# Patient Record
Sex: FEMALE | Race: WHITE | NOT HISPANIC OR LATINO
[De-identification: names, ages, dates, MRNs, and addresses within clinical notes are randomized per-mention and may not be internally consistent; named-entity substitution may affect disease eponyms.]

---

## 2017-01-06 ENCOUNTER — APPOINTMENT (OUTPATIENT)
Dept: ORTHOPEDIC SURGERY | Facility: HOSPITAL | Age: 83
End: 2017-01-06

## 2017-01-06 ENCOUNTER — OUTPATIENT (OUTPATIENT)
Dept: OUTPATIENT SERVICES | Facility: HOSPITAL | Age: 83
LOS: 1 days | Discharge: HOME | End: 2017-01-06
Payer: MEDICARE

## 2017-01-06 VITALS
DIASTOLIC BLOOD PRESSURE: 52 MMHG | SYSTOLIC BLOOD PRESSURE: 156 MMHG | RESPIRATION RATE: 14 BRPM | HEART RATE: 50 BPM | OXYGEN SATURATION: 94 %

## 2017-01-06 VITALS
SYSTOLIC BLOOD PRESSURE: 169 MMHG | HEART RATE: 51 BPM | OXYGEN SATURATION: 100 % | WEIGHT: 151.9 LBS | RESPIRATION RATE: 16 BRPM | TEMPERATURE: 99 F | DIASTOLIC BLOOD PRESSURE: 58 MMHG | HEIGHT: 62 IN

## 2017-01-06 DIAGNOSIS — Z96.7 PRESENCE OF OTHER BONE AND TENDON IMPLANTS: Chronic | ICD-10-CM

## 2017-01-06 DIAGNOSIS — T84.84XA PAIN DUE TO INTERNAL ORTHOPEDIC PROSTHETIC DEVICES, IMPLANTS AND GRAFTS, INITIAL ENCOUNTER: ICD-10-CM

## 2017-01-06 PROCEDURE — 20680 REMOVAL OF IMPLANT DEEP: CPT | Mod: RT

## 2017-01-06 PROCEDURE — 20610 DRAIN/INJ JOINT/BURSA W/O US: CPT | Mod: 59,RT

## 2017-01-06 RX ORDER — HYDROMORPHONE HYDROCHLORIDE 2 MG/ML
0.25 INJECTION INTRAMUSCULAR; INTRAVENOUS; SUBCUTANEOUS
Qty: 0 | Refills: 0 | Status: DISCONTINUED | OUTPATIENT
Start: 2017-01-06 | End: 2017-01-06

## 2017-01-06 RX ORDER — OXYCODONE HYDROCHLORIDE 5 MG/1
1 TABLET ORAL
Qty: 20 | Refills: 0 | OUTPATIENT
Start: 2017-01-06

## 2017-01-06 RX ORDER — OXYCODONE AND ACETAMINOPHEN 5; 325 MG/1; MG/1
1 TABLET ORAL
Qty: 20 | Refills: 0
Start: 2017-01-06

## 2017-01-06 RX ORDER — SODIUM CHLORIDE 9 MG/ML
1000 INJECTION, SOLUTION INTRAVENOUS
Qty: 0 | Refills: 0 | Status: DISCONTINUED | OUTPATIENT
Start: 2017-01-06 | End: 2017-01-21

## 2017-01-06 RX ORDER — ONDANSETRON 8 MG/1
4 TABLET, FILM COATED ORAL ONCE
Qty: 0 | Refills: 0 | Status: DISCONTINUED | OUTPATIENT
Start: 2017-01-06 | End: 2017-01-06

## 2017-01-06 RX ADMIN — SODIUM CHLORIDE 75 MILLILITER(S): 9 INJECTION, SOLUTION INTRAVENOUS at 09:00

## 2017-01-06 NOTE — ASU DISCHARGE PLAN (ADULT/PEDIATRIC). - NOTIFY
Persistent Nausea and Vomiting/Bleeding that does not stop/Inability to Tolerate Liquids or Foods/Fever greater than 101/Numbness, color, or temperature change to extremity

## 2017-01-06 NOTE — ASU DISCHARGE PLAN (ADULT/PEDIATRIC). - MEDICATION SUMMARY - MEDICATIONS TO TAKE
I will START or STAY ON the medications listed below when I get home from the hospital:    Ecotrin 325 mg oral delayed release tablet  -- Indication: For Prophylaxis    acetaminophen-oxyCODONE 325 mg-5 mg oral tablet  -- 1-2 tab(s) by mouth every 4 hours, As needed, Moderate Pain (4 - 6) MDD:12 tabs.  -- Indication: For Pain Control    losartan 50 mg oral tablet  -- 1 tab(s) by mouth once a day  -- Indication: For Hypertension    Januvia 25 mg oral tablet  -- 1 tab(s) by mouth once a day  -- Indication: For Diabetes    Claritin 10 mg oral tablet  -- 1 tab(s) by mouth once a day  -- Indication: For Allergies    simvastatin 20 mg oral tablet  -- 2 tab(s) by mouth once a day (at bedtime)  -- Indication: For Hyperlipidemia    metoprolol tartrate 100 mg oral tablet  -- 1 tab(s) by mouth 2 times a day  -- Indication: For Hypertension

## 2017-01-18 ENCOUNTER — APPOINTMENT (OUTPATIENT)
Dept: ORTHOPEDIC SURGERY | Facility: CLINIC | Age: 83
End: 2017-01-18

## 2017-01-18 VITALS — WEIGHT: 152 LBS | HEIGHT: 61 IN | BODY MASS INDEX: 28.7 KG/M2

## 2017-06-20 ENCOUNTER — APPOINTMENT (OUTPATIENT)
Dept: ORTHOPEDIC SURGERY | Facility: CLINIC | Age: 83
End: 2017-06-20

## 2017-08-01 ENCOUNTER — APPOINTMENT (OUTPATIENT)
Dept: ORTHOPEDIC SURGERY | Facility: CLINIC | Age: 83
End: 2017-08-01
Payer: MEDICARE

## 2017-08-01 VITALS — BODY MASS INDEX: 28.7 KG/M2 | WEIGHT: 152 LBS | HEIGHT: 61 IN

## 2017-08-01 DIAGNOSIS — S72.001D FRACTURE OF UNSPECIFIED PART OF NECK OF RIGHT FEMUR, SUBSEQUENT ENCOUNTER FOR CLOSED FRACTURE WITH ROUTINE HEALING: ICD-10-CM

## 2017-08-01 PROCEDURE — 73564 X-RAY EXAM KNEE 4 OR MORE: CPT | Mod: RT

## 2017-08-01 PROCEDURE — 99214 OFFICE O/P EST MOD 30 MIN: CPT

## 2017-08-06 ENCOUNTER — FORM ENCOUNTER (OUTPATIENT)
Age: 83
End: 2017-08-06

## 2017-08-07 ENCOUNTER — APPOINTMENT (OUTPATIENT)
Dept: ULTRASOUND IMAGING | Facility: CLINIC | Age: 83
End: 2017-08-07
Payer: MEDICARE

## 2017-08-07 ENCOUNTER — OUTPATIENT (OUTPATIENT)
Dept: OUTPATIENT SERVICES | Facility: HOSPITAL | Age: 83
LOS: 1 days | End: 2017-08-07
Payer: MEDICARE

## 2017-08-07 DIAGNOSIS — Z00.8 ENCOUNTER FOR OTHER GENERAL EXAMINATION: ICD-10-CM

## 2017-08-07 DIAGNOSIS — Z96.7 PRESENCE OF OTHER BONE AND TENDON IMPLANTS: Chronic | ICD-10-CM

## 2017-08-07 PROCEDURE — 20611 DRAIN/INJ JOINT/BURSA W/US: CPT

## 2017-08-07 PROCEDURE — 20611 DRAIN/INJ JOINT/BURSA W/US: CPT | Mod: RT

## 2018-06-19 ENCOUNTER — APPOINTMENT (OUTPATIENT)
Dept: ORTHOPEDIC SURGERY | Facility: CLINIC | Age: 84
End: 2018-06-19
Payer: MEDICARE

## 2018-06-19 VITALS
BODY MASS INDEX: 28.51 KG/M2 | HEIGHT: 61 IN | SYSTOLIC BLOOD PRESSURE: 157 MMHG | HEART RATE: 68 BPM | DIASTOLIC BLOOD PRESSURE: 65 MMHG | WEIGHT: 151 LBS

## 2018-06-19 PROCEDURE — 99214 OFFICE O/P EST MOD 30 MIN: CPT

## 2018-06-19 PROCEDURE — 73502 X-RAY EXAM HIP UNI 2-3 VIEWS: CPT | Mod: RT

## 2018-06-19 RX ORDER — CELECOXIB 100 MG/1
100 CAPSULE ORAL
Qty: 60 | Refills: 2 | Status: ACTIVE | COMMUNITY
Start: 2018-06-19 | End: 1900-01-01

## 2018-07-14 ENCOUNTER — TRANSCRIPTION ENCOUNTER (OUTPATIENT)
Age: 84
End: 2018-07-14

## 2018-10-02 ENCOUNTER — APPOINTMENT (OUTPATIENT)
Dept: ORTHOPEDIC SURGERY | Facility: CLINIC | Age: 84
End: 2018-10-02
Payer: MEDICARE

## 2018-10-02 VITALS — SYSTOLIC BLOOD PRESSURE: 146 MMHG | DIASTOLIC BLOOD PRESSURE: 63 MMHG | HEART RATE: 63 BPM

## 2018-10-02 DIAGNOSIS — M16.11 UNILATERAL PRIMARY OSTEOARTHRITIS, RIGHT HIP: ICD-10-CM

## 2018-10-02 DIAGNOSIS — M17.11 UNILATERAL PRIMARY OSTEOARTHRITIS, RIGHT KNEE: ICD-10-CM

## 2018-10-02 PROCEDURE — 99213 OFFICE O/P EST LOW 20 MIN: CPT

## 2019-03-23 NOTE — ASU PATIENT PROFILE, ADULT - NS SC CAGE ALCOHOL GUILTY ABOUT
Writer discussed clinical presentation and plan of care with attending physician, Dr. Bertha Fields. Writer received MD order to change admission order to observation.      Miguel Mercedes BSN, RN-BC  RN Case Manager
no

## 2020-01-22 ENCOUNTER — EMERGENCY (EMERGENCY)
Facility: HOSPITAL | Age: 86
LOS: 1 days | Discharge: HOME | End: 2020-01-22
Attending: EMERGENCY MEDICINE | Admitting: EMERGENCY MEDICINE
Payer: MEDICARE

## 2020-01-22 VITALS
RESPIRATION RATE: 16 BRPM | DIASTOLIC BLOOD PRESSURE: 74 MMHG | OXYGEN SATURATION: 97 % | SYSTOLIC BLOOD PRESSURE: 158 MMHG | TEMPERATURE: 98 F | HEART RATE: 62 BPM

## 2020-01-22 VITALS
WEIGHT: 145.06 LBS | TEMPERATURE: 98 F | SYSTOLIC BLOOD PRESSURE: 198 MMHG | OXYGEN SATURATION: 98 % | RESPIRATION RATE: 16 BRPM | HEART RATE: 58 BPM | DIASTOLIC BLOOD PRESSURE: 69 MMHG

## 2020-01-22 DIAGNOSIS — Z96.7 PRESENCE OF OTHER BONE AND TENDON IMPLANTS: Chronic | ICD-10-CM

## 2020-01-22 PROCEDURE — 73552 X-RAY EXAM OF FEMUR 2/>: CPT

## 2020-01-22 PROCEDURE — 99283 EMERGENCY DEPT VISIT LOW MDM: CPT

## 2020-01-22 PROCEDURE — 93971 EXTREMITY STUDY: CPT | Mod: 26,LT

## 2020-01-22 PROCEDURE — 73552 X-RAY EXAM OF FEMUR 2/>: CPT | Mod: 26,LT

## 2020-01-22 PROCEDURE — 93971 EXTREMITY STUDY: CPT

## 2020-01-22 PROCEDURE — 99284 EMERGENCY DEPT VISIT MOD MDM: CPT | Mod: 25

## 2020-01-22 NOTE — ED PROVIDER NOTE - NSFOLLOWUPINSTRUCTIONS_ED_ALL_ED_FT
Follow up with your PCP/ortho within 2-3 days. Return to ED immediately for new or worsening symptoms

## 2020-01-22 NOTE — ED ADULT NURSE NOTE - OBJECTIVE STATEMENT
Pt presents to the ED via ambulance s/p Pt presents to the ED via ambulance s/p left thigh pain that radiates down to the knee, denies injury, pt is worse with standing and walking. Skin warm and dry, + sensation, + mobility.

## 2020-01-22 NOTE — ED PROVIDER NOTE - PATIENT PORTAL LINK FT
You can access the FollowMyHealth Patient Portal offered by [facility] by registering at the following website: [email or web address]. By joining [employer]’s FollowMyHealth portal, you will also be able to view your health information using other applications (apps) compatible with our system.

## 2020-08-28 NOTE — BRIEF OPERATIVE NOTE - ASSISTANT(S)
Health Maintenance Due   Topic Date Due   • Pneumococcal Vaccine 0-64 (1 of 1 - PPSV23) 03/15/1983   • DTaP/Tdap/Td Vaccine (1 - Tdap) 03/15/1996   • Depression Screening  07/03/2020       Patient completed vaccines in office.         Recent PHQ 2/9 Score    PHQ 2:  Date Adult PHQ 2 Score Adult PHQ 2 Interpretation   8/28/2020 3 Further screening needed       PHQ 9:  Date Adult PHQ 9 Score Adult PHQ 9 Interpretation   8/28/2020 5 Mild Depression       Ezio MALHOTRA, Leodan MALHOTRA

## 2023-11-24 ENCOUNTER — INPATIENT (INPATIENT)
Facility: HOSPITAL | Age: 89
LOS: 4 days | Discharge: NURSING, REHAB OR LONG-TERM CARE | DRG: 481 | End: 2023-11-29
Attending: FAMILY MEDICINE | Admitting: INTERNAL MEDICINE
Payer: MEDICARE

## 2023-11-24 ENCOUNTER — TRANSCRIPTION ENCOUNTER (OUTPATIENT)
Age: 89
End: 2023-11-24

## 2023-11-24 VITALS
TEMPERATURE: 98 F | SYSTOLIC BLOOD PRESSURE: 166 MMHG | HEIGHT: 60 IN | WEIGHT: 139.99 LBS | OXYGEN SATURATION: 96 % | DIASTOLIC BLOOD PRESSURE: 76 MMHG | HEART RATE: 91 BPM | RESPIRATION RATE: 18 BRPM

## 2023-11-24 DIAGNOSIS — E78.5 HYPERLIPIDEMIA, UNSPECIFIED: ICD-10-CM

## 2023-11-24 DIAGNOSIS — I10 ESSENTIAL (PRIMARY) HYPERTENSION: ICD-10-CM

## 2023-11-24 DIAGNOSIS — Z96.7 PRESENCE OF OTHER BONE AND TENDON IMPLANTS: Chronic | ICD-10-CM

## 2023-11-24 DIAGNOSIS — Z29.9 ENCOUNTER FOR PROPHYLACTIC MEASURES, UNSPECIFIED: ICD-10-CM

## 2023-11-24 DIAGNOSIS — N18.9 CHRONIC KIDNEY DISEASE, UNSPECIFIED: ICD-10-CM

## 2023-11-24 DIAGNOSIS — S72.001A FRACTURE OF UNSPECIFIED PART OF NECK OF RIGHT FEMUR, INITIAL ENCOUNTER FOR CLOSED FRACTURE: ICD-10-CM

## 2023-11-24 DIAGNOSIS — N39.0 URINARY TRACT INFECTION, SITE NOT SPECIFIED: ICD-10-CM

## 2023-11-24 DIAGNOSIS — R79.89 OTHER SPECIFIED ABNORMAL FINDINGS OF BLOOD CHEMISTRY: ICD-10-CM

## 2023-11-24 DIAGNOSIS — N17.9 ACUTE KIDNEY FAILURE, UNSPECIFIED: ICD-10-CM

## 2023-11-24 DIAGNOSIS — E11.9 TYPE 2 DIABETES MELLITUS WITHOUT COMPLICATIONS: ICD-10-CM

## 2023-11-24 LAB
A1C WITH ESTIMATED AVERAGE GLUCOSE RESULT: 7.4 % — HIGH (ref 4–5.6)
A1C WITH ESTIMATED AVERAGE GLUCOSE RESULT: 7.4 % — HIGH (ref 4–5.6)
ALBUMIN SERPL ELPH-MCNC: 3.6 G/DL — SIGNIFICANT CHANGE UP (ref 3.3–5)
ALBUMIN SERPL ELPH-MCNC: 3.6 G/DL — SIGNIFICANT CHANGE UP (ref 3.3–5)
ALP SERPL-CCNC: 87 U/L — SIGNIFICANT CHANGE UP (ref 40–120)
ALP SERPL-CCNC: 87 U/L — SIGNIFICANT CHANGE UP (ref 40–120)
ALT FLD-CCNC: 26 U/L — SIGNIFICANT CHANGE UP (ref 12–78)
ALT FLD-CCNC: 26 U/L — SIGNIFICANT CHANGE UP (ref 12–78)
ANION GAP SERPL CALC-SCNC: 11 MMOL/L — SIGNIFICANT CHANGE UP (ref 5–17)
ANION GAP SERPL CALC-SCNC: 11 MMOL/L — SIGNIFICANT CHANGE UP (ref 5–17)
APPEARANCE UR: CLEAR — SIGNIFICANT CHANGE UP
APPEARANCE UR: CLEAR — SIGNIFICANT CHANGE UP
APTT BLD: 25.3 SEC — SIGNIFICANT CHANGE UP (ref 24.5–35.6)
APTT BLD: 25.3 SEC — SIGNIFICANT CHANGE UP (ref 24.5–35.6)
AST SERPL-CCNC: 22 U/L — SIGNIFICANT CHANGE UP (ref 15–37)
AST SERPL-CCNC: 22 U/L — SIGNIFICANT CHANGE UP (ref 15–37)
BACTERIA # UR AUTO: ABNORMAL /HPF
BACTERIA # UR AUTO: ABNORMAL /HPF
BASOPHILS # BLD AUTO: 0.06 K/UL — SIGNIFICANT CHANGE UP (ref 0–0.2)
BASOPHILS # BLD AUTO: 0.06 K/UL — SIGNIFICANT CHANGE UP (ref 0–0.2)
BASOPHILS NFR BLD AUTO: 0.4 % — SIGNIFICANT CHANGE UP (ref 0–2)
BASOPHILS NFR BLD AUTO: 0.4 % — SIGNIFICANT CHANGE UP (ref 0–2)
BILIRUB SERPL-MCNC: 0.5 MG/DL — SIGNIFICANT CHANGE UP (ref 0.2–1.2)
BILIRUB SERPL-MCNC: 0.5 MG/DL — SIGNIFICANT CHANGE UP (ref 0.2–1.2)
BILIRUB UR-MCNC: NEGATIVE — SIGNIFICANT CHANGE UP
BILIRUB UR-MCNC: NEGATIVE — SIGNIFICANT CHANGE UP
BLD GP AB SCN SERPL QL: SIGNIFICANT CHANGE UP
BLD GP AB SCN SERPL QL: SIGNIFICANT CHANGE UP
BUN SERPL-MCNC: 42 MG/DL — HIGH (ref 7–23)
BUN SERPL-MCNC: 42 MG/DL — HIGH (ref 7–23)
CALCIUM SERPL-MCNC: 9.5 MG/DL — SIGNIFICANT CHANGE UP (ref 8.5–10.1)
CALCIUM SERPL-MCNC: 9.5 MG/DL — SIGNIFICANT CHANGE UP (ref 8.5–10.1)
CHLORIDE SERPL-SCNC: 109 MMOL/L — HIGH (ref 96–108)
CHLORIDE SERPL-SCNC: 109 MMOL/L — HIGH (ref 96–108)
CO2 SERPL-SCNC: 23 MMOL/L — SIGNIFICANT CHANGE UP (ref 22–31)
CO2 SERPL-SCNC: 23 MMOL/L — SIGNIFICANT CHANGE UP (ref 22–31)
COLOR SPEC: YELLOW — SIGNIFICANT CHANGE UP
COLOR SPEC: YELLOW — SIGNIFICANT CHANGE UP
CREAT SERPL-MCNC: 2.1 MG/DL — HIGH (ref 0.5–1.3)
CREAT SERPL-MCNC: 2.1 MG/DL — HIGH (ref 0.5–1.3)
DIFF PNL FLD: NEGATIVE — SIGNIFICANT CHANGE UP
DIFF PNL FLD: NEGATIVE — SIGNIFICANT CHANGE UP
EGFR: 22 ML/MIN/1.73M2 — LOW
EGFR: 22 ML/MIN/1.73M2 — LOW
EOSINOPHIL # BLD AUTO: 0.07 K/UL — SIGNIFICANT CHANGE UP (ref 0–0.5)
EOSINOPHIL # BLD AUTO: 0.07 K/UL — SIGNIFICANT CHANGE UP (ref 0–0.5)
EOSINOPHIL NFR BLD AUTO: 0.5 % — SIGNIFICANT CHANGE UP (ref 0–6)
EOSINOPHIL NFR BLD AUTO: 0.5 % — SIGNIFICANT CHANGE UP (ref 0–6)
EPI CELLS # UR: PRESENT
EPI CELLS # UR: PRESENT
ESTIMATED AVERAGE GLUCOSE: 166 MG/DL — HIGH (ref 68–114)
ESTIMATED AVERAGE GLUCOSE: 166 MG/DL — HIGH (ref 68–114)
GLUCOSE SERPL-MCNC: 198 MG/DL — HIGH (ref 70–99)
GLUCOSE SERPL-MCNC: 198 MG/DL — HIGH (ref 70–99)
GLUCOSE UR QL: NEGATIVE MG/DL — SIGNIFICANT CHANGE UP
GLUCOSE UR QL: NEGATIVE MG/DL — SIGNIFICANT CHANGE UP
HCT VFR BLD CALC: 35.9 % — SIGNIFICANT CHANGE UP (ref 34.5–45)
HCT VFR BLD CALC: 35.9 % — SIGNIFICANT CHANGE UP (ref 34.5–45)
HGB BLD-MCNC: 12 G/DL — SIGNIFICANT CHANGE UP (ref 11.5–15.5)
HGB BLD-MCNC: 12 G/DL — SIGNIFICANT CHANGE UP (ref 11.5–15.5)
IMM GRANULOCYTES NFR BLD AUTO: 1 % — HIGH (ref 0–0.9)
IMM GRANULOCYTES NFR BLD AUTO: 1 % — HIGH (ref 0–0.9)
INR BLD: 1.01 RATIO — SIGNIFICANT CHANGE UP (ref 0.85–1.18)
INR BLD: 1.01 RATIO — SIGNIFICANT CHANGE UP (ref 0.85–1.18)
KETONES UR-MCNC: NEGATIVE MG/DL — SIGNIFICANT CHANGE UP
KETONES UR-MCNC: NEGATIVE MG/DL — SIGNIFICANT CHANGE UP
LEUKOCYTE ESTERASE UR-ACNC: ABNORMAL
LEUKOCYTE ESTERASE UR-ACNC: ABNORMAL
LYMPHOCYTES # BLD AUTO: 1.39 K/UL — SIGNIFICANT CHANGE UP (ref 1–3.3)
LYMPHOCYTES # BLD AUTO: 1.39 K/UL — SIGNIFICANT CHANGE UP (ref 1–3.3)
LYMPHOCYTES # BLD AUTO: 9.5 % — LOW (ref 13–44)
LYMPHOCYTES # BLD AUTO: 9.5 % — LOW (ref 13–44)
MCHC RBC-ENTMCNC: 28 PG — SIGNIFICANT CHANGE UP (ref 27–34)
MCHC RBC-ENTMCNC: 28 PG — SIGNIFICANT CHANGE UP (ref 27–34)
MCHC RBC-ENTMCNC: 33.4 GM/DL — SIGNIFICANT CHANGE UP (ref 32–36)
MCHC RBC-ENTMCNC: 33.4 GM/DL — SIGNIFICANT CHANGE UP (ref 32–36)
MCV RBC AUTO: 83.7 FL — SIGNIFICANT CHANGE UP (ref 80–100)
MCV RBC AUTO: 83.7 FL — SIGNIFICANT CHANGE UP (ref 80–100)
MONOCYTES # BLD AUTO: 1.16 K/UL — HIGH (ref 0–0.9)
MONOCYTES # BLD AUTO: 1.16 K/UL — HIGH (ref 0–0.9)
MONOCYTES NFR BLD AUTO: 7.9 % — SIGNIFICANT CHANGE UP (ref 2–14)
MONOCYTES NFR BLD AUTO: 7.9 % — SIGNIFICANT CHANGE UP (ref 2–14)
NEUTROPHILS # BLD AUTO: 11.81 K/UL — HIGH (ref 1.8–7.4)
NEUTROPHILS # BLD AUTO: 11.81 K/UL — HIGH (ref 1.8–7.4)
NEUTROPHILS NFR BLD AUTO: 80.7 % — HIGH (ref 43–77)
NEUTROPHILS NFR BLD AUTO: 80.7 % — HIGH (ref 43–77)
NITRITE UR-MCNC: NEGATIVE — SIGNIFICANT CHANGE UP
NITRITE UR-MCNC: NEGATIVE — SIGNIFICANT CHANGE UP
NRBC # BLD: 0 /100 WBCS — SIGNIFICANT CHANGE UP (ref 0–0)
NRBC # BLD: 0 /100 WBCS — SIGNIFICANT CHANGE UP (ref 0–0)
PH UR: 5.5 — SIGNIFICANT CHANGE UP (ref 5–8)
PH UR: 5.5 — SIGNIFICANT CHANGE UP (ref 5–8)
PLATELET # BLD AUTO: 179 K/UL — SIGNIFICANT CHANGE UP (ref 150–400)
PLATELET # BLD AUTO: 179 K/UL — SIGNIFICANT CHANGE UP (ref 150–400)
POTASSIUM SERPL-MCNC: 3.6 MMOL/L — SIGNIFICANT CHANGE UP (ref 3.5–5.3)
POTASSIUM SERPL-MCNC: 3.6 MMOL/L — SIGNIFICANT CHANGE UP (ref 3.5–5.3)
POTASSIUM SERPL-SCNC: 3.6 MMOL/L — SIGNIFICANT CHANGE UP (ref 3.5–5.3)
POTASSIUM SERPL-SCNC: 3.6 MMOL/L — SIGNIFICANT CHANGE UP (ref 3.5–5.3)
PROT SERPL-MCNC: 7.3 G/DL — SIGNIFICANT CHANGE UP (ref 6–8.3)
PROT SERPL-MCNC: 7.3 G/DL — SIGNIFICANT CHANGE UP (ref 6–8.3)
PROT UR-MCNC: SIGNIFICANT CHANGE UP MG/DL
PROT UR-MCNC: SIGNIFICANT CHANGE UP MG/DL
PROTHROM AB SERPL-ACNC: 11.8 SEC — SIGNIFICANT CHANGE UP (ref 9.5–13)
PROTHROM AB SERPL-ACNC: 11.8 SEC — SIGNIFICANT CHANGE UP (ref 9.5–13)
RBC # BLD: 4.29 M/UL — SIGNIFICANT CHANGE UP (ref 3.8–5.2)
RBC # BLD: 4.29 M/UL — SIGNIFICANT CHANGE UP (ref 3.8–5.2)
RBC # FLD: 14.2 % — SIGNIFICANT CHANGE UP (ref 10.3–14.5)
RBC # FLD: 14.2 % — SIGNIFICANT CHANGE UP (ref 10.3–14.5)
RBC CASTS # UR COMP ASSIST: SIGNIFICANT CHANGE UP /HPF
RBC CASTS # UR COMP ASSIST: SIGNIFICANT CHANGE UP /HPF
SODIUM SERPL-SCNC: 143 MMOL/L — SIGNIFICANT CHANGE UP (ref 135–145)
SODIUM SERPL-SCNC: 143 MMOL/L — SIGNIFICANT CHANGE UP (ref 135–145)
SP GR SPEC: 1.01 — SIGNIFICANT CHANGE UP (ref 1–1.03)
SP GR SPEC: 1.01 — SIGNIFICANT CHANGE UP (ref 1–1.03)
TROPONIN I, HIGH SENSITIVITY RESULT: 24.8 NG/L — SIGNIFICANT CHANGE UP
TROPONIN I, HIGH SENSITIVITY RESULT: 24.8 NG/L — SIGNIFICANT CHANGE UP
UROBILINOGEN FLD QL: 0.2 MG/DL — SIGNIFICANT CHANGE UP (ref 0.2–1)
UROBILINOGEN FLD QL: 0.2 MG/DL — SIGNIFICANT CHANGE UP (ref 0.2–1)
WBC # BLD: 14.63 K/UL — HIGH (ref 3.8–10.5)
WBC # BLD: 14.63 K/UL — HIGH (ref 3.8–10.5)
WBC # FLD AUTO: 14.63 K/UL — HIGH (ref 3.8–10.5)
WBC # FLD AUTO: 14.63 K/UL — HIGH (ref 3.8–10.5)
WBC UR QL: 18 /HPF — HIGH (ref 0–5)
WBC UR QL: 18 /HPF — HIGH (ref 0–5)

## 2023-11-24 PROCEDURE — 99222 1ST HOSP IP/OBS MODERATE 55: CPT | Mod: GC

## 2023-11-24 PROCEDURE — 73552 X-RAY EXAM OF FEMUR 2/>: CPT | Mod: 26,RT

## 2023-11-24 PROCEDURE — 71100 X-RAY EXAM RIBS UNI 2 VIEWS: CPT | Mod: 26

## 2023-11-24 PROCEDURE — 93306 TTE W/DOPPLER COMPLETE: CPT | Mod: 26

## 2023-11-24 PROCEDURE — 71045 X-RAY EXAM CHEST 1 VIEW: CPT | Mod: 26,59

## 2023-11-24 PROCEDURE — 93010 ELECTROCARDIOGRAM REPORT: CPT

## 2023-11-24 PROCEDURE — 99222 1ST HOSP IP/OBS MODERATE 55: CPT

## 2023-11-24 PROCEDURE — 73502 X-RAY EXAM HIP UNI 2-3 VIEWS: CPT | Mod: 26,RT

## 2023-11-24 PROCEDURE — 93010 ELECTROCARDIOGRAM REPORT: CPT | Mod: 77

## 2023-11-24 PROCEDURE — 99285 EMERGENCY DEPT VISIT HI MDM: CPT

## 2023-11-24 RX ORDER — HEPARIN SODIUM 5000 [USP'U]/ML
5000 INJECTION INTRAVENOUS; SUBCUTANEOUS ONCE
Refills: 0 | Status: COMPLETED | OUTPATIENT
Start: 2023-11-24 | End: 2023-11-24

## 2023-11-24 RX ORDER — OXYCODONE HYDROCHLORIDE 5 MG/1
5 TABLET ORAL EVERY 6 HOURS
Refills: 0 | Status: DISCONTINUED | OUTPATIENT
Start: 2023-11-24 | End: 2023-11-24

## 2023-11-24 RX ORDER — ACETAMINOPHEN 500 MG
1000 TABLET ORAL ONCE
Refills: 0 | Status: DISCONTINUED | OUTPATIENT
Start: 2023-11-24 | End: 2023-11-24

## 2023-11-24 RX ORDER — HYDROMORPHONE HYDROCHLORIDE 2 MG/ML
0.5 INJECTION INTRAMUSCULAR; INTRAVENOUS; SUBCUTANEOUS ONCE
Refills: 0 | Status: DISCONTINUED | OUTPATIENT
Start: 2023-11-24 | End: 2023-11-25

## 2023-11-24 RX ORDER — MORPHINE SULFATE 50 MG/1
2 CAPSULE, EXTENDED RELEASE ORAL ONCE
Refills: 0 | Status: DISCONTINUED | OUTPATIENT
Start: 2023-11-24 | End: 2023-11-24

## 2023-11-24 RX ORDER — ASPIRIN/CALCIUM CARB/MAGNESIUM 324 MG
81 TABLET ORAL DAILY
Refills: 0 | Status: DISCONTINUED | OUTPATIENT
Start: 2023-11-24 | End: 2023-11-25

## 2023-11-24 RX ORDER — SODIUM CHLORIDE 9 MG/ML
1000 INJECTION, SOLUTION INTRAVENOUS
Refills: 0 | Status: DISCONTINUED | OUTPATIENT
Start: 2023-11-24 | End: 2023-11-25

## 2023-11-24 RX ORDER — LANOLIN ALCOHOL/MO/W.PET/CERES
3 CREAM (GRAM) TOPICAL AT BEDTIME
Refills: 0 | Status: DISCONTINUED | OUTPATIENT
Start: 2023-11-24 | End: 2023-11-25

## 2023-11-24 RX ORDER — SENNA PLUS 8.6 MG/1
2 TABLET ORAL AT BEDTIME
Refills: 0 | Status: DISCONTINUED | OUTPATIENT
Start: 2023-11-24 | End: 2023-11-25

## 2023-11-24 RX ORDER — HEPARIN SODIUM 5000 [USP'U]/ML
5000 INJECTION INTRAVENOUS; SUBCUTANEOUS ONCE
Refills: 0 | Status: DISCONTINUED | OUTPATIENT
Start: 2023-11-24 | End: 2023-11-24

## 2023-11-24 RX ORDER — SODIUM CHLORIDE 9 MG/ML
1000 INJECTION INTRAMUSCULAR; INTRAVENOUS; SUBCUTANEOUS ONCE
Refills: 0 | Status: COMPLETED | OUTPATIENT
Start: 2023-11-24 | End: 2023-11-24

## 2023-11-24 RX ORDER — DEXTROSE 50 % IN WATER 50 %
25 SYRINGE (ML) INTRAVENOUS ONCE
Refills: 0 | Status: DISCONTINUED | OUTPATIENT
Start: 2023-11-24 | End: 2023-11-25

## 2023-11-24 RX ORDER — DEXTROSE 50 % IN WATER 50 %
12.5 SYRINGE (ML) INTRAVENOUS ONCE
Refills: 0 | Status: DISCONTINUED | OUTPATIENT
Start: 2023-11-24 | End: 2023-11-25

## 2023-11-24 RX ORDER — CEFAZOLIN SODIUM 1 G
2000 VIAL (EA) INJECTION EVERY 8 HOURS
Refills: 0 | Status: DISCONTINUED | OUTPATIENT
Start: 2023-11-24 | End: 2023-11-24

## 2023-11-24 RX ORDER — ACETAMINOPHEN 500 MG
1000 TABLET ORAL ONCE
Refills: 0 | Status: COMPLETED | OUTPATIENT
Start: 2023-11-24 | End: 2023-11-24

## 2023-11-24 RX ORDER — ONDANSETRON 8 MG/1
4 TABLET, FILM COATED ORAL ONCE
Refills: 0 | Status: COMPLETED | OUTPATIENT
Start: 2023-11-24 | End: 2023-11-24

## 2023-11-24 RX ORDER — ONDANSETRON 8 MG/1
4 TABLET, FILM COATED ORAL EVERY 8 HOURS
Refills: 0 | Status: DISCONTINUED | OUTPATIENT
Start: 2023-11-24 | End: 2023-11-25

## 2023-11-24 RX ORDER — METOPROLOL TARTRATE 50 MG
100 TABLET ORAL DAILY
Refills: 0 | Status: DISCONTINUED | OUTPATIENT
Start: 2023-11-24 | End: 2023-11-25

## 2023-11-24 RX ORDER — CEFTRIAXONE 500 MG/1
1000 INJECTION, POWDER, FOR SOLUTION INTRAMUSCULAR; INTRAVENOUS EVERY 24 HOURS
Refills: 0 | Status: DISCONTINUED | OUTPATIENT
Start: 2023-11-24 | End: 2023-11-25

## 2023-11-24 RX ORDER — OXYCODONE HYDROCHLORIDE 5 MG/1
2.5 TABLET ORAL EVERY 6 HOURS
Refills: 0 | Status: DISCONTINUED | OUTPATIENT
Start: 2023-11-24 | End: 2023-11-24

## 2023-11-24 RX ORDER — ACETAMINOPHEN 500 MG
650 TABLET ORAL EVERY 6 HOURS
Refills: 0 | Status: DISCONTINUED | OUTPATIENT
Start: 2023-11-24 | End: 2023-11-25

## 2023-11-24 RX ORDER — INSULIN LISPRO 100/ML
VIAL (ML) SUBCUTANEOUS EVERY 6 HOURS
Refills: 0 | Status: DISCONTINUED | OUTPATIENT
Start: 2023-11-24 | End: 2023-11-25

## 2023-11-24 RX ORDER — LOSARTAN POTASSIUM 100 MG/1
50 TABLET, FILM COATED ORAL DAILY
Refills: 0 | Status: DISCONTINUED | OUTPATIENT
Start: 2023-11-24 | End: 2023-11-24

## 2023-11-24 RX ORDER — GLUCAGON INJECTION, SOLUTION 0.5 MG/.1ML
1 INJECTION, SOLUTION SUBCUTANEOUS ONCE
Refills: 0 | Status: DISCONTINUED | OUTPATIENT
Start: 2023-11-24 | End: 2023-11-25

## 2023-11-24 RX ORDER — ATORVASTATIN CALCIUM 80 MG/1
40 TABLET, FILM COATED ORAL AT BEDTIME
Refills: 0 | Status: DISCONTINUED | OUTPATIENT
Start: 2023-11-24 | End: 2023-11-25

## 2023-11-24 RX ORDER — METOPROLOL TARTRATE 50 MG
100 TABLET ORAL ONCE
Refills: 0 | Status: DISCONTINUED | OUTPATIENT
Start: 2023-11-24 | End: 2023-11-29

## 2023-11-24 RX ORDER — DEXTROSE 50 % IN WATER 50 %
15 SYRINGE (ML) INTRAVENOUS ONCE
Refills: 0 | Status: DISCONTINUED | OUTPATIENT
Start: 2023-11-24 | End: 2023-11-25

## 2023-11-24 RX ORDER — ACETAMINOPHEN 500 MG
650 TABLET ORAL EVERY 6 HOURS
Refills: 0 | Status: DISCONTINUED | OUTPATIENT
Start: 2023-11-24 | End: 2023-11-24

## 2023-11-24 RX ORDER — CEFAZOLIN SODIUM 1 G
2000 VIAL (EA) INJECTION ONCE
Refills: 0 | Status: COMPLETED | OUTPATIENT
Start: 2023-11-24 | End: 2023-11-24

## 2023-11-24 RX ORDER — TRAMADOL HYDROCHLORIDE 50 MG/1
25 TABLET ORAL EVERY 8 HOURS
Refills: 0 | Status: DISCONTINUED | OUTPATIENT
Start: 2023-11-24 | End: 2023-11-25

## 2023-11-24 RX ORDER — SODIUM CHLORIDE 9 MG/ML
1000 INJECTION INTRAMUSCULAR; INTRAVENOUS; SUBCUTANEOUS
Refills: 0 | Status: DISCONTINUED | OUTPATIENT
Start: 2023-11-24 | End: 2023-11-25

## 2023-11-24 RX ORDER — HYDROMORPHONE HYDROCHLORIDE 2 MG/ML
0.5 INJECTION INTRAMUSCULAR; INTRAVENOUS; SUBCUTANEOUS ONCE
Refills: 0 | Status: DISCONTINUED | OUTPATIENT
Start: 2023-11-24 | End: 2023-11-24

## 2023-11-24 RX ORDER — ENOXAPARIN SODIUM 100 MG/ML
40 INJECTION SUBCUTANEOUS ONCE
Refills: 0 | Status: DISCONTINUED | OUTPATIENT
Start: 2023-11-24 | End: 2023-11-24

## 2023-11-24 RX ADMIN — MORPHINE SULFATE 2 MILLIGRAM(S): 50 CAPSULE, EXTENDED RELEASE ORAL at 09:53

## 2023-11-24 RX ADMIN — ATORVASTATIN CALCIUM 40 MILLIGRAM(S): 80 TABLET, FILM COATED ORAL at 21:18

## 2023-11-24 RX ADMIN — SENNA PLUS 2 TABLET(S): 8.6 TABLET ORAL at 21:19

## 2023-11-24 RX ADMIN — HEPARIN SODIUM 5000 UNIT(S): 5000 INJECTION INTRAVENOUS; SUBCUTANEOUS at 21:18

## 2023-11-24 RX ADMIN — SODIUM CHLORIDE 75 MILLILITER(S): 9 INJECTION, SOLUTION INTRAVENOUS at 18:24

## 2023-11-24 RX ADMIN — SODIUM CHLORIDE 1000 MILLILITER(S): 9 INJECTION INTRAMUSCULAR; INTRAVENOUS; SUBCUTANEOUS at 09:46

## 2023-11-24 RX ADMIN — ONDANSETRON 4 MILLIGRAM(S): 8 TABLET, FILM COATED ORAL at 08:53

## 2023-11-24 RX ADMIN — Medication 400 MILLIGRAM(S): at 10:36

## 2023-11-24 RX ADMIN — Medication 2: at 18:52

## 2023-11-24 RX ADMIN — TRAMADOL HYDROCHLORIDE 25 MILLIGRAM(S): 50 TABLET ORAL at 22:25

## 2023-11-24 RX ADMIN — HYDROMORPHONE HYDROCHLORIDE 0.5 MILLIGRAM(S): 2 INJECTION INTRAMUSCULAR; INTRAVENOUS; SUBCUTANEOUS at 14:25

## 2023-11-24 RX ADMIN — MORPHINE SULFATE 2 MILLIGRAM(S): 50 CAPSULE, EXTENDED RELEASE ORAL at 09:45

## 2023-11-24 RX ADMIN — CEFTRIAXONE 100 MILLIGRAM(S): 500 INJECTION, POWDER, FOR SOLUTION INTRAMUSCULAR; INTRAVENOUS at 21:19

## 2023-11-24 RX ADMIN — SODIUM CHLORIDE 75 MILLILITER(S): 9 INJECTION, SOLUTION INTRAVENOUS at 12:59

## 2023-11-24 RX ADMIN — MORPHINE SULFATE 2 MILLIGRAM(S): 50 CAPSULE, EXTENDED RELEASE ORAL at 10:15

## 2023-11-24 RX ADMIN — SODIUM CHLORIDE 1000 MILLILITER(S): 9 INJECTION INTRAMUSCULAR; INTRAVENOUS; SUBCUTANEOUS at 08:54

## 2023-11-24 RX ADMIN — Medication 1000 MILLIGRAM(S): at 11:00

## 2023-11-24 RX ADMIN — MORPHINE SULFATE 2 MILLIGRAM(S): 50 CAPSULE, EXTENDED RELEASE ORAL at 08:49

## 2023-11-24 NOTE — ED PROVIDER NOTE - CLINICAL SUMMARY MEDICAL DECISION MAKING FREE TEXT BOX
Mechanical fall today, no anticoagulants with right hip injury.  Will check labs, x-ray, UA for possible UTI, admission

## 2023-11-24 NOTE — CONSULT NOTE ADULT - SUBJECTIVE AND OBJECTIVE BOX
[facility] - Jacey Sandoval,  Tigist, Kedar Bajwa Goodger, Natan Waddell  Office Number: [phone]    Initial Consult Note    CHIEF COMPLAINT: Patient is a 89y old  Female who presents with a chief complaint of fall     HPI:  89 yo Female with PMHx of HTN, T2DM, TIA 9/2016, right hip fracture, S/P ORIF right hip 3/5/16 presents to ED w/ a c/o of R hip pain s/p fall. Plan for IMN 11/24/23.    PAST MEDICAL & SURGICAL HISTORY:  Hypertension      Diabetes      TIA (transient ischemic attack)      Hip fracture      S/P ORIF (open reduction internal fixation) fracture  right hip        SOCIAL HISTORY:  No tobacco, ethanol, or drug abuse.  FAMILY HISTORY:    No family history of acute MI or sudden cardiac death.  MEDICATIONS  (STANDING):  cefTRIAXone   IVPB 1000 milliGRAM(s) IV Intermittent every 24 hours  lactated ringers. 1000 milliLiter(s) (75 mL/Hr) IV Continuous <Continuous>    MEDICATIONS  (PRN):    Allergies    No Known Allergies    Intolerances      REVIEW OF SYSTEMS:  All other review of systems is negative unless indicated above  VITAL SIGNS:   Vital Signs Last 24 Hrs  T(C): 36.7 (24 Nov 2023 06:47), Max: 36.7 (24 Nov 2023 06:47)  T(F): 98.1 (24 Nov 2023 06:47), Max: 98.1 (24 Nov 2023 06:47)  HR: 91 (24 Nov 2023 06:47) (91 - 91)  BP: 166/76 (24 Nov 2023 06:47) (166/76 - 166/76)  BP(mean): --  RR: 18 (24 Nov 2023 06:47) (18 - 18)  SpO2: 96% (24 Nov 2023 06:47) (96% - 96%)    Parameters below as of 24 Nov 2023 06:47  Patient On (Oxygen Delivery Method): room air      I&O's Summary    On Exam:  Constitutional: NAD, alert and oriented x 3  Lungs:  Non-labored, breath sounds are clear bilaterally, No wheezing, rales or rhonchi  Cardiovascular: RRR.  S1 and S2 positive.  No murmurs, rubs, gallops or clicks  Gastrointestinal: Bowel Sounds present, soft, nontender.   Extremities: No peripheral edema.   Neurological: Alert, no focal deficits  Skin: No rashes or ulcers   Psych:  Mood & affect appropriate.    LABS: All Labs Reviewed:                        12.0   14.63 )-----------( 179      ( 24 Nov 2023 09:00 )             35.9     24 Nov 2023 09:00    143    |  109    |  42     ----------------------------<  198    3.6     |  23     |  2.10     Ca    9.5        24 Nov 2023 09:00    TPro  7.3    /  Alb  3.6    /  TBili  0.5    /  DBili  x      /  AST  22     /  ALT  26     /  AlkPhos  87     24 Nov 2023 09:00    PT/INR - ( 24 Nov 2023 09:00 )   PT: 11.8 sec;   INR: 1.01 ratio         PTT - ( 24 Nov 2023 09:00 )  PTT:25.3 sec      Blood Culture:         RADIOLOGY:    EKG:       [facility] - Jacey Sandoval,  Tigist, Kedar Bajwa, Hugo, Natan Waddell  Office Number: [phone]    Initial Consult Note    CHIEF COMPLAINT: Patient is a 89y old  Female who presents with a chief complaint of fall     HPI:  89 yo Female with PMHx of HTN, T2DM, TIA 9/2016, right hip fracture, S/P ORIF right hip 3/5/16 presents to ED w/ a c/o of R hip pain s/p fall. Plan for IMN 11/24/23.    Pt got up at 4am, to look at helicopter/sound. She tripped and fell landing on her right hip.  She denied any LOC, dizziness, sob, chest pain.    She does not have a cardiologist. Denied any recent TTE or NST. At baseline, she is ambulatory with walker.     PAST MEDICAL & SURGICAL HISTORY:  Hypertension      Diabetes      TIA (transient ischemic attack)      Hip fracture      S/P ORIF (open reduction internal fixation) fracture  right hip        SOCIAL HISTORY:  No tobacco, ethanol, or drug abuse.  FAMILY HISTORY:    No family history of acute MI or sudden cardiac death.  MEDICATIONS  (STANDING):  cefTRIAXone   IVPB 1000 milliGRAM(s) IV Intermittent every 24 hours  lactated ringers. 1000 milliLiter(s) (75 mL/Hr) IV Continuous <Continuous>    MEDICATIONS  (PRN):    Allergies    No Known Allergies    Intolerances      REVIEW OF SYSTEMS:  All other review of systems is negative unless indicated above  VITAL SIGNS:   Vital Signs Last 24 Hrs  T(C): 36.7 (24 Nov 2023 06:47), Max: 36.7 (24 Nov 2023 06:47)  T(F): 98.1 (24 Nov 2023 06:47), Max: 98.1 (24 Nov 2023 06:47)  HR: 91 (24 Nov 2023 06:47) (91 - 91)  BP: 166/76 (24 Nov 2023 06:47) (166/76 - 166/76)  BP(mean): --  RR: 18 (24 Nov 2023 06:47) (18 - 18)  SpO2: 96% (24 Nov 2023 06:47) (96% - 96%)    Parameters below as of 24 Nov 2023 06:47  Patient On (Oxygen Delivery Method): room air      I&O's Summary    On Exam:  Constitutional: NAD, alert and oriented x 3  Lungs:  Non-labored, breath sounds are clear bilaterally, No wheezing, rales or rhonchi  Cardiovascular: RRR.  S1 and S2 positive.  No murmurs, rubs, gallops or clicks  Gastrointestinal: Bowel Sounds present, soft, nontender.   Extremities: No peripheral edema.   Neurological: Alert, no focal deficits  Skin: No rashes or ulcers   Psych:  Mood & affect appropriate.    LABS: All Labs Reviewed:                        12.0   14.63 )-----------( 179      ( 24 Nov 2023 09:00 )             35.9     24 Nov 2023 09:00    143    |  109    |  42     ----------------------------<  198    3.6     |  23     |  2.10     Ca    9.5        24 Nov 2023 09:00    TPro  7.3    /  Alb  3.6    /  TBili  0.5    /  DBili  x      /  AST  22     /  ALT  26     /  AlkPhos  87     24 Nov 2023 09:00    PT/INR - ( 24 Nov 2023 09:00 )   PT: 11.8 sec;   INR: 1.01 ratio         PTT - ( 24 Nov 2023 09:00 )  PTT:25.3 sec      Blood Culture:         RADIOLOGY:    EKG:       [facility] - Jacey Sandoval,  Tigist, Kedar Bajwa, Hugo, Natan Waddell  Office Number: [phone]    Initial Consult Note    CHIEF COMPLAINT: Patient is a 89y old  Female who presents with a chief complaint of fall     HPI:  89 yo Female with PMHx of HTN, T2DM, TIA 9/2016, right hip fracture, S/P ORIF right hip 3/5/16 presents to ED w/ a c/o of R hip pain s/p fall. Plan for IMN 11/24/23.    Pt got up at 4am, to look at helicopter/sound. She tripped and fell landing on her right hip.  She denied any LOC, dizziness, sob, chest pain.    She does not have a cardiologist. Denied any recent TTE or NST. At baseline, she is ambulatory with walker.     PAST MEDICAL & SURGICAL HISTORY:  Hypertension      Diabetes      TIA (transient ischemic attack)      Hip fracture      S/P ORIF (open reduction internal fixation) fracture  right hip        SOCIAL HISTORY:  No tobacco, ethanol, or drug abuse.  FAMILY HISTORY:    No family history of acute MI or sudden cardiac death.  MEDICATIONS  (STANDING):  cefTRIAXone   IVPB 1000 milliGRAM(s) IV Intermittent every 24 hours  lactated ringers. 1000 milliLiter(s) (75 mL/Hr) IV Continuous <Continuous>    MEDICATIONS  (PRN):    Allergies    No Known Allergies    Intolerances      REVIEW OF SYSTEMS:  All other review of systems is negative unless indicated above  VITAL SIGNS:   Vital Signs Last 24 Hrs  T(C): 36.7 (24 Nov 2023 06:47), Max: 36.7 (24 Nov 2023 06:47)  T(F): 98.1 (24 Nov 2023 06:47), Max: 98.1 (24 Nov 2023 06:47)  HR: 91 (24 Nov 2023 06:47) (91 - 91)  BP: 166/76 (24 Nov 2023 06:47) (166/76 - 166/76)  BP(mean): --  RR: 18 (24 Nov 2023 06:47) (18 - 18)  SpO2: 96% (24 Nov 2023 06:47) (96% - 96%)    Parameters below as of 24 Nov 2023 06:47  Patient On (Oxygen Delivery Method): room air      I&O's Summary    On Exam:  Constitutional: NAD, alert and oriented x 3  Lungs:  Non-labored, breath sounds are clear bilaterally, No wheezing, rales or rhonchi  Cardiovascular: RRR.  S1 and S2 positive.  No murmurs, rubs, gallops or clicks  Gastrointestinal: Bowel Sounds present, soft, nontender.   Extremities: No peripheral edema. TTP hip + pain  Neurological: Alert, no focal deficits  Skin: No rashes or ulcers   Psych:  Mood & affect appropriate.    LABS: All Labs Reviewed:                        12.0   14.63 )-----------( 179      ( 24 Nov 2023 09:00 )             35.9     24 Nov 2023 09:00    143    |  109    |  42     ----------------------------<  198    3.6     |  23     |  2.10     Ca    9.5        24 Nov 2023 09:00    TPro  7.3    /  Alb  3.6    /  TBili  0.5    /  DBili  x      /  AST  22     /  ALT  26     /  AlkPhos  87     24 Nov 2023 09:00    PT/INR - ( 24 Nov 2023 09:00 )   PT: 11.8 sec;   INR: 1.01 ratio         PTT - ( 24 Nov 2023 09:00 )  PTT:25.3 sec      Blood Culture:         RADIOLOGY:    EKG:

## 2023-11-24 NOTE — H&P ADULT - NSHPSOCIALHISTORY_GEN_ALL_CORE
Tobacco: Former, quit 50+ years   EtOH:  Denies  Recreational drug use: Denies  Lives with: Alone  Ambulates: Walker  ADLs: Independent

## 2023-11-24 NOTE — H&P ADULT - HISTORY OF PRESENT ILLNESS
89 yo female w/ pmh of HTN, HLD, T2DM, TIA, previous right hip fx w/ surgical pinning s/p removal presents following a mechanical fall in her home. Pt states she was standing beside her chair looking out the window when she lost her balance and fell onto her right side. Denies head trauma, LOC, dizziness, lightheadedness. States that she crawled to a phone to call an ambulance. This all occurred at 4am this morning. Initially on arrival to the ED she thought she ate breakfast, but now realizes that she never ate today. Pt states that she had a previous fx to the right hip which had a pin that "fell out" and she had surgically removed. Pt states that even following pain medication in the ED, the pain is an 8-9/10 and radiates to her groin.  Of note, the patient states that her urine this morning was malodorous similar to a UTI she has had in the past. At that time, she too did not have sxs of a urinary tract infection. Denies fever, chills, sob, cp, palpitations, abd pain, n/v/d/c, dysuria, urinary frequency, urinary urgency, hematuria.    ED course  Vitals: T 98.1, HR 91, /76, RR 18, SpO2 96% on RA  Significant labs: WBC 14.63, BUN/Cr 42/2.1, UA: Positive for Large Leukocyte Esterase, WBC 18, Many Bacteria, Positive Squamous Epithelial Cells  Imaging: XR Femur and Hip: Proximal femoral neck fx on personal read  CXR: No acute cardiopulmonary pathology on wet read  Xray Ribs: No acute fx to right sided ribs  EKG: NSR   In ED patient given: 1L NS, 1L LR, Ofirmev, 2mg Morphine x2, Zofran   89 yo female w/ pmh of HTN, HLD, T2DM, TIA, CKD previous right hip fx w/ surgical pinning s/p removal presents following a mechanical fall in her home. Pt states she was standing beside her chair looking out the window when she lost her balance and fell onto her right side. Denies head trauma, LOC, dizziness, lightheadedness. States that she crawled to a phone to call an ambulance. This all occurred at 4am this morning. Initially on arrival to the ED she thought she ate breakfast, but now realizes that she never ate today. Pt states that she had a previous fx to the right hip which had a pin that "fell out" and she had surgically removed. Pt states that even following pain medication in the ED, the pain is an 8-9/10 and radiates to her groin.  Of note, the patient states that her urine this morning was malodorous similar to a UTI she has had in the past. At that time, she too did not have sxs of a urinary tract infection. Denies fever, chills, sob, cp, palpitations, abd pain, n/v/d/c, dysuria, urinary frequency, urinary urgency, hematuria.    ED course  Vitals: T 98.1, HR 91, /76, RR 18, SpO2 96% on RA  Significant labs: WBC 14.63, BUN/Cr 42/2.1, UA: Positive for Large Leukocyte Esterase, WBC 18, Many Bacteria, Positive Squamous Epithelial Cells  Imaging: XR Femur and Hip: Proximal femoral neck fx on personal read  CXR: No acute cardiopulmonary pathology on wet read  Xray Ribs: No acute fx to right sided ribs  EKG: NSR   In ED patient given: 1L NS, 1L LR, Ofirmev, 2mg Morphine x2, Zofran

## 2023-11-24 NOTE — ED PROVIDER NOTE - PHYSICAL EXAMINATION
Right hip: Positive tenderness to right proximal hip with external rotation and slight shortening.  Normal distal strength and sensation equal bilaterally.  2+ pulses.  Normal cap refill.  Normal left hip.  Normal pelvis.

## 2023-11-24 NOTE — PATIENT PROFILE ADULT - FALL HARM RISK - RISK INTERVENTIONS

## 2023-11-24 NOTE — H&P ADULT - PROBLEM SELECTOR PLAN 4
Hold home januvia  -q6h FSS and Low ISS while patient NPO  -Hypoglycemia protocol Hold home januvia  -q6h FSS and Low ISS while patient NPO  -Hypoglycemia protocol  -f/u A1c

## 2023-11-24 NOTE — H&P ADULT - NSHPREVIEWOFSYSTEMS_GEN_ALL_CORE
CONSTITUTIONAL: No weakness, fevers or chills  HEENT:  No headache, no visual changes, no sore throat  RESPIRATORY: No cough, wheezing, hemoptysis; No shortness of breath  CARDIOVASCULAR: No chest pain or palpitations  GASTROINTESTINAL: No abdominal pain, no nausea, vomiting, or hematemesis; No diarrhea or constipation. No melena or hematochezia.  GENITOURINARY: +malodorous urine, No dysuria, frequency or hematuria  NEUROLOGICAL: No focal weakness or dizziness   MSK: +hip pain No myalgias  SKIN: No itching, burning, rashes, or lesions

## 2023-11-24 NOTE — ED ADULT NURSE NOTE - NSFALLHARMRISKINTERV_ED_ALL_ED

## 2023-11-24 NOTE — CONSULT NOTE ADULT - ASSESSMENT
NOTE IN PROGRESS 89 yo Female with PMHx of HTN, T2DM, TIA 9/2016, right hip fracture, S/P ORIF right hip 3/5/16 presents to ED w/ a c/o of R hip pain s/p fall. Plan for IMN 11/24/23.    HTN, preop  - Denies chest pain, palpitation, SOB, syncope, dizziness, lightheadedness, orthopnea, PND, KAUR and edema  - EKG demonstrates NSR. No acute ischemic changes noted.     - Patient euvolemic on examination with no overt signs of heart failure or cardiac ischemia.   - No severe valvular abnormalities noted on examination  - Would check routine ECHO to r/o valvular abnormalities and to assess for pulmonary hypertension and heart function.     - Pt has no active ischemia, decompensated heart failure, unstable arrythmia, or severe stenotic valvular disease. Patient is optimized from cardiovascular standpoint to proceed with planned procedure with routine hemodynamic monitoring.     - Monitor and replete lytes, keep K>4, Mg>2.  - Will continue to follow.    Zaid Youngblood NP  Nurse Practitioner- Cardiology   Call TEAMS 89 yo Female with PMHx of HTN, T2DM, TIA 9/2016, right hip fracture, S/P ORIF right hip 3/5/16 presents to ED w/ a c/o of R hip pain s/p fall. Plan for IMN 11/24/23.    HTN, preop  - Denies chest pain, palpitation, SOB, syncope, dizziness, lightheadedness, orthopnea, PND, KAUR and edema  - EKG demonstrates NSR. No acute ischemic changes noted.   - Patient euvolemic on examination with no overt signs of heart failure or cardiac ischemia.   - No severe valvular abnormalities noted on examination  - Would check routine ECHO to r/o valvular abnormalities and to assess for pulmonary hypertension and heart function.   - BP elevated likely in the setting of pain. Continue home anti HTN losartan and metoprolol.     - Pt has no active ischemia, decompensated heart failure, unstable arrythmia, or severe stenotic valvular disease. Patient is optimized from cardiovascular standpoint to proceed with planned procedure with routine hemodynamic monitoring.     - Monitor and replete lytes, keep K>4, Mg>2.  - Will continue to follow.    Zaid Youngblood NP  Nurse Practitioner- Cardiology   Call TEAMS 89 yo Female with PMHx of HTN, T2DM, TIA 9/2016, right hip fracture, S/P ORIF right hip 3/5/16 presents to ED w/ a c/o of R hip pain s/p fall. Plan for IMN 11/24/23.    HTN, preop  - Denies chest pain, palpitation, SOB, syncope, dizziness, lightheadedness, orthopnea, PND, KAUR and edema  - EKG demonstrates NSR. No acute ischemic changes noted.   - Patient euvolemic on examination with no overt signs of heart failure or cardiac ischemia.   - No severe valvular abnormalities noted on examination  - BP elevated likely in the setting of pain. Continue home anti HTN losartan and metoprolol.     - Pt has no active ischemia, decompensated heart failure, unstable arrythmia, or severe stenotic valvular disease. Patient is optimized from cardiovascular standpoint to proceed with planned procedure with routine hemodynamic monitoring.     - Monitor and replete lytes, keep K>4, Mg>2.  - Will continue to follow.    Zaid Youngblood NP  Nurse Practitioner- Cardiology   Call TEAMS 89 yo Female with PMHx of HTN, T2DM, TIA 9/2016, right hip fracture, S/P ORIF right hip 3/5/16 presents to ED w/ a c/o of R hip pain s/p fall. Plan for IMN 11/24/23.    HTN, preop  - Denies chest pain, palpitation, SOB, syncope, dizziness, lightheadedness, orthopnea, PND, KAUR and edema  - EKG demonstrates NSR. No acute ischemic changes noted.   - Patient euvolemic on examination with no overt signs of heart failure or cardiac ischemia.   - No severe valvular abnormalities noted on examination  - BP elevated likely in the setting of pain. Continue home anti HTN losartan and metoprolol.     - Pt has no active ischemia, decompensated heart failure, unstable arrythmia, or severe stenotic valvular disease. Patient is optimized from cardiovascular standpoint to proceed with planned procedure with routine hemodynamic monitoring.     - Monitor and replete lytes, keep K>4, Mg>2.  - Will continue to follow.    Zaid Youngblood NP  Nurse Practitioner- Cardiology   Call TEAMS      ADDENDUM: Pt went into Rapid aFib 140bpm, case cancelled. She was given Esmolol 60mg and Metoprolol 5mg with decrease of heart rate to 100, and eventually down to SR 80-90. .She reports nausea. Denied any chest pain, palpitations, sob. She denied any hx of afib.      PAF  - admit to tele  - Afib on telemetry 110-120s bpm.   - give metoprolol 5mg ivp now  - resume metoprolol po, can uptitrate as bp permits  - pain control  - CHADVASC score  4, would start on heparin gtt for full AC     89 yo Female with PMHx of HTN, T2DM, TIA 9/2016, right hip fracture, S/P ORIF right hip 3/5/16 presents to ED w/ a c/o of R hip pain s/p fall. Plan for IMN 11/24/23.    HTN, preop  - Denies chest pain, palpitation, SOB, syncope, dizziness, lightheadedness, orthopnea, PND, KAUR and edema  - EKG demonstrates NSR. No acute ischemic changes noted.   - Patient euvolemic on examination with no overt signs of heart failure or cardiac ischemia.   - No severe valvular abnormalities noted on examination  - BP elevated likely in the setting of pain. Continue home anti HTN losartan and metoprolol.     - Pt has no active ischemia, decompensated heart failure, unstable arrythmia, or severe stenotic valvular disease. Patient is optimized from cardiovascular standpoint to proceed with planned procedure with routine hemodynamic monitoring.     - Monitor and replete lytes, keep K>4, Mg>2.  - Will continue to follow.    Zaid Youngblood NP  Nurse Practitioner- Cardiology   Call TEAMS      ADDENDUM: Pt went into Rapid aFib 140bpm, case cancelled. She was given Esmolol 60mg and Metoprolol 5mg with decrease of heart rate to 100, and eventually down to SR 80-90. .She reports nausea. Denied any chest pain, palpitations, sob. She denied any hx of afib.      PAF  - admit to tele  - Afib on telemetry 110-120s bpm.   - give metoprolol 5mg ivp now, if necessary can start cardizem gtt  - resume metoprolol po ( can uptitrate as bp permits)  - pain control  - CHADVASC score  4, would start on heparin gtt for full AC

## 2023-11-24 NOTE — H&P ADULT - PROBLEM SELECTOR PLAN 5
Continue home metoprolol  -Hold losartan and HCTZ in setting of elevated Cr Continue home metoprolol, losartan, and HCTZ

## 2023-11-24 NOTE — H&P ADULT - ASSESSMENT
89 yo female w/ pmh of HTN, HLD, T2DM, TIA, previous right hip fx w/ surgical pinning s/p removal admitted for ORIF of mechanical fall w/ right hip fx. 89 yo female w/ pmh of HTN, HLD, T2DM, TIA,CKD previous right hip fx w/ surgical pinning s/p removal admitted for ORIF of mechanical fall w/ right hip fx.

## 2023-11-24 NOTE — CONSULT NOTE ADULT - SUBJECTIVE AND OBJECTIVE BOX
Patient is a 89yFemale ambulator without assistive devices who presents to ED w/ a c/o of R hip pain sp MF. Denies HT/LOC. States inability to walk immediately following the injury. Denies any numbness or tingling. Denies having any other pain elsewhere. History of R CRPP with Dr. Velasquez in 2016, hardware removed in 2017.    T(C): 36.7 (11-24-23 @ 06:47), Max: 36.7 (11-24-23 @ 06:47)  HR: 91 (11-24-23 @ 06:47) (91 - 91)  BP: 166/76 (11-24-23 @ 06:47) (166/76 - 166/76)  RR: 18 (11-24-23 @ 06:47) (18 - 18)  SpO2: 96% (11-24-23 @ 06:47) (96% - 96%)        Fracture of unspecified part of neck of right femur, initial encounter for closed fracture    Handoff    MEWS Score    Hypertension    Diabetes    TIA (transient ischemic attack)    Hip fracture    Hip fracture, right    No significant past surgical history    S/P ORIF (open reduction internal fixation) fracture    FALL    90+    SysAdmin_VisitLink        Gen: NAD, Resting comfortably    R LE:  Skin intact, no erythema or ecchymosis  Externally and shortened leg  TTP by hip, nowhere else along RLE  Motor: + EHL/FHL/TA/GSC  Sensory: +SILT: SPN/DPN/ALICE/SAPH/TIB  Compartments soft and compressible  No calf tenderness  + DP    Secondary Assessment:  NC/AT, NTTP of clavicles, NTTP of C-,T-,L-Spine,  UEs: NTTP of Shoulders, Elbows, Wrists, Hands; NT with AROM/PROM of Shoulders, Elbows, Wrists, Hands; AIN/PIN/Med/Uln/Msc/Rad/Ax intact  LLE: Able to SLR, NT with Log Roll, NT with Heel Strike, NTTP of Hip, Knee, Ankle, foot; NT with AROM/PROM of Hip, Knee, Ankle, foot; Q/H/Gsc/TA/EHL/FHL intact    Imaging:  XR L/R Hip/Femur: R IT Fx    A/P: 89yFemale with a 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 Plan for OR today with Dr De Luna     Plan for IMN Today  NPO  IVF while NPO  Hold chemical DVT ppx for surgery  Please document necessary medical optimizations for surgery   Pain Control As Needed   Ice hip as tolerated  NWB, Strict Bed Rest  SCDs while in bed   Medical recommendations appreciated     Discussed with attending who is in agreement with above plan     Patient is a 89yFemale ambulator without assistive devices who presents to ED w/ a c/o of R hip pain sp MF. Denies HT/LOC. States inability to walk immediately following the injury. Denies any numbness or tingling. Denies having any other pain elsewhere. History of R CRPP with Dr. Velasquez in 2016, hardware removed in 2017.    T(C): 36.7 (11-24-23 @ 06:47), Max: 36.7 (11-24-23 @ 06:47)  HR: 91 (11-24-23 @ 06:47) (91 - 91)  BP: 166/76 (11-24-23 @ 06:47) (166/76 - 166/76)  RR: 18 (11-24-23 @ 06:47) (18 - 18)  SpO2: 96% (11-24-23 @ 06:47) (96% - 96%)        Fracture of unspecified part of neck of right femur, initial encounter for closed fracture    Handoff    MEWS Score    Hypertension    Diabetes    TIA (transient ischemic attack)    Hip fracture    Hip fracture, right    No significant past surgical history    S/P ORIF (open reduction internal fixation) fracture    FALL    90+    SysAdmin_VisitLink        Gen: NAD, Resting comfortably    R LE:  Skin intact, no erythema or ecchymosis  Externally and shortened leg  TTP by hip, nowhere else along RLE  Motor: + EHL/FHL/TA/GSC  Sensory: +SILT: SPN/DPN/ALICE/SAPH/TIB  Compartments soft and compressible  No calf tenderness  + DP    Secondary Assessment:  NC/AT, NTTP of clavicles, NTTP of C-,T-,L-Spine,  UEs: NTTP of Shoulders, Elbows, Wrists, Hands; NT with AROM/PROM of Shoulders, Elbows, Wrists, Hands; AIN/PIN/Med/Uln/Msc/Rad/Ax intact  LLE: Able to SLR, NT with Log Roll, NT with Heel Strike, NTTP of Hip, Knee, Ankle, foot; NT with AROM/PROM of Hip, Knee, Ankle, foot; Q/H/Gsc/TA/EHL/FHL intact    Imaging:  XR R Hip/Femur: R IT Fx    A/P: 89yFemale with a 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 Plan for OR today with Dr De Luna     Plan for IMN Today  NPO  IVF while NPO  Hold chemical DVT ppx for surgery  Please document necessary medical optimizations for surgery   Pain Control As Needed   Ice hip as tolerated  NWB, Strict Bed Rest  SCDs while in bed   Medical recommendations appreciated     Discussed with attending who is in agreement with above plan

## 2023-11-24 NOTE — CHART NOTE - NSCHARTNOTEFT_GEN_A_CORE
Pre-Op Events noted. Patient with tachycardia in pre-op and nausea with chest discomfort. Concern for afib but was slowed down with BB and rhythm was sinus. Currently in SR at this time ~88bpm on telemetry. Patient states that she was extremely anxious and nauseous in pre-op. She denies having chest pain but daughter at bedside states that she was present and patient did endorse some chest discomfort at that time. Patient still denies - states that she had a lot of bladder discomfort. Was found to be in urinary retention and now much more comfortable after galeana catheter was placed. Patient with CKD and BLCr of ~1.9 per outpatient MD. Follows with [facility] for cardiology but has no nephrologist. Will request nephrology (Rachel) to see as well. Given current status, will d/c CTA order and as per discussion with cardiology (reviewed EKG and rhythm strips in chart with him) - no need for AC at this time. ECHO to be formally read in AM by Dr. Escoto and patient will be re-evaluated for OR tomorrow as case was cancelled tonight. D/w patient, daughter and cardiology consultant. Continue to monitor for arrhythmia on remote telemetry. Pre-Op Events noted. Patient with tachycardia in pre-op and nausea with chest discomfort. Concern for afib but was slowed down with BB and rhythm was sinus. Currently in SR at this time ~88bpm on telemetry. Patient states that she was extremely anxious and nauseous in pre-op. She denies having chest pain but daughter at bedside states that she was present and patient did endorse some chest discomfort at that time. Patient still denies - states that she had a lot of bladder discomfort. Was found to be in urinary retention and now much more comfortable after galeana catheter was placed. Patient with CKD and BLCr of ~1.9 per outpatient MD. Follows with [facility] for cardiology but has no nephrologist. Will request nephrology (Rachel) to see as well. Hold ARB and thiazide for now for possible mild ELVIN - re-eval after repeat BMP (possible mild ELVIN from AUR). Given current status, will d/c CTA order as ordered by orthopedics. Per discussion with cardiology (reviewed EKG and rhythm strips in chart with him) - no need for AC at this time and can hold off on CTA as this was in the case of new onset afib and chest pain - and pt is currently SR with no chest discomfort at all at this time. ECHO to be formally read in AM by Dr. Escoto and patient will be re-evaluated for OR tomorrow as case was cancelled tonight. D/w patient, daughter and cardiology consultant. Continue to monitor for arrhythmia on remote telemetry.

## 2023-11-24 NOTE — ED ADULT NURSE NOTE - NS ED NURSE REPORT GIVEN TO FT
Report given to OR EDMUNDO Zee. Patient A&Ox4, respirations even/unlabored, no apparent distress or change in mental status noted. Plan of care discussed with patient and daughter at bed side, all questions answered. VSS, recorded in EMR, pt in stable condition.

## 2023-11-24 NOTE — H&P ADULT - ATTENDING COMMENTS
89 yo female w/ pmh of HTN, HLD, T2DM, TIA,CKD previous right hip fx w/ surgical pinning s/p removal admitted for ORIF of mechanical fall w/ right hip fx    Clearance as above.  Pt's baseline Cr is 1.9 as confirmed with Dr. Escobar, Cr approximately as baseline.  No acute chest pain or son.  Bladder scan shows 704 cc of urine, galeana place din.  Treat UTI with ceftriaxone.  Pt now more comfortable.      Addy Mueller, Attending Physician

## 2023-11-24 NOTE — H&P ADULT - PROBLEM SELECTOR PLAN 2
H/o of UTI requiring IV Abx w/ only known symptom of malodorous urine  -Pt had similar odor to her urine this morning w/o dysuria, hematuria, urinary frequency  -WBC 14.63, UA Positive for Large Leuk Esterase and 18 WBC and many bacteria  -Start ceftriaxone  -f/u urine cx  -Monitor for fever, trend WBC

## 2023-11-24 NOTE — ED PROVIDER NOTE - PROGRESS NOTE DETAILS
Discussed with orthopedic resident, the patient may possibly be transferred to [place].  Will await disposition decision. Discussed with orthopedic resident, patient can be admitted at [facility], for  OR today. Discussed with Dr. Mueller, will see patient to admit.

## 2023-11-24 NOTE — ED PROVIDER NOTE - RESPIRATORY, MLM
Breath sounds clear and equal bilaterally. no chest wall tend. Breath sounds clear and equal bilaterally. no chest wall tend except mild R lat rib tenderness. no crepitus.

## 2023-11-24 NOTE — H&P ADULT - PROBLEM SELECTOR PLAN 3
H/o of "small kidneys" and altered kidney function numbers on previous labs as per patient  -Cr. 2.1 on admission w/o known baseline  -No known h/o CKD  -Hold nephrotoxic medication  -Trend daily BMP H/o of "small kidneys" and altered kidney function numbers on previous labs as per patient  -Cr. 2.1 on admission w/ baseline of 1.9  -Trend daily BMP

## 2023-11-24 NOTE — ED ADULT NURSE REASSESSMENT NOTE - NS ED NURSE REASSESS COMMENT FT1
Bladder scan results 704mL in bladder. MD Mueller made aware, MD ordering indwelling galeana catheter insertion. Will insert galeana as per MD order.
MD Mueller at bed side evaluating pt. MD requesting bladder scan for pt. Pt on female external catheter, urine noted in suction canister. MD made aware, bladder scan to be performed and results to be reported to MD. Pr remains stable.
Assumed care of patient at 12:20 from RN HCET Carcamo. Charting as noted. Patient A&Ox4, resp even/unlabored, on room air, MAEx4, skin warm, dry, intact, presents to ED c/o mechanical fall. At time of assessment, patient denies pain/discomfort, denies CP/SOB, denies any further complaints or physical symptoms. Patient updated on the plan of care, pt admitted, NPO for surgery, awaiting in pt bed. Stretcher locked in lowest position. No signs of acute distress noted, safety maintained, daughter at bed side.
Report given to OR EDMUNDO Zee. Patient A&Ox4, respirations even/unlabored, no apparent distress or change in mental status noted. Plan of care discussed with patient and daughter at bed side, all questions answered. VSS, recorded in EMR, pt in stable condition.

## 2023-11-24 NOTE — ED ADULT NURSE NOTE - OBJECTIVE STATEMENT
pt from home a&ox4 to ED s/p slip and fall at home falling onto right side with c/o right hip pain. denies dizziness prior to fall. denies hitting head/LOC.  unable to ambulate after the fall.  No numbness/tingling/focal weakness.  No fever or chills.  No nausea or vomiting.  No weakness or dizziness.    No other acute injury or complaints

## 2023-11-24 NOTE — CONSULT NOTE ADULT - ATTENDING COMMENTS
HPI  89yFemale w/ c/o right hip pain s/p mechanical fall. Unable to bear weight in the RLE since the fall. Denies headstrike or LOC. Denies numbness/tingling in the RLE. Denies any other trauma/injuries at this time. At baseline, community ambulator w/o assistive devices.    PHYSICAL EXAM  Gen: Lying in bed, NAD  Resp: No increased WOB  RLE:  Skin intact, shortened and externally rotated, +edema and +ecchymosis over R hip  +TTP over R hip, no TTP along remainder of extremity; compartments soft  Limited ROM at hip 2/2 pain  +Log roll test  +Pain with axial loading  Motor: TA/EHL/GS/FHL intact  Sensory: DP/SP/Tib/Yury/Saph SILT  +DP pulse, WWP    Secondary survey:  No TTP along spine or other extremities, pelvis grossly stable, SILT and compartments soft throughout    IMAGING  XRs: RIGHT Intertrochanteric Hip Fracture    The patient is a 89  -year-old female past medical history as above sustained above injury following mechanical fall. Admitted to the hospital for medical optimization. Physical exam reveals closed injury and intact neurovascular exam. Radiographs demonstrate right intertrochanteric hip fracture; severe osteoporosis. Based on injury pattern and desire to promote mobilization and decrease pain, surgical intervention was offered. The risks, benefits, alternatives of various treatment options were discussed with the patient and family specifically their daughter Mandy . We explicitly discussed nonoperative management however they would like to proceed with surgery. I explained to them the 30% risk of one-year mortality     Non-operative treatment: Benefit - No surgical/anesthetic risk   Risks - Persistent pain, malunion/nonunion, delayed mobilization/ prolonged immobility and attendant medical risks.     Left Hip Cephalomedullary Nail Benefit - higher rate of fracture union, better chance for more anatomic alignment of fracture vs nonop; no risk of dislocation   Risks - malunion, nonunion, periprosthetic fracture, general risks of surgery (infection, wound healing problems, persistent pain, neurovascular injury, need for further surgery), avascular necrosis, general perioperative medical risks (cardiac, pulmonary, renal, GI, as well as VTE)     The nature and purpose of the cephalomedullary nail alternative method(s) of treatment, the material risks involved, and the possibility of complications were fully explained to the patient. The patient was told the most common risks and complications associated with a intertrochanteric hip fracture include, but are not limited to blood clots in the leg, fatal pulmonary embolism, dislocation of the prosthesis, intraoperative and postoperative fractures of the femur or acetabulum, infection, failure of the prosthesis or grafting materials, complications from anesthesia, reactions to blood transfusions, postoperative leg length inequality, nerve damage or injury, vascular injury, delayed wound healing, infections, other injury or even death. Also, the patient was told that after undergoing procedure there may still be pain or disability. The patient was informed that the success of this operation in part depends upon the mechanical devices which are going to be implanted and that these devices can fail or malfunction, and may need to be repaired or replaced and there are no guarantees as to the longevity of this device or its part and that it or its parts could fail prematurely. Finally, the patient was asked to follow completely and fully with all advice and recommended treatments, and that recovery and ultimate outcome are affected by their compliance with recommended treatment.     ASSESSMENT & PLAN In brief, this is a 89F w/ right IT fracture preop for OR   -NWB RLE, bedrest   -OR today   -f/u preop: CBC, BMP, coags, T&S x2, CXR, EKG   -NPOsince midnight, IVF   -hold chemical DVT ppx for OR; SCDs OK   -Medical clearance/optimization for OR   -pain control -ice/cold compress   -obtain Vit D level. Supplementation with daily Ca/Vit D   -Outpt osteoporosis workup

## 2023-11-24 NOTE — H&P ADULT - NSHPPHYSICALEXAM_GEN_ALL_CORE
T(C): 36.7 (11-24-23 @ 06:47), Max: 36.7 (11-24-23 @ 06:47)  HR: 91 (11-24-23 @ 06:47) (91 - 91)  BP: 166/76 (11-24-23 @ 06:47) (166/76 - 166/76)  RR: 18 (11-24-23 @ 06:47) (18 - 18)  SpO2: 96% (11-24-23 @ 06:47) (96% - 96%)    GENERAL: patient appears well, no acute distress, appropriately interactive  EYES: sclera clear, no exudates  ENMT: oropharynx clear without erythema, no exudates, moist mucous membranes  NECK: supple, soft  LUNGS: good air entry bilaterally, clear to auscultation, symmetric breath sounds, no wheezing or rhonchi appreciated  CV: soft S1/S2, regular rate and rhythm, no murmurs noted, no lower extremity edema  GASTROINTESTINAL: abdomen is soft, nontender, nondistended, normoactive bowel sounds  INTEGUMENT: good skin turgor, warm skin, appears well perfused  MUSCULOSKELETAL: ttp to lateral hip and proximal femur, no tenderness to knee nor ankle, external rotation of RLE w/ slight shortening of the leg, B/L LE NVI,no clubbing or cyanosis  NEUROLOGIC: awake, alert, oriented x3, good muscle tone in all 4 extremities  HEME/LYMPH: no obvious ecchymosis or petechiae

## 2023-11-24 NOTE — CONSULT NOTE ADULT - NS ATTEND AMEND GEN_ALL_CORE FT
89 yo Female with PMHx of HTN, T2DM, TIA 9/2016, right hip fracture, S/P ORIF right hip 3/5/16 presents to ED w/ a c/o of R hip pain s/p fall. Plan for IMN 11/24/23.    Current in severe hip pain   No signs of significant ischemia or volume overload.   ekg is nonischemic  Currently no active cardiac conditions. No signs of ischemia, ADHF, clinical exam not consistent with severe stenotic valvular disease, no unstable arrhythmias noted. Therefore able to proceed with this emergent intermediate risk orthopedic  surgery without any further cardiac workup. Routine hemodynamic monitoring is suggested during the procedure.   htn likely reflective of pain. pain control. can use hydral PRN   Monitor and replete electrolytes. Keep K>4.0 and Mg>2.0.  Further cardiac workup will depend on clinical course.

## 2023-11-24 NOTE — ED PROVIDER NOTE - NSICDXPASTMEDICALHX_GEN_ALL_CORE_FT
PAST MEDICAL HISTORY:  Diabetes     Hip fracture     Hypertension     TIA (transient ischemic attack)

## 2023-11-24 NOTE — ED PROVIDER NOTE - OBJECTIVE STATEMENT
89-year-old female with a history of hypertension, type 2 diabetes, high cholesterol, TIA, previous right hip pin which was removed presents with status post mechanical fall this morning.  Patient lost her balance while looking at the window and fell onto her right hip.  Patient was not dizzy prior or after.  No head injury or LOC.  No neck or back pain.  Patient planes of right hip pain, unable to ambulate after the fall.  No numbness/tingling/focal weakness.  Patient has had some foul-smelling urine this morning per the daughter.  Patient is not having any urinary symptoms.  No fever or chills.  No nausea or vomiting.  No weakness or dizziness.  No aggravating or alleviating factors otherwise noted.  No other acute injury or complaints. 89-year-old female with a history of hypertension, type 2 diabetes, high cholesterol, TIA, previous right hip pin which was removed presents with status post mechanical fall this morning.  Patient lost her balance while looking at the window and fell onto her right hip.  Patient was not dizzy prior or after.  No head injury or LOC.  No neck or back pain.  Patient planes of right hip pain, unable to ambulate after the fall.  No numbness/tingling/focal weakness.  Patient has had some foul-smelling urine this morning per the daughter.  Patient is not having any urinary symptoms.  No fever or chills.  No nausea or vomiting.  No weakness or dizziness.  No aggravating or alleviating factors otherwise noted.  No other acute injury or complaints.  Patient is also complaining of some right rib pain.  Minimal tenderness.

## 2023-11-24 NOTE — H&P ADULT - NSICDXPASTMEDICALHX_GEN_ALL_CORE_FT
PAST MEDICAL HISTORY:  Diabetes     Hip fracture     HLD (hyperlipidemia)     Hypertension     TIA (transient ischemic attack)

## 2023-11-24 NOTE — H&P ADULT - PROBLEM SELECTOR PLAN 1
h/o right hip fx w/ surgical pinning now removed. Mechanical fall this am with new Right hip fx  -XR Hip and Femur: Acute IT hip fx on personal read  -Given 1L NS, IVF w/ LR, 2mg Morphine x2, Ofirmev, Zofran in ED  -NPO prior to surgery  -Strict Bed Rest  -Type and Screen  -Plan for Emergent ORIF w/ Dr De Luna today 11/24  -EKG: NSR  -Mets 4  -RCRI - Class III risk, indicating 10.1% 30-day risk of death, MI, or cardiac arrest h/o right hip fx w/ surgical pinning now removed. Mechanical fall this am with new Right hip fx  -XR Hip and Femur: Acute IT hip fx on personal read  -Given 1L NS, IVF w/ LR, 2mg Morphine x2, Ofirmev, Zofran in ED  -NPO prior to surgery  -Strict Bed Rest  -Type and Screen  -Plan for Emergent ORIF w/ Dr De Luna today 11/24  -EKG: NSR  -Mets 4  -RCRI - Class III risk, indicating 10.1% 30-day risk of death, MI, or cardiac arrest  -Cardiology consulted (Dr. Thacker), recs appreciated  -Orthopedics consulted, recs appreciated h/o right hip fx w/ surgical pinning now removed. Mechanical fall this am with new Right hip fx  -XR Hip and Femur: Acute IT hip fx on personal read  -Given 1L NS, IVF w/ LR, 2mg Morphine x2, Ofirmev, Zofran in ED  -NPO prior to surgery  -Strict Bed Rest  -Type and Screen  -Plan for Emergent ORIF w/ Dr De Luna today 11/24  -EKG: NSR; pt without acute chest pain or sob, no signs suggestive of ACS  -Pt's Cr is approximately baseline as confirmed with Dr. Galvez, pt's outpt PCP  -Mets 4  -RCRI - Class III risk, indicating 10.1% 30-day risk of death, MI, or cardiac arrest  -Pt medically optimized for procedure  -Cardiology consulted (Dr. Thacker), recs appreciated; cardiology clearance in chart  -Orthopedics consulted, recs appreciated

## 2023-11-24 NOTE — PROGRESS NOTE ADULT - SUBJECTIVE AND OBJECTIVE BOX
Called to see  89y/o female who was going for IM nail fro hip fx. Patient appeared to be in rapid atrial fibrillation with rate of 141. Patient felt nausea and some chest pain. Pt given Esmolol 60mg and Metoprolol 5mg with decrease of heart rate to 100. 2l/m of oxygen by NC applied. Patient no longer feels nauseous. EKG showed ST and accelerated junctional rhythm. Cardiology notified and will see patient. Case is cancelled for tonight.

## 2023-11-24 NOTE — ED ADULT NURSE NOTE - NSFALLRISKFACTORS_ED_ALL_ED
81 mg ASA every other day/Age: 85 years old or older/Coagulation: Bleeding disorder either through use of anticoagulants or underlying clinical condition(s)

## 2023-11-25 ENCOUNTER — TRANSCRIPTION ENCOUNTER (OUTPATIENT)
Age: 89
End: 2023-11-25

## 2023-11-25 DIAGNOSIS — R00.0 TACHYCARDIA, UNSPECIFIED: ICD-10-CM

## 2023-11-25 LAB
A1C WITH ESTIMATED AVERAGE GLUCOSE RESULT: 7.3 % — HIGH (ref 4–5.6)
A1C WITH ESTIMATED AVERAGE GLUCOSE RESULT: 7.3 % — HIGH (ref 4–5.6)
ALBUMIN SERPL ELPH-MCNC: 2.5 G/DL — LOW (ref 3.3–5)
ALBUMIN SERPL ELPH-MCNC: 2.5 G/DL — LOW (ref 3.3–5)
ALBUMIN SERPL ELPH-MCNC: 2.9 G/DL — LOW (ref 3.3–5)
ALBUMIN SERPL ELPH-MCNC: 2.9 G/DL — LOW (ref 3.3–5)
ALP SERPL-CCNC: 62 U/L — SIGNIFICANT CHANGE UP (ref 40–120)
ALP SERPL-CCNC: 62 U/L — SIGNIFICANT CHANGE UP (ref 40–120)
ALP SERPL-CCNC: 69 U/L — SIGNIFICANT CHANGE UP (ref 40–120)
ALP SERPL-CCNC: 69 U/L — SIGNIFICANT CHANGE UP (ref 40–120)
ALT FLD-CCNC: 21 U/L — SIGNIFICANT CHANGE UP (ref 12–78)
ANION GAP SERPL CALC-SCNC: 6 MMOL/L — SIGNIFICANT CHANGE UP (ref 5–17)
ANION GAP SERPL CALC-SCNC: 6 MMOL/L — SIGNIFICANT CHANGE UP (ref 5–17)
ANION GAP SERPL CALC-SCNC: 9 MMOL/L — SIGNIFICANT CHANGE UP (ref 5–17)
APPEARANCE UR: ABNORMAL
APPEARANCE UR: ABNORMAL
APTT BLD: 25.5 SEC — SIGNIFICANT CHANGE UP (ref 24.5–35.6)
APTT BLD: 25.5 SEC — SIGNIFICANT CHANGE UP (ref 24.5–35.6)
AST SERPL-CCNC: 23 U/L — SIGNIFICANT CHANGE UP (ref 15–37)
AST SERPL-CCNC: 23 U/L — SIGNIFICANT CHANGE UP (ref 15–37)
AST SERPL-CCNC: 28 U/L — SIGNIFICANT CHANGE UP (ref 15–37)
AST SERPL-CCNC: 28 U/L — SIGNIFICANT CHANGE UP (ref 15–37)
BILIRUB SERPL-MCNC: 0.5 MG/DL — SIGNIFICANT CHANGE UP (ref 0.2–1.2)
BILIRUB SERPL-MCNC: 0.5 MG/DL — SIGNIFICANT CHANGE UP (ref 0.2–1.2)
BILIRUB SERPL-MCNC: 0.8 MG/DL — SIGNIFICANT CHANGE UP (ref 0.2–1.2)
BILIRUB SERPL-MCNC: 0.8 MG/DL — SIGNIFICANT CHANGE UP (ref 0.2–1.2)
BILIRUB UR-MCNC: NEGATIVE — SIGNIFICANT CHANGE UP
BILIRUB UR-MCNC: NEGATIVE — SIGNIFICANT CHANGE UP
BUN SERPL-MCNC: 32 MG/DL — HIGH (ref 7–23)
BUN SERPL-MCNC: 34 MG/DL — HIGH (ref 7–23)
BUN SERPL-MCNC: 34 MG/DL — HIGH (ref 7–23)
CALCIUM SERPL-MCNC: 8.2 MG/DL — LOW (ref 8.5–10.1)
CALCIUM SERPL-MCNC: 8.2 MG/DL — LOW (ref 8.5–10.1)
CALCIUM SERPL-MCNC: 8.3 MG/DL — LOW (ref 8.5–10.1)
CALCIUM SERPL-MCNC: 8.3 MG/DL — LOW (ref 8.5–10.1)
CALCIUM SERPL-MCNC: 8.5 MG/DL — SIGNIFICANT CHANGE UP (ref 8.5–10.1)
CALCIUM SERPL-MCNC: 8.5 MG/DL — SIGNIFICANT CHANGE UP (ref 8.5–10.1)
CALCIUM SERPL-MCNC: 8.8 MG/DL — SIGNIFICANT CHANGE UP (ref 8.5–10.1)
CALCIUM SERPL-MCNC: 8.8 MG/DL — SIGNIFICANT CHANGE UP (ref 8.5–10.1)
CHLORIDE SERPL-SCNC: 108 MMOL/L — SIGNIFICANT CHANGE UP (ref 96–108)
CHLORIDE SERPL-SCNC: 111 MMOL/L — HIGH (ref 96–108)
CHLORIDE SERPL-SCNC: 111 MMOL/L — HIGH (ref 96–108)
CO2 SERPL-SCNC: 23 MMOL/L — SIGNIFICANT CHANGE UP (ref 22–31)
CO2 SERPL-SCNC: 23 MMOL/L — SIGNIFICANT CHANGE UP (ref 22–31)
CO2 SERPL-SCNC: 24 MMOL/L — SIGNIFICANT CHANGE UP (ref 22–31)
CO2 SERPL-SCNC: 26 MMOL/L — SIGNIFICANT CHANGE UP (ref 22–31)
CO2 SERPL-SCNC: 26 MMOL/L — SIGNIFICANT CHANGE UP (ref 22–31)
COLOR SPEC: SIGNIFICANT CHANGE UP
COLOR SPEC: SIGNIFICANT CHANGE UP
CREAT ?TM UR-MCNC: 62 MG/DL — SIGNIFICANT CHANGE UP
CREAT ?TM UR-MCNC: 62 MG/DL — SIGNIFICANT CHANGE UP
CREAT SERPL-MCNC: 1.8 MG/DL — HIGH (ref 0.5–1.3)
CREAT SERPL-MCNC: 1.9 MG/DL — HIGH (ref 0.5–1.3)
CREAT SERPL-MCNC: 1.9 MG/DL — HIGH (ref 0.5–1.3)
D DIMER BLD IA.RAPID-MCNC: 8030 NG/ML DDU — HIGH
D DIMER BLD IA.RAPID-MCNC: 8030 NG/ML DDU — HIGH
DIFF PNL FLD: ABNORMAL
DIFF PNL FLD: ABNORMAL
EGFR: 24 ML/MIN/1.73M2 — LOW
EGFR: 24 ML/MIN/1.73M2 — LOW
EGFR: 26 ML/MIN/1.73M2 — LOW
ESTIMATED AVERAGE GLUCOSE: 163 MG/DL — HIGH (ref 68–114)
ESTIMATED AVERAGE GLUCOSE: 163 MG/DL — HIGH (ref 68–114)
FLUAV AG NPH QL: SIGNIFICANT CHANGE UP
FLUAV AG NPH QL: SIGNIFICANT CHANGE UP
FLUBV AG NPH QL: SIGNIFICANT CHANGE UP
FLUBV AG NPH QL: SIGNIFICANT CHANGE UP
GLUCOSE SERPL-MCNC: 164 MG/DL — HIGH (ref 70–99)
GLUCOSE SERPL-MCNC: 164 MG/DL — HIGH (ref 70–99)
GLUCOSE SERPL-MCNC: 166 MG/DL — HIGH (ref 70–99)
GLUCOSE SERPL-MCNC: 166 MG/DL — HIGH (ref 70–99)
GLUCOSE SERPL-MCNC: 198 MG/DL — HIGH (ref 70–99)
GLUCOSE SERPL-MCNC: 198 MG/DL — HIGH (ref 70–99)
GLUCOSE SERPL-MCNC: 245 MG/DL — HIGH (ref 70–99)
GLUCOSE SERPL-MCNC: 245 MG/DL — HIGH (ref 70–99)
GLUCOSE UR QL: NEGATIVE MG/DL — SIGNIFICANT CHANGE UP
GLUCOSE UR QL: NEGATIVE MG/DL — SIGNIFICANT CHANGE UP
HCT VFR BLD CALC: 30.1 % — LOW (ref 34.5–45)
HCT VFR BLD CALC: 30.1 % — LOW (ref 34.5–45)
HCT VFR BLD CALC: 33.2 % — LOW (ref 34.5–45)
HCT VFR BLD CALC: 33.2 % — LOW (ref 34.5–45)
HCT VFR BLD CALC: 34.1 % — LOW (ref 34.5–45)
HCT VFR BLD CALC: 34.1 % — LOW (ref 34.5–45)
HGB BLD-MCNC: 10.9 G/DL — LOW (ref 11.5–15.5)
HGB BLD-MCNC: 10.9 G/DL — LOW (ref 11.5–15.5)
HGB BLD-MCNC: 11.2 G/DL — LOW (ref 11.5–15.5)
HGB BLD-MCNC: 11.2 G/DL — LOW (ref 11.5–15.5)
HGB BLD-MCNC: 9.8 G/DL — LOW (ref 11.5–15.5)
HGB BLD-MCNC: 9.8 G/DL — LOW (ref 11.5–15.5)
INR BLD: 1.19 RATIO — HIGH (ref 0.85–1.18)
INR BLD: 1.19 RATIO — HIGH (ref 0.85–1.18)
KETONES UR-MCNC: NEGATIVE MG/DL — SIGNIFICANT CHANGE UP
KETONES UR-MCNC: NEGATIVE MG/DL — SIGNIFICANT CHANGE UP
LEUKOCYTE ESTERASE UR-ACNC: ABNORMAL
LEUKOCYTE ESTERASE UR-ACNC: ABNORMAL
MAGNESIUM SERPL-MCNC: 1.6 MG/DL — SIGNIFICANT CHANGE UP (ref 1.6–2.6)
MAGNESIUM SERPL-MCNC: 1.6 MG/DL — SIGNIFICANT CHANGE UP (ref 1.6–2.6)
MCHC RBC-ENTMCNC: 27.6 PG — SIGNIFICANT CHANGE UP (ref 27–34)
MCHC RBC-ENTMCNC: 27.6 PG — SIGNIFICANT CHANGE UP (ref 27–34)
MCHC RBC-ENTMCNC: 27.8 PG — SIGNIFICANT CHANGE UP (ref 27–34)
MCHC RBC-ENTMCNC: 32.6 GM/DL — SIGNIFICANT CHANGE UP (ref 32–36)
MCHC RBC-ENTMCNC: 32.6 GM/DL — SIGNIFICANT CHANGE UP (ref 32–36)
MCHC RBC-ENTMCNC: 32.8 GM/DL — SIGNIFICANT CHANGE UP (ref 32–36)
MCV RBC AUTO: 84.1 FL — SIGNIFICANT CHANGE UP (ref 80–100)
MCV RBC AUTO: 84.1 FL — SIGNIFICANT CHANGE UP (ref 80–100)
MCV RBC AUTO: 84.6 FL — SIGNIFICANT CHANGE UP (ref 80–100)
MCV RBC AUTO: 84.6 FL — SIGNIFICANT CHANGE UP (ref 80–100)
MCV RBC AUTO: 85.5 FL — SIGNIFICANT CHANGE UP (ref 80–100)
MCV RBC AUTO: 85.5 FL — SIGNIFICANT CHANGE UP (ref 80–100)
NITRITE UR-MCNC: NEGATIVE — SIGNIFICANT CHANGE UP
NITRITE UR-MCNC: NEGATIVE — SIGNIFICANT CHANGE UP
NRBC # BLD: 0 /100 WBCS — SIGNIFICANT CHANGE UP (ref 0–0)
OSMOLALITY UR: 499 MOSM/KG — SIGNIFICANT CHANGE UP (ref 50–1200)
OSMOLALITY UR: 499 MOSM/KG — SIGNIFICANT CHANGE UP (ref 50–1200)
PH UR: 5.5 — SIGNIFICANT CHANGE UP (ref 5–8)
PH UR: 5.5 — SIGNIFICANT CHANGE UP (ref 5–8)
PHOSPHATE SERPL-MCNC: 3.3 MG/DL — SIGNIFICANT CHANGE UP (ref 2.5–4.5)
PHOSPHATE SERPL-MCNC: 3.3 MG/DL — SIGNIFICANT CHANGE UP (ref 2.5–4.5)
PLATELET # BLD AUTO: 145 K/UL — LOW (ref 150–400)
PLATELET # BLD AUTO: 145 K/UL — LOW (ref 150–400)
PLATELET # BLD AUTO: 152 K/UL — SIGNIFICANT CHANGE UP (ref 150–400)
PLATELET # BLD AUTO: 152 K/UL — SIGNIFICANT CHANGE UP (ref 150–400)
PLATELET # BLD AUTO: 177 K/UL — SIGNIFICANT CHANGE UP (ref 150–400)
PLATELET # BLD AUTO: 177 K/UL — SIGNIFICANT CHANGE UP (ref 150–400)
POTASSIUM SERPL-MCNC: 3.7 MMOL/L — SIGNIFICANT CHANGE UP (ref 3.5–5.3)
POTASSIUM SERPL-MCNC: 3.7 MMOL/L — SIGNIFICANT CHANGE UP (ref 3.5–5.3)
POTASSIUM SERPL-MCNC: 4 MMOL/L — SIGNIFICANT CHANGE UP (ref 3.5–5.3)
POTASSIUM SERPL-MCNC: 4 MMOL/L — SIGNIFICANT CHANGE UP (ref 3.5–5.3)
POTASSIUM SERPL-MCNC: 4.2 MMOL/L — SIGNIFICANT CHANGE UP (ref 3.5–5.3)
POTASSIUM SERPL-SCNC: 3.7 MMOL/L — SIGNIFICANT CHANGE UP (ref 3.5–5.3)
POTASSIUM SERPL-SCNC: 3.7 MMOL/L — SIGNIFICANT CHANGE UP (ref 3.5–5.3)
POTASSIUM SERPL-SCNC: 4 MMOL/L — SIGNIFICANT CHANGE UP (ref 3.5–5.3)
POTASSIUM SERPL-SCNC: 4 MMOL/L — SIGNIFICANT CHANGE UP (ref 3.5–5.3)
POTASSIUM SERPL-SCNC: 4.2 MMOL/L — SIGNIFICANT CHANGE UP (ref 3.5–5.3)
POTASSIUM UR-SCNC: 43 MMOL/L — SIGNIFICANT CHANGE UP
POTASSIUM UR-SCNC: 43 MMOL/L — SIGNIFICANT CHANGE UP
PROT ?TM UR-MCNC: 528 MG/DL — HIGH (ref 0–12)
PROT ?TM UR-MCNC: 528 MG/DL — HIGH (ref 0–12)
PROT SERPL-MCNC: 5.6 G/DL — LOW (ref 6–8.3)
PROT SERPL-MCNC: 5.6 G/DL — LOW (ref 6–8.3)
PROT SERPL-MCNC: 6.2 G/DL — SIGNIFICANT CHANGE UP (ref 6–8.3)
PROT SERPL-MCNC: 6.2 G/DL — SIGNIFICANT CHANGE UP (ref 6–8.3)
PROT UR-MCNC: 300 MG/DL
PROT UR-MCNC: 300 MG/DL
PROT/CREAT UR-RTO: 8.5 RATIO — HIGH (ref 0–0.2)
PROT/CREAT UR-RTO: 8.5 RATIO — HIGH (ref 0–0.2)
PROTHROM AB SERPL-ACNC: 13.9 SEC — HIGH (ref 9.5–13)
PROTHROM AB SERPL-ACNC: 13.9 SEC — HIGH (ref 9.5–13)
RBC # BLD: 3.52 M/UL — LOW (ref 3.8–5.2)
RBC # BLD: 3.52 M/UL — LOW (ref 3.8–5.2)
RBC # BLD: 3.95 M/UL — SIGNIFICANT CHANGE UP (ref 3.8–5.2)
RBC # BLD: 3.95 M/UL — SIGNIFICANT CHANGE UP (ref 3.8–5.2)
RBC # BLD: 4.03 M/UL — SIGNIFICANT CHANGE UP (ref 3.8–5.2)
RBC # BLD: 4.03 M/UL — SIGNIFICANT CHANGE UP (ref 3.8–5.2)
RBC # FLD: 14.6 % — HIGH (ref 10.3–14.5)
RBC # FLD: 14.6 % — HIGH (ref 10.3–14.5)
RBC # FLD: 14.7 % — HIGH (ref 10.3–14.5)
RBC # FLD: 14.7 % — HIGH (ref 10.3–14.5)
RBC # FLD: 14.8 % — HIGH (ref 10.3–14.5)
RBC # FLD: 14.8 % — HIGH (ref 10.3–14.5)
RSV RNA NPH QL NAA+NON-PROBE: SIGNIFICANT CHANGE UP
RSV RNA NPH QL NAA+NON-PROBE: SIGNIFICANT CHANGE UP
SARS-COV-2 RNA SPEC QL NAA+PROBE: SIGNIFICANT CHANGE UP
SARS-COV-2 RNA SPEC QL NAA+PROBE: SIGNIFICANT CHANGE UP
SODIUM SERPL-SCNC: 140 MMOL/L — SIGNIFICANT CHANGE UP (ref 135–145)
SODIUM SERPL-SCNC: 140 MMOL/L — SIGNIFICANT CHANGE UP (ref 135–145)
SODIUM SERPL-SCNC: 141 MMOL/L — SIGNIFICANT CHANGE UP (ref 135–145)
SODIUM SERPL-SCNC: 143 MMOL/L — SIGNIFICANT CHANGE UP (ref 135–145)
SODIUM SERPL-SCNC: 143 MMOL/L — SIGNIFICANT CHANGE UP (ref 135–145)
SODIUM UR-SCNC: 97 MMOL/L — SIGNIFICANT CHANGE UP
SODIUM UR-SCNC: 97 MMOL/L — SIGNIFICANT CHANGE UP
SP GR SPEC: 1.02 — SIGNIFICANT CHANGE UP (ref 1–1.03)
SP GR SPEC: 1.02 — SIGNIFICANT CHANGE UP (ref 1–1.03)
TROPONIN I, HIGH SENSITIVITY RESULT: 24.1 NG/L — SIGNIFICANT CHANGE UP
TROPONIN I, HIGH SENSITIVITY RESULT: 24.1 NG/L — SIGNIFICANT CHANGE UP
TROPONIN I, HIGH SENSITIVITY RESULT: 26 NG/L — SIGNIFICANT CHANGE UP
TROPONIN I, HIGH SENSITIVITY RESULT: 26 NG/L — SIGNIFICANT CHANGE UP
UROBILINOGEN FLD QL: 0.2 MG/DL — SIGNIFICANT CHANGE UP (ref 0.2–1)
UROBILINOGEN FLD QL: 0.2 MG/DL — SIGNIFICANT CHANGE UP (ref 0.2–1)
UUN UR-MCNC: 545 MG/DL — SIGNIFICANT CHANGE UP
UUN UR-MCNC: 545 MG/DL — SIGNIFICANT CHANGE UP
WBC # BLD: 18.22 K/UL — HIGH (ref 3.8–10.5)
WBC # BLD: 18.22 K/UL — HIGH (ref 3.8–10.5)
WBC # BLD: 20.14 K/UL — HIGH (ref 3.8–10.5)
WBC # BLD: 20.14 K/UL — HIGH (ref 3.8–10.5)
WBC # BLD: 21.08 K/UL — HIGH (ref 3.8–10.5)
WBC # BLD: 21.08 K/UL — HIGH (ref 3.8–10.5)
WBC # FLD AUTO: 18.22 K/UL — HIGH (ref 3.8–10.5)
WBC # FLD AUTO: 18.22 K/UL — HIGH (ref 3.8–10.5)
WBC # FLD AUTO: 20.14 K/UL — HIGH (ref 3.8–10.5)
WBC # FLD AUTO: 20.14 K/UL — HIGH (ref 3.8–10.5)
WBC # FLD AUTO: 21.08 K/UL — HIGH (ref 3.8–10.5)
WBC # FLD AUTO: 21.08 K/UL — HIGH (ref 3.8–10.5)

## 2023-11-25 PROCEDURE — 93010 ELECTROCARDIOGRAM REPORT: CPT

## 2023-11-25 PROCEDURE — 99232 SBSQ HOSP IP/OBS MODERATE 35: CPT

## 2023-11-25 PROCEDURE — 93970 EXTREMITY STUDY: CPT | Mod: 26

## 2023-11-25 PROCEDURE — 99221 1ST HOSP IP/OBS SF/LOW 40: CPT | Mod: 57

## 2023-11-25 PROCEDURE — 27245 TREAT THIGH FRACTURE: CPT | Mod: RT

## 2023-11-25 PROCEDURE — 99233 SBSQ HOSP IP/OBS HIGH 50: CPT | Mod: GC

## 2023-11-25 DEVICE — SCREW LOKG STRL 5X34MM: Type: IMPLANTABLE DEVICE | Site: RIGHT | Status: FUNCTIONAL

## 2023-11-25 DEVICE — NAIL CANN TI 130DEG 11X170MM: Type: IMPLANTABLE DEVICE | Site: RIGHT | Status: FUNCTIONAL

## 2023-11-25 DEVICE — IMPLANTABLE DEVICE: Type: IMPLANTABLE DEVICE | Site: RIGHT | Status: FUNCTIONAL

## 2023-11-25 RX ORDER — SODIUM CHLORIDE 9 MG/ML
1000 INJECTION, SOLUTION INTRAVENOUS
Refills: 0 | Status: DISCONTINUED | OUTPATIENT
Start: 2023-11-25 | End: 2023-11-29

## 2023-11-25 RX ORDER — DEXTROSE 50 % IN WATER 50 %
15 SYRINGE (ML) INTRAVENOUS ONCE
Refills: 0 | Status: DISCONTINUED | OUTPATIENT
Start: 2023-11-25 | End: 2023-11-29

## 2023-11-25 RX ORDER — ASPIRIN/CALCIUM CARB/MAGNESIUM 324 MG
81 TABLET ORAL DAILY
Refills: 0 | Status: DISCONTINUED | OUTPATIENT
Start: 2023-11-25 | End: 2023-11-29

## 2023-11-25 RX ORDER — ACETAMINOPHEN 500 MG
1000 TABLET ORAL ONCE
Refills: 0 | Status: COMPLETED | OUTPATIENT
Start: 2023-11-25 | End: 2023-11-25

## 2023-11-25 RX ORDER — ATORVASTATIN CALCIUM 80 MG/1
40 TABLET, FILM COATED ORAL AT BEDTIME
Refills: 0 | Status: DISCONTINUED | OUTPATIENT
Start: 2023-11-25 | End: 2023-11-29

## 2023-11-25 RX ORDER — METOPROLOL TARTRATE 50 MG
100 TABLET ORAL DAILY
Refills: 0 | Status: DISCONTINUED | OUTPATIENT
Start: 2023-11-25 | End: 2023-11-29

## 2023-11-25 RX ORDER — INSULIN LISPRO 100/ML
VIAL (ML) SUBCUTANEOUS
Refills: 0 | Status: DISCONTINUED | OUTPATIENT
Start: 2023-11-25 | End: 2023-11-29

## 2023-11-25 RX ORDER — SODIUM CHLORIDE 9 MG/ML
1000 INJECTION, SOLUTION INTRAVENOUS
Refills: 0 | Status: DISCONTINUED | OUTPATIENT
Start: 2023-11-25 | End: 2023-11-25

## 2023-11-25 RX ORDER — DEXTROSE 50 % IN WATER 50 %
25 SYRINGE (ML) INTRAVENOUS ONCE
Refills: 0 | Status: DISCONTINUED | OUTPATIENT
Start: 2023-11-25 | End: 2023-11-29

## 2023-11-25 RX ORDER — ONDANSETRON 8 MG/1
4 TABLET, FILM COATED ORAL ONCE
Refills: 0 | Status: DISCONTINUED | OUTPATIENT
Start: 2023-11-25 | End: 2023-11-25

## 2023-11-25 RX ORDER — APIXABAN 2.5 MG/1
2.5 TABLET, FILM COATED ORAL EVERY 12 HOURS
Refills: 0 | Status: DISCONTINUED | OUTPATIENT
Start: 2023-11-25 | End: 2023-11-25

## 2023-11-25 RX ORDER — LANOLIN ALCOHOL/MO/W.PET/CERES
3 CREAM (GRAM) TOPICAL AT BEDTIME
Refills: 0 | Status: DISCONTINUED | OUTPATIENT
Start: 2023-11-25 | End: 2023-11-29

## 2023-11-25 RX ORDER — GLUCAGON INJECTION, SOLUTION 0.5 MG/.1ML
1 INJECTION, SOLUTION SUBCUTANEOUS ONCE
Refills: 0 | Status: DISCONTINUED | OUTPATIENT
Start: 2023-11-25 | End: 2023-11-29

## 2023-11-25 RX ORDER — INSULIN LISPRO 100/ML
VIAL (ML) SUBCUTANEOUS AT BEDTIME
Refills: 0 | Status: DISCONTINUED | OUTPATIENT
Start: 2023-11-25 | End: 2023-11-29

## 2023-11-25 RX ORDER — ACETAMINOPHEN 500 MG
1000 TABLET ORAL ONCE
Refills: 0 | Status: COMPLETED | OUTPATIENT
Start: 2023-11-26 | End: 2023-11-26

## 2023-11-25 RX ORDER — HYDROMORPHONE HYDROCHLORIDE 2 MG/ML
0.5 INJECTION INTRAMUSCULAR; INTRAVENOUS; SUBCUTANEOUS
Refills: 0 | Status: DISCONTINUED | OUTPATIENT
Start: 2023-11-25 | End: 2023-11-25

## 2023-11-25 RX ORDER — SENNA PLUS 8.6 MG/1
2 TABLET ORAL AT BEDTIME
Refills: 0 | Status: DISCONTINUED | OUTPATIENT
Start: 2023-11-25 | End: 2023-11-29

## 2023-11-25 RX ORDER — OXYCODONE HYDROCHLORIDE 5 MG/1
2.5 TABLET ORAL EVERY 4 HOURS
Refills: 0 | Status: DISCONTINUED | OUTPATIENT
Start: 2023-11-25 | End: 2023-11-29

## 2023-11-25 RX ORDER — POLYETHYLENE GLYCOL 3350 17 G/17G
17 POWDER, FOR SOLUTION ORAL DAILY
Refills: 0 | Status: DISCONTINUED | OUTPATIENT
Start: 2023-11-25 | End: 2023-11-29

## 2023-11-25 RX ORDER — APIXABAN 2.5 MG/1
2.5 TABLET, FILM COATED ORAL EVERY 12 HOURS
Refills: 0 | Status: DISCONTINUED | OUTPATIENT
Start: 2023-11-26 | End: 2023-11-29

## 2023-11-25 RX ORDER — DEXTROSE 50 % IN WATER 50 %
12.5 SYRINGE (ML) INTRAVENOUS ONCE
Refills: 0 | Status: DISCONTINUED | OUTPATIENT
Start: 2023-11-25 | End: 2023-11-29

## 2023-11-25 RX ORDER — OXYCODONE HYDROCHLORIDE 5 MG/1
5 TABLET ORAL EVERY 4 HOURS
Refills: 0 | Status: DISCONTINUED | OUTPATIENT
Start: 2023-11-25 | End: 2023-11-29

## 2023-11-25 RX ORDER — SIMETHICONE 80 MG/1
80 TABLET, CHEWABLE ORAL EVERY 8 HOURS
Refills: 0 | Status: DISCONTINUED | OUTPATIENT
Start: 2023-11-25 | End: 2023-11-29

## 2023-11-25 RX ORDER — ONDANSETRON 8 MG/1
4 TABLET, FILM COATED ORAL EVERY 6 HOURS
Refills: 0 | Status: DISCONTINUED | OUTPATIENT
Start: 2023-11-25 | End: 2023-11-29

## 2023-11-25 RX ORDER — ACETAMINOPHEN 500 MG
1000 TABLET ORAL ONCE
Refills: 0 | Status: DISCONTINUED | OUTPATIENT
Start: 2023-11-25 | End: 2023-11-25

## 2023-11-25 RX ORDER — HEPARIN SODIUM 5000 [USP'U]/ML
5000 INJECTION INTRAVENOUS; SUBCUTANEOUS EVERY 12 HOURS
Refills: 0 | Status: DISCONTINUED | OUTPATIENT
Start: 2023-11-25 | End: 2023-11-25

## 2023-11-25 RX ORDER — CEFTRIAXONE 500 MG/1
1000 INJECTION, POWDER, FOR SOLUTION INTRAMUSCULAR; INTRAVENOUS EVERY 24 HOURS
Refills: 0 | Status: COMPLETED | OUTPATIENT
Start: 2023-11-25 | End: 2023-11-27

## 2023-11-25 RX ORDER — CEFAZOLIN SODIUM 1 G
2000 VIAL (EA) INJECTION ONCE
Refills: 0 | Status: DISCONTINUED | OUTPATIENT
Start: 2023-11-25 | End: 2023-11-25

## 2023-11-25 RX ADMIN — Medication 2: at 16:52

## 2023-11-25 RX ADMIN — ONDANSETRON 4 MILLIGRAM(S): 8 TABLET, FILM COATED ORAL at 11:52

## 2023-11-25 RX ADMIN — SODIUM CHLORIDE 75 MILLILITER(S): 9 INJECTION, SOLUTION INTRAVENOUS at 10:18

## 2023-11-25 RX ADMIN — TRAMADOL HYDROCHLORIDE 25 MILLIGRAM(S): 50 TABLET ORAL at 06:30

## 2023-11-25 RX ADMIN — Medication 30 MILLILITER(S): at 14:44

## 2023-11-25 RX ADMIN — Medication 400 MILLIGRAM(S): at 17:49

## 2023-11-25 RX ADMIN — Medication 2: at 00:58

## 2023-11-25 RX ADMIN — Medication 1: at 06:56

## 2023-11-25 RX ADMIN — Medication 81 MILLIGRAM(S): at 17:43

## 2023-11-25 RX ADMIN — Medication 1000 MILLIGRAM(S): at 10:36

## 2023-11-25 RX ADMIN — OXYCODONE HYDROCHLORIDE 5 MILLIGRAM(S): 5 TABLET ORAL at 18:00

## 2023-11-25 RX ADMIN — Medication 100 MILLIGRAM(S): at 06:30

## 2023-11-25 RX ADMIN — ATORVASTATIN CALCIUM 40 MILLIGRAM(S): 80 TABLET, FILM COATED ORAL at 21:30

## 2023-11-25 RX ADMIN — CEFTRIAXONE 100 MILLIGRAM(S): 500 INJECTION, POWDER, FOR SOLUTION INTRAMUSCULAR; INTRAVENOUS at 21:30

## 2023-11-25 RX ADMIN — Medication 3 MILLIGRAM(S): at 22:39

## 2023-11-25 RX ADMIN — OXYCODONE HYDROCHLORIDE 5 MILLIGRAM(S): 5 TABLET ORAL at 17:16

## 2023-11-25 RX ADMIN — Medication 400 MILLIGRAM(S): at 10:18

## 2023-11-25 NOTE — DISCHARGE NOTE PROVIDER - PROVIDER TOKENS
PROVIDER:[TOKEN:[[id number]]] PROVIDER:[TOKEN:[[id number]]],PROVIDER:[TOKEN:[[id number]]],PROVIDER:[TOKEN:[[id number]]],PROVIDER:[TOKEN:[[id number]]] PROVIDER:[TOKEN:[[id number]],FOLLOWUP:[2 weeks]],PROVIDER:[TOKEN:[[id number]],FOLLOWUP:[2 weeks]],PROVIDER:[TOKEN:[[id number]],FOLLOWUP:[Routine]],PROVIDER:[TOKEN:[[id number]],FOLLOWUP:[Routine]]

## 2023-11-25 NOTE — DISCHARGE NOTE PROVIDER - NSDCCPTREATMENT_GEN_ALL_CORE_FT
PRINCIPAL PROCEDURE  Procedure: Insertion of locking intramedullary edith into right hip  Findings and Treatment:

## 2023-11-25 NOTE — CONSULT NOTE ADULT - ASSESSMENT
89 yo female w/ pmh of HTN, HLD, T2DM, TIA, CKD previous right hip fx w/ surgical pinning s/p removal presents following a mechanical fall in her home. Pt states she was standing beside her chair looking out the window when she lost her balance and fell onto her right side.    plan for IMN today with ORTHO  cardio eval noted  known CKD - Stable  [facility] rx regimen  BP control  Pain rx regimen  D Dimer noted - likely an inflammation marker in light of Right Hip Fx  CXR NADP  monitor VS and HD and Sat  I chioma  DM care  serial FS

## 2023-11-25 NOTE — DISCHARGE NOTE PROVIDER - CARE PROVIDERS DIRECT ADDRESSES
deconditioning ,DirectAddress_Unknown ,DirectAddress_Unknown,DirectAddress_Unknown,[email or web address],DirectAddress_Unknown

## 2023-11-25 NOTE — CONSULT NOTE ADULT - SUBJECTIVE AND OBJECTIVE BOX
Date/Time Patient Seen:  		  Referring MD:   Data Reviewed	       Patient is a 89y old  Female who presents with a chief complaint of Right hip Fx (25 Nov 2023 07:12)      Subjective/HPI   89 yo female w/ pmh of HTN, HLD, T2DM, TIA, CKD previous right hip fx w/ surgical pinning s/p removal presents following a mechanical fall in her home. Pt states she was standing beside her chair looking out the window when she lost her balance and fell onto her right side. Denies head trauma, LOC, dizziness, lightheadedness. States that she crawled to a phone to call an ambulance. This all occurred at 4am this morning. Initially on arrival to the ED she thought she ate breakfast, but now realizes that she never ate today. Pt states that she had a previous fx to the right hip which had a pin that "fell out" and she had surgically removed. Pt states that even following pain medication in the ED, the pain is an 8-9/10 and radiates to her groin.  Of note, the patient states that her urine this morning was malodorous similar to a UTI she has had in the past. At that time, she too did not have sxs of a urinary tract infection. Denies fever, chills, sob, cp, palpitations, abd pain, n/v/d/c, dysuria, urinary frequency, urinary urgency, hematuria.  PAST MEDICAL & SURGICAL HISTORY:  Hypertension    Diabetes    TIA (transient ischemic attack)    Hip fracture    HLD (hyperlipidemia)    No significant past surgical history    S/P ORIF (open reduction internal fixation) fracture  right hip    PAST SURGICAL HISTORY:  S/P ORIF (open reduction internal fixation) fracture right hip.     FAMILY HISTORY:  Father  Still living? Unknown  FH: asthma, Age at diagnosis: Age Unknown.     Social History:  · Substance use	No  · Social History (marital status, living situation, occupation, and sexual history)	Tobacco: Former, quit 50+ years   EtOH:  Denies  Recreational drug use: Denies  Lives with: Alone  Ambulates: Walker  ADLs: Independent     Tobacco Screening:  · Core Measure Site	Yes  · Has the patient used tobacco in the past 30 days?	No    Risk Assessment:    Present on Admission:  Deep Venous Thrombosis	no  Pulmonary Embolus	no     HIV Screening:  · In accordance with [place] State law, we offer every patient who comes to our ED an HIV test. Would you like to be tested today?	Opt out        Medication list         MEDICATIONS  (STANDING):  aspirin enteric coated 81 milliGRAM(s) Oral daily  atorvastatin 40 milliGRAM(s) Oral at bedtime  ceFAZolin   IVPB 2000 milliGRAM(s) IV Intermittent once  cefTRIAXone   IVPB 1000 milliGRAM(s) IV Intermittent every 24 hours  dextrose 5%. 1000 milliLiter(s) (50 mL/Hr) IV Continuous <Continuous>  dextrose 5%. 1000 milliLiter(s) (100 mL/Hr) IV Continuous <Continuous>  dextrose 50% Injectable 12.5 Gram(s) IV Push once  dextrose 50% Injectable 25 Gram(s) IV Push once  dextrose 50% Injectable 25 Gram(s) IV Push once  glucagon  Injectable 1 milliGRAM(s) IntraMuscular once  HYDROmorphone  Injectable 0.5 milliGRAM(s) IV Push once  insulin lispro (ADMELOG) corrective regimen sliding scale   SubCutaneous every 6 hours  lactated ringers. 1000 milliLiter(s) (75 mL/Hr) IV Continuous <Continuous>  lactated ringers. 1000 milliLiter(s) (75 mL/Hr) IV Continuous <Continuous>  metoprolol succinate  milliGRAM(s) Oral daily  metoprolol succinate  milliGRAM(s) Oral once  senna 2 Tablet(s) Oral at bedtime  sodium chloride 0.9%. 1000 milliLiter(s) (125 mL/Hr) IV Continuous <Continuous>    MEDICATIONS  (PRN):  acetaminophen     Tablet .. 650 milliGRAM(s) Oral every 6 hours PRN Temp greater or equal to 38C (100.4F), Mild Pain (1 - 3)  aluminum hydroxide/magnesium hydroxide/simethicone Suspension 30 milliLiter(s) Oral every 4 hours PRN Dyspepsia  dextrose Oral Gel 15 Gram(s) Oral once PRN Blood Glucose LESS THAN 70 milliGRAM(s)/deciliter  melatonin 3 milliGRAM(s) Oral at bedtime PRN Insomnia  ondansetron Injectable 4 milliGRAM(s) IV Push every 8 hours PRN Nausea and/or Vomiting  traMADol 25 milliGRAM(s) Oral every 8 hours PRN Moderate Pain (4 - 6)         Vitals log        ICU Vital Signs Last 24 Hrs  T(C): 38.2 (25 Nov 2023 08:12), Max: 38.2 (25 Nov 2023 08:12)  T(F): 100.8 (25 Nov 2023 08:12), Max: 100.8 (25 Nov 2023 08:12)  HR: 78 (25 Nov 2023 08:12) (78 - 141)  BP: 146/576 (25 Nov 2023 08:12) (142/68 - 166/89)  BP(mean): --  ABP: --  ABP(mean): --  RR: 18 (25 Nov 2023 08:12) (13 - 25)  SpO2: 97% (25 Nov 2023 08:12) (95% - 100%)    O2 Parameters below as of 25 Nov 2023 05:40  Patient On (Oxygen Delivery Method): room air                 Input and Output:  I&O's Detail    24 Nov 2023 07:01  -  25 Nov 2023 07:00  --------------------------------------------------------  IN:  Total IN: 0 mL    OUT:    Indwelling Catheter - Urethral (mL): 200 mL  Total OUT: 200 mL    Total NET: -200 mL          Lab Data                        11.2   21.08 )-----------( 177      ( 25 Nov 2023 05:07 )             34.1     11-25    141  |  108  |  32<H>  ----------------------------<  198<H>  3.7   |  24  |  1.90<H>    Ca    8.8      25 Nov 2023 05:07  Phos  3.3     11-25  Mg     1.6     11-25    TPro  6.2  /  Alb  2.9<L>  /  TBili  0.8  /  DBili  x   /  AST  23  /  ALT  21  /  AlkPhos  69  11-25            Review of Systems	  pain  weakness  on RA    Objective     Physical Examination  heart s1s2  lung dc bS  head nc  verbal  alert  cn grossly int        Pertinent Lab findings & Imaging      Justin:  NO   Adequate UO     I&O's Detail    24 Nov 2023 07:01  -  25 Nov 2023 07:00  --------------------------------------------------------  IN:  Total IN: 0 mL    OUT:    Indwelling Catheter - Urethral (mL): 200 mL  Total OUT: 200 mL    Total NET: -200 mL               Discussed with:     Cultures:	        Radiology      ACC: [id number] EXAM:  US DPLX LWR EXT VEINS COMPL BI   ORDERED BY:   AKASH BRICENO     PROCEDURE DATE:  11/25/2023          INTERPRETATION:  CLINICAL INFORMATION: Leg pain    COMPARISON: None available.    TECHNIQUE: Duplex sonography of the BILATERAL LOWER extremity veins with   color and spectral Doppler, with and without compression.    FINDINGS:    RIGHT:  Normal compressibility of the RIGHT common femoral, femoral and popliteal   veins.  Doppler examination shows normal spontaneous and phasic flow.  No RIGHT calf vein thrombosis is detected.    LEFT:  Normal compressibility of the LEFT common femoral, femoral and popliteal   veins.  Doppler examination shows normal spontaneous and phasic flow.  No LEFT calf vein thrombosis is detected.    IMPRESSION:  No evidence of deep venous thrombosis in either lower extremity.            --- End of Report ---            SRINI FRAIRE MD; Attending Radiologist  This document has been electronically signed. Nov 25 2023  8:01AM       CONCLUSIONS:      1. Technically difficult image quality.   2. The patient is tachycardic throughout this examination.   3. Left ventricular systolic function is normal with an ejection fraction of 64 % by Swanson's method of disks.   4. The right ventricle is not well visualized.   5. Structurally normal mitral valve with normal leaflet excursion.   6. There is mild calcification of the mitral valve annulus.   7. The left atrium is normal.   8. Trace mitral regurgitation.   9. Aortic valve was not well visualized.  10. Moderate calcification of the aortic valve leaflets.  11. The inferior vena cava is normal in size measuring 1.30 cm in diameter, (normal <2.1cm) with normal inspiratory collapse (normal >50%) consistent with normal right atrial pressure (~3, range 0-5mmHg).

## 2023-11-25 NOTE — CONSULT NOTE ADULT - SUBJECTIVE AND OBJECTIVE BOX
Patient is a 89y old  Female who presents with a chief complaint of Right hip Fx (25 Nov 2023 13:23)       HPI:  89 yo female w/ pmh of HTN, HLD, T2DM, TIA, CKD previous right hip fx w/ surgical pinning s/p removal presents following a mechanical fall in her home. Pt states she was standing beside her chair looking out the window when she lost her balance and fell onto her right side. Denies head trauma, LOC, dizziness, lightheadedness. States that she crawled to a phone to call an ambulance. This all occurred at 4am this morning. Initially on arrival to the ED she thought she ate breakfast, but now realizes that she never ate today. Pt states that she had a previous fx to the right hip which had a pin that "fell out" and she had surgically removed. Pt states that even following pain medication in the ED, the pain is an 8-9/10 and radiates to her groin.  Of note, the patient states that her urine this morning was malodorous similar to a UTI she has had in the past. At that time, she too did not have sxs of a urinary tract infection. Denies fever, chills, sob, cp, palpitations, abd pain, n/v/d/c, dysuria, urinary frequency, urinary urgency, hematuria.  Has not seen urologist in many years, but reportedly her renal function has been stable.  Had galeana placed for retention.       PAST MEDICAL & SURGICAL HISTORY:  Hypertension      Diabetes      TIA (transient ischemic attack)      Hip fracture      HLD (hyperlipidemia)      S/P ORIF (open reduction internal fixation) fracture  right hip           FAMILY HISTORY:  FH: asthma (Father)    [place]    Social History:Non smoker    MEDICATIONS  (STANDING):  acetaminophen   IVPB .. 1000 milliGRAM(s) IV Intermittent once  acetaminophen   IVPB .. 1000 milliGRAM(s) IV Intermittent once  aspirin enteric coated 81 milliGRAM(s) Oral daily  atorvastatin 40 milliGRAM(s) Oral at bedtime  cefTRIAXone   IVPB 1000 milliGRAM(s) IV Intermittent every 24 hours  dextrose 5%. 1000 milliLiter(s) (50 mL/Hr) IV Continuous <Continuous>  dextrose 5%. 1000 milliLiter(s) (100 mL/Hr) IV Continuous <Continuous>  dextrose 50% Injectable 12.5 Gram(s) IV Push once  dextrose 50% Injectable 25 Gram(s) IV Push once  dextrose 50% Injectable 25 Gram(s) IV Push once  glucagon  Injectable 1 milliGRAM(s) IntraMuscular once  insulin lispro (ADMELOG) corrective regimen sliding scale   SubCutaneous three times a day before meals  insulin lispro (ADMELOG) corrective regimen sliding scale   SubCutaneous at bedtime  metoprolol succinate  milliGRAM(s) Oral once  metoprolol succinate  milliGRAM(s) Oral daily  polyethylene glycol 3350 17 Gram(s) Oral daily  senna 2 Tablet(s) Oral at bedtime    MEDICATIONS  (PRN):  aluminum hydroxide/magnesium hydroxide/simethicone Suspension 30 milliLiter(s) Oral every 6 hours PRN Dyspepsia  dextrose Oral Gel 15 Gram(s) Oral once PRN Blood Glucose LESS THAN 70 milliGRAM(s)/deciliter  melatonin 3 milliGRAM(s) Oral at bedtime PRN Insomnia  ondansetron Injectable 4 milliGRAM(s) IV Push every 6 hours PRN Nausea and/or Vomiting  oxyCODONE    IR 5 milliGRAM(s) Oral every 4 hours PRN Severe Pain (7 - 10)  oxyCODONE    IR 2.5 milliGRAM(s) Oral every 4 hours PRN Moderate Pain (4 - 6)  simethicone 80 milliGRAM(s) Chew every 8 hours PRN Gas   Meds reviewed    Allergies    No Known Allergies    Intolerances         REVIEW OF SYSTEMS:    Review of Systems:   Constitutional: Denies fatigue  HEENT: Denies headaches and dizziness  Respiratory: denies SOB, cough, or wheezing  Cardiovascular: denies CP, palpitations  Gastrointestinal: Denies nausea, denies vomiting, diarrhea, constipation, abdominal pain, or bloody stools  Genitourinary: denies painful urination, increased frequency, urgency, or bloody urine  Skin: denies rashes or itching  Musculoskeletal: denies muscle aches, joint swelling  Neurologic: Denies generalized weakness, denies loss of sensation, numbness, or tingling      Vital Signs Last 24 Hrs  T(C): 36.8 (25 Nov 2023 11:33), Max: 38.2 (25 Nov 2023 08:12)  T(F): 98.3 (25 Nov 2023 11:33), Max: 100.8 (25 Nov 2023 08:12)  HR: 62 (25 Nov 2023 11:33) (60 - 115)  BP: 116/55 (25 Nov 2023 11:33) (102/50 - 165/87)  BP(mean): --  RR: 18 (25 Nov 2023 11:33) (12 - 25)  SpO2: 98% (25 Nov 2023 11:33) (95% - 99%)    Parameters below as of 25 Nov 2023 11:33  Patient On (Oxygen Delivery Method): room air      Daily     Daily     PHYSICAL EXAM:    GENERAL: NAD  HEAD:  Atraumatic, Normocephalic  EYES: EOMI, conjunctiva and sclera clear  ENMT: No Drainage from nares, No drainage from ears  NERVOUS SYSTEM:  Awake and Alert  CHEST/LUNG: Clear to percussion bilaterally  EXTREMITIES:  No Edema  SKIN: No rashes No obvious ecchymosis      LABS:                        9.8    18.22 )-----------( 145      ( 25 Nov 2023 10:40 )             30.1     11-25    140  |  108  |  32<H>  ----------------------------<  166<H>  4.2   |  26  |  1.80<H>    Ca    8.2<L>      25 Nov 2023 12:55  Phos  3.3     11-25  Mg     1.6     11-25    TPro  5.6<L>  /  Alb  2.5<L>  /  TBili  0.5  /  DBili  x   /  AST  28  /  ALT  21  /  AlkPhos  62  11-25    PT/INR - ( 25 Nov 2023 07:01 )   PT: 15.6 sec;   INR: 1.34 ratio         PTT - ( 25 Nov 2023 07:01 )  PTT:27.0 sec  Urinalysis Basic - ( 25 Nov 2023 12:55 )    Color: x / Appearance: x / SG: x / pH: x  Gluc: 166 mg/dL / Ketone: x  / Bili: x / Urobili: x   Blood: x / Protein: x / Nitrite: x   Leuk Esterase: x / RBC: x / WBC x   Sq Epi: x / Non Sq Epi: x / Bacteria: x      Magnesium: 1.6 mg/dL (11-25 @ 05:07)  Phosphorus: 3.3 mg/dL (11-25 @ 05:07)          RADIOLOGY & ADDITIONAL TESTS:

## 2023-11-25 NOTE — PROGRESS NOTE ADULT - ASSESSMENT
89 yo female w/ pmh of HTN, HLD, T2DM, TIA,CKD previous right hip fx w/ surgical pinning s/p removal admitted for ORIF of mechanical fall w/ right hip fx, s/p R hip IMN on 11/25.

## 2023-11-25 NOTE — DISCHARGE NOTE PROVIDER - HOSPITAL COURSE
FROM ADMISSION H+P:   HPI:  89 yo female w/ pmh of HTN, HLD, T2DM, TIA, CKD previous right hip fx w/ surgical pinning s/p removal presents following a mechanical fall in her home. Pt states she was standing beside her chair looking out the window when she lost her balance and fell onto her right side. Denies head trauma, LOC, dizziness, lightheadedness. States that she crawled to a phone to call an ambulance. This all occurred at 4am this morning. Initially on arrival to the ED she thought she ate breakfast, but now realizes that she never ate today. Pt states that she had a previous fx to the right hip which had a pin that "fell out" and she had surgically removed. Pt states that even following pain medication in the ED, the pain is an 8-9/10 and radiates to her groin.  Of note, the patient states that her urine this morning was malodorous similar to a UTI she has had in the past. At that time, she too did not have sxs of a urinary tract infection. Denies fever, chills, sob, cp, palpitations, abd pain, n/v/d/c, dysuria, urinary frequency, urinary urgency, hematuria.    ED course  Vitals: T 98.1, HR 91, /76, RR 18, SpO2 96% on RA  Significant labs: WBC 14.63, BUN/Cr 42/2.1, UA: Positive for Large Leukocyte Esterase, WBC 18, Many Bacteria, Positive Squamous Epithelial Cells  Imaging: XR Femur and Hip: Proximal femoral neck fx on personal read  CXR: No acute cardiopulmonary pathology on wet read  Xray Ribs: No acute fx to right sided ribs  EKG: NSR   In ED patient given: 1L NS, 1L LR, Ofirmev, 2mg Morphine x2, Zofran   (24 Nov 2023 12:47)      ---  HOSPITAL COURSE:   Patient was admitted for ORIF of mechanical fall w/ right hip fracture. Orthopedics was consulted. Pt had ORIF w/ IMN of the R hip perfomed on 11/25. Pt was also found to have  acute UTI, continued on IV rocephin. Nephrology was consulted due to urinary retention in the ED w/ 700+cc of urine, galeana was placed.  Urine culture grew ____. Patient was also found to have episodes of tachycardia with HR to 140s prior to procedure on day of admission, cardiology and pulmonology were consulted. Metoprolol was continued. Home losartan and HCTZ were held in the setting of pt's existing CKD and stable BP around 100-140s/60s. Patient on POD #1 was continued on rocephin and started on Eliquis 2.5mg BID.   ---  CONSULTANTS:   Cardio Dr. Escoto  Orthopedics Dr. De Luna  Pulmonology Dr. Mohamud   Nephrology Dr. Ramos   ---  TIME SPENT:  I, the attending physician, was physically present for the key portions of the evaluation and management (E/M) service provided. The total amount of time spent reviewing the hospital notes, laboratory values, imaging findings, assessing/counseling the patient, discussing with consultant physicians, social work, nursing staff was -- minutes    ---  Primary care provider was made aware of plan for discharge:      [  ] NO     [  ] YES   FROM ADMISSION H+P:   HPI:  89 yo female w/ pmh of HTN, HLD, T2DM, TIA, CKD previous right hip fx w/ surgical pinning s/p removal presents following a mechanical fall in her home. Pt states she was standing beside her chair looking out the window when she lost her balance and fell onto her right side. Denies head trauma, LOC, dizziness, lightheadedness. States that she crawled to a phone to call an ambulance. This all occurred at 4am this morning. Initially on arrival to the ED she thought she ate breakfast, but now realizes that she never ate today. Pt states that she had a previous fx to the right hip which had a pin that "fell out" and she had surgically removed. Pt states that even following pain medication in the ED, the pain is an 8-9/10 and radiates to her groin.  Of note, the patient states that her urine this morning was malodorous similar to a UTI she has had in the past. At that time, she too did not have sxs of a urinary tract infection. Denies fever, chills, sob, cp, palpitations, abd pain, n/v/d/c, dysuria, urinary frequency, urinary urgency, hematuria.    ED course  Vitals: T 98.1, HR 91, /76, RR 18, SpO2 96% on RA  Significant labs: WBC 14.63, BUN/Cr 42/2.1, UA: Positive for Large Leukocyte Esterase, WBC 18, Many Bacteria, Positive Squamous Epithelial Cells  Imaging: XR Femur and Hip: Proximal femoral neck fx on personal read  CXR: No acute cardiopulmonary pathology on wet read  Xray Ribs: No acute fx to right sided ribs  EKG: NSR   In ED patient given: 1L NS, 1L LR, Ofirmev, 2mg Morphine x2, Zofran   (24 Nov 2023 12:47)      ---  HOSPITAL COURSE:   Patient was admitted for ORIF of mechanical fall w/ right hip fracture. Orthopedics was consulted. Pt had ORIF w/ IMN of the R hip perfomed on 11/25. Pt was also found to have  acute UTI, continued on IV rocephin. Nephrology was consulted due to urinary retention in the ED w/ 700+cc of urine, galeana was placed.  Patient was also found to have episodes of tachycardia with HR to 140s prior to procedure on day of admission, cardiology and pulmonology were consulted. Metoprolol was continued. Home losartan and HCTZ were held in the setting of pt's existing CKD and stable BP around 100-140s/60s. Patient on POD #1 was continued on rocephin and started on Eliquis 2.5mg BID. Urine culture grew Klebsiella pneumoniae. Pt evaluated by PT, determined for LEONEL placement.     Determined to continue augmentin for remainder of 5 day abx course for UTI.  Patient seen and examined on day of discharge. Patient medically optimized for LEONEL placement.   ---  CONSULTANTS:   Cardio Dr. Escoto  Orthopedics Dr. De Luna  Pulmonology Dr. Mohamud   Nephrology Dr. Ramos   ---  TIME SPENT:  I, the attending physician, was physically present for the key portions of the evaluation and management (E/M) service provided. The total amount of time spent reviewing the hospital notes, laboratory values, imaging findings, assessing/counseling the patient, discussing with consultant physicians, social work, nursing staff was -- minutes    ---  Primary care provider was made aware of plan for discharge:      [  ] NO     [  ] YES   FROM ADMISSION H+P:   HPI:  89 yo female w/ pmh of HTN, HLD, T2DM, TIA, CKD previous right hip fx w/ surgical pinning s/p removal presents following a mechanical fall in her home. Pt states she was standing beside her chair looking out the window when she lost her balance and fell onto her right side. Denies head trauma, LOC, dizziness, lightheadedness. States that she crawled to a phone to call an ambulance. This all occurred at 4am this morning. Initially on arrival to the ED she thought she ate breakfast, but now realizes that she never ate today. Pt states that she had a previous fx to the right hip which had a pin that "fell out" and she had surgically removed. Pt states that even following pain medication in the ED, the pain is an 8-9/10 and radiates to her groin.  Of note, the patient states that her urine this morning was malodorous similar to a UTI she has had in the past. At that time, she too did not have sxs of a urinary tract infection. Denies fever, chills, sob, cp, palpitations, abd pain, n/v/d/c, dysuria, urinary frequency, urinary urgency, hematuria.    ED course  Vitals: T 98.1, HR 91, /76, RR 18, SpO2 96% on RA  Significant labs: WBC 14.63, BUN/Cr 42/2.1, UA: Positive for Large Leukocyte Esterase, WBC 18, Many Bacteria, Positive Squamous Epithelial Cells  Imaging: XR Femur and Hip: Proximal femoral neck fx on personal read  CXR: No acute cardiopulmonary pathology on wet read  Xray Ribs: No acute fx to right sided ribs  EKG: NSR   In ED patient given: 1L NS, 1L LR, Ofirmev, 2mg Morphine x2, Zofran   (24 Nov 2023 12:47)      ---  HOSPITAL COURSE:   Patient was admitted for ORIF of mechanical fall w/ right hip fracture. Orthopedics was consulted. Pt had ORIF w/ IMN of the R hip perfomed on 11/25. Pt was also found to have  acute UTI, continued on IV rocephin. Nephrology was consulted due to urinary retention in the ED w/ 700+cc of urine, galeana was placed.  Patient was also found to have episodes of tachycardia with HR to 140s prior to procedure on day of admission, cardiology and pulmonology were consulted. Metoprolol was continued. Home losartan and HCTZ were held in the setting of pt's existing CKD and stable BP around 100-140s/60s. Patient on POD #1 was continued on rocephin and started on Eliquis 2.5mg BID. Urine culture grew Klebsiella pneumoniae. Pt evaluated by PT, determined for LEONEL placement. ID recommended continuing Cefuroxime for remainder of 5 day abx course for UTI.    Patient seen and examined on day of discharge.     LOS: 4d    VITALS:   T(C): 37 (11-28-23 @ 05:24), Max: 37 (11-28-23 @ 05:24)  HR: 87 (11-28-23 @ 05:24) (70 - 87)  BP: 181/74 (11-28-23 @ 05:24) (163/64 - 181/74)  RR: 18 (11-28-23 @ 05:24) (18 - 18)  SpO2: 93% (11-28-23 @ 05:24) (93% - 93%)    GENERAL: NAD, lying in bed comfortably  HEAD:  Atraumatic, Normocephalic  EYES: EOMI, PERRLA, conjunctiva and sclera clear  ENT: Moist mucous membranes  NECK: Supple, No JVD  CHEST/LUNG: Clear to auscultation bilaterally; No rales, rhonchi, wheezing, or rubs. Unlabored respirations  HEART: Regular rate and rhythm; No murmurs, rubs, or gallops  ABDOMEN: BSx4; Soft, nontender, nondistended  EXTREMITIES:  2+ Peripheral Pulses, brisk capillary refill. No clubbing, cyanosis, or edema  NERVOUS SYSTEM:  A&Ox3, no focal deficits   SKIN: No rashes or lesions    Patient medically optimized for LEONEL placement.   ---  CONSULTANTS:   Cardio Dr. Escoto  Orthopedics Dr. De Luna  Pulmonology Dr. Mohamud   Nephrology Dr. Ramos   ---  TIME SPENT:  I, the attending physician, was physically present for the key portions of the evaluation and management (E/M) service provided. The total amount of time spent reviewing the hospital notes, laboratory values, imaging findings, assessing/counseling the patient, discussing with consultant physicians, social work, nursing staff was -- minutes    ---  Primary care provider was made aware of plan for discharge:      [  ] NO     [  ] YES   FROM ADMISSION H+P:   HPI:  89 yo female w/ pmh of HTN, HLD, T2DM, TIA, CKD previous right hip fx w/ surgical pinning s/p removal presents following a mechanical fall in her home. Pt states she was standing beside her chair looking out the window when she lost her balance and fell onto her right side. Denies head trauma, LOC, dizziness, lightheadedness. States that she crawled to a phone to call an ambulance. This all occurred at 4am this morning. Initially on arrival to the ED she thought she ate breakfast, but now realizes that she never ate today. Pt states that she had a previous fx to the right hip which had a pin that "fell out" and she had surgically removed. Pt states that even following pain medication in the ED, the pain is an 8-9/10 and radiates to her groin.  Of note, the patient states that her urine this morning was malodorous similar to a UTI she has had in the past. At that time, she too did not have sxs of a urinary tract infection. Denies fever, chills, sob, cp, palpitations, abd pain, n/v/d/c, dysuria, urinary frequency, urinary urgency, hematuria.    ED course  Vitals: T 98.1, HR 91, /76, RR 18, SpO2 96% on RA  Significant labs: WBC 14.63, BUN/Cr 42/2.1, UA: Positive for Large Leukocyte Esterase, WBC 18, Many Bacteria, Positive Squamous Epithelial Cells  Imaging: XR Femur and Hip: Proximal femoral neck fx on personal read  CXR: No acute cardiopulmonary pathology on wet read  Xray Ribs: No acute fx to right sided ribs  EKG: NSR   In ED patient given: 1L NS, 1L LR, Ofirmev, 2mg Morphine x2, Zofran   (24 Nov 2023 12:47)      ---  HOSPITAL COURSE:   Patient was admitted for ORIF of mechanical fall w/ right hip fracture. Orthopedics was consulted. Pt had ORIF w/ IMN of the R hip perfomed on 11/25. Pt was also found to have  acute UTI, continued on IV rocephin. Nephrology was consulted due to urinary retention in the ED w/ 700+cc of urine, galeana was placed.  Patient was also found to have episodes of tachycardia with HR to 140s prior to procedure on day of admission, cardiology and pulmonology were consulted. Metoprolol was continued. Home losartan and HCTZ were held in the setting of pt's existing CKD and stable BP around 100-140s/60s. Patient on POD #1 was continued on rocephin and started on Eliquis 2.5mg BID. Urine culture grew Klebsiella pneumoniae. Pt evaluated by PT, determined for LEONEL placement. ID recommended continuing Cefuroxime for remainder of 5 day abx course for UTI.    Patient seen and examined on day of discharge.     LOS: 4d    VITALS:   T(C): 37 (11-28-23 @ 05:24), Max: 37 (11-28-23 @ 05:24)  HR: 87 (11-28-23 @ 05:24) (70 - 87)  BP: 181/74 (11-28-23 @ 05:24) (163/64 - 181/74)  RR: 18 (11-28-23 @ 05:24) (18 - 18)  SpO2: 93% (11-28-23 @ 05:24) (93% - 93%)    GENERAL: NAD, sitting in chair, +galeana draining properly.   HEENT:  anicteric, moist mucous membranes  CHEST/LUNG:  CTA b/l, no rales, wheezes, or rhonchi  HEART:  RRR, S1, S2  ABDOMEN:  BS+, soft. + TTP in RLQ and R suprapubic regions.   EXTREMITIES: no edema, cyanosis. + TTP of RLE.   NERVOUS SYSTEM: answers questions and follows commands appropriately    Patient medically optimized for discharge to [facility].     ---  CONSULTANTS:   Cardio Dr. Escoto  Orthopedics Dr. De Luna  Pulmonology Dr. Mohamud   Nephrology Dr. Ramos   ---  TIME SPENT:  I, the attending physician, was physically present for the key portions of the evaluation and management (E/M) service provided. The total amount of time spent reviewing the hospital notes, laboratory values, imaging findings, assessing/counseling the patient, discussing with consultant physicians, social work, nursing staff was -- minutes    ---  Primary care provider was made aware of plan for discharge:      [  ] NO     [  ] YES   FROM ADMISSION H+P:   HPI:  89 yo female w/ pmh of HTN, HLD, T2DM, TIA, CKD previous right hip fx w/ surgical pinning s/p removal presents following a mechanical fall in her home. Pt states she was standing beside her chair looking out the window when she lost her balance and fell onto her right side. Denies head trauma, LOC, dizziness, lightheadedness. States that she crawled to a phone to call an ambulance. This all occurred at 4am this morning. Initially on arrival to the ED she thought she ate breakfast, but now realizes that she never ate today. Pt states that she had a previous fx to the right hip which had a pin that "fell out" and she had surgically removed. Pt states that even following pain medication in the ED, the pain is an 8-9/10 and radiates to her groin.  Of note, the patient states that her urine this morning was malodorous similar to a UTI she has had in the past. At that time, she too did not have sxs of a urinary tract infection. Denies fever, chills, sob, cp, palpitations, abd pain, n/v/d/c, dysuria, urinary frequency, urinary urgency, hematuria.    ED course  Vitals: T 98.1, HR 91, /76, RR 18, SpO2 96% on RA  Significant labs: WBC 14.63, BUN/Cr 42/2.1, UA: Positive for Large Leukocyte Esterase, WBC 18, Many Bacteria, Positive Squamous Epithelial Cells  Imaging: XR Femur and Hip: Proximal femoral neck fx on personal read  CXR: No acute cardiopulmonary pathology on wet read  Xray Ribs: No acute fx to right sided ribs  EKG: NSR   In ED patient given: 1L NS, 1L LR, Ofirmev, 2mg Morphine x2, Zofran   (24 Nov 2023 12:47)      ---  HOSPITAL COURSE:   Patient was admitted for ORIF of mechanical fall w/ right hip fracture. Orthopedics was consulted. Pt had ORIF w/ IMN of the R hip perfomed on 11/25. Pt was also found to have  acute UTI, continued on IV rocephin. Nephrology was consulted due to urinary retention in the ED w/ 700+cc of urine, galeana was placed.  Patient was also found to have episodes of tachycardia with HR to 140s prior to procedure on day of admission, cardiology and pulmonology were consulted. Metoprolol was continued. Home losartan and HCTZ were held in the setting of pt's existing CKD and stable BP around 100-140s/60s. Patient on POD #1 was continued on rocephin and started on Eliquis 2.5mg BID. Urine culture grew Klebsiella pneumoniae,  continuing Cefuroxime for remainder of 5 day abx course for UTI. Pt evaluated by PT, determined for LEONEL placement.  patient received one unit blood transfusion on 11/28      Patient seen and examined on day of discharge.     LOS: 4d    VITALS:   T(C): 37 (11-28-23 @ 05:24), Max: 37 (11-28-23 @ 05:24)  HR: 87 (11-28-23 @ 05:24) (70 - 87)  BP: 181/74 (11-28-23 @ 05:24) (163/64 - 181/74)  RR: 18 (11-28-23 @ 05:24) (18 - 18)  SpO2: 93% (11-28-23 @ 05:24) (93% - 93%)    GENERAL: NAD, sitting in chair, +galeana draining properly.   HEENT:  anicteric, moist mucous membranes  CHEST/LUNG:  CTA b/l, no rales, wheezes, or rhonchi  HEART:  RRR, S1, S2  ABDOMEN:  BS+, soft. + TTP in RLQ and R suprapubic regions.   EXTREMITIES: no edema, cyanosis. + TTP of RLE.   NERVOUS SYSTEM: answers questions and follows commands appropriately    Patient medically optimized for discharge to [facility].     ---  CONSULTANTS:   Cardio Dr. Escoto  Orthopedics Dr. De Luna  Pulmonology Dr. Mohamud   Nephrology Dr. Ramos   ---  TIME SPENT:  I, the attending physician, was physically present for the key portions of the evaluation and management (E/M) service provided. The total amount of time spent reviewing the hospital notes, laboratory values, imaging findings, assessing/counseling the patient, discussing with consultant physicians, social work, nursing staff was -- minutes    ---  Primary care provider was made aware of plan for discharge:      [  ] NO     [  ] YES   FROM ADMISSION H+P:   HPI:  89 yo female w/ pmh of HTN, HLD, T2DM, TIA, CKD previous right hip fx w/ surgical pinning s/p removal presents following a mechanical fall in her home. Pt states she was standing beside her chair looking out the window when she lost her balance and fell onto her right side. Denies head trauma, LOC, dizziness, lightheadedness. States that she crawled to a phone to call an ambulance. This all occurred at 4am this morning. Initially on arrival to the ED she thought she ate breakfast, but now realizes that she never ate today. Pt states that she had a previous fx to the right hip which had a pin that "fell out" and she had surgically removed. Pt states that even following pain medication in the ED, the pain is an 8-9/10 and radiates to her groin.  Of note, the patient states that her urine this morning was malodorous similar to a UTI she has had in the past. At that time, she too did not have sxs of a urinary tract infection. Denies fever, chills, sob, cp, palpitations, abd pain, n/v/d/c, dysuria, urinary frequency, urinary urgency, hematuria.    ED course  Vitals: T 98.1, HR 91, /76, RR 18, SpO2 96% on RA  Significant labs: WBC 14.63, BUN/Cr 42/2.1, UA: Positive for Large Leukocyte Esterase, WBC 18, Many Bacteria, Positive Squamous Epithelial Cells  Imaging: XR Femur and Hip: Proximal femoral neck fx on personal read  CXR: No acute cardiopulmonary pathology on wet read  Xray Ribs: No acute fx to right sided ribs  EKG: NSR   In ED patient given: 1L NS, 1L LR, Ofirmev, 2mg Morphine x2, Zofran   (24 Nov 2023 12:47)      ---  HOSPITAL COURSE:   Patient was admitted for ORIF of mechanical fall w/ right hip fracture. Orthopedics was consulted. Pt had ORIF w/ IMN of the R hip perfomed on 11/25. Pt was also found to have  acute UTI, continued on IV rocephin. Nephrology was consulted due to urinary retention in the ED w/ 700+cc of urine, galeana was placed.  Patient was also found to have episodes of tachycardia with HR to 140s prior to procedure on day of admission, cardiology and pulmonology were consulted. Metoprolol was continued. Home losartan and HCTZ were held in the setting of pt's existing CKD and stable BP around 100-140s/60s. Patient on POD #1 was continued on rocephin and started on Eliquis 2.5mg BID. Urine culture grew Klebsiella pneumoniae,  continuing Cefuroxime for remainder of 5 day abx course for UTI. Pt evaluated by PT, determined for LEONEL placement.  patient received one unit blood transfusion on 11/28    Patient seen and examined on day of discharge.     LOS: 5d    VITALS:   T(C): 36.9 (11-29-23 @ 05:50), Max: 37.1 (11-28-23 @ 19:42)  HR: 72 (11-29-23 @ 05:50) (65 - 72)  BP: 145/58 (11-29-23 @ 05:50) (101/44 - 155/68)  RR: 18 (11-29-23 @ 05:50) (18 - 18)  SpO2: 91% (11-29-23 @ 05:50) (91% - 94%)    GENERAL: NAD, sitting in chair, +galeana draining properly.   HEENT:  anicteric, moist mucous membranes  CHEST/LUNG:  CTA b/l, no rales, wheezes, or rhonchi  HEART:  RRR, S1, S2  ABDOMEN:  BS+, soft. + TTP in RLQ and R suprapubic regions.   EXTREMITIES: no edema, cyanosis. + TTP of RLE.   NERVOUS SYSTEM: answers questions and follows commands appropriately     Patient medically optimized for discharge to [facility].     ---  CONSULTANTS:   Cardio Dr. Escoto  Orthopedics Dr. De Luna  Pulmonology Dr. Mohamud   Nephrology Dr. Ramos   ---  TIME SPENT:  I, the attending physician, was physically present for the key portions of the evaluation and management (E/M) service provided. The total amount of time spent reviewing the hospital notes, laboratory values, imaging findings, assessing/counseling the patient, discussing with consultant physicians, social work, nursing staff was -- minutes    ---  Primary care provider was made aware of plan for discharge:      [  ] NO     [  ] YES

## 2023-11-25 NOTE — BRIEF OPERATIVE NOTE - NSICDXBRIEFPROCEDURE_GEN_ALL_CORE_FT
PROCEDURES:  Insertion of locking intramedullary edith into right hip 25-Nov-2023 10:17:23  Tanya Teague

## 2023-11-25 NOTE — DISCHARGE NOTE PROVIDER - NSDCFUADDINST_GEN_ALL_CORE_FT
Discharge Instructions for Right Hip IMN:    1. PAIN CONTROL: See Med Rec.  2. ACTIVITY: Weight Bearing as Tolerated with assistance and rolling walker  3. PT: daily  4. DVT/PE PROPHYLAXIS: Continue DVT/PE Prophylaxis. See Med Rec for Duration and dose.  5. BANDAGE: Keep dressing dry and intact. May change if dressing saturated or falling off.   6. STAPLES: RN Remove Staples POD14 (12/9/23).  7. SHOWER: Okay to shower. Do not soak, submerge or let shower stream beat on dressing/wound.  8. FOLLOW UP: Follow-up with Orthopedic Surgeon Dr. De Luna in 14 Days. Call Office For Appointment.

## 2023-11-25 NOTE — PROGRESS NOTE ADULT - SUBJECTIVE AND OBJECTIVE BOX
Postop Check    Patient tolerated the procedure well. Patient seen and examined at bedside.  No acute complaints at this time. Pain well controlled. Patient had one episode of emesis post operatively. Denies chest pain, shortness of breath, or nausea.     PE:  Vital Signs Last 24 Hrs  T(C): 36.8 (11-25-23 @ 11:33), Max: 38.2 (11-25-23 @ 08:12)  T(F): 98.3 (11-25-23 @ 11:33), Max: 100.8 (11-25-23 @ 08:12)  HR: 62 (11-25-23 @ 11:33) (60 - 141)  BP: 116/55 (11-25-23 @ 11:33) (102/50 - 166/89)  BP(mean): --  RR: 18 (11-25-23 @ 11:33) (12 - 25)  SpO2: 98% (11-25-23 @ 11:33) (95% - 100%)    General: NAD, resting comfortably in bed  RLE:   Dressing in place C/D/I  SCD in place bilaterally  No calf tenderness   Motor: + EHL/FHL/TA/GSC  +SILT: SPN/DPN/Yury/Saph/Tib  DP/PT 2+    A/P:  89y f s/p R IMN POD0    -PT/OT  -WBAT  -Pain Control  -DVT ppx starting POD1  -One dose of Ancef give, continue Rocephin postoperatively  -FU AM Labs  -Rest, ice, compress and elevate the extremity as we needed  -Incentive Spirometry  -Medical management appreciated  -Dispo pending PT eval  -Will discuss plan with Dr. De Luna and will advise changes to plan as needed

## 2023-11-25 NOTE — DISCHARGE NOTE PROVIDER - NSDCMRMEDTOKEN_GEN_ALL_CORE_FT
aspirin 81 mg oral delayed release tablet: 1 tab(s) orally every other day  atorvastatin 40 mg oral tablet: 1 tab(s) orally once a day (at bedtime)  hydroCHLOROthiazide 12.5 mg oral tablet: 1 tab(s) orally once a day  Januvia 25 mg oral tablet: 1 tab(s) orally once a day  losartan 50 mg oral tablet: 1 tab(s) orally once a day  Metoprolol Succinate  mg oral tablet, extended release: 1 tab(s) orally once a day   apixaban 2.5 mg oral tablet: 1 tab(s) orally every 12 hours  aspirin 81 mg oral delayed release tablet: 1 tab(s) orally every other day  atorvastatin 40 mg oral tablet: 1 tab(s) orally once a day (at bedtime)  cefuroxime 250 mg oral tablet: 1 tab(s) orally every 12 hours take 1 tablet twice daily for 2 days  hydroCHLOROthiazide 12.5 mg oral tablet: 1 tab(s) orally once a day  Januvia 25 mg oral tablet: 1 tab(s) orally once a day  losartan 50 mg oral tablet: 1 tab(s) orally once a day  melatonin 3 mg oral tablet: 1 tab(s) orally once a day (at bedtime) As needed Insomnia  Metoprolol Succinate  mg oral tablet, extended release: 1 tab(s) orally once a day  polyethylene glycol 3350 oral powder for reconstitution: 17 gram(s) orally once a day  senna leaf extract oral tablet: 2 tab(s) orally once a day (at bedtime)  simethicone 80 mg oral tablet, chewable: 1 tab(s) orally every 8 hours As needed Gas  tamsulosin 0.4 mg oral capsule: 1 cap(s) orally once a day (at bedtime)   apixaban 2.5 mg oral tablet: 1 tab(s) orally every 12 hours  aspirin 81 mg oral delayed release tablet: 1 tab(s) orally every other day  atorvastatin 40 mg oral tablet: 1 tab(s) orally once a day (at bedtime)  cefuroxime 250 mg oral tablet: 1 tab(s) orally every 12 hours take 1 tablet twice daily until 11/30  hydroCHLOROthiazide 12.5 mg oral tablet: 1 tab(s) orally once a day  Januvia 25 mg oral tablet: 1 tab(s) orally once a day  losartan 50 mg oral tablet: 1 tab(s) orally once a day  melatonin 3 mg oral tablet: 1 tab(s) orally once a day (at bedtime) As needed Insomnia  Metoprolol Succinate  mg oral tablet, extended release: 1 tab(s) orally once a day  polyethylene glycol 3350 oral powder for reconstitution: 17 gram(s) orally once a day  senna leaf extract oral tablet: 2 tab(s) orally once a day (at bedtime)  simethicone 80 mg oral tablet, chewable: 1 tab(s) orally every 8 hours As needed Gas  tamsulosin 0.4 mg oral capsule: 1 cap(s) orally once a day (at bedtime)

## 2023-11-25 NOTE — CONSULT NOTE ADULT - ASSESSMENT
ELVIN on CKD 4  Urinary Retention  HTN    -Baseline Creatinine reportedly 1.9  -ELVIN likely 2/2 urinary retention  -Improving back to baseline with galeana  -On metoprolol for BP, can add amlodipine if remains elevated, but currently improved  -S/P OR    11/25/23-d/w daughters

## 2023-11-25 NOTE — PROGRESS NOTE ADULT - SUBJECTIVE AND OBJECTIVE BOX
[facility] -- Tigist Mari Pannella, Patel, Savella Goodger, Cohen  Office # [phone]      Follow Up:  tachycardia     Subjective/Observations:   Seen bedside no complaints, OR cancelled 2/2 tachycardia and nausea yesterday     REVIEW OF SYSTEMS: All other review of systems is negative unless indicated above    PAST MEDICAL & SURGICAL HISTORY:  Hypertension      Diabetes      TIA (transient ischemic attack)      Hip fracture      HLD (hyperlipidemia)      S/P ORIF (open reduction internal fixation) fracture  right hip          MEDICATIONS  (STANDING):  aspirin enteric coated 81 milliGRAM(s) Oral daily  atorvastatin 40 milliGRAM(s) Oral at bedtime  cefTRIAXone   IVPB 1000 milliGRAM(s) IV Intermittent every 24 hours  dextrose 5%. 1000 milliLiter(s) (50 mL/Hr) IV Continuous <Continuous>  dextrose 5%. 1000 milliLiter(s) (100 mL/Hr) IV Continuous <Continuous>  dextrose 50% Injectable 12.5 Gram(s) IV Push once  dextrose 50% Injectable 25 Gram(s) IV Push once  dextrose 50% Injectable 25 Gram(s) IV Push once  glucagon  Injectable 1 milliGRAM(s) IntraMuscular once  HYDROmorphone  Injectable 0.5 milliGRAM(s) IV Push once  insulin lispro (ADMELOG) corrective regimen sliding scale   SubCutaneous every 6 hours  lactated ringers. 1000 milliLiter(s) (75 mL/Hr) IV Continuous <Continuous>  lactated ringers. 1000 milliLiter(s) (75 mL/Hr) IV Continuous <Continuous>  metoprolol succinate  milliGRAM(s) Oral daily  metoprolol succinate  milliGRAM(s) Oral once  senna 2 Tablet(s) Oral at bedtime  sodium chloride 0.9%. 1000 milliLiter(s) (125 mL/Hr) IV Continuous <Continuous>    MEDICATIONS  (PRN):  acetaminophen     Tablet .. 650 milliGRAM(s) Oral every 6 hours PRN Temp greater or equal to 38C (100.4F), Mild Pain (1 - 3)  aluminum hydroxide/magnesium hydroxide/simethicone Suspension 30 milliLiter(s) Oral every 4 hours PRN Dyspepsia  dextrose Oral Gel 15 Gram(s) Oral once PRN Blood Glucose LESS THAN 70 milliGRAM(s)/deciliter  melatonin 3 milliGRAM(s) Oral at bedtime PRN Insomnia  ondansetron Injectable 4 milliGRAM(s) IV Push every 8 hours PRN Nausea and/or Vomiting  traMADol 25 milliGRAM(s) Oral every 8 hours PRN Moderate Pain (4 - 6)      Allergies    No Known Allergies    Intolerances        Vital Signs Last 24 Hrs  T(C): 37.1 (2023 05:40), Max: 37.1 (2023 05:40)  T(F): 98.8 (2023 05:40), Max: 98.8 (2023 05:40)  HR: 100 (2023 05:40) (83 - 141)  BP: 142/68 (2023 05:40) (142/68 - 166/89)  BP(mean): --  RR: 18 (2023 05:40) (13 - 25)  SpO2: 95% (2023 05:40) (95% - 100%)    Parameters below as of 2023 05:40  Patient On (Oxygen Delivery Method): room air        I&O's Summary    2023 07:01  -  2023 06:39  --------------------------------------------------------  IN: 0 mL / OUT: 200 mL / NET: -200 mL      Weight (kg): 51.664 (11-24 @ 15:59)    PHYSICAL EXAM:  TELE:   Constitutional: NAD, awake and alert, well-developed  HEENT: Moist Mucous Membranes, Anicteric  Pulmonary: Non-labored, breath sounds are clear bilaterally, No wheezing, crackles or rhonchi  Cardiovascular: Regular, S1 and S2 nl, No murmurs, rubs, gallops or clicks  Gastrointestinal: Bowel Sounds present, soft, nontender.   Lymph: No lymphadenopathy. No peripheral edema.  Skin: No visible rashes or ulcers.  Psych:  Mood & affect appropriate    LABS: All Labs Reviewed:                        11.2   21.08 )-----------( 177      ( 2023 05:07 )             34.1                         10.9   20.14 )-----------( 152      ( 2023 01:00 )             33.2                         12.0   14.63 )-----------( 179      ( 2023 09:00 )             35.9     2023 05:07    141    |  108    |  32     ----------------------------<  198    3.7     |  24     |  1.90   2023 01:00    141    |  108    |  34     ----------------------------<  245    4.2     |  24     |  1.80   2023 09:00    143    |  109    |  42     ----------------------------<  198    3.6     |  23     |  2.10     Ca    8.8        2023 05:07  Ca    8.5        2023 01:00  Ca    9.5        2023 09:00  Phos  3.3       2023 05:07  Mg     1.6       2023 05:07    TPro  6.2    /  Alb  2.9    /  TBili  0.8    /  DBili  x      /  AST  23     /  ALT  21     /  AlkPhos  69     2023 05:07  TPro  7.3    /  Alb  3.6    /  TBili  0.5    /  DBili  x      /  AST  22     /  ALT  26     /  AlkPhos  87     2023 09:00    PT/INR - ( 2023 01:00 )   PT: 13.9 sec;   INR: 1.19 ratio         PTT - ( 2023 01:00 )  PTT:25.5 sec         EC Lead ECG:   Ventricular Rate 78 BPM    Atrial Rate 78 BPM    P-R Interval 204 ms    QRS Duration 78 ms    Q-T Interval 420 ms    QTC Calculation(Bazett) 478 ms    P Axis 73 degrees    R Axis 30 degrees    T Axis 41 degrees    Diagnosis Line Normal sinus rhythm  Nonspecific ST and T wave abnormality  Abnormal ECG  When compared with ECG of 11-SEP-2016 12:36,  No significant change was found  Confirmed by CARIDAD GARCIA ([id number]) on 2023 9:20:36 PM (23 @ 09:12)      TRANSTHORACIC ECHOCARDIOGRAM REPORT  ________________________________________________________________________________                                      _______       Pt. Name:       NELDA VERDIN Study Date:    2023  MRN:            [id number]       YOB: 1930  Accession #:    [id number]      Age:           89 years  Account#:       [id number]     Gender:        F  Heart Rate:                    Height:        62.99 in (160.00 cm)  Rhythm:                        Weight:   114.64 lb (52.00 kg)  Blood Pressure: 158/98 mmHg    BSA/BMI:       1.53 m² / 20.31 kg/m²  ________________________________________________________________________________________  Referring Physician:    [id number] Addy Mueller  Interpreting Physician: Chuy Escoto  Primary Sonographer:    Juanito Bahena    CPT:               ECHO TTE WO CON COMP W DOPP - 11164.m  Indication(s):     Abnormal electrocardiogram ECG/EKG - R94.31  Procedure:         Transthoracic echocardiogram with 2-D, M-modeand complete                     spectral and color flow Doppler.  Ordering Location: [facility]  Study Information: Image quality for this study is technically difficult.    _______________________________________________________________________________________     CONCLUSIONS:      1. Technically difficult image quality.   2. The patient is tachycardic throughout this examination.   3. Left ventricular systolic function is normal with an ejection fraction of 64 % by Swanson's method of disks.   4. The right ventricle is not well visualized.   5. Structurally normal mitral valve with normal leaflet excursion.   6. There is mild calcification of the mitral valve annulus.   7. The left atrium is normal.   8. Trace mitral regurgitation.   9. Aortic valve was not well visualized.  10. Moderate calcification of the aortic valve leaflets.  11. The inferior vena cava is normal in size measuring 1.30 cm in diameter, (normal <2.1cm) with normal inspiratory collapse (normal >50%) consistent with normal right atrial pressure (~3, range 0-5mmHg).    ________________________________________________________________________________________  FINDINGS:     Left Ventricle:  Left ventricular systolic function is normal with a calculated ejection fraction of 64 % by the Swanson's biplane method of disks. There is normal left ventricular diastolic function.     Right Ventricle:  The right ventricle is not well visualized. Tricuspid annular plane systolic excursion (TAPSE) is 1.7 cm (normal >=1.7 cm).     Left Atrium:  The left atrium is normal with an indexed volume of 21.82 ml/m².     Right Atrium:  The right atrium was not well visualized.     Aortic Valve:  The aortic valve was not well visualized. The aortic valve anatomy cannot be determined. There is moderate calcification of the aortic valve leaflets.     Mitral Valve:  Structurally normal mitral valve with normal leaflet excursion. There is mild calcification of the mitral valve annulus. There is trace mitral regurgitation.     Tricuspid Valve:  The tricuspid valve was not well visualized.     Pulmonic Valve:  The pulmonic valve was not well visualized.     Aorta:  The aortic root at the sinuses of Valsalva is normal in size, measuring 3.00 cm (indexed 1.97 cm/m²).     Systemic Veins:  The inferior vena cava is normal in size measuring 1.30 cm in diameter, (normal <2.1cm) with normal inspiratory collapse (normal >50%) consistent with normal right atrial pressure (~3, range 0-5mmHg).  ____________________________________________________________________  QUANTITATIVE DATA:  Left Ventricle Measurements: (Indexed to BSA)     IVSd (2D):   1.2 cm  LVPWd (2D):  1.0 cm  LVIDd (2D):  3.5 cm  LVIDs (2D):  2.5 cm  LV Mass:     121 g  79.2 g/m²  LV Vol d, MOD A2C: 33.7 ml 22.08 ml/m²  LV Vol d, MOD A4C: 18.8 ml 12.32 ml/m²  LV Vol d, MOD BP:  25.5 ml 16.69 ml/m²  LV Vol s, MOD A2C: 11.5 ml 7.53 ml/m²  LV Vol s, MOD A4C: 7.7 ml  5.06 ml/m²  LV Vol s, MOD BP:  9.2 ml  6.02 ml/m²  LVOT SV MOD BP:    16.3 ml  LV EF% MOD BP:     64 %     MV E Vmax:    1.33 m/s  MV A Vmax:    0.50 m/s  MV E/A:       2.67  e' lateral:   21.20 cm/s  e' medial:    12.80 cm/s  E/e' lateral: 6.27  E/e' medial:  10.39  E/e' Average: 7.82  MV DT:        143 msec    Aorta Measurements: (normal range) (Indexed to BSA)     Sinuses of Valsalva: 3.00 cm (2.7 - 3.3 cm)       Left Atrium Measurements: (Indexed to BSA)  LA Diam 2D: 3.40 cm    Right Ventricle Measurements:     TAPSE: 1.7 cm       LVOT / RVOT/ Qp/Qs Data: (Indexed to BSA)  LVOT Diameter: 1.80 cm    Mitral Valve Measurements:     MV E Vmax: 1.3 m/s  MV A Vmax: 0.5 m/s  MV E/A:    2.7       Tricuspid Valve Measurements:     RA Pressure: 3 mmHg    ________________________________________________________________________________________  Electronicallysigned on 2023 at 6:17:31 AM by Chuy Escoto         *** Final ***      Radiology:         [facility] -- Tigist Mari Pannella, Patel, Savella Goodger, Cohen  Office # [phone]      Follow Up:  tachycardia     Subjective/Observations:   OR cancelled 2/2 tachycardia and nausea yesterday     REVIEW OF SYSTEMS: All other review of systems is negative unless indicated above    PAST MEDICAL & SURGICAL HISTORY:  Hypertension      Diabetes      TIA (transient ischemic attack)      Hip fracture      HLD (hyperlipidemia)      S/P ORIF (open reduction internal fixation) fracture  right hip          MEDICATIONS  (STANDING):  aspirin enteric coated 81 milliGRAM(s) Oral daily  atorvastatin 40 milliGRAM(s) Oral at bedtime  cefTRIAXone   IVPB 1000 milliGRAM(s) IV Intermittent every 24 hours  dextrose 5%. 1000 milliLiter(s) (50 mL/Hr) IV Continuous <Continuous>  dextrose 5%. 1000 milliLiter(s) (100 mL/Hr) IV Continuous <Continuous>  dextrose 50% Injectable 12.5 Gram(s) IV Push once  dextrose 50% Injectable 25 Gram(s) IV Push once  dextrose 50% Injectable 25 Gram(s) IV Push once  glucagon  Injectable 1 milliGRAM(s) IntraMuscular once  HYDROmorphone  Injectable 0.5 milliGRAM(s) IV Push once  insulin lispro (ADMELOG) corrective regimen sliding scale   SubCutaneous every 6 hours  lactated ringers. 1000 milliLiter(s) (75 mL/Hr) IV Continuous <Continuous>  lactated ringers. 1000 milliLiter(s) (75 mL/Hr) IV Continuous <Continuous>  metoprolol succinate  milliGRAM(s) Oral daily  metoprolol succinate  milliGRAM(s) Oral once  senna 2 Tablet(s) Oral at bedtime  sodium chloride 0.9%. 1000 milliLiter(s) (125 mL/Hr) IV Continuous <Continuous>    MEDICATIONS  (PRN):  acetaminophen     Tablet .. 650 milliGRAM(s) Oral every 6 hours PRN Temp greater or equal to 38C (100.4F), Mild Pain (1 - 3)  aluminum hydroxide/magnesium hydroxide/simethicone Suspension 30 milliLiter(s) Oral every 4 hours PRN Dyspepsia  dextrose Oral Gel 15 Gram(s) Oral once PRN Blood Glucose LESS THAN 70 milliGRAM(s)/deciliter  melatonin 3 milliGRAM(s) Oral at bedtime PRN Insomnia  ondansetron Injectable 4 milliGRAM(s) IV Push every 8 hours PRN Nausea and/or Vomiting  traMADol 25 milliGRAM(s) Oral every 8 hours PRN Moderate Pain (4 - 6)      Allergies    No Known Allergies    Intolerances        Vital Signs Last 24 Hrs  T(C): 37.1 (2023 05:40), Max: 37.1 (2023 05:40)  T(F): 98.8 (2023 05:40), Max: 98.8 (2023 05:40)  HR: 100 (2023 05:40) (83 - 141)  BP: 142/68 (2023 05:40) (142/68 - 166/89)  BP(mean): --  RR: 18 (2023 05:40) (13 - 25)  SpO2: 95% (2023 05:40) (95% - 100%)    Parameters below as of 2023 05:40  Patient On (Oxygen Delivery Method): room air        I&O's Summary    2023 07:01  -  2023 06:39  --------------------------------------------------------  IN: 0 mL / OUT: 200 mL / NET: -200 mL      Weight (kg): 51.664 (11-24 @ 15:59)    PHYSICAL EXAM:  TELE:   Constitutional: NAD, awake and alert, well-developed  HEENT: Moist Mucous Membranes, Anicteric  Pulmonary: Non-labored, breath sounds are clear bilaterally, No wheezing, crackles or rhonchi  Cardiovascular: Regular, S1 and S2 nl, No murmurs, rubs, gallops or clicks  Gastrointestinal: Bowel Sounds present, soft, nontender.   Lymph: No lymphadenopathy. No peripheral edema.  Skin: No visible rashes or ulcers.  Psych:  Mood & affect appropriate    LABS: All Labs Reviewed:                        11.2   21.08 )-----------( 177      ( 2023 05:07 )             34.1                         10.9   20.14 )-----------( 152      ( 2023 01:00 )             33.2                         12.0   14.63 )-----------( 179      ( 2023 09:00 )             35.9     2023 05:07    141    |  108    |  32     ----------------------------<  198    3.7     |  24     |  1.90   2023 01:00    141    |  108    |  34     ----------------------------<  245    4.2     |  24     |  1.80   2023 09:00    143    |  109    |  42     ----------------------------<  198    3.6     |  23     |  2.10     Ca    8.8        2023 05:07  Ca    8.5        2023 01:00  Ca    9.5        2023 09:00  Phos  3.3       2023 05:07  Mg     1.6       2023 05:07    TPro  6.2    /  Alb  2.9    /  TBili  0.8    /  DBili  x      /  AST  23     /  ALT  21     /  AlkPhos  69     2023 05:07  TPro  7.3    /  Alb  3.6    /  TBili  0.5    /  DBili  x      /  AST  22     /  ALT  26     /  AlkPhos  87     2023 09:00    PT/INR - ( 2023 01:00 )   PT: 13.9 sec;   INR: 1.19 ratio         PTT - ( 2023 01:00 )  PTT:25.5 sec         EC Lead ECG:   Ventricular Rate 78 BPM    Atrial Rate 78 BPM    P-R Interval 204 ms    QRS Duration 78 ms    Q-T Interval 420 ms    QTC Calculation(Bazett) 478 ms    P Axis 73 degrees    R Axis 30 degrees    T Axis 41 degrees    Diagnosis Line Normal sinus rhythm  Nonspecific ST and T wave abnormality  Abnormal ECG  When compared with ECG of 11-SEP-2016 12:36,  No significant change was found  Confirmed by CARIDAD GARCIA ([id number]) on 2023 9:20:36 PM (23 @ 09:12)      TRANSTHORACIC ECHOCARDIOGRAM REPORT  ________________________________________________________________________________                                      _______       Pt. Name:       NELDA VERDIN Study Date:    2023  MRN:            [id number]       YOB: 1930  Accession #:    [id number]      Age:           89 years  Account#:       [id number]     Gender:        F  Heart Rate:                    Height:        62.99 in (160.00 cm)  Rhythm:                        Weight:   114.64 lb (52.00 kg)  Blood Pressure: 158/98 mmHg    BSA/BMI:       1.53 m² / 20.31 kg/m²  ________________________________________________________________________________________  Referring Physician:    [id number] Addy Mueller  Interpreting Physician: Chuy Escoto  Primary Sonographer:    Juanito Bahena    CPT:               ECHO TTE WO CON COMP W DOPP - 53317.m  Indication(s):     Abnormal electrocardiogram ECG/EKG - R94.31  Procedure:         Transthoracic echocardiogram with 2-D, M-modeand complete                     spectral and color flow Doppler.  Ordering Location: [facility]  Study Information: Image quality for this study is technically difficult.    _______________________________________________________________________________________     CONCLUSIONS:      1. Technically difficult image quality.   2. The patient is tachycardic throughout this examination.   3. Left ventricular systolic function is normal with an ejection fraction of 64 % by Swanson's method of disks.   4. The right ventricle is not well visualized.   5. Structurally normal mitral valve with normal leaflet excursion.   6. There is mild calcification of the mitral valve annulus.   7. The left atrium is normal.   8. Trace mitral regurgitation.   9. Aortic valve was not well visualized.  10. Moderate calcification of the aortic valve leaflets.  11. The inferior vena cava is normal in size measuring 1.30 cm in diameter, (normal <2.1cm) with normal inspiratory collapse (normal >50%) consistent with normal right atrial pressure (~3, range 0-5mmHg).    ________________________________________________________________________________________  FINDINGS:     Left Ventricle:  Left ventricular systolic function is normal with a calculated ejection fraction of 64 % by the Swanson's biplane method of disks. There is normal left ventricular diastolic function.     Right Ventricle:  The right ventricle is not well visualized. Tricuspid annular plane systolic excursion (TAPSE) is 1.7 cm (normal >=1.7 cm).     Left Atrium:  The left atrium is normal with an indexed volume of 21.82 ml/m².     Right Atrium:  The right atrium was not well visualized.     Aortic Valve:  The aortic valve was not well visualized. The aortic valve anatomy cannot be determined. There is moderate calcification of the aortic valve leaflets.     Mitral Valve:  Structurally normal mitral valve with normal leaflet excursion. There is mild calcification of the mitral valve annulus. There is trace mitral regurgitation.     Tricuspid Valve:  The tricuspid valve was not well visualized.     Pulmonic Valve:  The pulmonic valve was not well visualized.     Aorta:  The aortic root at the sinuses of Valsalva is normal in size, measuring 3.00 cm (indexed 1.97 cm/m²).     Systemic Veins:  The inferior vena cava is normal in size measuring 1.30 cm in diameter, (normal <2.1cm) with normal inspiratory collapse (normal >50%) consistent with normal right atrial pressure (~3, range 0-5mmHg).  ____________________________________________________________________  QUANTITATIVE DATA:  Left Ventricle Measurements: (Indexed to BSA)     IVSd (2D):   1.2 cm  LVPWd (2D):  1.0 cm  LVIDd (2D):  3.5 cm  LVIDs (2D):  2.5 cm  LV Mass:     121 g  79.2 g/m²  LV Vol d, MOD A2C: 33.7 ml 22.08 ml/m²  LV Vol d, MOD A4C: 18.8 ml 12.32 ml/m²  LV Vol d, MOD BP:  25.5 ml 16.69 ml/m²  LV Vol s, MOD A2C: 11.5 ml 7.53 ml/m²  LV Vol s, MOD A4C: 7.7 ml  5.06 ml/m²  LV Vol s, MOD BP:  9.2 ml  6.02 ml/m²  LVOT SV MOD BP:    16.3 ml  LV EF% MOD BP:     64 %     MV E Vmax:    1.33 m/s  MV A Vmax:    0.50 m/s  MV E/A:       2.67  e' lateral:   21.20 cm/s  e' medial:    12.80 cm/s  E/e' lateral: 6.27  E/e' medial:  10.39  E/e' Average: 7.82  MV DT:        143 msec    Aorta Measurements: (normal range) (Indexed to BSA)     Sinuses of Valsalva: 3.00 cm (2.7 - 3.3 cm)       Left Atrium Measurements: (Indexed to BSA)  LA Diam 2D: 3.40 cm    Right Ventricle Measurements:     TAPSE: 1.7 cm       LVOT / RVOT/ Qp/Qs Data: (Indexed to BSA)  LVOT Diameter: 1.80 cm    Mitral Valve Measurements:     MV E Vmax: 1.3 m/s  MV A Vmax: 0.5 m/s  MV E/A:    2.7       Tricuspid Valve Measurements:     RA Pressure: 3 mmHg    ________________________________________________________________________________________  Electronicallysigned on 2023 at 6:17:31 AM by Chuy Escoto         *** Final ***      Radiology:

## 2023-11-25 NOTE — PROGRESS NOTE ADULT - SUBJECTIVE AND OBJECTIVE BOX
Patient seen and examined at bedside. Patient reports pain well controlled on medications. No acute events overnight. Pt denies fevers, chills, new onset numbness, weakness or tingling in the extremities.    Vital Signs (24 Hrs):  T(C): 37.1 (11-25-23 @ 05:40), Max: 37.1 (11-25-23 @ 05:40)  HR: 100 (11-25-23 @ 05:40) (83 - 141)  BP: 142/68 (11-25-23 @ 05:40) (142/68 - 166/89)  RR: 18 (11-25-23 @ 05:40) (13 - 25)  SpO2: 95% (11-25-23 @ 05:40) (95% - 100%)  Wt(kg): --    LABS:                          11.2   21.08 )-----------( 177      ( 25 Nov 2023 05:07 )             34.1     11-25    141  |  108  |  32<H>  ----------------------------<  198<H>  3.7   |  24  |  1.90<H>    Ca    8.8      25 Nov 2023 05:07  Phos  3.3     11-25  Mg     1.6     11-25    TPro  6.2  /  Alb  2.9<L>  /  TBili  0.8  /  DBili  x   /  AST  23  /  ALT  21  /  AlkPhos  69  11-25    LIVER FUNCTIONS - ( 25 Nov 2023 05:07 )  Alb: 2.9 g/dL / Pro: 6.2 g/dL / ALK PHOS: 69 U/L / ALT: 21 U/L / AST: 23 U/L / GGT: x           PT/INR - ( 25 Nov 2023 01:00 )   PT: 13.9 sec;   INR: 1.19 ratio         PTT - ( 25 Nov 2023 01:00 )  PTT:25.5 sec    Physical Exam:  Gen: NAD, Resting comfortably    R LE:  Skin intact, no erythema or ecchymosis  Externally and shortened leg  TTP by hip, nowhere else along RLE  Motor: + EHL/FHL/TA/GSC  Sensory: +SILT: SPN/DPN/ALICE/SAPH/TIB  Compartments soft and compressible  No calf tenderness  + DP      A/P: 89yFemale with a 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 Plan for OR today with Dr De Luna     Plan for IMN Today  NPO  IVF while NPO  Hold chemical DVT ppx for surgery  Please document necessary medical optimizations for surgery   Pain Control As Needed   Ice hip as tolerated  NWB, Strict Bed Rest  SCDs while in bed   Medical recommendations appreciated     Will discuss plan with Dr. De Luna and will advise changes to plan as needed

## 2023-11-25 NOTE — DISCHARGE NOTE PROVIDER - CARE PROVIDER_API CALL
Ananda De Luna  Orthopaedic Surgery  [address] [facility], [facility]  [place] [address]  Phone: [phone]  Fax: [phone]  Follow Up Time:    Ananda De Luna  Orthopaedic Surgery  [address] [facility], [address]  [place] [address]  Phone: [phone]  Fax: [phone]  Follow Up Time:     Lewis Ramos  Nephrology  [address]  [place] [address]  Phone: [phone]  Fax: [phone]  Follow Up Time:     Chuy Escoto  Cardiovascular Disease  [address] [place] [address]  Phone: [phone]  Fax: [phone]  Follow Up Time:     Silverio Mohamud)  Pulmonary Disease  [address] [place] [address]  Phone: [phone]  Fax: [phone]  Follow Up Time:    Ananda De Luna  Orthopaedic Surgery  [address] [facility], [address]  [place] [address]  Phone: [phone]  Fax: [phone]  Follow Up Time: 2 weeks    Lewis Ramos  Nephrology  [address] [facility], [address]  [place] [address]  Phone: [phone]  Fax: [phone]  Follow Up Time: 2 weeks    Chuy Escoto  Cardiovascular Disease  [address] [place] [address]  Phone: [phone]  Fax: [phone]  Follow Up Time: Routine    Silverio Mohamud)  Pulmonary Disease  [address] [place] [address]  Phone: [phone]  Fax: [phone]  Follow Up Time: Routine

## 2023-11-25 NOTE — PROGRESS NOTE ADULT - SUBJECTIVE AND OBJECTIVE BOX
Patient is a 89y old  Female who presents with a chief complaint of Right hip Fx (25 Nov 2023 12:27)      INTERVAL HPI/OVERNIGHT EVENTS: No acute events overnight. Patient seen and examined at bedside. Denies fever/chills, chest pain, shortness of breath, headache, dizziness, nausea/vomiting, abdominal pain. Pt planned for OR with Ortho this AM.     MEDICATIONS  (STANDING):  acetaminophen   IVPB .. 1000 milliGRAM(s) IV Intermittent once  acetaminophen   IVPB .. 1000 milliGRAM(s) IV Intermittent once  atorvastatin 40 milliGRAM(s) Oral at bedtime  cefTRIAXone   IVPB 1000 milliGRAM(s) IV Intermittent every 24 hours  lactated ringers. 1000 milliLiter(s) (75 mL/Hr) IV Continuous <Continuous>  metoprolol succinate  milliGRAM(s) Oral once  metoprolol succinate  milliGRAM(s) Oral daily  polyethylene glycol 3350 17 Gram(s) Oral daily  senna 2 Tablet(s) Oral at bedtime    MEDICATIONS  (PRN):  HYDROmorphone  Injectable 0.5 milliGRAM(s) IV Push every 10 minutes PRN Moderate Pain (4 - 6)  melatonin 3 milliGRAM(s) Oral at bedtime PRN Insomnia  ondansetron Injectable 4 milliGRAM(s) IV Push once PRN Nausea and/or Vomiting  ondansetron Injectable 4 milliGRAM(s) IV Push every 6 hours PRN Nausea and/or Vomiting  oxyCODONE    IR 2.5 milliGRAM(s) Oral every 4 hours PRN Moderate Pain (4 - 6)  oxyCODONE    IR 5 milliGRAM(s) Oral every 4 hours PRN Severe Pain (7 - 10)      Allergies  No Known Allergies      REVIEW OF SYSTEMS:  CONSTITUTIONAL: No fever or chills  HEENT:  No headache, no sore throat  RESPIRATORY: No cough, wheezing, or shortness of breath  CARDIOVASCULAR: No chest pain, palpitations  GASTROINTESTINAL: No abd pain, nausea, vomiting, or diarrhea  GENITOURINARY: No dysuria, frequency, or hematuria  NEUROLOGICAL: no focal weakness or dizziness  MUSCULOSKELETAL: no myalgias     Vital Signs Last 24 Hrs  T(C): 36.8 (25 Nov 2023 11:33), Max: 38.2 (25 Nov 2023 08:12)  T(F): 98.3 (25 Nov 2023 11:33), Max: 100.8 (25 Nov 2023 08:12)  HR: 62 (25 Nov 2023 11:33) (60 - 141)  BP: 116/55 (25 Nov 2023 11:33) (102/50 - 166/89)  BP(mean): --  RR: 18 (25 Nov 2023 11:33) (12 - 25)  SpO2: 98% (25 Nov 2023 11:33) (95% - 100%)    Parameters below as of 25 Nov 2023 11:33  Patient On (Oxygen Delivery Method): room air        PHYSICAL EXAM:  GENERAL: NAD  HEENT:  anicteric, moist mucous membranes  CHEST/LUNG:  CTA b/l, no rales, wheezes, or rhonchi  HEART:  RRR, S1, S2  ABDOMEN: soft, nontender, nondistended  EXTREMITIES: tenderness to palpation of R hip, sensation intact and equal b/l, no cyanosis or edema   NERVOUS SYSTEM: answers questions and follows commands appropriately      LABS:                        9.8    18.22 )-----------( 145      ( 25 Nov 2023 10:40 )             30.1     CBC Full  -  ( 25 Nov 2023 10:40 )  WBC Count : 18.22 K/uL  Hemoglobin : 9.8 g/dL  Hematocrit : 30.1 %  Platelet Count - Automated : 145 K/uL  Mean Cell Volume : 85.5 fl  Mean Cell Hemoglobin : 27.8 pg  Mean Cell Hemoglobin Concentration : 32.6 gm/dL  Auto Neutrophil # : x  Auto Lymphocyte # : x  Auto Monocyte # : x  Auto Eosinophil # : x  Auto Basophil # : x  Auto Neutrophil % : x  Auto Lymphocyte % : x  Auto Monocyte % : x  Auto Eosinophil % : x  Auto Basophil % : x    25 Nov 2023 10:40    143    |  111    |  32     ----------------------------<  164    4.0     |  23     |  1.80     Ca    8.3        25 Nov 2023 10:40  Phos  3.3       25 Nov 2023 05:07  Mg     1.6       25 Nov 2023 05:07    TPro  6.2    /  Alb  2.9    /  TBili  0.8    /  DBili  x      /  AST  23     /  ALT  21     /  AlkPhos  69     25 Nov 2023 05:07    PT/INR - ( 25 Nov 2023 07:01 )   PT: 15.6 sec;   INR: 1.34 ratio         PTT - ( 25 Nov 2023 07:01 )  PTT:27.0 sec  Urinalysis Basic - ( 25 Nov 2023 10:40 )    Color: x / Appearance: x / SG: x / pH: x  Gluc: 164 mg/dL / Ketone: x  / Bili: x / Urobili: x   Blood: x / Protein: x / Nitrite: x   Leuk Esterase: x / RBC: x / WBC x   Sq Epi: x / Non Sq Epi: x / Bacteria: x      CAPILLARY BLOOD GLUCOSE      POCT Blood Glucose.: 160 mg/dL (25 Nov 2023 11:31)  POCT Blood Glucose.: 155 mg/dL (25 Nov 2023 10:06)  POCT Blood Glucose.: 196 mg/dL (25 Nov 2023 06:25)  POCT Blood Glucose.: 239 mg/dL (25 Nov 2023 00:53)  POCT Blood Glucose.: 202 mg/dL (24 Nov 2023 21:50)  POCT Blood Glucose.: 247 mg/dL (24 Nov 2023 18:46)  POCT Blood Glucose.: 205 mg/dL (24 Nov 2023 16:01)          RADIOLOGY & ADDITIONAL TESTS:    Personally reviewed.     Consultant(s) Notes Reviewed:  [x] YES  [ ] NO     Patient is a 89y old  Female who presents with a chief complaint of Right hip Fx (25 Nov 2023 12:27)      INTERVAL HPI/OVERNIGHT EVENTS: Pt was tachycardic prior to Ortho procedure yesterday resulting in cancellation of procedure. HR improved s/p Esmolol x1 and Metoprolol x1. Patient seen and examined at bedside. Denies fever/chills, chest pain, shortness of breath, headache, dizziness, nausea/vomiting, abdominal pain. Pt planned for OR with Ortho this AM.     MEDICATIONS  (STANDING):  acetaminophen   IVPB .. 1000 milliGRAM(s) IV Intermittent once  acetaminophen   IVPB .. 1000 milliGRAM(s) IV Intermittent once  atorvastatin 40 milliGRAM(s) Oral at bedtime  cefTRIAXone   IVPB 1000 milliGRAM(s) IV Intermittent every 24 hours  lactated ringers. 1000 milliLiter(s) (75 mL/Hr) IV Continuous <Continuous>  metoprolol succinate  milliGRAM(s) Oral once  metoprolol succinate  milliGRAM(s) Oral daily  polyethylene glycol 3350 17 Gram(s) Oral daily  senna 2 Tablet(s) Oral at bedtime    MEDICATIONS  (PRN):  HYDROmorphone  Injectable 0.5 milliGRAM(s) IV Push every 10 minutes PRN Moderate Pain (4 - 6)  melatonin 3 milliGRAM(s) Oral at bedtime PRN Insomnia  ondansetron Injectable 4 milliGRAM(s) IV Push once PRN Nausea and/or Vomiting  ondansetron Injectable 4 milliGRAM(s) IV Push every 6 hours PRN Nausea and/or Vomiting  oxyCODONE    IR 2.5 milliGRAM(s) Oral every 4 hours PRN Moderate Pain (4 - 6)  oxyCODONE    IR 5 milliGRAM(s) Oral every 4 hours PRN Severe Pain (7 - 10)      Allergies  No Known Allergies      REVIEW OF SYSTEMS:  CONSTITUTIONAL: No fever or chills  HEENT:  No headache, no sore throat  RESPIRATORY: No cough, wheezing, or shortness of breath  CARDIOVASCULAR: No chest pain, palpitations  GASTROINTESTINAL: No abd pain, nausea, vomiting, or diarrhea  GENITOURINARY: No dysuria, frequency, or hematuria  NEUROLOGICAL: no focal weakness or dizziness  MUSCULOSKELETAL: no myalgias     Vital Signs Last 24 Hrs  T(C): 36.8 (25 Nov 2023 11:33), Max: 38.2 (25 Nov 2023 08:12)  T(F): 98.3 (25 Nov 2023 11:33), Max: 100.8 (25 Nov 2023 08:12)  HR: 62 (25 Nov 2023 11:33) (60 - 141)  BP: 116/55 (25 Nov 2023 11:33) (102/50 - 166/89)  BP(mean): --  RR: 18 (25 Nov 2023 11:33) (12 - 25)  SpO2: 98% (25 Nov 2023 11:33) (95% - 100%)    Parameters below as of 25 Nov 2023 11:33  Patient On (Oxygen Delivery Method): room air        PHYSICAL EXAM:  GENERAL: NAD  HEENT:  anicteric, moist mucous membranes  CHEST/LUNG:  CTA b/l, no rales, wheezes, or rhonchi  HEART:  RRR, S1, S2  ABDOMEN: soft, nontender, nondistended  EXTREMITIES: tenderness to palpation of R hip, sensation intact and equal b/l, no cyanosis or edema   NERVOUS SYSTEM: answers questions and follows commands appropriately      LABS:                        9.8    18.22 )-----------( 145      ( 25 Nov 2023 10:40 )             30.1     CBC Full  -  ( 25 Nov 2023 10:40 )  WBC Count : 18.22 K/uL  Hemoglobin : 9.8 g/dL  Hematocrit : 30.1 %  Platelet Count - Automated : 145 K/uL  Mean Cell Volume : 85.5 fl  Mean Cell Hemoglobin : 27.8 pg  Mean Cell Hemoglobin Concentration : 32.6 gm/dL  Auto Neutrophil # : x  Auto Lymphocyte # : x  Auto Monocyte # : x  Auto Eosinophil # : x  Auto Basophil # : x  Auto Neutrophil % : x  Auto Lymphocyte % : x  Auto Monocyte % : x  Auto Eosinophil % : x  Auto Basophil % : x    25 Nov 2023 10:40    143    |  111    |  32     ----------------------------<  164    4.0     |  23     |  1.80     Ca    8.3        25 Nov 2023 10:40  Phos  3.3       25 Nov 2023 05:07  Mg     1.6       25 Nov 2023 05:07    TPro  6.2    /  Alb  2.9    /  TBili  0.8    /  DBili  x      /  AST  23     /  ALT  21     /  AlkPhos  69     25 Nov 2023 05:07    PT/INR - ( 25 Nov 2023 07:01 )   PT: 15.6 sec;   INR: 1.34 ratio         PTT - ( 25 Nov 2023 07:01 )  PTT:27.0 sec  Urinalysis Basic - ( 25 Nov 2023 10:40 )    Color: x / Appearance: x / SG: x / pH: x  Gluc: 164 mg/dL / Ketone: x  / Bili: x / Urobili: x   Blood: x / Protein: x / Nitrite: x   Leuk Esterase: x / RBC: x / WBC x   Sq Epi: x / Non Sq Epi: x / Bacteria: x      CAPILLARY BLOOD GLUCOSE      POCT Blood Glucose.: 160 mg/dL (25 Nov 2023 11:31)  POCT Blood Glucose.: 155 mg/dL (25 Nov 2023 10:06)  POCT Blood Glucose.: 196 mg/dL (25 Nov 2023 06:25)  POCT Blood Glucose.: 239 mg/dL (25 Nov 2023 00:53)  POCT Blood Glucose.: 202 mg/dL (24 Nov 2023 21:50)  POCT Blood Glucose.: 247 mg/dL (24 Nov 2023 18:46)  POCT Blood Glucose.: 205 mg/dL (24 Nov 2023 16:01)          RADIOLOGY & ADDITIONAL TESTS:    Personally reviewed.     Consultant(s) Notes Reviewed:  [x] YES  [ ] NO

## 2023-11-25 NOTE — PROGRESS NOTE ADULT - ASSESSMENT
89 yo Female with PMHx of HTN, T2DM, TIA 9/2016, right hip fracture, S/P ORIF right hip 3/5/16 presents to ED w/ a c/o of R hip pain s/p fall. Plan for IMN     s/p fall preop for IMN   - EKG demonstrates NSR. No acute ischemic changes noted.   - Patient euvolemic on examination with no overt signs of heart failure or cardiac ischemia.   - No severe valvular abnormalities noted on examination  - /68. Continue home anti HTN losartan and metoprolol.   - or cancelled 11/24 for nausea and tachycardia in pre op   - Echo to be read today by Dr Escoto   -further risk stratification pending results of above     - Monitor and replete lytes, keep K>4, Mg>2.- AM labs pending   - Will continue to follow.   89 yo Female with PMHx of HTN, T2DM, TIA 9/2016, right hip fracture, S/P ORIF right hip 3/5/16 presents to ED w/ a c/o of R hip pain s/p fall. Plan for IMN     IN Progress    s/p fall preop for IMN   - EKG demonstrates NSR. No acute ischemic changes noted.   - Patient euvolemic on examination with no overt signs of heart failure or cardiac ischemia.   - No severe valvular abnormalities noted on examination  - /68. Continue home anti HTN losartan and metoprolol.   - or cancelled 11/24 for nausea and tachycardia in pre op   - Echo to be read today by Dr Escoto       - Monitor and replete lytes, keep K>4, Mg>2.- AM labs pending   - Will continue to follow.   89 yo Female with PMHx of HTN, T2DM, TIA 9/2016, right hip fracture, S/P ORIF right hip 3/5/16 presents to ED w/ a c/o of R hip pain s/p fall. Plan for IMN        s/p fall preop for IMN   - EKG demonstrates NSR. No acute ischemic changes noted.   - Patient euvolemic on examination with no overt signs of heart failure or cardiac ischemia.   - No severe valvular abnormalities noted on examination  - /68. Continue home anti HTN losartan and metoprolol.   - or cancelled 11/24 for nausea and tachycardia in pre op   - Echo to be read today by Dr Escoto       - Monitor and replete lytes, keep K>4, Mg>2.- AM labs pending   - Will continue to follow.

## 2023-11-25 NOTE — DISCHARGE NOTE PROVIDER - NSDCCPCAREPLAN_GEN_ALL_CORE_FT
PRINCIPAL DISCHARGE DIAGNOSIS  Diagnosis: Intertrochanteric fracture of right hip  Assessment and Plan of Treatment:      PRINCIPAL DISCHARGE DIAGNOSIS  Diagnosis: Intertrochanteric fracture of right hip  Assessment and Plan of Treatment: You were admitted for a R hip fracture. You were seen by orthopedics. You had a procedure done called open reduction, internal fixation of the right hip with intramedullary nailing  You were started on Eliquis 2.5mg  twice a day after your postoperative days  You were also seen by physical therapy, you were advised to weight bear as tolerated.  Please follow up with orthopedics within 1 week of discharge. Please continue to follow up with your PCP within 1 week of discharge.     PRINCIPAL DISCHARGE DIAGNOSIS  Diagnosis: Intertrochanteric fracture of right hip  Assessment and Plan of Treatment: You were admitted for a R hip fracture. You were seen by orthopedics. You had a procedure done called open reduction, internal fixation of the right hip with intramedullary nailing  You were started on Eliquis 2.5mg  twice a day after your postoperative days  You were also seen by physical therapy, you were advised to weight bear as tolerated.  Please follow up with orthopedics within 1 week of discharge. Please continue to follow up with your PCP within 1 week of discharge.      SECONDARY DISCHARGE DIAGNOSES  Diagnosis: Acute UTI  Assessment and Plan of Treatment: You were diagnosed with a UTI during the hospital stay. You were treated with IV antibiotics which were switched to oral antibiotics upon dishcharge. Start taking Cefuroxime 250 mg twice daily for two days. Follow up with your PCP within 1 week of discharge.        PRINCIPAL DISCHARGE DIAGNOSIS  Diagnosis: Intertrochanteric fracture of right hip  Assessment and Plan of Treatment: You were admitted for a R hip fracture. You were seen by orthopedics. You had a procedure done called open reduction, internal fixation of the right hip with intramedullary nailing  You were started on Eliquis 2.5mg  twice a day for another 33days.   You were also seen by physical therapy, you were advised to weight bear as tolerated.  Please follow up with orthopedics within 1 week of discharge. Please continue to follow up with your PCP within 1 week of discharge.      SECONDARY DISCHARGE DIAGNOSES  Diagnosis: Acute UTI  Assessment and Plan of Treatment: You were diagnosed with a UTI during the hospital stay. You were treated with IV antibiotics which were switched to oral antibiotics upon dishcharge. Start taking Cefuroxime 250 mg twice daily for two days. Follow up with your PCP within 1 week of discharge.        PRINCIPAL DISCHARGE DIAGNOSIS  Diagnosis: Intertrochanteric fracture of right hip  Assessment and Plan of Treatment: You were admitted for a R hip fracture. You were seen by orthopedics. You had a procedure done called open reduction, internal fixation of the right hip with intramedullary nailing  You were started on Eliquis 2.5mg  twice a day for another 33days.   You were also seen by physical therapy, you were advised to weight bear as tolerated.  Please follow up with orthopedics within 1 week of discharge. Please continue to follow up with your PCP within 1 week of discharge.      SECONDARY DISCHARGE DIAGNOSES  Diagnosis: Acute UTI  Assessment and Plan of Treatment: You were diagnosed with a UTI during the hospital stay. You were treated with IV antibiotics which were switched to oral antibiotics upon dishcharge. Start taking Cefuroxime 250 mg twice daily for two days. Follow up with your PCP within 1 week of discharge.       Diagnosis: Anemia due to acute blood loss  Assessment and Plan of Treatment: Hb 7.9  and s/p one unit blood transfusion on  11/28, monitor H/H suggested     PRINCIPAL DISCHARGE DIAGNOSIS  Diagnosis: Intertrochanteric fracture of right hip  Assessment and Plan of Treatment: You were admitted for a R hip fracture. You were seen by orthopedics. You had a procedure done called open reduction, internal fixation of the right hip with intramedullary nailing  You were started on Eliquis 2.5mg  twice a day for another 33days.   You were also seen by physical therapy, you were advised to weight bear as tolerated.  Please follow up with orthopedics within 1 week of discharge. Please continue to follow up with your PCP within 1 week of discharge.      SECONDARY DISCHARGE DIAGNOSES  Diagnosis: Acute UTI  Assessment and Plan of Treatment: You were diagnosed with a UTI during the hospital stay. You were treated with IV antibiotics which were switched to oral antibiotics upon dishcharge. Start taking Cefuroxime 250 mg twice daily for two days. Follow up with your PCP within 1 week of discharge.       Diagnosis: Anemia due to acute blood loss  Assessment and Plan of Treatment: You have a known history of Anemia. You were transfused 1 unit of blood on 11/28 for hemoglobin 7.9 to keep above goal hemoglobin 8. Your hemoglobin rised appropriately and remained stable.

## 2023-11-26 LAB
ALBUMIN SERPL ELPH-MCNC: 2.2 G/DL — LOW (ref 3.3–5)
ALBUMIN SERPL ELPH-MCNC: 2.2 G/DL — LOW (ref 3.3–5)
ALP SERPL-CCNC: 65 U/L — SIGNIFICANT CHANGE UP (ref 40–120)
ALP SERPL-CCNC: 65 U/L — SIGNIFICANT CHANGE UP (ref 40–120)
ALT FLD-CCNC: 15 U/L — SIGNIFICANT CHANGE UP (ref 12–78)
ALT FLD-CCNC: 15 U/L — SIGNIFICANT CHANGE UP (ref 12–78)
ANION GAP SERPL CALC-SCNC: 7 MMOL/L — SIGNIFICANT CHANGE UP (ref 5–17)
ANION GAP SERPL CALC-SCNC: 7 MMOL/L — SIGNIFICANT CHANGE UP (ref 5–17)
AST SERPL-CCNC: 34 U/L — SIGNIFICANT CHANGE UP (ref 15–37)
AST SERPL-CCNC: 34 U/L — SIGNIFICANT CHANGE UP (ref 15–37)
BASOPHILS # BLD AUTO: 0.05 K/UL — SIGNIFICANT CHANGE UP (ref 0–0.2)
BASOPHILS # BLD AUTO: 0.05 K/UL — SIGNIFICANT CHANGE UP (ref 0–0.2)
BASOPHILS NFR BLD AUTO: 0.4 % — SIGNIFICANT CHANGE UP (ref 0–2)
BASOPHILS NFR BLD AUTO: 0.4 % — SIGNIFICANT CHANGE UP (ref 0–2)
BILIRUB SERPL-MCNC: 0.5 MG/DL — SIGNIFICANT CHANGE UP (ref 0.2–1.2)
BILIRUB SERPL-MCNC: 0.5 MG/DL — SIGNIFICANT CHANGE UP (ref 0.2–1.2)
BUN SERPL-MCNC: 37 MG/DL — HIGH (ref 7–23)
BUN SERPL-MCNC: 37 MG/DL — HIGH (ref 7–23)
CALCIUM SERPL-MCNC: 8.2 MG/DL — LOW (ref 8.5–10.1)
CALCIUM SERPL-MCNC: 8.2 MG/DL — LOW (ref 8.5–10.1)
CHLORIDE SERPL-SCNC: 105 MMOL/L — SIGNIFICANT CHANGE UP (ref 96–108)
CHLORIDE SERPL-SCNC: 105 MMOL/L — SIGNIFICANT CHANGE UP (ref 96–108)
CO2 SERPL-SCNC: 27 MMOL/L — SIGNIFICANT CHANGE UP (ref 22–31)
CO2 SERPL-SCNC: 27 MMOL/L — SIGNIFICANT CHANGE UP (ref 22–31)
CREAT SERPL-MCNC: 1.9 MG/DL — HIGH (ref 0.5–1.3)
CREAT SERPL-MCNC: 1.9 MG/DL — HIGH (ref 0.5–1.3)
EGFR: 24 ML/MIN/1.73M2 — LOW
EGFR: 24 ML/MIN/1.73M2 — LOW
EOSINOPHIL # BLD AUTO: 0.18 K/UL — SIGNIFICANT CHANGE UP (ref 0–0.5)
EOSINOPHIL # BLD AUTO: 0.18 K/UL — SIGNIFICANT CHANGE UP (ref 0–0.5)
EOSINOPHIL NFR BLD AUTO: 1.3 % — SIGNIFICANT CHANGE UP (ref 0–6)
EOSINOPHIL NFR BLD AUTO: 1.3 % — SIGNIFICANT CHANGE UP (ref 0–6)
GLUCOSE SERPL-MCNC: 201 MG/DL — HIGH (ref 70–99)
GLUCOSE SERPL-MCNC: 201 MG/DL — HIGH (ref 70–99)
HCT VFR BLD CALC: 26.2 % — LOW (ref 34.5–45)
HCT VFR BLD CALC: 26.2 % — LOW (ref 34.5–45)
HGB BLD-MCNC: 8.5 G/DL — LOW (ref 11.5–15.5)
HGB BLD-MCNC: 8.5 G/DL — LOW (ref 11.5–15.5)
IMM GRANULOCYTES NFR BLD AUTO: 0.6 % — SIGNIFICANT CHANGE UP (ref 0–0.9)
IMM GRANULOCYTES NFR BLD AUTO: 0.6 % — SIGNIFICANT CHANGE UP (ref 0–0.9)
LYMPHOCYTES # BLD AUTO: 1.12 K/UL — SIGNIFICANT CHANGE UP (ref 1–3.3)
LYMPHOCYTES # BLD AUTO: 1.12 K/UL — SIGNIFICANT CHANGE UP (ref 1–3.3)
LYMPHOCYTES # BLD AUTO: 7.9 % — LOW (ref 13–44)
LYMPHOCYTES # BLD AUTO: 7.9 % — LOW (ref 13–44)
MAGNESIUM SERPL-MCNC: 1.7 MG/DL — SIGNIFICANT CHANGE UP (ref 1.6–2.6)
MAGNESIUM SERPL-MCNC: 1.7 MG/DL — SIGNIFICANT CHANGE UP (ref 1.6–2.6)
MCHC RBC-ENTMCNC: 28 PG — SIGNIFICANT CHANGE UP (ref 27–34)
MCHC RBC-ENTMCNC: 28 PG — SIGNIFICANT CHANGE UP (ref 27–34)
MCHC RBC-ENTMCNC: 32.4 GM/DL — SIGNIFICANT CHANGE UP (ref 32–36)
MCHC RBC-ENTMCNC: 32.4 GM/DL — SIGNIFICANT CHANGE UP (ref 32–36)
MCV RBC AUTO: 86.2 FL — SIGNIFICANT CHANGE UP (ref 80–100)
MCV RBC AUTO: 86.2 FL — SIGNIFICANT CHANGE UP (ref 80–100)
MONOCYTES # BLD AUTO: 1.12 K/UL — HIGH (ref 0–0.9)
MONOCYTES # BLD AUTO: 1.12 K/UL — HIGH (ref 0–0.9)
MONOCYTES NFR BLD AUTO: 7.9 % — SIGNIFICANT CHANGE UP (ref 2–14)
MONOCYTES NFR BLD AUTO: 7.9 % — SIGNIFICANT CHANGE UP (ref 2–14)
NEUTROPHILS # BLD AUTO: 11.55 K/UL — HIGH (ref 1.8–7.4)
NEUTROPHILS # BLD AUTO: 11.55 K/UL — HIGH (ref 1.8–7.4)
NEUTROPHILS NFR BLD AUTO: 81.9 % — HIGH (ref 43–77)
NEUTROPHILS NFR BLD AUTO: 81.9 % — HIGH (ref 43–77)
NRBC # BLD: 0 /100 WBCS — SIGNIFICANT CHANGE UP (ref 0–0)
NRBC # BLD: 0 /100 WBCS — SIGNIFICANT CHANGE UP (ref 0–0)
PHOSPHATE SERPL-MCNC: 2.8 MG/DL — SIGNIFICANT CHANGE UP (ref 2.5–4.5)
PHOSPHATE SERPL-MCNC: 2.8 MG/DL — SIGNIFICANT CHANGE UP (ref 2.5–4.5)
PLATELET # BLD AUTO: 136 K/UL — LOW (ref 150–400)
PLATELET # BLD AUTO: 136 K/UL — LOW (ref 150–400)
POTASSIUM SERPL-MCNC: 4 MMOL/L — SIGNIFICANT CHANGE UP (ref 3.5–5.3)
POTASSIUM SERPL-MCNC: 4 MMOL/L — SIGNIFICANT CHANGE UP (ref 3.5–5.3)
POTASSIUM SERPL-SCNC: 4 MMOL/L — SIGNIFICANT CHANGE UP (ref 3.5–5.3)
POTASSIUM SERPL-SCNC: 4 MMOL/L — SIGNIFICANT CHANGE UP (ref 3.5–5.3)
PROT SERPL-MCNC: 5.5 G/DL — LOW (ref 6–8.3)
PROT SERPL-MCNC: 5.5 G/DL — LOW (ref 6–8.3)
RBC # BLD: 3.04 M/UL — LOW (ref 3.8–5.2)
RBC # BLD: 3.04 M/UL — LOW (ref 3.8–5.2)
RBC # FLD: 14.9 % — HIGH (ref 10.3–14.5)
RBC # FLD: 14.9 % — HIGH (ref 10.3–14.5)
SODIUM SERPL-SCNC: 139 MMOL/L — SIGNIFICANT CHANGE UP (ref 135–145)
SODIUM SERPL-SCNC: 139 MMOL/L — SIGNIFICANT CHANGE UP (ref 135–145)
WBC # BLD: 14.1 K/UL — HIGH (ref 3.8–10.5)
WBC # BLD: 14.1 K/UL — HIGH (ref 3.8–10.5)
WBC # FLD AUTO: 14.1 K/UL — HIGH (ref 3.8–10.5)
WBC # FLD AUTO: 14.1 K/UL — HIGH (ref 3.8–10.5)

## 2023-11-26 PROCEDURE — 99233 SBSQ HOSP IP/OBS HIGH 50: CPT | Mod: GC

## 2023-11-26 PROCEDURE — 99232 SBSQ HOSP IP/OBS MODERATE 35: CPT

## 2023-11-26 RX ORDER — LIDOCAINE 4 G/100G
1 CREAM TOPICAL EVERY 24 HOURS
Refills: 0 | Status: DISCONTINUED | OUTPATIENT
Start: 2023-11-26 | End: 2023-11-29

## 2023-11-26 RX ORDER — SODIUM CHLORIDE 9 MG/ML
500 INJECTION INTRAMUSCULAR; INTRAVENOUS; SUBCUTANEOUS ONCE
Refills: 0 | Status: COMPLETED | OUTPATIENT
Start: 2023-11-26 | End: 2023-11-26

## 2023-11-26 RX ORDER — SODIUM CHLORIDE 9 MG/ML
1000 INJECTION, SOLUTION INTRAVENOUS
Refills: 0 | Status: DISCONTINUED | OUTPATIENT
Start: 2023-11-26 | End: 2023-11-28

## 2023-11-26 RX ORDER — SODIUM CHLORIDE 9 MG/ML
500 INJECTION INTRAMUSCULAR; INTRAVENOUS; SUBCUTANEOUS ONCE
Refills: 0 | Status: DISCONTINUED | OUTPATIENT
Start: 2023-11-26 | End: 2023-11-26

## 2023-11-26 RX ADMIN — LIDOCAINE 1 PATCH: 4 CREAM TOPICAL at 19:00

## 2023-11-26 RX ADMIN — Medication 81 MILLIGRAM(S): at 12:19

## 2023-11-26 RX ADMIN — SODIUM CHLORIDE 75 MILLILITER(S): 9 INJECTION, SOLUTION INTRAVENOUS at 12:42

## 2023-11-26 RX ADMIN — APIXABAN 2.5 MILLIGRAM(S): 2.5 TABLET, FILM COATED ORAL at 05:40

## 2023-11-26 RX ADMIN — OXYCODONE HYDROCHLORIDE 5 MILLIGRAM(S): 5 TABLET ORAL at 22:58

## 2023-11-26 RX ADMIN — POLYETHYLENE GLYCOL 3350 17 GRAM(S): 17 POWDER, FOR SOLUTION ORAL at 12:19

## 2023-11-26 RX ADMIN — CEFTRIAXONE 100 MILLIGRAM(S): 500 INJECTION, POWDER, FOR SOLUTION INTRAMUSCULAR; INTRAVENOUS at 21:58

## 2023-11-26 RX ADMIN — OXYCODONE HYDROCHLORIDE 5 MILLIGRAM(S): 5 TABLET ORAL at 21:58

## 2023-11-26 RX ADMIN — APIXABAN 2.5 MILLIGRAM(S): 2.5 TABLET, FILM COATED ORAL at 18:05

## 2023-11-26 RX ADMIN — LIDOCAINE 1 PATCH: 4 CREAM TOPICAL at 18:04

## 2023-11-26 RX ADMIN — OXYCODONE HYDROCHLORIDE 5 MILLIGRAM(S): 5 TABLET ORAL at 08:09

## 2023-11-26 RX ADMIN — SENNA PLUS 2 TABLET(S): 8.6 TABLET ORAL at 21:58

## 2023-11-26 RX ADMIN — Medication 1: at 12:18

## 2023-11-26 RX ADMIN — ATORVASTATIN CALCIUM 40 MILLIGRAM(S): 80 TABLET, FILM COATED ORAL at 21:58

## 2023-11-26 RX ADMIN — Medication 100 MILLIGRAM(S): at 05:40

## 2023-11-26 RX ADMIN — OXYCODONE HYDROCHLORIDE 5 MILLIGRAM(S): 5 TABLET ORAL at 09:09

## 2023-11-26 RX ADMIN — Medication 1000 MILLIGRAM(S): at 12:30

## 2023-11-26 RX ADMIN — SODIUM CHLORIDE 500 MILLILITER(S): 9 INJECTION INTRAMUSCULAR; INTRAVENOUS; SUBCUTANEOUS at 12:42

## 2023-11-26 RX ADMIN — Medication 400 MILLIGRAM(S): at 12:22

## 2023-11-26 NOTE — PROGRESS NOTE ADULT - ASSESSMENT
89 yo female w/ pmh of HTN, HLD, T2DM, TIA, CKD previous right hip fx w/ surgical pinning s/p removal presents following a mechanical fall in her home. Pt states she was standing beside her chair looking out the window when she lost her balance and fell onto her right side.    POD 1 - right IMN  ortho follow up  renal eval noted  on RA    cardio eval noted  known CKD - Stable  [facility] rx regimen  BP control  Pain rx regimen  D Dimer noted - likely an inflammation marker in light of Right Hip Fx  CXR NADP  monitor VS and HD and Sat  I chioma  DM care  serial FS

## 2023-11-26 NOTE — OCCUPATIONAL THERAPY INITIAL EVALUATION ADULT - ADL RETRAINING, OT EVAL
Patient will dress lower body with minimal assistance, AE as needed, in 2-5 OT sessions to prepare for ADLs and IADLs.

## 2023-11-26 NOTE — PROGRESS NOTE ADULT - ASSESSMENT
89 yo Female with PMHx of HTN, T2DM, TIA 9/2016, right hip fracture, S/P ORIF right hip 3/5/16 presents to ED w/ a c/o of R hip pain s/p fall. Plan for IMN        s/p fall now s/p IMN   -echo normal ef and no sig valve disease  -there is no evidence of acute ischemia.  -there is no evidence of significant arrhythmia.  -there is no evidence for meaningful  volume overload.  -no cardiovascular complications postop  -DVT prophylaxis  -monitor electrolytes, keep k>4, Mg>2  -cardiovascular status is otherwise without acute change.    - Monitor and replete lytes, keep K>4, Mg>2.   - Will continue to follow.

## 2023-11-26 NOTE — OCCUPATIONAL THERAPY INITIAL EVALUATION ADULT - ADDITIONAL COMMENTS
Pt [place] a [place] with +RACQUEL and +steps to bedroom and bathroom. As per daughter, once pt is on the main level of the home she can stay there for some time. Pt has a tub with no seat, +grab bars. Pt owns commode however does not use it. PTA, pt was independent using RW with ADLs, IADLs and functional mobility.

## 2023-11-26 NOTE — OCCUPATIONAL THERAPY INITIAL EVALUATION ADULT - BED MOBILITY TRAINING, PT EVAL
Patient will perform bed mobility skills (supine to sit and sit to supine) with minimal assistance to participate in EOB activities in 2-5 OT sessions.

## 2023-11-26 NOTE — PROGRESS NOTE ADULT - SUBJECTIVE AND OBJECTIVE BOX
Patient is a 89y old  Female who presents with a chief complaint of Right hip Fx (25 Nov 2023 13:23)       HPI:  89 yo female w/ pmh of HTN, HLD, T2DM, TIA, CKD previous right hip fx w/ surgical pinning s/p removal presents following a mechanical fall in her home. Pt states she was standing beside her chair looking out the window when she lost her balance and fell onto her right side. Denies head trauma, LOC, dizziness, lightheadedness. States that she crawled to a phone to call an ambulance. This all occurred at 4am this morning. Initially on arrival to the ED she thought she ate breakfast, but now realizes that she never ate today. Pt states that she had a previous fx to the right hip which had a pin that "fell out" and she had surgically removed. Pt states that even following pain medication in the ED, the pain is an 8-9/10 and radiates to her groin.  Of note, the patient states that her urine this morning was malodorous similar to a UTI she has had in the past. At that time, she too did not have sxs of a urinary tract infection. Denies fever, chills, sob, cp, palpitations, abd pain, n/v/d/c, dysuria, urinary frequency, urinary urgency, hematuria.  Has not seen urologist in many years, but reportedly her renal function has been stable.  Had galeana placed for retention.     No N/V/SOB    PAST MEDICAL & SURGICAL HISTORY:  Hypertension      Diabetes      TIA (transient ischemic attack)      Hip fracture      HLD (hyperlipidemia)      S/P ORIF (open reduction internal fixation) fracture  right hip           FAMILY HISTORY:  FH: asthma (Father)    [place]    Social History:Non smoker    MEDICATIONS  (STANDING):  acetaminophen   IVPB .. 1000 milliGRAM(s) IV Intermittent once  acetaminophen   IVPB .. 1000 milliGRAM(s) IV Intermittent once  aspirin enteric coated 81 milliGRAM(s) Oral daily  atorvastatin 40 milliGRAM(s) Oral at bedtime  cefTRIAXone   IVPB 1000 milliGRAM(s) IV Intermittent every 24 hours  dextrose 5%. 1000 milliLiter(s) (50 mL/Hr) IV Continuous <Continuous>  dextrose 5%. 1000 milliLiter(s) (100 mL/Hr) IV Continuous <Continuous>  dextrose 50% Injectable 12.5 Gram(s) IV Push once  dextrose 50% Injectable 25 Gram(s) IV Push once  dextrose 50% Injectable 25 Gram(s) IV Push once  glucagon  Injectable 1 milliGRAM(s) IntraMuscular once  insulin lispro (ADMELOG) corrective regimen sliding scale   SubCutaneous three times a day before meals  insulin lispro (ADMELOG) corrective regimen sliding scale   SubCutaneous at bedtime  metoprolol succinate  milliGRAM(s) Oral once  metoprolol succinate  milliGRAM(s) Oral daily  polyethylene glycol 3350 17 Gram(s) Oral daily  senna 2 Tablet(s) Oral at bedtime    MEDICATIONS  (PRN):  aluminum hydroxide/magnesium hydroxide/simethicone Suspension 30 milliLiter(s) Oral every 6 hours PRN Dyspepsia  dextrose Oral Gel 15 Gram(s) Oral once PRN Blood Glucose LESS THAN 70 milliGRAM(s)/deciliter  melatonin 3 milliGRAM(s) Oral at bedtime PRN Insomnia  ondansetron Injectable 4 milliGRAM(s) IV Push every 6 hours PRN Nausea and/or Vomiting  oxyCODONE    IR 5 milliGRAM(s) Oral every 4 hours PRN Severe Pain (7 - 10)  oxyCODONE    IR 2.5 milliGRAM(s) Oral every 4 hours PRN Moderate Pain (4 - 6)  simethicone 80 milliGRAM(s) Chew every 8 hours PRN Gas   Meds reviewed    Allergies    No Known Allergies    Intolerances         REVIEW OF SYSTEMS:    Review of Systems:   as above    ICU Vital Signs Last 24 Hrs  T(C): 36.7 (26 Nov 2023 11:18), Max: 37.2 (25 Nov 2023 19:40)  T(F): 98.1 (26 Nov 2023 11:18), Max: 98.9 (25 Nov 2023 19:40)  HR: 61 (26 Nov 2023 11:18) (61 - 73)  BP: 117/62 (26 Nov 2023 11:18) (99/62 - 122/62)  BP(mean): --  ABP: --  ABP(mean): --  RR: 18 (26 Nov 2023 11:18) (17 - 18)  SpO2: 91% (26 Nov 2023 11:18) (91% - 94%)    O2 Parameters below as of 26 Nov 2023 11:18  Patient On (Oxygen Delivery Method): room air          Daily     Daily     PHYSICAL EXAM:    GENERAL: NAD  HEAD:  Atraumatic, Normocephalic  EYES: EOMI, conjunctiva and sclera clear  ENMT: No Drainage from nares, No drainage from ears  NERVOUS SYSTEM:  Awake and Alert  CHEST/LUNG: Clear to percussion bilaterally  EXTREMITIES:  No Edema  SKIN: No rashes No obvious ecchymosis      LABS:                                    8.5    14.10 )-----------( 136      ( 26 Nov 2023 10:33 )             26.2     11-26    139  |  105  |  37<H>  ----------------------------<  201<H>  4.0   |  27  |  1.90<H>    Ca    8.2<L>      26 Nov 2023 10:33  Phos  2.8     11-26  Mg     1.7     11-26    TPro  5.5<L>  /  Alb  2.2<L>  /  TBili  0.5  /  DBili  x   /  AST  34  /  ALT  15  /  AlkPhos  65  11-26    PT/INR - ( 25 Nov 2023 07:01 )   PT: 15.6 sec;   INR: 1.34 ratio         PTT - ( 25 Nov 2023 07:01 )  PTT:27.0 sec  Urinalysis Basic - ( 26 Nov 2023 10:33 )    Color: x / Appearance: x / SG: x / pH: x  Gluc: 201 mg/dL / Ketone: x  / Bili: x / Urobili: x   Blood: x / Protein: x / Nitrite: x   Leuk Esterase: x / RBC: x / WBC x   Sq Epi: x / Non Sq Epi: x / Bacteria: x

## 2023-11-26 NOTE — PHYSICAL THERAPY INITIAL EVALUATION ADULT - GENERAL OBSERVATIONS, REHAB EVAL
Patient found supine in bed, +galeana, +SCDs, +bandage R hip, NAD, daughter at bedside, A&Ox4 and in agreement to work with OT for initial evaluation.

## 2023-11-26 NOTE — OCCUPATIONAL THERAPY INITIAL EVALUATION ADULT - TRANSFER TRAINING, PT EVAL
Patient will transfer to toilet with DME as needed with minimal assistance in 2-5 OT sessions to increase independence with toileting tasks.

## 2023-11-26 NOTE — OCCUPATIONAL THERAPY INITIAL EVALUATION ADULT - PERSONAL SAFETY AND JUDGMENT, REHAB EVAL
COPD/PN Week 2 Survey    Flowsheet Row Responses   [facility] patient discharged from? [place]   Does the patient have one of the following disease processes/diagnoses(primary or secondary)? COPD/Pneumonia   Was the primary reason for admission: Pneumonia   Week 2 attempt successful? Yes   Call start time 1310   Call end time 1314   Discharge diagnosis PNA   Meds reviewed with patient/caregiver? Yes   Is the patient taking all medications as directed (includes completed medication regime)? Yes   Has the patient kept scheduled appointments due by today? Yes  [had apt today for xray and bloodwork]   Has all DME been delivered? No   Pulse Ox monitoring Intermittent   O2 Sat comments 95% RA,  2L O2 at HS    What is the patient's perception of their health status since discharge? Improving   Is the patient/caregiver able to teach back signs and symptoms of worsening condition: Fever/chills, Shortness of breath, Chest pain   Week 2 call completed? Yes          ZENIA FRANZ - Registered Nurse  
98.7
verbal cues for safety and sequencing/intact

## 2023-11-26 NOTE — PROGRESS NOTE ADULT - SUBJECTIVE AND OBJECTIVE BOX
Date/Time Patient Seen:  		  Referring MD:   Data Reviewed	       Patient is a 89y old  Female who presents with a chief complaint of Right hip Fx (25 Nov 2023 17:38)      Subjective/HPI     PAST MEDICAL & SURGICAL HISTORY:  Hypertension    Diabetes    TIA (transient ischemic attack)    Hip fracture    HLD (hyperlipidemia)    No significant past surgical history    S/P ORIF (open reduction internal fixation) fracture  right hip          Medication list         MEDICATIONS  (STANDING):  acetaminophen   IVPB .. 1000 milliGRAM(s) IV Intermittent once  acetaminophen   IVPB .. 1000 milliGRAM(s) IV Intermittent once  apixaban 2.5 milliGRAM(s) Oral every 12 hours  aspirin enteric coated 81 milliGRAM(s) Oral daily  atorvastatin 40 milliGRAM(s) Oral at bedtime  cefTRIAXone   IVPB 1000 milliGRAM(s) IV Intermittent every 24 hours  dextrose 5%. 1000 milliLiter(s) (50 mL/Hr) IV Continuous <Continuous>  dextrose 5%. 1000 milliLiter(s) (100 mL/Hr) IV Continuous <Continuous>  dextrose 50% Injectable 25 Gram(s) IV Push once  dextrose 50% Injectable 25 Gram(s) IV Push once  dextrose 50% Injectable 12.5 Gram(s) IV Push once  glucagon  Injectable 1 milliGRAM(s) IntraMuscular once  insulin lispro (ADMELOG) corrective regimen sliding scale   SubCutaneous three times a day before meals  insulin lispro (ADMELOG) corrective regimen sliding scale   SubCutaneous at bedtime  metoprolol succinate  milliGRAM(s) Oral once  metoprolol succinate  milliGRAM(s) Oral daily  polyethylene glycol 3350 17 Gram(s) Oral daily  senna 2 Tablet(s) Oral at bedtime    MEDICATIONS  (PRN):  aluminum hydroxide/magnesium hydroxide/simethicone Suspension 30 milliLiter(s) Oral every 6 hours PRN Dyspepsia  dextrose Oral Gel 15 Gram(s) Oral once PRN Blood Glucose LESS THAN 70 milliGRAM(s)/deciliter  melatonin 3 milliGRAM(s) Oral at bedtime PRN Insomnia  ondansetron Injectable 4 milliGRAM(s) IV Push every 6 hours PRN Nausea and/or Vomiting  oxyCODONE    IR 2.5 milliGRAM(s) Oral every 4 hours PRN Moderate Pain (4 - 6)  oxyCODONE    IR 5 milliGRAM(s) Oral every 4 hours PRN Severe Pain (7 - 10)  simethicone 80 milliGRAM(s) Chew every 8 hours PRN Gas         Vitals log        ICU Vital Signs Last 24 Hrs  T(C): 37.1 (26 Nov 2023 05:02), Max: 38.2 (25 Nov 2023 08:12)  T(F): 98.8 (26 Nov 2023 05:02), Max: 100.8 (25 Nov 2023 08:12)  HR: 73 (26 Nov 2023 05:02) (60 - 100)  BP: 122/62 (26 Nov 2023 05:02) (102/50 - 146/576)  BP(mean): --  ABP: --  ABP(mean): --  RR: 17 (26 Nov 2023 05:02) (12 - 18)  SpO2: 92% (26 Nov 2023 05:02) (91% - 99%)    O2 Parameters below as of 26 Nov 2023 05:02  Patient On (Oxygen Delivery Method): room air                 Input and Output:  I&O's Detail    24 Nov 2023 07:01  -  25 Nov 2023 07:00  --------------------------------------------------------  IN:  Total IN: 0 mL    OUT:    Indwelling Catheter - Urethral (mL): 200 mL  Total OUT: 200 mL    Total NET: -200 mL      25 Nov 2023 07:01  -  26 Nov 2023 05:08  --------------------------------------------------------  IN:  Total IN: 0 mL    OUT:    Indwelling Catheter - Urethral (mL): 300 mL  Total OUT: 300 mL    Total NET: -300 mL          Lab Data                        9.8    18.22 )-----------( 145      ( 25 Nov 2023 10:40 )             30.1     11-25    140  |  108  |  32<H>  ----------------------------<  166<H>  4.2   |  26  |  1.80<H>    Ca    8.2<L>      25 Nov 2023 12:55  Phos  3.3     11-25  Mg     1.6     11-25    TPro  5.6<L>  /  Alb  2.5<L>  /  TBili  0.5  /  DBili  x   /  AST  28  /  ALT  21  /  AlkPhos  62  11-25            Review of Systems	      Objective     Physical Examination  heart s1s2  lung dc BS  head nc        Pertinent Lab findings & Imaging      Justin:  NO   Adequate UO     I&O's Detail    24 Nov 2023 07:01  -  25 Nov 2023 07:00  --------------------------------------------------------  IN:  Total IN: 0 mL    OUT:    Indwelling Catheter - Urethral (mL): 200 mL  Total OUT: 200 mL    Total NET: -200 mL      25 Nov 2023 07:01  -  26 Nov 2023 05:08  --------------------------------------------------------  IN:  Total IN: 0 mL    OUT:    Indwelling Catheter - Urethral (mL): 300 mL  Total OUT: 300 mL    Total NET: -300 mL               Discussed with:     Cultures:	        Radiology

## 2023-11-26 NOTE — PROGRESS NOTE ADULT - ASSESSMENT
ELVIN on CKD 4  Urinary Retention  HTN    -Baseline Creatinine reportedly 1.9  -ELVIN likely 2/2 urinary retention  -Improving back to baseline with galeana  -On metoprolol for BP, can add amlodipine if remains elevated, but currently improved  -S/P OR  -Creatinine stable       11/26/23-d/w daughter  11/25/23-d/w daughters

## 2023-11-26 NOTE — PHYSICAL THERAPY INITIAL EVALUATION ADULT - PERTINENT HX OF CURRENT PROBLEM, REHAB EVAL
89 yo female w/ pmh of HTN, HLD, T2DM, TIA, CKD previous right hip fx w/ surgical pinning s/p removal presents following a mechanical fall in her home. Pt states she was standing beside her chair looking out the window when she lost her balance and fell onto her right side. Pt with +displaced intertrochanteric fx of R hip. Pt underwent surgery on 11/25 for R hip IMN.

## 2023-11-26 NOTE — PROGRESS NOTE ADULT - SUBJECTIVE AND OBJECTIVE BOX
Patient seen and examined at bedside. Patient reports pain well controlled on medications. No acute events overnight. Pt denies fevers, chills, new onset numbness, weakness or tingling in the extremities.    Vital Signs (24 Hrs):  T(C): 37.1 (11-26-23 @ 05:02), Max: 38.2 (11-25-23 @ 08:12)  HR: 73 (11-26-23 @ 05:02) (60 - 78)  BP: 122/62 (11-26-23 @ 05:02) (102/50 - 146/576)  RR: 17 (11-26-23 @ 05:02) (12 - 18)  SpO2: 92% (11-26-23 @ 05:02) (91% - 99%)  Wt(kg): --    LABS:                          9.8    18.22 )-----------( 145      ( 25 Nov 2023 10:40 )             30.1     11-25    140  |  108  |  32<H>  ----------------------------<  166<H>  4.2   |  26  |  1.80<H>    Ca    8.2<L>      25 Nov 2023 12:55  Phos  3.3     11-25  Mg     1.6     11-25    TPro  5.6<L>  /  Alb  2.5<L>  /  TBili  0.5  /  DBili  x   /  AST  28  /  ALT  21  /  AlkPhos  62  11-25    LIVER FUNCTIONS - ( 25 Nov 2023 12:55 )  Alb: 2.5 g/dL / Pro: 5.6 g/dL / ALK PHOS: 62 U/L / ALT: 21 U/L / AST: 28 U/L / GGT: x           PT/INR - ( 25 Nov 2023 07:01 )   PT: 15.6 sec;   INR: 1.34 ratio         PTT - ( 25 Nov 2023 07:01 )  PTT:27.0 sec    Physical Exam:  General: NAD, resting comfortably in bed  RLE:   Dressing in place C/D/I  SCD in place bilaterally  No calf tenderness   Motor: + EHL/FHL/TA/GSC  +SILT: SPN/DPN/Yury/Saph/Tib  DP/PT 2+    A/P:  89y F s/p R IMN POD1    -PT/OT  -WBAT  -Pain Control  -DVT ppx: Eliquis 2.5 mg BID  -Continue Rocephin per medicine  -FU AM Labs  -Rest, ice, compress and elevate the extremity as we needed  -Incentive Spirometry  -Medical management appreciated  -Dispo pending PT eval  -Will discuss plan with Dr. De Luna and will advise changes to plan as needed

## 2023-11-26 NOTE — PROGRESS NOTE ADULT - SUBJECTIVE AND OBJECTIVE BOX
[facility]    Tigist Mari, Aydee, Kedar, Nadira, Natan      [phone]    CHIEF COMPLAINT: Patient is a 89y old  Female who presents with a chief complaint of Right hip Fx (26 Nov 2023 07:34)      Follow Up:  svt    Interim history: The patient reports no new symptoms.  Denies chest discomfort and shortness of breath.  No abdominal pain.  No new neurologic symptoms.      MEDICATIONS  (STANDING):  acetaminophen   IVPB .. 1000 milliGRAM(s) IV Intermittent once  apixaban 2.5 milliGRAM(s) Oral every 12 hours  aspirin enteric coated 81 milliGRAM(s) Oral daily  atorvastatin 40 milliGRAM(s) Oral at bedtime  cefTRIAXone   IVPB 1000 milliGRAM(s) IV Intermittent every 24 hours  dextrose 5%. 1000 milliLiter(s) (100 mL/Hr) IV Continuous <Continuous>  dextrose 5%. 1000 milliLiter(s) (50 mL/Hr) IV Continuous <Continuous>  dextrose 50% Injectable 12.5 Gram(s) IV Push once  dextrose 50% Injectable 25 Gram(s) IV Push once  dextrose 50% Injectable 25 Gram(s) IV Push once  glucagon  Injectable 1 milliGRAM(s) IntraMuscular once  insulin lispro (ADMELOG) corrective regimen sliding scale   SubCutaneous three times a day before meals  insulin lispro (ADMELOG) corrective regimen sliding scale   SubCutaneous at bedtime  metoprolol succinate  milliGRAM(s) Oral once  metoprolol succinate  milliGRAM(s) Oral daily  polyethylene glycol 3350 17 Gram(s) Oral daily  senna 2 Tablet(s) Oral at bedtime    MEDICATIONS  (PRN):  aluminum hydroxide/magnesium hydroxide/simethicone Suspension 30 milliLiter(s) Oral every 6 hours PRN Dyspepsia  dextrose Oral Gel 15 Gram(s) Oral once PRN Blood Glucose LESS THAN 70 milliGRAM(s)/deciliter  melatonin 3 milliGRAM(s) Oral at bedtime PRN Insomnia  ondansetron Injectable 4 milliGRAM(s) IV Push every 6 hours PRN Nausea and/or Vomiting  oxyCODONE    IR 2.5 milliGRAM(s) Oral every 4 hours PRN Moderate Pain (4 - 6)  oxyCODONE    IR 5 milliGRAM(s) Oral every 4 hours PRN Severe Pain (7 - 10)  simethicone 80 milliGRAM(s) Chew every 8 hours PRN Gas      REVIEW OF SYSTEMS:  eye, ent, GI, , allergic, dermatologic, musculoskeletal and neurologic are negative except as described above    Vital Signs Last 24 Hrs  T(C): 36.7 (26 Nov 2023 11:18), Max: 37.2 (25 Nov 2023 19:40)  T(F): 98.1 (26 Nov 2023 11:18), Max: 98.9 (25 Nov 2023 19:40)  HR: 61 (26 Nov 2023 11:18) (61 - 73)  BP: 117/62 (26 Nov 2023 11:18) (99/62 - 122/62)  BP(mean): --  RR: 18 (26 Nov 2023 11:18) (17 - 18)  SpO2: 91% (26 Nov 2023 11:18) (91% - 94%)    Parameters below as of 26 Nov 2023 11:18  Patient On (Oxygen Delivery Method): room air        I&O's Summary    25 Nov 2023 07:01  -  26 Nov 2023 07:00  --------------------------------------------------------  IN: 0 mL / OUT: 650 mL / NET: -650 mL        Telemetry past 24h: sr    PHYSICAL EXAM:    Constitutional: well-nourished, well-developed, NAD   HEENT:  MMM, sclerae anicteric, conjunctivae clear, no oral cyanosis.  Pulmonary: Non-labored, breath sounds are clear bilaterally, No wheezing, rales or rhonchi  Cardiovascular: Regular, S1 and S2.  No murmur.  No rubs, gallops or clicks  Gastrointestinal: Bowel Sounds present, soft, nontender.   Lymph: No peripheral edema.   Neurological: Alert, no focal deficits  Skin: No rashes.  Psych:  Mood & affect appropriate    LABS: All Labs Reviewed:                        8.5    14.10 )-----------( 136      ( 26 Nov 2023 10:33 )             26.2                         9.8    18.22 )-----------( 145      ( 25 Nov 2023 10:40 )             30.1                         11.2   21.08 )-----------( 177      ( 25 Nov 2023 05:07 )             34.1     26 Nov 2023 10:33    139    |  105    |  37     ----------------------------<  201    4.0     |  27     |  1.90   25 Nov 2023 12:55    140    |  108    |  32     ----------------------------<  166    4.2     |  26     |  1.80   25 Nov 2023 10:40    143    |  111    |  32     ----------------------------<  164    4.0     |  23     |  1.80     Ca    8.2        26 Nov 2023 10:33  Ca    8.2        25 Nov 2023 12:55  Ca    8.3        25 Nov 2023 10:40  Phos  2.8       26 Nov 2023 10:33  Phos  3.3       25 Nov 2023 05:07  Mg     1.7       26 Nov 2023 10:33  Mg     1.6       25 Nov 2023 05:07    TPro  5.5    /  Alb  2.2    /  TBili  0.5    /  DBili  x      /  AST  34     /  ALT  15     /  AlkPhos  65     26 Nov 2023 10:33  TPro  5.6    /  Alb  2.5    /  TBili  0.5    /  DBili  x      /  AST  28     /  ALT  21     /  AlkPhos  62     25 Nov 2023 12:55  TPro  6.2    /  Alb  2.9    /  TBili  0.8    /  DBili  x      /  AST  23     /  ALT  21     /  AlkPhos  69     25 Nov 2023 05:07    PT/INR - ( 25 Nov 2023 07:01 )   PT: 15.6 sec;   INR: 1.34 ratio         PTT - ( 25 Nov 2023 07:01 )  PTT:27.0 sec      Blood Culture: Organism --  Gram Stain Blood -- Gram Stain --  Specimen Source Clean Catch Clean Catch (Midstream)  Culture-Blood --           RADIOLOGY:    EKG:    Echo:

## 2023-11-26 NOTE — PROGRESS NOTE ADULT - SUBJECTIVE AND OBJECTIVE BOX
Patient is a 89y old  Female who presents with a chief complaint of Right hip Fx (26 Nov 2023 11:38)    INTERVAL HPI/OVERNIGHT EVENTS: No acute overnight events. Pt seen and examined at the bedside this AM. Pt states that she is feeling 'fine', with complaints of postoperative R sided waist pain after the R IMN yesterday. RN states galeana is draining much more properly today w/ yellow urine, stating yesterday urine was more 'pink' in coloration. Pt denies any other complaints at this time. Denies fever, n/v/d/, CP, SOB, LE edema.     MEDICATIONS  (STANDING):  apixaban 2.5 milliGRAM(s) Oral every 12 hours  aspirin enteric coated 81 milliGRAM(s) Oral daily  atorvastatin 40 milliGRAM(s) Oral at bedtime  cefTRIAXone   IVPB 1000 milliGRAM(s) IV Intermittent every 24 hours  dextrose 5%. 1000 milliLiter(s) (50 mL/Hr) IV Continuous <Continuous>  dextrose 5%. 1000 milliLiter(s) (100 mL/Hr) IV Continuous <Continuous>  dextrose 50% Injectable 12.5 Gram(s) IV Push once  dextrose 50% Injectable 25 Gram(s) IV Push once  dextrose 50% Injectable 25 Gram(s) IV Push once  glucagon  Injectable 1 milliGRAM(s) IntraMuscular once  insulin lispro (ADMELOG) corrective regimen sliding scale   SubCutaneous three times a day before meals  insulin lispro (ADMELOG) corrective regimen sliding scale   SubCutaneous at bedtime  lactated ringers. 1000 milliLiter(s) (75 mL/Hr) IV Continuous <Continuous>  metoprolol succinate  milliGRAM(s) Oral daily  metoprolol succinate  milliGRAM(s) Oral once  polyethylene glycol 3350 17 Gram(s) Oral daily  senna 2 Tablet(s) Oral at bedtime    MEDICATIONS  (PRN):  aluminum hydroxide/magnesium hydroxide/simethicone Suspension 30 milliLiter(s) Oral every 6 hours PRN Dyspepsia  dextrose Oral Gel 15 Gram(s) Oral once PRN Blood Glucose LESS THAN 70 milliGRAM(s)/deciliter  melatonin 3 milliGRAM(s) Oral at bedtime PRN Insomnia  ondansetron Injectable 4 milliGRAM(s) IV Push every 6 hours PRN Nausea and/or Vomiting  oxyCODONE    IR 5 milliGRAM(s) Oral every 4 hours PRN Severe Pain (7 - 10)  oxyCODONE    IR 2.5 milliGRAM(s) Oral every 4 hours PRN Moderate Pain (4 - 6)  simethicone 80 milliGRAM(s) Chew every 8 hours PRN Gas      Allergies    No Known Allergies    Intolerances        REVIEW OF SYSTEMS:  CONSTITUTIONAL: No fever or chills  HEENT:  No headache, no sore throat  RESPIRATORY: No cough, wheezing, or shortness of breath  CARDIOVASCULAR: No chest pain, palpitations  GASTROINTESTINAL: R abd pain 'more towards the R waist'. No  nausea, vomiting, or diarrhea  GENITOURINARY: No dysuria, frequency, or hematuria  NEUROLOGICAL: no focal weakness or dizziness  MUSCULOSKELETAL: no myalgias     Vital Signs Last 24 Hrs  T(C): 36.7 (26 Nov 2023 11:18), Max: 37.2 (25 Nov 2023 19:40)  T(F): 98.1 (26 Nov 2023 11:18), Max: 98.9 (25 Nov 2023 19:40)  HR: 61 (26 Nov 2023 11:18) (61 - 73)  BP: 117/62 (26 Nov 2023 11:18) (99/62 - 122/62)  BP(mean): --  RR: 18 (26 Nov 2023 11:18) (17 - 18)  SpO2: 91% (26 Nov 2023 11:18) (91% - 94%)    Parameters below as of 26 Nov 2023 11:18  Patient On (Oxygen Delivery Method): room air        PHYSICAL EXAM:  GENERAL: NAD, with ++ galeana draining properly.  HEENT:  anicteric, moist mucous membranes  CHEST/LUNG:  CTA b/l, no rales, wheezes, or rhonchi  HEART:  RRR, S1, S2  ABDOMEN:  BS+, ++TTP in RLQ and R waistline.   EXTREMITIES: SCDs present. no edema, cyanosis, or calf tenderness  NERVOUS SYSTEM: answers questions and follows commands appropriately    LABS:                        8.5    14.10 )-----------( 136      ( 26 Nov 2023 10:33 )             26.2     CBC Full  -  ( 26 Nov 2023 10:33 )  WBC Count : 14.10 K/uL  Hemoglobin : 8.5 g/dL  Hematocrit : 26.2 %  Platelet Count - Automated : 136 K/uL  Mean Cell Volume : 86.2 fl  Mean Cell Hemoglobin : 28.0 pg  Mean Cell Hemoglobin Concentration : 32.4 gm/dL  Auto Neutrophil # : 11.55 K/uL  Auto Lymphocyte # : 1.12 K/uL  Auto Monocyte # : 1.12 K/uL  Auto Eosinophil # : 0.18 K/uL  Auto Basophil # : 0.05 K/uL  Auto Neutrophil % : 81.9 %  Auto Lymphocyte % : 7.9 %  Auto Monocyte % : 7.9 %  Auto Eosinophil % : 1.3 %  Auto Basophil % : 0.4 %    26 Nov 2023 10:33    139    |  105    |  37     ----------------------------<  201    4.0     |  27     |  1.90     Ca    8.2        26 Nov 2023 10:33  Phos  2.8       26 Nov 2023 10:33  Mg     1.7       26 Nov 2023 10:33    TPro  5.5    /  Alb  2.2    /  TBili  0.5    /  DBili  x      /  AST  34     /  ALT  15     /  AlkPhos  65     26 Nov 2023 10:33    PT/INR - ( 25 Nov 2023 07:01 )   PT: 15.6 sec;   INR: 1.34 ratio         PTT - ( 25 Nov 2023 07:01 )  PTT:27.0 sec  Urinalysis Basic - ( 26 Nov 2023 10:33 )    Color: x / Appearance: x / SG: x / pH: x  Gluc: 201 mg/dL / Ketone: x  / Bili: x / Urobili: x   Blood: x / Protein: x / Nitrite: x   Leuk Esterase: x / RBC: x / WBC x   Sq Epi: x / Non Sq Epi: x / Bacteria: x      CAPILLARY BLOOD GLUCOSE      POCT Blood Glucose.: 157 mg/dL (26 Nov 2023 11:50)  POCT Blood Glucose.: 144 mg/dL (26 Nov 2023 08:17)  POCT Blood Glucose.: 149 mg/dL (25 Nov 2023 21:29)  POCT Blood Glucose.: 206 mg/dL (25 Nov 2023 16:41)        Culture - Urine (collected 11-24-23 @ 10:35)  Source: Clean Catch Clean Catch (Midstream)  Preliminary Report (11-25-23 @ 15:08):    >100,000 CFU/ml Gram Negative Rods        RADIOLOGY & ADDITIONAL TESTS:  Personally reviewed.     Consultant(s) Notes Reviewed:  [x] YES  [ ] NO

## 2023-11-26 NOTE — OCCUPATIONAL THERAPY INITIAL EVALUATION ADULT - NSACTIVITYREC_GEN_A_OT
BUE AROM exercises including shd/elbow/wrist/digit flexion/extension and pronation/supination exercises when in bed and OOB to increase functional BUE activity.

## 2023-11-27 DIAGNOSIS — I95.1 ORTHOSTATIC HYPOTENSION: ICD-10-CM

## 2023-11-27 DIAGNOSIS — I10 ESSENTIAL (PRIMARY) HYPERTENSION: ICD-10-CM

## 2023-11-27 LAB
-  AMOXICILLIN/CLAVULANIC ACID: SIGNIFICANT CHANGE UP
-  AMOXICILLIN/CLAVULANIC ACID: SIGNIFICANT CHANGE UP
-  AMPICILLIN/SULBACTAM: SIGNIFICANT CHANGE UP
-  AMPICILLIN/SULBACTAM: SIGNIFICANT CHANGE UP
-  AMPICILLIN: SIGNIFICANT CHANGE UP
-  AMPICILLIN: SIGNIFICANT CHANGE UP
-  AZTREONAM: SIGNIFICANT CHANGE UP
-  AZTREONAM: SIGNIFICANT CHANGE UP
-  CEFAZOLIN: SIGNIFICANT CHANGE UP
-  CEFAZOLIN: SIGNIFICANT CHANGE UP
-  CEFEPIME: SIGNIFICANT CHANGE UP
-  CEFEPIME: SIGNIFICANT CHANGE UP
-  CEFOXITIN: SIGNIFICANT CHANGE UP
-  CEFOXITIN: SIGNIFICANT CHANGE UP
-  CEFTRIAXONE: SIGNIFICANT CHANGE UP
-  CEFTRIAXONE: SIGNIFICANT CHANGE UP
-  CEFUROXIME: SIGNIFICANT CHANGE UP
-  CEFUROXIME: SIGNIFICANT CHANGE UP
-  CIPROFLOXACIN: SIGNIFICANT CHANGE UP
-  CIPROFLOXACIN: SIGNIFICANT CHANGE UP
-  ERTAPENEM: SIGNIFICANT CHANGE UP
-  ERTAPENEM: SIGNIFICANT CHANGE UP
-  GENTAMICIN: SIGNIFICANT CHANGE UP
-  GENTAMICIN: SIGNIFICANT CHANGE UP
-  IMIPENEM: SIGNIFICANT CHANGE UP
-  IMIPENEM: SIGNIFICANT CHANGE UP
-  LEVOFLOXACIN: SIGNIFICANT CHANGE UP
-  LEVOFLOXACIN: SIGNIFICANT CHANGE UP
-  MEROPENEM: SIGNIFICANT CHANGE UP
-  MEROPENEM: SIGNIFICANT CHANGE UP
-  NITROFURANTOIN: SIGNIFICANT CHANGE UP
-  NITROFURANTOIN: SIGNIFICANT CHANGE UP
-  PIPERACILLIN/TAZOBACTAM: SIGNIFICANT CHANGE UP
-  PIPERACILLIN/TAZOBACTAM: SIGNIFICANT CHANGE UP
-  TOBRAMYCIN: SIGNIFICANT CHANGE UP
-  TOBRAMYCIN: SIGNIFICANT CHANGE UP
-  TRIMETHOPRIM/SULFAMETHOXAZOLE: SIGNIFICANT CHANGE UP
-  TRIMETHOPRIM/SULFAMETHOXAZOLE: SIGNIFICANT CHANGE UP
ALBUMIN SERPL ELPH-MCNC: 2.2 G/DL — LOW (ref 3.3–5)
ALBUMIN SERPL ELPH-MCNC: 2.2 G/DL — LOW (ref 3.3–5)
ALP SERPL-CCNC: 80 U/L — SIGNIFICANT CHANGE UP (ref 40–120)
ALP SERPL-CCNC: 80 U/L — SIGNIFICANT CHANGE UP (ref 40–120)
ALT FLD-CCNC: 21 U/L — SIGNIFICANT CHANGE UP (ref 12–78)
ALT FLD-CCNC: 21 U/L — SIGNIFICANT CHANGE UP (ref 12–78)
ANION GAP SERPL CALC-SCNC: 7 MMOL/L — SIGNIFICANT CHANGE UP (ref 5–17)
ANION GAP SERPL CALC-SCNC: 7 MMOL/L — SIGNIFICANT CHANGE UP (ref 5–17)
AST SERPL-CCNC: 48 U/L — HIGH (ref 15–37)
AST SERPL-CCNC: 48 U/L — HIGH (ref 15–37)
BASOPHILS # BLD AUTO: 0.04 K/UL — SIGNIFICANT CHANGE UP (ref 0–0.2)
BASOPHILS # BLD AUTO: 0.04 K/UL — SIGNIFICANT CHANGE UP (ref 0–0.2)
BASOPHILS NFR BLD AUTO: 0.3 % — SIGNIFICANT CHANGE UP (ref 0–2)
BASOPHILS NFR BLD AUTO: 0.3 % — SIGNIFICANT CHANGE UP (ref 0–2)
BILIRUB SERPL-MCNC: 0.5 MG/DL — SIGNIFICANT CHANGE UP (ref 0.2–1.2)
BILIRUB SERPL-MCNC: 0.5 MG/DL — SIGNIFICANT CHANGE UP (ref 0.2–1.2)
BUN SERPL-MCNC: 37 MG/DL — HIGH (ref 7–23)
BUN SERPL-MCNC: 37 MG/DL — HIGH (ref 7–23)
CALCIUM SERPL-MCNC: 8.2 MG/DL — LOW (ref 8.5–10.1)
CALCIUM SERPL-MCNC: 8.2 MG/DL — LOW (ref 8.5–10.1)
CHLORIDE SERPL-SCNC: 104 MMOL/L — SIGNIFICANT CHANGE UP (ref 96–108)
CHLORIDE SERPL-SCNC: 104 MMOL/L — SIGNIFICANT CHANGE UP (ref 96–108)
CO2 SERPL-SCNC: 26 MMOL/L — SIGNIFICANT CHANGE UP (ref 22–31)
CO2 SERPL-SCNC: 26 MMOL/L — SIGNIFICANT CHANGE UP (ref 22–31)
CREAT SERPL-MCNC: 2 MG/DL — HIGH (ref 0.5–1.3)
CREAT SERPL-MCNC: 2 MG/DL — HIGH (ref 0.5–1.3)
CULTURE RESULTS: ABNORMAL
CULTURE RESULTS: ABNORMAL
EGFR: 23 ML/MIN/1.73M2 — LOW
EGFR: 23 ML/MIN/1.73M2 — LOW
EOSINOPHIL # BLD AUTO: 0.28 K/UL — SIGNIFICANT CHANGE UP (ref 0–0.5)
EOSINOPHIL # BLD AUTO: 0.28 K/UL — SIGNIFICANT CHANGE UP (ref 0–0.5)
EOSINOPHIL NFR BLD AUTO: 2.1 % — SIGNIFICANT CHANGE UP (ref 0–6)
EOSINOPHIL NFR BLD AUTO: 2.1 % — SIGNIFICANT CHANGE UP (ref 0–6)
GLUCOSE SERPL-MCNC: 176 MG/DL — HIGH (ref 70–99)
GLUCOSE SERPL-MCNC: 176 MG/DL — HIGH (ref 70–99)
HCT VFR BLD CALC: 26.8 % — LOW (ref 34.5–45)
HCT VFR BLD CALC: 26.8 % — LOW (ref 34.5–45)
HGB BLD-MCNC: 8.6 G/DL — LOW (ref 11.5–15.5)
HGB BLD-MCNC: 8.6 G/DL — LOW (ref 11.5–15.5)
IMM GRANULOCYTES NFR BLD AUTO: 0.9 % — SIGNIFICANT CHANGE UP (ref 0–0.9)
IMM GRANULOCYTES NFR BLD AUTO: 0.9 % — SIGNIFICANT CHANGE UP (ref 0–0.9)
LYMPHOCYTES # BLD AUTO: 1.28 K/UL — SIGNIFICANT CHANGE UP (ref 1–3.3)
LYMPHOCYTES # BLD AUTO: 1.28 K/UL — SIGNIFICANT CHANGE UP (ref 1–3.3)
LYMPHOCYTES # BLD AUTO: 9.7 % — LOW (ref 13–44)
LYMPHOCYTES # BLD AUTO: 9.7 % — LOW (ref 13–44)
MAGNESIUM SERPL-MCNC: 1.9 MG/DL — SIGNIFICANT CHANGE UP (ref 1.6–2.6)
MAGNESIUM SERPL-MCNC: 1.9 MG/DL — SIGNIFICANT CHANGE UP (ref 1.6–2.6)
MCHC RBC-ENTMCNC: 27.7 PG — SIGNIFICANT CHANGE UP (ref 27–34)
MCHC RBC-ENTMCNC: 27.7 PG — SIGNIFICANT CHANGE UP (ref 27–34)
MCHC RBC-ENTMCNC: 32.1 GM/DL — SIGNIFICANT CHANGE UP (ref 32–36)
MCHC RBC-ENTMCNC: 32.1 GM/DL — SIGNIFICANT CHANGE UP (ref 32–36)
MCV RBC AUTO: 86.2 FL — SIGNIFICANT CHANGE UP (ref 80–100)
MCV RBC AUTO: 86.2 FL — SIGNIFICANT CHANGE UP (ref 80–100)
METHOD TYPE: SIGNIFICANT CHANGE UP
METHOD TYPE: SIGNIFICANT CHANGE UP
MONOCYTES # BLD AUTO: 0.74 K/UL — SIGNIFICANT CHANGE UP (ref 0–0.9)
MONOCYTES # BLD AUTO: 0.74 K/UL — SIGNIFICANT CHANGE UP (ref 0–0.9)
MONOCYTES NFR BLD AUTO: 5.6 % — SIGNIFICANT CHANGE UP (ref 2–14)
MONOCYTES NFR BLD AUTO: 5.6 % — SIGNIFICANT CHANGE UP (ref 2–14)
NEUTROPHILS # BLD AUTO: 10.75 K/UL — HIGH (ref 1.8–7.4)
NEUTROPHILS # BLD AUTO: 10.75 K/UL — HIGH (ref 1.8–7.4)
NEUTROPHILS NFR BLD AUTO: 81.4 % — HIGH (ref 43–77)
NEUTROPHILS NFR BLD AUTO: 81.4 % — HIGH (ref 43–77)
NRBC # BLD: 0 /100 WBCS — SIGNIFICANT CHANGE UP (ref 0–0)
NRBC # BLD: 0 /100 WBCS — SIGNIFICANT CHANGE UP (ref 0–0)
ORGANISM # SPEC MICROSCOPIC CNT: ABNORMAL
ORGANISM # SPEC MICROSCOPIC CNT: ABNORMAL
ORGANISM # SPEC MICROSCOPIC CNT: SIGNIFICANT CHANGE UP
ORGANISM # SPEC MICROSCOPIC CNT: SIGNIFICANT CHANGE UP
PHOSPHATE SERPL-MCNC: 2.8 MG/DL — SIGNIFICANT CHANGE UP (ref 2.5–4.5)
PHOSPHATE SERPL-MCNC: 2.8 MG/DL — SIGNIFICANT CHANGE UP (ref 2.5–4.5)
PLATELET # BLD AUTO: 162 K/UL — SIGNIFICANT CHANGE UP (ref 150–400)
PLATELET # BLD AUTO: 162 K/UL — SIGNIFICANT CHANGE UP (ref 150–400)
POTASSIUM SERPL-MCNC: 4.2 MMOL/L — SIGNIFICANT CHANGE UP (ref 3.5–5.3)
POTASSIUM SERPL-MCNC: 4.2 MMOL/L — SIGNIFICANT CHANGE UP (ref 3.5–5.3)
POTASSIUM SERPL-SCNC: 4.2 MMOL/L — SIGNIFICANT CHANGE UP (ref 3.5–5.3)
POTASSIUM SERPL-SCNC: 4.2 MMOL/L — SIGNIFICANT CHANGE UP (ref 3.5–5.3)
PROT SERPL-MCNC: 5.9 G/DL — LOW (ref 6–8.3)
PROT SERPL-MCNC: 5.9 G/DL — LOW (ref 6–8.3)
RBC # BLD: 3.11 M/UL — LOW (ref 3.8–5.2)
RBC # BLD: 3.11 M/UL — LOW (ref 3.8–5.2)
RBC # FLD: 14.6 % — HIGH (ref 10.3–14.5)
RBC # FLD: 14.6 % — HIGH (ref 10.3–14.5)
SODIUM SERPL-SCNC: 137 MMOL/L — SIGNIFICANT CHANGE UP (ref 135–145)
SODIUM SERPL-SCNC: 137 MMOL/L — SIGNIFICANT CHANGE UP (ref 135–145)
SPECIMEN SOURCE: SIGNIFICANT CHANGE UP
SPECIMEN SOURCE: SIGNIFICANT CHANGE UP
WBC # BLD: 13.21 K/UL — HIGH (ref 3.8–10.5)
WBC # BLD: 13.21 K/UL — HIGH (ref 3.8–10.5)
WBC # FLD AUTO: 13.21 K/UL — HIGH (ref 3.8–10.5)
WBC # FLD AUTO: 13.21 K/UL — HIGH (ref 3.8–10.5)

## 2023-11-27 PROCEDURE — 99232 SBSQ HOSP IP/OBS MODERATE 35: CPT

## 2023-11-27 PROCEDURE — 99233 SBSQ HOSP IP/OBS HIGH 50: CPT | Mod: GC

## 2023-11-27 RX ADMIN — OXYCODONE HYDROCHLORIDE 5 MILLIGRAM(S): 5 TABLET ORAL at 06:15

## 2023-11-27 RX ADMIN — APIXABAN 2.5 MILLIGRAM(S): 2.5 TABLET, FILM COATED ORAL at 17:27

## 2023-11-27 RX ADMIN — Medication 1: at 12:24

## 2023-11-27 RX ADMIN — Medication 100 MILLIGRAM(S): at 05:15

## 2023-11-27 RX ADMIN — LIDOCAINE 1 PATCH: 4 CREAM TOPICAL at 19:15

## 2023-11-27 RX ADMIN — Medication 81 MILLIGRAM(S): at 11:29

## 2023-11-27 RX ADMIN — LIDOCAINE 1 PATCH: 4 CREAM TOPICAL at 17:27

## 2023-11-27 RX ADMIN — SIMETHICONE 80 MILLIGRAM(S): 80 TABLET, CHEWABLE ORAL at 21:14

## 2023-11-27 RX ADMIN — POLYETHYLENE GLYCOL 3350 17 GRAM(S): 17 POWDER, FOR SOLUTION ORAL at 11:29

## 2023-11-27 RX ADMIN — OXYCODONE HYDROCHLORIDE 5 MILLIGRAM(S): 5 TABLET ORAL at 05:15

## 2023-11-27 RX ADMIN — ATORVASTATIN CALCIUM 40 MILLIGRAM(S): 80 TABLET, FILM COATED ORAL at 21:14

## 2023-11-27 RX ADMIN — CEFTRIAXONE 100 MILLIGRAM(S): 500 INJECTION, POWDER, FOR SOLUTION INTRAMUSCULAR; INTRAVENOUS at 21:15

## 2023-11-27 RX ADMIN — OXYCODONE HYDROCHLORIDE 5 MILLIGRAM(S): 5 TABLET ORAL at 23:33

## 2023-11-27 RX ADMIN — Medication 1: at 08:03

## 2023-11-27 RX ADMIN — SIMETHICONE 80 MILLIGRAM(S): 80 TABLET, CHEWABLE ORAL at 10:02

## 2023-11-27 RX ADMIN — SODIUM CHLORIDE 75 MILLILITER(S): 9 INJECTION, SOLUTION INTRAVENOUS at 21:15

## 2023-11-27 RX ADMIN — LIDOCAINE 1 PATCH: 4 CREAM TOPICAL at 07:22

## 2023-11-27 RX ADMIN — APIXABAN 2.5 MILLIGRAM(S): 2.5 TABLET, FILM COATED ORAL at 05:15

## 2023-11-27 RX ADMIN — SENNA PLUS 2 TABLET(S): 8.6 TABLET ORAL at 21:14

## 2023-11-27 RX ADMIN — Medication 1: at 17:26

## 2023-11-27 NOTE — CARE COORDINATION ASSESSMENT. - NSCAREPROVIDERS_GEN_ALL_CORE_FT
CARE PROVIDERS:  Accepting Physician: Addy Mueller  Access Services: Vanna Zaragoza  Administration: Adiel Lira  Administration: Jeremiah Rodriguez  Administration: Jose Fuller  Admitting: Addy Mueller  Attending: Addy Mueller  Case Management: Lorezna Sy  Consultant: Zaid Youngblood  Consultant: Lety Thacker  Consultant: Silverio Mohamud  Consultant: Chuy Escoto  Consultant: Mireya Gardner  Consultant: Elie Bajwa  Consultant: Karen Fitch  Consultant: Jhonny Wooten  Consultant: Lewis Ramos  Consultant: Isabel Oconnell  Consultant: Ananda De Luna  Consultant: Howard An  Consultant: Fritz Capps  Covering Team: Sajan Khalil  Covering Team: Bassem Hernandez  ED Attending: Robert Lovett  ED Nurse: Desi Saleh  Nurse: Luciano Gomez  Nurse: Saadia Aquino  Nurse: Janna Cobos  Nurse: Natalie Reynoso  Occupational Therapy: Janice Warren  Ordered: Physician, Ordering  Ordered: Doctor, Unknown  Ordered: ADM, User  Ordered: ServiceAccount, SCMMLM  Ordered: ServiceAccount, SCMMLM  Ordered: ServiceAccount, SCMMLM  Ordered: ServiceAccount, SCMMLM  Override: José Miguel Horowitz  PCA/Nursing Assistant: So Moore  PCA/Nursing Assistant: Jennifer Souza  Primary Team: Jose Crockett  Primary Team: Robert Leon  Primary Team: Tanya Teague  Primary Team: Kathleen Nuñez  Primary Team: Ben Mayfield  Registered Dietitian: Annabelle Slaughter  Research: Alexandrea Hines  : Marly Shah  Team: [facility], Team  UR// Supp. Assoc.: Saadia Wilson  Wound Care Specialist Nurse: Farideh Joshi

## 2023-11-27 NOTE — PROGRESS NOTE ADULT - SUBJECTIVE AND OBJECTIVE BOX
Patient is a 89y old  Female who presents with a chief complaint of Right hip Fx (25 Nov 2023 13:23)       HPI:  89 yo female w/ pmh of HTN, HLD, T2DM, TIA, CKD previous right hip fx w/ surgical pinning s/p removal presents following a mechanical fall in her home. Pt states she was standing beside her chair looking out the window when she lost her balance and fell onto her right side. Denies head trauma, LOC, dizziness, lightheadedness. States that she crawled to a phone to call an ambulance. This all occurred at 4am this morning. Initially on arrival to the ED she thought she ate breakfast, but now realizes that she never ate today. Pt states that she had a previous fx to the right hip which had a pin that "fell out" and she had surgically removed. Pt states that even following pain medication in the ED, the pain is an 8-9/10 and radiates to her groin.  Of note, the patient states that her urine this morning was malodorous similar to a UTI she has had in the past. At that time, she too did not have sxs of a urinary tract infection. Denies fever, chills, sob, cp, palpitations, abd pain, n/v/d/c, dysuria, urinary frequency, urinary urgency, hematuria.  Has not seen urologist in many years, but reportedly her renal function has been stable.  Had galeana placed for retention.     No N/V/SOB    PAST MEDICAL & SURGICAL HISTORY:  Hypertension      Diabetes      TIA (transient ischemic attack)      Hip fracture      HLD (hyperlipidemia)      S/P ORIF (open reduction internal fixation) fracture  right hip           FAMILY HISTORY:  FH: asthma (Father)    [place]    Social History:Non smoker    MEDICATIONS  (STANDING):  acetaminophen   IVPB .. 1000 milliGRAM(s) IV Intermittent once  acetaminophen   IVPB .. 1000 milliGRAM(s) IV Intermittent once  aspirin enteric coated 81 milliGRAM(s) Oral daily  atorvastatin 40 milliGRAM(s) Oral at bedtime  cefTRIAXone   IVPB 1000 milliGRAM(s) IV Intermittent every 24 hours  dextrose 5%. 1000 milliLiter(s) (50 mL/Hr) IV Continuous <Continuous>  dextrose 5%. 1000 milliLiter(s) (100 mL/Hr) IV Continuous <Continuous>  dextrose 50% Injectable 12.5 Gram(s) IV Push once  dextrose 50% Injectable 25 Gram(s) IV Push once  dextrose 50% Injectable 25 Gram(s) IV Push once  glucagon  Injectable 1 milliGRAM(s) IntraMuscular once  insulin lispro (ADMELOG) corrective regimen sliding scale   SubCutaneous three times a day before meals  insulin lispro (ADMELOG) corrective regimen sliding scale   SubCutaneous at bedtime  metoprolol succinate  milliGRAM(s) Oral once  metoprolol succinate  milliGRAM(s) Oral daily  polyethylene glycol 3350 17 Gram(s) Oral daily  senna 2 Tablet(s) Oral at bedtime    MEDICATIONS  (PRN):  aluminum hydroxide/magnesium hydroxide/simethicone Suspension 30 milliLiter(s) Oral every 6 hours PRN Dyspepsia  dextrose Oral Gel 15 Gram(s) Oral once PRN Blood Glucose LESS THAN 70 milliGRAM(s)/deciliter  melatonin 3 milliGRAM(s) Oral at bedtime PRN Insomnia  ondansetron Injectable 4 milliGRAM(s) IV Push every 6 hours PRN Nausea and/or Vomiting  oxyCODONE    IR 5 milliGRAM(s) Oral every 4 hours PRN Severe Pain (7 - 10)  oxyCODONE    IR 2.5 milliGRAM(s) Oral every 4 hours PRN Moderate Pain (4 - 6)  simethicone 80 milliGRAM(s) Chew every 8 hours PRN Gas   Meds reviewed    Allergies    No Known Allergies    Intolerances         REVIEW OF SYSTEMS:    Review of Systems:   as above    Vital Signs Last 24 Hrs  T(C): 37.1 (27 Nov 2023 11:25), Max: 37.1 (27 Nov 2023 04:52)  T(F): 98.7 (27 Nov 2023 11:25), Max: 98.7 (27 Nov 2023 04:52)  HR: 67 (27 Nov 2023 11:25) (60 - 70)  BP: 131/72 (27 Nov 2023 11:25) (118/49 - 148/60)  BP(mean): --  RR: 17 (27 Nov 2023 11:25) (17 - 18)  SpO2: 94% (27 Nov 2023 11:25) (92% - 94%)    Parameters below as of 27 Nov 2023 11:25  Patient On (Oxygen Delivery Method): room air        PHYSICAL EXAM:    GENERAL: NAD  HEAD:  Atraumatic, Normocephalic  EYES: EOMI, conjunctiva and sclera clear  ENMT: No Drainage from nares, No drainage from ears  NERVOUS SYSTEM:  Awake and Alert  CHEST/LUNG: Clear to percussion bilaterally  EXTREMITIES:  No Edema  SKIN: No rashes No obvious ecchymosis      LABS:                        8.6    13.21 )-----------( 162      ( 27 Nov 2023 08:27 )             26.8     11-27    137  |  104  |  37<H>  ----------------------------<  176<H>  4.2   |  26  |  2.00<H>    Ca    8.2<L>      27 Nov 2023 08:27  Phos  2.8     11-27  Mg     1.9     11-27    TPro  5.9<L>  /  Alb  2.2<L>  /  TBili  0.5  /  DBili  x   /  AST  48<H>  /  ALT  21  /  AlkPhos  80  11-27      Urinalysis Basic - ( 27 Nov 2023 08:27 )    Color: x / Appearance: x / SG: x / pH: x  Gluc: 176 mg/dL / Ketone: x  / Bili: x / Urobili: x   Blood: x / Protein: x / Nitrite: x   Leuk Esterase: x / RBC: x / WBC x   Sq Epi: x / Non Sq Epi: x / Bacteria: x

## 2023-11-27 NOTE — PROGRESS NOTE ADULT - ASSESSMENT
ELVIN on CKD 4  Urinary Retention  HTN  Hip fracture  UTI    -Baseline Creatinine reportedly 1.9  -ELVIN likely 2/2 urinary retention, now at baseline   -Improving back to baseline with galeana  -On metoprolol for BP, can add amlodipine if remains elevated, but currently improved  -S/P OR  -Creatinine stable       11/26/23-d/w daughter  11/25/23-d/w daughters

## 2023-11-27 NOTE — PROGRESS NOTE ADULT - ATTENDING COMMENTS
Patient seen and examined at bedside. No chest pain or shortness of breath. Dressing c/d/i and NVI. Pain is controlled. Pending further work with PT/OT and monitoring postoperative labs.
Patient was seen and examined by myself. Case was discussed with house staff in details. I have reviewed and agree with the plan as outlined above with edits where appropriate.    HPI:  89 yo female w/ pmh of HTN, HLD, T2DM, TIA, CKD previous right hip fx w/ surgical pinning s/p removal presents following a mechanical fall in her home. Pt states she was standing beside her chair looking out the window when she lost her balance and fell onto her right side. Denies head trauma, LOC, dizziness, lightheadedness. States that she crawled to a phone to call an ambulance. This all occurred at 4am this morning. Initially on arrival to the ED she thought she ate breakfast, but now realizes that she never ate today. Pt states that she had a previous fx to the right hip which had a pin that "fell out" and she had surgically removed. Pt states that even following pain medication in the ED, the pain is an 8-9/10 and radiates to her groin.  Of note, the patient states that her urine this morning was malodorous similar to a UTI she has had in the past. At that time, she too did not have sxs of a urinary tract infection. Denies fever, chills, sob, cp, palpitations, abd pain, n/v/d/c, dysuria, urinary frequency, urinary urgency, hematuria.    ED course  Vitals: T 98.1, HR 91, /76, RR 18, SpO2 96% on RA  Significant labs: WBC 14.63, BUN/Cr 42/2.1, UA: Positive for Large Leukocyte Esterase, WBC 18, Many Bacteria, Positive Squamous Epithelial Cells  Imaging: XR Femur and Hip: Proximal femoral neck fx on personal read  CXR: No acute cardiopulmonary pathology on wet read  Xray Ribs: No acute fx to right sided ribs  EKG: NSR   In ED patient given: 1L NS, 1L LR, Ofirmev, 2mg Morphine x2, Zofran   (24 Nov 2023 12:47)      ROS: as in the HPI; all other ROS negative    SH and family history as above    Vital Signs Last 24 Hrs  T(C): 36.8 (25 Nov 2023 11:33), Max: 38.2 (25 Nov 2023 08:12)  T(F): 98.3 (25 Nov 2023 11:33), Max: 100.8 (25 Nov 2023 08:12)  HR: 62 (25 Nov 2023 11:33) (60 - 141)  BP: 116/55 (25 Nov 2023 11:33) (102/50 - 166/89)  BP(mean): --  RR: 18 (25 Nov 2023 11:33) (12 - 25)  SpO2: 98% (25 Nov 2023 11:33) (95% - 100%)    Parameters below as of 25 Nov 2023 11:33  Patient On (Oxygen Delivery Method): room air        GEN: NAD  HEENT- normocephalic; mouth moist  CVS- S1S2+  LUNGS- clear to auscultation; no wheezing  ABD: Soft , nontender, nondistended, Bowel sounds are present  EXTREMITY: right hip Sx site aqacel dressing on, area clean.   NEURO: AAOx3; non focal neurologic exam; grossly non focal neuro exam  PSYCH: normal affect and behavior  BACK: no swelling or mass;   VASCULAR: distal peripheral pulses present  SKIN: warm and dry.       Labs and imaging reviewed      Patient presenting with Patient is a 89y old  Female who presents with a chief complaint of Right hip Fx (25 Nov 2023 13:23)   admitted for   1. Displaced intertrochanteric fracture of the right hip: s/p IMN, patient doing well, c/o some cough and indigestion, pain is controlled.          PT eval, fall precaution, incentive spirometer, DVT prophylaxis  2.5mg eliquis bid. pain management.   2. urinary retention: s/p galeana, voiding trial in am   3. UTI: started on rocephin: f/u Culture + E coli  4. HTN: hold lisinopril/HCTZ for ELVIN, c/w metoprolol  5. HLD: lipitor   6.H/O TIA: on asa  7. DM: carb consist diet, RISS   8. ELVIN ON CKD: c/w IVF, c/w galeana for out put monitoring, renal consult noted, improved today   9. Tachycardia: on metoprolol, Tele monitor post op   10 orthostatic hypotension: likely due to fluid volume loss,  crystalloid IVF 500ml bolus  continue with LR 75ml/Hr.         Plan of care discussed with patient  AND DAUGHTER AT BEDSIDE ;  all questions and concerns were addressed.
Patient was seen and examined by myself. Case was discussed with house staff in details. I have reviewed and agree with the plan as outlined above with edits where appropriate.    HPI:  89 yo female w/ pmh of HTN, HLD, T2DM, TIA, CKD previous right hip fx w/ surgical pinning s/p removal presents following a mechanical fall in her home. Pt states she was standing beside her chair looking out the window when she lost her balance and fell onto her right side. Denies head trauma, LOC, dizziness, lightheadedness. States that she crawled to a phone to call an ambulance. This all occurred at 4am this morning. Initially on arrival to the ED she thought she ate breakfast, but now realizes that she never ate today. Pt states that she had a previous fx to the right hip which had a pin that "fell out" and she had surgically removed. Pt states that even following pain medication in the ED, the pain is an 8-9/10 and radiates to her groin.  Of note, the patient states that her urine this morning was malodorous similar to a UTI she has had in the past. At that time, she too did not have sxs of a urinary tract infection. Denies fever, chills, sob, cp, palpitations, abd pain, n/v/d/c, dysuria, urinary frequency, urinary urgency, hematuria.    ED course  Vitals: T 98.1, HR 91, /76, RR 18, SpO2 96% on RA  Significant labs: WBC 14.63, BUN/Cr 42/2.1, UA: Positive for Large Leukocyte Esterase, WBC 18, Many Bacteria, Positive Squamous Epithelial Cells  Imaging: XR Femur and Hip: Proximal femoral neck fx on personal read  CXR: No acute cardiopulmonary pathology on wet read  Xray Ribs: No acute fx to right sided ribs  EKG: NSR   In ED patient given: 1L NS, 1L LR, Ofirmev, 2mg Morphine x2, Zofran   (24 Nov 2023 12:47)      ROS: as in the HPI; all other ROS negative    SH and family history as above    Vital Signs Last 24 Hrs  T(C): 36.8 (25 Nov 2023 11:33), Max: 38.2 (25 Nov 2023 08:12)  T(F): 98.3 (25 Nov 2023 11:33), Max: 100.8 (25 Nov 2023 08:12)  HR: 62 (25 Nov 2023 11:33) (60 - 141)  BP: 116/55 (25 Nov 2023 11:33) (102/50 - 166/89)  BP(mean): --  RR: 18 (25 Nov 2023 11:33) (12 - 25)  SpO2: 98% (25 Nov 2023 11:33) (95% - 100%)    Parameters below as of 25 Nov 2023 11:33  Patient On (Oxygen Delivery Method): room air        GEN: NAD  HEENT- normocephalic; mouth moist  CVS- S1S2+  LUNGS- clear to auscultation; no wheezing  ABD: Soft , nontender, nondistended, Bowel sounds are present  EXTREMITY: right hip Sx site aqacel dressing on, area clean.   NEURO: AAOx3; non focal neurologic exam; grossly non focal neuro exam  PSYCH: normal affect and behavior  BACK: no swelling or mass;   VASCULAR: distal peripheral pulses present  SKIN: warm and dry.       Labs and imaging reviewed      Patient presenting with Patient is a 89y old  Female who presents with a chief complaint of Right hip Fx (25 Nov 2023 13:23)   admitted for   1. Displaced intertrochanteric fracture of the right hip: s/p IMN, patient doing well, c/o some cough and indigestion, pain is controlled.          PT eval, fall precaution, incentive spirometer, DVT prophylaxis suggest 2.5mg eliquis bid start from tomorrow. pain management.   2. urinary retention: s/p galeana, voiding trial  in am if renal function improve   3. UTI: started on rocephin: f/u Culture   4. HTN: hold lisinopril/HCTZ for ELVIN, c/w metoprolol  5. HLD: lipitor   6.H/O TIA: on asa  7. DM: carb consist diet, RISS   8. ELVIN ON CKD: c/w IVF, c/w galeana for out put monitoring, renal consult   9. Tachycardia: on metoprolol, Tele monitor post op         Plan of care discussed with patient  AND DAUGHTER AT BEDSIDE ;  all questions and concerns were addressed.
Patient was seen and examined by myself. Case was discussed with house staff in details. I have reviewed and agree with the plan as outlined above with edits where appropriate.    HPI:  89 yo female w/ pmh of HTN, HLD, T2DM, TIA, CKD previous right hip fx w/ surgical pinning s/p removal presents following a mechanical fall in her home. Pt states she was standing beside her chair looking out the window when she lost her balance and fell onto her right side. Denies head trauma, LOC, dizziness, lightheadedness. States that she crawled to a phone to call an ambulance. This all occurred at 4am this morning. Initially on arrival to the ED she thought she ate breakfast, but now realizes that she never ate today. Pt states that she had a previous fx to the right hip which had a pin that "fell out" and she had surgically removed. Pt states that even following pain medication in the ED, the pain is an 8-9/10 and radiates to her groin.  Of note, the patient states that her urine this morning was malodorous similar to a UTI she has had in the past. At that time, she too did not have sxs of a urinary tract infection. Denies fever, chills, sob, cp, palpitations, abd pain, n/v/d/c, dysuria, urinary frequency, urinary urgency, hematuria.    ED course  Vitals: T 98.1, HR 91, /76, RR 18, SpO2 96% on RA  Significant labs: WBC 14.63, BUN/Cr 42/2.1, UA: Positive for Large Leukocyte Esterase, WBC 18, Many Bacteria, Positive Squamous Epithelial Cells  Imaging: XR Femur and Hip: Proximal femoral neck fx on personal read  CXR: No acute cardiopulmonary pathology on wet read  Xray Ribs: No acute fx to right sided ribs  EKG: NSR   In ED patient given: 1L NS, 1L LR, Ofirmev, 2mg Morphine x2, Zofran   (24 Nov 2023 12:47)      ROS: as in the HPI; all other ROS negative    SH and family history as above    Vital Signs Last 24 Hrs  T(C): 36.8 (25 Nov 2023 11:33), Max: 38.2 (25 Nov 2023 08:12)  T(F): 98.3 (25 Nov 2023 11:33), Max: 100.8 (25 Nov 2023 08:12)  HR: 62 (25 Nov 2023 11:33) (60 - 141)  BP: 116/55 (25 Nov 2023 11:33) (102/50 - 166/89)  BP(mean): --  RR: 18 (25 Nov 2023 11:33) (12 - 25)  SpO2: 98% (25 Nov 2023 11:33) (95% - 100%)    Parameters below as of 25 Nov 2023 11:33  Patient On (Oxygen Delivery Method): room air        GEN: NAD  HEENT- normocephalic; mouth moist  CVS- S1S2+  LUNGS- clear to auscultation; no wheezing  ABD: Soft , nontender, nondistended, Bowel sounds are present  EXTREMITY: right hip Sx site aqacel dressing on, area clean.   NEURO: AAOx3; non focal neurologic exam; grossly non focal neuro exam  PSYCH: normal affect and behavior  BACK: no swelling or mass;   VASCULAR: distal peripheral pulses present  SKIN: warm and dry.       Labs and imaging reviewed      Patient presenting with Patient is a 89y old  Female who presents with a chief complaint of Right hip Fx (25 Nov 2023 13:23)   admitted for   1. Displaced intertrochanteric fracture of the right hip: s/p IMN, patient doing well, c/o some cough and indigestion, pain is controlled.          PT eval, fall precaution, incentive spirometer, DVT prophylaxis  2.5mg eliquis bid. pain management.   2. urinary retention: d/c galeana, voiding trial    3. UTI: started on rocephin: f/u Culture + E coli pansensitive , will Transit to oral upon discharge   4. HTN: hold lisinopril/HCTZ for ELVIN, c/w metoprolol  5. HLD: lipitor   6.H/O TIA: on asa  7. DM: carb consist diet, RISS   8. ELVIN ON CKD: c/w IVF, worsening Renal function likely pre renal   9. Tachycardia: on metoprolol, Tele monitor post op           Plan of care discussed with patient  AND DAUGHTER AT BEDSIDE ;  all questions and concerns were addressed.

## 2023-11-27 NOTE — CARE COORDINATION ASSESSMENT. - REASON FOR CONSULT
SW consult addressed, pt for [facility] with plan to return home./coordination of care/discharge planning

## 2023-11-27 NOTE — PROGRESS NOTE ADULT - ASSESSMENT
89 yo Female with PMHx of HTN, T2DM, TIA 9/2016, right hip fracture, S/P ORIF right hip 3/5/16 presents to ED w/ a c/o of R hip pain s/p fall. Plan for IMN      s/p fall now s/p IMN   -on eliquis per ortho   -pt, pain control     -echo normal ef and no sig valve disease  -there is no evidence of acute ischemia.  -there is no evidence for meaningful  volume overload.  -no cardiovascular complications postop  -had tachycardia prior to or procedure on 11/24     -bp stable continue home bb     -ELVIN on CKD followed by renal can add norvasc if needed for bp control      -DVT prophylaxis  -monitor electrolytes, keep k>4, Mg>2    - Monitor and replete lytes, keep K>4, Mg>2.   - Will continue to follow.

## 2023-11-27 NOTE — PROGRESS NOTE ADULT - ASSESSMENT
89 yo female w/ pmh of HTN, HLD, T2DM, TIA,CKD previous right hip fx w/ surgical pinning s/p removal admitted for ORIF of mechanical fall w/ right hip fx, s/p R hip IMN on 11/25.

## 2023-11-27 NOTE — CHART NOTE - NSCHARTNOTEFT_GEN_A_CORE
Called by RN for pt c/o B/L calf tenderness. Patient seen and examined at bedside.  Pt c/o abdominal pain, mild calf tenderness.    Exam:  Calves B/L mildly swollen; +TTP most proximal region R calf  Paula's sign negative to posterior calf pain B/L    A/P:  - C/o B/L Calf pain, post-op R hip IMN  - LE Doppler negative for DVT B/L 2 days ago  - Paula's neg. B/L  - Pt on ASA, Eliquis  - Low suspicion for DVT

## 2023-11-27 NOTE — PROGRESS NOTE ADULT - SUBJECTIVE AND OBJECTIVE BOX
[facility] -- Tigist Mari Pannella, Patel, Savella Goodger, Cohen  Office # [phone]      Follow Up:    SVT   Subjective/Observations:     No events overnight resting comfortably in bed.  No complaints of chest pain, dyspnea, or palpitations reported. No signs of orthopnea or PND.    REVIEW OF SYSTEMS: All other review of systems is negative unless indicated above    PAST MEDICAL & SURGICAL HISTORY:  Hypertension      Diabetes      TIA (transient ischemic attack)      Hip fracture      HLD (hyperlipidemia)      S/P ORIF (open reduction internal fixation) fracture  right hip          MEDICATIONS  (STANDING):  apixaban 2.5 milliGRAM(s) Oral every 12 hours  aspirin enteric coated 81 milliGRAM(s) Oral daily  atorvastatin 40 milliGRAM(s) Oral at bedtime  cefTRIAXone   IVPB 1000 milliGRAM(s) IV Intermittent every 24 hours  dextrose 5%. 1000 milliLiter(s) (50 mL/Hr) IV Continuous <Continuous>  dextrose 5%. 1000 milliLiter(s) (100 mL/Hr) IV Continuous <Continuous>  dextrose 50% Injectable 25 Gram(s) IV Push once  dextrose 50% Injectable 25 Gram(s) IV Push once  dextrose 50% Injectable 12.5 Gram(s) IV Push once  glucagon  Injectable 1 milliGRAM(s) IntraMuscular once  insulin lispro (ADMELOG) corrective regimen sliding scale   SubCutaneous three times a day before meals  insulin lispro (ADMELOG) corrective regimen sliding scale   SubCutaneous at bedtime  lactated ringers. 1000 milliLiter(s) (75 mL/Hr) IV Continuous <Continuous>  lidocaine   4% Patch 1 Patch Transdermal every 24 hours  metoprolol succinate  milliGRAM(s) Oral once  metoprolol succinate  milliGRAM(s) Oral daily  polyethylene glycol 3350 17 Gram(s) Oral daily  senna 2 Tablet(s) Oral at bedtime    MEDICATIONS  (PRN):  aluminum hydroxide/magnesium hydroxide/simethicone Suspension 30 milliLiter(s) Oral every 6 hours PRN Dyspepsia  benzonatate 100 milliGRAM(s) Oral three times a day PRN Cough  dextrose Oral Gel 15 Gram(s) Oral once PRN Blood Glucose LESS THAN 70 milliGRAM(s)/deciliter  melatonin 3 milliGRAM(s) Oral at bedtime PRN Insomnia  ondansetron Injectable 4 milliGRAM(s) IV Push every 6 hours PRN Nausea and/or Vomiting  oxyCODONE    IR 2.5 milliGRAM(s) Oral every 4 hours PRN Moderate Pain (4 - 6)  oxyCODONE    IR 5 milliGRAM(s) Oral every 4 hours PRN Severe Pain (7 - 10)  simethicone 80 milliGRAM(s) Chew every 8 hours PRN Gas      Allergies    No Known Allergies    Intolerances        Vital Signs Last 24 Hrs  T(C): 37.1 (2023 04:52), Max: 37.1 (2023 04:52)  T(F): 98.7 (2023 04:52), Max: 98.7 (2023 04:52)  HR: 70 (2023 07:45) (60 - 70)  BP: 148/60 (2023 07:45) (117/62 - 148/60)  BP(mean): --  RR: 17 (2023 04:52) (17 - 18)  SpO2: 94% (:45) (91% - 94%)    Parameters below as of 2023 07:45  Patient On (Oxygen Delivery Method): room air        I&O's Summary    2023 07:01  -  2023 07:00  --------------------------------------------------------  IN: 0 mL / OUT: 1450 mL / NET: -1450 mL          PHYSICAL EXAM:  TELE: not on tele   Constitutional: NAD, awake and alert, well-developed  HEENT: Moist Mucous Membranes, Anicteric  Pulmonary: Non-labored, breath sounds are clear bilaterally, No wheezing, crackles or rhonchi  Cardiovascular: Regular, S1 and S2 nl, No murmurs, rubs, gallops or clicks  Gastrointestinal: Bowel Sounds present, soft, nontender.   Lymph: No lymphadenopathy. No peripheral edema.  Skin: No visible rashes or ulcers.  Psych:  Mood & affect appropriate    LABS: All Labs Reviewed:                        8.6    13.21 )-----------( 162      ( 2023 08:27 )             26.8                         8.5    14.10 )-----------( 136      ( 2023 10:33 )             26.2                         9.8    18.22 )-----------( 145      ( 2023 10:40 )             30.1     2023 08:27    137    |  104    |  37     ----------------------------<  176    4.2     |  26     |  2.00   2023 10:33    139    |  105    |  37     ----------------------------<  201    4.0     |  27     |  1.90   2023 12:55    140    |  108    |  32     ----------------------------<  166    4.2     |  26     |  1.80     Ca    8.2        2023 08:27  Ca    8.2        2023 10:33  Ca    8.2        2023 12:55  Phos  2.8       2023 08:27  Phos  2.8       2023 10:33  Phos  3.3       2023 05:07  Mg     1.9       2023 08:27  Mg     1.7       2023 10:33  Mg     1.6       2023 05:07    TPro  5.9    /  Alb  2.2    /  TBili  0.5    /  DBili  x      /  AST  48     /  ALT  21     /  AlkPhos  80     2023 08:27  TPro  5.5    /  Alb  2.2    /  TBili  0.5    /  DBili  x      /  AST  34     /  ALT  15     /  AlkPhos  65     2023 10:33  TPro  5.6    /  Alb  2.5    /  TBili  0.5    /  DBili  x      /  AST  28     /  ALT  21     /  AlkPhos  62     2023 12:55             EC Lead ECG:   Ventricular Rate 60 BPM    Atrial Rate 60 BPM    P-R Interval 190 ms    QRS Duration 86 ms    Q-T Interval 454 ms    QTC Calculation(Bazett) 454 ms    P Axis -40 degrees    R Axis 4 degrees    T Axis 38 degrees    Diagnosis Line Unusual P axis, possible ectopic atrial rhythm  Nonspecific T wave abnormality  Abnormal ECG  When compared with ECG of 2023 16:25, (Unconfirmed)  Ectopic atrial rhythm has replaced Junctional rhythm  Vent. rate has decreased BY  43 BPM  Nonspecific T wave abnormality, worse in Anterior leads  Nonspecific T wave abnormality no longer evident in Lateral leads  Confirmed by Chuy Escoto MD ([id number]) on 2023 5:20:22 PM (23 @ 12:06)      TRANSTHORACIC ECHOCARDIOGRAM REPORT  ________________________________________________________________________________                                      _______       Pt. Name:       NELDA VERDIN Study Date:    2023  MRN:            [id number]       YOB: 1930  Accession #:    [id number]      Age:           89 years  Account#:       [id number]     Gender:        F  Heart Rate:                    Height:        62.99 in (160.00 cm)  Rhythm:                        Weight:   114.64 lb (52.00 kg)  Blood Pressure: 158/98 mmHg    BSA/BMI:       1.53 m² / 20.31 kg/m²  ________________________________________________________________________________________  Referring Physician:    [id number] Addy Mueller  Interpreting Physician: Chuy Escoto  Primary Sonographer:    Juanito Bahena    CPT:               ECHO TTE WO CON COMP W DOPP - 13745.m  Indication(s):     Abnormal electrocardiogram ECG/EKG - R94.31  Procedure:         Transthoracic echocardiogram with 2-D, M-modeand complete                     spectral and color flow Doppler.  Ordering Location: [facility]  Study Information: Image quality for this study is technically difficult.    _______________________________________________________________________________________     CONCLUSIONS:      1. Technically difficult image quality.   2. The patient is tachycardic throughout this examination.   3. Left ventricular systolic function is normal with an ejection fraction of 64 % by Swanson's method of disks.   4. The right ventricle is not well visualized.   5. Structurally normal mitral valve with normal leaflet excursion.   6. There is mild calcification of the mitral valve annulus.   7. The left atrium is normal.   8. Trace mitral regurgitation.   9. Aortic valve was not well visualized.  10. Moderate calcification of the aortic valve leaflets.  11. The inferior vena cava is normal in size measuring 1.30 cm in diameter, (normal <2.1cm) with normal inspiratory collapse (normal >50%) consistent with normal right atrial pressure (~3, range 0-5mmHg).    ________________________________________________________________________________________  FINDINGS:     Left Ventricle:  Left ventricular systolic function is normal with a calculated ejection fraction of 64 % by the Swanson's biplane method of disks. There is normal left ventricular diastolic function.     Right Ventricle:  The right ventricle is not well visualized. Tricuspid annular plane systolic excursion (TAPSE) is 1.7 cm (normal >=1.7 cm).     Left Atrium:  The left atrium is normal with an indexed volume of 21.82 ml/m².     Right Atrium:  The right atrium was not well visualized.     Aortic Valve:  The aortic valve was not well visualized. The aortic valve anatomy cannot be determined. There is moderate calcification of the aortic valve leaflets.     Mitral Valve:  Structurally normal mitral valve with normal leaflet excursion. There is mild calcification of the mitral valve annulus. There is trace mitral regurgitation.     Tricuspid Valve:  The tricuspid valve was not well visualized.     Pulmonic Valve:  The pulmonic valve was not well visualized.     Aorta:  The aortic root at the sinuses of Valsalva is normal in size, measuring 3.00 cm (indexed 1.97 cm/m²).     Systemic Veins:  The inferior vena cava is normal in size measuring 1.30 cm in diameter, (normal <2.1cm) with normal inspiratory collapse (normal >50%) consistent with normal right atrial pressure (~3, range 0-5mmHg).  ____________________________________________________________________  QUANTITATIVE DATA:  Left Ventricle Measurements: (Indexed to BSA)     IVSd (2D):   1.2 cm  LVPWd (2D):  1.0 cm  LVIDd (2D):  3.5 cm  LVIDs (2D):  2.5 cm  LV Mass:     121 g  79.2 g/m²  LV Vol d, MOD A2C: 33.7 ml 22.08 ml/m²  LV Vol d, MOD A4C: 18.8 ml 12.32 ml/m²  LV Vol d, MOD BP:  25.5 ml 16.69 ml/m²  LV Vol s, MOD A2C: 11.5 ml 7.53 ml/m²  LV Vol s, MOD A4C: 7.7 ml  5.06 ml/m²  LV Vol s, MOD BP:  9.2 ml  6.02 ml/m²  LVOT SV MOD BP:    16.3 ml  LV EF% MOD BP:     64 %     MV E Vmax:    1.33 m/s  MV A Vmax:    0.50 m/s  MV E/A:       2.67  e' lateral:   21.20 cm/s  e' medial:    12.80 cm/s  E/e' lateral: 6.27  E/e' medial:  10.39  E/e' Average: 7.82  MV DT:        143 msec    Aorta Measurements: (normal range) (Indexed to BSA)     Sinuses of Valsalva: 3.00 cm (2.7 - 3.3 cm)       Left Atrium Measurements: (Indexed to BSA)  LA Diam 2D: 3.40 cm    Right Ventricle Measurements:     TAPSE: 1.7 cm       LVOT / RVOT/ Qp/Qs Data: (Indexed to BSA)  LVOT Diameter: 1.80 cm    Mitral Valve Measurements:     MV E Vmax: 1.3 m/s  MV A Vmax: 0.5 m/s  MV E/A:    2.7       Tricuspid Valve Measurements:     RA Pressure: 3 mmHg    ________________________________________________________________________________________  Electronicallysigned on 2023 at 6:17:31 AM by Chuy Escoto         *** Final ***      Radiology:

## 2023-11-27 NOTE — PROGRESS NOTE ADULT - ASSESSMENT
89 yo female w/ pmh of HTN, HLD, T2DM, TIA, CKD previous right hip fx w/ surgical pinning s/p removal presents following a mechanical fall in her home. Pt states she was standing beside her chair looking out the window when she lost her balance and fell onto her right side.    POD 2 - right IMN  on ABX  on IVF  ortho follow up  renal eval noted  on RA    cardio eval noted  known CKD - Stable  [facility] rx regimen  BP control  Pain rx regimen  D Dimer noted - likely an inflammation marker in light of Right Hip Fx  CXR NADP  monitor VS and HD and Sat  I chioma  DM care  serial FS

## 2023-11-27 NOTE — PROGRESS NOTE ADULT - SUBJECTIVE AND OBJECTIVE BOX
Patient seen and examined at bedside. Patient reports pain well controlled on medications. No acute events overnight. Pt denies fevers, chills, new onset numbness, weakness or tingling in the extremities.    LABS:                        8.5    14.10 )-----------( 136      ( 26 Nov 2023 10:33 )             26.2     11-26    139  |  105  |  37<H>  ----------------------------<  201<H>  4.0   |  27  |  1.90<H>    Ca    8.2<L>      26 Nov 2023 10:33  Phos  2.8     11-26  Mg     1.7     11-26    TPro  5.5<L>  /  Alb  2.2<L>  /  TBili  0.5  /  DBili  x   /  AST  34  /  ALT  15  /  AlkPhos  65  11-26    PT/INR - ( 25 Nov 2023 07:01 )   PT: 15.6 sec;   INR: 1.34 ratio         PTT - ( 25 Nov 2023 07:01 )  PTT:27.0 sec  Urinalysis Basic - ( 26 Nov 2023 10:33 )    Color: x / Appearance: x / SG: x / pH: x  Gluc: 201 mg/dL / Ketone: x  / Bili: x / Urobili: x   Blood: x / Protein: x / Nitrite: x   Leuk Esterase: x / RBC: x / WBC x   Sq Epi: x / Non Sq Epi: x / Bacteria: x        VITAL SIGNS:  T(C): 37.1 (11-27-23 @ 04:52), Max: 37.1 (11-27-23 @ 04:52)  HR: 70 (11-27-23 @ 04:52) (60 - 71)  BP: 131/60 (11-27-23 @ 04:52) (99/62 - 138/62)  RR: 17 (11-27-23 @ 04:52) (17 - 18)  SpO2: 92% (11-27-23 @ 04:52) (91% - 94%)    Physical Exam:  General: NAD, resting comfortably in bed  RLE:   Dressing in place C/D/I  SCD in place bilaterally  No calf tenderness   Motor: + EHL/FHL/TA/GSC  +SILT: SPN/DPN/Yury/Saph/Tib  DP/PT 2+    A/P:  89y F s/p R IMN POD2    -PT/OT: Rec LEONEL  -WBAT  -Pain Control  -DVT ppx: Eliquis 2.5 mg BID  -Continue Rocephin per medicine  -FU AM Labs  -Rest, ice, compress and elevate the extremity as we needed  -Incentive Spirometry  -Medical management appreciated  -Dispo pending PT eval  -Will discuss plan with Dr. De Luna and will advise changes to plan as needed

## 2023-11-27 NOTE — CARE COORDINATION ASSESSMENT. - NSPASTMEDSURGHISTORY_GEN_ALL_CORE_FT
PAST MEDICAL & SURGICAL HISTORY:  Diabetes      Hypertension      Hip fracture      TIA (transient ischemic attack)      S/P ORIF (open reduction internal fixation) fracture  right hip      HLD (hyperlipidemia)

## 2023-11-27 NOTE — PROGRESS NOTE ADULT - SUBJECTIVE AND OBJECTIVE BOX
Date/Time Patient Seen:  		  Referring MD:   Data Reviewed	       Patient is a 89y old  Female who presents with a chief complaint of Right hip Fx (26 Nov 2023 13:33)      Subjective/HPI     PAST MEDICAL & SURGICAL HISTORY:  Hypertension    Diabetes    TIA (transient ischemic attack)    Hip fracture    HLD (hyperlipidemia)    No significant past surgical history    S/P ORIF (open reduction internal fixation) fracture  right hip          Medication list         MEDICATIONS  (STANDING):  apixaban 2.5 milliGRAM(s) Oral every 12 hours  aspirin enteric coated 81 milliGRAM(s) Oral daily  atorvastatin 40 milliGRAM(s) Oral at bedtime  cefTRIAXone   IVPB 1000 milliGRAM(s) IV Intermittent every 24 hours  dextrose 5%. 1000 milliLiter(s) (50 mL/Hr) IV Continuous <Continuous>  dextrose 5%. 1000 milliLiter(s) (100 mL/Hr) IV Continuous <Continuous>  dextrose 50% Injectable 12.5 Gram(s) IV Push once  dextrose 50% Injectable 25 Gram(s) IV Push once  dextrose 50% Injectable 25 Gram(s) IV Push once  glucagon  Injectable 1 milliGRAM(s) IntraMuscular once  insulin lispro (ADMELOG) corrective regimen sliding scale   SubCutaneous three times a day before meals  insulin lispro (ADMELOG) corrective regimen sliding scale   SubCutaneous at bedtime  lactated ringers. 1000 milliLiter(s) (75 mL/Hr) IV Continuous <Continuous>  lidocaine   4% Patch 1 Patch Transdermal every 24 hours  metoprolol succinate  milliGRAM(s) Oral daily  metoprolol succinate  milliGRAM(s) Oral once  polyethylene glycol 3350 17 Gram(s) Oral daily  senna 2 Tablet(s) Oral at bedtime    MEDICATIONS  (PRN):  aluminum hydroxide/magnesium hydroxide/simethicone Suspension 30 milliLiter(s) Oral every 6 hours PRN Dyspepsia  dextrose Oral Gel 15 Gram(s) Oral once PRN Blood Glucose LESS THAN 70 milliGRAM(s)/deciliter  melatonin 3 milliGRAM(s) Oral at bedtime PRN Insomnia  ondansetron Injectable 4 milliGRAM(s) IV Push every 6 hours PRN Nausea and/or Vomiting  oxyCODONE    IR 2.5 milliGRAM(s) Oral every 4 hours PRN Moderate Pain (4 - 6)  oxyCODONE    IR 5 milliGRAM(s) Oral every 4 hours PRN Severe Pain (7 - 10)  simethicone 80 milliGRAM(s) Chew every 8 hours PRN Gas         Vitals log        ICU Vital Signs Last 24 Hrs  T(C): 37.1 (27 Nov 2023 04:52), Max: 37.1 (27 Nov 2023 04:52)  T(F): 98.7 (27 Nov 2023 04:52), Max: 98.7 (27 Nov 2023 04:52)  HR: 70 (27 Nov 2023 04:52) (60 - 71)  BP: 131/60 (27 Nov 2023 04:52) (99/62 - 138/62)  BP(mean): --  ABP: --  ABP(mean): --  RR: 17 (27 Nov 2023 04:52) (17 - 18)  SpO2: 92% (27 Nov 2023 04:52) (91% - 94%)    O2 Parameters below as of 27 Nov 2023 04:52  Patient On (Oxygen Delivery Method): room air                 Input and Output:  I&O's Detail    25 Nov 2023 07:01  -  26 Nov 2023 07:00  --------------------------------------------------------  IN:  Total IN: 0 mL    OUT:    Indwelling Catheter - Urethral (mL): 650 mL  Total OUT: 650 mL    Total NET: -650 mL      26 Nov 2023 07:01  -  27 Nov 2023 05:02  --------------------------------------------------------  IN:  Total IN: 0 mL    OUT:    Indwelling Catheter - Urethral (mL): 750 mL  Total OUT: 750 mL    Total NET: -750 mL          Lab Data                        8.5    14.10 )-----------( 136      ( 26 Nov 2023 10:33 )             26.2     11-26    139  |  105  |  37<H>  ----------------------------<  201<H>  4.0   |  27  |  1.90<H>    Ca    8.2<L>      26 Nov 2023 10:33  Phos  2.8     11-26  Mg     1.7     11-26    TPro  5.5<L>  /  Alb  2.2<L>  /  TBili  0.5  /  DBili  x   /  AST  34  /  ALT  15  /  AlkPhos  65  11-26            Review of Systems	      Objective     Physical Examination    heart s1s2  lung dc BS  head nc      Pertinent Lab findings & Imaging      Justin:  NO   Adequate UO     I&O's Detail    25 Nov 2023 07:01  -  26 Nov 2023 07:00  --------------------------------------------------------  IN:  Total IN: 0 mL    OUT:    Indwelling Catheter - Urethral (mL): 650 mL  Total OUT: 650 mL    Total NET: -650 mL      26 Nov 2023 07:01  -  27 Nov 2023 05:02  --------------------------------------------------------  IN:  Total IN: 0 mL    OUT:    Indwelling Catheter - Urethral (mL): 750 mL  Total OUT: 750 mL    Total NET: -750 mL               Discussed with:     Cultures:	        Radiology

## 2023-11-27 NOTE — PROGRESS NOTE ADULT - SUBJECTIVE AND OBJECTIVE BOX
Patient is a 89y old  Female who presents with a chief complaint of Right hip Fx (27 Nov 2023 11:05)    INTERVAL HPI/OVERNIGHT EVENTS: No acute overnight events. Pt seen and examined at the in chair this AM. As per daughter, pt has had cough without notable sputum, attributing likely to recent procedure. Daughter also had concerns of medication regimen but acknowledges medications can be given in LEONEL upon placement. States pt's galeana has been draining more properly.  Pt denies fever, n/v/d, CP, SOB, abd pain, LE edema.     MEDICATIONS  (STANDING):  apixaban 2.5 milliGRAM(s) Oral every 12 hours  aspirin enteric coated 81 milliGRAM(s) Oral daily  atorvastatin 40 milliGRAM(s) Oral at bedtime  cefTRIAXone   IVPB 1000 milliGRAM(s) IV Intermittent every 24 hours  dextrose 5%. 1000 milliLiter(s) (100 mL/Hr) IV Continuous <Continuous>  dextrose 5%. 1000 milliLiter(s) (50 mL/Hr) IV Continuous <Continuous>  dextrose 50% Injectable 12.5 Gram(s) IV Push once  dextrose 50% Injectable 25 Gram(s) IV Push once  dextrose 50% Injectable 25 Gram(s) IV Push once  glucagon  Injectable 1 milliGRAM(s) IntraMuscular once  insulin lispro (ADMELOG) corrective regimen sliding scale   SubCutaneous three times a day before meals  insulin lispro (ADMELOG) corrective regimen sliding scale   SubCutaneous at bedtime  lactated ringers. 1000 milliLiter(s) (75 mL/Hr) IV Continuous <Continuous>  lidocaine   4% Patch 1 Patch Transdermal every 24 hours  metoprolol succinate  milliGRAM(s) Oral once  metoprolol succinate  milliGRAM(s) Oral daily  polyethylene glycol 3350 17 Gram(s) Oral daily  senna 2 Tablet(s) Oral at bedtime    MEDICATIONS  (PRN):  aluminum hydroxide/magnesium hydroxide/simethicone Suspension 30 milliLiter(s) Oral every 6 hours PRN Dyspepsia  benzonatate 100 milliGRAM(s) Oral three times a day PRN Cough  dextrose Oral Gel 15 Gram(s) Oral once PRN Blood Glucose LESS THAN 70 milliGRAM(s)/deciliter  melatonin 3 milliGRAM(s) Oral at bedtime PRN Insomnia  ondansetron Injectable 4 milliGRAM(s) IV Push every 6 hours PRN Nausea and/or Vomiting  oxyCODONE    IR 5 milliGRAM(s) Oral every 4 hours PRN Severe Pain (7 - 10)  oxyCODONE    IR 2.5 milliGRAM(s) Oral every 4 hours PRN Moderate Pain (4 - 6)  simethicone 80 milliGRAM(s) Chew every 8 hours PRN Gas      Allergies    No Known Allergies    Intolerances        REVIEW OF SYSTEMS:  CONSTITUTIONAL: No fever or chills  HEENT:  No headache, no sore throat  RESPIRATORY: No cough, wheezing, or shortness of breath  CARDIOVASCULAR: No chest pain, palpitations  GASTROINTESTINAL: +R sided groin soreness at site of ORIF incision. No nausea, vomiting, or diarrhea  GENITOURINARY: +galeana. No dysuria, frequency, or hematuria  NEUROLOGICAL: +R leg soreness. no focal weakness or dizziness  MUSCULOSKELETAL: no myalgias     Vital Signs Last 24 Hrs  T(C): 37.1 (27 Nov 2023 11:25), Max: 37.1 (27 Nov 2023 04:52)  T(F): 98.7 (27 Nov 2023 11:25), Max: 98.7 (27 Nov 2023 04:52)  HR: 67 (27 Nov 2023 11:25) (60 - 70)  BP: 131/72 (27 Nov 2023 11:25) (118/49 - 148/60)  BP(mean): --  RR: 17 (27 Nov 2023 11:25) (17 - 18)  SpO2: 94% (27 Nov 2023 11:25) (92% - 94%)    Parameters below as of 27 Nov 2023 11:25  Patient On (Oxygen Delivery Method): room air        PHYSICAL EXAM:  GENERAL: NAD, sitting in chair, +galeana draining properly.   HEENT:  anicteric, moist mucous membranes  CHEST/LUNG:  CTA b/l, no rales, wheezes, or rhonchi  HEART:  RRR, S1, S2  ABDOMEN:  BS+, soft. + TTP in RLQ and R suprapubic regions.   EXTREMITIES: no edema, cyanosis. + TTP of RLE.   NERVOUS SYSTEM: answers questions and follows commands appropriately    LABS:                        8.6    13.21 )-----------( 162      ( 27 Nov 2023 08:27 )             26.8     CBC Full  -  ( 27 Nov 2023 08:27 )  WBC Count : 13.21 K/uL  Hemoglobin : 8.6 g/dL  Hematocrit : 26.8 %  Platelet Count - Automated : 162 K/uL  Mean Cell Volume : 86.2 fl  Mean Cell Hemoglobin : 27.7 pg  Mean Cell Hemoglobin Concentration : 32.1 gm/dL  Auto Neutrophil # : 10.75 K/uL  Auto Lymphocyte # : 1.28 K/uL  Auto Monocyte # : 0.74 K/uL  Auto Eosinophil # : 0.28 K/uL  Auto Basophil # : 0.04 K/uL  Auto Neutrophil % : 81.4 %  Auto Lymphocyte % : 9.7 %  Auto Monocyte % : 5.6 %  Auto Eosinophil % : 2.1 %  Auto Basophil % : 0.3 %    27 Nov 2023 08:27    137    |  104    |  37     ----------------------------<  176    4.2     |  26     |  2.00     Ca    8.2        27 Nov 2023 08:27  Phos  2.8       27 Nov 2023 08:27  Mg     1.9       27 Nov 2023 08:27    TPro  5.9    /  Alb  2.2    /  TBili  0.5    /  DBili  x      /  AST  48     /  ALT  21     /  AlkPhos  80     27 Nov 2023 08:27      Urinalysis Basic - ( 27 Nov 2023 08:27 )    Color: x / Appearance: x / SG: x / pH: x  Gluc: 176 mg/dL / Ketone: x  / Bili: x / Urobili: x   Blood: x / Protein: x / Nitrite: x   Leuk Esterase: x / RBC: x / WBC x   Sq Epi: x / Non Sq Epi: x / Bacteria: x      CAPILLARY BLOOD GLUCOSE      POCT Blood Glucose.: 156 mg/dL (27 Nov 2023 07:47)  POCT Blood Glucose.: 155 mg/dL (26 Nov 2023 21:25)  POCT Blood Glucose.: 136 mg/dL (26 Nov 2023 16:51)  POCT Blood Glucose.: 157 mg/dL (26 Nov 2023 11:50)        Culture - Urine (collected 11-24-23 @ 10:35)  Source: Clean Catch Clean Catch (Midstream)  Final Report (11-27-23 @ 10:09):    >100,000 CFU/ml Klebsiella pneumoniae  Organism: Klebsiella pneumoniae (11-27-23 @ 10:09)  Organism: Klebsiella pneumoniae (11-27-23 @ 10:09)      Method Type: ANNA MARIE      -  Amoxicillin/Clavulanic Acid: S <=8/4      -  Ampicillin: R >16 These ampicillin results predict results for amoxicillin      -  Ampicillin/Sulbactam: S <=4/2      -  Aztreonam: S <=4      -  Cefazolin: S <=2 For uncomplicated UTI with K. pneumoniae, E. coli, or P. mirablis: ANNA MARIE <=16 is sensitive and ANNA MARIE >=32 is resistant. This also predicts results for oral agents cefaclor, cefdinir, cefpodoxime, cefprozil, cefuroxime axetil, cephalexin and locarbef for uncomplicated UTI. Note that some isolates may be susceptible to these agents while testing resistant to cefazolin.      -  Cefepime: S <=2      -  Cefoxitin: S <=8      -  Ceftriaxone: S <=1      -  Cefuroxime: S <=4      -  Ciprofloxacin: S <=0.25      -  Ertapenem: S <=0.5      -  Gentamicin: S <=2      -  Imipenem: S <=1      -  Levofloxacin: S <=0.5      -  Meropenem: S <=1      -  Nitrofurantoin: I 64 Should not be used to treat pyelonephritis      -  Piperacillin/Tazobactam: S <=8      -  Tobramycin: S <=2      -  Trimethoprim/Sulfamethoxazole: S <=0.5/9.5        RADIOLOGY & ADDITIONAL TESTS:  Personally reviewed.     Consultant(s) Notes Reviewed:  [x] YES  [ ] NO

## 2023-11-28 ENCOUNTER — TRANSCRIPTION ENCOUNTER (OUTPATIENT)
Age: 89
End: 2023-11-28

## 2023-11-28 LAB
ALBUMIN SERPL ELPH-MCNC: 2.1 G/DL — LOW (ref 3.3–5)
ALBUMIN SERPL ELPH-MCNC: 2.1 G/DL — LOW (ref 3.3–5)
ALP SERPL-CCNC: 89 U/L — SIGNIFICANT CHANGE UP (ref 40–120)
ALP SERPL-CCNC: 89 U/L — SIGNIFICANT CHANGE UP (ref 40–120)
ALT FLD-CCNC: 30 U/L — SIGNIFICANT CHANGE UP (ref 12–78)
ALT FLD-CCNC: 30 U/L — SIGNIFICANT CHANGE UP (ref 12–78)
ANION GAP SERPL CALC-SCNC: 7 MMOL/L — SIGNIFICANT CHANGE UP (ref 5–17)
ANION GAP SERPL CALC-SCNC: 7 MMOL/L — SIGNIFICANT CHANGE UP (ref 5–17)
AST SERPL-CCNC: 48 U/L — HIGH (ref 15–37)
AST SERPL-CCNC: 48 U/L — HIGH (ref 15–37)
BILIRUB SERPL-MCNC: 0.5 MG/DL — SIGNIFICANT CHANGE UP (ref 0.2–1.2)
BILIRUB SERPL-MCNC: 0.5 MG/DL — SIGNIFICANT CHANGE UP (ref 0.2–1.2)
BUN SERPL-MCNC: 38 MG/DL — HIGH (ref 7–23)
BUN SERPL-MCNC: 38 MG/DL — HIGH (ref 7–23)
CALCIUM SERPL-MCNC: 8.3 MG/DL — LOW (ref 8.5–10.1)
CALCIUM SERPL-MCNC: 8.3 MG/DL — LOW (ref 8.5–10.1)
CHLORIDE SERPL-SCNC: 105 MMOL/L — SIGNIFICANT CHANGE UP (ref 96–108)
CHLORIDE SERPL-SCNC: 105 MMOL/L — SIGNIFICANT CHANGE UP (ref 96–108)
CO2 SERPL-SCNC: 28 MMOL/L — SIGNIFICANT CHANGE UP (ref 22–31)
CO2 SERPL-SCNC: 28 MMOL/L — SIGNIFICANT CHANGE UP (ref 22–31)
CREAT SERPL-MCNC: 1.7 MG/DL — HIGH (ref 0.5–1.3)
CREAT SERPL-MCNC: 1.7 MG/DL — HIGH (ref 0.5–1.3)
EGFR: 28 ML/MIN/1.73M2 — LOW
EGFR: 28 ML/MIN/1.73M2 — LOW
GLUCOSE SERPL-MCNC: 182 MG/DL — HIGH (ref 70–99)
GLUCOSE SERPL-MCNC: 182 MG/DL — HIGH (ref 70–99)
HCT VFR BLD CALC: 23.3 % — LOW (ref 34.5–45)
HCT VFR BLD CALC: 23.3 % — LOW (ref 34.5–45)
HCT VFR BLD CALC: 26.6 % — LOW (ref 34.5–45)
HCT VFR BLD CALC: 26.6 % — LOW (ref 34.5–45)
HGB BLD-MCNC: 7.9 G/DL — LOW (ref 11.5–15.5)
HGB BLD-MCNC: 7.9 G/DL — LOW (ref 11.5–15.5)
HGB BLD-MCNC: 8.8 G/DL — LOW (ref 11.5–15.5)
HGB BLD-MCNC: 8.8 G/DL — LOW (ref 11.5–15.5)
MCHC RBC-ENTMCNC: 27.5 PG — SIGNIFICANT CHANGE UP (ref 27–34)
MCHC RBC-ENTMCNC: 27.5 PG — SIGNIFICANT CHANGE UP (ref 27–34)
MCHC RBC-ENTMCNC: 28.3 PG — SIGNIFICANT CHANGE UP (ref 27–34)
MCHC RBC-ENTMCNC: 28.3 PG — SIGNIFICANT CHANGE UP (ref 27–34)
MCHC RBC-ENTMCNC: 33.1 GM/DL — SIGNIFICANT CHANGE UP (ref 32–36)
MCHC RBC-ENTMCNC: 33.1 GM/DL — SIGNIFICANT CHANGE UP (ref 32–36)
MCHC RBC-ENTMCNC: 33.9 GM/DL — SIGNIFICANT CHANGE UP (ref 32–36)
MCHC RBC-ENTMCNC: 33.9 GM/DL — SIGNIFICANT CHANGE UP (ref 32–36)
MCV RBC AUTO: 83.1 FL — SIGNIFICANT CHANGE UP (ref 80–100)
MCV RBC AUTO: 83.1 FL — SIGNIFICANT CHANGE UP (ref 80–100)
MCV RBC AUTO: 83.5 FL — SIGNIFICANT CHANGE UP (ref 80–100)
MCV RBC AUTO: 83.5 FL — SIGNIFICANT CHANGE UP (ref 80–100)
NRBC # BLD: 0 /100 WBCS — SIGNIFICANT CHANGE UP (ref 0–0)
PHOSPHATE SERPL-MCNC: 3 MG/DL — SIGNIFICANT CHANGE UP (ref 2.5–4.5)
PHOSPHATE SERPL-MCNC: 3 MG/DL — SIGNIFICANT CHANGE UP (ref 2.5–4.5)
PLATELET # BLD AUTO: 148 K/UL — LOW (ref 150–400)
PLATELET # BLD AUTO: 148 K/UL — LOW (ref 150–400)
PLATELET # BLD AUTO: 152 K/UL — SIGNIFICANT CHANGE UP (ref 150–400)
PLATELET # BLD AUTO: 152 K/UL — SIGNIFICANT CHANGE UP (ref 150–400)
POTASSIUM SERPL-MCNC: 4 MMOL/L — SIGNIFICANT CHANGE UP (ref 3.5–5.3)
POTASSIUM SERPL-MCNC: 4 MMOL/L — SIGNIFICANT CHANGE UP (ref 3.5–5.3)
POTASSIUM SERPL-SCNC: 4 MMOL/L — SIGNIFICANT CHANGE UP (ref 3.5–5.3)
POTASSIUM SERPL-SCNC: 4 MMOL/L — SIGNIFICANT CHANGE UP (ref 3.5–5.3)
PROT SERPL-MCNC: 5.8 G/DL — LOW (ref 6–8.3)
PROT SERPL-MCNC: 5.8 G/DL — LOW (ref 6–8.3)
RBC # BLD: 2.79 M/UL — LOW (ref 3.8–5.2)
RBC # BLD: 2.79 M/UL — LOW (ref 3.8–5.2)
RBC # BLD: 3.2 M/UL — LOW (ref 3.8–5.2)
RBC # BLD: 3.2 M/UL — LOW (ref 3.8–5.2)
RBC # FLD: 14.4 % — SIGNIFICANT CHANGE UP (ref 10.3–14.5)
RBC # FLD: 14.4 % — SIGNIFICANT CHANGE UP (ref 10.3–14.5)
RBC # FLD: 14.6 % — HIGH (ref 10.3–14.5)
RBC # FLD: 14.6 % — HIGH (ref 10.3–14.5)
SODIUM SERPL-SCNC: 140 MMOL/L — SIGNIFICANT CHANGE UP (ref 135–145)
SODIUM SERPL-SCNC: 140 MMOL/L — SIGNIFICANT CHANGE UP (ref 135–145)
WBC # BLD: 10.13 K/UL — SIGNIFICANT CHANGE UP (ref 3.8–10.5)
WBC # BLD: 10.13 K/UL — SIGNIFICANT CHANGE UP (ref 3.8–10.5)
WBC # BLD: 8.99 K/UL — SIGNIFICANT CHANGE UP (ref 3.8–10.5)
WBC # BLD: 8.99 K/UL — SIGNIFICANT CHANGE UP (ref 3.8–10.5)
WBC # FLD AUTO: 10.13 K/UL — SIGNIFICANT CHANGE UP (ref 3.8–10.5)
WBC # FLD AUTO: 10.13 K/UL — SIGNIFICANT CHANGE UP (ref 3.8–10.5)
WBC # FLD AUTO: 8.99 K/UL — SIGNIFICANT CHANGE UP (ref 3.8–10.5)
WBC # FLD AUTO: 8.99 K/UL — SIGNIFICANT CHANGE UP (ref 3.8–10.5)

## 2023-11-28 PROCEDURE — 99232 SBSQ HOSP IP/OBS MODERATE 35: CPT

## 2023-11-28 PROCEDURE — 99239 HOSP IP/OBS DSCHRG MGMT >30: CPT

## 2023-11-28 RX ORDER — CEFUROXIME AXETIL 250 MG
1 TABLET ORAL
Qty: 0 | Refills: 0 | DISCHARGE
Start: 2023-11-28

## 2023-11-28 RX ORDER — APIXABAN 2.5 MG/1
1 TABLET, FILM COATED ORAL
Qty: 0 | Refills: 0 | DISCHARGE
Start: 2023-11-28

## 2023-11-28 RX ORDER — TAMSULOSIN HYDROCHLORIDE 0.4 MG/1
1 CAPSULE ORAL
Qty: 0 | Refills: 0 | DISCHARGE
Start: 2023-11-28

## 2023-11-28 RX ORDER — POLYETHYLENE GLYCOL 3350 17 G/17G
17 POWDER, FOR SOLUTION ORAL
Qty: 0 | Refills: 0 | DISCHARGE
Start: 2023-11-28

## 2023-11-28 RX ORDER — INFLUENZA VIRUS VACCINE 15; 15; 15; 15 UG/.5ML; UG/.5ML; UG/.5ML; UG/.5ML
0.7 SUSPENSION INTRAMUSCULAR ONCE
Refills: 0 | Status: DISCONTINUED | OUTPATIENT
Start: 2023-11-28 | End: 2023-11-29

## 2023-11-28 RX ORDER — TAMSULOSIN HYDROCHLORIDE 0.4 MG/1
0.4 CAPSULE ORAL AT BEDTIME
Refills: 0 | Status: DISCONTINUED | OUTPATIENT
Start: 2023-11-28 | End: 2023-11-29

## 2023-11-28 RX ORDER — LANOLIN ALCOHOL/MO/W.PET/CERES
1 CREAM (GRAM) TOPICAL
Qty: 0 | Refills: 0 | DISCHARGE
Start: 2023-11-28

## 2023-11-28 RX ORDER — SENNA PLUS 8.6 MG/1
2 TABLET ORAL
Qty: 0 | Refills: 0 | DISCHARGE
Start: 2023-11-28

## 2023-11-28 RX ORDER — SIMETHICONE 80 MG/1
1 TABLET, CHEWABLE ORAL
Qty: 0 | Refills: 0 | DISCHARGE
Start: 2023-11-28

## 2023-11-28 RX ORDER — CEFUROXIME AXETIL 250 MG
250 TABLET ORAL EVERY 12 HOURS
Refills: 0 | Status: DISCONTINUED | OUTPATIENT
Start: 2023-11-28 | End: 2023-11-29

## 2023-11-28 RX ADMIN — OXYCODONE HYDROCHLORIDE 5 MILLIGRAM(S): 5 TABLET ORAL at 00:30

## 2023-11-28 RX ADMIN — LIDOCAINE 1 PATCH: 4 CREAM TOPICAL at 06:00

## 2023-11-28 RX ADMIN — Medication 81 MILLIGRAM(S): at 12:47

## 2023-11-28 RX ADMIN — APIXABAN 2.5 MILLIGRAM(S): 2.5 TABLET, FILM COATED ORAL at 06:58

## 2023-11-28 RX ADMIN — Medication 1: at 12:39

## 2023-11-28 RX ADMIN — ATORVASTATIN CALCIUM 40 MILLIGRAM(S): 80 TABLET, FILM COATED ORAL at 22:34

## 2023-11-28 RX ADMIN — Medication 100 MILLIGRAM(S): at 06:58

## 2023-11-28 RX ADMIN — LIDOCAINE 1 PATCH: 4 CREAM TOPICAL at 17:32

## 2023-11-28 RX ADMIN — TAMSULOSIN HYDROCHLORIDE 0.4 MILLIGRAM(S): 0.4 CAPSULE ORAL at 22:34

## 2023-11-28 RX ADMIN — Medication 250 MILLIGRAM(S): at 17:33

## 2023-11-28 RX ADMIN — Medication 1: at 08:36

## 2023-11-28 RX ADMIN — APIXABAN 2.5 MILLIGRAM(S): 2.5 TABLET, FILM COATED ORAL at 17:33

## 2023-11-28 NOTE — CHART NOTE - NSCHARTNOTEFT_GEN_A_CORE
Called by RN for bladder scan > 500  Straight cath ordered; this will be 2nd consecutive SC  If next bladder scan shows continued retention, will initiate Anderson

## 2023-11-28 NOTE — CHART NOTE - NSCHARTNOTEFT_GEN_A_CORE
Patient was scheduled to be discharged today. Pickup rescheduled pick LEONEL, pt s/p 1 u prbc so unable to meet pickup time. Hemoglobin from improved to 8.8. Patient asymptomatic  after transfusion.

## 2023-11-28 NOTE — PROGRESS NOTE ADULT - ASSESSMENT
89 yo female w/ pmh of HTN, HLD, T2DM, TIA, CKD previous right hip fx w/ surgical pinning s/p removal presents following a mechanical fall in her home. Pt states she was standing beside her chair looking out the window when she lost her balance and fell onto her right side.    POD 3 - right IMN  on IVF, caution with vol overload  ortho follow up  renal eval noted    cardio eval noted  known CKD - Stable  [facility] rx regimen  BP control  Pain rx regimen  D Dimer noted - likely an inflammation marker in light of Right Hip Fx  CXR NADP  monitor VS and HD and Sat  I chioma  DM care  serial FS

## 2023-11-28 NOTE — PROGRESS NOTE ADULT - ASSESSMENT
ELVIN on CKD 4  Urinary Retention  HTN    -Baseline Creatinine reportedly 1.9  -ELVIN likely 2/2 urinary retention  -Improving back to baseline with galeana  -On metoprolol for BP, can add amlodipine if remains elevated, but currently improved  -S/P OR  -Creatinine stable   -Cont galeana, follow up outpatient urology    d/w primary  11/28/23-d./w daughter  11/26/23-d/w daughter  11/25/23-d/w daughters

## 2023-11-28 NOTE — DISCHARGE NOTE NURSING/CASE MANAGEMENT/SOCIAL WORK - PATIENT PORTAL LINK FT
You can access the FollowMyHealth Patient Portal offered by [facility] by registering at the following website: [email or web address]. By joining [facility]’s FollowMyHealth portal, you will also be able to view your health information using other applications (apps) compatible with our system.

## 2023-11-28 NOTE — PATIENT CHOICE NOTE. - NSPTCHOICESTATE_GEN_ALL_CORE

## 2023-11-28 NOTE — DISCHARGE NOTE NURSING/CASE MANAGEMENT/SOCIAL WORK - NSDCPEFALRISK_GEN_ALL_CORE
For information on Fall & Injury Prevention, visit: [email or web address] OR  [email or web address] OR  https://www.cdc.gov/steadi/patient.html

## 2023-11-28 NOTE — PROGRESS NOTE ADULT - SUBJECTIVE AND OBJECTIVE BOX
Date/Time Patient Seen:  		  Referring MD:   Data Reviewed	       Patient is a 89y old  Female who presents with a chief complaint of Right hip Fx (27 Nov 2023 14:55)      Subjective/HPI     PAST MEDICAL & SURGICAL HISTORY:  Hypertension    Diabetes    TIA (transient ischemic attack)    Hip fracture    HLD (hyperlipidemia)    No significant past surgical history    S/P ORIF (open reduction internal fixation) fracture  right hip          Medication list         MEDICATIONS  (STANDING):  apixaban 2.5 milliGRAM(s) Oral every 12 hours  aspirin enteric coated 81 milliGRAM(s) Oral daily  atorvastatin 40 milliGRAM(s) Oral at bedtime  dextrose 5%. 1000 milliLiter(s) (100 mL/Hr) IV Continuous <Continuous>  dextrose 5%. 1000 milliLiter(s) (50 mL/Hr) IV Continuous <Continuous>  dextrose 50% Injectable 25 Gram(s) IV Push once  dextrose 50% Injectable 25 Gram(s) IV Push once  dextrose 50% Injectable 12.5 Gram(s) IV Push once  glucagon  Injectable 1 milliGRAM(s) IntraMuscular once  insulin lispro (ADMELOG) corrective regimen sliding scale   SubCutaneous three times a day before meals  insulin lispro (ADMELOG) corrective regimen sliding scale   SubCutaneous at bedtime  lactated ringers. 1000 milliLiter(s) (75 mL/Hr) IV Continuous <Continuous>  lidocaine   4% Patch 1 Patch Transdermal every 24 hours  metoprolol succinate  milliGRAM(s) Oral daily  metoprolol succinate  milliGRAM(s) Oral once  polyethylene glycol 3350 17 Gram(s) Oral daily  senna 2 Tablet(s) Oral at bedtime    MEDICATIONS  (PRN):  aluminum hydroxide/magnesium hydroxide/simethicone Suspension 30 milliLiter(s) Oral every 6 hours PRN Dyspepsia  benzonatate 100 milliGRAM(s) Oral three times a day PRN Cough  dextrose Oral Gel 15 Gram(s) Oral once PRN Blood Glucose LESS THAN 70 milliGRAM(s)/deciliter  melatonin 3 milliGRAM(s) Oral at bedtime PRN Insomnia  ondansetron Injectable 4 milliGRAM(s) IV Push every 6 hours PRN Nausea and/or Vomiting  oxyCODONE    IR 2.5 milliGRAM(s) Oral every 4 hours PRN Moderate Pain (4 - 6)  oxyCODONE    IR 5 milliGRAM(s) Oral every 4 hours PRN Severe Pain (7 - 10)  simethicone 80 milliGRAM(s) Chew every 8 hours PRN Gas         Vitals log        ICU Vital Signs Last 24 Hrs  T(C): 36.8 (27 Nov 2023 21:41), Max: 37.1 (27 Nov 2023 11:25)  T(F): 98.3 (27 Nov 2023 21:41), Max: 98.7 (27 Nov 2023 11:25)  HR: 70 (27 Nov 2023 21:41) (67 - 70)  BP: 163/64 (27 Nov 2023 21:41) (131/72 - 163/64)  BP(mean): --  ABP: --  ABP(mean): --  RR: 18 (27 Nov 2023 21:41) (17 - 18)  SpO2: 93% (27 Nov 2023 21:41) (93% - 94%)    O2 Parameters below as of 27 Nov 2023 21:41  Patient On (Oxygen Delivery Method): room air                 Input and Output:  I&O's Detail    26 Nov 2023 07:01  -  27 Nov 2023 07:00  --------------------------------------------------------  IN:  Total IN: 0 mL    OUT:    Indwelling Catheter - Urethral (mL): 1450 mL  Total OUT: 1450 mL    Total NET: -1450 mL      27 Nov 2023 07:01  -  28 Nov 2023 05:03  --------------------------------------------------------  IN:  Total IN: 0 mL    OUT:    Intermittent Catheterization - Urethral (mL): 1150 mL    Voided (mL): 50 mL  Total OUT: 1200 mL    Total NET: -1200 mL          Lab Data                        8.6    13.21 )-----------( 162      ( 27 Nov 2023 08:27 )             26.8     11-27    137  |  104  |  37<H>  ----------------------------<  176<H>  4.2   |  26  |  2.00<H>    Ca    8.2<L>      27 Nov 2023 08:27  Phos  2.8     11-27  Mg     1.9     11-27    TPro  5.9<L>  /  Alb  2.2<L>  /  TBili  0.5  /  DBili  x   /  AST  48<H>  /  ALT  21  /  AlkPhos  80  11-27            Review of Systems	      Objective     Physical Examination  heart s1s2  lung dc BS  head nc        Pertinent Lab findings & Imaging      Justin:  NO   Adequate UO     I&O's Detail    26 Nov 2023 07:01  -  27 Nov 2023 07:00  --------------------------------------------------------  IN:  Total IN: 0 mL    OUT:    Indwelling Catheter - Urethral (mL): 1450 mL  Total OUT: 1450 mL    Total NET: -1450 mL      27 Nov 2023 07:01  -  28 Nov 2023 05:03  --------------------------------------------------------  IN:  Total IN: 0 mL    OUT:    Intermittent Catheterization - Urethral (mL): 1150 mL    Voided (mL): 50 mL  Total OUT: 1200 mL    Total NET: -1200 mL               Discussed with:     Cultures:	        Radiology

## 2023-11-28 NOTE — PROGRESS NOTE ADULT - ASSESSMENT
89 yo Female with PMHx of HTN, T2DM, TIA 9/2016, right hip fracture, S/P ORIF right hip 3/5/16 presents to ED w/ a c/o of R hip pain s/p fall. s/p IMN      s/p fall now s/p IMN   -on eliquis per ortho   - Hb down trending, today 7.9      -echo normal ef and no sig valve disease  -there is no evidence of acute ischemia.  -there is no evidence for meaningful  volume overload.  -had tachycardia prior to or procedure on 11/24   -no cardiovascular complications postop      -BP liable  -  continue home bb   - add norvasc for Blood Pressure control         -DVT prophylaxis  -monitor electrolytes, keep k>4, Mg>2      King Child, MS ANP, AGAP  Nurse Practitioner- Cardiology   Spectra #[id number] /[phone]

## 2023-11-28 NOTE — PROGRESS NOTE ADULT - SUBJECTIVE AND OBJECTIVE BOX
[facility] -- Tigist Mari Pannella, Patel, Savella, Goodger: Office # [phone]    Follow Up:  SVT       Subjective/Observations: Patient seen and examined. Patient alert awake, Denies chest pain palpitation dizziness, denies difficulty breathing , no orthopnea or PND noted.  Patient compliant of lower abdomen and rectal pain.  Also burning on urination. Tolerating room air      REVIEW OF SYSTEMS: All other review of systems are negative unless indicated above    PAST MEDICAL & SURGICAL HISTORY:  Hypertension      Diabetes      TIA (transient ischemic attack)      Hip fracture      HLD (hyperlipidemia)      S/P ORIF (open reduction internal fixation) fracture  right hip          MEDICATIONS  (STANDING):  apixaban 2.5 milliGRAM(s) Oral every 12 hours  aspirin enteric coated 81 milliGRAM(s) Oral daily  atorvastatin 40 milliGRAM(s) Oral at bedtime  cefuroxime   Tablet 250 milliGRAM(s) Oral every 12 hours  dextrose 5%. 1000 milliLiter(s) (50 mL/Hr) IV Continuous <Continuous>  dextrose 5%. 1000 milliLiter(s) (100 mL/Hr) IV Continuous <Continuous>  dextrose 50% Injectable 12.5 Gram(s) IV Push once  dextrose 50% Injectable 25 Gram(s) IV Push once  dextrose 50% Injectable 25 Gram(s) IV Push once  glucagon  Injectable 1 milliGRAM(s) IntraMuscular once  insulin lispro (ADMELOG) corrective regimen sliding scale   SubCutaneous three times a day before meals  insulin lispro (ADMELOG) corrective regimen sliding scale   SubCutaneous at bedtime  lidocaine   4% Patch 1 Patch Transdermal every 24 hours  metoprolol succinate  milliGRAM(s) Oral daily  metoprolol succinate  milliGRAM(s) Oral once  polyethylene glycol 3350 17 Gram(s) Oral daily  senna 2 Tablet(s) Oral at bedtime  tamsulosin 0.4 milliGRAM(s) Oral at bedtime    MEDICATIONS  (PRN):  aluminum hydroxide/magnesium hydroxide/simethicone Suspension 30 milliLiter(s) Oral every 6 hours PRN Dyspepsia  benzonatate 100 milliGRAM(s) Oral three times a day PRN Cough  dextrose Oral Gel 15 Gram(s) Oral once PRN Blood Glucose LESS THAN 70 milliGRAM(s)/deciliter  melatonin 3 milliGRAM(s) Oral at bedtime PRN Insomnia  ondansetron Injectable 4 milliGRAM(s) IV Push every 6 hours PRN Nausea and/or Vomiting  oxyCODONE    IR 2.5 milliGRAM(s) Oral every 4 hours PRN Moderate Pain (4 - 6)  oxyCODONE    IR 5 milliGRAM(s) Oral every 4 hours PRN Severe Pain (7 - 10)  simethicone 80 milliGRAM(s) Chew every 8 hours PRN Gas    Allergies    No Known Allergies    Intolerances      Vital Signs Last 24 Hrs  T(C): 36.7 (28 Nov 2023 12:42), Max: 37 (28 Nov 2023 05:24)  T(F): 98 (28 Nov 2023 12:42), Max: 98.6 (28 Nov 2023 05:24)  HR: 65 (28 Nov 2023 12:42) (65 - 87)  BP: 101/44 (28 Nov 2023 12:42) (101/44 - 181/74)  BP(mean): --  RR: 18 (28 Nov 2023 12:42) (18 - 18)  SpO2: 92% (28 Nov 2023 12:42) (92% - 93%)    Parameters below as of 28 Nov 2023 12:42  Patient On (Oxygen Delivery Method): room air      I&O's Summary    27 Nov 2023 07:01  -  28 Nov 2023 07:00  --------------------------------------------------------  IN: 810 mL / OUT: 1400 mL / NET: -590 mL          TELE: not on tele   PHYSICAL EXAM:  Constitutional: NAD, awake and alert,   HEENT: Moist Mucous Membranes, Anicteric  Pulmonary: Non-labored, breath sounds are clear bilaterally, No wheezing, rales or rhonchi  Cardiovascular: Regular, S1 and S2, No murmurs, No rubs, gallops or clicks  Gastrointestinal:  soft, nontender, nondistended   Lymph: No peripheral edema. No lymphadenopathy.   Skin: No visible rashes or ulcers, rt hip dsg intact  Psych:  Mood & affect appropriate      LABS: All Labs Reviewed:                        7.9    10.13 )-----------( 148      ( 28 Nov 2023 11:15 )             23.3                         8.6    13.21 )-----------( 162      ( 27 Nov 2023 08:27 )             26.8                         8.5    14.10 )-----------( 136      ( 26 Nov 2023 10:33 )             26.2     28 Nov 2023 11:15    140    |  105    |  38     ----------------------------<  182    4.0     |  28     |  1.70   27 Nov 2023 08:27    137    |  104    |  37     ----------------------------<  176    4.2     |  26     |  2.00   26 Nov 2023 10:33    139    |  105    |  37     ----------------------------<  201    4.0     |  27     |  1.90     Ca    8.3        28 Nov 2023 11:15  Ca    8.2        27 Nov 2023 08:27  Ca    8.2        26 Nov 2023 10:33  Phos  3.0       28 Nov 2023 11:15  Phos  2.8       27 Nov 2023 08:27  Phos  2.8       26 Nov 2023 10:33  Mg     1.9       27 Nov 2023 08:27  Mg     1.7       26 Nov 2023 10:33    TPro  5.8    /  Alb  2.1    /  TBili  0.5    /  DBili  x      /  AST  48     /  ALT  30     /  AlkPhos  89     28 Nov 2023 11:15  TPro  5.9    /  Alb  2.2    /  TBili  0.5    /  DBili  x      /  AST  48     /  ALT  21     /  AlkPhos  80     27 Nov 2023 08:27  TPro  5.5    /  Alb  2.2    /  TBili  0.5    /  DBili  x      /  AST  34     /  ALT  15     /  AlkPhos  65     26 Nov 2023 10:33   LIVER FUNCTIONS - ( 28 Nov 2023 11:15 )  Alb: 2.1 g/dL / Pro: 5.8 g/dL / ALK PHOS: 89 U/L / ALT: 30 U/L / AST: 48 U/L / GGT: x           Troponin I, High Sensitivity Result: 26.0 ng/L (11-25-23 @ 05:07)  Troponin I, High Sensitivity Result: 24.1 ng/L (11-25-23 @ 00:35)  Troponin I, High Sensitivity Result: 24.8 ng/L (11-24-23 @ 20:50)  D-Dimer Assay, Quantitative: 8030 ng/mL DDU (11-24-23 @ 22:45)    12 Lead ECG:   Ventricular Rate 60 BPM    Atrial Rate 60 BPM    P-R Interval 190 ms    QRS Duration 86 ms    Q-T Interval 454 ms    QTC Calculation(Bazett) 454 ms    P Axis -40 degrees    R Axis 4 degrees    T Axis 38 degrees    Diagnosis Line Unusual P axis, possible ectopic atrial rhythm  Nonspecific T wave abnormality  Abnormal ECG  When compared with ECG of 24-NOV-2023 16:25, (Unconfirmed)  Ectopic atrial rhythm has replaced Junctional rhythm  Vent. rate has decreased BY  43 BPM  Nonspecific T wave abnormality, worse in Anterior leads  Nonspecific T wave abnormality no longer evident in Lateral leads  Confirmed by Chuy Escoto MD ([id number]) on 11/25/2023 5:20:22 PM (11-25-23 @ 12:06)      TRANSTHORACIC ECHOCARDIOGRAM REPORT  ________________________________________________________________________________                                      _______       Pt. Name:       NELDA VERDIN Study Date:    11/24/2023  MRN:            [id number]       YOB: 1930  Accession #:    [id number]      Age:           89 years  Account#:       [id number]     Gender:        F  Heart Rate:                    Height:        62.99 in (160.00 cm)  Rhythm:                        Weight:   114.64 lb (52.00 kg)  Blood Pressure: 158/98 mmHg    BSA/BMI:       1.53 m² / 20.31 kg/m²  ________________________________________________________________________________________  Referring Physician:    [id number] Addy Mueller  Interpreting Physician: Chuy Escoto  Primary Sonographer:    Juanito Bahena    CPT:               ECHO TTE WO CON COMP W DOPP - 84745.m  Indication(s):     Abnormal electrocardiogram ECG/EKG - R94.31  Procedure:         Transthoracic echocardiogram with 2-D, M-modeand complete                     spectral and color flow Doppler.  Ordering Location: [facility]  Study Information: Image quality for this study is technically difficult.    _______________________________________________________________________________________     CONCLUSIONS:      1. Technically difficult image quality.   2. The patient is tachycardic throughout this examination.   3. Left ventricular systolic function is normal with an ejection fraction of 64 % by Swanson's method of disks.   4. The right ventricle is not well visualized.   5. Structurally normal mitral valve with normal leaflet excursion.   6. There is mild calcification of the mitral valve annulus.   7. The left atrium is normal.   8. Trace mitral regurgitation.   9. Aortic valve was not well visualized.  10. Moderate calcification of the aortic valve leaflets.  11. The inferior vena cava is normal in size measuring 1.30 cm in diameter, (normal <2.1cm) with normal inspiratory collapse (normal >50%) consistent with normal right atrial pressure (~3, range 0-5mmHg).    ________________________________________________________________________________________  FINDINGS:     Left Ventricle:  Left ventricular systolic function is normal with a calculated ejection fraction of 64 % by the Swanson's biplane method of disks. There is normal left ventricular diastolic function.     Right Ventricle:  The right ventricle is not well visualized. Tricuspid annular plane systolic excursion (TAPSE) is 1.7 cm (normal >=1.7 cm).     Left Atrium:  The left atrium is normal with an indexed volume of 21.82 ml/m².     Right Atrium:  The right atrium was not well visualized.     Aortic Valve:  The aortic valve was not well visualized. The aortic valve anatomy cannot be determined. There is moderate calcification of the aortic valve leaflets.     Mitral Valve:  Structurally normal mitral valve with normal leaflet excursion. There is mild calcification of the mitral valve annulus. There is trace mitral regurgitation.     Tricuspid Valve:  The tricuspid valve was not well visualized.     Pulmonic Valve:  The pulmonic valve was not well visualized.     Aorta:  The aortic root at the sinuses of Valsalva is normal in size, measuring 3.00 cm (indexed 1.97 cm/m²).     Systemic Veins:  The inferior vena cava is normal in size measuring 1.30 cm in diameter, (normal <2.1cm) with normal inspiratory collapse (normal >50%) consistent with normal right atrial pressure (~3, range 0-5mmHg).  ____________________________________________________________________  QUANTITATIVE DATA:  Left Ventricle Measurements: (Indexed to BSA)     IVSd (2D):   1.2 cm  LVPWd (2D):  1.0 cm  LVIDd (2D):  3.5 cm  LVIDs (2D):  2.5 cm  LV Mass:     121 g  79.2 g/m²  LV Vol d, MOD A2C: 33.7 ml 22.08 ml/m²  LV Vol d, MOD A4C: 18.8 ml 12.32 ml/m²  LV Vol d, MOD BP:  25.5 ml 16.69 ml/m²  LV Vol s, MOD A2C: 11.5 ml 7.53 ml/m²  LV Vol s, MOD A4C: 7.7 ml  5.06 ml/m²  LV Vol s, MOD BP:  9.2 ml  6.02 ml/m²  LVOT SV MOD BP:    16.3 ml  LV EF% MOD BP:     64 %     MV E Vmax:    1.33 m/s  MV A Vmax:    0.50 m/s  MV E/A:       2.67  e' lateral:   21.20 cm/s  e' medial:    12.80 cm/s  E/e' lateral: 6.27  E/e' medial:  10.39  E/e' Average: 7.82  MV DT:        143 msec    Aorta Measurements: (normal range) (Indexed to BSA)     Sinuses of Valsalva: 3.00 cm (2.7 - 3.3 cm)       Left Atrium Measurements: (Indexed to BSA)  LA Diam 2D: 3.40 cm    Right Ventricle Measurements:     TAPSE: 1.7 cm       LVOT / RVOT/ Qp/Qs Data: (Indexed to BSA)  LVOT Diameter: 1.80 cm    Mitral Valve Measurements:     MV E Vmax: 1.3 m/s  MV A Vmax: 0.5 m/s  MV E/A:    2.7       Tricuspid Valve Measurements:     RA Pressure: 3 mmHg    ________________________________________________________________________________________  Electronicallysigned on 11/25/2023 at 6:17:31 AM by Chuy Escoto         *** Final ***

## 2023-11-28 NOTE — PROGRESS NOTE ADULT - SUBJECTIVE AND OBJECTIVE BOX
Patient is a 89y old  Female who presents with a chief complaint of Right hip Fx (25 Nov 2023 13:23)       HPI:  89 yo female w/ pmh of HTN, HLD, T2DM, TIA, CKD previous right hip fx w/ surgical pinning s/p removal presents following a mechanical fall in her home. Pt states she was standing beside her chair looking out the window when she lost her balance and fell onto her right side. Denies head trauma, LOC, dizziness, lightheadedness. States that she crawled to a phone to call an ambulance. This all occurred at 4am this morning. Initially on arrival to the ED she thought she ate breakfast, but now realizes that she never ate today. Pt states that she had a previous fx to the right hip which had a pin that "fell out" and she had surgically removed. Pt states that even following pain medication in the ED, the pain is an 8-9/10 and radiates to her groin.  Of note, the patient states that her urine this morning was malodorous similar to a UTI she has had in the past. At that time, she too did not have sxs of a urinary tract infection. Denies fever, chills, sob, cp, palpitations, abd pain, n/v/d/c, dysuria, urinary frequency, urinary urgency, hematuria.  Has not seen urologist in many years, but reportedly her renal function has been stable.  Had galeana placed for retention.     No N/V/SOB    PAST MEDICAL & SURGICAL HISTORY:  Hypertension      Diabetes      TIA (transient ischemic attack)      Hip fracture      HLD (hyperlipidemia)      S/P ORIF (open reduction internal fixation) fracture  right hip           FAMILY HISTORY:  FH: asthma (Father)    [place]    Social History:Non smoker    MEDICATIONS  (STANDING):  acetaminophen   IVPB .. 1000 milliGRAM(s) IV Intermittent once  acetaminophen   IVPB .. 1000 milliGRAM(s) IV Intermittent once  aspirin enteric coated 81 milliGRAM(s) Oral daily  atorvastatin 40 milliGRAM(s) Oral at bedtime  cefTRIAXone   IVPB 1000 milliGRAM(s) IV Intermittent every 24 hours  dextrose 5%. 1000 milliLiter(s) (50 mL/Hr) IV Continuous <Continuous>  dextrose 5%. 1000 milliLiter(s) (100 mL/Hr) IV Continuous <Continuous>  dextrose 50% Injectable 12.5 Gram(s) IV Push once  dextrose 50% Injectable 25 Gram(s) IV Push once  dextrose 50% Injectable 25 Gram(s) IV Push once  glucagon  Injectable 1 milliGRAM(s) IntraMuscular once  insulin lispro (ADMELOG) corrective regimen sliding scale   SubCutaneous three times a day before meals  insulin lispro (ADMELOG) corrective regimen sliding scale   SubCutaneous at bedtime  metoprolol succinate  milliGRAM(s) Oral once  metoprolol succinate  milliGRAM(s) Oral daily  polyethylene glycol 3350 17 Gram(s) Oral daily  senna 2 Tablet(s) Oral at bedtime    MEDICATIONS  (PRN):  aluminum hydroxide/magnesium hydroxide/simethicone Suspension 30 milliLiter(s) Oral every 6 hours PRN Dyspepsia  dextrose Oral Gel 15 Gram(s) Oral once PRN Blood Glucose LESS THAN 70 milliGRAM(s)/deciliter  melatonin 3 milliGRAM(s) Oral at bedtime PRN Insomnia  ondansetron Injectable 4 milliGRAM(s) IV Push every 6 hours PRN Nausea and/or Vomiting  oxyCODONE    IR 5 milliGRAM(s) Oral every 4 hours PRN Severe Pain (7 - 10)  oxyCODONE    IR 2.5 milliGRAM(s) Oral every 4 hours PRN Moderate Pain (4 - 6)  simethicone 80 milliGRAM(s) Chew every 8 hours PRN Gas   Meds reviewed    Allergies    No Known Allergies    Intolerances         REVIEW OF SYSTEMS:    Review of Systems:   as above    ICU Vital Signs Last 24 Hrs  T(C): 36.7 (28 Nov 2023 12:42), Max: 37 (28 Nov 2023 05:24)  T(F): 98 (28 Nov 2023 12:42), Max: 98.6 (28 Nov 2023 05:24)  HR: 65 (28 Nov 2023 12:42) (65 - 87)  BP: 101/44 (28 Nov 2023 12:42) (101/44 - 181/74)  BP(mean): --  ABP: --  ABP(mean): --  RR: 18 (28 Nov 2023 12:42) (18 - 18)  SpO2: 92% (28 Nov 2023 12:42) (92% - 93%)    O2 Parameters below as of 28 Nov 2023 12:42  Patient On (Oxygen Delivery Method): room air        Daily     Daily     PHYSICAL EXAM:    GENERAL: NAD  HEAD:  Atraumatic, Normocephalic  EYES: EOMI, conjunctiva and sclera clear  ENMT: No Drainage from nares, No drainage from ears  NERVOUS SYSTEM:  Awake and Alert  CHEST/LUNG: Clear to percussion bilaterally  EXTREMITIES:  No Edema  SKIN: No rashes No obvious ecchymosis      LABS:                                               7.9    10.13 )-----------( 148      ( 28 Nov 2023 11:15 )             23.3     11-28    140  |  105  |  38<H>  ----------------------------<  182<H>  4.0   |  28  |  1.70<H>    Ca    8.3<L>      28 Nov 2023 11:15  Phos  3.0     11-28  Mg     1.9     11-27    TPro  5.8<L>  /  Alb  2.1<L>  /  TBili  0.5  /  DBili  x   /  AST  48<H>  /  ALT  30  /  AlkPhos  89  11-28      Urinalysis Basic - ( 28 Nov 2023 11:15 )    Color: x / Appearance: x / SG: x / pH: x  Gluc: 182 mg/dL / Ketone: x  / Bili: x / Urobili: x   Blood: x / Protein: x / Nitrite: x   Leuk Esterase: x / RBC: x / WBC x   Sq Epi: x / Non Sq Epi: x / Bacteria: x

## 2023-11-28 NOTE — PROGRESS NOTE ADULT - SUBJECTIVE AND OBJECTIVE BOX
Patient seen and examined at bedside. Patient reports pain well controlled on medications. No acute events overnight. Pt denies fevers, chills, new onset numbness, weakness or tingling in the extremities.    LABS:                        8.6    13.21 )-----------( 162      ( 27 Nov 2023 08:27 )             26.8     11-27    137  |  104  |  37<H>  ----------------------------<  176<H>  4.2   |  26  |  2.00<H>    Ca    8.2<L>      27 Nov 2023 08:27  Phos  2.8     11-27  Mg     1.9     11-27    TPro  5.9<L>  /  Alb  2.2<L>  /  TBili  0.5  /  DBili  x   /  AST  48<H>  /  ALT  21  /  AlkPhos  80  11-27      Urinalysis Basic - ( 27 Nov 2023 08:27 )    Color: x / Appearance: x / SG: x / pH: x  Gluc: 176 mg/dL / Ketone: x  / Bili: x / Urobili: x   Blood: x / Protein: x / Nitrite: x   Leuk Esterase: x / RBC: x / WBC x   Sq Epi: x / Non Sq Epi: x / Bacteria: x        VITAL SIGNS:  T(C): 37 (11-28-23 @ 05:24), Max: 37.1 (11-27-23 @ 11:25)  HR: 87 (11-28-23 @ 05:24) (67 - 87)  BP: 181/74 (11-28-23 @ 05:24) (131/72 - 181/74)  RR: 18 (11-28-23 @ 05:24) (17 - 18)  SpO2: 93% (11-28-23 @ 05:24) (93% - 94%)    Physical Exam:  General: NAD, resting comfortably in bed  RLE:   Dressing in place C/D/I  SCD in place bilaterally  No calf tenderness   Motor: + EHL/FHL/TA/GSC  +SILT: SPN/DPN/Yury/Saph/Tib  DP/PT 2+    A/P:  89y F s/p R IMN POD3    -PT/OT: Rec LEONEL  -WBAT  -Pain Control  -DVT ppx: Eliquis 2.5 mg BID  -Continue Rocephin per medicine  -FU AM Labs  -Rest, ice, compress and elevate the extremity as we needed  -Incentive Spirometry  -Medical management appreciated  -Dispo pending PT eval  -Will discuss plan with Dr. De Luna and will advise changes to plan as needed

## 2023-11-29 VITALS
TEMPERATURE: 98 F | WEIGHT: 164.69 LBS | HEART RATE: 72 BPM | OXYGEN SATURATION: 91 % | DIASTOLIC BLOOD PRESSURE: 588 MMHG | RESPIRATION RATE: 18 BRPM | SYSTOLIC BLOOD PRESSURE: 145 MMHG

## 2023-11-29 LAB
ALBUMIN SERPL ELPH-MCNC: 2.3 G/DL — LOW (ref 3.3–5)
ALBUMIN SERPL ELPH-MCNC: 2.3 G/DL — LOW (ref 3.3–5)
ALP SERPL-CCNC: 91 U/L — SIGNIFICANT CHANGE UP (ref 40–120)
ALP SERPL-CCNC: 91 U/L — SIGNIFICANT CHANGE UP (ref 40–120)
ALT FLD-CCNC: 36 U/L — SIGNIFICANT CHANGE UP (ref 12–78)
ALT FLD-CCNC: 36 U/L — SIGNIFICANT CHANGE UP (ref 12–78)
ANION GAP SERPL CALC-SCNC: 8 MMOL/L — SIGNIFICANT CHANGE UP (ref 5–17)
ANION GAP SERPL CALC-SCNC: 8 MMOL/L — SIGNIFICANT CHANGE UP (ref 5–17)
AST SERPL-CCNC: 50 U/L — HIGH (ref 15–37)
AST SERPL-CCNC: 50 U/L — HIGH (ref 15–37)
BASOPHILS # BLD AUTO: 0.03 K/UL — SIGNIFICANT CHANGE UP (ref 0–0.2)
BASOPHILS # BLD AUTO: 0.03 K/UL — SIGNIFICANT CHANGE UP (ref 0–0.2)
BASOPHILS NFR BLD AUTO: 0.4 % — SIGNIFICANT CHANGE UP (ref 0–2)
BASOPHILS NFR BLD AUTO: 0.4 % — SIGNIFICANT CHANGE UP (ref 0–2)
BILIRUB SERPL-MCNC: 0.7 MG/DL — SIGNIFICANT CHANGE UP (ref 0.2–1.2)
BILIRUB SERPL-MCNC: 0.7 MG/DL — SIGNIFICANT CHANGE UP (ref 0.2–1.2)
BUN SERPL-MCNC: 39 MG/DL — HIGH (ref 7–23)
BUN SERPL-MCNC: 39 MG/DL — HIGH (ref 7–23)
CALCIUM SERPL-MCNC: 8.9 MG/DL — SIGNIFICANT CHANGE UP (ref 8.5–10.1)
CALCIUM SERPL-MCNC: 8.9 MG/DL — SIGNIFICANT CHANGE UP (ref 8.5–10.1)
CHLORIDE SERPL-SCNC: 105 MMOL/L — SIGNIFICANT CHANGE UP (ref 96–108)
CHLORIDE SERPL-SCNC: 105 MMOL/L — SIGNIFICANT CHANGE UP (ref 96–108)
CO2 SERPL-SCNC: 28 MMOL/L — SIGNIFICANT CHANGE UP (ref 22–31)
CO2 SERPL-SCNC: 28 MMOL/L — SIGNIFICANT CHANGE UP (ref 22–31)
CREAT SERPL-MCNC: 1.6 MG/DL — HIGH (ref 0.5–1.3)
CREAT SERPL-MCNC: 1.6 MG/DL — HIGH (ref 0.5–1.3)
EGFR: 30 ML/MIN/1.73M2 — LOW
EGFR: 30 ML/MIN/1.73M2 — LOW
EOSINOPHIL # BLD AUTO: 0.28 K/UL — SIGNIFICANT CHANGE UP (ref 0–0.5)
EOSINOPHIL # BLD AUTO: 0.28 K/UL — SIGNIFICANT CHANGE UP (ref 0–0.5)
EOSINOPHIL NFR BLD AUTO: 3.4 % — SIGNIFICANT CHANGE UP (ref 0–6)
EOSINOPHIL NFR BLD AUTO: 3.4 % — SIGNIFICANT CHANGE UP (ref 0–6)
GLUCOSE SERPL-MCNC: 198 MG/DL — HIGH (ref 70–99)
GLUCOSE SERPL-MCNC: 198 MG/DL — HIGH (ref 70–99)
HCT VFR BLD CALC: 30.1 % — LOW (ref 34.5–45)
HCT VFR BLD CALC: 30.1 % — LOW (ref 34.5–45)
HGB BLD-MCNC: 9.7 G/DL — LOW (ref 11.5–15.5)
HGB BLD-MCNC: 9.7 G/DL — LOW (ref 11.5–15.5)
IMM GRANULOCYTES NFR BLD AUTO: 1.7 % — HIGH (ref 0–0.9)
IMM GRANULOCYTES NFR BLD AUTO: 1.7 % — HIGH (ref 0–0.9)
LYMPHOCYTES # BLD AUTO: 1.16 K/UL — SIGNIFICANT CHANGE UP (ref 1–3.3)
LYMPHOCYTES # BLD AUTO: 1.16 K/UL — SIGNIFICANT CHANGE UP (ref 1–3.3)
LYMPHOCYTES # BLD AUTO: 14.1 % — SIGNIFICANT CHANGE UP (ref 13–44)
LYMPHOCYTES # BLD AUTO: 14.1 % — SIGNIFICANT CHANGE UP (ref 13–44)
MCHC RBC-ENTMCNC: 27.2 PG — SIGNIFICANT CHANGE UP (ref 27–34)
MCHC RBC-ENTMCNC: 27.2 PG — SIGNIFICANT CHANGE UP (ref 27–34)
MCHC RBC-ENTMCNC: 32.2 GM/DL — SIGNIFICANT CHANGE UP (ref 32–36)
MCHC RBC-ENTMCNC: 32.2 GM/DL — SIGNIFICANT CHANGE UP (ref 32–36)
MCV RBC AUTO: 84.6 FL — SIGNIFICANT CHANGE UP (ref 80–100)
MCV RBC AUTO: 84.6 FL — SIGNIFICANT CHANGE UP (ref 80–100)
MONOCYTES # BLD AUTO: 1.02 K/UL — HIGH (ref 0–0.9)
MONOCYTES # BLD AUTO: 1.02 K/UL — HIGH (ref 0–0.9)
MONOCYTES NFR BLD AUTO: 12.4 % — SIGNIFICANT CHANGE UP (ref 2–14)
MONOCYTES NFR BLD AUTO: 12.4 % — SIGNIFICANT CHANGE UP (ref 2–14)
NEUTROPHILS # BLD AUTO: 5.61 K/UL — SIGNIFICANT CHANGE UP (ref 1.8–7.4)
NEUTROPHILS # BLD AUTO: 5.61 K/UL — SIGNIFICANT CHANGE UP (ref 1.8–7.4)
NEUTROPHILS NFR BLD AUTO: 68 % — SIGNIFICANT CHANGE UP (ref 43–77)
NEUTROPHILS NFR BLD AUTO: 68 % — SIGNIFICANT CHANGE UP (ref 43–77)
NRBC # BLD: 0 /100 WBCS — SIGNIFICANT CHANGE UP (ref 0–0)
NRBC # BLD: 0 /100 WBCS — SIGNIFICANT CHANGE UP (ref 0–0)
PLATELET # BLD AUTO: 175 K/UL — SIGNIFICANT CHANGE UP (ref 150–400)
PLATELET # BLD AUTO: 175 K/UL — SIGNIFICANT CHANGE UP (ref 150–400)
POTASSIUM SERPL-MCNC: 3.9 MMOL/L — SIGNIFICANT CHANGE UP (ref 3.5–5.3)
POTASSIUM SERPL-MCNC: 3.9 MMOL/L — SIGNIFICANT CHANGE UP (ref 3.5–5.3)
POTASSIUM SERPL-SCNC: 3.9 MMOL/L — SIGNIFICANT CHANGE UP (ref 3.5–5.3)
POTASSIUM SERPL-SCNC: 3.9 MMOL/L — SIGNIFICANT CHANGE UP (ref 3.5–5.3)
PROT SERPL-MCNC: 6.1 G/DL — SIGNIFICANT CHANGE UP (ref 6–8.3)
PROT SERPL-MCNC: 6.1 G/DL — SIGNIFICANT CHANGE UP (ref 6–8.3)
RBC # BLD: 3.56 M/UL — LOW (ref 3.8–5.2)
RBC # BLD: 3.56 M/UL — LOW (ref 3.8–5.2)
RBC # FLD: 14.6 % — HIGH (ref 10.3–14.5)
RBC # FLD: 14.6 % — HIGH (ref 10.3–14.5)
SODIUM SERPL-SCNC: 141 MMOL/L — SIGNIFICANT CHANGE UP (ref 135–145)
SODIUM SERPL-SCNC: 141 MMOL/L — SIGNIFICANT CHANGE UP (ref 135–145)
WBC # BLD: 8.24 K/UL — SIGNIFICANT CHANGE UP (ref 3.8–10.5)
WBC # BLD: 8.24 K/UL — SIGNIFICANT CHANGE UP (ref 3.8–10.5)
WBC # FLD AUTO: 8.24 K/UL — SIGNIFICANT CHANGE UP (ref 3.8–10.5)
WBC # FLD AUTO: 8.24 K/UL — SIGNIFICANT CHANGE UP (ref 3.8–10.5)

## 2023-11-29 PROCEDURE — 93970 EXTREMITY STUDY: CPT | Mod: 26

## 2023-11-29 PROCEDURE — 99232 SBSQ HOSP IP/OBS MODERATE 35: CPT

## 2023-11-29 RX ORDER — ACETAMINOPHEN 500 MG
1000 TABLET ORAL ONCE
Refills: 0 | Status: COMPLETED | OUTPATIENT
Start: 2023-11-29 | End: 2023-11-29

## 2023-11-29 RX ORDER — ACETAMINOPHEN 500 MG
650 TABLET ORAL EVERY 6 HOURS
Refills: 0 | Status: DISCONTINUED | OUTPATIENT
Start: 2023-11-29 | End: 2023-11-29

## 2023-11-29 RX ORDER — OXYBUTYNIN CHLORIDE 5 MG
5 TABLET ORAL
Refills: 0 | Status: DISCONTINUED | OUTPATIENT
Start: 2023-11-29 | End: 2023-11-29

## 2023-11-29 RX ADMIN — Medication 100 MILLIGRAM(S): at 05:54

## 2023-11-29 RX ADMIN — APIXABAN 2.5 MILLIGRAM(S): 2.5 TABLET, FILM COATED ORAL at 05:54

## 2023-11-29 RX ADMIN — Medication 650 MILLIGRAM(S): at 12:15

## 2023-11-29 RX ADMIN — Medication 1: at 08:15

## 2023-11-29 RX ADMIN — Medication 1000 MILLIGRAM(S): at 05:15

## 2023-11-29 RX ADMIN — POLYETHYLENE GLYCOL 3350 17 GRAM(S): 17 POWDER, FOR SOLUTION ORAL at 12:15

## 2023-11-29 RX ADMIN — Medication 2: at 12:14

## 2023-11-29 RX ADMIN — Medication 5 MILLIGRAM(S): at 12:15

## 2023-11-29 RX ADMIN — Medication 400 MILLIGRAM(S): at 05:00

## 2023-11-29 NOTE — PROGRESS NOTE ADULT - PROBLEM SELECTOR PLAN 1
H/o R hip fx w/ surgical pinning now removed. Mechanical fall prior to admission with new R hip fx  - s/p R hip IMN today with Ortho  - Analgesia PRN  - SCDs for now, will start pharmacologic DVT ppx on POD#1 per Ortho   - Continue IV Rocephin post-op per Ortho   - Ortho following, recs appreciated
H/o R hip fx w/ surgical pinning now removed. Mechanical fall prior to admission with new R hip fx  - s/p R hip IMN today with Ortho  - Analgesia PRN  - s/p SCDs  - started Eliquis 2.5 BID POD #1  - Continue IV Rocephin post-op   - Ortho following, recs appreciated  - PT/OT evaluation -- LEONEL
H/o R hip fx w/ surgical pinning now removed. Mechanical fall prior to admission with new R hip fx  - s/p R hip IMN today with Ortho- POD#4  - Analgesia PRN  - s/p SCDs  - continue Eliquis 2.5 BID   - Lidocaine patch PRN for R sided groin/waist pain.    - Ortho following, recs appreciated  - PT/OT evaluation -- LEOENL
H/o R hip fx w/ surgical pinning now removed. Mechanical fall prior to admission with new R hip fx  - s/p R hip IMN today with Ortho  - Analgesia PRN  - s/p SCDs  - continue Eliquis 2.5 BID POD #2  - Lidocaine patch PRN for R sided groin/waist pain.   - Continue IV Rocephin post-op, to switch to Augmentin PO on dc as cultures sensitive pending LEONEL placement.   - Ortho following, recs appreciated  - PT/OT evaluation -- LEONEL

## 2023-11-29 NOTE — PROGRESS NOTE ADULT - ASSESSMENT
ELVIN on CKD 4  Urinary Retention  HTN    -Baseline Creatinine reportedly 1.9  -ELVIN likely 2/2 urinary retention  -Improved with gaelana  -On metoprolol for BP, can add amlodipine if remains elevated, but currently improved  -S/P OR  -Creatinine at baseline range; monitor trend for now  -Cont lissett, follow up outpatient urology    DC planning      11/28/23-d./w daughter  11/26/23-d/w daughter  11/25/23-d/w daughters

## 2023-11-29 NOTE — PROGRESS NOTE ADULT - PROBLEM SELECTOR PLAN 3
Systolic drop of >20 on 11/26 s/p IVF 500ml bolus  - continue LR 75cc/hr  - BPs stable, trend.
H/o of "small kidneys" and altered kidney function numbers on previous labs as per patient  - Baseline ~1.9 per chart review   - stable at 1.9.  - HELD home HCTZ and Losartan given ELVIN on admission, BP have been stable.
H/o of "small kidneys" and altered kidney function numbers on previous labs as per patient  - Baseline ~1.9 per chart review   - Hold home HCTZ and Losartan given ELVIN on admission, restart as able
Systolic drop of >20 on 11/26 s/p IVF 500ml bolus  - continue LR 75cc/hr  - BPs stable, trend.

## 2023-11-29 NOTE — PROGRESS NOTE ADULT - SUBJECTIVE AND OBJECTIVE BOX
Date/Time Patient Seen:  		  Referring MD:   Data Reviewed	       Patient is a 89y old  Female who presents with a chief complaint of Right hip Fx (28 Nov 2023 15:10)      Subjective/HPI     PAST MEDICAL & SURGICAL HISTORY:  Hypertension    Diabetes    TIA (transient ischemic attack)    Hip fracture    HLD (hyperlipidemia)    No significant past surgical history    S/P ORIF (open reduction internal fixation) fracture  right hip          Medication list         MEDICATIONS  (STANDING):  apixaban 2.5 milliGRAM(s) Oral every 12 hours  aspirin enteric coated 81 milliGRAM(s) Oral daily  atorvastatin 40 milliGRAM(s) Oral at bedtime  cefuroxime   Tablet 250 milliGRAM(s) Oral every 12 hours  dextrose 5%. 1000 milliLiter(s) (100 mL/Hr) IV Continuous <Continuous>  dextrose 5%. 1000 milliLiter(s) (50 mL/Hr) IV Continuous <Continuous>  dextrose 50% Injectable 12.5 Gram(s) IV Push once  dextrose 50% Injectable 25 Gram(s) IV Push once  dextrose 50% Injectable 25 Gram(s) IV Push once  glucagon  Injectable 1 milliGRAM(s) IntraMuscular once  influenza  Vaccine (HIGH DOSE) 0.7 milliLiter(s) IntraMuscular once  insulin lispro (ADMELOG) corrective regimen sliding scale   SubCutaneous three times a day before meals  insulin lispro (ADMELOG) corrective regimen sliding scale   SubCutaneous at bedtime  lidocaine   4% Patch 1 Patch Transdermal every 24 hours  metoprolol succinate  milliGRAM(s) Oral once  metoprolol succinate  milliGRAM(s) Oral daily  polyethylene glycol 3350 17 Gram(s) Oral daily  senna 2 Tablet(s) Oral at bedtime  tamsulosin 0.4 milliGRAM(s) Oral at bedtime    MEDICATIONS  (PRN):  aluminum hydroxide/magnesium hydroxide/simethicone Suspension 30 milliLiter(s) Oral every 6 hours PRN Dyspepsia  benzonatate 100 milliGRAM(s) Oral three times a day PRN Cough  dextrose Oral Gel 15 Gram(s) Oral once PRN Blood Glucose LESS THAN 70 milliGRAM(s)/deciliter  melatonin 3 milliGRAM(s) Oral at bedtime PRN Insomnia  ondansetron Injectable 4 milliGRAM(s) IV Push every 6 hours PRN Nausea and/or Vomiting  oxyCODONE    IR 2.5 milliGRAM(s) Oral every 4 hours PRN Moderate Pain (4 - 6)  oxyCODONE    IR 5 milliGRAM(s) Oral every 4 hours PRN Severe Pain (7 - 10)  simethicone 80 milliGRAM(s) Chew every 8 hours PRN Gas         Vitals log        ICU Vital Signs Last 24 Hrs  T(C): 37.1 (28 Nov 2023 19:42), Max: 37.1 (28 Nov 2023 19:42)  T(F): 98.8 (28 Nov 2023 19:42), Max: 98.8 (28 Nov 2023 19:42)  HR: 67 (28 Nov 2023 19:42) (65 - 87)  BP: 155/68 (28 Nov 2023 19:42) (101/44 - 181/74)  BP(mean): --  ABP: --  ABP(mean): --  RR: 18 (28 Nov 2023 19:42) (18 - 18)  SpO2: 94% (28 Nov 2023 19:42) (92% - 94%)    O2 Parameters below as of 28 Nov 2023 19:42  Patient On (Oxygen Delivery Method): room air                 Input and Output:  I&O's Detail    27 Nov 2023 07:01  -  28 Nov 2023 07:00  --------------------------------------------------------  IN:    Lactated Ringers: 750 mL    Oral Fluid: 60 mL  Total IN: 810 mL    OUT:    Intermittent Catheterization - Urethral (mL): 1150 mL    Voided (mL): 250 mL  Total OUT: 1400 mL    Total NET: -590 mL      28 Nov 2023 07:01  -  29 Nov 2023 05:02  --------------------------------------------------------  IN:  Total IN: 0 mL    OUT:    Indwelling Catheter - Urethral (mL): 800 mL  Total OUT: 800 mL    Total NET: -800 mL          Lab Data                        8.8    8.99  )-----------( 152      ( 28 Nov 2023 21:47 )             26.6     11-28    140  |  105  |  38<H>  ----------------------------<  182<H>  4.0   |  28  |  1.70<H>    Ca    8.3<L>      28 Nov 2023 11:15  Phos  3.0     11-28  Mg     1.9     11-27    TPro  5.8<L>  /  Alb  2.1<L>  /  TBili  0.5  /  DBili  x   /  AST  48<H>  /  ALT  30  /  AlkPhos  89  11-28            Review of Systems	      Objective     Physical Examination    heart s1s2  lung dc BS  head nc      Pertinent Lab findings & Imaging      Justin:  NO   Adequate UO     I&O's Detail    27 Nov 2023 07:01  -  28 Nov 2023 07:00  --------------------------------------------------------  IN:    Lactated Ringers: 750 mL    Oral Fluid: 60 mL  Total IN: 810 mL    OUT:    Intermittent Catheterization - Urethral (mL): 1150 mL    Voided (mL): 250 mL  Total OUT: 1400 mL    Total NET: -590 mL      28 Nov 2023 07:01  -  29 Nov 2023 05:02  --------------------------------------------------------  IN:  Total IN: 0 mL    OUT:    Indwelling Catheter - Urethral (mL): 800 mL  Total OUT: 800 mL    Total NET: -800 mL               Discussed with:     Cultures:	        Radiology

## 2023-11-29 NOTE — PROGRESS NOTE ADULT - SUBJECTIVE AND OBJECTIVE BOX
[facility] -- Tigist Mari Pannella, Patel, Savella Goodger, Cohen  Office # [phone]      Follow Up:    Svt  Subjective/Observations:     No events overnight resting comfortably in bed.  No complaints of chest pain, dyspnea, or palpitations reported. No signs of orthopnea or PND.  remains on room air   REVIEW OF SYSTEMS: All other review of systems is negative unless indicated above    PAST MEDICAL & SURGICAL HISTORY:  Hypertension      Diabetes      TIA (transient ischemic attack)      Hip fracture      HLD (hyperlipidemia)      S/P ORIF (open reduction internal fixation) fracture  right hip          MEDICATIONS  (STANDING):  apixaban 2.5 milliGRAM(s) Oral every 12 hours  aspirin enteric coated 81 milliGRAM(s) Oral daily  atorvastatin 40 milliGRAM(s) Oral at bedtime  cefuroxime   Tablet 250 milliGRAM(s) Oral every 12 hours  dextrose 5%. 1000 milliLiter(s) (100 mL/Hr) IV Continuous <Continuous>  dextrose 5%. 1000 milliLiter(s) (50 mL/Hr) IV Continuous <Continuous>  dextrose 50% Injectable 12.5 Gram(s) IV Push once  dextrose 50% Injectable 25 Gram(s) IV Push once  dextrose 50% Injectable 25 Gram(s) IV Push once  glucagon  Injectable 1 milliGRAM(s) IntraMuscular once  influenza  Vaccine (HIGH DOSE) 0.7 milliLiter(s) IntraMuscular once  insulin lispro (ADMELOG) corrective regimen sliding scale   SubCutaneous three times a day before meals  insulin lispro (ADMELOG) corrective regimen sliding scale   SubCutaneous at bedtime  lidocaine   4% Patch 1 Patch Transdermal every 24 hours  metoprolol succinate  milliGRAM(s) Oral once  metoprolol succinate  milliGRAM(s) Oral daily  polyethylene glycol 3350 17 Gram(s) Oral daily  senna 2 Tablet(s) Oral at bedtime  tamsulosin 0.4 milliGRAM(s) Oral at bedtime    MEDICATIONS  (PRN):  aluminum hydroxide/magnesium hydroxide/simethicone Suspension 30 milliLiter(s) Oral every 6 hours PRN Dyspepsia  benzonatate 100 milliGRAM(s) Oral three times a day PRN Cough  dextrose Oral Gel 15 Gram(s) Oral once PRN Blood Glucose LESS THAN 70 milliGRAM(s)/deciliter  melatonin 3 milliGRAM(s) Oral at bedtime PRN Insomnia  ondansetron Injectable 4 milliGRAM(s) IV Push every 6 hours PRN Nausea and/or Vomiting  oxyCODONE    IR 2.5 milliGRAM(s) Oral every 4 hours PRN Moderate Pain (4 - 6)  oxyCODONE    IR 5 milliGRAM(s) Oral every 4 hours PRN Severe Pain (7 - 10)  simethicone 80 milliGRAM(s) Chew every 8 hours PRN Gas      Allergies    No Known Allergies    Intolerances        Vital Signs Last 24 Hrs  T(C): 36.9 (2023 05:50), Max: 37.1 (2023 19:42)  T(F): 98.5 (2023 05:50), Max: 98.8 (2023 19:42)  HR: 72 (2023 05:50) (65 - 72)  BP: 145/58 (2023 05:50) (101/44 - 155/68)  BP(mean): --  RR: 18 (2023 05:50) (18 - 18)  SpO2: 91% (2023 05:50) (91% - 94%)    Parameters below as of 2023 05:50  Patient On (Oxygen Delivery Method): room air        I&O's Summary    2023 07:01  -  2023 07:00  --------------------------------------------------------  IN: 0 mL / OUT: 800 mL / NET: -800 mL          PHYSICAL EXAM:  TELE: not on tele   Constitutional: NAD, awake and alert, well-developed  HEENT: Moist Mucous Membranes, Anicteric  Pulmonary: Non-labored, breath sounds are clear bilaterally, No wheezing, crackles or rhonchi  Cardiovascular: Regular, S1 and S2 nl, No murmurs, rubs, gallops or clicks  Gastrointestinal: Bowel Sounds present, soft, nontender.   Lymph: No lymphadenopathy. No peripheral edema.  Skin: No visible rashes or ulcers. right hip dressing   Psych:  Mood & affect appropriate    LABS: All Labs Reviewed:                        8.8    8.99  )-----------( 152      ( 2023 21:47 )             26.6                         7.9    10.13 )-----------( 148      ( 2023 11:15 )             23.3                         8.6    13.21 )-----------( 162      ( 2023 08:27 )             26.8     2023 11:15    140    |  105    |  38     ----------------------------<  182    4.0     |  28     |  1.70   2023 08:27    137    |  104    |  37     ----------------------------<  176    4.2     |  26     |  2.00   2023 10:33    139    |  105    |  37     ----------------------------<  201    4.0     |  27     |  1.90     Ca    8.3        2023 11:15  Ca    8.2        2023 08:27  Ca    8.2        2023 10:33  Phos  3.0       2023 11:15  Phos  2.8       2023 08:27  Phos  2.8       2023 10:33  Mg     1.9       2023 08:27  Mg     1.7       2023 10:33    TPro  5.8    /  Alb  2.1    /  TBili  0.5    /  DBili  x      /  AST  48     /  ALT  30     /  AlkPhos  89     2023 11:15  TPro  5.9    /  Alb  2.2    /  TBili  0.5    /  DBili  x      /  AST  48     /  ALT  21     /  AlkPhos  80     2023 08:27  TPro  5.5    /  Alb  2.2    /  TBili  0.5    /  DBili  x      /  AST  34     /  ALT  15     /  AlkPhos  65     2023 10:33             EC Lead ECG:   Ventricular Rate 60 BPM    Atrial Rate 60 BPM    P-R Interval 190 ms    QRS Duration 86 ms    Q-T Interval 454 ms    QTC Calculation(Bazett) 454 ms    P Axis -40 degrees    R Axis 4 degrees    T Axis 38 degrees    Diagnosis Line Unusual P axis, possible ectopic atrial rhythm  Nonspecific T wave abnormality  Abnormal ECG  When compared with ECG of 24-NOV-2023 16:25, (Unconfirmed)  Ectopic atrial rhythm has replaced Junctional rhythm  Vent. rate has decreased BY  43 BPM  Nonspecific T wave abnormality, worse in Anterior leads  Nonspecific T wave abnormality no longer evident in Lateral leads  Confirmed by Chuy Escoto MD ([id number]) on 2023 5:20:22 PM (23 @ 12:06)      TRANSTHORACIC ECHOCARDIOGRAM REPORT  ________________________________________________________________________________                                      _______       Pt. Name:       NELDA VERDIN Study Date:    2023  MRN:            [id number]       YOB: 1930  Accession #:    [id number]      Age:           89 years  Account#:       [id number]     Gender:        F  Heart Rate:                    Height:        62.99 in (160.00 cm)  Rhythm:                        Weight:   114.64 lb (52.00 kg)  Blood Pressure: 158/98 mmHg    BSA/BMI:       1.53 m² / 20.31 kg/m²  ________________________________________________________________________________________  Referring Physician:    [id number] Addy Mueller  Interpreting Physician: Chuy Escoto  Primary Sonographer:    Juanito Bahena    CPT:               ECHO TTE WO CON COMP W DOPP - 08709.m  Indication(s):     Abnormal electrocardiogram ECG/EKG - R94.31  Procedure:         Transthoracic echocardiogram with 2-D, M-modeand complete                     spectral and color flow Doppler.  Ordering Location: [facility]  Study Information: Image quality for this study is technically difficult.    _______________________________________________________________________________________     CONCLUSIONS:      1. Technically difficult image quality.   2. The patient is tachycardic throughout this examination.   3. Left ventricular systolic function is normal with an ejection fraction of 64 % by Swanson's method of disks.   4. The right ventricle is not well visualized.   5. Structurally normal mitral valve with normal leaflet excursion.   6. There is mild calcification of the mitral valve annulus.   7. The left atrium is normal.   8. Trace mitral regurgitation.   9. Aortic valve was not well visualized.  10. Moderate calcification of the aortic valve leaflets.  11. The inferior vena cava is normal in size measuring 1.30 cm in diameter, (normal <2.1cm) with normal inspiratory collapse (normal >50%) consistent with normal right atrial pressure (~3, range 0-5mmHg).    ________________________________________________________________________________________  FINDINGS:     Left Ventricle:  Left ventricular systolic function is normal with a calculated ejection fraction of 64 % by the Swanson's biplane method of disks. There is normal left ventricular diastolic function.     Right Ventricle:  The right ventricle is not well visualized. Tricuspid annular plane systolic excursion (TAPSE) is 1.7 cm (normal >=1.7 cm).     Left Atrium:  The left atrium is normal with an indexed volume of 21.82 ml/m².     Right Atrium:  The right atrium was not well visualized.     Aortic Valve:  The aortic valve was not well visualized. The aortic valve anatomy cannot be determined. There is moderate calcification of the aortic valve leaflets.     Mitral Valve:  Structurally normal mitral valve with normal leaflet excursion. There is mild calcification of the mitral valve annulus. There is trace mitral regurgitation.     Tricuspid Valve:  The tricuspid valve was not well visualized.     Pulmonic Valve:  The pulmonic valve was not well visualized.     Aorta:  The aortic root at the sinuses of Valsalva is normal in size, measuring 3.00 cm (indexed 1.97 cm/m²).     Systemic Veins:  The inferior vena cava is normal in size measuring 1.30 cm in diameter, (normal <2.1cm) with normal inspiratory collapse (normal >50%) consistent with normal right atrial pressure (~3, range 0-5mmHg).  ____________________________________________________________________  QUANTITATIVE DATA:  Left Ventricle Measurements: (Indexed to BSA)     IVSd (2D):   1.2 cm  LVPWd (2D):  1.0 cm  LVIDd (2D):  3.5 cm  LVIDs (2D):  2.5 cm  LV Mass:     121 g  79.2 g/m²  LV Vol d, MOD A2C: 33.7 ml 22.08 ml/m²  LV Vol d, MOD A4C: 18.8 ml 12.32 ml/m²  LV Vol d, MOD BP:  25.5 ml 16.69 ml/m²  LV Vol s, MOD A2C: 11.5 ml 7.53 ml/m²  LV Vol s, MOD A4C: 7.7 ml  5.06 ml/m²  LV Vol s, MOD BP:  9.2 ml  6.02 ml/m²  LVOT SV MOD BP:    16.3 ml  LV EF% MOD BP:     64 %     MV E Vmax:    1.33 m/s  MV A Vmax:    0.50 m/s  MV E/A:       2.67  e' lateral:   21.20 cm/s  e' medial:    12.80 cm/s  E/e' lateral: 6.27  E/e' medial:  10.39  E/e' Average: 7.82  MV DT:        143 msec    Aorta Measurements: (normal range) (Indexed to BSA)     Sinuses of Valsalva: 3.00 cm (2.7 - 3.3 cm)       Left Atrium Measurements: (Indexed to BSA)  LA Diam 2D: 3.40 cm    Right Ventricle Measurements:     TAPSE: 1.7 cm       LVOT / RVOT/ Qp/Qs Data: (Indexed to BSA)  LVOT Diameter: 1.80 cm    Mitral Valve Measurements:     MV E Vmax: 1.3 m/s  MV A Vmax: 0.5 m/s  MV E/A:    2.7       Tricuspid Valve Measurements:     RA Pressure: 3 mmHg    ________________________________________________________________________________________  Electronicallysigned on 2023 at 6:17:31 AM by Chuy Escoto         *** Final ***      Radiology:

## 2023-11-29 NOTE — PROGRESS NOTE ADULT - TIME BILLING
direct patient care including but not limited to reviewing chart, medications ,laboratory data, imaging reports, discussion of plan of care with consultants on the case, coordination of care with multidisciplinary team involved in the case and discussion of plan with patient.  Patient and family agreeable to plan of care and verbalized understanding the anticipated hospital course and treatment plan.

## 2023-11-29 NOTE — PROGRESS NOTE ADULT - PROVIDER SPECIALTY LIST ADULT
Anesthesia
Cardiology
Nephrology
Orthopedics
Orthopedics
Pulmonology
Pulmonology
Nephrology
Orthopedics
Orthopedics
Cardiology
Nephrology
Orthopedics
Pulmonology
Cardiology
Pulmonology
Hospitalist
Orthopedics
Hospitalist

## 2023-11-29 NOTE — PROGRESS NOTE ADULT - SUBJECTIVE AND OBJECTIVE BOX
Patient is a 89y old  Female who presents with a chief complaint of Right hip Fx (25 Nov 2023 13:23)       HPI:  89 yo female w/ pmh of HTN, HLD, T2DM, TIA, CKD previous right hip fx w/ surgical pinning s/p removal presents following a mechanical fall in her home. Pt states she was standing beside her chair looking out the window when she lost her balance and fell onto her right side. Denies head trauma, LOC, dizziness, lightheadedness. States that she crawled to a phone to call an ambulance. This all occurred at 4am this morning. Initially on arrival to the ED she thought she ate breakfast, but now realizes that she never ate today. Pt states that she had a previous fx to the right hip which had a pin that "fell out" and she had surgically removed. Pt states that even following pain medication in the ED, the pain is an 8-9/10 and radiates to her groin.  Of note, the patient states that her urine this morning was malodorous similar to a UTI she has had in the past. At that time, she too did not have sxs of a urinary tract infection. Denies fever, chills, sob, cp, palpitations, abd pain, n/v/d/c, dysuria, urinary frequency, urinary urgency, hematuria.  Has not seen urologist in many years, but reportedly her renal function has been stable.  Had galeana placed for retention.     No N/V/SOB    PAST MEDICAL & SURGICAL HISTORY:  Hypertension      Diabetes      TIA (transient ischemic attack)      Hip fracture      HLD (hyperlipidemia)      S/P ORIF (open reduction internal fixation) fracture  right hip           FAMILY HISTORY:  FH: asthma (Father)    [place]    Social History:Non smoker    MEDICATIONS  (STANDING):  acetaminophen   IVPB .. 1000 milliGRAM(s) IV Intermittent once  acetaminophen   IVPB .. 1000 milliGRAM(s) IV Intermittent once  aspirin enteric coated 81 milliGRAM(s) Oral daily  atorvastatin 40 milliGRAM(s) Oral at bedtime  cefTRIAXone   IVPB 1000 milliGRAM(s) IV Intermittent every 24 hours  dextrose 5%. 1000 milliLiter(s) (50 mL/Hr) IV Continuous <Continuous>  dextrose 5%. 1000 milliLiter(s) (100 mL/Hr) IV Continuous <Continuous>  dextrose 50% Injectable 12.5 Gram(s) IV Push once  dextrose 50% Injectable 25 Gram(s) IV Push once  dextrose 50% Injectable 25 Gram(s) IV Push once  glucagon  Injectable 1 milliGRAM(s) IntraMuscular once  insulin lispro (ADMELOG) corrective regimen sliding scale   SubCutaneous three times a day before meals  insulin lispro (ADMELOG) corrective regimen sliding scale   SubCutaneous at bedtime  metoprolol succinate  milliGRAM(s) Oral once  metoprolol succinate  milliGRAM(s) Oral daily  polyethylene glycol 3350 17 Gram(s) Oral daily  senna 2 Tablet(s) Oral at bedtime    MEDICATIONS  (PRN):  aluminum hydroxide/magnesium hydroxide/simethicone Suspension 30 milliLiter(s) Oral every 6 hours PRN Dyspepsia  dextrose Oral Gel 15 Gram(s) Oral once PRN Blood Glucose LESS THAN 70 milliGRAM(s)/deciliter  melatonin 3 milliGRAM(s) Oral at bedtime PRN Insomnia  ondansetron Injectable 4 milliGRAM(s) IV Push every 6 hours PRN Nausea and/or Vomiting  oxyCODONE    IR 5 milliGRAM(s) Oral every 4 hours PRN Severe Pain (7 - 10)  oxyCODONE    IR 2.5 milliGRAM(s) Oral every 4 hours PRN Moderate Pain (4 - 6)  simethicone 80 milliGRAM(s) Chew every 8 hours PRN Gas   Meds reviewed    Allergies    No Known Allergies    Intolerances         REVIEW OF SYSTEMS:    Review of Systems:   as above    Vital Signs Last 24 Hrs  T(C): 36.9 (29 Nov 2023 05:50), Max: 37.1 (28 Nov 2023 19:42)  T(F): 98.5 (29 Nov 2023 05:50), Max: 98.8 (28 Nov 2023 19:42)  HR: 72 (29 Nov 2023 05:50) (65 - 72)  BP: 145/58 (29 Nov 2023 05:50) (101/44 - 155/68)  BP(mean): --  RR: 18 (29 Nov 2023 05:50) (18 - 18)  SpO2: 91% (29 Nov 2023 05:50) (91% - 94%)    Parameters below as of 29 Nov 2023 05:50  Patient On (Oxygen Delivery Method): room air      Daily     Daily     PHYSICAL EXAM:    GENERAL: NAD  HEAD:  Atraumatic, Normocephalic  EYES: EOMI, conjunctiva and sclera clear  ENMT: No Drainage from nares, No drainage from ears  NERVOUS SYSTEM:  Awake and Alert  CHEST/LUNG: Clear to percussion bilaterally  EXTREMITIES:  No Edema  SKIN: No rashes No obvious ecchymosis      LABS:                                                      8.8    8.99  )-----------( 152      ( 28 Nov 2023 21:47 )             26.6     11-28    140  |  105  |  38<H>  ----------------------------<  182<H>  4.0   |  28  |  1.70<H>    Ca    8.3<L>      28 Nov 2023 11:15  Phos  3.0     11-28    TPro  5.8<L>  /  Alb  2.1<L>  /  TBili  0.5  /  DBili  x   /  AST  48<H>  /  ALT  30  /  AlkPhos  89  11-28      Urinalysis Basic - ( 28 Nov 2023 11:15 )    Color: x / Appearance: x / SG: x / pH: x  Gluc: 182 mg/dL / Ketone: x  / Bili: x / Urobili: x   Blood: x / Protein: x / Nitrite: x   Leuk Esterase: x / RBC: x / WBC x   Sq Epi: x / Non Sq Epi: x / Bacteria: x

## 2023-11-29 NOTE — PROGRESS NOTE ADULT - PROBLEM SELECTOR PLAN 7
- Continue home Metoprolol with hold parameters  - Hold home Losartan and HCTZ given ELVIN   - Monitor routine hemodynamics
Continue home Atorvastatin and ASA
Continue home Atorvastatin and ASA
- Continue home Metoprolol with hold parameters  - Hold home Losartan and HCTZ given ELVIN   - Monitor routine hemodynamics

## 2023-11-29 NOTE — PROGRESS NOTE ADULT - PROBLEM SELECTOR PROBLEM 3
Orthostatic hypotension
CKD (chronic kidney disease)
Orthostatic hypotension
CKD (chronic kidney disease)

## 2023-11-29 NOTE — PROGRESS NOTE ADULT - NS ATTEND AMEND GEN_ALL_CORE FT
89 yo Female with PMHx of HTN, T2DM, TIA 9/2016, right hip fracture, S/P ORIF right hip 3/5/16 presents to ED w/ a c/o of R hip pain s/p fall. s/p IMN      s/p fall now s/p IMN    -echo normal ef and no sig valve disease  -there is no evidence of acute ischemia.  -there is no evidence for meaningful  volume overload.  -had tachycardia prior to or procedure on 11/24   -no cardiovascular complications postop  -labile bp add ccb
No signs of significant ischemia or volume overload. cont current care. Further cardiac workup will depend on clinical course.
89 yo Female with PMHx of HTN, T2DM, TIA 9/2016, right hip fracture, S/P ORIF right hip 3/5/16 presents to ED w/ a c/o of R hip pain s/p fall. Plan for IMN      s/p fall now s/p IMN   -on eliquis per ortho     -echo normal ef and no sig valve disease  -there is no evidence of acute ischemia.  -there is no evidence for meaningful  volume overload.  -no cardiovascular complications postop  -had tachycardia prior to or procedure on 11/24     -bp stable continue home bb
-echo normal ef and no sig valve disease  -there is no evidence of acute ischemia.  -there is no evidence of significant arrhythmia.  -there is no evidence for meaningful  volume overload.  -no cardiovascular complications postop  -DVT prophylaxis  -monitor electrolytes, keep k>4, Mg>2  -cardiovascular status is otherwise without acute change.

## 2023-11-29 NOTE — PROGRESS NOTE ADULT - ASSESSMENT
89 yo Female with PMHx of HTN, T2DM, TIA 9/2016, right hip fracture, S/P ORIF right hip 3/5/16 presents to ED w/ a c/o of R hip pain s/p fall. s/p IMN      s/p fall now s/p IMN   -on eliquis per ortho   - fu am cbc     -echo normal ef and no sig valve disease  -there is no evidence of acute ischemia.  -there is no evidence for meaningful  volume overload.  -had tachycardia prior to or procedure on 11/24   -no cardiovascular complications postop    -bp 145/58  -  continue home bb       -DVT prophylaxis  -monitor electrolytes, keep k>4, Mg>2

## 2023-11-29 NOTE — PROGRESS NOTE ADULT - PROBLEM SELECTOR PLAN 6
- Continue home Metoprolol with hold parameters  - Hold home Losartan and HCTZ given ELVIN   - Monitor routine hemodynamics
Hold home Januvia  - Low dose ISS   - Fingersticks qac&qhs  - Hypoglycemia protocol
Hold home Januvia  - Low dose ISS   - Fingersticks qac&qhs  - Hypoglycemia protocol
- Continue home Metoprolol with hold parameters  - Hold home Losartan and HCTZ given ELVIN   - Monitor routine hemodynamics

## 2023-11-29 NOTE — PROGRESS NOTE ADULT - PROBLEM SELECTOR PLAN 4
H/o of "small kidneys" and altered kidney function numbers on previous labs as per patient  - Baseline ~1.9 per chart review   - currently at 2   - on LR IVF   - HELD home HCTZ and Losartan given ELVIN on admission, BP have been stable.
- Episode of tachycardia w/ HR 140s prior to Ortho procedure on 11/24  - HR improved  - Continue home Metoprolol   - EKG NSR  - TTE: EF 64%  - Cardio (Dr. Escoto) consulted, following
- Episode of tachycardia w/ HR 140s prior to Ortho procedure on 11/24  - HR improved  - Continue home Metoprolol   - EKG NSR  - TTE: EF 64%  - Cardio (Dr. Escoto) consulted, f/u recs
H/o of "small kidneys" and altered kidney function numbers on previous labs as per patient  - Baseline ~1.9 per chart review   - currently at 2   - on LR IVF   - HELD home HCTZ and Losartan given ELVIN on admission, BP have been stable.

## 2023-11-29 NOTE — PROGRESS NOTE ADULT - PROBLEM SELECTOR PLAN 5
- Episode of tachycardia w/ HR 140s prior to Ortho procedure on 11/24  - HR improved  - Continue home Metoprolol   - EKG NSR  - TTE: EF 64%  - Cardio (Dr. Esctoo) consulted, following
Hold home Januvia  - Low dose ISS   - Fingersticks qac&qhs  - Hypoglycemia protocol
Hold home Januvia  - Low dose ISS   - Fingersticks qac&qhs  - Hypoglycemia protocol
- Episode of tachycardia w/ HR 140s prior to Ortho procedure on 11/24  - HR improved  - Continue home Metoprolol   - EKG NSR  - TTE: EF 64%  - Cardio (Dr. Escoto) consulted, following

## 2023-11-29 NOTE — PROGRESS NOTE ADULT - PROBLEM SELECTOR PLAN 9
VTE Prophylaxis: Eliquis 2.5mg BID       #DISPO LEONEL as per PT/OT    #Tessalon Perles  for cough.
VTE Prophylaxis: Eliquis 2.5mg BID       #DISPO LEONEL as per PT/OT    #Tessalon Perles  for cough.

## 2023-11-29 NOTE — PROGRESS NOTE ADULT - ASSESSMENT
89 yo female w/ pmh of HTN, HLD, T2DM, TIA, CKD previous right hip fx w/ surgical pinning s/p removal presents following a mechanical fall in her home. Pt states she was standing beside her chair looking out the window when she lost her balance and fell onto her right side.    POD 4 - right IMN  ortho follow up  renal eval noted    cardio eval noted  known CKD - Stable  [facility] rx regimen  BP control  Pain rx regimen  D Dimer noted - likely an inflammation marker in light of Right Hip Fx  CXR NADP  monitor VS and HD and Sat  I chioma  DM care  serial FS

## 2023-11-29 NOTE — PROGRESS NOTE ADULT - REASON FOR ADMISSION
Right hip Fx

## 2023-11-29 NOTE — PROGRESS NOTE ADULT - PROBLEM SELECTOR PLAN 2
H/o of UTI requiring IV Abx w/ only known symptom of malodorous urine  - Pt had similar odor to her urine prior to admission w/o dysuria, hematuria, urinary frequency  - UA positive  - Bladder scan in ED revealed 704cc, galeana placed for urinary retention   - Galeana draining properly. Failed TOV, patient complaining of cramping, will trial oxybutynin  - Continue IV Rocephin, transition to PO for total 5 day course on d/c   - UCx noted.  - Monitor for fever, trend WBC
H/o of UTI requiring IV Abx w/ only known symptom of malodorous urine  - Pt had similar odor to her urine prior to admission w/o dysuria, hematuria, urinary frequency  - UA positive  - Bladder scan in ED revealed 704cc, galeana placed for urinary retention   - Galeana draining properly.   - Remove galeana TOV @ 6PM today, to notify if over 400cc retention.   - Continue IV Rocephin, transition to Augmentin for total 5 day course on d/c   - UCx noted.  - Monitor for fever, trend WBC
H/o of UTI requiring IV Abx w/ only known symptom of malodorous urine  - Pt had similar odor to her urine prior to admission w/o dysuria, hematuria, urinary frequency  - UA positive  - Bladder scan in ED revealed 704cc, galeana placed for urinary retention   - Galeana draining properly.   - Continue IV Rocephin   - UCx- gram - rods, follow final report.  - Monitor for fever, trend WBC
H/o of UTI requiring IV Abx w/ only known symptom of malodorous urine  - Pt had similar odor to her urine prior to admission w/o dysuria, hematuria, urinary frequency  - UA positive  - Bladder scan in ED revealed 704cc, galeana placed for urinary retention   - Continue IV Rocephin   - F/u urine cx   - Monitor for fever, trend WBC

## 2023-11-29 NOTE — CAREGIVER ENGAGEMENT NOTE - CAREGIVER OUTREACH NOTES - FREE TEXT
TAHIR spoke with kiah Galvan regarding plan for d/c today. ATHIR explained that [facility] has no bed avail. today. She is accepting bed at [facility] for today. TAHIR set up beto at 12pm via [facility]. SW to follow and remain available for any needs.
TAHIR spoke with kiah Galvan at pt bedside and she is agreeable to d/c today. beto via [facility] scheduled at 12pm. TAHIR to follow and remain available for any needs.
SW met with pt at dtr [facility] at bedside. IMM provided. Pending response in the AM from [facility] about available bed, if not plan to d/c to [facility] tomorrow. SW to follow and remain available for any needs.

## 2023-11-29 NOTE — PROGRESS NOTE ADULT - PROBLEM SELECTOR PLAN 8
VTE Prophylaxis: SCDs for now, start pharmacologic DVT ppx on POD#1
Continue home Atorvastatin and ASA
Continue home Atorvastatin and ASA
VTE Prophylaxis: Eliquis 2.5mg BID       #DISPO LEONEL as per PT/OT

## 2023-11-29 NOTE — PROGRESS NOTE ADULT - SUBJECTIVE AND OBJECTIVE BOX
Patient seen and examined at bedside. Patient reports pain well controlled on medications. Patient complained of right calf pain overnight. BLLE Doppler US were ordered, pending results. Pt denies fevers, chills, new onset numbness, weakness or tingling in the extremities.      Vital Signs (24 Hrs):  T(C): 36.9 (11-29-23 @ 05:50), Max: 37.1 (11-28-23 @ 19:42)  HR: 72 (11-29-23 @ 05:50) (65 - 72)  BP: 145/58 (11-29-23 @ 05:50) (101/44 - 155/68)  RR: 18 (11-29-23 @ 05:50) (18 - 18)  SpO2: 91% (11-29-23 @ 05:50) (91% - 94%)  Wt(kg): --    LABS:                          8.8    8.99  )-----------( 152      ( 28 Nov 2023 21:47 )             26.6     11-28    140  |  105  |  38<H>  ----------------------------<  182<H>  4.0   |  28  |  1.70<H>    Ca    8.3<L>      28 Nov 2023 11:15  Phos  3.0     11-28    TPro  5.8<L>  /  Alb  2.1<L>  /  TBili  0.5  /  DBili  x   /  AST  48<H>  /  ALT  30  /  AlkPhos  89  11-28    LIVER FUNCTIONS - ( 28 Nov 2023 11:15 )  Alb: 2.1 g/dL / Pro: 5.8 g/dL / ALK PHOS: 89 U/L / ALT: 30 U/L / AST: 48 U/L / GGT: x           Physical Exam:  General: NAD, resting comfortably in bed  RLE:   Dressing in place C/D/I  SCD in place bilaterally  No calf tenderness BL   Motor: + EHL/FHL/TA/GSC  +SILT: SPN/DPN/Yury/Saph/Tib  DP/PT 2+    A/P:  89y F s/p R IMN POD4    -PT/OT: Rec LEONEL  -WBAT  -Pain Control  -DVT ppx: Eliquis 2.5 mg BID  -Continue Cefuroxime per Medicine  -FU AM Labs  -Rest, ice, compress and elevate the extremity as we needed  -Incentive Spirometry  -Medical management appreciated  -Will discuss plan with Dr. De Luna and will advise changes to plan as needed

## 2023-11-29 NOTE — PROGRESS NOTE ADULT - SUBJECTIVE AND OBJECTIVE BOX
INTERVAL HPI/OVERNIGHT EVENTS:  Patient seen and examined at bedside. States she is feeling better this AM. Was transfused 1 unit PRBC yesterday with appropriate rise in Hgb. Patient states she was having some calf pain this AM, sent for doppler to r/o DVT. Patient also complains fo pressure due to galeana catheter. Denies any chest pain, SOB, abd pain.    ROS:  All other review of systems is negative unless indicated above.    MEDICATIONS  (STANDING):  apixaban 2.5 milliGRAM(s) Oral every 12 hours  aspirin enteric coated 81 milliGRAM(s) Oral daily  atorvastatin 40 milliGRAM(s) Oral at bedtime  cefuroxime   Tablet 250 milliGRAM(s) Oral every 12 hours  dextrose 5%. 1000 milliLiter(s) (100 mL/Hr) IV Continuous <Continuous>  dextrose 5%. 1000 milliLiter(s) (50 mL/Hr) IV Continuous <Continuous>  dextrose 50% Injectable 25 Gram(s) IV Push once  dextrose 50% Injectable 25 Gram(s) IV Push once  dextrose 50% Injectable 12.5 Gram(s) IV Push once  glucagon  Injectable 1 milliGRAM(s) IntraMuscular once  influenza  Vaccine (HIGH DOSE) 0.7 milliLiter(s) IntraMuscular once  insulin lispro (ADMELOG) corrective regimen sliding scale   SubCutaneous three times a day before meals  insulin lispro (ADMELOG) corrective regimen sliding scale   SubCutaneous at bedtime  lidocaine   4% Patch 1 Patch Transdermal every 24 hours  metoprolol succinate  milliGRAM(s) Oral once  metoprolol succinate  milliGRAM(s) Oral daily  oxybutynin 5 milliGRAM(s) Oral two times a day  polyethylene glycol 3350 17 Gram(s) Oral daily  senna 2 Tablet(s) Oral at bedtime  tamsulosin 0.4 milliGRAM(s) Oral at bedtime    MEDICATIONS  (PRN):  acetaminophen     Tablet .. 650 milliGRAM(s) Oral every 6 hours PRN Mild Pain (1 - 3)  aluminum hydroxide/magnesium hydroxide/simethicone Suspension 30 milliLiter(s) Oral every 6 hours PRN Dyspepsia  benzonatate 100 milliGRAM(s) Oral three times a day PRN Cough  dextrose Oral Gel 15 Gram(s) Oral once PRN Blood Glucose LESS THAN 70 milliGRAM(s)/deciliter  melatonin 3 milliGRAM(s) Oral at bedtime PRN Insomnia  ondansetron Injectable 4 milliGRAM(s) IV Push every 6 hours PRN Nausea and/or Vomiting  oxyCODONE    IR 2.5 milliGRAM(s) Oral every 4 hours PRN Moderate Pain (4 - 6)  oxyCODONE    IR 5 milliGRAM(s) Oral every 4 hours PRN Severe Pain (7 - 10)  simethicone 80 milliGRAM(s) Chew every 8 hours PRN Gas      Allergies    No Known Allergies    Intolerances        Vital Signs Last 24 Hrs  T(C): 36.9 (29 Nov 2023 05:50), Max: 37.1 (28 Nov 2023 19:42)  T(F): 98.5 (29 Nov 2023 05:50), Max: 98.8 (28 Nov 2023 19:42)  HR: 72 (29 Nov 2023 05:50) (67 - 72)  BP: 145/58 (29 Nov 2023 05:50) (145/58 - 155/68)  BP(mean): --  RR: 18 (29 Nov 2023 05:50) (18 - 18)  SpO2: 91% (29 Nov 2023 05:50) (91% - 94%)    Parameters below as of 29 Nov 2023 05:50  Patient On (Oxygen Delivery Method): room air        11-28 @ 07:01 - 11-29 @ 07:00  --------------------------------------------------------  IN: 0 mL / OUT: 800 mL / NET: -800 mL    11-29 @ 07:01  -  11-29 @ 14:30  --------------------------------------------------------  IN: 0 mL / OUT: 500 mL / NET: -500 mL      Physical Exam:  General: sitting comfortably in chair, NAD  Neurology: A&Ox3, nonfocal, HALLMAN x 4  Respiratory: CTA B/L  CV: RRR, S1S2, no murmurs, rubs or gallops  Abdominal: Soft, NT, ND +BS  : +galeana with clear urine draining  Extremities: R hip dressing C/D/I, + peripheral pulses      LABS:                        9.7    8.24  )-----------( 175      ( 29 Nov 2023 11:10 )             30.1     11-29    141  |  105  |  39<H>  ----------------------------<  198<H>  3.9   |  28  |  1.60<H>    Ca    8.9      29 Nov 2023 11:10  Phos  3.0     11-28    TPro  6.1  /  Alb  2.3<L>  /  TBili  0.7  /  DBili  x   /  AST  50<H>  /  ALT  36  /  AlkPhos  91  11-29      Urinalysis Basic - ( 29 Nov 2023 11:10 )    Color: x / Appearance: x / SG: x / pH: x  Gluc: 198 mg/dL / Ketone: x  / Bili: x / Urobili: x   Blood: x / Protein: x / Nitrite: x   Leuk Esterase: x / RBC: x / WBC x   Sq Epi: x / Non Sq Epi: x / Bacteria: x        RADIOLOGY & ADDITIONAL TESTS:

## 2023-12-06 PROBLEM — E78.5 HYPERLIPIDEMIA, UNSPECIFIED: Chronic | Status: ACTIVE | Noted: 2023-11-24

## 2023-12-08 ENCOUNTER — TRANSCRIPTION ENCOUNTER (OUTPATIENT)
Age: 89
End: 2023-12-08

## 2023-12-11 ENCOUNTER — APPOINTMENT (OUTPATIENT)
Dept: UROLOGY | Facility: CLINIC | Age: 89
End: 2023-12-11
Payer: MEDICARE

## 2023-12-11 ENCOUNTER — NON-APPOINTMENT (OUTPATIENT)
Age: 89
End: 2023-12-11

## 2023-12-11 VITALS
OXYGEN SATURATION: 90 % | WEIGHT: 150 LBS | BODY MASS INDEX: 28.32 KG/M2 | TEMPERATURE: 97.7 F | HEART RATE: 64 BPM | SYSTOLIC BLOOD PRESSURE: 140 MMHG | HEIGHT: 61 IN | RESPIRATION RATE: 16 BRPM | DIASTOLIC BLOOD PRESSURE: 68 MMHG

## 2023-12-11 PROCEDURE — 51702 INSERT TEMP BLADDER CATH: CPT

## 2023-12-11 PROCEDURE — 99203 OFFICE O/P NEW LOW 30 MIN: CPT | Mod: 25

## 2023-12-12 ENCOUNTER — APPOINTMENT (OUTPATIENT)
Dept: ORTHOPEDIC SURGERY | Facility: CLINIC | Age: 89
End: 2023-12-12
Payer: MEDICARE

## 2023-12-12 VITALS
SYSTOLIC BLOOD PRESSURE: 166 MMHG | DIASTOLIC BLOOD PRESSURE: 69 MMHG | HEIGHT: 61 IN | BODY MASS INDEX: 28.13 KG/M2 | HEART RATE: 65 BPM | WEIGHT: 149 LBS

## 2023-12-12 PROCEDURE — 73502 X-RAY EXAM HIP UNI 2-3 VIEWS: CPT | Mod: 26,RT

## 2023-12-12 PROCEDURE — 99024 POSTOP FOLLOW-UP VISIT: CPT

## 2023-12-13 PROCEDURE — 97116 GAIT TRAINING THERAPY: CPT

## 2023-12-13 PROCEDURE — 73552 X-RAY EXAM OF FEMUR 2/>: CPT

## 2023-12-13 PROCEDURE — 73502 X-RAY EXAM HIP UNI 2-3 VIEWS: CPT

## 2023-12-13 PROCEDURE — 84540 ASSAY OF URINE/UREA-N: CPT

## 2023-12-13 PROCEDURE — 83036 HEMOGLOBIN GLYCOSYLATED A1C: CPT

## 2023-12-13 PROCEDURE — 81001 URINALYSIS AUTO W/SCOPE: CPT

## 2023-12-13 PROCEDURE — 71045 X-RAY EXAM CHEST 1 VIEW: CPT

## 2023-12-13 PROCEDURE — 87077 CULTURE AEROBIC IDENTIFY: CPT

## 2023-12-13 PROCEDURE — 85027 COMPLETE CBC AUTOMATED: CPT

## 2023-12-13 PROCEDURE — 86901 BLOOD TYPING SEROLOGIC RH(D): CPT

## 2023-12-13 PROCEDURE — 86850 RBC ANTIBODY SCREEN: CPT

## 2023-12-13 PROCEDURE — 87637 SARSCOV2&INF A&B&RSV AMP PRB: CPT

## 2023-12-13 PROCEDURE — 97535 SELF CARE MNGMENT TRAINING: CPT

## 2023-12-13 PROCEDURE — 86923 COMPATIBILITY TEST ELECTRIC: CPT

## 2023-12-13 PROCEDURE — 82962 GLUCOSE BLOOD TEST: CPT

## 2023-12-13 PROCEDURE — 96375 TX/PRO/DX INJ NEW DRUG ADDON: CPT

## 2023-12-13 PROCEDURE — 84300 ASSAY OF URINE SODIUM: CPT

## 2023-12-13 PROCEDURE — 93005 ELECTROCARDIOGRAM TRACING: CPT

## 2023-12-13 PROCEDURE — 97166 OT EVAL MOD COMPLEX 45 MIN: CPT

## 2023-12-13 PROCEDURE — P9016: CPT

## 2023-12-13 PROCEDURE — 83735 ASSAY OF MAGNESIUM: CPT

## 2023-12-13 PROCEDURE — 71100 X-RAY EXAM RIBS UNI 2 VIEWS: CPT

## 2023-12-13 PROCEDURE — 80048 BASIC METABOLIC PNL TOTAL CA: CPT

## 2023-12-13 PROCEDURE — 87086 URINE CULTURE/COLONY COUNT: CPT

## 2023-12-13 PROCEDURE — 36430 TRANSFUSION BLD/BLD COMPNT: CPT

## 2023-12-13 PROCEDURE — 93970 EXTREMITY STUDY: CPT

## 2023-12-13 PROCEDURE — 85379 FIBRIN DEGRADATION QUANT: CPT

## 2023-12-13 PROCEDURE — 76000 FLUOROSCOPY <1 HR PHYS/QHP: CPT

## 2023-12-13 PROCEDURE — 84133 ASSAY OF URINE POTASSIUM: CPT

## 2023-12-13 PROCEDURE — 97162 PT EVAL MOD COMPLEX 30 MIN: CPT

## 2023-12-13 PROCEDURE — 36415 COLL VENOUS BLD VENIPUNCTURE: CPT

## 2023-12-13 PROCEDURE — 82570 ASSAY OF URINE CREATININE: CPT

## 2023-12-13 PROCEDURE — C1713: CPT

## 2023-12-13 PROCEDURE — 84100 ASSAY OF PHOSPHORUS: CPT

## 2023-12-13 PROCEDURE — 86900 BLOOD TYPING SEROLOGIC ABO: CPT

## 2023-12-13 PROCEDURE — 87186 SC STD MICRODIL/AGAR DIL: CPT

## 2023-12-13 PROCEDURE — 85025 COMPLETE CBC W/AUTO DIFF WBC: CPT

## 2023-12-13 PROCEDURE — 80053 COMPREHEN METABOLIC PANEL: CPT

## 2023-12-13 PROCEDURE — 85730 THROMBOPLASTIN TIME PARTIAL: CPT

## 2023-12-13 PROCEDURE — 93306 TTE W/DOPPLER COMPLETE: CPT

## 2023-12-13 PROCEDURE — 96374 THER/PROPH/DIAG INJ IV PUSH: CPT

## 2023-12-13 PROCEDURE — 84484 ASSAY OF TROPONIN QUANT: CPT

## 2023-12-13 PROCEDURE — 99285 EMERGENCY DEPT VISIT HI MDM: CPT | Mod: 25

## 2023-12-13 PROCEDURE — 84156 ASSAY OF PROTEIN URINE: CPT

## 2023-12-13 PROCEDURE — 85610 PROTHROMBIN TIME: CPT

## 2023-12-13 PROCEDURE — 97530 THERAPEUTIC ACTIVITIES: CPT

## 2023-12-13 PROCEDURE — 83935 ASSAY OF URINE OSMOLALITY: CPT

## 2024-01-03 ENCOUNTER — APPOINTMENT (OUTPATIENT)
Dept: UROLOGY | Facility: CLINIC | Age: 89
End: 2024-01-03
Payer: MEDICARE

## 2024-01-03 DIAGNOSIS — N39.3 STRESS INCONTINENCE (FEMALE) (MALE): ICD-10-CM

## 2024-01-03 DIAGNOSIS — R33.9 RETENTION OF URINE, UNSPECIFIED: ICD-10-CM

## 2024-01-03 PROCEDURE — 51741 ELECTRO-UROFLOWMETRY FIRST: CPT | Mod: 26

## 2024-01-03 PROCEDURE — 51728 CYSTOMETROGRAM W/VP: CPT | Mod: TC

## 2024-01-03 PROCEDURE — 51784 ANAL/URINARY MUSCLE STUDY: CPT | Mod: 26

## 2024-01-03 PROCEDURE — 51728 CYSTOMETROGRAM W/VP: CPT | Mod: 26

## 2024-01-03 PROCEDURE — 51797 INTRAABDOMINAL PRESSURE TEST: CPT | Mod: 26

## 2024-01-03 PROCEDURE — 51741 ELECTRO-UROFLOWMETRY FIRST: CPT

## 2024-01-03 PROCEDURE — 51797 INTRAABDOMINAL PRESSURE TEST: CPT

## 2024-01-17 NOTE — DISCHARGE NOTE NURSING/CASE MANAGEMENT/SOCIAL WORK - NSDCCRSPACEROBJ_GEN_ALL_CORE_FT
61 yo M who represents with worsening left and right chest pain, LUQ and RUQ abdominal pain, and back pain. He continues to have no leukocytosis, afebrile (documented 102.7 overnight, but on recheck was normal) and HIDA was negative for acute cholecystitis. Additionally, his complaints are not consistent with cholecystitis or biliary colic. His bilirubin is mildly elevated. EKG on arrival yesterday was notable for septal infarct of indeterminate age.  Now status post laparoscopic cholecystectomy on January 15th, 2024 with signs of significant inflammation and necrosis of the gallbladder.    - OK to advance diet as tolerated  - Multimodal pain control  - Drain care  - Strict I's & O's.  - Continue antibiotics for 4 days postoperatively  - Needs DVT PPx  - Trend labs  - EKG previously with ischemic changes, now showing septal infarct age undetermined, workup per primary   - recommend keeping him as an inpatient for at least 1 more day for monitoring of his lab function and to increase his mobility.  - multiple bowel movements are okay at this point.  If he continues to have uncontrollable loose stools, tomorrow would consider further investigation into the source of this.  - Rest of care per primary   .

## 2024-01-26 ENCOUNTER — APPOINTMENT (OUTPATIENT)
Dept: ORTHOPEDIC SURGERY | Facility: CLINIC | Age: 89
End: 2024-01-26
Payer: MEDICARE

## 2024-01-26 VITALS — SYSTOLIC BLOOD PRESSURE: 156 MMHG | HEART RATE: 72 BPM | DIASTOLIC BLOOD PRESSURE: 78 MMHG

## 2024-01-26 DIAGNOSIS — M25.569 PAIN IN UNSPECIFIED KNEE: ICD-10-CM

## 2024-01-26 PROCEDURE — 73502 X-RAY EXAM HIP UNI 2-3 VIEWS: CPT | Mod: RT

## 2024-01-26 PROCEDURE — 20610 DRAIN/INJ JOINT/BURSA W/O US: CPT | Mod: 79,RT

## 2024-01-26 PROCEDURE — 99213 OFFICE O/P EST LOW 20 MIN: CPT | Mod: 24,25

## 2024-01-26 PROCEDURE — 73564 X-RAY EXAM KNEE 4 OR MORE: CPT | Mod: RT

## 2024-02-05 NOTE — PROCEDURE
[de-identified] : Injection: Right knee joint. Indication: Osteoarthritis.   A discussion was had with the patient regarding this procedure and all questions were answered. All risks, benefits and alternatives were discussed. These included but were not limited to bleeding, infection, allergic reaction and reaccumulation of fluid. A timeout was done to ensure correct side and patient agreed to the procedure.  A [place] vera was created on the skin utilizing a plastic needle cap to vera the anticipated point of entry. Alcohol was used to clean the skin, and chloraprep was used to sterilize and prep the area in the lateral joint line aspect of the knee. Ethyl chloride spray was then used as a topical anesthetic. A 22-gauge needle was used to inject 4cc 1% lidocaine without epinephrine  and 1cc of 40mg/ml methylprednisolone into the knee. A sterile bandage was then applied. The patient tolerated the procedure well.

## 2024-02-05 NOTE — HISTORY OF PRESENT ILLNESS
[de-identified] : NELDA VERDIN  is an 89 year-old female presents today status post right hip cephalomedullary nail for displaced intertrochanteric hip fracture on 1/25/2023 for followup. The patient is living athome. The patient has maintained weight bearing as tolerated. The patient is ambulating with a walker for an assistive device and working with physical therapy. The patient has weaned off all narcotic pain medicine. The patient is progressing well.  She is suffering from some right knee pain and does have a known history of right knee arthritis   No fever, chills, or signs of infection. Today, patient does not state any other associated signs or complaints outside of those described. The patient presents for postoperative followup.

## 2024-02-05 NOTE — DISCUSSION/SUMMARY
[de-identified] : Now s/p right hip cephalomedullary nail on 1/25/2023 for follow-up. Overall doing well.  I encouraged her to continue working with physical therapy and Occupational Therapy to improve her strength.  She also suffering from right knee arthritis which seems to be more bothersome to her than her hip at this time.  She was given a steroid injection for symptom relief   At this point we have suggested continued exercises. Appropriate instructions regarding further care, restrictions, and further recovery has been given.    We remain available for any further questions and concerns and instructed patient that we would like to see them if any concerns for infection including fevers, redness, or increased swelling.  I would like to see her back in 3 months for repeat evaluation or sooner should symptoms worsen

## 2024-02-05 NOTE — PHYSICAL EXAM
[de-identified] : General Appearance / Station: Well developed, well nourished, in no acute distress Orientation: Oriented to person, place, and time Gait & Station: Ambulates with wheelchair and walker for assistive device Neurologic: Normal leg sensation Cardiovascular: Warm extremity Lymphatics: No lymphedema  OPERATIVE RIGHT HIP Skin: incisions clean, dry and intact. Well healing. No erythema, warmth or tenderness. Range of motion: Painless internal and external rotation of the hip. Strength: Within Normal Limits. Negative Trendelenburg sign                                                                      Neurovascular Exam: Able to wiggle toes and dorsiflex/ plantarflex ankle. Foot warm, well perfused  SYMPTOMATIC RIGHT KNEE: Alignment: Valgus Skin: normal Effusion: none . Quadriceps: normal . Range of motion: symmetrical but painful . PF crepitus: 1+. PF apprehension: none . Patella / Patella Tendon: nontender . Lachman's: negative  Valgus @ 30: negative. Varus @ 30: negative. Posterior drawer: negative. Palpation: TENDER AT MEDIAL AND LATERAL JOINT LINE.  [de-identified] : Imaging: AP Pelvis and lateral views of the right hip show satisfactory placement of a right hip cephalomedullary nail. There are no changes in alignment of hardware and no signs of radiographic failure compared to previous radiographs.  Imaging: Standing 4 views of the right knee show right knee severe arthritis worse in the lateral compartment with loss of joint space, subchondral sclerosis, marginal osteophyte formations, and subchondral cyst formation. There are no signs of fracture. There is no  knee effusion. The soft tissues appear unremarkable

## 2024-03-12 ENCOUNTER — APPOINTMENT (OUTPATIENT)
Dept: ORTHOPEDIC SURGERY | Facility: CLINIC | Age: 89
End: 2024-03-12
Payer: MEDICARE

## 2024-03-12 VITALS
BODY MASS INDEX: 26.43 KG/M2 | DIASTOLIC BLOOD PRESSURE: 73 MMHG | HEIGHT: 61 IN | SYSTOLIC BLOOD PRESSURE: 176 MMHG | WEIGHT: 140 LBS | HEART RATE: 50 BPM

## 2024-03-12 PROCEDURE — 99214 OFFICE O/P EST MOD 30 MIN: CPT

## 2024-03-12 PROCEDURE — 73502 X-RAY EXAM HIP UNI 2-3 VIEWS: CPT | Mod: RT

## 2024-03-12 RX ORDER — HYLAN G-F 20 16MG/2ML
16 SYRINGE (ML) INTRAARTICULAR
Qty: 1 | Refills: 0 | Status: ACTIVE | OUTPATIENT
Start: 2024-03-12

## 2024-03-12 RX ORDER — LIDOCAINE 5% 700 MG/1
5 PATCH TOPICAL
Qty: 30 | Refills: 1 | Status: ACTIVE | COMMUNITY
Start: 2024-03-12 | End: 1900-01-01

## 2024-03-12 NOTE — PHYSICAL EXAM
[de-identified] : General Appearance / Station: Well developed, well nourished, in no acute distress Orientation: Oriented to person, place, and time Gait & Station: Ambulates with wheelchair and walker for assistive device Neurologic: Normal leg sensation Cardiovascular: Warm extremity Lymphatics: No lymphedema  OPERATIVE RIGHT HIP Skin: incisions clean, dry and intact. Well healing. No erythema, warmth or tenderness.  Tenderness to palpation over lateral incision Range of motion: Painless internal and external rotation of the hip. Strength: Within Normal Limits. Negative Trendelenburg sign                                                                      Neurovascular Exam: Able to wiggle toes and dorsiflex/ plantarflex ankle. Foot warm, well perfused  SYMPTOMATIC RIGHT KNEE: Alignment: Valgus Skin: normal Effusion: none . Quadriceps: normal . Range of motion: symmetrical but painful . PF crepitus: 1+. PF apprehension: none . Patella / Patella Tendon: nontender . Lachman's: negative  Valgus @ 30: negative. Varus @ 30: negative. Posterior drawer: negative. Palpation: TENDER AT MEDIAL AND LATERAL JOINT LINE.  [de-identified] : Imaging: AP Pelvis and lateral views of the right hip show satisfactory placement of a right hip cephalomedullary nail. There are no changes in alignment of hardware and no signs of radiographic failure compared to previous radiographs.

## 2024-03-12 NOTE — DISCUSSION/SUMMARY
[de-identified] : I discussed with her that I would like her to continue walking with a walker and to place a lidocaine patch over the lateral aspect of the hip where it is most bothersome to her.  I like to see her back in about 1 month for repeat evaluation or sooner should her symptoms worsen.  I explained that if her symptoms have not improved I would like to obtain a CT scan of the right hip. Regarding her right knee arthritis, she does not have much relief with the cortisone injection she would like to try a series of 3 gel injections as it is provided with her more sustained release.  I have ordered the Synvisc injections and we will work to obtain authorization.  She will follow-up for those injections once they are authorized.

## 2024-04-03 ENCOUNTER — APPOINTMENT (OUTPATIENT)
Dept: UROLOGY | Facility: CLINIC | Age: 89
End: 2024-04-03

## 2024-04-12 ENCOUNTER — APPOINTMENT (OUTPATIENT)
Dept: ORTHOPEDIC SURGERY | Facility: CLINIC | Age: 89
End: 2024-04-12

## 2024-05-29 ENCOUNTER — INPATIENT (INPATIENT)
Facility: HOSPITAL | Age: 89
LOS: 5 days | Discharge: NURSING, REHAB OR LONG-TERM CARE | DRG: 690 | End: 2024-06-04
Attending: INTERNAL MEDICINE | Admitting: INTERNAL MEDICINE
Payer: MEDICARE

## 2024-05-29 VITALS
DIASTOLIC BLOOD PRESSURE: 72 MMHG | WEIGHT: 139.99 LBS | SYSTOLIC BLOOD PRESSURE: 163 MMHG | TEMPERATURE: 98 F | HEART RATE: 87 BPM | RESPIRATION RATE: 17 BRPM | OXYGEN SATURATION: 95 %
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
## 2024-05-29 DIAGNOSIS — Z86.73 PERSONAL HISTORY OF TRANSIENT ISCHEMIC ATTACK (TIA), AND CEREBRAL INFARCTION WITHOUT RESIDUAL DEFICITS: ICD-10-CM

## 2024-05-29 DIAGNOSIS — E83.52 HYPERCALCEMIA: ICD-10-CM

## 2024-05-29 DIAGNOSIS — Z29.9 ENCOUNTER FOR PROPHYLACTIC MEASURES, UNSPECIFIED: ICD-10-CM

## 2024-05-29 DIAGNOSIS — E11.9 TYPE 2 DIABETES MELLITUS WITHOUT COMPLICATIONS: ICD-10-CM

## 2024-05-29 DIAGNOSIS — I10 ESSENTIAL (PRIMARY) HYPERTENSION: ICD-10-CM

## 2024-05-29 DIAGNOSIS — N39.0 URINARY TRACT INFECTION, SITE NOT SPECIFIED: ICD-10-CM

## 2024-05-29 DIAGNOSIS — R93.89 ABNORMAL FINDINGS ON DIAGNOSTIC IMAGING OF OTHER SPECIFIED BODY STRUCTURES: ICD-10-CM

## 2024-05-29 DIAGNOSIS — N18.9 CHRONIC KIDNEY DISEASE, UNSPECIFIED: ICD-10-CM

## 2024-05-29 DIAGNOSIS — Z96.7 PRESENCE OF OTHER BONE AND TENDON IMPLANTS: Chronic | ICD-10-CM

## 2024-05-29 DIAGNOSIS — E78.5 HYPERLIPIDEMIA, UNSPECIFIED: ICD-10-CM

## 2024-05-29 LAB
ALBUMIN SERPL ELPH-MCNC: 3.8 G/DL — SIGNIFICANT CHANGE UP (ref 3.3–5)
ALP SERPL-CCNC: 106 U/L — SIGNIFICANT CHANGE UP (ref 40–120)
ALT FLD-CCNC: 24 U/L — SIGNIFICANT CHANGE UP (ref 12–78)
ANION GAP SERPL CALC-SCNC: 8 MMOL/L — SIGNIFICANT CHANGE UP (ref 5–17)
APPEARANCE UR: CLEAR — SIGNIFICANT CHANGE UP
APTT BLD: 25.8 SEC — SIGNIFICANT CHANGE UP (ref 24.5–35.6)
AST SERPL-CCNC: 22 U/L — SIGNIFICANT CHANGE UP (ref 15–37)
BACTERIA # UR AUTO: ABNORMAL /HPF
BASOPHILS # BLD AUTO: 0.04 K/UL — SIGNIFICANT CHANGE UP (ref 0–0.2)
BASOPHILS NFR BLD AUTO: 0.6 % — SIGNIFICANT CHANGE UP (ref 0–2)
BILIRUB SERPL-MCNC: 0.8 MG/DL — SIGNIFICANT CHANGE UP (ref 0.2–1.2)
BILIRUB UR-MCNC: NEGATIVE — SIGNIFICANT CHANGE UP
BUN SERPL-MCNC: 35 MG/DL — HIGH (ref 7–23)
CALCIUM SERPL-MCNC: 10.7 MG/DL — HIGH (ref 8.5–10.1)
CHLORIDE SERPL-SCNC: 106 MMOL/L — SIGNIFICANT CHANGE UP (ref 96–108)
CO2 SERPL-SCNC: 27 MMOL/L — SIGNIFICANT CHANGE UP (ref 22–31)
COLOR SPEC: YELLOW — SIGNIFICANT CHANGE UP
CREAT SERPL-MCNC: 1.8 MG/DL — HIGH (ref 0.5–1.3)
DIFF PNL FLD: NEGATIVE — SIGNIFICANT CHANGE UP
EGFR: 26 ML/MIN/1.73M2 — LOW
EOSINOPHIL # BLD AUTO: 0.42 K/UL — SIGNIFICANT CHANGE UP (ref 0–0.5)
EOSINOPHIL NFR BLD AUTO: 5.9 % — SIGNIFICANT CHANGE UP (ref 0–6)
EPI CELLS # UR: PRESENT
FLUAV AG NPH QL: SIGNIFICANT CHANGE UP
FLUBV AG NPH QL: SIGNIFICANT CHANGE UP
GLUCOSE SERPL-MCNC: 133 MG/DL — HIGH (ref 70–99)
GLUCOSE UR QL: NEGATIVE MG/DL — SIGNIFICANT CHANGE UP
HCT VFR BLD CALC: 38.7 % — SIGNIFICANT CHANGE UP (ref 34.5–45)
HGB BLD-MCNC: 12.7 G/DL — SIGNIFICANT CHANGE UP (ref 11.5–15.5)
IMM GRANULOCYTES NFR BLD AUTO: 0.6 % — SIGNIFICANT CHANGE UP (ref 0–0.9)
INR BLD: 1.03 RATIO — SIGNIFICANT CHANGE UP (ref 0.85–1.18)
KETONES UR-MCNC: NEGATIVE MG/DL — SIGNIFICANT CHANGE UP
LACTATE SERPL-SCNC: 1.3 MMOL/L — SIGNIFICANT CHANGE UP (ref 0.7–2)
LACTATE SERPL-SCNC: 2.6 MMOL/L — HIGH (ref 0.7–2)
LEUKOCYTE ESTERASE UR-ACNC: ABNORMAL
LYMPHOCYTES # BLD AUTO: 2.01 K/UL — SIGNIFICANT CHANGE UP (ref 1–3.3)
LYMPHOCYTES # BLD AUTO: 28.3 % — SIGNIFICANT CHANGE UP (ref 13–44)
MCHC RBC-ENTMCNC: 27.7 PG — SIGNIFICANT CHANGE UP (ref 27–34)
MCHC RBC-ENTMCNC: 32.8 GM/DL — SIGNIFICANT CHANGE UP (ref 32–36)
MCV RBC AUTO: 84.5 FL — SIGNIFICANT CHANGE UP (ref 80–100)
MONOCYTES # BLD AUTO: 0.74 K/UL — SIGNIFICANT CHANGE UP (ref 0–0.9)
MONOCYTES NFR BLD AUTO: 10.4 % — SIGNIFICANT CHANGE UP (ref 2–14)
NEUTROPHILS # BLD AUTO: 3.86 K/UL — SIGNIFICANT CHANGE UP (ref 1.8–7.4)
NEUTROPHILS NFR BLD AUTO: 54.2 % — SIGNIFICANT CHANGE UP (ref 43–77)
NITRITE UR-MCNC: POSITIVE
NRBC # BLD: 0 /100 WBCS — SIGNIFICANT CHANGE UP (ref 0–0)
PH UR: 5.5 — SIGNIFICANT CHANGE UP (ref 5–8)
PLATELET # BLD AUTO: 181 K/UL — SIGNIFICANT CHANGE UP (ref 150–400)
POTASSIUM SERPL-MCNC: 3.6 MMOL/L — SIGNIFICANT CHANGE UP (ref 3.5–5.3)
POTASSIUM SERPL-SCNC: 3.6 MMOL/L — SIGNIFICANT CHANGE UP (ref 3.5–5.3)
PROT SERPL-MCNC: 8 G/DL — SIGNIFICANT CHANGE UP (ref 6–8.3)
PROT UR-MCNC: NEGATIVE MG/DL — SIGNIFICANT CHANGE UP
PROTHROM AB SERPL-ACNC: 12 SEC — SIGNIFICANT CHANGE UP (ref 9.5–13)
RBC # BLD: 4.58 M/UL — SIGNIFICANT CHANGE UP (ref 3.8–5.2)
RBC # FLD: 13.8 % — SIGNIFICANT CHANGE UP (ref 10.3–14.5)
RBC CASTS # UR COMP ASSIST: 0 /HPF — SIGNIFICANT CHANGE UP (ref 0–4)
RSV RNA NPH QL NAA+NON-PROBE: SIGNIFICANT CHANGE UP
SARS-COV-2 RNA SPEC QL NAA+PROBE: SIGNIFICANT CHANGE UP
SODIUM SERPL-SCNC: 141 MMOL/L — SIGNIFICANT CHANGE UP (ref 135–145)
SP GR SPEC: 1.01 — SIGNIFICANT CHANGE UP (ref 1–1.03)
UROBILINOGEN FLD QL: 0.2 MG/DL — SIGNIFICANT CHANGE UP (ref 0.2–1)
WBC # BLD: 7.11 K/UL — SIGNIFICANT CHANGE UP (ref 3.8–10.5)
WBC # FLD AUTO: 7.11 K/UL — SIGNIFICANT CHANGE UP (ref 3.8–10.5)
WBC UR QL: 50 /HPF — HIGH (ref 0–5)

## 2024-05-29 PROCEDURE — 70450 CT HEAD/BRAIN W/O DYE: CPT | Mod: 26,MC

## 2024-05-29 PROCEDURE — 99285 EMERGENCY DEPT VISIT HI MDM: CPT

## 2024-05-29 PROCEDURE — 73630 X-RAY EXAM OF FOOT: CPT | Mod: 26,LT

## 2024-05-29 PROCEDURE — 73610 X-RAY EXAM OF ANKLE: CPT | Mod: 26,LT

## 2024-05-29 PROCEDURE — 76705 ECHO EXAM OF ABDOMEN: CPT | Mod: 26

## 2024-05-29 PROCEDURE — 93010 ELECTROCARDIOGRAM REPORT: CPT

## 2024-05-29 PROCEDURE — 73030 X-RAY EXAM OF SHOULDER: CPT | Mod: 26,RT

## 2024-05-29 PROCEDURE — 71250 CT THORAX DX C-: CPT | Mod: 26,MC

## 2024-05-29 PROCEDURE — 72125 CT NECK SPINE W/O DYE: CPT | Mod: 26,MC

## 2024-05-29 PROCEDURE — 99223 1ST HOSP IP/OBS HIGH 75: CPT | Mod: GC

## 2024-05-29 RX ORDER — INSULIN LISPRO 100/ML
VIAL (ML) SUBCUTANEOUS AT BEDTIME
Refills: 0 | Status: DISCONTINUED | OUTPATIENT
Start: 2024-05-29 | End: 2024-06-04

## 2024-05-29 RX ORDER — METOPROLOL TARTRATE 50 MG
100 TABLET ORAL DAILY
Refills: 0 | Status: DISCONTINUED | OUTPATIENT
Start: 2024-05-29 | End: 2024-05-30

## 2024-05-29 RX ORDER — DEXTROSE 50 % IN WATER 50 %
15 SYRINGE (ML) INTRAVENOUS ONCE
Refills: 0 | Status: DISCONTINUED | OUTPATIENT
Start: 2024-05-29 | End: 2024-06-04

## 2024-05-29 RX ORDER — METOPROLOL TARTRATE 50 MG
1 TABLET ORAL
Refills: 0 | DISCHARGE

## 2024-05-29 RX ORDER — SODIUM CHLORIDE 9 MG/ML
1000 INJECTION INTRAMUSCULAR; INTRAVENOUS; SUBCUTANEOUS ONCE
Refills: 0 | Status: COMPLETED | OUTPATIENT
Start: 2024-05-29 | End: 2024-05-29

## 2024-05-29 RX ORDER — DEXTROSE 50 % IN WATER 50 %
12.5 SYRINGE (ML) INTRAVENOUS ONCE
Refills: 0 | Status: DISCONTINUED | OUTPATIENT
Start: 2024-05-29 | End: 2024-06-04

## 2024-05-29 RX ORDER — ACETAMINOPHEN 500 MG
650 TABLET ORAL ONCE
Refills: 0 | Status: COMPLETED | OUTPATIENT
Start: 2024-05-29 | End: 2024-05-29

## 2024-05-29 RX ORDER — LIDOCAINE 4 G/100G
2 CREAM TOPICAL EVERY 24 HOURS
Refills: 0 | Status: DISCONTINUED | OUTPATIENT
Start: 2024-05-29 | End: 2024-06-04

## 2024-05-29 RX ORDER — CEFTRIAXONE 500 MG/1
1000 INJECTION, POWDER, FOR SOLUTION INTRAMUSCULAR; INTRAVENOUS EVERY 24 HOURS
Refills: 0 | Status: DISCONTINUED | OUTPATIENT
Start: 2024-05-30 | End: 2024-05-29

## 2024-05-29 RX ORDER — INSULIN LISPRO 100/ML
VIAL (ML) SUBCUTANEOUS
Refills: 0 | Status: DISCONTINUED | OUTPATIENT
Start: 2024-05-29 | End: 2024-06-04

## 2024-05-29 RX ORDER — HEPARIN SODIUM 5000 [USP'U]/ML
5000 INJECTION INTRAVENOUS; SUBCUTANEOUS EVERY 8 HOURS
Refills: 0 | Status: DISCONTINUED | OUTPATIENT
Start: 2024-05-29 | End: 2024-06-04

## 2024-05-29 RX ORDER — DEXTROSE 50 % IN WATER 50 %
25 SYRINGE (ML) INTRAVENOUS ONCE
Refills: 0 | Status: DISCONTINUED | OUTPATIENT
Start: 2024-05-29 | End: 2024-06-04

## 2024-05-29 RX ORDER — PIPERACILLIN AND TAZOBACTAM 4; .5 G/20ML; G/20ML
3.38 INJECTION, POWDER, LYOPHILIZED, FOR SOLUTION INTRAVENOUS ONCE
Refills: 0 | Status: DISCONTINUED | OUTPATIENT
Start: 2024-05-29 | End: 2024-05-29

## 2024-05-29 RX ORDER — PIPERACILLIN AND TAZOBACTAM 4; .5 G/20ML; G/20ML
3.38 INJECTION, POWDER, LYOPHILIZED, FOR SOLUTION INTRAVENOUS ONCE
Refills: 0 | Status: COMPLETED | OUTPATIENT
Start: 2024-05-29 | End: 2024-05-29

## 2024-05-29 RX ORDER — SODIUM CHLORIDE 9 MG/ML
1000 INJECTION, SOLUTION INTRAVENOUS
Refills: 0 | Status: DISCONTINUED | OUTPATIENT
Start: 2024-05-29 | End: 2024-06-04

## 2024-05-29 RX ORDER — GLUCAGON INJECTION, SOLUTION 0.5 MG/.1ML
1 INJECTION, SOLUTION SUBCUTANEOUS ONCE
Refills: 0 | Status: DISCONTINUED | OUTPATIENT
Start: 2024-05-29 | End: 2024-06-04

## 2024-05-29 RX ORDER — SODIUM CHLORIDE 9 MG/ML
1000 INJECTION INTRAMUSCULAR; INTRAVENOUS; SUBCUTANEOUS
Refills: 0 | Status: DISCONTINUED | OUTPATIENT
Start: 2024-05-29 | End: 2024-05-31

## 2024-05-29 RX ORDER — ATORVASTATIN CALCIUM 80 MG/1
40 TABLET, FILM COATED ORAL AT BEDTIME
Refills: 0 | Status: DISCONTINUED | OUTPATIENT
Start: 2024-05-29 | End: 2024-06-04

## 2024-05-29 RX ORDER — LIDOCAINE 4 G/100G
1 CREAM TOPICAL ONCE
Refills: 0 | Status: COMPLETED | OUTPATIENT
Start: 2024-05-29 | End: 2024-05-29

## 2024-05-29 RX ORDER — LOSARTAN POTASSIUM 100 MG/1
50 TABLET, FILM COATED ORAL DAILY
Refills: 0 | Status: DISCONTINUED | OUTPATIENT
Start: 2024-05-29 | End: 2024-06-03

## 2024-05-29 RX ORDER — SITAGLIPTIN 50 MG/1
1 TABLET, FILM COATED ORAL
Refills: 0 | DISCHARGE

## 2024-05-29 RX ORDER — DIPHENHYDRAMINE HCL 50 MG
25 CAPSULE ORAL ONCE
Refills: 0 | Status: COMPLETED | OUTPATIENT
Start: 2024-05-29 | End: 2024-05-29

## 2024-05-29 RX ORDER — ASPIRIN/CALCIUM CARB/MAGNESIUM 324 MG
81 TABLET ORAL
Refills: 0 | Status: DISCONTINUED | OUTPATIENT
Start: 2024-05-29 | End: 2024-06-04

## 2024-05-29 RX ORDER — ASPIRIN/CALCIUM CARB/MAGNESIUM 324 MG
1 TABLET ORAL
Refills: 0 | DISCHARGE

## 2024-05-29 RX ORDER — CEFTRIAXONE 500 MG/1
1000 INJECTION, POWDER, FOR SOLUTION INTRAMUSCULAR; INTRAVENOUS ONCE
Refills: 0 | Status: COMPLETED | OUTPATIENT
Start: 2024-05-29 | End: 2024-05-29

## 2024-05-29 RX ORDER — ACETAMINOPHEN 500 MG
650 TABLET ORAL EVERY 6 HOURS
Refills: 0 | Status: DISCONTINUED | OUTPATIENT
Start: 2024-05-29 | End: 2024-06-04

## 2024-05-29 RX ORDER — DEXTROSE 10 % IN WATER 10 %
125 INTRAVENOUS SOLUTION INTRAVENOUS ONCE
Refills: 0 | Status: DISCONTINUED | OUTPATIENT
Start: 2024-05-29 | End: 2024-06-04

## 2024-05-29 RX ADMIN — PIPERACILLIN AND TAZOBACTAM 200 GRAM(S): 4; .5 INJECTION, POWDER, LYOPHILIZED, FOR SOLUTION INTRAVENOUS at 16:50

## 2024-05-29 RX ADMIN — Medication 650 MILLIGRAM(S): at 22:08

## 2024-05-29 RX ADMIN — Medication 25 MILLIGRAM(S): at 22:08

## 2024-05-29 RX ADMIN — SODIUM CHLORIDE 1000 MILLILITER(S): 9 INJECTION INTRAMUSCULAR; INTRAVENOUS; SUBCUTANEOUS at 12:55

## 2024-05-29 RX ADMIN — SODIUM CHLORIDE 60 MILLILITER(S): 9 INJECTION INTRAMUSCULAR; INTRAVENOUS; SUBCUTANEOUS at 16:00

## 2024-05-29 RX ADMIN — CEFTRIAXONE 1000 MILLIGRAM(S): 500 INJECTION, POWDER, FOR SOLUTION INTRAMUSCULAR; INTRAVENOUS at 11:30

## 2024-05-29 RX ADMIN — LIDOCAINE 1 PATCH: 4 CREAM TOPICAL at 14:24

## 2024-05-29 RX ADMIN — SODIUM CHLORIDE 1000 MILLILITER(S): 9 INJECTION INTRAMUSCULAR; INTRAVENOUS; SUBCUTANEOUS at 11:53

## 2024-05-29 RX ADMIN — CEFTRIAXONE 100 MILLIGRAM(S): 500 INJECTION, POWDER, FOR SOLUTION INTRAMUSCULAR; INTRAVENOUS at 10:35

## 2024-05-29 RX ADMIN — Medication 0: at 17:42

## 2024-05-29 NOTE — ED ADULT NURSE NOTE - NSFALLRISKINTERV_ED_ALL_ED

## 2024-05-29 NOTE — H&P ADULT - PROBLEM SELECTOR PLAN 6
chronic; 163/72 on admission  continue home losartant, hctz, losartan with parameters chronic; 163/72 on admission  continue home losartan, hctz with parameters chronic; 163/72 on admission  continue home losartan, hctz, metoprolol with parameters

## 2024-05-29 NOTE — H&P ADULT - NSHPPHYSICALEXAM_GEN_ALL_CORE
VITALS:   T(C): 36.6 (05-29-24 @ 09:56), Max: 36.6 (05-29-24 @ 09:56)  HR: 87 (05-29-24 @ 09:56) (87 - 87)  BP: 163/72 (05-29-24 @ 09:56) (163/72 - 163/72)  RR: 17 (05-29-24 @ 09:56) (17 - 17)  SpO2: 95% (05-29-24 @ 09:56) (95% - 95%)    GENERAL: NAD, lying in bed comfortably  HEAD:  Atraumatic, normocephalic  EYES: EOMI, PERRLA, conjunctiva and sclera clear  ENT: Moist mucous membranes  NECK: Supple, no JVD  HEART: Regular rate and rhythm, no murmurs, rubs, or gallops  LUNGS: Unlabored respirations.  Clear to auscultation bilaterally, no crackles, wheezing, or rhonchi  ABDOMEN: Soft, nontender, nondistended, +BS  EXTREMITIES: 2+ peripheral pulses bilaterally. No clubbing, cyanosis, or edema  NERVOUS SYSTEM:  A&Ox3, no focal deficits   SKIN: No rashes or lesions VITALS:   T(C): 36.6 (05-29-24 @ 09:56), Max: 36.6 (05-29-24 @ 09:56)  HR: 87 (05-29-24 @ 09:56) (87 - 87)  BP: 163/72 (05-29-24 @ 09:56) (163/72 - 163/72)  RR: 17 (05-29-24 @ 09:56) (17 - 17)  SpO2: 95% (05-29-24 @ 09:56) (95% - 95%)    GENERAL: NAD, lying in bed comfortably  HEAD:  Atraumatic, normocephalic  EYES: EOMI, PERRLA, conjunctiva and sclera clear  ENT: Moist mucous membranes  NECK: Supple, no JVD  HEART: Regular rate and rhythm, no murmurs, rubs, or gallops  LUNGS: Unlabored respirations.  Clear to auscultation bilaterally, no crackles, wheezing, or rhonchi  ABDOMEN: Soft, nontender, nondistended, +BS  EXTREMITIES: 2+ peripheral pulses bilaterally. + LLE edema  NERVOUS SYSTEM:  A&Ox2 (person, place), no focal deficits   SKIN: + L dorsal foot ecchymosis, + R shoulder ecchymosis VITALS:   T(C): 36.6 (05-29-24 @ 09:56), Max: 36.6 (05-29-24 @ 09:56)  HR: 87 (05-29-24 @ 09:56) (87 - 87)  BP: 163/72 (05-29-24 @ 09:56) (163/72 - 163/72)  RR: 17 (05-29-24 @ 09:56) (17 - 17)  SpO2: 95% (05-29-24 @ 09:56) (95% - 95%)    GENERAL: NAD, lying in bed comfortably  HEAD:  Atraumatic, normocephalic  EYES: EOMI, PERRLA, conjunctiva and sclera clear  ENT: Moist mucous membranes  NECK: Supple, no JVD  HEART: Regular rate and rhythm, no murmurs, rubs, or gallops  LUNGS: Unlabored respirations.  Clear to auscultation bilaterally, no crackles, wheezing, or rhonchi  ABDOMEN: Soft, + RUQ TTP; nondistended, +BS  EXTREMITIES: 2+ peripheral pulses bilaterally. + LLE edema, + TTP L dorsal foot, + TTP R shoulder  NERVOUS SYSTEM:  A&Ox2 (person, place), no focal deficits   SKIN: + L dorsal foot ecchymosis, + R shoulder ecchymosis

## 2024-05-29 NOTE — ED PROVIDER NOTE - OBJECTIVE STATEMENT
89-year-old female history of CKD with creatinine of 1.8, diabetes, hypertension, takes low-dose aspirin every other day, also takes water pill every other here with concern for UTI as well as evaluation after fall.  On Thursday patient dropped her walker on her left foot causing pain and bruising.  On Friday she fell, family noted bruising on her right shoulder.  Patient also has been complaining of rib pain on the right since then.  It is foul-smelling urine.  Was on antibiotic a week ago however has been off recently.  Family notes increased confusion and states patient felt hot yesterday however no fever.  PMD Dr. Jean Schmid.

## 2024-05-29 NOTE — CONSULT NOTE ADULT - ASSESSMENT
[facility]  Chart Reviewed-Full Consult to follow for any immediate concerns please fell free to contact us directly at  [phone] and have us paged or text my cell # [phone]  Chuy Macias MD PhD   89 yo female w HTN, HLD, T2DM, TIA, CKD III previous right hip fx w/ surgical pinning presented to the ED for multiple falls and foul smelling urine. Diagnosed w/ UTI by her PCP x2 wks ago, had been taking cefuroxime with no improvement in urinary symptoms. Pt noticed foul smelling urine, several falls over the past week.    RECOMMENDATIONS  Pyelonephritis  noted urinary frequency, suprapubic tenderness, pyuria, frequent UTIs, no RUQ pain, no imaging, biochemical or symptoms to suggest alternate diagnosis other than gallbladder which is benign on exam so agree with  Zosyn started 5/29 continue for now  recs to follow     Recurrent UTIs will want to address recurrences after Rx for acute issues    Thank you for consulting us and involving us in the management of this most interesting and challenging case.  We will follow along in the care of this patient. Please call us at [phone] or text me directly on my cell# at [phone] with any concerns.

## 2024-05-29 NOTE — ED ADULT NURSE NOTE - CHIEF COMPLAINT QUOTE
Left lower extremity swelling and pain after dropping walker on foot Thursday. S/p fall over walker on Friday and c/o right rib pain. Pt dx with UTI x weeks ago, but urine still has foul odor and pt is weaker than normal. Pt denies SOB. Pt [place] and left hearing aides at home.

## 2024-05-29 NOTE — CONSULT NOTE ADULT - SUBJECTIVE AND OBJECTIVE BOX
[facility] DIV[facility] of INFECTIOUS DISEASE  Chuy Macias MD PhD, Rekha Gonzalez MD, Claudette Holguin MD, Jan Thacker MD, Camilo Arroyo MD  and providing coverage with Jonathan Grijalva MD  Providing Infectious Disease Consultations at [facility], [facility], [place], [facility], [facility]'s    Office# [phone] to schedule follow up appointments  Answering Service for urgent calls or New Consults [phone]  Cell# to text for urgent issues Chuy Gilberto [phone]     HPI:  89 yo female w HTN, HLD, T2DM, TIA, CKD III previous right hip fx w/ surgical pinning presented to the ED for multiple falls and foul smelling urine. Ddiagnosed w/ UTI by her PCP x2 wks ago, had been taking cefuroxime for the past 2 weeks with no improvement in urinary symptoms. Pt noticed foul smelling urine, several falls over the past week. Last Thursday, she dropped her walker on her L leg and subsequently noticed bruising and swelling. On Friday, she states that her walker tipped over, subsequently she fell on her R-side. She c/o R-sided abdominal pain which she attributes to her fall. Denies LOC, head trauma. Denies fevers, chills, dysuria, hematuria, N/V/D/C.     In the ED   VS: T(F): 97.8 HR: 87 BP: 163/72 RR: 17 SpO2: 95%  Labs show: H.7  WBC:7.11  Plt:181  Creatinine:1.8 Ca. 10.7 Lactate 2.6  UA: postive nitrites, Large leuks, mod zoltan,   S/p: 1g rocephin, 1L NS  Imaging  CT HEAD: There is no acute intracranial hemorrhage or depressed calvarial fracture. Chronic findings as above.  CT CERVICAL SPINE: No fracture or acute traumatic malalignment. Advanced multilevel degenerative changes of the cervical spine.  CT Chest: Circumferential gallbladder wall thickening, Few bilateral pulmonary nodules.         (29 May 2024 13:58)      PAST MEDICAL & SURGICAL HISTORY:  Hypertension      Diabetes      TIA (transient ischemic attack)      Hip fracture      HLD (hyperlipidemia)      S/P ORIF (open reduction internal fixation) fracture  right hip          Antimicrobials  piperacillin/tazobactam IVPB. 3.375 Gram(s) IV Intermittent once  piperacillin/tazobactam IVPB.- 3.375 Gram(s) IV Intermittent once      Immunological      Other  acetaminophen     Tablet .. 650 milliGRAM(s) Oral every 6 hours PRN  aspirin enteric coated 81 milliGRAM(s) Oral <User Schedule>  atorvastatin 40 milliGRAM(s) Oral at bedtime  dextrose 10% Bolus 125 milliLiter(s) IV Bolus once  dextrose 5%. 1000 milliLiter(s) IV Continuous <Continuous>  dextrose 5%. 1000 milliLiter(s) IV Continuous <Continuous>  dextrose 50% Injectable 25 Gram(s) IV Push once  dextrose 50% Injectable 12.5 Gram(s) IV Push once  dextrose Oral Gel 15 Gram(s) Oral once PRN  glucagon  Injectable 1 milliGRAM(s) IntraMuscular once  heparin   Injectable 5000 Unit(s) SubCutaneous every 8 hours  hydrochlorothiazide 12.5 milliGRAM(s) Oral daily  insulin lispro (ADMELOG) corrective regimen sliding scale   SubCutaneous at bedtime  insulin lispro (ADMELOG) corrective regimen sliding scale   SubCutaneous three times a day before meals  lidocaine   4% Patch 2 Patch Transdermal every 24 hours  losartan 50 milliGRAM(s) Oral daily  metoprolol succinate  milliGRAM(s) Oral daily  sodium chloride 0.9%. 1000 milliLiter(s) IV Continuous <Continuous>      Allergies    No Known Allergies    Intolerances        SOCIAL HISTORY:  Social History:  denies drug use  Tobacco: Former, quit  30+ years   EtOH:  Denies  Recreational drug use: Denies  Lives with: Alone  Ambulates: Walker  ADLs: Independent (29 May 2024 13:58)      FAMILY HISTORY:  FH: asthma (Father)        ROS:    EYES:  Negative  blurry vision or double vision  GASTROINTESTINAL:  Negative for nausea, vomiting, diarrhea  -otherwise negative except for subjective    Vital Signs Last 24 Hrs  T(C): 36.6 (29 May 2024 09:56), Max: 36.6 (29 May 2024 09:56)  T(F): 97.8 (29 May 2024 09:56), Max: 97.8 (29 May 2024 09:56)  HR: 87 (29 May 2024 09:56) (87 - 87)  BP: 163/72 (29 May 2024 09:56) (163/72 - 163/72)  BP(mean): --  RR: 17 (29 May 2024 09:56) (17 - 17)  SpO2: 95% (29 May 2024 09:56) (95% - 95%)    Parameters below as of 29 May 2024 09:56  Patient On (Oxygen Delivery Method): room air        PE:  In no distress  HEENT:  NC, PERRL, sclerae anicteric, conjunctivae clear, EOMI.  Sinuses nontender, no nasal exudate.  No buccal or pharyngeal lesions, erythema or exudate  Neck:  Supple, no adenopathy  Lungs:  No accessory muscle use, bilaterally clear to auscultation  Cor:  distant  Abd:  Symmetric, normoactive BS.  Soft, supra pubic area is tender, no masses, guarding or rebound.  Liver and spleen not enlarged  Extrem:  No cyanosis or edema  Skin:  No rashes.  Neuro: grossly intact  Musc: moving all limbs freely, no focal deficits        LABS:                        12.7   7.11  )-----------( 181      ( 29 May 2024 10:15 )             38.7       WBC Count: 7.11 K/uL (24 @ 10:15)          141  |  106  |  35<H>  ----------------------------<  133<H>  3.6   |  27  |  1.80<H>    Ca    10.7<H>      29 May 2024 10:15    TPro  8.0  /  Alb  3.8  /  TBili  0.8  /  DBili  x   /  AST  22  /  ALT  24  /  AlkPhos  106        Creatinine: 1.80 mg/dL (24 @ 10:15)      Urinalysis Basic - ( 29 May 2024 10:15 )    Color: Yellow / Appearance: Clear / S.009 / pH: x  Gluc: 133 mg/dL / Ketone: Negative mg/dL  / Bili: Negative / Urobili: 0.2 mg/dL   Blood: x / Protein: Negative mg/dL / Nitrite: Positive   Leuk Esterase: Large / RBC: 0 /HPF / WBC 50 /HPF   Sq Epi: x / Non Sq Epi: x / Bacteria: Moderate /HPF      MICROBIOLOGY:      RADIOLOGY & ADDITIONAL STUDIES:    --< from: CT Chest No Cont (24 @ 11:34) >    ACC: [id number] EXAM:  CT CHEST   ORDERED BY: BARBARA SMALL     PROCEDURE DATE:  2024          INTERPRETATION:  CLINICAL INFORMATION: Given history is a 89-year-old   female status post fall with rib pain.    COMPARISON: None.    CONTRAST/COMPLICATIONS:  IV Contrast: NONE  Oral Contrast: NONE  Complications: None reported at time of study completion    PROCEDURE:  CT of the Chest was performed.  Sagittal and coronal reformats were performed.    FINDINGS:    LUNGS AND AIRWAYS: Patent central airways.  Lungs are without   consolidation. Bibasilar linear atelectasis and/or scarring. Scarring   within the inferior lingula with adjacent mild bronchiolectasis and focal   paraseptal emphysema.. Mild bronchiolectasis within the right middle lobe   and lung bases.  Few bilateral micronodules with referenced nodules below.    A 4 mm calcified nodule within the left apex series 310 image 113   consistent with a calcified granuloma.  1 to 2 mm subpleural nodule left upper lobe anteriorly image 244  3 mm groundglass nodule opacity left upper lobe image 259  4 mm nodule superior segment left lower lobe image 306  3 mm pleural-based nodule right apex image 109.  2-3 mm nodule right upper lobe anteriorly image 151  PLEURA: No pleural effusion. No pneumothorax.  MEDIASTINUM AND QUINN: No lymphadenopathy.No  Pneumomediastinum or   mediastinal hematoma.  VESSELS: Within normal limits of caliber. Scattered atherosclerotic   calcifications of the thoracic aorta are noted..  HEART: Heart size is upper limit of normal to mildly enlarged. No   pericardial effusion. Coronary artery calcifications and/or stents are   present.  CHEST WALL AND LOWER NECK: Lower neck is Within normal limits. Within the   left anterolateral chest wall is a 1.2 cm low-attenuation nodular density   below the skin surface with adjacent punctate calcification. This may   represent a sebaceous cyst. Correlate with physical exam. The chest wall   is otherwise within normal limits.  VISUALIZED UPPER ABDOMEN: 1.2 cm and 2.0 cm splenules are seen near the   inferior splenic tip. The included unenhanced liver is within normal   limits. Circumferential gallbladder wall thickening is noted. This is   nonspecific can be seen with cholecystitis, hypoproteinemic states,   adjacent inflammatory states, cholesterolosis . Correlate with   symptomatology and laboratory values.. Further evaluation with ultrasound   and/or HIDA scan can be obtained as indicated. The remainder of the   included unenhanced upper abdomen is Within normal limits. The abdominal   aorta is normal in caliber with scattered atherosclerotic calcifications   as well as of its branches.  BONES: No evidence of rib fractures. No fractures of the chest. Findings   suggestive of bilateral shoulder calcific tendinosis. Osteopenia and   Multilevel degenerative change of the spine.    IMPRESSION:  No CT evidence of acute chest injury.    No evidence of rib fractures    Circumferential gallbladder wall thickening .This is nonspecific and can   be seenwith cholecystitis, hypoproteinemic states, adjacent inflammatory   states, cholesterolosis . Correlate with symptomatology and laboratory   values.. Further evaluation with ultrasound and/or HIDA scan can be   obtained as indicated.    Few bilateralpulmonary nodules. These are nonspecific. As per the   Fleischner Society guidelines, if patient is a low-risk patient, no   follow-up is necessary. If patient is a high-risk patient, optional CT   chest can be obtained in 12 months.     [facility]FECTIOUS DISEASE  Chuy Macias MD PhD, Rekha Gonzalez MD, Claudette Holguin MD, Jan Thacker MD, Camilo Arroyo MD  and providing coverage with Jonathan Grijalva MD  Providing Infectious Disease Consultations at [facility], [facility], [place], [facility], [facility]'s    Office# [phone] to schedule follow up appointments  Answering Service for urgent calls or New Consults [phone]  Cell# to text for urgent issues Chuy Gilberto [phone]     HPI:  89 yo female w HTN, HLD, T2DM, TIA, CKD III previous right hip fx w/ surgical pinning presented to the ED for multiple falls and foul smelling urine. Ddiagnosed w/ UTI by her PCP x2 wks ago, had been taking cefuroxime for the past 2 weeks with no improvement in urinary symptoms. Pt noticed foul smelling urine, several falls over the past week. Last Thursday, she dropped her walker on her L leg and subsequently noticed bruising and swelling. On Friday, she states that her walker tipped over, subsequently she fell on her R-side. She c/o R-sided abdominal pain which she attributes to her fall. Denies LOC, head trauma. Denies fevers, chills, dysuria, hematuria, N/V/D/C.     In the ED   VS: T(F): 97.8 HR: 87 BP: 163/72 RR: 17 SpO2: 95%  Labs show: H.7  WBC:7.11  Plt:181  Creatinine:1.8 Ca. 10.7 Lactate 2.6  UA: postive nitrites, Large leuks, mod zoltan,   S/p: 1g rocephin, 1L NS  Imaging  CT HEAD: There is no acute intracranial hemorrhage or depressed calvarial fracture. Chronic findings as above.  CT CERVICAL SPINE: No fracture or acute traumatic malalignment. Advanced multilevel degenerative changes of the cervical spine.  CT Chest: Circumferential gallbladder wall thickening, Few bilateral pulmonary nodules.         (29 May 2024 13:58)      PAST MEDICAL & SURGICAL HISTORY:  Hypertension      Diabetes      TIA (transient ischemic attack)      Hip fracture      HLD (hyperlipidemia)      S/P ORIF (open reduction internal fixation) fracture  right hip          Antimicrobials  piperacillin/tazobactam IVPB. 3.375 Gram(s) IV Intermittent once  piperacillin/tazobactam IVPB.- 3.375 Gram(s) IV Intermittent once      Immunological      Other  acetaminophen     Tablet .. 650 milliGRAM(s) Oral every 6 hours PRN  aspirin enteric coated 81 milliGRAM(s) Oral <User Schedule>  atorvastatin 40 milliGRAM(s) Oral at bedtime  dextrose 10% Bolus 125 milliLiter(s) IV Bolus once  dextrose 5%. 1000 milliLiter(s) IV Continuous <Continuous>  dextrose 5%. 1000 milliLiter(s) IV Continuous <Continuous>  dextrose 50% Injectable 25 Gram(s) IV Push once  dextrose 50% Injectable 12.5 Gram(s) IV Push once  dextrose Oral Gel 15 Gram(s) Oral once PRN  glucagon  Injectable 1 milliGRAM(s) IntraMuscular once  heparin   Injectable 5000 Unit(s) SubCutaneous every 8 hours  hydrochlorothiazide 12.5 milliGRAM(s) Oral daily  insulin lispro (ADMELOG) corrective regimen sliding scale   SubCutaneous at bedtime  insulin lispro (ADMELOG) corrective regimen sliding scale   SubCutaneous three times a day before meals  lidocaine   4% Patch 2 Patch Transdermal every 24 hours  losartan 50 milliGRAM(s) Oral daily  metoprolol succinate  milliGRAM(s) Oral daily  sodium chloride 0.9%. 1000 milliLiter(s) IV Continuous <Continuous>      Allergies    No Known Allergies    Intolerances        SOCIAL HISTORY:  Social History:  denies drug use  Tobacco: Former, quit  30+ years   EtOH:  Denies  Recreational drug use: Denies  Lives with: Alone  Ambulates: Walker  ADLs: Independent (29 May 2024 13:58)      FAMILY HISTORY:  FH: asthma (Father)        ROS:    EYES:  Negative  blurry vision or double vision  GASTROINTESTINAL:  Negative for nausea, vomiting, diarrhea  -otherwise negative except for subjective    Vital Signs Last 24 Hrs  T(C): 36.6 (29 May 2024 09:56), Max: 36.6 (29 May 2024 09:56)  T(F): 97.8 (29 May 2024 09:56), Max: 97.8 (29 May 2024 09:56)  HR: 87 (29 May 2024 09:56) (87 - 87)  BP: 163/72 (29 May 2024 09:56) (163/72 - 163/72)  BP(mean): --  RR: 17 (29 May 2024 09:56) (17 - 17)  SpO2: 95% (29 May 2024 09:56) (95% - 95%)    Parameters below as of 29 May 2024 09:56  Patient On (Oxygen Delivery Method): room air        PE:  In no distress  HEENT:  NC, PERRL, sclerae anicteric, conjunctivae clear, EOMI.  Sinuses nontender, no nasal exudate.  No buccal or pharyngeal lesions, erythema or exudate  Neck:  Supple, no adenopathy  Lungs:  No accessory muscle use, bilaterally clear to auscultation  Cor:  distant  Abd:  Symmetric, normoactive BS.  Soft, supra pubic area is tender, no masses, guarding or rebound.  Liver and spleen not enlarged  Extrem:  No cyanosis, swelling and bruising of left foot  Neuro: grossly intact  Musc: moving all limbs freely, no focal deficits        LABS:                        12.7   7.11  )-----------( 181      ( 29 May 2024 10:15 )             38.7       WBC Count: 7.11 K/uL (24 @ 10:15)          141  |  106  |  35<H>  ----------------------------<  133<H>  3.6   |  27  |  1.80<H>    Ca    10.7<H>      29 May 2024 10:15    TPro  8.0  /  Alb  3.8  /  TBili  0.8  /  DBili  x   /  AST  22  /  ALT  24  /  AlkPhos  106        Creatinine: 1.80 mg/dL (24 @ 10:15)      Urinalysis Basic - ( 29 May 2024 10:15 )    Color: Yellow / Appearance: Clear / S.009 / pH: x  Gluc: 133 mg/dL / Ketone: Negative mg/dL  / Bili: Negative / Urobili: 0.2 mg/dL   Blood: x / Protein: Negative mg/dL / Nitrite: Positive   Leuk Esterase: Large / RBC: 0 /HPF / WBC 50 /HPF   Sq Epi: x / Non Sq Epi: x / Bacteria: Moderate /HPF      MICROBIOLOGY:      RADIOLOGY & ADDITIONAL STUDIES:    --< from: CT Chest No Cont (24 @ 11:34) >    ACC: [id number] EXAM:  CT CHEST   ORDERED BY: BARBARA SMALL     PROCEDURE DATE:  2024          INTERPRETATION:  CLINICAL INFORMATION: Given history is a 89-year-old   female status post fall with rib pain.    COMPARISON: None.    CONTRAST/COMPLICATIONS:  IV Contrast: NONE  Oral Contrast: NONE  Complications: None reported at time of study completion    PROCEDURE:  CT of the Chest was performed.  Sagittal and coronal reformats were performed.    FINDINGS:    LUNGS AND AIRWAYS: Patent central airways.  Lungs are without   consolidation. Bibasilar linear atelectasis and/or scarring. Scarring   within the inferior lingula with adjacent mild bronchiolectasis and focal   paraseptal emphysema.. Mild bronchiolectasis within the right middle lobe   and lung bases.  Few bilateral micronodules with referenced nodules below.    A 4 mm calcified nodule within the left apex series 310 image 113   consistent with a calcified granuloma.  1 to 2 mm subpleural nodule left upper lobe anteriorly image 244  3 mm groundglass nodule opacity left upper lobe image 259  4 mm nodule superior segment left lower lobe image 306  3 mm pleural-based nodule right apex image 109.  2-3 mm nodule right upper lobe anteriorly image 151  PLEURA: No pleural effusion. No pneumothorax.  MEDIASTINUM AND QUINN: No lymphadenopathy.No  Pneumomediastinum or   mediastinal hematoma.  VESSELS: Within normal limits of caliber. Scattered atherosclerotic   calcifications of the thoracic aorta are noted..  HEART: Heart size is upper limit of normal to mildly enlarged. No   pericardial effusion. Coronary artery calcifications and/or stents are   present.  CHEST WALL AND LOWER NECK: Lower neck is Within normal limits. Within the   left anterolateral chest wall is a 1.2 cm low-attenuation nodular density   below the skin surface with adjacent punctate calcification. This may   represent a sebaceous cyst. Correlate with physical exam. The chest wall   is otherwise within normal limits.  VISUALIZED UPPER ABDOMEN: 1.2 cm and 2.0 cm splenules are seen near the   inferior splenic tip. The included unenhanced liver is within normal   limits. Circumferential gallbladder wall thickening is noted. This is   nonspecific can be seen with cholecystitis, hypoproteinemic states,   adjacent inflammatory states, cholesterolosis . Correlate with   symptomatology and laboratory values.. Further evaluation with ultrasound   and/or HIDA scan can be obtained as indicated. The remainder of the   included unenhanced upper abdomen is Within normal limits. The abdominal   aorta is normal in caliber with scattered atherosclerotic calcifications   as well as of its branches.  BONES: No evidence of rib fractures. No fractures of the chest. Findings   suggestive of bilateral shoulder calcific tendinosis. Osteopenia and   Multilevel degenerative change of the spine.    IMPRESSION:  No CT evidence of acute chest injury.    No evidence of rib fractures    Circumferential gallbladder wall thickening .This is nonspecific and can   be seenwith cholecystitis, hypoproteinemic states, adjacent inflammatory   states, cholesterolosis . Correlate with symptomatology and laboratory   values.. Further evaluation with ultrasound and/or HIDA scan can be   obtained as indicated.    Few bilateralpulmonary nodules. These are nonspecific. As per the   Fleischner Society guidelines, if patient is a low-risk patient, no   follow-up is necessary. If patient is a high-risk patient, optional CT   chest can be obtained in 12 months.

## 2024-05-29 NOTE — CHART NOTE - NSCHARTNOTEFT_GEN_A_CORE
Called by RN for rash on face     Patient seen and examined at bedside.   Patient with erythematous rash on cheeks and right ear Called by RN for rash on face     Patient seen and examined at bedside.   Patient with erythematous rash on cheeks and right ear. Appears to be drug reaction. She denies any SOB or chest tightness   Will DC Zosyn for now as patient received prior doses  Will give Benadryl PO x1 and Tylenol PO x1   Continue to Monitor Called by RN for rash on face     Patient seen and examined at bedside.   Patient with erythematous rash on cheeks and right ear. Appears to be drug reaction. She denies any SOB or chest tightness   Will DC Zosyn for now as patient received prior doses  Will give Benadryl PO x1 and Tylenol PO x1   Continue to Monitor      0627 ADDENDUM  Called by RN, pt with elevated BP. Pt had not received home BP meds in ED, advised to give PO home medications. Pt seen and examined, she denies any symptoms including headache, dizziness, lightheadedness, chest pain, shortness of breath, blurry vision, nausea, vomiting, AP.     Repeat BP later taken, remains elevated now manually 200/58. Will give IV Hydralazine 10mg x1 dose. RN to please call with any changes Called by RN for rash on face     Patient seen and examined at bedside.   Patient with erythematous rash on cheeks and right ear. Appears to be drug reaction. She denies any SOB or chest tightness   Will DC Zosyn for now as patient received prior doses  Will give Benadryl PO x1 and Tylenol PO x1   Continue to Monitor      ADDENDUM  Called by RN, pt with elevated BP. Pt had not received home BP meds in ED, advised to give PO home medications. Pt seen and examined, she denies any symptoms including headache, dizziness, lightheadedness, chest pain, shortness of breath, blurry vision, nausea, vomiting, AP. Pt c/o burning with urination, admitted with UTI. Will trial dose of pyridium.    0627 ADDENDUM    Repeat BP later taken, remains elevated now manually 200/58. Will give IV Hydralazine 10mg x1 dose. RN to please call with any changes

## 2024-05-29 NOTE — H&P ADULT - PROBLEM SELECTOR PLAN 3
Chest CT:    Pulmonary nodules on Chest CT  former smoker, quit 50 years ago    Circumferential gallbladder wall thickening, f/u US gall bladder  bilirubin wnl # CT Chest: Circumferential gallbladder wall thickening  - bilirubin wnl  - f/u US gall bladder    # CT Chest: Few bilateral pulmonary nodules.  - pt informed of imaging findings and recommendation for follow up with CT Chest in 12 months # CT Chest: Circumferential gallbladder wall thickening  - bilirubin wnl  - f/u US gall bladder    # CT Chest: Few bilateral pulmonary nodules.  - pt and daughter at bedside informed of imaging findings and recommendation for follow up with CT Chest in 12 months

## 2024-05-29 NOTE — H&P ADULT - NSHPREVIEWOFSYSTEMS_GEN_ALL_CORE
REVIEW OF SYSTEMS:    CONSTITUTIONAL:  No weakness, fevers or chills  EYES/ENT:  No visual changes;  No vertigo or throat pain   NECK:  No pain or stiffness  RESPIRATORY:  No cough, wheezing, hemoptysis; No shortness of breath  CARDIOVASCULAR:  No chest pain or palpitations  GASTROINTESTINAL:  No abdominal or epigastric pain. No nausea, vomiting, or hematemesis; No diarrhea or constipation. No melena or hematochezia.  GENITOURINARY:  No dysuria, frequency or hematuria  NEUROLOGICAL:  No numbness or weakness  SKIN:  No itching, rashes CONSTITUTIONAL:  No weakness, fevers or chills  EYES/ENT:  No visual changes;  No vertigo or throat pain   NECK:  No pain or stiffness  RESPIRATORY:  No cough, wheezing, hemoptysis; No shortness of breath  CARDIOVASCULAR:  No chest pain or palpitations; + LLE edema  GASTROINTESTINAL:  + R-sided abdominal , No epigastric pain. No nausea, vomiting, or hematemesis; No diarrhea or constipation. No melena or hematochezia.  GENITOURINARY:  No dysuria, frequency or hematuria  NEUROLOGICAL:  No numbness or weakness  SKIN:  + L foot ecchymosis, + R shoulder ecchymosis

## 2024-05-29 NOTE — H&P ADULT - PROBLEM SELECTOR PLAN 2
Mechanical fall  CTH and Neck: No fracture or acute traumatic malalignment. Advanced multilevel degenerative changes of the cervical spine.  Pain regiment: tylenol PO prn and lidocaine patch  fall precautions  f/u orthostatics Mechanical fall  CT HEAD: There is no acute intracranial hemorrhage or depressed calvarial fracture  CT CERVICAL SPINE: No fracture or acute traumatic malalignment. Advanced multilevel degenerative changes of the cervical spine.  XR Right shoulder: Some osteoarthritic changes noted. No acute fracture or   dislocation noted. There may be some slight increased density at the   right lung base.  XR L ankle: Soft tissue prominence at the ankle and tarsal region. There is calcific ossific density seen by the medial malleolus which may be   related to trauma of indeterminate age.   XR L foot: Positive findings related to the ankle region and tarsal   region with prominent soft tissue swelling noted. Soft tissue swelling is   also seen on the dorsum of the foot at the metatarsal level. Some   osteoarthritic changes at the first MTP joint. Prominent calcifications   related to the Achilles tendon region.   Pain regiment: tylenol PO prn and lidocaine patch  fall precautions  f/u orthostatics  PT/OT/SW consult Mechanical fall  CT HEAD: There is no acute intracranial hemorrhage or depressed calvarial fracture  CT CERVICAL SPINE: No fracture or acute traumatic malalignment. Advanced multilevel degenerative changes of the cervical spine.  XR Right shoulder: Some osteoarthritic changes noted. No acute fracture or   dislocation noted. There may be some slight increased density at the   right lung base.  XR L ankle: Soft tissue prominence at the ankle and tarsal region. There is calcific ossific density seen by the medial malleolus which may be   related to trauma of indeterminate age.   XR L foot: Positive findings related to the ankle region and tarsal   region with prominent soft tissue swelling noted. Soft tissue swelling is   also seen on the dorsum of the foot at the metatarsal level. Some   osteoarthritic changes at the first MTP joint. Prominent calcifications   related to the Achilles tendon region.   Pain regiment: tylenol PO prn and lidocaine patch  fall precautions  f/u orthostatics  PT/OT/SW consult  -Podiatry consulted for LLE pain and swelling

## 2024-05-29 NOTE — H&P ADULT - PROBLEM SELECTOR PLAN 1
Patient currently does not meet sepsis criteria on admission  s/p 1g rocephin in ED  from previous admission, urine cx illustrative of klebsiella, w/ sensitivity to rocephin  c/w 1 rocephin qd  lactate elevated 2.6, reciving fluids, f/u lactate  f/u urine cx Patient currently does not meet sepsis criteria on admission; from previous admission, UCx positive for >100,000 klebsiella, sensitive to rocephin  UA: postive nitrites, Large leuks, mod zoltan,   S/p: 1g rocephin, 1L NS  c/w 1 rocephin qd  lactate elevated 2.6, f/u lactate  f/u urine cx, blood cx Patient currently does not meet sepsis criteria on admission; from previous admission, UCx positive for >100,000 klebsiella, sensitive to rocephin  UA: postive nitrites, Large leuks, mod zoltan,   S/p: 1g rocephin, 1L NS  c/w 1 rocephin qd  c/w IVF NS   lactate elevated 2.6, f/u lactate  f/u urine cx, blood cx Patient currently does not meet sepsis criteria on admission; from previous admission, UCx positive for >100,000 klebsiella, sensitive to rocephin  UA: postive nitrites, Large leuks, mod zoltan,   S/p: 1g rocephin, 1L NS  c/w Zosyn as patient had gallbladder wall thickening and is pending ABDOMINAL U/S.   c/w IVF NS   lactate elevated 2.6, f/u lactate  f/u urine cx, blood cx

## 2024-05-29 NOTE — CARE COORDINATION ASSESSMENT. - OTHER PERTINENT REFERRAL INFORMATION
89 y/o female admitted to ED with multiple falls at home, increased confusion. PMHX- HTN, HLD, T2DM, TIA, CKD. Pt was ambulating at home with a walker, SW left message for pt's daughter Akua Wadsworth [phone] as pt is alert with some confusion. SW will continue to follow to ensure safe transitional planning.

## 2024-05-29 NOTE — H&P ADULT - PROBLEM SELECTOR PLAN 8
hx of tia  continue home aspirin 81mg  continue home statin hx of tia  continue home aspirin 81mg every other day  continue home statin

## 2024-05-29 NOTE — CARE COORDINATION ASSESSMENT. - NS SW HEALTH CARE DECISION
[FreeTextEntry1] : I, Nanda Orellana, acted solely as a scribe for Dr. Myron I. Kleiner, MD. on 03/28/2022. 
No

## 2024-05-29 NOTE — ED PROVIDER NOTE - CLINICAL SUMMARY MEDICAL DECISION MAKING FREE TEXT BOX
s/p fall with Altered Mental Status, possible UTI. differential diagnosis inclusive of uti, ich, fracture. check labs, imaging, UA, antibiotics, likely admit.

## 2024-05-29 NOTE — H&P ADULT - PROBLEM SELECTOR PLAN 4
found to have hypercalcemia on admission  PTH ordered  pulm nodules on CT imaging found to have hypercalcemia on admission, 10.6  former smoker  CT Chest: Few bilateral pulmonary nodules.  f/u PTH, AM BMP found to have hypercalcemia on admission, 10.6  former smoker  CT Chest: Few bilateral pulmonary nodules.  f/u PTH, AM BMP  -IVF as ordered  -Nephro consult Dr Su

## 2024-05-29 NOTE — H&P ADULT - ASSESSMENT
4 yo female w/ pmh of HTN, HLD, T2DM, TIA,CKD admitted for UTI and fall 89 yo female w/ pmh of HTN, HLD, T2DM, TIA, CKD presents w/ foul smelling urine; admitted for UTI and fall

## 2024-05-29 NOTE — ED ADULT TRIAGE NOTE - CHIEF COMPLAINT QUOTE
Left lower extremity swelling and pain after dropping walker on foot Thursday. S/p fall over walker on Friday and c/o right rib pain. Pt dx with UTI x weeks ago, but urine still has foul odor and pt is weaker than normal. Pt denies SOB. Pt Nikolai and left hearing aides at home.

## 2024-05-29 NOTE — H&P ADULT - PROBLEM SELECTOR PLAN 5
cr on adm 1.8 b/l 1.7, wnl for patient  continue to monitor renal indices  avoid nephrotoxic agents when possible  s/p 1L ns in ED Baseline Cr 1.7, Cr 1.8 on admission   s/p 1L NS  continue to monitor renal indices  avoid nephrotoxic agents when possible  c/w IVF NS @ 60cc/hr x 12 hrs

## 2024-05-29 NOTE — H&P ADULT - PROBLEM SELECTOR PLAN 7
type 2 DM on januvia  hold oral glycemic agents  hypoglycemia protocol  AM A1C  low dose sliding scale  accuchecks per protocol

## 2024-05-29 NOTE — H&P ADULT - NSHPSOCIALHISTORY_GEN_ALL_CORE
denies drug use  Tobacco: Former, quit 50+ years   EtOH:  Denies  Recreational drug use: Denies  Lives with: Alone  Ambulates: Walker  ADLs: Independent denies drug use  Tobacco: Former, quit  30+ years   EtOH:  Denies  Recreational drug use: Denies  Lives with: Alone  Ambulates: Walker  ADLs: Independent

## 2024-05-29 NOTE — ED ADULT NURSE REASSESSMENT NOTE - NS ED NURSE REASSESS COMMENT FT1
pt complaining of Red face and eye/ear pain after abx. MD made aware (Ext.[id number])  patient is A&Ox4. vital signs within normal limits for patient. patient denies chest pain, or shortness of breath.  medications tolerated well.  safety precautions maintained.  will continue to assess and monitor for safety.

## 2024-05-29 NOTE — ED PROVIDER NOTE - PHYSICAL EXAMINATION
Vital signs as available reviewed.  General:  Comfortable, no acute distress.  Head:  Normocephalic, atraumatic.  Eyes:  Conjunctiva pink, no icterus. Pupils equal, round, reactive to light, extra-occular eye movements intact.  Cardiovascular:  Regular rate, no obvious murmur.  Respiratory:  Clear to auscultation, good air entry bilaterally.  Abdomen:  Soft, non-tender.  Musculoskeletal:  No tenderness to palpation over c/t/l spine. No tenderness to palpation over clavicles, upper/lower arms, hip/pelvis, upper / lower legs. + tenderness to palpation and ecchymosis over right shoulder. + Swelling ecchymosis and tenderness to palpation over left foot. + right rib tenderness to palpation.  Neurologic: Alert and oriented, moving all extremities. Sensation intact.  Skin:  Warm and dry. No skin break.

## 2024-05-29 NOTE — H&P ADULT - ATTENDING COMMENTS
89 yo female w/ pmh of HTN, HLD, T2DM, TIA, CKD presents w/ foul smelling urine; admitted for UTI and fall    Plan:  Admit for UTI- will continue Zosyn as patient has pending gallbladder U/S and had evidence of GB wall thickening on CT scan.   -less likely to have cholecystitis but needs further imaging  -pending results of imaging will obtain surgical consult if necessary. NPO except meds for now  -CKD III with hypercalcemia- f/u AM calcium level, continue IVF. f/u nephro recs    remainder as above

## 2024-05-29 NOTE — CARE COORDINATION ASSESSMENT. - NSCAREPROVIDERS_GEN_ALL_CORE_FT
CARE PROVIDERS:  Accepting Physician: Khoi Holguin  Admitting: Khoi Holguin  Attending: Khoi Holguin  Covering Team: Emily Llanos  ED Attending: Blanca Alba  ED Nurse: Jona Redd  ED Nurse 2: Susan Pérez  Ordered: ServiceAccount, SCMMLM  Ordered: ServiceAccount, SCMMLM  Ordered: ServiceAccount, SCMMLM  Outpatient Provider: Abby Terrell  Physical Therapy: Newton Chacon  Primary Team: Sergio Smith  : Jacquelyn Reaves  UR// Supp. Assoc.: Izabella Reeves  UR// Supp. Assoc.: Modesta Marcus

## 2024-05-29 NOTE — H&P ADULT - HISTORY OF PRESENT ILLNESS
89 yo female w/ pmh of HTN, HLD, T2DM, TIA,CKD previous right hip fx w/ surgical pinning presents to the ED for multiple falls and fould smilling urine. Hx per family, states patient has had increased confusion over the past week, and states the patient has felt "hot" but has had no fever on measurement. Family states that the patient has recently dropped her walker on her foot on thursday and then has fallen on friday.     In the ED pts VS were T(F): 97.8 HR: 87 BP: 163/72 RR: 17  SpO2: 95%  Labs show: H.7   WBC:7.11  Plt:181  Creatinine:1.8 Ca. 10.7 Lactate 2.6  UA: postive nitrites, Large leuks, mod zoltan,   CT HEAD: There is no acute intracranial hemorrhage or depressed calvarial   fracture. Chronic findings as above.    CT CERVICAL SPINE: No fracture or acute traumatic malalignment. Advanced   multilevel degenerative changes of the cervical spine.      CT Chest: Circumferential gallbladder wall thickening, Few bilateral pulmonary nodules.    S/p: 1g rocephin, 1L NS    Pt is admitted for further workup and management   89 yo female w/ pmh of HTN, HLD, T2DM, TIA, CKD previous right hip fx w/ surgical pinning presents to the ED for multiple falls and foul smelling urine. Daughter at bedside and provided collateral history. Pt states that she was diagnosed w/ UTI by her PCP x2 wks ago, had been taking cefuroxime for the past 2 weeks with no improvement in urinary symptoms. Pt noticed foul smelling urine this morning. Pt was hospitalized in 2023 w/ UTI. Per daughter, pt w/ increased "confusion" which she describes as short-term memory loss -- daughter states this is typical for pt when she has a UTI. Additionally, pt reports several falls over the past week. Last Thursday, she dropped her walker on her L leg and subsequently noticed bruising and swelling. On Friday, she states that her walker tipped over, subsequently she fell on her R-side. She c/o R-sided abdominal pain which she attributes to her fall. Denies LOC, head trauma. Denies fevers, chills, dysuria, hematuria, N/V/D/C.     In the ED   VS: T(F): 97.8 HR: 87 BP: 163/72 RR: 17 SpO2: 95%  Labs show: H.7  WBC:7.11  Plt:181  Creatinine:1.8 Ca. 10.7 Lactate 2.6  UA: postive nitrites, Large leuks, mod zoltan,   S/p: 1g rocephin, 1L NS  Imaging  CT HEAD: There is no acute intracranial hemorrhage or depressed calvarial fracture. Chronic findings as above.  CT CERVICAL SPINE: No fracture or acute traumatic malalignment. Advanced multilevel degenerative changes of the cervical spine.  CT Chest: Circumferential gallbladder wall thickening, Few bilateral pulmonary nodules.         89 yo female w/ pmh of HTN, HLD, T2DM, TIA, CKD III previous right hip fx w/ surgical pinning presents to the ED for multiple falls and foul smelling urine. Daughter at bedside and provided collateral history. Pt states that she was diagnosed w/ UTI by her PCP x2 wks ago, had been taking cefuroxime for the past 2 weeks with no improvement in urinary symptoms. Pt noticed foul smelling urine this morning. Pt was hospitalized in 2023 w/ UTI. Per daughter, pt w/ increased "confusion" which she describes as short-term memory loss -- daughter states this is typical for pt when she has a UTI. Additionally, pt reports several falls over the past week. Last Thursday, she dropped her walker on her L leg and subsequently noticed bruising and swelling. On Friday, she states that her walker tipped over, subsequently she fell on her R-side. She c/o R-sided abdominal pain which she attributes to her fall. Denies LOC, head trauma. Denies fevers, chills, dysuria, hematuria, N/V/D/C.     In the ED   VS: T(F): 97.8 HR: 87 BP: 163/72 RR: 17 SpO2: 95%  Labs show: H.7  WBC:7.11  Plt:181  Creatinine:1.8 Ca. 10.7 Lactate 2.6  UA: postive nitrites, Large leuks, mod zoltan,   S/p: 1g rocephin, 1L NS  Imaging  CT HEAD: There is no acute intracranial hemorrhage or depressed calvarial fracture. Chronic findings as above.  CT CERVICAL SPINE: No fracture or acute traumatic malalignment. Advanced multilevel degenerative changes of the cervical spine.  CT Chest: Circumferential gallbladder wall thickening, Few bilateral pulmonary nodules.

## 2024-05-30 DIAGNOSIS — M79.672 PAIN IN LEFT FOOT: ICD-10-CM

## 2024-05-30 LAB
A1C WITH ESTIMATED AVERAGE GLUCOSE RESULT: 7.7 % — HIGH (ref 4–5.6)
ALBUMIN SERPL ELPH-MCNC: 3 G/DL — LOW (ref 3.3–5)
ALP SERPL-CCNC: 88 U/L — SIGNIFICANT CHANGE UP (ref 40–120)
ALT FLD-CCNC: 18 U/L — SIGNIFICANT CHANGE UP (ref 12–78)
ANION GAP SERPL CALC-SCNC: 7 MMOL/L — SIGNIFICANT CHANGE UP (ref 5–17)
AST SERPL-CCNC: 21 U/L — SIGNIFICANT CHANGE UP (ref 15–37)
BASOPHILS # BLD AUTO: 0.02 K/UL — SIGNIFICANT CHANGE UP (ref 0–0.2)
BASOPHILS NFR BLD AUTO: 0.4 % — SIGNIFICANT CHANGE UP (ref 0–2)
BILIRUB SERPL-MCNC: 0.8 MG/DL — SIGNIFICANT CHANGE UP (ref 0.2–1.2)
BUN SERPL-MCNC: 26 MG/DL — HIGH (ref 7–23)
CALCIUM SERPL-MCNC: 9.3 MG/DL — SIGNIFICANT CHANGE UP (ref 8.4–10.5)
CALCIUM SERPL-MCNC: 9.5 MG/DL — SIGNIFICANT CHANGE UP (ref 8.5–10.1)
CHLORIDE SERPL-SCNC: 111 MMOL/L — HIGH (ref 96–108)
CHOLEST SERPL-MCNC: 129 MG/DL — SIGNIFICANT CHANGE UP
CO2 SERPL-SCNC: 26 MMOL/L — SIGNIFICANT CHANGE UP (ref 22–31)
CREAT SERPL-MCNC: 1.7 MG/DL — HIGH (ref 0.5–1.3)
EGFR: 28 ML/MIN/1.73M2 — LOW
EOSINOPHIL # BLD AUTO: 0.39 K/UL — SIGNIFICANT CHANGE UP (ref 0–0.5)
EOSINOPHIL NFR BLD AUTO: 7.9 % — HIGH (ref 0–6)
ESTIMATED AVERAGE GLUCOSE: 174 MG/DL — HIGH (ref 68–114)
GLUCOSE SERPL-MCNC: 124 MG/DL — HIGH (ref 70–99)
HCT VFR BLD CALC: 34.8 % — SIGNIFICANT CHANGE UP (ref 34.5–45)
HDLC SERPL-MCNC: 32 MG/DL — LOW
HGB BLD-MCNC: 11.3 G/DL — LOW (ref 11.5–15.5)
IMM GRANULOCYTES NFR BLD AUTO: 0.4 % — SIGNIFICANT CHANGE UP (ref 0–0.9)
LIPID PNL WITH DIRECT LDL SERPL: 65 MG/DL — SIGNIFICANT CHANGE UP
LYMPHOCYTES # BLD AUTO: 1.57 K/UL — SIGNIFICANT CHANGE UP (ref 1–3.3)
LYMPHOCYTES # BLD AUTO: 31.8 % — SIGNIFICANT CHANGE UP (ref 13–44)
MAGNESIUM SERPL-MCNC: 1.6 MG/DL — SIGNIFICANT CHANGE UP (ref 1.6–2.6)
MCHC RBC-ENTMCNC: 27.6 PG — SIGNIFICANT CHANGE UP (ref 27–34)
MCHC RBC-ENTMCNC: 32.5 GM/DL — SIGNIFICANT CHANGE UP (ref 32–36)
MCV RBC AUTO: 84.9 FL — SIGNIFICANT CHANGE UP (ref 80–100)
MONOCYTES # BLD AUTO: 0.65 K/UL — SIGNIFICANT CHANGE UP (ref 0–0.9)
MONOCYTES NFR BLD AUTO: 13.2 % — SIGNIFICANT CHANGE UP (ref 2–14)
NEUTROPHILS # BLD AUTO: 2.29 K/UL — SIGNIFICANT CHANGE UP (ref 1.8–7.4)
NEUTROPHILS NFR BLD AUTO: 46.3 % — SIGNIFICANT CHANGE UP (ref 43–77)
NON HDL CHOLESTEROL: 96 MG/DL — SIGNIFICANT CHANGE UP
NRBC # BLD: 0 /100 WBCS — SIGNIFICANT CHANGE UP (ref 0–0)
PHOSPHATE SERPL-MCNC: 3.2 MG/DL — SIGNIFICANT CHANGE UP (ref 2.5–4.5)
PLATELET # BLD AUTO: 143 K/UL — LOW (ref 150–400)
POTASSIUM SERPL-MCNC: 3.4 MMOL/L — LOW (ref 3.5–5.3)
POTASSIUM SERPL-SCNC: 3.4 MMOL/L — LOW (ref 3.5–5.3)
PROT SERPL-MCNC: 6.4 G/DL — SIGNIFICANT CHANGE UP (ref 6–8.3)
PTH-INTACT FLD-MCNC: 48 PG/ML — SIGNIFICANT CHANGE UP (ref 15–65)
RBC # BLD: 4.1 M/UL — SIGNIFICANT CHANGE UP (ref 3.8–5.2)
RBC # FLD: 13.7 % — SIGNIFICANT CHANGE UP (ref 10.3–14.5)
SODIUM SERPL-SCNC: 144 MMOL/L — SIGNIFICANT CHANGE UP (ref 135–145)
TRIGL SERPL-MCNC: 184 MG/DL — HIGH
WBC # BLD: 4.94 K/UL — SIGNIFICANT CHANGE UP (ref 3.8–10.5)
WBC # FLD AUTO: 4.94 K/UL — SIGNIFICANT CHANGE UP (ref 3.8–10.5)

## 2024-05-30 PROCEDURE — 99232 SBSQ HOSP IP/OBS MODERATE 35: CPT

## 2024-05-30 PROCEDURE — 99221 1ST HOSP IP/OBS SF/LOW 40: CPT

## 2024-05-30 PROCEDURE — 78226 HEPATOBILIARY SYSTEM IMAGING: CPT | Mod: 26

## 2024-05-30 RX ORDER — HYDRALAZINE HCL 50 MG
10 TABLET ORAL ONCE
Refills: 0 | Status: COMPLETED | OUTPATIENT
Start: 2024-05-30 | End: 2024-05-30

## 2024-05-30 RX ORDER — CEFTRIAXONE 500 MG/1
2000 INJECTION, POWDER, FOR SOLUTION INTRAMUSCULAR; INTRAVENOUS EVERY 24 HOURS
Refills: 0 | Status: DISCONTINUED | OUTPATIENT
Start: 2024-05-30 | End: 2024-06-02

## 2024-05-30 RX ORDER — POTASSIUM CHLORIDE 20 MEQ
10 PACKET (EA) ORAL ONCE
Refills: 0 | Status: COMPLETED | OUTPATIENT
Start: 2024-05-30 | End: 2024-05-30

## 2024-05-30 RX ORDER — MORPHINE SULFATE 50 MG/1
2.5 CAPSULE, EXTENDED RELEASE ORAL ONCE
Refills: 0 | Status: DISCONTINUED | OUTPATIENT
Start: 2024-05-30 | End: 2024-05-30

## 2024-05-30 RX ORDER — METOPROLOL TARTRATE 50 MG
100 TABLET ORAL DAILY
Refills: 0 | Status: DISCONTINUED | OUTPATIENT
Start: 2024-05-30 | End: 2024-06-04

## 2024-05-30 RX ORDER — PHENAZOPYRIDINE HCL 100 MG
100 TABLET ORAL ONCE
Refills: 0 | Status: COMPLETED | OUTPATIENT
Start: 2024-05-30 | End: 2024-05-30

## 2024-05-30 RX ORDER — SODIUM CHLORIDE 9 MG/ML
1000 INJECTION, SOLUTION INTRAVENOUS
Refills: 0 | Status: DISCONTINUED | OUTPATIENT
Start: 2024-05-30 | End: 2024-05-31

## 2024-05-30 RX ADMIN — Medication 100 MILLIEQUIVALENT(S): at 08:58

## 2024-05-30 RX ADMIN — Medication 1: at 12:08

## 2024-05-30 RX ADMIN — LIDOCAINE 2 PATCH: 4 CREAM TOPICAL at 20:54

## 2024-05-30 RX ADMIN — ATORVASTATIN CALCIUM 40 MILLIGRAM(S): 80 TABLET, FILM COATED ORAL at 22:19

## 2024-05-30 RX ADMIN — Medication 10 MILLIGRAM(S): at 06:41

## 2024-05-30 RX ADMIN — SODIUM CHLORIDE 75 MILLILITER(S): 9 INJECTION, SOLUTION INTRAVENOUS at 22:30

## 2024-05-30 RX ADMIN — LIDOCAINE 2 PATCH: 4 CREAM TOPICAL at 19:00

## 2024-05-30 RX ADMIN — ATORVASTATIN CALCIUM 40 MILLIGRAM(S): 80 TABLET, FILM COATED ORAL at 00:20

## 2024-05-30 RX ADMIN — Medication 81 MILLIGRAM(S): at 06:05

## 2024-05-30 RX ADMIN — HEPARIN SODIUM 5000 UNIT(S): 5000 INJECTION INTRAVENOUS; SUBCUTANEOUS at 13:21

## 2024-05-30 RX ADMIN — MORPHINE SULFATE 2.5 MILLIGRAM(S): 50 CAPSULE, EXTENDED RELEASE ORAL at 15:31

## 2024-05-30 RX ADMIN — MORPHINE SULFATE 2.5 MILLIGRAM(S): 50 CAPSULE, EXTENDED RELEASE ORAL at 15:54

## 2024-05-30 RX ADMIN — LOSARTAN POTASSIUM 50 MILLIGRAM(S): 100 TABLET, FILM COATED ORAL at 01:04

## 2024-05-30 RX ADMIN — Medication 100 MILLIGRAM(S): at 21:53

## 2024-05-30 RX ADMIN — HEPARIN SODIUM 5000 UNIT(S): 5000 INJECTION INTRAVENOUS; SUBCUTANEOUS at 22:18

## 2024-05-30 RX ADMIN — Medication 650 MILLIGRAM(S): at 22:19

## 2024-05-30 RX ADMIN — CEFTRIAXONE 100 MILLIGRAM(S): 500 INJECTION, POWDER, FOR SOLUTION INTRAMUSCULAR; INTRAVENOUS at 10:54

## 2024-05-30 RX ADMIN — Medication 100 MILLIGRAM(S): at 07:09

## 2024-05-30 RX ADMIN — HEPARIN SODIUM 5000 UNIT(S): 5000 INJECTION INTRAVENOUS; SUBCUTANEOUS at 06:38

## 2024-05-30 RX ADMIN — LIDOCAINE 2 PATCH: 4 CREAM TOPICAL at 08:57

## 2024-05-30 RX ADMIN — SODIUM CHLORIDE 75 MILLILITER(S): 9 INJECTION, SOLUTION INTRAVENOUS at 09:30

## 2024-05-30 RX ADMIN — HEPARIN SODIUM 5000 UNIT(S): 5000 INJECTION INTRAVENOUS; SUBCUTANEOUS at 00:21

## 2024-05-30 NOTE — PHYSICAL THERAPY INITIAL EVALUATION ADULT - LEVEL OF INDEPENDENCE: GAIT, REHAB EVAL
No pertinent past medical history contact guard <<----- Click to add NO pertinent Past Medical History

## 2024-05-30 NOTE — OCCUPATIONAL THERAPY INITIAL EVALUATION ADULT - ADDITIONAL COMMENTS
As per patient and dtr report; patient lives in a high ranch style house with 6 steps + 6 steps and bilateral handrails to access main level from garage. Pt has a stall shower with grab bars and a comfort height toilet seat. Pt owns a rolling walker, cane, commode, shower chair and has grab bars in shower and next to toilet. Pt ambulated using RW. Pt attended outpatient PT 2x/week s/p IMN right hip 11/25/23. Pt performed sit to stand with min assist and ambulated 10' using RW with contact guard. Pt requires assistance with ADL's and transfers due to decreased flexibility, decreased strength, decreased endurance and impaired sitting/standing balance.

## 2024-05-30 NOTE — PHYSICAL THERAPY INITIAL EVALUATION ADULT - BED MOBILITY TRAINING, PT EVAL
Patient will be independent in all bed mobility in 2 weeks in order to increase safety at home. Patient will be supervision in all bed mobility in 2 weeks in order to increase safety at home.

## 2024-05-30 NOTE — PHYSICAL THERAPY INITIAL EVALUATION ADULT - PERTINENT HX OF CURRENT PROBLEM, REHAB EVAL
Patient is a 89 yo female w/ pmh of HTN, HLD, T2DM, TIA, CKD III previous right hip fx w/ surgical pinning presents to the ED for multiple falls and foul smelling urine. Daughter at bedside and provided collateral history. Pt states that she was diagnosed w/ UTI by her PCP x2 wks ago, had been taking cefuroxime for the past 2 weeks with no improvement in urinary symptoms. Pt noticed foul smelling urine this morning. Pt was hospitalized in November 2023 w/ UTI. Per daughter, pt w/ increased "confusion" which she describes as short-term memory loss -- daughter states this is typical for pt when she has a UTI. Additionally, pt reports several falls over the past week. Last Thursday, she dropped her walker on her L leg and subsequently noticed bruising and swelling. On Friday, she states that her walker tipped over, subsequently she fell on her R-side. She c/o R-sided abdominal pain which she attributes to her fall. Denies LOC, head trauma. Denies fevers, chills, dysuria, hematuria, N/V/D/C.  Shoulder/Foot X-Rays 5/29/24 r/o Fx. Patient is a 89 yo female w/ pmh of HTN, HLD, T2DM, TIA, CKD III previous right hip fx w/ surgical pinning presents to the ED for multiple falls and foul smelling urine. Daughter at bedside and provided collateral history. Pt states that she was diagnosed w/ UTI by her PCP x2 wks ago, had been taking cefuroxime for the past 2 weeks with no improvement in urinary symptoms. Pt noticed foul smelling urine this morning. Pt was hospitalized in November 2023 w/ UTI. Per daughter, pt w/ increased "confusion" which she describes as short-term memory loss -- daughter states this is typical for pt when she has a UTI. Additionally, pt reports several falls over the past week. Last Thursday, she dropped her walker on her L leg and subsequently noticed bruising and swelling. On Friday, she states that her walker tipped over, subsequently she fell on her R-side. She c/o R-sided abdominal pain which she attributes to her fall. Denies LOC, head trauma. Denies fevers, chills, dysuria, hematuria, N/V/D/C.  Shoulder/Foot X-Rays 5/29/24 : no fx

## 2024-05-30 NOTE — OCCUPATIONAL THERAPY INITIAL EVALUATION ADULT - GENERAL OBSERVATIONS, REHAB EVAL
Patient found supine in bed with +IV left UE, +external catheter, +ecchymosis left foot on room air. Dtr Maday present bedside. Patient chart reviewed, events noted. Patient cleared to be seen for therapy by RN prior to session.

## 2024-05-30 NOTE — PHYSICAL THERAPY INITIAL EVALUATION ADULT - BALANCE TRAINING, PT EVAL
Patient will increase dynamic standing balance 1/2 grade in 2 weeks in order to increase safety at home.

## 2024-05-30 NOTE — PROGRESS NOTE ADULT - PROBLEM/PLAN-3
DISPLAY PLAN FREE TEXT
[FreeTextEntry1] : Bone mineral density 6/19/18\par indication: primary hyperparathyroidism \par spine not performed due to DJD \par total hip -1.9 osteopenia, no significant change \par femoral neck-2.2 osteopenia, no significant change \par proximal radius -1.7 osteopenia -5.7 %\par \par  Bone mineral density 5/26/17\par Indication primary hyperparathyroidism\par Spine not performed\par Total hip -2.1, osteopenia, --3.7%, a borderline statistical significance\par Femoral neck -2.3, osteopenia, -6.2%\par Proximal radius -1.1, normal, -11.8% versus 2006 seen but stable versus 2015\par \par Bone mineral density 5/16/16\par Indication primary hyperparathyroidism\par Spine not performed\par Total hip -1.8, osteopenia, -6.4% versus 2015\par Femoral neck -1.9, osteopenia, -4.5%\par Proximal radius 0.4, normal, +16.5%, no obvious source of error\par \par Repeat BMD- 05/11/2015\par indication primary hyperparathyroidism\par Spine falsely elevated due to arthritis\par Total Hip: -1.4, osteopenia, +3.1% versus 2014\par Fem Neck: -1.7, osteopenia, +8.5% versus 2014\par Prox Radius: -1.3, osteopenia, no significant change versus 2000 410\par \par bone mineral density test performed May 8, 2014\par Spine faulty elevated due to arthritis\par Total hip -1.6, osteopenia,\par Femoral neck -2.1, osteopenia\par Proximal radius -1.1, osteopenia

## 2024-05-30 NOTE — PHYSICAL THERAPY INITIAL EVALUATION ADULT - ORIENTATION, REHAB EVAL
oriented to person, place, time and situation forgetful/oriented to person, place, time and situation

## 2024-05-30 NOTE — PATIENT PROFILE ADULT - FALL HARM RISK - RISK INTERVENTIONS

## 2024-05-30 NOTE — PHYSICAL THERAPY INITIAL EVALUATION ADULT - GAIT TRAINING, PT EVAL
Patient will [id number] feet independently with RW in 2 weeks in order to increase mobility at home. Patient will [id number] feet supervision with RW in 2 weeks in order to increase mobility at home.

## 2024-05-30 NOTE — PHYSICAL THERAPY INITIAL EVALUATION ADULT - WEIGHT-BEARING RESTRICTIONS: GAIT, REHAB EVAL
Johana Stroud RN   Plan last modified:   Johana Stroud RN (3/28/2016)   Next INR check:   9/10/Juli Pearson MD Referring [facility] [phone]            See the Encounter Report to view Anticoagulation Flowsheet and Dosing Calendar (Go to Encounters tab in chart review, and find the Anticoagulation Therapy Visit)    Patient made aware if signs of clotting (pain, tenderness, swelling, or color change in any extremity) AND/OR bleeding occur (nosebleeds, bleeding gums, bruising, or blood in stool or urine) to notify provider & seek medical  attention. If severe symptoms develop, such as major bleeding, chest pain, shortness of breath, fall, trauma or s/s of stroke, patient to call 911 immediately.       Dosage adjustment made based on physician directed care plan.    Johana Stroud RN                  full weight-bearing

## 2024-05-30 NOTE — ED ADULT NURSE REASSESSMENT NOTE - NS ED NURSE REASSESS COMMENT FT1
(shift change) Report received from BT/RN, Pt sitting up in stretcher no acute distress noted at this time, respiration even and unlabored,  pt iv site noted to be dry and intact,  pt vitals checked and pt to be medicated per orders, update provided to pt family at bedside, no signs of infiltration noted, safety checks completed, will continue to monitor pt. Cherry WYATT

## 2024-05-30 NOTE — OCCUPATIONAL THERAPY INITIAL EVALUATION ADULT - ADL RETRAINING, OT EVAL
Patient will dress lower body with supervision, AE as needed in 2-4 sessions. Patient will dress upper body with set-up and no assistance in 2-4 sessions.

## 2024-05-30 NOTE — PROGRESS NOTE ADULT - PROBLEM SELECTOR PLAN 3
# CT Chest: Circumferential gallbladder wall thickening  - bilirubin wnl  tender on palpation in right Upper quadrant  could be from ribs vs gallbladder  will order hida scan to r/o acute pino

## 2024-05-30 NOTE — PROGRESS NOTE ADULT - PROBLEM SELECTOR PLAN 1
with Pyelonephritis  noted urinary frequency, suprapubic tenderness, pyuria, frequent UTIs,   continue on zosyn  awaiting final urine culture
Chart reviewed and Patient evaluated  Discussed diagnosis and treatment with patient  Light compression dressing applied  X-rays reviewed : Shows no obvious or acute fracture pathology  Weight bearing as tolerated in surgical shoe  Will discuss care plan  with all  Attendings  -Patient is to follow up with Dr. Castro/ Dr. Carter if symptoms persist after discharge. Please call to make an appointment [phone]

## 2024-05-30 NOTE — CONSULT NOTE ADULT - SUBJECTIVE AND OBJECTIVE BOX
Patient is a 89y old  Female who presents with a chief complaint of UTI (29 May 2024 19:25)       HPI:  89 yo female w/ pmh of HTN, HLD, T2DM, TIA, CKD III previous right hip fx w/ surgical pinning presents to the ED for multiple falls and foul smelling urine. Daughter at bedside and provided collateral history. Pt states that she was diagnosed w/ UTI by her PCP x2 wks ago, had been taking cefuroxime for the past 2 weeks with no improvement in urinary symptoms. Pt noticed foul smelling urine this morning. Pt was hospitalized in 2023 w/ UTI. Per daughter, pt w/ increased "confusion" which she describes as short-term memory loss -- daughter states this is typical for pt when she has a UTI. Additionally, pt reports several falls over the past week. Last Thursday, she dropped her walker on her L leg and subsequently noticed bruising and swelling. On Friday, she states that her walker tipped over, subsequently she fell on her R-side. She c/o R-sided abdominal pain which she attributes to her fall. Denies LOC, head trauma. Denies fevers, chills, dysuria, hematuria, N/V/D/C.     In the ED   VS: T(F): 97.8 HR: 87 BP: 163/72 RR: 17 SpO2: 95%  Labs show: H.7  WBC:7.11  Plt:181  Creatinine:1.8 Ca. 10.7 Lactate 2.6  UA: postive nitrites, Large leuks, mod zoltan,   S/p: 1g rocephin, 1L NS  Imaging  CT HEAD: There is no acute intracranial hemorrhage or depressed calvarial fracture. Chronic findings as above.  CT CERVICAL SPINE: No fracture or acute traumatic malalignment. Advanced multilevel degenerative changes of the cervical spine.  CT Chest: Circumferential gallbladder wall thickening, Few bilateral pulmonary nodules.    Renal consulted for acute on CKD  Chart reviewed  Known to office  Last outpt cr measured to be 1.5     (29 May 2024 13:58)       PAST MEDICAL & SURGICAL HISTORY:  Hypertension      Diabetes      TIA (transient ischemic attack)      Hip fracture      HLD (hyperlipidemia)      S/P ORIF (open reduction internal fixation) fracture  right hip           FAMILY HISTORY:  FH: asthma (Father)    [place]    Social History:Non smoker    MEDICATIONS  (STANDING):  aspirin enteric coated 81 milliGRAM(s) Oral <User Schedule>  atorvastatin 40 milliGRAM(s) Oral at bedtime  dextrose 10% Bolus 125 milliLiter(s) IV Bolus once  dextrose 5%. 1000 milliLiter(s) (100 mL/Hr) IV Continuous <Continuous>  dextrose 5%. 1000 milliLiter(s) (50 mL/Hr) IV Continuous <Continuous>  dextrose 50% Injectable 25 Gram(s) IV Push once  dextrose 50% Injectable 12.5 Gram(s) IV Push once  glucagon  Injectable 1 milliGRAM(s) IntraMuscular once  heparin   Injectable 5000 Unit(s) SubCutaneous every 8 hours  hydrochlorothiazide 12.5 milliGRAM(s) Oral daily  insulin lispro (ADMELOG) corrective regimen sliding scale   SubCutaneous three times a day before meals  insulin lispro (ADMELOG) corrective regimen sliding scale   SubCutaneous at bedtime  lidocaine   4% Patch 2 Patch Transdermal every 24 hours  losartan 50 milliGRAM(s) Oral daily  metoprolol succinate  milliGRAM(s) Oral daily  potassium chloride  10 mEq/100 mL IVPB 10 milliEquivalent(s) IV Intermittent once  sodium chloride 0.9%. 1000 milliLiter(s) (60 mL/Hr) IV Continuous <Continuous>    MEDICATIONS  (PRN):  acetaminophen     Tablet .. 650 milliGRAM(s) Oral every 6 hours PRN Mild Pain (1 - 3)  dextrose Oral Gel 15 Gram(s) Oral once PRN Blood Glucose LESS THAN 70 milliGRAM(s)/deciliter   Meds reviewed    Allergies    No Known Allergies    Intolerances         REVIEW OF SYSTEMS:    Review of Systems:   Constitutional: Denies fatigue  HEENT: Denies headaches and dizziness  Respiratory: denies SOB, cough, or wheezing  Cardiovascular: denies CP, palpitations  Gastrointestinal: +Abd pain  Genitourinary: +dysuria  Skin: denies rashes or itching  Musculoskeletal: denies muscle aches, joint swelling  Neurologic: Denies generalized weakness, denies loss of sensation, numbness, or tingling      Vital Signs Last 24 Hrs  T(C): 36.6 (30 May 2024 04:20), Max: 36.6 (29 May 2024 09:56)  T(F): 97.8 (30 May 2024 04:20), Max: 97.8 (29 May 2024 09:56)  HR: 61 (30 May 2024 07:00) (50 - 87)  BP: 171/74 (30 May 2024 07:00) (163/72 - 200/58)  BP(mean): --  RR: 18 (30 May 2024 07:00) (17 - 18)  SpO2: 96% (30 May 2024 07:00) (91% - 97%)    Parameters below as of 30 May 2024 07:00  Patient On (Oxygen Delivery Method): room air      Daily     Daily Weight in k.3 (30 May 2024 05:52)    PHYSICAL EXAM:    GENERAL: NAD  HEAD:  Atraumatic, Normocephalic  NECK: Supple  NERVOUS SYSTEM:  Awake and Alert  CHEST/LUNG: Clear to auscultation bilaterally; No rales, rhonchi, wheezing, or rubs  HEART: Regular rate and rhythm; No murmurs, rubs, or gallops  ABDOMEN: Soft, mild tenderness, Nondistended; Bowel sounds present  EXTREMITIES:  No Edema        LABS:                        11.3   4.94  )-----------( 143      ( 30 May 2024 04:30 )             34.8     05-30    144  |  111<H>  |  26<H>  ----------------------------<  124<H>  3.4<L>   |  26  |  1.70<H>    Ca    9.5      30 May 2024 04:30  Phos  3.2     05-30  Mg     1.6     30    TPro  6.4  /  Alb  3.0<L>  /  TBili  0.8  /  DBili  x   /  AST  21  /  ALT  18  /  AlkPhos  88  05-30    PT/INR - ( 29 May 2024 10:15 )   PT: 12.0 sec;   INR: 1.03 ratio         PTT - ( 29 May 2024 10:15 )  PTT:25.8 sec  Urinalysis Basic - ( 30 May 2024 04:30 )    Color: x / Appearance: x / SG: x / pH: x  Gluc: 124 mg/dL / Ketone: x  / Bili: x / Urobili: x   Blood: x / Protein: x / Nitrite: x   Leuk Esterase: x / RBC: x / WBC x   Sq Epi: x / Non Sq Epi: x / Bacteria: x      Magnesium: 1.6 mg/dL ( @ 04:30)  Phosphorus: 3.2 mg/dL ( @ 04:30)          RADIOLOGY & ADDITIONAL TESTS:

## 2024-05-30 NOTE — PROGRESS NOTE ADULT - PROBLEM SELECTOR PLAN 5
Baseline Cr 1.7, Cr 1.8 on admission   s/p 1L NS  continue to monitor renal indices  avoid nephrotoxic agents when possible  c/w IVF NS @ 60cc/hr x 12 hrs

## 2024-05-30 NOTE — PHYSICAL THERAPY INITIAL EVALUATION ADULT - ADDITIONAL COMMENTS
Patient lives alone in a private house with no steps to enter. There are [id number] inside the house. Prior to admission patient was ambulating independently with a RW. Patient has a wal-in shower Patient lives alone in a private house with [id number] and bilateral rails to enter and no stairs inside the house. Prior to admission patient was ambulating independently with a RW. Patient has a walk-in shower with grab bars. Patient was ambulating independently with RW and independent with ADLs and transfers prior to admission.

## 2024-05-30 NOTE — PROGRESS NOTE ADULT - PROBLEM SELECTOR PLAN 2
Mechanical fall  CT HEAD: There is no acute intracranial hemorrhage or depressed calvarial fracture  CT CERVICAL SPINE: No fracture or acute traumatic malalignment. Advanced multilevel degenerative changes of the cervical spine.  XR Right shoulder: Some osteoarthritic changes noted. No acute fracture or   dislocation noted. There may be some slight increased density at the   right lung base.  XR L ankle: Soft tissue prominence at the ankle and tarsal region. There is calcific ossific density seen by the medial malleolus which may be   related to trauma of indeterminate age.   XR L foot: Positive findings related to the ankle region and tarsal   region with prominent soft tissue swelling noted. Soft tissue swelling is   also seen on the dorsum of the foot at the metatarsal level. Some   osteoarthritic changes at the first MTP joint. Prominent calcifications   related to the Achilles tendon region.   Pain regiment: tylenol PO prn and lidocaine patch  fall precautions  f/u orthostatics  PT/OT/SW consult  -Podiatry consulted for LLE pain and swelling

## 2024-05-30 NOTE — CASE MANAGEMENT PROGRESS NOTE - NSCMPROGRESSNOTE_GEN_ALL_CORE
CM consult for dc planning noted and reviewed. CM will continue to collaborate with interdisciplinary team, remain available to assist and monitor for home care needs.

## 2024-05-30 NOTE — CONSULT NOTE ADULT - ASSESSMENT
Acute on CKD Stage 3  Pyelonephritis  HTN with CKD  Hypokalemia      -Baseline Cr 1.5  -Now with ELVIN which is likely to be prerenal  -Improving Cr with IVF; Recommend to continue gentle IVF while NPO  -Restart home antihypertensive regimen; if needs further help; increase losartan to 100mg  -KCL given  -Abx per ID; had allergic rxn to Zosyn  -Daily chem  -No additional renal work up for now    D/W Daughter Acute on CKD Stage 3  Pyelonephritis  HTN with CKD  Hypokalemia      -Baseline Cr 1.5  -Now with ELVIN which is likely to be prerenal  -Improving Cr with IVF; Recommend to continue gentle IVF while NPO  -Restart home antihypertensive regimen; if needs further help; increase losartan to 100mg  -KCL given  -Abx per ID; had allergic rxn to Zosyn  -Daily chem  -No additional renal work up for now  -GI eval for billiary sludge noted on sono    D/W Daughter

## 2024-05-30 NOTE — CASE MANAGEMENT PROGRESS NOTE - NSCMPROGRESSNOTE_GEN_ALL_CORE
Discussed pt on rounds, pt remains acute, awaiting further testing, pending PT OT. pending HIDA scan. CM will continue to collaborate with interdisciplinary team and remain available to assist.

## 2024-05-30 NOTE — CHART NOTE - NSCHARTNOTEFT_GEN_A_CORE
Called by RN, pt with /70 HR 81. Pt asymptomatic. Missed AM dose due to HR parameter, received home HCTZ and losartan 0100 this AM after missed doses while in ED, pt also received hydralazine 10mg IV x1.    Will order one time STAT dose of pts home metoprolol that was held this AM.     RN to please call with any changes.

## 2024-05-30 NOTE — PATIENT PROFILE ADULT - FUNCTIONAL ASSESSMENT - BASIC MOBILITY 1.
Referring Provider: Nancy Interiano NP      Lung Cancer Risk Profile:   Age: 69  Gender: Male  Height: 69\"  Weight: 212#    Smoking History:  Smoking Status: past use  # years smokin  # years quit: 14  Packs/day: 2  Pack years: 100    Patient discussed smoking cessation with PCP: Unknown    Patient participated in shared decision making process with PCP: Unknown    Patient is currently experiencing symptoms: No, per patient report    If yes what symptoms:     Co-Morbidities:  COPD    Cancer History:      Additional Risk Factors:    Exposure to diesel fumes and second hand smoke      Patient's smoking history discussed via phone. Patient meets LDCT lung cancer screening criteria.  Call transferred to central scheduling to schedule exam.      DIONISIO MartinsN, RN, [id number] [address] [facility] Nurse Navigator 3 = A little assistance

## 2024-05-30 NOTE — PHYSICAL THERAPY INITIAL EVALUATION ADULT - TRANSFER TRAINING, PT EVAL
Patient will transfer independently from all surfaces in 2 weeks in order to increase mobility at home. Patient will transfer supervision from all surfaces in 2 weeks in order to increase mobility at home.

## 2024-05-30 NOTE — PROGRESS NOTE ADULT - PROBLEM SELECTOR PLAN 4
found to have hypercalcemia on admission, 10.6  former smoker  CT Chest: Few bilateral pulmonary nodules.  f/u PTH, AM BMP  -IVF as ordered  -Nephro consult Dr Su

## 2024-05-31 ENCOUNTER — APPOINTMENT (OUTPATIENT)
Dept: ORTHOPEDIC SURGERY | Facility: CLINIC | Age: 89
End: 2024-05-31

## 2024-05-31 LAB
-  AMOXICILLIN/CLAVULANIC ACID: SIGNIFICANT CHANGE UP
-  AMPICILLIN/SULBACTAM: SIGNIFICANT CHANGE UP
-  AMPICILLIN: SIGNIFICANT CHANGE UP
-  AZTREONAM: SIGNIFICANT CHANGE UP
-  CEFAZOLIN: SIGNIFICANT CHANGE UP
-  CEFEPIME: SIGNIFICANT CHANGE UP
-  CEFOXITIN: SIGNIFICANT CHANGE UP
-  CEFTRIAXONE: SIGNIFICANT CHANGE UP
-  CEFUROXIME: SIGNIFICANT CHANGE UP
-  CIPROFLOXACIN: SIGNIFICANT CHANGE UP
-  ERTAPENEM: SIGNIFICANT CHANGE UP
-  GENTAMICIN: SIGNIFICANT CHANGE UP
-  IMIPENEM: SIGNIFICANT CHANGE UP
-  LEVOFLOXACIN: SIGNIFICANT CHANGE UP
-  MEROPENEM: SIGNIFICANT CHANGE UP
-  NITROFURANTOIN: SIGNIFICANT CHANGE UP
-  PIPERACILLIN/TAZOBACTAM: SIGNIFICANT CHANGE UP
-  TOBRAMYCIN: SIGNIFICANT CHANGE UP
-  TRIMETHOPRIM/SULFAMETHOXAZOLE: SIGNIFICANT CHANGE UP
ALBUMIN SERPL ELPH-MCNC: 2.9 G/DL — LOW (ref 3.3–5)
ALP SERPL-CCNC: 93 U/L — SIGNIFICANT CHANGE UP (ref 40–120)
ALT FLD-CCNC: 18 U/L — SIGNIFICANT CHANGE UP (ref 12–78)
ANION GAP SERPL CALC-SCNC: 7 MMOL/L — SIGNIFICANT CHANGE UP (ref 5–17)
AST SERPL-CCNC: 27 U/L — SIGNIFICANT CHANGE UP (ref 15–37)
BILIRUB SERPL-MCNC: 0.7 MG/DL — SIGNIFICANT CHANGE UP (ref 0.2–1.2)
BUN SERPL-MCNC: 20 MG/DL — SIGNIFICANT CHANGE UP (ref 7–23)
CALCIUM SERPL-MCNC: 9.6 MG/DL — SIGNIFICANT CHANGE UP (ref 8.5–10.1)
CHLORIDE SERPL-SCNC: 108 MMOL/L — SIGNIFICANT CHANGE UP (ref 96–108)
CO2 SERPL-SCNC: 26 MMOL/L — SIGNIFICANT CHANGE UP (ref 22–31)
CREAT SERPL-MCNC: 1.5 MG/DL — HIGH (ref 0.5–1.3)
CULTURE RESULTS: ABNORMAL
EGFR: 32 ML/MIN/1.73M2 — LOW
GLUCOSE SERPL-MCNC: 154 MG/DL — HIGH (ref 70–99)
HCT VFR BLD CALC: 35.2 % — SIGNIFICANT CHANGE UP (ref 34.5–45)
HGB BLD-MCNC: 11.5 G/DL — SIGNIFICANT CHANGE UP (ref 11.5–15.5)
MCHC RBC-ENTMCNC: 27.8 PG — SIGNIFICANT CHANGE UP (ref 27–34)
MCHC RBC-ENTMCNC: 32.7 GM/DL — SIGNIFICANT CHANGE UP (ref 32–36)
MCV RBC AUTO: 85 FL — SIGNIFICANT CHANGE UP (ref 80–100)
METHOD TYPE: SIGNIFICANT CHANGE UP
NRBC # BLD: 0 /100 WBCS — SIGNIFICANT CHANGE UP (ref 0–0)
ORGANISM # SPEC MICROSCOPIC CNT: ABNORMAL
ORGANISM # SPEC MICROSCOPIC CNT: SIGNIFICANT CHANGE UP
PLATELET # BLD AUTO: 158 K/UL — SIGNIFICANT CHANGE UP (ref 150–400)
POTASSIUM SERPL-MCNC: 3.7 MMOL/L — SIGNIFICANT CHANGE UP (ref 3.5–5.3)
POTASSIUM SERPL-SCNC: 3.7 MMOL/L — SIGNIFICANT CHANGE UP (ref 3.5–5.3)
PROT SERPL-MCNC: 6.4 G/DL — SIGNIFICANT CHANGE UP (ref 6–8.3)
RBC # BLD: 4.14 M/UL — SIGNIFICANT CHANGE UP (ref 3.8–5.2)
RBC # FLD: 13.8 % — SIGNIFICANT CHANGE UP (ref 10.3–14.5)
SODIUM SERPL-SCNC: 141 MMOL/L — SIGNIFICANT CHANGE UP (ref 135–145)
SPECIMEN SOURCE: SIGNIFICANT CHANGE UP
WBC # BLD: 5.27 K/UL — SIGNIFICANT CHANGE UP (ref 3.8–10.5)
WBC # FLD AUTO: 5.27 K/UL — SIGNIFICANT CHANGE UP (ref 3.8–10.5)

## 2024-05-31 PROCEDURE — 99232 SBSQ HOSP IP/OBS MODERATE 35: CPT

## 2024-05-31 RX ADMIN — HEPARIN SODIUM 5000 UNIT(S): 5000 INJECTION INTRAVENOUS; SUBCUTANEOUS at 13:14

## 2024-05-31 RX ADMIN — LIDOCAINE 2 PATCH: 4 CREAM TOPICAL at 19:30

## 2024-05-31 RX ADMIN — LIDOCAINE 2 PATCH: 4 CREAM TOPICAL at 20:00

## 2024-05-31 RX ADMIN — HEPARIN SODIUM 5000 UNIT(S): 5000 INJECTION INTRAVENOUS; SUBCUTANEOUS at 21:14

## 2024-05-31 RX ADMIN — CEFTRIAXONE 100 MILLIGRAM(S): 500 INJECTION, POWDER, FOR SOLUTION INTRAMUSCULAR; INTRAVENOUS at 09:32

## 2024-05-31 RX ADMIN — HEPARIN SODIUM 5000 UNIT(S): 5000 INJECTION INTRAVENOUS; SUBCUTANEOUS at 05:27

## 2024-05-31 RX ADMIN — LOSARTAN POTASSIUM 50 MILLIGRAM(S): 100 TABLET, FILM COATED ORAL at 05:26

## 2024-05-31 RX ADMIN — ATORVASTATIN CALCIUM 40 MILLIGRAM(S): 80 TABLET, FILM COATED ORAL at 21:13

## 2024-05-31 RX ADMIN — Medication 1: at 08:13

## 2024-05-31 RX ADMIN — Medication 2: at 11:48

## 2024-05-31 RX ADMIN — LIDOCAINE 2 PATCH: 4 CREAM TOPICAL at 08:14

## 2024-05-31 NOTE — CASE MANAGEMENT PROGRESS NOTE - NSCMPROGRESSNOTE_GEN_ALL_CORE
Discussed pt on rounds, pt remains acute, pt on IV Rocephin, pt diet advanced to Consistent Carbohydrate with snack today pending tolerance of diet, per MD pending cultures. CM will continue to collaborate with interdisciplinary team and remain available to assist.

## 2024-05-31 NOTE — SOCIAL WORK PROGRESS NOTE - NSSWPROGRESSNOTE_GEN_ALL_CORE
SW met with pt and daughter to discuss transitional planning, pt prefers to go home with [facility] as she does not want to go to subacute rehab at this time. Daughters are in agreement with plan for DC, and pt will resume outpt PT upon DC. SW to continue to follow to ensure safe transitional planning.

## 2024-05-31 NOTE — CASE MANAGEMENT PROGRESS NOTE - NSCMPROGRESSNOTE_GEN_ALL_CORE
CM met with pt and daughter to discuss transition of care. Pt gave consent to speak with caregiver daughter Mandy. PT recommends [facility]. Pt stated she only wants to go to [facility], SW made aware. Pt stated if she cannot go to [facility] pt would like to return home with a PT script to go back to her out patient PT location. CM discussed with MD. CM will continue to collaborate with interdisciplinary team and remain available to assist.

## 2024-06-01 ENCOUNTER — TRANSCRIPTION ENCOUNTER (OUTPATIENT)
Age: 89
End: 2024-06-01

## 2024-06-01 LAB
ALBUMIN SERPL ELPH-MCNC: 3.1 G/DL — LOW (ref 3.3–5)
ALP SERPL-CCNC: 97 U/L — SIGNIFICANT CHANGE UP (ref 40–120)
ALT FLD-CCNC: 22 U/L — SIGNIFICANT CHANGE UP (ref 12–78)
ANION GAP SERPL CALC-SCNC: 8 MMOL/L — SIGNIFICANT CHANGE UP (ref 5–17)
AST SERPL-CCNC: 29 U/L — SIGNIFICANT CHANGE UP (ref 15–37)
BILIRUB SERPL-MCNC: 0.3 MG/DL — SIGNIFICANT CHANGE UP (ref 0.2–1.2)
BUN SERPL-MCNC: 28 MG/DL — HIGH (ref 7–23)
CALCIUM SERPL-MCNC: 9.6 MG/DL — SIGNIFICANT CHANGE UP (ref 8.5–10.1)
CHLORIDE SERPL-SCNC: 109 MMOL/L — HIGH (ref 96–108)
CO2 SERPL-SCNC: 22 MMOL/L — SIGNIFICANT CHANGE UP (ref 22–31)
CREAT SERPL-MCNC: 1.7 MG/DL — HIGH (ref 0.5–1.3)
EGFR: 28 ML/MIN/1.73M2 — LOW
GLUCOSE SERPL-MCNC: 178 MG/DL — HIGH (ref 70–99)
HCT VFR BLD CALC: 38.6 % — SIGNIFICANT CHANGE UP (ref 34.5–45)
HGB BLD-MCNC: 12.4 G/DL — SIGNIFICANT CHANGE UP (ref 11.5–15.5)
MCHC RBC-ENTMCNC: 27.6 PG — SIGNIFICANT CHANGE UP (ref 27–34)
MCHC RBC-ENTMCNC: 32.1 GM/DL — SIGNIFICANT CHANGE UP (ref 32–36)
MCV RBC AUTO: 86 FL — SIGNIFICANT CHANGE UP (ref 80–100)
NRBC # BLD: 0 /100 WBCS — SIGNIFICANT CHANGE UP (ref 0–0)
PLATELET # BLD AUTO: 192 K/UL — SIGNIFICANT CHANGE UP (ref 150–400)
POTASSIUM SERPL-MCNC: 3.6 MMOL/L — SIGNIFICANT CHANGE UP (ref 3.5–5.3)
POTASSIUM SERPL-SCNC: 3.6 MMOL/L — SIGNIFICANT CHANGE UP (ref 3.5–5.3)
PROT SERPL-MCNC: 7.2 G/DL — SIGNIFICANT CHANGE UP (ref 6–8.3)
RBC # BLD: 4.49 M/UL — SIGNIFICANT CHANGE UP (ref 3.8–5.2)
RBC # FLD: 13.9 % — SIGNIFICANT CHANGE UP (ref 10.3–14.5)
SODIUM SERPL-SCNC: 139 MMOL/L — SIGNIFICANT CHANGE UP (ref 135–145)
WBC # BLD: 7.97 K/UL — SIGNIFICANT CHANGE UP (ref 3.8–10.5)
WBC # FLD AUTO: 7.97 K/UL — SIGNIFICANT CHANGE UP (ref 3.8–10.5)

## 2024-06-01 PROCEDURE — 99232 SBSQ HOSP IP/OBS MODERATE 35: CPT

## 2024-06-01 RX ORDER — LACTOBACILLUS ACIDOPHILUS 100MM CELL
1 CAPSULE ORAL
Refills: 0 | Status: DISCONTINUED | OUTPATIENT
Start: 2024-06-01 | End: 2024-06-04

## 2024-06-01 RX ORDER — AMLODIPINE BESYLATE 2.5 MG/1
5 TABLET ORAL DAILY
Refills: 0 | Status: DISCONTINUED | OUTPATIENT
Start: 2024-06-01 | End: 2024-06-04

## 2024-06-01 RX ORDER — DIPHENHYDRAMINE HCL 50 MG
25 CAPSULE ORAL ONCE
Refills: 0 | Status: COMPLETED | OUTPATIENT
Start: 2024-06-01 | End: 2024-06-01

## 2024-06-01 RX ADMIN — Medication 1: at 08:09

## 2024-06-01 RX ADMIN — ATORVASTATIN CALCIUM 40 MILLIGRAM(S): 80 TABLET, FILM COATED ORAL at 21:54

## 2024-06-01 RX ADMIN — LIDOCAINE 2 PATCH: 4 CREAM TOPICAL at 08:07

## 2024-06-01 RX ADMIN — Medication 25 MILLIGRAM(S): at 10:12

## 2024-06-01 RX ADMIN — Medication 2: at 12:10

## 2024-06-01 RX ADMIN — Medication 100 MILLIGRAM(S): at 05:43

## 2024-06-01 RX ADMIN — Medication 81 MILLIGRAM(S): at 05:46

## 2024-06-01 RX ADMIN — LOSARTAN POTASSIUM 50 MILLIGRAM(S): 100 TABLET, FILM COATED ORAL at 05:43

## 2024-06-01 RX ADMIN — HEPARIN SODIUM 5000 UNIT(S): 5000 INJECTION INTRAVENOUS; SUBCUTANEOUS at 13:49

## 2024-06-01 RX ADMIN — Medication 1 TABLET(S): at 17:12

## 2024-06-01 RX ADMIN — HEPARIN SODIUM 5000 UNIT(S): 5000 INJECTION INTRAVENOUS; SUBCUTANEOUS at 05:46

## 2024-06-01 RX ADMIN — CEFTRIAXONE 100 MILLIGRAM(S): 500 INJECTION, POWDER, FOR SOLUTION INTRAMUSCULAR; INTRAVENOUS at 10:53

## 2024-06-01 RX ADMIN — HEPARIN SODIUM 5000 UNIT(S): 5000 INJECTION INTRAVENOUS; SUBCUTANEOUS at 21:54

## 2024-06-01 NOTE — DISCHARGE NOTE PROVIDER - HOSPITAL COURSE
ADMISSION H+P:    HPI:  89 yo female w/ pmh of HTN, HLD, T2DM, TIA, CKD III previous right hip fx w/ surgical pinning presents to the ED for multiple falls and foul smelling urine. Daughter at bedside and provided collateral history. Pt states that she was diagnosed w/ UTI by her PCP x2 wks ago, had been taking cefuroxime for the past 2 weeks with no improvement in urinary symptoms. Pt noticed foul smelling urine this morning. Pt was hospitalized in 2023 w/ UTI. Per daughter, pt w/ increased "confusion" which she describes as short-term memory loss -- daughter states this is typical for pt when she has a UTI. Additionally, pt reports several falls over the past week. Last Thursday, she dropped her walker on her L leg and subsequently noticed bruising and swelling. On Friday, she states that her walker tipped over, subsequently she fell on her R-side. She c/o R-sided abdominal pain which she attributes to her fall. Denies LOC, head trauma. Denies fevers, chills, dysuria, hematuria, N/V/D/C.     In the ED   VS: T(F): 97.8 HR: 87 BP: 163/72 RR: 17 SpO2: 95%  Labs show: H.7  WBC:7.11  Plt:181  Creatinine:1.8 Ca. 10.7 Lactate 2.6  UA: postive nitrites, Large leuks, mod zoltan,   S/p: 1g rocephin, 1L NS  Imaging  CT HEAD: There is no acute intracranial hemorrhage or depressed calvarial fracture. Chronic findings as above.  CT CERVICAL SPINE: No fracture or acute traumatic malalignment. Advanced multilevel degenerative changes of the cervical spine.  CT Chest: Circumferential gallbladder wall thickening, Few bilateral pulmonary nodules.         (29 May 2024 13:58)      ---  HOSPITAL COURSE: Patient admitted with acute pyelonephritis. ID (Dr. Grijalva) consulted. Urine with >100,000 CFU/ml Escherichia coli. Patient treated with IV Ceftriaxone with plan to discharge on PO Ceftin with last day . CT Chest: circumferential gallbladder wall thickening. HIDA scan negative for acute pino. PT evaluated patient and recommended dc to [facility].     Patient was medically optimized and improved clinically throughout hospital course. Patient seen and examined on day of discharge.    Vital Signs  T(C): 37.1 (2024 20:00), Max: 37.1 (2024 20:00)  T(F): 98.7 (2024 20:00), Max: 98.7 (2024 20:00)  HR: 72 (2024 20:00) (64 - 79)  BP: 151/85 (2024 14:06) (151/85 - 172/77)  RR: 18 (2024 14:06) (18 - 18)  SpO2: 93% (2024 14:06) (93% - 95%)    Physical Exam:  General: well-developed, well-nourished, NAD  HEENT: normocephalic, atraumatic, EOMI, moist mucous membranes   Neck: supple, non-tender, no masses  Neurology: AAOx3, sensation intact  Respiratory: clear to auscultation bilaterally; no wheezes, rhonchi, or rales  CV: regular rate and rhythm, soft S1/S2, no murmurs, rubs, or gallops  Abdominal: soft, non-tender, non-distended, bowel sounds present  Extremities: no clubbing, cyanosis, or edema; palpable peripheral pulses  Musculoskeletal: no joint erythema or warmth, no joint swelling   Skin: warm, dry, normal color    Patient is medically stable for discharge to ____ with outpatient follow up.  ---  CONSULTANTS:     ---  TIME SPENT:   I, the attending physician, was physically present for the key portions of the evaluation and management (E/M) service provided. The total amount of time spent reviewing the hospital course, laboratory values, imaging findings, assessing/counseling the patient, discussing with consultant physicians, social work, nursing staff was -- minutes.     ---  FINAL DISCHARGE DIAGNOSIS LIST:  Please see last daily progress note for final discharge diagnoses    ---  Primary care provider was made aware of plan for discharge:  [    ] NO     [     ] YES       ADMISSION H+P:    HPI:  89 yo female w/ pmh of HTN, HLD, T2DM, TIA, CKD III previous right hip fx w/ surgical pinning presents to the ED for multiple falls and foul smelling urine. Daughter at bedside and provided collateral history. Pt states that she was diagnosed w/ UTI by her PCP x2 wks ago, had been taking cefuroxime for the past 2 weeks with no improvement in urinary symptoms. Pt noticed foul smelling urine this morning. Pt was hospitalized in 2023 w/ UTI. Per daughter, pt w/ increased "confusion" which she describes as short-term memory loss -- daughter states this is typical for pt when she has a UTI. Additionally, pt reports several falls over the past week. Last Thursday, she dropped her walker on her L leg and subsequently noticed bruising and swelling. On Friday, she states that her walker tipped over, subsequently she fell on her R-side. She c/o R-sided abdominal pain which she attributes to her fall. Denies LOC, head trauma. Denies fevers, chills, dysuria, hematuria, N/V/D/C.     In the ED   VS: T(F): 97.8 HR: 87 BP: 163/72 RR: 17 SpO2: 95%  Labs show: H.7  WBC:7.11  Plt:181  Creatinine:1.8 Ca. 10.7 Lactate 2.6  UA: postive nitrites, Large leuks, mod zoltan,   S/p: 1g rocephin, 1L NS  Imaging  CT HEAD: There is no acute intracranial hemorrhage or depressed calvarial fracture. Chronic findings as above.  CT CERVICAL SPINE: No fracture or acute traumatic malalignment. Advanced multilevel degenerative changes of the cervical spine.  CT Chest: Circumferential gallbladder wall thickening, Few bilateral pulmonary nodules.         (29 May 2024 13:58)      ---  HOSPITAL COURSE: Patient admitted with acute pyelonephritis. ID (Dr. Grijalva) consulted. Urine with >100,000 CFU/ml Escherichia coli. Patient treated with IV Ceftriaxone with plan to discharge on PO Ceftin with last day . CT Chest: circumferential gallbladder wall thickening. HIDA scan negative for acute pino. PT evaluated patient and recommended dc to [facility]. Hospital course complicated by ELVIN on CKD, likely due to poor oral intake, improved after IVF, encourage PO water intake.    Patient was medically optimized and improved clinically throughout hospital course. Patient seen and examined on day of discharge.    ---  CONSULTANTS:   Dr. Ramos (Nephro)  Dr. Macias (ID)  Dr. Castro (Podiatry)    ---  TIME SPENT:   I, the attending physician, was physically present for the key portions of the evaluation and management (E/M) service provided. The total amount of time spent reviewing the hospital course, laboratory values, imaging findings, assessing/counseling the patient, discussing with consultant physicians, social work, nursing staff was 42 minutes.     ---

## 2024-06-01 NOTE — DISCHARGE NOTE PROVIDER - NSDCCPCAREPLAN_GEN_ALL_CORE_FT
PRINCIPAL DISCHARGE DIAGNOSIS  Diagnosis: Acute UTI  Assessment and Plan of Treatment: A urinary tract infection (UTI) is an infection of any part of the urinary tract, which includes the kidneys, ureters, bladder, and urethra. Continue to take _________. Do not stop taking the antibiotic even if you start to feel better.  SEEK IMMEDIATE MEDICAL CARE IF YOU HAVE ANY OF THE FOLLOWING SYMPTOMS: severe back or abdominal pain, fever, inability to keep fluids or medicine down, dizziness/lightheadedness, or a change in mental status.        SECONDARY DISCHARGE DIAGNOSES  Diagnosis: HTN (hypertension)  Assessment and Plan of Treatment:      PRINCIPAL DISCHARGE DIAGNOSIS  Diagnosis: Acute UTI  Assessment and Plan of Treatment: A urinary tract infection (UTI) is an infection of any part of the urinary tract, which includes the kidneys, ureters, bladder, and urethra. Continue to take cefuroxime 500mg twice a day until 6/7/24. Do not stop taking the antibiotic even if you start to feel better.  SEEK IMMEDIATE MEDICAL CARE IF YOU HAVE ANY OF THE FOLLOWING SYMPTOMS: severe back or abdominal pain, fever, inability to keep fluids or medicine down, dizziness/lightheadedness, or a change in mental status.        SECONDARY DISCHARGE DIAGNOSES  Diagnosis: Chronic kidney disease, unspecified CKD stage  Assessment and Plan of Treatment: Your kidney levels were elevated on admission and during hospital course but improved with help of IV fluids. It is important that you stay well hydrated and drink plenty of water. Please do not take losartan or HCTZ until you follow up with your Nephrologist.    Diagnosis: HTN (hypertension)  Assessment and Plan of Treatment: We held your losartan and HCTZ due to elevated kidney levels. You were started on amlodipine and continued on your home metoprolol to help manage your HTN. Continue to hold losartan and HCTZ for now. Please follow up with your PCP/Cardiologist within 2 weeks of discharge to further discuss your anti-HTN medication regimen.

## 2024-06-01 NOTE — CASE MANAGEMENT PROGRESS NOTE - NSCMPROGRESSNOTE_GEN_ALL_CORE
Anticipate transition home today.  Met with patient and daughter Maday at bedside.  Patient requests to be sent home with resumption of outpatient PT and daughter states she will be caring for patient upon D/C.  MD will provide RX and daughter to transport home.  Will remain available if needed.

## 2024-06-01 NOTE — DISCHARGE NOTE PROVIDER - NSDCMRMEDTOKEN_GEN_ALL_CORE_FT
aspirin 81 mg oral delayed release tablet: 1 tab(s) orally every other day  atorvastatin 40 mg oral tablet: 1 tab(s) orally once a day (at bedtime)  cefuroxime 250 mg oral tablet: 1 tab(s) orally every 12 hours take 1 tablet twice daily until 11/30  hydroCHLOROthiazide 12.5 mg oral tablet: 1 tab(s) orally once a day  Januvia 25 mg oral tablet: 1 tab(s) orally once a day  losartan 50 mg oral tablet: 1 tab(s) orally once a day  Metoprolol Succinate  mg oral tablet, extended release: 1 tab(s) orally once a day  Physical Therapy: Physical therapy TIW x6 weeks   amLODIPine 5 mg oral tablet: 1 tab(s) orally once a day  aspirin 81 mg oral delayed release tablet: 1 tab(s) orally every other day  atorvastatin 40 mg oral tablet: 1 tab(s) orally once a day (at bedtime)  bacitracin 500 units/g topical ointment: 1 Apply topically to affected area once a day  cefuroxime 500 mg oral tablet: 1 tab(s) orally every 12 hours  famotidine 20 mg oral tablet: 1 tab(s) orally once a day  Januvia 25 mg oral tablet: 1 tab(s) orally once a day  lactobacillus acidophilus oral capsule: 1 cap(s) orally 2 times a day  lidocaine 4% topical film: Apply topically to affected area once a day  Metoprolol Succinate  mg oral tablet, extended release: 1 tab(s) orally once a day  simethicone 80 mg oral tablet, chewable: 1 tab(s) orally 3 times a day

## 2024-06-01 NOTE — DISCHARGE NOTE PROVIDER - CARE PROVIDER_API CALL
JUNIOR MELVIN  [address] [place] [address]  Phone: [phone]  Fax: [phone]  Follow Up Time:    JUNIOR MELVIN  [address] [place] [address]  Phone: [phone]  Fax: ()-  Follow Up Time:     Lewis Ramos  Nephrology  [facility] [facility] [address]  [place] [address]  Phone: [phone]  Fax: [phone]  Follow Up Time:

## 2024-06-01 NOTE — CAREGIVER ENGAGEMENT NOTE - CAREGIVER OUTREACH NOTES - FREE TEXT
Patient with mons pubis abscess appreciated on physical exam. Obtained verbal consent from the patient to proceed with the procedure. Gave the patient 1 of dilaudid for systemic pain control and lidocaine for local anesthesia. Prepped the area in sterile fashion. Made a cruciate incision with a #11 blade. Was able to express 2-5 cc of pus. Obtained wound cultures. Throughly irrigated the wound with sterile saline. Packed the wound with 1/2'' iodoform. Dressed with wound with 4x4 gauze, and ABD pad, and tape. Will perform packing change on 11/25. The wound measured approximately 2 x 2 cm in size.  Rest of care per primary team SW met with pt and dtr Maday at bedside to discuss d/c planning. Pt would like to go to [facility] for a week to get stronger before d/c home. Pt now interested in [facility] placement. BETZY requesting and pending completion for referral to be sent to [facility]. SW to follow and remain available for any needs.

## 2024-06-01 NOTE — DISCHARGE NOTE PROVIDER - NSDCHC_MEDRECSTATUS_GEN_ALL_CORE
Admission Reconciliation is Not Complete  Discharge Reconciliation is Not Complete None Admission Reconciliation is Not Complete  Discharge Reconciliation is Completed

## 2024-06-01 NOTE — DISCHARGE NOTE PROVIDER - CARE PROVIDERS DIRECT ADDRESSES
[email or web address]

## 2024-06-01 NOTE — DISCHARGE NOTE PROVIDER - PROVIDER TOKENS
PROVIDER:[TOKEN:[[id number]]] PROVIDER:[TOKEN:[[id number]]],PROVIDER:[TOKEN:[[id number]]]

## 2024-06-02 LAB
ALBUMIN SERPL ELPH-MCNC: 3.3 G/DL — SIGNIFICANT CHANGE UP (ref 3.3–5)
ALP SERPL-CCNC: 99 U/L — SIGNIFICANT CHANGE UP (ref 40–120)
ALT FLD-CCNC: 25 U/L — SIGNIFICANT CHANGE UP (ref 12–78)
ANION GAP SERPL CALC-SCNC: 7 MMOL/L — SIGNIFICANT CHANGE UP (ref 5–17)
AST SERPL-CCNC: 31 U/L — SIGNIFICANT CHANGE UP (ref 15–37)
BILIRUB SERPL-MCNC: 0.6 MG/DL — SIGNIFICANT CHANGE UP (ref 0.2–1.2)
BUN SERPL-MCNC: 31 MG/DL — HIGH (ref 7–23)
CALCIUM SERPL-MCNC: 9.9 MG/DL — SIGNIFICANT CHANGE UP (ref 8.5–10.1)
CHLORIDE SERPL-SCNC: 107 MMOL/L — SIGNIFICANT CHANGE UP (ref 96–108)
CO2 SERPL-SCNC: 27 MMOL/L — SIGNIFICANT CHANGE UP (ref 22–31)
CREAT SERPL-MCNC: 1.8 MG/DL — HIGH (ref 0.5–1.3)
EGFR: 26 ML/MIN/1.73M2 — LOW
GLUCOSE SERPL-MCNC: 166 MG/DL — HIGH (ref 70–99)
HCT VFR BLD CALC: 37.5 % — SIGNIFICANT CHANGE UP (ref 34.5–45)
HGB BLD-MCNC: 12.1 G/DL — SIGNIFICANT CHANGE UP (ref 11.5–15.5)
MCHC RBC-ENTMCNC: 27.8 PG — SIGNIFICANT CHANGE UP (ref 27–34)
MCHC RBC-ENTMCNC: 32.3 GM/DL — SIGNIFICANT CHANGE UP (ref 32–36)
MCV RBC AUTO: 86 FL — SIGNIFICANT CHANGE UP (ref 80–100)
NRBC # BLD: 0 /100 WBCS — SIGNIFICANT CHANGE UP (ref 0–0)
PLATELET # BLD AUTO: 209 K/UL — SIGNIFICANT CHANGE UP (ref 150–400)
POTASSIUM SERPL-MCNC: 3.4 MMOL/L — LOW (ref 3.5–5.3)
POTASSIUM SERPL-SCNC: 3.4 MMOL/L — LOW (ref 3.5–5.3)
PROT SERPL-MCNC: 7.4 G/DL — SIGNIFICANT CHANGE UP (ref 6–8.3)
RBC # BLD: 4.36 M/UL — SIGNIFICANT CHANGE UP (ref 3.8–5.2)
RBC # FLD: 14 % — SIGNIFICANT CHANGE UP (ref 10.3–14.5)
SODIUM SERPL-SCNC: 141 MMOL/L — SIGNIFICANT CHANGE UP (ref 135–145)
WBC # BLD: 7.78 K/UL — SIGNIFICANT CHANGE UP (ref 3.8–10.5)
WBC # FLD AUTO: 7.78 K/UL — SIGNIFICANT CHANGE UP (ref 3.8–10.5)

## 2024-06-02 PROCEDURE — 99232 SBSQ HOSP IP/OBS MODERATE 35: CPT

## 2024-06-02 RX ORDER — CEFUROXIME AXETIL 250 MG
500 TABLET ORAL EVERY 12 HOURS
Refills: 0 | Status: DISCONTINUED | OUTPATIENT
Start: 2024-06-02 | End: 2024-06-04

## 2024-06-02 RX ORDER — CALCIUM CARBONATE 500(1250)
1 TABLET ORAL ONCE
Refills: 0 | Status: COMPLETED | OUTPATIENT
Start: 2024-06-02 | End: 2024-06-02

## 2024-06-02 RX ORDER — POTASSIUM CHLORIDE 20 MEQ
40 PACKET (EA) ORAL ONCE
Refills: 0 | Status: COMPLETED | OUTPATIENT
Start: 2024-06-02 | End: 2024-06-02

## 2024-06-02 RX ADMIN — HEPARIN SODIUM 5000 UNIT(S): 5000 INJECTION INTRAVENOUS; SUBCUTANEOUS at 14:07

## 2024-06-02 RX ADMIN — ATORVASTATIN CALCIUM 40 MILLIGRAM(S): 80 TABLET, FILM COATED ORAL at 21:40

## 2024-06-02 RX ADMIN — Medication 1: at 12:05

## 2024-06-02 RX ADMIN — HEPARIN SODIUM 5000 UNIT(S): 5000 INJECTION INTRAVENOUS; SUBCUTANEOUS at 05:50

## 2024-06-02 RX ADMIN — Medication 650 MILLIGRAM(S): at 17:01

## 2024-06-02 RX ADMIN — Medication 1: at 17:00

## 2024-06-02 RX ADMIN — Medication 100 MILLIGRAM(S): at 05:50

## 2024-06-02 RX ADMIN — HEPARIN SODIUM 5000 UNIT(S): 5000 INJECTION INTRAVENOUS; SUBCUTANEOUS at 21:40

## 2024-06-02 RX ADMIN — AMLODIPINE BESYLATE 5 MILLIGRAM(S): 2.5 TABLET ORAL at 05:50

## 2024-06-02 RX ADMIN — Medication 650 MILLIGRAM(S): at 17:58

## 2024-06-02 RX ADMIN — LIDOCAINE 2 PATCH: 4 CREAM TOPICAL at 08:36

## 2024-06-02 RX ADMIN — CEFTRIAXONE 100 MILLIGRAM(S): 500 INJECTION, POWDER, FOR SOLUTION INTRAMUSCULAR; INTRAVENOUS at 10:12

## 2024-06-02 RX ADMIN — Medication 40 MILLIEQUIVALENT(S): at 11:17

## 2024-06-02 RX ADMIN — Medication 1 TABLET(S): at 08:36

## 2024-06-02 RX ADMIN — Medication 1 TABLET(S): at 22:44

## 2024-06-02 RX ADMIN — Medication 1 TABLET(S): at 17:01

## 2024-06-02 RX ADMIN — LOSARTAN POTASSIUM 50 MILLIGRAM(S): 100 TABLET, FILM COATED ORAL at 05:50

## 2024-06-02 RX ADMIN — Medication 500 MILLIGRAM(S): at 18:04

## 2024-06-03 LAB
ALBUMIN SERPL ELPH-MCNC: 3 G/DL — LOW (ref 3.3–5)
ALP SERPL-CCNC: 91 U/L — SIGNIFICANT CHANGE UP (ref 40–120)
ALT FLD-CCNC: 24 U/L — SIGNIFICANT CHANGE UP (ref 12–78)
ANION GAP SERPL CALC-SCNC: 6 MMOL/L — SIGNIFICANT CHANGE UP (ref 5–17)
AST SERPL-CCNC: 26 U/L — SIGNIFICANT CHANGE UP (ref 15–37)
BILIRUB SERPL-MCNC: 0.4 MG/DL — SIGNIFICANT CHANGE UP (ref 0.2–1.2)
BUN SERPL-MCNC: 38 MG/DL — HIGH (ref 7–23)
CALCIUM SERPL-MCNC: 9.7 MG/DL — SIGNIFICANT CHANGE UP (ref 8.5–10.1)
CHLORIDE SERPL-SCNC: 111 MMOL/L — HIGH (ref 96–108)
CO2 SERPL-SCNC: 23 MMOL/L — SIGNIFICANT CHANGE UP (ref 22–31)
CREAT SERPL-MCNC: 2.1 MG/DL — HIGH (ref 0.5–1.3)
CULTURE RESULTS: SIGNIFICANT CHANGE UP
CULTURE RESULTS: SIGNIFICANT CHANGE UP
EGFR: 22 ML/MIN/1.73M2 — LOW
GLUCOSE SERPL-MCNC: 154 MG/DL — HIGH (ref 70–99)
HCT VFR BLD CALC: 34 % — LOW (ref 34.5–45)
HGB BLD-MCNC: 10.8 G/DL — LOW (ref 11.5–15.5)
MCHC RBC-ENTMCNC: 27.6 PG — SIGNIFICANT CHANGE UP (ref 27–34)
MCHC RBC-ENTMCNC: 31.8 GM/DL — LOW (ref 32–36)
MCV RBC AUTO: 86.7 FL — SIGNIFICANT CHANGE UP (ref 80–100)
NRBC # BLD: 0 /100 WBCS — SIGNIFICANT CHANGE UP (ref 0–0)
PLATELET # BLD AUTO: 165 K/UL — SIGNIFICANT CHANGE UP (ref 150–400)
POTASSIUM SERPL-MCNC: 4.4 MMOL/L — SIGNIFICANT CHANGE UP (ref 3.5–5.3)
POTASSIUM SERPL-SCNC: 4.4 MMOL/L — SIGNIFICANT CHANGE UP (ref 3.5–5.3)
PROT SERPL-MCNC: 6.3 G/DL — SIGNIFICANT CHANGE UP (ref 6–8.3)
RBC # BLD: 3.92 M/UL — SIGNIFICANT CHANGE UP (ref 3.8–5.2)
RBC # FLD: 14 % — SIGNIFICANT CHANGE UP (ref 10.3–14.5)
SODIUM SERPL-SCNC: 140 MMOL/L — SIGNIFICANT CHANGE UP (ref 135–145)
SPECIMEN SOURCE: SIGNIFICANT CHANGE UP
SPECIMEN SOURCE: SIGNIFICANT CHANGE UP
WBC # BLD: 6.63 K/UL — SIGNIFICANT CHANGE UP (ref 3.8–10.5)
WBC # FLD AUTO: 6.63 K/UL — SIGNIFICANT CHANGE UP (ref 3.8–10.5)

## 2024-06-03 PROCEDURE — 99232 SBSQ HOSP IP/OBS MODERATE 35: CPT

## 2024-06-03 RX ORDER — SODIUM CHLORIDE 9 MG/ML
1000 INJECTION, SOLUTION INTRAVENOUS
Refills: 0 | Status: DISCONTINUED | OUTPATIENT
Start: 2024-06-03 | End: 2024-06-04

## 2024-06-03 RX ORDER — FAMOTIDINE 10 MG/ML
20 INJECTION INTRAVENOUS DAILY
Refills: 0 | Status: DISCONTINUED | OUTPATIENT
Start: 2024-06-03 | End: 2024-06-04

## 2024-06-03 RX ORDER — SIMETHICONE 80 MG/1
80 TABLET, CHEWABLE ORAL THREE TIMES A DAY
Refills: 0 | Status: DISCONTINUED | OUTPATIENT
Start: 2024-06-03 | End: 2024-06-04

## 2024-06-03 RX ADMIN — Medication 1 TABLET(S): at 16:58

## 2024-06-03 RX ADMIN — HEPARIN SODIUM 5000 UNIT(S): 5000 INJECTION INTRAVENOUS; SUBCUTANEOUS at 05:38

## 2024-06-03 RX ADMIN — Medication 1: at 08:13

## 2024-06-03 RX ADMIN — Medication 1 TABLET(S): at 08:27

## 2024-06-03 RX ADMIN — HEPARIN SODIUM 5000 UNIT(S): 5000 INJECTION INTRAVENOUS; SUBCUTANEOUS at 13:40

## 2024-06-03 RX ADMIN — Medication 650 MILLIGRAM(S): at 15:18

## 2024-06-03 RX ADMIN — AMLODIPINE BESYLATE 5 MILLIGRAM(S): 2.5 TABLET ORAL at 05:37

## 2024-06-03 RX ADMIN — Medication 500 MILLIGRAM(S): at 17:44

## 2024-06-03 RX ADMIN — ATORVASTATIN CALCIUM 40 MILLIGRAM(S): 80 TABLET, FILM COATED ORAL at 22:11

## 2024-06-03 RX ADMIN — LIDOCAINE 2 PATCH: 4 CREAM TOPICAL at 08:27

## 2024-06-03 RX ADMIN — SODIUM CHLORIDE 60 MILLILITER(S): 9 INJECTION, SOLUTION INTRAVENOUS at 13:40

## 2024-06-03 RX ADMIN — SIMETHICONE 80 MILLIGRAM(S): 80 TABLET, CHEWABLE ORAL at 12:10

## 2024-06-03 RX ADMIN — FAMOTIDINE 20 MILLIGRAM(S): 10 INJECTION INTRAVENOUS at 12:10

## 2024-06-03 RX ADMIN — HEPARIN SODIUM 5000 UNIT(S): 5000 INJECTION INTRAVENOUS; SUBCUTANEOUS at 22:11

## 2024-06-03 RX ADMIN — SIMETHICONE 80 MILLIGRAM(S): 80 TABLET, CHEWABLE ORAL at 22:11

## 2024-06-03 RX ADMIN — Medication 1: at 12:10

## 2024-06-03 RX ADMIN — Medication 500 MILLIGRAM(S): at 05:37

## 2024-06-03 RX ADMIN — LOSARTAN POTASSIUM 50 MILLIGRAM(S): 100 TABLET, FILM COATED ORAL at 05:37

## 2024-06-03 RX ADMIN — Medication 81 MILLIGRAM(S): at 05:52

## 2024-06-03 RX ADMIN — Medication 650 MILLIGRAM(S): at 14:27

## 2024-06-03 NOTE — SOCIAL WORK PROGRESS NOTE - NSSWPROGRESSNOTE_GEN_ALL_CORE
Anticipated DC plan is for pt to go to [facility] for subacute rehab on 6/4, pt/MD/daughter all aware and in agreement with plan. SW will confirm DC with MD in AM and continue to follow to ensure safe transition.

## 2024-06-03 NOTE — SOCIAL WORK PROGRESS NOTE - NSSWPROGRESSNOTE_GEN_ALL_CORE
BETZY sent to [facility] for subacute rehab as per request for pt/daughters for anticipated DC 6/4/24 as per MD. [facility] is first choice for subacute rehab, SW will continue to follow to ensure safe transition.

## 2024-06-03 NOTE — PROGRESS NOTE ADULT - TIME BILLING
as above
Attestation Statements:  I have personally seen and examined the patient.  I fully participated in the care of this patient.  I have made amendments to the documentation where necessary, and agree with the history, physical exam, and plan as documented by the Resident.     89 yo female w/ pmh of HTN, HLD, T2DM, TIA, CKD presents w/ foul smelling urine; admitted for UTI and fall    Plan:  Admit for UTI- will continue Zosyn   HIDA is negative   Pt wants to go home with home PT

## 2024-06-04 ENCOUNTER — TRANSCRIPTION ENCOUNTER (OUTPATIENT)
Age: 89
End: 2024-06-04

## 2024-06-04 VITALS
RESPIRATION RATE: 18 BRPM | DIASTOLIC BLOOD PRESSURE: 60 MMHG | TEMPERATURE: 98 F | HEART RATE: 66 BPM | SYSTOLIC BLOOD PRESSURE: 139 MMHG | OXYGEN SATURATION: 98 %

## 2024-06-04 LAB
ANION GAP SERPL CALC-SCNC: 7 MMOL/L — SIGNIFICANT CHANGE UP (ref 5–17)
BUN SERPL-MCNC: 37 MG/DL — HIGH (ref 7–23)
CALCIUM SERPL-MCNC: 9.8 MG/DL — SIGNIFICANT CHANGE UP (ref 8.5–10.1)
CHLORIDE SERPL-SCNC: 108 MMOL/L — SIGNIFICANT CHANGE UP (ref 96–108)
CO2 SERPL-SCNC: 25 MMOL/L — SIGNIFICANT CHANGE UP (ref 22–31)
CREAT SERPL-MCNC: 1.8 MG/DL — HIGH (ref 0.5–1.3)
EGFR: 26 ML/MIN/1.73M2 — LOW
GLUCOSE SERPL-MCNC: 233 MG/DL — HIGH (ref 70–99)
HCT VFR BLD CALC: 32.8 % — LOW (ref 34.5–45)
HGB BLD-MCNC: 10.7 G/DL — LOW (ref 11.5–15.5)
MCHC RBC-ENTMCNC: 28.2 PG — SIGNIFICANT CHANGE UP (ref 27–34)
MCHC RBC-ENTMCNC: 32.6 GM/DL — SIGNIFICANT CHANGE UP (ref 32–36)
MCV RBC AUTO: 86.3 FL — SIGNIFICANT CHANGE UP (ref 80–100)
NRBC # BLD: 0 /100 WBCS — SIGNIFICANT CHANGE UP (ref 0–0)
PLATELET # BLD AUTO: 186 K/UL — SIGNIFICANT CHANGE UP (ref 150–400)
POTASSIUM SERPL-MCNC: 4 MMOL/L — SIGNIFICANT CHANGE UP (ref 3.5–5.3)
POTASSIUM SERPL-SCNC: 4 MMOL/L — SIGNIFICANT CHANGE UP (ref 3.5–5.3)
RBC # BLD: 3.8 M/UL — SIGNIFICANT CHANGE UP (ref 3.8–5.2)
RBC # FLD: 14 % — SIGNIFICANT CHANGE UP (ref 10.3–14.5)
SODIUM SERPL-SCNC: 140 MMOL/L — SIGNIFICANT CHANGE UP (ref 135–145)
WBC # BLD: 7.36 K/UL — SIGNIFICANT CHANGE UP (ref 3.8–10.5)
WBC # FLD AUTO: 7.36 K/UL — SIGNIFICANT CHANGE UP (ref 3.8–10.5)

## 2024-06-04 PROCEDURE — 85730 THROMBOPLASTIN TIME PARTIAL: CPT

## 2024-06-04 PROCEDURE — 71250 CT THORAX DX C-: CPT | Mod: MC

## 2024-06-04 PROCEDURE — 78226 HEPATOBILIARY SYSTEM IMAGING: CPT

## 2024-06-04 PROCEDURE — 80053 COMPREHEN METABOLIC PANEL: CPT

## 2024-06-04 PROCEDURE — 97116 GAIT TRAINING THERAPY: CPT

## 2024-06-04 PROCEDURE — 82962 GLUCOSE BLOOD TEST: CPT

## 2024-06-04 PROCEDURE — 73030 X-RAY EXAM OF SHOULDER: CPT

## 2024-06-04 PROCEDURE — 96365 THER/PROPH/DIAG IV INF INIT: CPT

## 2024-06-04 PROCEDURE — 70450 CT HEAD/BRAIN W/O DYE: CPT | Mod: MC

## 2024-06-04 PROCEDURE — 99239 HOSP IP/OBS DSCHRG MGMT >30: CPT

## 2024-06-04 PROCEDURE — 80048 BASIC METABOLIC PNL TOTAL CA: CPT

## 2024-06-04 PROCEDURE — 83735 ASSAY OF MAGNESIUM: CPT

## 2024-06-04 PROCEDURE — 87186 SC STD MICRODIL/AGAR DIL: CPT

## 2024-06-04 PROCEDURE — A9537: CPT

## 2024-06-04 PROCEDURE — 83605 ASSAY OF LACTIC ACID: CPT

## 2024-06-04 PROCEDURE — 36415 COLL VENOUS BLD VENIPUNCTURE: CPT

## 2024-06-04 PROCEDURE — 87040 BLOOD CULTURE FOR BACTERIA: CPT

## 2024-06-04 PROCEDURE — 83036 HEMOGLOBIN GLYCOSYLATED A1C: CPT

## 2024-06-04 PROCEDURE — 85025 COMPLETE CBC W/AUTO DIFF WBC: CPT

## 2024-06-04 PROCEDURE — 83970 ASSAY OF PARATHORMONE: CPT

## 2024-06-04 PROCEDURE — 76705 ECHO EXAM OF ABDOMEN: CPT

## 2024-06-04 PROCEDURE — 80061 LIPID PANEL: CPT

## 2024-06-04 PROCEDURE — 82310 ASSAY OF CALCIUM: CPT

## 2024-06-04 PROCEDURE — 97166 OT EVAL MOD COMPLEX 45 MIN: CPT

## 2024-06-04 PROCEDURE — 97535 SELF CARE MNGMENT TRAINING: CPT

## 2024-06-04 PROCEDURE — 99285 EMERGENCY DEPT VISIT HI MDM: CPT

## 2024-06-04 PROCEDURE — 87637 SARSCOV2&INF A&B&RSV AMP PRB: CPT

## 2024-06-04 PROCEDURE — 84100 ASSAY OF PHOSPHORUS: CPT

## 2024-06-04 PROCEDURE — 97530 THERAPEUTIC ACTIVITIES: CPT

## 2024-06-04 PROCEDURE — 85610 PROTHROMBIN TIME: CPT

## 2024-06-04 PROCEDURE — 93005 ELECTROCARDIOGRAM TRACING: CPT

## 2024-06-04 PROCEDURE — 85027 COMPLETE CBC AUTOMATED: CPT

## 2024-06-04 PROCEDURE — 97161 PT EVAL LOW COMPLEX 20 MIN: CPT

## 2024-06-04 PROCEDURE — 81001 URINALYSIS AUTO W/SCOPE: CPT

## 2024-06-04 PROCEDURE — 73610 X-RAY EXAM OF ANKLE: CPT

## 2024-06-04 PROCEDURE — 97110 THERAPEUTIC EXERCISES: CPT

## 2024-06-04 PROCEDURE — 73630 X-RAY EXAM OF FOOT: CPT

## 2024-06-04 PROCEDURE — 72125 CT NECK SPINE W/O DYE: CPT | Mod: MC

## 2024-06-04 PROCEDURE — 87086 URINE CULTURE/COLONY COUNT: CPT

## 2024-06-04 RX ORDER — CEFUROXIME AXETIL 250 MG
1 TABLET ORAL
Qty: 0 | Refills: 0 | DISCHARGE
Start: 2024-06-04

## 2024-06-04 RX ORDER — LIDOCAINE 4 G/100G
2 CREAM TOPICAL
Qty: 0 | Refills: 0 | DISCHARGE
Start: 2024-06-04

## 2024-06-04 RX ORDER — LACTOBACILLUS ACIDOPHILUS 100MM CELL
1 CAPSULE ORAL
Qty: 0 | Refills: 0 | DISCHARGE
Start: 2024-06-04

## 2024-06-04 RX ORDER — LOSARTAN POTASSIUM 100 MG/1
1 TABLET, FILM COATED ORAL
Refills: 0 | DISCHARGE

## 2024-06-04 RX ORDER — BACITRACIN ZINC 500 UNIT/G
1 OINTMENT IN PACKET (EA) TOPICAL
Qty: 0 | Refills: 0 | DISCHARGE
Start: 2024-06-04

## 2024-06-04 RX ORDER — ATORVASTATIN CALCIUM 80 MG/1
1 TABLET, FILM COATED ORAL
Qty: 0 | Refills: 0 | DISCHARGE
Start: 2024-06-04

## 2024-06-04 RX ORDER — FAMOTIDINE 10 MG/ML
1 INJECTION INTRAVENOUS
Qty: 0 | Refills: 0 | DISCHARGE
Start: 2024-06-04

## 2024-06-04 RX ORDER — HYDROCHLOROTHIAZIDE 25 MG
1 TABLET ORAL
Refills: 0 | DISCHARGE

## 2024-06-04 RX ORDER — SIMETHICONE 80 MG/1
1 TABLET, CHEWABLE ORAL
Qty: 0 | Refills: 0 | DISCHARGE
Start: 2024-06-04

## 2024-06-04 RX ORDER — ATORVASTATIN CALCIUM 80 MG/1
1 TABLET, FILM COATED ORAL
Refills: 0 | DISCHARGE

## 2024-06-04 RX ORDER — BACITRACIN ZINC 500 UNIT/G
1 OINTMENT IN PACKET (EA) TOPICAL DAILY
Refills: 0 | Status: DISCONTINUED | OUTPATIENT
Start: 2024-06-04 | End: 2024-06-04

## 2024-06-04 RX ORDER — AMLODIPINE BESYLATE 2.5 MG/1
1 TABLET ORAL
Qty: 0 | Refills: 0 | DISCHARGE
Start: 2024-06-04

## 2024-06-04 RX ADMIN — LIDOCAINE 2 PATCH: 4 CREAM TOPICAL at 08:28

## 2024-06-04 RX ADMIN — Medication 1 APPLICATION(S): at 12:03

## 2024-06-04 RX ADMIN — FAMOTIDINE 20 MILLIGRAM(S): 10 INJECTION INTRAVENOUS at 12:04

## 2024-06-04 RX ADMIN — AMLODIPINE BESYLATE 5 MILLIGRAM(S): 2.5 TABLET ORAL at 05:33

## 2024-06-04 RX ADMIN — Medication 100 MILLIGRAM(S): at 05:33

## 2024-06-04 RX ADMIN — SIMETHICONE 80 MILLIGRAM(S): 80 TABLET, CHEWABLE ORAL at 05:33

## 2024-06-04 RX ADMIN — HEPARIN SODIUM 5000 UNIT(S): 5000 INJECTION INTRAVENOUS; SUBCUTANEOUS at 13:46

## 2024-06-04 RX ADMIN — HEPARIN SODIUM 5000 UNIT(S): 5000 INJECTION INTRAVENOUS; SUBCUTANEOUS at 05:33

## 2024-06-04 RX ADMIN — SIMETHICONE 80 MILLIGRAM(S): 80 TABLET, CHEWABLE ORAL at 13:46

## 2024-06-04 RX ADMIN — Medication 500 MILLIGRAM(S): at 05:33

## 2024-06-04 RX ADMIN — Medication 1 TABLET(S): at 08:27

## 2024-06-04 NOTE — PROGRESS NOTE ADULT - PROVIDER SPECIALTY LIST ADULT
Hospitalist
Infectious Disease
Infectious Disease
Nephrology
Nephrology
Hospitalist
Infectious Disease
Nephrology
Hospitalist
Hospitalist
Nephrology
Nephrology
Podiatry
Infectious Disease
Nephrology
Hospitalist
Hospitalist

## 2024-06-04 NOTE — DISCHARGE NOTE NURSING/CASE MANAGEMENT/SOCIAL WORK - PATIENT PORTAL LINK FT
You can access the FollowMyHealth Patient Portal offered by [facility] by registering at the following website: [email or web address]. By joining [employer]’s FollowMyHealth portal, you will also be able to view your health information using other applications (apps) compatible with our system.

## 2024-06-04 NOTE — PROGRESS NOTE ADULT - SUBJECTIVE AND OBJECTIVE BOX
Covering [facility]ECT[facility] DISEASE  HERNESTO Barry, NOE Leung G. Casimir     AUDREYFARANELDA is a 89yFemale , patient examined and chart reviewed.      INTERVAL HPI/ OVERNIGHT EVENTS:   In chair, Daughter at bedside.  No events.    PAST MEDICAL & SURGICAL HISTORY:  Hypertension  Diabetes  TIA (transient ischemic attack)  Hip fracture  HLD (hyperlipidemia)  S/P ORIF (open reduction internal fixation) fracture  right hip    For details regarding the patient's social history, family history, and other miscellaneous elements, please refer the initial infectious diseases consultation and/or the admitting history and physical examination for this admission.    ROS:  CONSTITUTIONAL:  Negative fever or chills  EYES:  Negative  blurry vision or double vision  CARDIOVASCULAR:  Negative for chest pain or palpitations  RESPIRATORY:  Negative for cough, wheezing, or SOB   GASTROINTESTINAL:  Negative for nausea, vomiting, diarrhea, constipation, or abdominal pain  GENITOURINARY:  Negative frequency, urgency or dysuria  NEUROLOGIC:  No headache, confusion, dizziness, lightheadedness  All other systems were reviewed and are negative     No Known Allergies      Current inpatient medications :    ANTIBIOTICS/RELEVANT:  cefTRIAXone   IVPB 2000 milliGRAM(s) IV Intermittent every 24 hours  lactobacillus acidophilus 1 Tablet(s) Oral two times a day with meals      acetaminophen     Tablet .. 650 milliGRAM(s) Oral every 6 hours PRN  aspirin enteric coated 81 milliGRAM(s) Oral <User Schedule>  atorvastatin 40 milliGRAM(s) Oral at bedtime  dextrose 10% Bolus 125 milliLiter(s) IV Bolus once  dextrose 5%. 1000 milliLiter(s) IV Continuous <Continuous>  dextrose 5%. 1000 milliLiter(s) IV Continuous <Continuous>  dextrose 50% Injectable 25 Gram(s) IV Push once  dextrose 50% Injectable 12.5 Gram(s) IV Push once  dextrose Oral Gel 15 Gram(s) Oral once PRN  glucagon  Injectable 1 milliGRAM(s) IntraMuscular once  heparin   Injectable 5000 Unit(s) SubCutaneous every 8 hours  hydrochlorothiazide 12.5 milliGRAM(s) Oral daily  insulin lispro (ADMELOG) corrective regimen sliding scale   SubCutaneous three times a day before meals  insulin lispro (ADMELOG) corrective regimen sliding scale   SubCutaneous at bedtime  lidocaine   4% Patch 2 Patch Transdermal every 24 hours  losartan 50 milliGRAM(s) Oral daily  metoprolol succinate  milliGRAM(s) Oral daily      Objective:    05-31 @ 07:01  -  06-01 @ 07:00  --------------------------------------------------------  IN: 1350 mL / OUT: 650 mL / NET: 700 mL      T(C): 36.4 (06-01-24 @ 04:48), Max: 36.4 (06-01-24 @ 04:48)  HR: 79 (06-01-24 @ 04:48) (79 - 79)  BP: 172/77 (06-01-24 @ 04:48) (172/77 - 172/77)  RR: 18 (06-01-24 @ 04:48) (18 - 18)  SpO2: 95% (06-01-24 @ 04:48) (95% - 95%)    Physical Exam:  General:  no acute distress  Neck: supple, trachea midline  Lungs: clear, no wheeze/rhonchi  Cardiovascular: regular rate and rhythm, S1 S2  Abdomen: soft, nontender,  bowel sounds normal  Neurological: alert and oriented x3  Skin: no rash  Extremities: no edema      LABS:                          12.4   7.97  )-----------( 192      ( 01 Jun 2024 12:45 )             38.6       06-01    139  |  109<H>  |  28<H>  ----------------------------<  178<H>  3.6   |  22  |  1.70<H>    Ca    9.6      01 Jun 2024 12:45    TPro  7.2  /  Alb  3.1<L>  /  TBili  0.3  /  DBili  x   /  AST  29  /  ALT  22  /  AlkPhos  97  06-01        MICROBIOLOGY:  Culture - Urine (05.29.24 @ 10:15)    -  Trimethoprim/Sulfamethoxazole: S <=0.5/9.5   -  Piperacillin/Tazobactam: S <=8   -  Tobramycin: S <=2   -  Imipenem: S <=1   -  Levofloxacin: S <=0.5   -  Meropenem: S <=1   -  Nitrofurantoin: S <=32 Should not be used to treat pyelonephritis   -  Amoxicillin/Clavulanic Acid: S <=8/4   -  Ampicillin: R >16 These ampicillin results predict results for amoxicillin   -  Ampicillin/Sulbactam: S 8/4   -  Aztreonam: S <=4   -  Cefazolin: S <=2 For uncomplicated UTI with K. pneumoniae, E. coli, or P. mirablis: ANNA MARIE <=16 is sensitive and ANNA MARIE >=32 is resistant. This also predicts results for oral agents cefaclor, cefdinir, cefpodoxime, cefprozil, cefuroxime axetil, cephalexin and locarbef for uncomplicated UTI. Note that some isolates may be susceptible to these agents while testing resistant to cefazolin.   -  Cefepime: S <=2   -  Cefoxitin: S <=8   -  Ceftriaxone: S <=1   -  Cefuroxime: S <=4   -  Ciprofloxacin: S <=0.25   -  Ertapenem: S <=0.5   -  Gentamicin: S <=2   Specimen Source: Clean Catch Clean Catch (Midstream)   Culture Results:   >100,000 CFU/ml Escherichia coli   Organism Identification: Escherichia coli   Organism: Escherichia coli   Method Type: ANNA MARIE      RADIOLOGY & ADDITIONAL STUDIES:          Assessment :  89 yo female w HTN, HLD, T2DM, TIA, CKD III previous right hip fx w/ surgical pinning presented to the ED for multiple falls and foul smelling urine. Diagnosed w/ UTI by her PCP x2 wks ago, had been taking cefuroxime with no improvement in urinary symptoms. Pt noticed foul smelling urine, several falls admitted with Acute Pyelonephritis  Urine with >100,000 CFU/ml Escherichia coli sensi noted  Afebrile wbc wnl  Clinically doing well      Plan:  Ceftriaxone 2 grams IV daily   Can look at dc to complete therapy with PO Ceftin with last day 6/7  Trend temps and cbc  Increase activity  For rehab per family wishes    D/w Dr Nuñez    Continue with present regiment.  Appropriate use of antibiotics and adverse effects reviewed.      I have discussed the above plan of care with patient/ family in detail. They expressed understanding of the  treatment plan . Risks, benefits and alternatives discussed in detail. I have asked if they have any questions or concerns and appropriately addressed them to the best of my ability .    > 35 minutes were spent in direct patient care reviewing notes, medications ,labs data/ imaging , discussion with multidisciplinary team.    Thank you for allowing me to participate in care of your patient .    Jonathan Grijalva MD  Infectious Disease  [phone]
[facility]ECT[facility] DISE[facility]  Chuy Macias MD PhD, Rekha Gonzalez MD, Claudette Holguin MD, Jan Thacker MD, Camilo Arroyo MD  and providing coverage with Jonathan Grijalva MD  Providing Infectious Disease Consultations at [facility], [facility], [facility], [facility]'s    Office# [phone] to schedule follow up appointments  Answering Service for urgent calls or New Consults [phone]  Cell# to text for urgent issues Chuy Macias [phone]     infectious diseases progress note:    NELDA VERDIN is a 89y y. o. Female patient    Overnight and events of the last 24hrs reviewed    Allergies    No Known Allergies    Intolerances        ANTIBIOTICS/RELEVANT:  antimicrobials  cefuroxime   Tablet 500 milliGRAM(s) Oral every 12 hours    immunologic:    OTHER:  acetaminophen     Tablet .. 650 milliGRAM(s) Oral every 6 hours PRN  amLODIPine   Tablet 5 milliGRAM(s) Oral daily  aspirin enteric coated 81 milliGRAM(s) Oral <User Schedule>  atorvastatin 40 milliGRAM(s) Oral at bedtime  dextrose 10% Bolus 125 milliLiter(s) IV Bolus once  dextrose 5%. 1000 milliLiter(s) IV Continuous <Continuous>  dextrose 5%. 1000 milliLiter(s) IV Continuous <Continuous>  dextrose 50% Injectable 25 Gram(s) IV Push once  dextrose 50% Injectable 12.5 Gram(s) IV Push once  dextrose Oral Gel 15 Gram(s) Oral once PRN  famotidine    Tablet 20 milliGRAM(s) Oral daily  glucagon  Injectable 1 milliGRAM(s) IntraMuscular once  heparin   Injectable 5000 Unit(s) SubCutaneous every 8 hours  insulin lispro (ADMELOG) corrective regimen sliding scale   SubCutaneous three times a day before meals  insulin lispro (ADMELOG) corrective regimen sliding scale   SubCutaneous at bedtime  lactated ringers. 1000 milliLiter(s) IV Continuous <Continuous>  lactobacillus acidophilus 1 Tablet(s) Oral two times a day with meals  lidocaine   4% Patch 2 Patch Transdermal every 24 hours  metoprolol succinate  milliGRAM(s) Oral daily  simethicone 80 milliGRAM(s) Chew three times a day      Objective:  Vital Signs Last 24 Hrs  T(C): 36.3 (04 Jun 2024 05:31), Max: 36.8 (03 Jun 2024 19:56)  T(F): 97.3 (04 Jun 2024 05:31), Max: 98.2 (03 Jun 2024 19:56)  HR: 68 (04 Jun 2024 05:31) (58 - 74)  BP: 152/55 (04 Jun 2024 05:31) (130/67 - 152/55)  BP(mean): --  RR: 18 (04 Jun 2024 05:31) (18 - 18)  SpO2: 94% (04 Jun 2024 05:31) (90% - 95%)    Parameters below as of 04 Jun 2024 05:31  Patient On (Oxygen Delivery Method): room air        T(C): 36.3 (06-04-24 @ 05:31), Max: 36.9 (06-03-24 @ 04:37)  T(C): 36.3 (06-04-24 @ 05:31), Max: 37.1 (06-01-24 @ 20:00)  T(C): 36.3 (06-04-24 @ 05:31), Max: 37.1 (06-01-24 @ 20:00)    PHYSICAL EXAM:  HEENT: NC atraumatic  Neck: supple  Respiratory: no accessory muscle use, breathing comfortably  Cardiovascular: distant  Gastrointestinal: normal appearing, nondistended  Extremities: no clubbing, no cyanosis,        LABS:                          10.7   7.36  )-----------( 186      ( 04 Jun 2024 09:00 )             32.8       WBC  7.36 06-04 @ 09:00  6.63 06-03 @ 07:28  7.78 06-02 @ 08:30  7.97 06-01 @ 12:45  5.27 05-31 @ 06:50  4.94 05-30 @ 04:30  7.11 05-29 @ 10:15      06-04    140  |  108  |  37<H>  ----------------------------<  233<H>  4.0   |  25  |  1.80<H>    Ca    9.8      04 Jun 2024 09:00    TPro  6.3  /  Alb  3.0<L>  /  TBili  0.4  /  DBili  x   /  AST  26  /  ALT  24  /  AlkPhos  91  06-03      Creatinine: 1.80 mg/dL (06-04-24 @ 09:00)  Creatinine: 2.10 mg/dL (06-03-24 @ 07:28)  Creatinine: 1.80 mg/dL (06-02-24 @ 08:30)  Creatinine: 1.70 mg/dL (06-01-24 @ 12:45)  Creatinine: 1.50 mg/dL (05-31-24 @ 06:50)  Creatinine: 1.70 mg/dL (05-30-24 @ 04:30)  Creatinine: 1.80 mg/dL (05-29-24 @ 10:15)        Urinalysis Basic - ( 04 Jun 2024 09:00 )    Color: x / Appearance: x / SG: x / pH: x  Gluc: 233 mg/dL / Ketone: x  / Bili: x / Urobili: x   Blood: x / Protein: x / Nitrite: x   Leuk Esterase: x / RBC: x / WBC x   Sq Epi: x / Non Sq Epi: x / Bacteria: x            INFLAMMATORY MARKERS      MICROBIOLOGY:              RADIOLOGY & ADDITIONAL STUDIES:  
Office patient with CKD4. Admitted for UTI. Mild hypercalcemia suggest volume depleted state. Gentle IVF. Consult to follow in AM
Patient is a 89y old  Female who presents with a chief complaint of UTI (31 May 2024 17:39)      INTERVAL HPI/OVERNIGHT EVENTS: Patient seen and examined at bedside. No overnight events. Daughter at bedside. Patient sitting in chair. States she would like to go to [facility] now instead of discharge home today. Tolerating diet. Denies fever, chills, chest pain, shortness of breath, abdominal pain, nausea/vomiting, headache.    MEDICATIONS  (STANDING):  aspirin enteric coated 81 milliGRAM(s) Oral <User Schedule>  atorvastatin 40 milliGRAM(s) Oral at bedtime  cefTRIAXone   IVPB 2000 milliGRAM(s) IV Intermittent every 24 hours  dextrose 10% Bolus 125 milliLiter(s) IV Bolus once  dextrose 5%. 1000 milliLiter(s) (100 mL/Hr) IV Continuous <Continuous>  dextrose 5%. 1000 milliLiter(s) (50 mL/Hr) IV Continuous <Continuous>  dextrose 50% Injectable 25 Gram(s) IV Push once  dextrose 50% Injectable 12.5 Gram(s) IV Push once  glucagon  Injectable 1 milliGRAM(s) IntraMuscular once  heparin   Injectable 5000 Unit(s) SubCutaneous every 8 hours  hydrochlorothiazide 12.5 milliGRAM(s) Oral daily  insulin lispro (ADMELOG) corrective regimen sliding scale   SubCutaneous three times a day before meals  insulin lispro (ADMELOG) corrective regimen sliding scale   SubCutaneous at bedtime  lactobacillus acidophilus 1 Tablet(s) Oral two times a day with meals  lidocaine   4% Patch 2 Patch Transdermal every 24 hours  losartan 50 milliGRAM(s) Oral daily  metoprolol succinate  milliGRAM(s) Oral daily    MEDICATIONS  (PRN):  acetaminophen     Tablet .. 650 milliGRAM(s) Oral every 6 hours PRN Mild Pain (1 - 3)  dextrose Oral Gel 15 Gram(s) Oral once PRN Blood Glucose LESS THAN 70 milliGRAM(s)/deciliter      Allergies    No Known Allergies    Intolerances        REVIEW OF SYSTEMS:  CONSTITUTIONAL: No fever or chills  HEENT:  No headache, no sore throat  RESPIRATORY: No cough, wheezing, or shortness of breath  CARDIOVASCULAR: No chest pain, palpitations  GASTROINTESTINAL: No abd pain, nausea, vomiting, or diarrhea  GENITOURINARY: No dysuria, frequency, or hematuria  NEUROLOGICAL: no focal weakness or dizziness  MUSCULOSKELETAL: no myalgias     Vital Signs Last 24 Hrs  T(C): 36.4 (01 Jun 2024 04:48), Max: 36.6 (31 May 2024 12:27)  T(F): 97.5 (01 Jun 2024 04:48), Max: 97.9 (31 May 2024 12:27)  HR: 79 (01 Jun 2024 04:48) (79 - 88)  BP: 172/77 (01 Jun 2024 04:48) (156/67 - 172/77)  BP(mean): --  RR: 18 (01 Jun 2024 04:48) (18 - 20)  SpO2: 95% (01 Jun 2024 04:48) (94% - 95%)    Parameters below as of 01 Jun 2024 04:48  Patient On (Oxygen Delivery Method): room air        PHYSICAL EXAM:  GENERAL: NAD  HEENT:  anicteric, moist mucous membranes  CHEST/LUNG:  CTA b/l, no rales, wheezes, or rhonchi  HEART:  RRR, S1, S2  ABDOMEN:  BS+, soft, nontender, nondistended  EXTREMITIES: no edema, cyanosis, or calf tenderness  NERVOUS SYSTEM: answers questions and follows commands appropriately    LABS:    CBC Full  -  ( 31 May 2024 06:50 )  WBC Count : 5.27 K/uL  Hemoglobin : 11.5 g/dL  Hematocrit : 35.2 %  Platelet Count - Automated : 158 K/uL  Mean Cell Volume : 85.0 fl  Mean Cell Hemoglobin : 27.8 pg  Mean Cell Hemoglobin Concentration : 32.7 gm/dL  Auto Neutrophil # : x  Auto Lymphocyte # : x  Auto Monocyte # : x  Auto Eosinophil # : x  Auto Basophil # : x  Auto Neutrophil % : x  Auto Lymphocyte % : x  Auto Monocyte % : x  Auto Eosinophil % : x  Auto Basophil % : x      Ca    9.6        31 May 2024 06:50        Urinalysis Basic - ( 31 May 2024 06:50 )    Color: x / Appearance: x / SG: x / pH: x  Gluc: 154 mg/dL / Ketone: x  / Bili: x / Urobili: x   Blood: x / Protein: x / Nitrite: x   Leuk Esterase: x / RBC: x / WBC x   Sq Epi: x / Non Sq Epi: x / Bacteria: x      CAPILLARY BLOOD GLUCOSE      POCT Blood Glucose.: 249 mg/dL (01 Jun 2024 11:55)  POCT Blood Glucose.: 170 mg/dL (01 Jun 2024 07:34)  POCT Blood Glucose.: 148 mg/dL (31 May 2024 22:03)  POCT Blood Glucose.: 118 mg/dL (31 May 2024 17:00)        Culture - Blood (collected 05-29-24 @ 10:15)  Source: .Blood Blood-Peripheral  Preliminary Report (05-31-24 @ 17:01):    No growth at 48 Hours    Culture - Urine (collected 05-29-24 @ 10:15)  Source: Clean Catch Clean Catch (Midstream)  Final Report (05-31-24 @ 17:03):    >100,000 CFU/ml Escherichia coli  Organism: Escherichia coli (05-31-24 @ 17:03)  Organism: Escherichia coli (05-31-24 @ 17:03)      Method Type: ANNA MARIE      -  Amoxicillin/Clavulanic Acid: S <=8/4      -  Ampicillin: R >16 These ampicillin results predict results for amoxicillin      -  Ampicillin/Sulbactam: S 8/4      -  Aztreonam: S <=4      -  Cefazolin: S <=2 For uncomplicated UTI with K. pneumoniae, E. coli, or P. mirablis: ANNA MARIE <=16 is sensitive and ANNA MARIE >=32 is resistant. This also predicts results for oral agents cefaclor, cefdinir, cefpodoxime, cefprozil, cefuroxime axetil, cephalexin and locarbef for uncomplicated UTI. Note that some isolates may be susceptible to these agents while testing resistant to cefazolin.      -  Cefepime: S <=2      -  Cefoxitin: S <=8      -  Ceftriaxone: S <=1      -  Cefuroxime: S <=4      -  Ciprofloxacin: S <=0.25      -  Ertapenem: S <=0.5      -  Gentamicin: S <=2      -  Imipenem: S <=1      -  Levofloxacin: S <=0.5      -  Meropenem: S <=1      -  Nitrofurantoin: S <=32 Should not be used to treat pyelonephritis      -  Piperacillin/Tazobactam: S <=8      -  Tobramycin: S <=2      -  Trimethoprim/Sulfamethoxazole: S <=0.5/9.5    Culture - Blood (collected 05-29-24 @ 10:15)  Source: .Blood Blood-Peripheral  Preliminary Report (05-31-24 @ 17:01):    No growth at 48 Hours        RADIOLOGY & ADDITIONAL TESTS:    Personally reviewed.     Consultant(s) Notes Reviewed:  [x] YES  [ ] NO    
NELDA VERDIN  89y  Female      HPI:  89 yo female w/ pmh of HTN, HLD, T2DM, TIA, CKD III previous right hip fx w/ surgical pinning presents to the ED for multiple falls and foul smelling urine. Daughter at bedside and provided collateral history. Pt states that she was diagnosed w/ UTI by her PCP x2 wks ago, had been taking cefuroxime for the past 2 weeks with no improvement in urinary symptoms. Pt noticed foul smelling urine this morning. Pt was hospitalized in 2023 w/ UTI. Per daughter, pt w/ increased "confusion" which she describes as short-term memory loss -- daughter states this is typical for pt when she has a UTI. Additionally, pt reports several falls over the past week. Last Thursday, she dropped her walker on her L leg and subsequently noticed bruising and swelling. On Friday, she states that her walker tipped over, subsequently she fell on her R-side. She c/o R-sided abdominal pain which she attributes to her fall. Denies LOC, head trauma. Denies fevers, chills, dysuria, hematuria, N/V/D/C.     In the ED   VS: T(F): 97.8 HR: 87 BP: 163/72 RR: 17 SpO2: 95%  Labs show: H.7  WBC:7.11  Plt:181  Creatinine:1.8 Ca. 10.7 Lactate 2.6  UA: postive nitrites, Large leuks, mod zoltan,   S/p: 1g rocephin, 1L NS  Imaging  CT HEAD: There is no acute intracranial hemorrhage or depressed calvarial fracture. Chronic findings as above.  CT CERVICAL SPINE: No fracture or acute traumatic malalignment. Advanced multilevel degenerative changes of the cervical spine.  CT Chest: Circumferential gallbladder wall thickening, Few bilateral pulmonary nodules.        INTERVAL HPI/OVERNIGHT EVENTS: Pt seen and examined at bed side this morning. she was sitting in chair. Dtr was also at bed side. Pt reported R lower chest/RUQ pain that she thinks is related to fall. did not have this pain before fall. HIDA scan showed no cholecystitis. diet resumed. She was also c/o some burning with urination this morning that resolved now. afebrile.             T(C): 36.6 (24 @ 12:27), Max: 36.6 (24 @ 19:40)  HR: 88 (24 @ 12:27) (53 - 88)  BP: 156/67 (24 @ 12:27) (125/71 - 185/68)  RR: 20 (24 @ 12:27) (18 - 20)  SpO2: 94% (24 @ 12:27) (92% - 94%)  Wt(kg): --Vital Signs Last 24 Hrs  T(C): 36.6 (31 May 2024 12:27), Max: 36.6 (30 May 2024 19:40)  T(F): 97.9 (31 May 2024 12:), Max: 97.9 (30 May 2024 19:40)  HR: 88 (31 May 2024 12:27) (53 - 88)  BP: 156/67 (31 May 2024 12:27) (125/71 - 185/68)  BP(mean): 107 (30 May 2024 21:50) (107 - 107)  RR: 20 (31 May 2024 12:27) (18 - 20)  SpO2: 94% (31 May 2024 12:27) (92% - 94%)    Parameters below as of 31 May 2024 12:27  Patient On (Oxygen Delivery Method): room air        PHYSICAL EXAM:  GENERAL: NAD, well-groomed, well-developed  HEAD:  Atraumatic, Normocephalic  EYES: EOMI, PERRLA, conjunctiva and sclera clear  ENMT: No tonsillar erythema, exudates, or enlargement; Moist mucous membranes, Good dentition, No lesions  NECK: Supple, No JVD, Normal thyroid  NERVOUS SYSTEM:  Alert & Oriented X3, Good concentration; Motor Strength 5/5 B/L upper and lower extremities; DTRs 2+ intact and symmetric  CHEST/LUNG: Clear to percussion bilaterally; No rales, rhonchi, wheezing, or rubs  HEART: Regular rate and rhythm; No murmurs, rubs, or gallops  ABDOMEN: Soft, Nontender, Nondistended; Bowel sounds present  EXTREMITIES:  2+ Peripheral Pulses, No clubbing, cyanosis, or edema. ecchymosis noted over left foot toes   LYMPH: No lymphadenopathy noted  SKIN: No rashes or lesions    Consultant(s) Notes Reviewed:  [x ] YES  [ ] NO  Care Discussed with Consultants/Other Providers [ x] YES  [ ] NO    LABS:                        11.5   5.27  )-----------( 158      ( 31 May 2024 06:50 )             35.2         141  |  108  |  20  ----------------------------<  154<H>  3.7   |  26  |  1.50<H>    Ca    9.6      31 May 2024 06:50  Phos  3.2       Mg     1.6         TPro  6.4  /  Alb  2.9<L>  /  TBili  0.7  /  DBili  x   /  AST  27  /  ALT  18  /  AlkPhos  93        Urinalysis Basic - ( 31 May 2024 06:50 )    Color: x / Appearance: x / SG: x / pH: x  Gluc: 154 mg/dL / Ketone: x  / Bili: x / Urobili: x   Blood: x / Protein: x / Nitrite: x   Leuk Esterase: x / RBC: x / WBC x   Sq Epi: x / Non Sq Epi: x / Bacteria: x      CAPILLARY BLOOD GLUCOSE      POCT Blood Glucose.: 215 mg/dL (31 May 2024 11:13)  POCT Blood Glucose.: 162 mg/dL (31 May 2024 07:53)  POCT Blood Glucose.: 147 mg/dL (30 May 2024 21:01)        Urinalysis Basic - ( 31 May 2024 06:50 )    Color: x / Appearance: x / SG: x / pH: x  Gluc: 154 mg/dL / Ketone: x  / Bili: x / Urobili: x   Blood: x / Protein: x / Nitrite: x   Leuk Esterase: x / RBC: x / WBC x   Sq Epi: x / Non Sq Epi: x / Bacteria: x        RADIOLOGY & ADDITIONAL TESTS:    Imaging Personally Reviewed:  [ ] YES  [ ] NO    HEALTH ISSUES - PROBLEM Dx:  Acute UTI    Hypercalcemia    Chronic kidney disease, unspecified CKD stage    HTN (hypertension)    T2DM (type 2 diabetes mellitus)    History of TIAs    HLD (hyperlipidemia)    Need for prophylactic measure    Imaging abnormality    Fall    Left foot pain        
[facility] INFECTIOUS DISEASE  Chuy Macias MD PhD, Rekha Gonzalez MD, Claudette Holguin MD, Jan Thacker MD, Camilo Arroyo MD  and providing coverage with Jonathan Grijalva MD  Providing Infectious Disease Consultations at [facility], [facility], [place], [facility], [facility]'s    Office# [phone] to schedule follow up appointments  Answering Service for urgent calls or New Consults [phone]  Cell# to text for urgent issues Chuy Macias [phone]     infectious diseases progress note:    NELDA VERDIN is a 89y y. o. Female patient    Overnight and events of the last 24hrs reviewed    Allergies    No Known Allergies    Intolerances        ANTIBIOTICS/RELEVANT:  antimicrobials  cefTRIAXone   IVPB 2000 milliGRAM(s) IV Intermittent every 24 hours    immunologic:    OTHER:  acetaminophen     Tablet .. 650 milliGRAM(s) Oral every 6 hours PRN  aspirin enteric coated 81 milliGRAM(s) Oral <User Schedule>  atorvastatin 40 milliGRAM(s) Oral at bedtime  dextrose 10% Bolus 125 milliLiter(s) IV Bolus once  dextrose 5% + sodium chloride 0.45%. 1000 milliLiter(s) IV Continuous <Continuous>  dextrose 5%. 1000 milliLiter(s) IV Continuous <Continuous>  dextrose 5%. 1000 milliLiter(s) IV Continuous <Continuous>  dextrose 50% Injectable 25 Gram(s) IV Push once  dextrose 50% Injectable 12.5 Gram(s) IV Push once  dextrose Oral Gel 15 Gram(s) Oral once PRN  glucagon  Injectable 1 milliGRAM(s) IntraMuscular once  heparin   Injectable 5000 Unit(s) SubCutaneous every 8 hours  hydrochlorothiazide 12.5 milliGRAM(s) Oral daily  insulin lispro (ADMELOG) corrective regimen sliding scale   SubCutaneous at bedtime  insulin lispro (ADMELOG) corrective regimen sliding scale   SubCutaneous three times a day before meals  lidocaine   4% Patch 2 Patch Transdermal every 24 hours  losartan 50 milliGRAM(s) Oral daily  metoprolol succinate  milliGRAM(s) Oral daily  sodium chloride 0.9%. 1000 milliLiter(s) IV Continuous <Continuous>      Objective:  Vital Signs Last 24 Hrs  T(C): 36.5 (31 May 2024 05:33), Max: 36.6 (30 May 2024 19:40)  T(F): 97.7 (31 May 2024 05:33), Max: 97.9 (30 May 2024 19:40)  HR: 53 (31 May 2024 05:33) (53 - 106)  BP: 177/64 (31 May 2024 05:33) (125/71 - 187/79)  BP(mean): 107 (30 May 2024 21:50) (107 - 107)  RR: 18 (31 May 2024 05:33) (18 - 20)  SpO2: 92% (31 May 2024 05:33) (92% - 94%)    Parameters below as of 31 May 2024 05:33  Patient On (Oxygen Delivery Method): room air        T(C): 36.5 (05-31-24 @ 05:33), Max: 36.6 (05-29-24 @ 23:00)  T(C): 36.5 (05-31-24 @ 05:33), Max: 36.6 (05-29-24 @ 09:56)  T(C): 36.5 (05-31-24 @ 05:33), Max: 36.6 (05-29-24 @ 09:56)    PHYSICAL EXAM:  HEENT: NC atraumatic  Neck: supple  Respiratory: no accessory muscle use, breathing comfortably  Cardiovascular: distant  Gastrointestinal: normal appearing, nondistended  Extremities: no clubbing, no cyanosis,        LABS:                          11.5   5.27  )-----------( 158      ( 31 May 2024 06:50 )             35.2       WBC  5.27 05-31 @ 06:50  4.94 05-30 @ 04:30  7.11 05-29 @ 10:15      05-31    141  |  108  |  20  ----------------------------<  154<H>  3.7   |  26  |  1.50<H>    Ca    9.6      31 May 2024 06:50  Phos  3.2     05-30  Mg     1.6     05-30    TPro  6.4  /  Alb  2.9<L>  /  TBili  0.7  /  DBili  x   /  AST  27  /  ALT  18  /  AlkPhos  93  05-31      Creatinine: 1.50 mg/dL (05-31-24 @ 06:50)  Creatinine: 1.70 mg/dL (05-30-24 @ 04:30)  Creatinine: 1.80 mg/dL (05-29-24 @ 10:15)        Urinalysis Basic - ( 31 May 2024 06:50 )    Color: x / Appearance: x / SG: x / pH: x  Gluc: 154 mg/dL / Ketone: x  / Bili: x / Urobili: x   Blood: x / Protein: x / Nitrite: x   Leuk Esterase: x / RBC: x / WBC x   Sq Epi: x / Non Sq Epi: x / Bacteria: x            INFLAMMATORY MARKERS      MICROBIOLOGY:    Culture - Urine (05.29.24 @ 10:15)    Specimen Source: Clean Catch Clean Catch (Midstream)   Culture Results:   >100,000 CFU/ml Escherichia coli    RADIOLOGY & ADDITIONAL STUDIES:  
Patient is a 89y old  Female who presents with a chief complaint of UTI (29 May 2024 19:25)       HPI:  89 yo female w/ pmh of HTN, HLD, T2DM, TIA, CKD III previous right hip fx w/ surgical pinning presents to the ED for multiple falls and foul smelling urine. Daughter at bedside and provided collateral history. Pt states that she was diagnosed w/ UTI by her PCP x2 wks ago, had been taking cefuroxime for the past 2 weeks with no improvement in urinary symptoms. Pt noticed foul smelling urine this morning. Pt was hospitalized in 2023 w/ UTI. Per daughter, pt w/ increased "confusion" which she describes as short-term memory loss -- daughter states this is typical for pt when she has a UTI. Additionally, pt reports several falls over the past week. Last Thursday, she dropped her walker on her L leg and subsequently noticed bruising and swelling. On Friday, she states that her walker tipped over, subsequently she fell on her R-side. She c/o R-sided abdominal pain which she attributes to her fall. Denies LOC, head trauma. Denies fevers, chills, dysuria, hematuria, N/V/D/C.     In the ED   VS: T(F): 97.8 HR: 87 BP: 163/72 RR: 17 SpO2: 95%  Labs show: H.7  WBC:7.11  Plt:181  Creatinine:1.8 Ca. 10.7 Lactate 2.6  UA: postive nitrites, Large leuks, mod zoltan,   S/p: 1g rocephin, 1L NS  Imaging  CT HEAD: There is no acute intracranial hemorrhage or depressed calvarial fracture. Chronic findings as above.  CT CERVICAL SPINE: No fracture or acute traumatic malalignment. Advanced multilevel degenerative changes of the cervical spine.  CT Chest: Circumferential gallbladder wall thickening, Few bilateral pulmonary nodules.    Renal consulted for acute on CKD  Chart reviewed  Known to office  Last outpt cr measured to be 1.5     (29 May 2024 13:58)       PAST MEDICAL & SURGICAL HISTORY:  Hypertension      Diabetes      TIA (transient ischemic attack)      Hip fracture      HLD (hyperlipidemia)      S/P ORIF (open reduction internal fixation) fracture  right hip           FAMILY HISTORY:  FH: asthma (Father)    NC    Social History:Non smoker    MEDICATIONS  (STANDING):  aspirin enteric coated 81 milliGRAM(s) Oral <User Schedule>  atorvastatin 40 milliGRAM(s) Oral at bedtime  dextrose 10% Bolus 125 milliLiter(s) IV Bolus once  dextrose 5%. 1000 milliLiter(s) (100 mL/Hr) IV Continuous <Continuous>  dextrose 5%. 1000 milliLiter(s) (50 mL/Hr) IV Continuous <Continuous>  dextrose 50% Injectable 25 Gram(s) IV Push once  dextrose 50% Injectable 12.5 Gram(s) IV Push once  glucagon  Injectable 1 milliGRAM(s) IntraMuscular once  heparin   Injectable 5000 Unit(s) SubCutaneous every 8 hours  hydrochlorothiazide 12.5 milliGRAM(s) Oral daily  insulin lispro (ADMELOG) corrective regimen sliding scale   SubCutaneous three times a day before meals  insulin lispro (ADMELOG) corrective regimen sliding scale   SubCutaneous at bedtime  lidocaine   4% Patch 2 Patch Transdermal every 24 hours  losartan 50 milliGRAM(s) Oral daily  metoprolol succinate  milliGRAM(s) Oral daily  potassium chloride  10 mEq/100 mL IVPB 10 milliEquivalent(s) IV Intermittent once  sodium chloride 0.9%. 1000 milliLiter(s) (60 mL/Hr) IV Continuous <Continuous>    MEDICATIONS  (PRN):  acetaminophen     Tablet .. 650 milliGRAM(s) Oral every 6 hours PRN Mild Pain (1 - 3)  dextrose Oral Gel 15 Gram(s) Oral once PRN Blood Glucose LESS THAN 70 milliGRAM(s)/deciliter   Meds reviewed    Allergies    No Known Allergies    Intolerances         REVIEW OF SYSTEMS:    Review of Systems:   Constitutional: Denies fatigue  HEENT: Denies headaches and dizziness  Respiratory: denies SOB, cough, or wheezing  Cardiovascular: denies CP, palpitations  Gastrointestinal: +Abd pain  Genitourinary: +dysuria  Skin: denies rashes or itching  Musculoskeletal: denies muscle aches, joint swelling  Neurologic: Denies generalized weakness, denies loss of sensation, numbness, or tingling      Vital Signs Last 24 Hrs  T(C): 36.3 (2024 14:06), Max: 36.4 (2024 04:48)  T(F): 97.4 (2024 14:06), Max: 97.5 (2024 04:48)  HR: 64 (2024 14:06) (64 - 79)  BP: 173/46 (2024 14:06) (172/77 - 173/46)  BP(mean): --  RR: 18 (2024 14:06) (18 - 18)  SpO2: 93% (2024 14:06) (93% - 95%)    Parameters below as of 2024 14:06  Patient On (Oxygen Delivery Method): room air          Daily     Daily Weight in k.3 (30 May 2024 05:52)    PHYSICAL EXAM:    GENERAL: NAD  HEAD:  Atraumatic, Normocephalic  NECK: Supple  NERVOUS SYSTEM:  Awake and Alert  CHEST/LUNG: Clear to auscultation bilaterally; No rales, rhonchi, wheezing, or rubs  HEART: Regular rate and rhythm; No murmurs, rubs, or gallops  ABDOMEN: Soft, mild tenderness, Nondistended; Bowel sounds present  EXTREMITIES:  No Edema        LABS:                                       12.4   7.97  )-----------( 192      ( 2024 12:45 )             38.6     06-01    139  |  109<H>  |  28<H>  ----------------------------<  178<H>  3.6   |  22  |  1.70<H>    Ca    9.6      2024 12:45    TPro  7.2  /  Alb  3.1<L>  /  TBili  0.3  /  DBili  x   /  AST  29  /  ALT  22  /  AlkPhos  97  06-01      Urinalysis Basic - ( 2024 12:45 )    Color: x / Appearance: x / SG: x / pH: x  Gluc: 178 mg/dL / Ketone: x  / Bili: x / Urobili: x   Blood: x / Protein: x / Nitrite: x   Leuk Esterase: x / RBC: x / WBC x   Sq Epi: x / Non Sq Epi: x / Bacteria: x        
Covering [facility] of INFECTIOUS DISEASE  HERNESTO Barry, NOE Leung G. Casimir ERRICO NELDA is a 89yFemale , patient examined and chart reviewed.      INTERVAL HPI/ OVERNIGHT EVENTS:   Afebrile.  No events.    PAST MEDICAL & SURGICAL HISTORY:  Hypertension  Diabetes  TIA (transient ischemic attack)  Hip fracture  HLD (hyperlipidemia)  S/P ORIF (open reduction internal fixation) fracture  right hip    For details regarding the patient's social history, family history, and other miscellaneous elements, please refer the initial infectious diseases consultation and/or the admitting history and physical examination for this admission.    ROS:  CONSTITUTIONAL:  Negative fever or chills  EYES:  Negative  blurry vision or double vision  CARDIOVASCULAR:  Negative for chest pain or palpitations  RESPIRATORY:  Negative for cough, wheezing, or SOB   GASTROINTESTINAL:  Negative for nausea, vomiting, diarrhea, constipation, or abdominal pain  GENITOURINARY:  Negative frequency, urgency or dysuria  NEUROLOGIC:  No headache, confusion, dizziness, lightheadedness  All other systems were reviewed and are negative     No Known Allergies      Current inpatient medications :    ANTIBIOTICS/RELEVANT:  cefuroxime   Tablet 500 milliGRAM(s) Oral every 12 hours    MEDICATIONS  (STANDING):  amLODIPine   Tablet 5 milliGRAM(s) Oral daily  aspirin enteric coated 81 milliGRAM(s) Oral <User Schedule>  atorvastatin 40 milliGRAM(s) Oral at bedtime  dextrose 10% Bolus 125 milliLiter(s) IV Bolus once  dextrose 5%. 1000 milliLiter(s) (100 mL/Hr) IV Continuous <Continuous>  dextrose 5%. 1000 milliLiter(s) (50 mL/Hr) IV Continuous <Continuous>  dextrose 50% Injectable 25 Gram(s) IV Push once  dextrose 50% Injectable 12.5 Gram(s) IV Push once  glucagon  Injectable 1 milliGRAM(s) IntraMuscular once  heparin   Injectable 5000 Unit(s) SubCutaneous every 8 hours  hydrochlorothiazide 12.5 milliGRAM(s) Oral daily  insulin lispro (ADMELOG) corrective regimen sliding scale   SubCutaneous three times a day before meals  insulin lispro (ADMELOG) corrective regimen sliding scale   SubCutaneous at bedtime  lactobacillus acidophilus 1 Tablet(s) Oral two times a day with meals  lidocaine   4% Patch 2 Patch Transdermal every 24 hours  losartan 50 milliGRAM(s) Oral daily  metoprolol succinate  milliGRAM(s) Oral daily        Objective:  Vital Signs Last 24 Hrs  T(C): 36.3 (02 Jun 2024 11:45), Max: 37.1 (01 Jun 2024 20:00)  T(F): 97.4 (02 Jun 2024 11:45), Max: 98.7 (01 Jun 2024 20:00)  HR: 59 (02 Jun 2024 11:45) (59 - 72)  BP: 143/68 (02 Jun 2024 11:45) (137/61 - 143/68)  RR: 18 (02 Jun 2024 11:45) (18 - 18)  SpO2: 92% (02 Jun 2024 11:45) (92% - 92%)    Parameters below as of 02 Jun 2024 11:45  Patient On (Oxygen Delivery Method): room air      Physical Exam:  General:  no acute distress  Neck: supple, trachea midline  Lungs: clear, no wheeze/rhonchi  Cardiovascular: regular rate and rhythm, S1 S2  Abdomen: soft, nontender,  bowel sounds normal  Neurological: alert and oriented x3  Skin: no rash  Extremities: no edema      LABS:                        12.1   7.78  )-----------( 209      ( 02 Jun 2024 08:30 )             37.5   06-02    141  |  107  |  31<H>  ----------------------------<  166<H>  3.4<L>   |  27  |  1.80<H>    Ca    9.9      02 Jun 2024 08:30    TPro  7.4  /  Alb  3.3  /  TBili  0.6  /  DBili  x   /  AST  31  /  ALT  25  /  AlkPhos  99  06-02    MICROBIOLOGY:  Culture - Urine (05.29.24 @ 10:15)    -  Trimethoprim/Sulfamethoxazole: S <=0.5/9.5   -  Piperacillin/Tazobactam: S <=8   -  Tobramycin: S <=2   -  Imipenem: S <=1   -  Levofloxacin: S <=0.5   -  Meropenem: S <=1   -  Nitrofurantoin: S <=32 Should not be used to treat pyelonephritis   -  Amoxicillin/Clavulanic Acid: S <=8/4   -  Ampicillin: R >16 These ampicillin results predict results for amoxicillin   -  Ampicillin/Sulbactam: S 8/4   -  Aztreonam: S <=4   -  Cefazolin: S <=2 For uncomplicated UTI with K. pneumoniae, E. coli, or P. mirablis: ANNA MARIE <=16 is sensitive and ANNA MARIE >=32 is resistant. This also predicts results for oral agents cefaclor, cefdinir, cefpodoxime, cefprozil, cefuroxime axetil, cephalexin and locarbef for uncomplicated UTI. Note that some isolates may be susceptible to these agents while testing resistant to cefazolin.   -  Cefepime: S <=2   -  Cefoxitin: S <=8   -  Ceftriaxone: S <=1   -  Cefuroxime: S <=4   -  Ciprofloxacin: S <=0.25   -  Ertapenem: S <=0.5   -  Gentamicin: S <=2   Specimen Source: Clean Catch Clean Catch (Midstream)   Culture Results:   >100,000 CFU/ml Escherichia coli   Organism Identification: Escherichia coli   Organism: Escherichia coli   Method Type: ANNA MARIE      RADIOLOGY & ADDITIONAL STUDIES:          Assessment :  89 yo female w HTN, HLD, T2DM, TIA, CKD III previous right hip fx w/ surgical pinning presented to the ED for multiple falls and foul smelling urine. Diagnosed w/ UTI by her PCP x2 wks ago, had been taking cefuroxime with no improvement in urinary symptoms. Pt noticed foul smelling urine, several falls admitted with Acute Pyelonephritis  Urine with >100,000 CFU/ml Escherichia coli sensi noted  Afebrile wbc wnl  Clinically doing well      Plan:  Off Ceftriaxone -->PO Ceftin with last day 6/7  Trend temps and cbc  Increase activity  For rehab per family wishes  Stable from ID standpoint    Continue with present regiment.  Appropriate use of antibiotics and adverse effects reviewed.      > 35 minutes were spent in direct patient care reviewing notes, medications ,labs data/ imaging , discussion with multidisciplinary team.    Thank you for allowing me to participate in care of your patient .    Jonathan Grijalva MD  Infectious Disease  [phone]
[facility] INFECTIOUS DISEASE  Chuy Macias MD PhD, Rekha Gonzalez MD, Claudette Holguin MD, Jan Thacker MD, Camilo Arroyo MD  and providing coverage with Jonathan Grijalva MD  Providing Infectious Disease Consultations at [facility], [facility], [facility], [facility]'s    Office# [phone] to schedule follow up appointments  Answering Service for urgent calls or New Consults [phone]  Cell# to text for urgent issues Chuy Macias [phone]     infectious diseases progress note:    NELDA VERDIN is a 89y y. o. Female patient    Overnight and events of the last 24hrs reviewed    Allergies    No Known Allergies    Intolerances        ANTIBIOTICS/RELEVANT:  antimicrobials  cefuroxime   Tablet 500 milliGRAM(s) Oral every 12 hours    immunologic:    OTHER:  acetaminophen     Tablet .. 650 milliGRAM(s) Oral every 6 hours PRN  amLODIPine   Tablet 5 milliGRAM(s) Oral daily  aspirin enteric coated 81 milliGRAM(s) Oral <User Schedule>  atorvastatin 40 milliGRAM(s) Oral at bedtime  dextrose 10% Bolus 125 milliLiter(s) IV Bolus once  dextrose 5%. 1000 milliLiter(s) IV Continuous <Continuous>  dextrose 5%. 1000 milliLiter(s) IV Continuous <Continuous>  dextrose 50% Injectable 25 Gram(s) IV Push once  dextrose 50% Injectable 12.5 Gram(s) IV Push once  dextrose Oral Gel 15 Gram(s) Oral once PRN  famotidine    Tablet 20 milliGRAM(s) Oral daily  glucagon  Injectable 1 milliGRAM(s) IntraMuscular once  heparin   Injectable 5000 Unit(s) SubCutaneous every 8 hours  insulin lispro (ADMELOG) corrective regimen sliding scale   SubCutaneous three times a day before meals  insulin lispro (ADMELOG) corrective regimen sliding scale   SubCutaneous at bedtime  lactated ringers. 1000 milliLiter(s) IV Continuous <Continuous>  lactobacillus acidophilus 1 Tablet(s) Oral two times a day with meals  lidocaine   4% Patch 2 Patch Transdermal every 24 hours  metoprolol succinate  milliGRAM(s) Oral daily  simethicone 80 milliGRAM(s) Chew three times a day      Objective:  Vital Signs Last 24 Hrs  T(C): 36.4 (03 Jun 2024 11:15), Max: 36.9 (03 Jun 2024 04:37)  T(F): 97.6 (03 Jun 2024 11:15), Max: 98.4 (03 Jun 2024 04:37)  HR: 74 (03 Jun 2024 11:15) (51 - 74)  BP: 148/70 (03 Jun 2024 11:15) (125/66 - 148/70)  BP(mean): --  RR: 18 (03 Jun 2024 11:15) (17 - 18)  SpO2: 95% (03 Jun 2024 11:15) (93% - 95%)    Parameters below as of 03 Jun 2024 11:15  Patient On (Oxygen Delivery Method): room air        T(C): 36.4 (06-03-24 @ 11:15), Max: 37.1 (06-01-24 @ 20:00)  T(C): 36.4 (06-03-24 @ 11:15), Max: 37.1 (06-01-24 @ 20:00)  T(C): 36.4 (06-03-24 @ 11:15), Max: 37.1 (06-01-24 @ 20:00)    PHYSICAL EXAM:  HEENT: NC atraumatic  Neck: supple  Respiratory: no accessory muscle use, breathing comfortably  Cardiovascular: distant  Gastrointestinal: normal appearing, nondistended  Extremities: no clubbing, no cyanosis,        LABS:                          10.8   6.63  )-----------( 165      ( 03 Jun 2024 07:28 )             34.0       WBC  6.63 06-03 @ 07:28  7.78 06-02 @ 08:30  7.97 06-01 @ 12:45  5.27 05-31 @ 06:50  4.94 05-30 @ 04:30  7.11 05-29 @ 10:15      06-03    140  |  111<H>  |  38<H>  ----------------------------<  154<H>  4.4   |  23  |  2.10<H>    Ca    9.7      03 Jun 2024 07:28    TPro  6.3  /  Alb  3.0<L>  /  TBili  0.4  /  DBili  x   /  AST  26  /  ALT  24  /  AlkPhos  91  06-03      Creatinine: 2.10 mg/dL (06-03-24 @ 07:28)  Creatinine: 1.80 mg/dL (06-02-24 @ 08:30)  Creatinine: 1.70 mg/dL (06-01-24 @ 12:45)  Creatinine: 1.50 mg/dL (05-31-24 @ 06:50)  Creatinine: 1.70 mg/dL (05-30-24 @ 04:30)  Creatinine: 1.80 mg/dL (05-29-24 @ 10:15)        Urinalysis Basic - ( 03 Jun 2024 07:28 )    Color: x / Appearance: x / SG: x / pH: x  Gluc: 154 mg/dL / Ketone: x  / Bili: x / Urobili: x   Blood: x / Protein: x / Nitrite: x   Leuk Esterase: x / RBC: x / WBC x   Sq Epi: x / Non Sq Epi: x / Bacteria: x            INFLAMMATORY MARKERS      MICROBIOLOGY:              RADIOLOGY & ADDITIONAL STUDIES:  
Patient is a 89y old  Female who presents with a chief complaint of UTI (02 Jun 2024 06:18)      INTERVAL HPI/OVERNIGHT EVENTS: Patient seen and examined at bedside. No overnight events. Sitting in chair. Patient states she feels better today, "less shaky." Eager to go to rehab. Tolerating diet. Denies fever, chills, chest pain, shortness of breath, abdominal pain, nausea/vomiting, headache.     MEDICATIONS  (STANDING):  amLODIPine   Tablet 5 milliGRAM(s) Oral daily  aspirin enteric coated 81 milliGRAM(s) Oral <User Schedule>  atorvastatin 40 milliGRAM(s) Oral at bedtime  cefTRIAXone   IVPB 2000 milliGRAM(s) IV Intermittent every 24 hours  dextrose 10% Bolus 125 milliLiter(s) IV Bolus once  dextrose 5%. 1000 milliLiter(s) (100 mL/Hr) IV Continuous <Continuous>  dextrose 5%. 1000 milliLiter(s) (50 mL/Hr) IV Continuous <Continuous>  dextrose 50% Injectable 25 Gram(s) IV Push once  dextrose 50% Injectable 12.5 Gram(s) IV Push once  glucagon  Injectable 1 milliGRAM(s) IntraMuscular once  heparin   Injectable 5000 Unit(s) SubCutaneous every 8 hours  hydrochlorothiazide 12.5 milliGRAM(s) Oral daily  insulin lispro (ADMELOG) corrective regimen sliding scale   SubCutaneous three times a day before meals  insulin lispro (ADMELOG) corrective regimen sliding scale   SubCutaneous at bedtime  lactobacillus acidophilus 1 Tablet(s) Oral two times a day with meals  lidocaine   4% Patch 2 Patch Transdermal every 24 hours  losartan 50 milliGRAM(s) Oral daily  metoprolol succinate  milliGRAM(s) Oral daily    MEDICATIONS  (PRN):  acetaminophen     Tablet .. 650 milliGRAM(s) Oral every 6 hours PRN Mild Pain (1 - 3)  dextrose Oral Gel 15 Gram(s) Oral once PRN Blood Glucose LESS THAN 70 milliGRAM(s)/deciliter      Allergies    No Known Allergies    Intolerances        REVIEW OF SYSTEMS:  CONSTITUTIONAL: No fever or chills  HEENT:  No headache, no sore throat  RESPIRATORY: No cough, wheezing, or shortness of breath  CARDIOVASCULAR: No chest pain, palpitations  GASTROINTESTINAL: No abd pain, nausea, vomiting, or diarrhea  GENITOURINARY: No dysuria, frequency, or hematuria  NEUROLOGICAL: no focal weakness or dizziness  MUSCULOSKELETAL: no myalgias     Vital Signs Last 24 Hrs  T(C): 36.8 (02 Jun 2024 05:13), Max: 37.1 (01 Jun 2024 20:00)  T(F): 98.2 (02 Jun 2024 05:13), Max: 98.7 (01 Jun 2024 20:00)  HR: 63 (02 Jun 2024 05:13) (63 - 72)  BP: 137/61 (02 Jun 2024 05:13) (137/61 - 151/85)  BP(mean): --  RR: 18 (02 Jun 2024 05:13) (18 - 18)  SpO2: 92% (02 Jun 2024 05:13) (92% - 93%)    Parameters below as of 02 Jun 2024 05:13  Patient On (Oxygen Delivery Method): room air        PHYSICAL EXAM:  GENERAL: NAD  HEENT:  anicteric, moist mucous membranes  CHEST/LUNG:  CTA b/l, no rales, wheezes, or rhonchi  HEART:  RRR, S1, S2  ABDOMEN:  BS+, soft, nontender, nondistended  EXTREMITIES: no edema, cyanosis, or calf tenderness  NERVOUS SYSTEM: answers questions and follows commands appropriately    LABS:                        12.1   7.78  )-----------( 209      ( 02 Jun 2024 08:30 )             37.5     CBC Full  -  ( 02 Jun 2024 08:30 )  WBC Count : 7.78 K/uL  Hemoglobin : 12.1 g/dL  Hematocrit : 37.5 %  Platelet Count - Automated : 209 K/uL  Mean Cell Volume : 86.0 fl  Mean Cell Hemoglobin : 27.8 pg  Mean Cell Hemoglobin Concentration : 32.3 gm/dL  Auto Neutrophil # : x  Auto Lymphocyte # : x  Auto Monocyte # : x  Auto Eosinophil # : x  Auto Basophil # : x  Auto Neutrophil % : x  Auto Lymphocyte % : x  Auto Monocyte % : x  Auto Eosinophil % : x  Auto Basophil % : x    02 Jun 2024 08:30    141    |  107    |  31     ----------------------------<  166    3.4     |  27     |  1.80     Ca    9.9        02 Jun 2024 08:30    TPro  7.4    /  Alb  3.3    /  TBili  0.6    /  DBili  x      /  AST  31     /  ALT  25     /  AlkPhos  99     02 Jun 2024 08:30      Urinalysis Basic - ( 02 Jun 2024 08:30 )    Color: x / Appearance: x / SG: x / pH: x  Gluc: 166 mg/dL / Ketone: x  / Bili: x / Urobili: x   Blood: x / Protein: x / Nitrite: x   Leuk Esterase: x / RBC: x / WBC x   Sq Epi: x / Non Sq Epi: x / Bacteria: x      CAPILLARY BLOOD GLUCOSE      POCT Blood Glucose.: 150 mg/dL (02 Jun 2024 07:38)  POCT Blood Glucose.: 141 mg/dL (01 Jun 2024 21:43)  POCT Blood Glucose.: 99 mg/dL (01 Jun 2024 16:53)        Culture - Blood (collected 05-29-24 @ 10:15)  Source: .Blood Blood-Peripheral  Preliminary Report (06-01-24 @ 17:01):    No growth at 72 Hours    Culture - Urine (collected 05-29-24 @ 10:15)  Source: Clean Catch Clean Catch (Midstream)  Final Report (05-31-24 @ 17:03):    >100,000 CFU/ml Escherichia coli  Organism: Escherichia coli (05-31-24 @ 17:03)  Organism: Escherichia coli (05-31-24 @ 17:03)      Method Type: ANNA MARIE      -  Amoxicillin/Clavulanic Acid: S <=8/4      -  Ampicillin: R >16 These ampicillin results predict results for amoxicillin      -  Ampicillin/Sulbactam: S 8/4      -  Aztreonam: S <=4      -  Cefazolin: S <=2 For uncomplicated UTI with K. pneumoniae, E. coli, or P. mirablis: ANNA MARIE <=16 is sensitive and ANNA MARIE >=32 is resistant. This also predicts results for oral agents cefaclor, cefdinir, cefpodoxime, cefprozil, cefuroxime axetil, cephalexin and locarbef for uncomplicated UTI. Note that some isolates may be susceptible to these agents while testing resistant to cefazolin.      -  Cefepime: S <=2      -  Cefoxitin: S <=8      -  Ceftriaxone: S <=1      -  Cefuroxime: S <=4      -  Ciprofloxacin: S <=0.25      -  Ertapenem: S <=0.5      -  Gentamicin: S <=2      -  Imipenem: S <=1      -  Levofloxacin: S <=0.5      -  Meropenem: S <=1      -  Nitrofurantoin: S <=32 Should not be used to treat pyelonephritis      -  Piperacillin/Tazobactam: S <=8      -  Tobramycin: S <=2      -  Trimethoprim/Sulfamethoxazole: S <=0.5/9.5    Culture - Blood (collected 05-29-24 @ 10:15)  Source: .Blood Blood-Peripheral  Preliminary Report (06-01-24 @ 17:01):    No growth at 72 Hours        RADIOLOGY & ADDITIONAL TESTS:    Personally reviewed.     Consultant(s) Notes Reviewed:  [x] YES  [ ] NO    
Patient is a 89y old  Female who presents with a chief complaint of UTI (29 May 2024 19:25)       HPI:  89 yo female w/ pmh of HTN, HLD, T2DM, TIA, CKD III previous right hip fx w/ surgical pinning presents to the ED for multiple falls and foul smelling urine. Daughter at bedside and provided collateral history. Pt states that she was diagnosed w/ UTI by her PCP x2 wks ago, had been taking cefuroxime for the past 2 weeks with no improvement in urinary symptoms. Pt noticed foul smelling urine this morning. Pt was hospitalized in 2023 w/ UTI. Per daughter, pt w/ increased "confusion" which she describes as short-term memory loss -- daughter states this is typical for pt when she has a UTI. Additionally, pt reports several falls over the past week. Last Thursday, she dropped her walker on her L leg and subsequently noticed bruising and swelling. On Friday, she states that her walker tipped over, subsequently she fell on her R-side. She c/o R-sided abdominal pain which she attributes to her fall. Denies LOC, head trauma. Denies fevers, chills, dysuria, hematuria, N/V/D/C.     In the ED   VS: T(F): 97.8 HR: 87 BP: 163/72 RR: 17 SpO2: 95%  Labs show: H.7  WBC:7.11  Plt:181  Creatinine:1.8 Ca. 10.7 Lactate 2.6  UA: postive nitrites, Large leuks, mod zoltan,   S/p: 1g rocephin, 1L NS  Imaging  CT HEAD: There is no acute intracranial hemorrhage or depressed calvarial fracture. Chronic findings as above.  CT CERVICAL SPINE: No fracture or acute traumatic malalignment. Advanced multilevel degenerative changes of the cervical spine.  CT Chest: Circumferential gallbladder wall thickening, Few bilateral pulmonary nodules.    Renal consulted for acute on CKD  Chart reviewed  Known to office  Last outpt cr measured to be 1.5     (29 May 2024 13:58)       PAST MEDICAL & SURGICAL HISTORY:  Hypertension      Diabetes      TIA (transient ischemic attack)      Hip fracture      HLD (hyperlipidemia)      S/P ORIF (open reduction internal fixation) fracture  right hip           FAMILY HISTORY:  FH: asthma (Father)    NC    Social History:Non smoker    MEDICATIONS  (STANDING):  aspirin enteric coated 81 milliGRAM(s) Oral <User Schedule>  atorvastatin 40 milliGRAM(s) Oral at bedtime  dextrose 10% Bolus 125 milliLiter(s) IV Bolus once  dextrose 5%. 1000 milliLiter(s) (100 mL/Hr) IV Continuous <Continuous>  dextrose 5%. 1000 milliLiter(s) (50 mL/Hr) IV Continuous <Continuous>  dextrose 50% Injectable 25 Gram(s) IV Push once  dextrose 50% Injectable 12.5 Gram(s) IV Push once  glucagon  Injectable 1 milliGRAM(s) IntraMuscular once  heparin   Injectable 5000 Unit(s) SubCutaneous every 8 hours  hydrochlorothiazide 12.5 milliGRAM(s) Oral daily  insulin lispro (ADMELOG) corrective regimen sliding scale   SubCutaneous three times a day before meals  insulin lispro (ADMELOG) corrective regimen sliding scale   SubCutaneous at bedtime  lidocaine   4% Patch 2 Patch Transdermal every 24 hours  losartan 50 milliGRAM(s) Oral daily  metoprolol succinate  milliGRAM(s) Oral daily  potassium chloride  10 mEq/100 mL IVPB 10 milliEquivalent(s) IV Intermittent once  sodium chloride 0.9%. 1000 milliLiter(s) (60 mL/Hr) IV Continuous <Continuous>    MEDICATIONS  (PRN):  acetaminophen     Tablet .. 650 milliGRAM(s) Oral every 6 hours PRN Mild Pain (1 - 3)  dextrose Oral Gel 15 Gram(s) Oral once PRN Blood Glucose LESS THAN 70 milliGRAM(s)/deciliter   Meds reviewed    Allergies    No Known Allergies    Intolerances         REVIEW OF SYSTEMS:    Review of Systems:   Constitutional: Denies fatigue  HEENT: Denies headaches and dizziness  Respiratory: denies SOB, cough, or wheezing  Cardiovascular: denies CP, palpitations  Gastrointestinal: +Abd pain  Genitourinary: +dysuria  Skin: denies rashes or itching  Musculoskeletal: denies muscle aches, joint swelling  Neurologic: Denies generalized weakness, denies loss of sensation, numbness, or tingling      Vital Signs Last 24 Hrs  T(C): 36.6 (30 May 2024 04:20), Max: 36.6 (29 May 2024 09:56)  T(F): 97.8 (30 May 2024 04:20), Max: 97.8 (29 May 2024 09:56)  HR: 61 (30 May 2024 07:00) (50 - 87)  BP: 171/74 (30 May 2024 07:00) (163/72 - 200/58)  BP(mean): --  RR: 18 (30 May 2024 07:00) (17 - 18)  SpO2: 96% (30 May 2024 07:00) (91% - 97%)    Parameters below as of 30 May 2024 07:00  Patient On (Oxygen Delivery Method): room air      Daily     Daily Weight in k.3 (30 May 2024 05:52)    PHYSICAL EXAM:    GENERAL: NAD  HEAD:  Atraumatic, Normocephalic  NECK: Supple  NERVOUS SYSTEM:  Awake and Alert  CHEST/LUNG: Clear to auscultation bilaterally; No rales, rhonchi, wheezing, or rubs  HEART: Regular rate and rhythm; No murmurs, rubs, or gallops  ABDOMEN: Soft, mild tenderness, Nondistended; Bowel sounds present  EXTREMITIES:  No Edema        LABS:                        11.3   4.94  )-----------( 143      ( 30 May 2024 04:30 )             34.8     05-30    144  |  111<H>  |  26<H>  ----------------------------<  124<H>  3.4<L>   |  26  |  1.70<H>    Ca    9.5      30 May 2024 04:30  Phos  3.2     05-30  Mg     1.6     30    TPro  6.4  /  Alb  3.0<L>  /  TBili  0.8  /  DBili  x   /  AST  21  /  ALT  18  /  AlkPhos  88  05-30    PT/INR - ( 29 May 2024 10:15 )   PT: 12.0 sec;   INR: 1.03 ratio         PTT - ( 29 May 2024 10:15 )  PTT:25.8 sec  Urinalysis Basic - ( 30 May 2024 04:30 )    Color: x / Appearance: x / SG: x / pH: x  Gluc: 124 mg/dL / Ketone: x  / Bili: x / Urobili: x   Blood: x / Protein: x / Nitrite: x   Leuk Esterase: x / RBC: x / WBC x   Sq Epi: x / Non Sq Epi: x / Bacteria: x      Magnesium: 1.6 mg/dL ( @ 04:30)  Phosphorus: 3.2 mg/dL ( @ 04:30)          RADIOLOGY & ADDITIONAL TESTS:  
Patient is a 89y old  Female who presents with a chief complaint of UTI (29 May 2024 19:25)       HPI:  89 yo female w/ pmh of HTN, HLD, T2DM, TIA, CKD III previous right hip fx w/ surgical pinning presents to the ED for multiple falls and foul smelling urine. Daughter at bedside and provided collateral history. Pt states that she was diagnosed w/ UTI by her PCP x2 wks ago, had been taking cefuroxime for the past 2 weeks with no improvement in urinary symptoms. Pt noticed foul smelling urine this morning. Pt was hospitalized in 2023 w/ UTI. Per daughter, pt w/ increased "confusion" which she describes as short-term memory loss -- daughter states this is typical for pt when she has a UTI. Additionally, pt reports several falls over the past week. Last Thursday, she dropped her walker on her L leg and subsequently noticed bruising and swelling. On Friday, she states that her walker tipped over, subsequently she fell on her R-side. She c/o R-sided abdominal pain which she attributes to her fall. Denies LOC, head trauma. Denies fevers, chills, dysuria, hematuria, N/V/D/C.     Renal consulted for acute on CKD  Chart reviewed  Known to office  Last outpt cr measured to be 1.5      No N/V/SOB    PAST MEDICAL & SURGICAL HISTORY:  Hypertension      Diabetes      TIA (transient ischemic attack)      Hip fracture      HLD (hyperlipidemia)      S/P ORIF (open reduction internal fixation) fracture  right hip           FAMILY HISTORY:  FH: asthma (Father)    NC    Social History:Non smoker    MEDICATIONS  (STANDING):  aspirin enteric coated 81 milliGRAM(s) Oral <User Schedule>  atorvastatin 40 milliGRAM(s) Oral at bedtime  dextrose 10% Bolus 125 milliLiter(s) IV Bolus once  dextrose 5%. 1000 milliLiter(s) (100 mL/Hr) IV Continuous <Continuous>  dextrose 5%. 1000 milliLiter(s) (50 mL/Hr) IV Continuous <Continuous>  dextrose 50% Injectable 25 Gram(s) IV Push once  dextrose 50% Injectable 12.5 Gram(s) IV Push once  glucagon  Injectable 1 milliGRAM(s) IntraMuscular once  heparin   Injectable 5000 Unit(s) SubCutaneous every 8 hours  hydrochlorothiazide 12.5 milliGRAM(s) Oral daily  insulin lispro (ADMELOG) corrective regimen sliding scale   SubCutaneous three times a day before meals  insulin lispro (ADMELOG) corrective regimen sliding scale   SubCutaneous at bedtime  lidocaine   4% Patch 2 Patch Transdermal every 24 hours  losartan 50 milliGRAM(s) Oral daily  metoprolol succinate  milliGRAM(s) Oral daily  potassium chloride  10 mEq/100 mL IVPB 10 milliEquivalent(s) IV Intermittent once  sodium chloride 0.9%. 1000 milliLiter(s) (60 mL/Hr) IV Continuous <Continuous>    MEDICATIONS  (PRN):  acetaminophen     Tablet .. 650 milliGRAM(s) Oral every 6 hours PRN Mild Pain (1 - 3)  dextrose Oral Gel 15 Gram(s) Oral once PRN Blood Glucose LESS THAN 70 milliGRAM(s)/deciliter   Meds reviewed    Allergies    No Known Allergies    Intolerances         REVIEW OF SYSTEMS:  as above      ICU Vital Signs Last 24 Hrs  T(C): 36.4 (2024 11:15), Max: 36.9 (2024 04:37)  T(F): 97.6 (2024 11:15), Max: 98.4 (2024 04:37)  HR: 74 (2024 11:15) (51 - 74)  BP: 148/70 (2024 11:15) (125/66 - 148/70)  BP(mean): --  ABP: --  ABP(mean): --  RR: 18 (2024 11:15) (17 - 18)  SpO2: 95% (2024 11:15) (93% - 95%)    O2 Parameters below as of 2024 11:15  Patient On (Oxygen Delivery Method): room air                Daily     Daily Weight in k.3 (30 May 2024 05:52)    PHYSICAL EXAM:    GENERAL: NAD  HEAD:  Atraumatic, Normocephalic  NECK: Supple  NERVOUS SYSTEM:  Awake and Alert  CHEST/LUNG: Clear to auscultation bilaterally; No rales, rhonchi, wheezing, or rubs  HEART: Regular rate and rhythm; No murmurs, rubs, or gallops  ABDOMEN: Soft, mild tenderness, Nondistended; Bowel sounds present  EXTREMITIES:  No Edema        LABS:     \                      10.8   6.63  )-----------( 165      ( 2024 07:28 )             34.0     06-03    140  |  111<H>  |  38<H>  ----------------------------<  154<H>  4.4   |  23  |  2.10<H>    Ca    9.7      2024 07:28    TPro  6.3  /  Alb  3.0<L>  /  TBili  0.4  /  DBili  x   /  AST  26  /  ALT  24  /  AlkPhos  91        Urinalysis Basic - ( 2024 07:28 )    Color: x / Appearance: x / SG: x / pH: x  Gluc: 154 mg/dL / Ketone: x  / Bili: x / Urobili: x   Blood: x / Protein: x / Nitrite: x   Leuk Esterase: x / RBC: x / WBC x   Sq Epi: x / Non Sq Epi: x / Bacteria: x              
Patient seen and examined at bedside. States she is feeling well, denies any chest pain, SOB, abd pain at this time. States her belching and abdominal discomfort improved after pepcid and simethicone yesterday. Patient's Cr also improved s/p IVF. Patient encouraged to stay wlel hydrated and increase PO water intake, patient expressed understanding. Plan for discharge to [facility], daughter at bedside and agreeable with discharge plan.    Physical Exam:  GENERAL: NAD, sitting comfortably in chair  HEENT:  anicteric, moist mucous membranes  CHEST/LUNG:  CTA b/l, no rales, wheezes, or rhonchi  HEART:  RRR, S1, S2  ABDOMEN:  BS+, soft, nontender, nondistended  EXTREMITIES: no edema, cyanosis, or calf tenderness  NERVOUS SYSTEM: answers questions and follows commands appropriately    Please refer to updated D/C note for further details.
INTERVAL HPI/OVERNIGHT EVENTS:  Patient seen and examined at bedside. States she is feeling well, denies any chest pain, SOB, abd pain. Patient denies any dysuria/hematuria at this time. Plan for discharge but noted to have worsening Cr on AM labs. As per patient and daughter at bedside, patient does not have adequate water intake. Encouraged PO water intake.    ROS: All other review of systems is negative unless indicated above.    MEDICATIONS  (STANDING):  amLODIPine   Tablet 5 milliGRAM(s) Oral daily  aspirin enteric coated 81 milliGRAM(s) Oral <User Schedule>  atorvastatin 40 milliGRAM(s) Oral at bedtime  cefuroxime   Tablet 500 milliGRAM(s) Oral every 12 hours  dextrose 10% Bolus 125 milliLiter(s) IV Bolus once  dextrose 5%. 1000 milliLiter(s) (100 mL/Hr) IV Continuous <Continuous>  dextrose 5%. 1000 milliLiter(s) (50 mL/Hr) IV Continuous <Continuous>  dextrose 50% Injectable 25 Gram(s) IV Push once  dextrose 50% Injectable 12.5 Gram(s) IV Push once  famotidine    Tablet 20 milliGRAM(s) Oral daily  glucagon  Injectable 1 milliGRAM(s) IntraMuscular once  heparin   Injectable 5000 Unit(s) SubCutaneous every 8 hours  insulin lispro (ADMELOG) corrective regimen sliding scale   SubCutaneous three times a day before meals  insulin lispro (ADMELOG) corrective regimen sliding scale   SubCutaneous at bedtime  lactated ringers. 1000 milliLiter(s) (60 mL/Hr) IV Continuous <Continuous>  lactobacillus acidophilus 1 Tablet(s) Oral two times a day with meals  lidocaine   4% Patch 2 Patch Transdermal every 24 hours  metoprolol succinate  milliGRAM(s) Oral daily  simethicone 80 milliGRAM(s) Chew three times a day    MEDICATIONS  (PRN):  acetaminophen     Tablet .. 650 milliGRAM(s) Oral every 6 hours PRN Mild Pain (1 - 3)  dextrose Oral Gel 15 Gram(s) Oral once PRN Blood Glucose LESS THAN 70 milliGRAM(s)/deciliter      Allergies    No Known Allergies    Intolerances          Vital Signs Last 24 Hrs  T(C): 36.4 (03 Jun 2024 11:15), Max: 36.9 (03 Jun 2024 04:37)  T(F): 97.6 (03 Jun 2024 11:15), Max: 98.4 (03 Jun 2024 04:37)  HR: 74 (03 Jun 2024 11:15) (51 - 74)  BP: 148/70 (03 Jun 2024 11:15) (125/66 - 148/70)  BP(mean): --  RR: 18 (03 Jun 2024 11:15) (17 - 18)  SpO2: 95% (03 Jun 2024 11:15) (93% - 95%)    Parameters below as of 03 Jun 2024 11:15  Patient On (Oxygen Delivery Method): room air        06-02 @ 07:01  -  06-03 @ 07:00  --------------------------------------------------------  IN: 0 mL / OUT: 800 mL / NET: -800 mL    06-03 @ 07:01  -  06-03 @ 16:54  --------------------------------------------------------  IN: 510 mL / OUT: 0 mL / NET: 510 mL        PHYSICAL EXAM:  GENERAL: NAD, sitting comfortably in chair  HEENT:  anicteric, moist mucous membranes  CHEST/LUNG:  CTA b/l, no rales, wheezes, or rhonchi  HEART:  RRR, S1, S2  ABDOMEN:  BS+, soft, nontender, nondistended  EXTREMITIES: no edema, cyanosis, or calf tenderness  NERVOUS SYSTEM: answers questions and follows commands appropriately        LABS:                        10.8   6.63  )-----------( 165      ( 03 Jun 2024 07:28 )             34.0     06-03    140  |  111<H>  |  38<H>  ----------------------------<  154<H>  4.4   |  23  |  2.10<H>    Ca    9.7      03 Jun 2024 07:28    TPro  6.3  /  Alb  3.0<L>  /  TBili  0.4  /  DBili  x   /  AST  26  /  ALT  24  /  AlkPhos  91  06-03      Urinalysis Basic - ( 03 Jun 2024 07:28 )    Color: x / Appearance: x / SG: x / pH: x  Gluc: 154 mg/dL / Ketone: x  / Bili: x / Urobili: x   Blood: x / Protein: x / Nitrite: x   Leuk Esterase: x / RBC: x / WBC x   Sq Epi: x / Non Sq Epi: x / Bacteria: x        RADIOLOGY & ADDITIONAL TESTS:
[facility] of INFECTIOUS DISE[facility]  Chuy Macias MD PhD, Rekha Gonzalez MD, Claudette Holguin MD, Jan Thacker MD, Camilo Arroyo MD  and providing coverage with Jonathan Grijalva MD  Providing Infectious Disease Consultations at [facility], [facility], [facility]'s    Office# [phone] to schedule follow up appointments  Answering Service for urgent calls or New Consults [phone]  Cell# to text for urgent issues Chuy Macias [phone]     infectious diseases progress note:    NELDA VERDIN is a 89y y. o. Female patient    Overnight and events of the last 24hrs reviewed    Allergies    No Known Allergies    Intolerances        ANTIBIOTICS/RELEVANT:  antimicrobials    immunologic:    OTHER:  acetaminophen     Tablet .. 650 milliGRAM(s) Oral every 6 hours PRN  aspirin enteric coated 81 milliGRAM(s) Oral <User Schedule>  atorvastatin 40 milliGRAM(s) Oral at bedtime  dextrose 10% Bolus 125 milliLiter(s) IV Bolus once  dextrose 5% + sodium chloride 0.45%. 1000 milliLiter(s) IV Continuous <Continuous>  dextrose 5%. 1000 milliLiter(s) IV Continuous <Continuous>  dextrose 5%. 1000 milliLiter(s) IV Continuous <Continuous>  dextrose 50% Injectable 25 Gram(s) IV Push once  dextrose 50% Injectable 12.5 Gram(s) IV Push once  dextrose Oral Gel 15 Gram(s) Oral once PRN  glucagon  Injectable 1 milliGRAM(s) IntraMuscular once  heparin   Injectable 5000 Unit(s) SubCutaneous every 8 hours  hydrochlorothiazide 12.5 milliGRAM(s) Oral daily  insulin lispro (ADMELOG) corrective regimen sliding scale   SubCutaneous at bedtime  insulin lispro (ADMELOG) corrective regimen sliding scale   SubCutaneous three times a day before meals  lidocaine   4% Patch 2 Patch Transdermal every 24 hours  losartan 50 milliGRAM(s) Oral daily  metoprolol succinate  milliGRAM(s) Oral daily  sodium chloride 0.9%. 1000 milliLiter(s) IV Continuous <Continuous>      Objective:  Vital Signs Last 24 Hrs  T(C): 36.4 (30 May 2024 08:44), Max: 36.6 (29 May 2024 23:00)  T(F): 97.6 (30 May 2024 08:44), Max: 97.8 (29 May 2024 23:00)  HR: 61 (30 May 2024 07:00) (50 - 61)  BP: 171/74 (30 May 2024 07:00) (171/74 - 200/58)  BP(mean): --  RR: 18 (30 May 2024 07:00) (18 - 18)  SpO2: 96% (30 May 2024 07:00) (91% - 97%)    Parameters below as of 30 May 2024 07:00  Patient On (Oxygen Delivery Method): room air        T(C): 36.4 (05-30-24 @ 08:44), Max: 36.6 (05-29-24 @ 09:56)  T(C): 36.4 (05-30-24 @ 08:44), Max: 36.6 (05-29-24 @ 09:56)  T(C): 36.4 (05-30-24 @ 08:44), Max: 36.6 (05-29-24 @ 09:56)    PHYSICAL EXAM:  HEENT: NC atraumatic  Neck: supple  Respiratory: no accessory muscle use, breathing comfortably  Cardiovascular: distant  Gastrointestinal: normal appearing, nondistended  Extremities: no clubbing, no cyanosis,        LABS:                          11.3   4.94  )-----------( 143      ( 30 May 2024 04:30 )             34.8       WBC  4.94 05-30 @ 04:30  7.11 05-29 @ 10:15      05-30    144  |  111<H>  |  26<H>  ----------------------------<  124<H>  3.4<L>   |  26  |  1.70<H>    Ca    9.5      30 May 2024 04:30  Phos  3.2     05-30  Mg     1.6     05-30    TPro  6.4  /  Alb  3.0<L>  /  TBili  0.8  /  DBili  x   /  AST  21  /  ALT  18  /  AlkPhos  88  05-30      Creatinine: 1.70 mg/dL (05-30-24 @ 04:30)  Creatinine: 1.80 mg/dL (05-29-24 @ 10:15)      PT/INR - ( 29 May 2024 10:15 )   PT: 12.0 sec;   INR: 1.03 ratio         PTT - ( 29 May 2024 10:15 )  PTT:25.8 sec  Urinalysis Basic - ( 30 May 2024 04:30 )    Color: x / Appearance: x / SG: x / pH: x  Gluc: 124 mg/dL / Ketone: x  / Bili: x / Urobili: x   Blood: x / Protein: x / Nitrite: x   Leuk Esterase: x / RBC: x / WBC x   Sq Epi: x / Non Sq Epi: x / Bacteria: x            INFLAMMATORY MARKERS      MICROBIOLOGY:    Culture - Urine (11.24.23 @ 10:35)    -  Amoxicillin/Clavulanic Acid: S <=8/4   -  Ampicillin: R >16 These ampicillin results predict results for amoxicillin   -  Ampicillin/Sulbactam: S <=4/2   -  Aztreonam: S <=4   -  Cefazolin: S <=2 For uncomplicated UTI with K. pneumoniae, E. coli, or P. mirablis: ANNA MARIE <=16 is sensitive and ANNA MARIE >=32 is resistant. This also predicts results for oral agents cefaclor, cefdinir, cefpodoxime, cefprozil, cefuroxime axetil, cephalexin and locarbef for uncomplicated UTI. Note that some isolates may be susceptible to these agents while testing resistant to cefazolin.   -  Cefepime: S <=2   -  Cefoxitin: S <=8   -  Ceftriaxone: S <=1   -  Cefuroxime: S <=4   -  Ciprofloxacin: S <=0.25   -  Ertapenem: S <=0.5   -  Gentamicin: S <=2   -  Imipenem: S <=1   -  Levofloxacin: S <=0.5   -  Meropenem: S <=1   -  Nitrofurantoin: I 64 Should not be used to treat pyelonephritis   -  Piperacillin/Tazobactam: S <=8   -  Tobramycin: S <=2   -  Trimethoprim/Sulfamethoxazole: S <=0.5/9.5   Specimen Source: Clean Catch Clean Catch (Midstream)   Culture Results:   >100,000 CFU/ml Klebsiella pneumoniae   Organism Identification: Klebsiella pneumoniae   Organism: Klebsiella pneumoniae   Method Type: ANNA MARIE    RADIOLOGY & ADDITIONAL STUDIES:  < from: US Abdomen Upper Quadrant Right (05.29.24 @ 20:07) >    ACC: [id number] EXAM:  US ABDOMEN RT UPR QUADRANT   ORDERED BY: LOWELL MULLINS     PROCEDURE DATE:  05/29/2024          INTERPRETATION:  CLINICAL INFORMATION: Gallbladder wall thickening on CT   scan.    COMPARISON: CT scan chest 5/29/2024.    TECHNIQUE: Sonography of the right upper quadrant.    FINDINGS:    Limited positioning.    Liver: Within normal limits.    Bile ducts: Normal caliber. Common bile duct measures 5 mm.    Gallbladder:   Mild gallbladder wall thickening with probable dependent tumefactive   biliary sludge.    Pancreas: Mild pancreatic ductal prominence measuring 3 mm.    Right kidney: 8.9 cm. No hydronephrosis.    Ascites: None.    IVC: Limited.    IMPRESSION:    Limited study.    Probable tumefactive biliary sludge with gallbladder wall thickening.    If there is a clinical suspicion for acute cholecystitis, recommend   further evaluation with nuclear medicine HIDA scan.      
Patient is a 89y old  Female who presents with a chief complaint of UTI (29 May 2024 19:25)       HPI:  89 yo female w/ pmh of HTN, HLD, T2DM, TIA, CKD III previous right hip fx w/ surgical pinning presents to the ED for multiple falls and foul smelling urine. Daughter at bedside and provided collateral history. Pt states that she was diagnosed w/ UTI by her PCP x2 wks ago, had been taking cefuroxime for the past 2 weeks with no improvement in urinary symptoms. Pt noticed foul smelling urine this morning. Pt was hospitalized in 2023 w/ UTI. Per daughter, pt w/ increased "confusion" which she describes as short-term memory loss -- daughter states this is typical for pt when she has a UTI. Additionally, pt reports several falls over the past week. Last Thursday, she dropped her walker on her L leg and subsequently noticed bruising and swelling. On Friday, she states that her walker tipped over, subsequently she fell on her R-side. She c/o R-sided abdominal pain which she attributes to her fall. Denies LOC, head trauma. Denies fevers, chills, dysuria, hematuria, N/V/D/C.     Renal consulted for acute on CKD  Chart reviewed  Known to office  Last outpt cr measured to be 1.5      No N/V/SOB    PAST MEDICAL & SURGICAL HISTORY:  Hypertension      Diabetes      TIA (transient ischemic attack)      Hip fracture      HLD (hyperlipidemia)      S/P ORIF (open reduction internal fixation) fracture  right hip           FAMILY HISTORY:  FH: asthma (Father)    NC    Social History:Non smoker    MEDICATIONS  (STANDING):  aspirin enteric coated 81 milliGRAM(s) Oral <User Schedule>  atorvastatin 40 milliGRAM(s) Oral at bedtime  dextrose 10% Bolus 125 milliLiter(s) IV Bolus once  dextrose 5%. 1000 milliLiter(s) (100 mL/Hr) IV Continuous <Continuous>  dextrose 5%. 1000 milliLiter(s) (50 mL/Hr) IV Continuous <Continuous>  dextrose 50% Injectable 25 Gram(s) IV Push once  dextrose 50% Injectable 12.5 Gram(s) IV Push once  glucagon  Injectable 1 milliGRAM(s) IntraMuscular once  heparin   Injectable 5000 Unit(s) SubCutaneous every 8 hours  hydrochlorothiazide 12.5 milliGRAM(s) Oral daily  insulin lispro (ADMELOG) corrective regimen sliding scale   SubCutaneous three times a day before meals  insulin lispro (ADMELOG) corrective regimen sliding scale   SubCutaneous at bedtime  lidocaine   4% Patch 2 Patch Transdermal every 24 hours  losartan 50 milliGRAM(s) Oral daily  metoprolol succinate  milliGRAM(s) Oral daily  potassium chloride  10 mEq/100 mL IVPB 10 milliEquivalent(s) IV Intermittent once  sodium chloride 0.9%. 1000 milliLiter(s) (60 mL/Hr) IV Continuous <Continuous>    MEDICATIONS  (PRN):  acetaminophen     Tablet .. 650 milliGRAM(s) Oral every 6 hours PRN Mild Pain (1 - 3)  dextrose Oral Gel 15 Gram(s) Oral once PRN Blood Glucose LESS THAN 70 milliGRAM(s)/deciliter   Meds reviewed    Allergies    No Known Allergies    Intolerances         REVIEW OF SYSTEMS:  as above      Vital Signs Last 24 Hrs  T(C): 36.3 (2024 05:31), Max: 36.8 (2024 19:56)  T(F): 97.3 (2024 05:31), Max: 98.2 (2024 19:56)  HR: 68 (2024 05:31) (58 - 74)  BP: 152/55 (2024 05:31) (130/67 - 152/55)  BP(mean): --  RR: 18 (2024 05:31) (18 - 18)  SpO2: 94% (2024 05:31) (90% - 95%)    Parameters below as of 2024 05:31  Patient On (Oxygen Delivery Method): room air                Daily     Daily Weight in k.3 (30 May 2024 05:52)    PHYSICAL EXAM:    GENERAL: NAD  HEAD:  Atraumatic, Normocephalic  NECK: Supple  NERVOUS SYSTEM:  Awake and Alert  CHEST/LUNG: Clear to auscultation bilaterally; No rales, rhonchi, wheezing, or rubs  HEART: Regular rate and rhythm; No murmurs, rubs, or gallops  ABDOMEN: Soft, mild tenderness, Nondistended; Bowel sounds present  EXTREMITIES:  No Edema        LABS:                           10.7   7.36  )-----------( 186      ( 2024 09:00 )             32.8     06-04    140  |  108  |  37<H>  ----------------------------<  233<H>  4.0   |  25  |  1.80<H>    Ca    9.8      2024 09:00    TPro  6.3  /  Alb  3.0<L>  /  TBili  0.4  /  DBili  x   /  AST  26  /  ALT  24  /  AlkPhos  91  06-03      Urinalysis Basic - ( 2024 09:00 )    Color: x / Appearance: x / SG: x / pH: x  Gluc: 233 mg/dL / Ketone: x  / Bili: x / Urobili: x   Blood: x / Protein: x / Nitrite: x   Leuk Esterase: x / RBC: x / WBC x   Sq Epi: x / Non Sq Epi: x / Bacteria: x                
Patient is a 89y old  Female who presents with a chief complaint of UTI (29 May 2024 19:25)       HPI:  89 yo female w/ pmh of HTN, HLD, T2DM, TIA, CKD III previous right hip fx w/ surgical pinning presents to the ED for multiple falls and foul smelling urine. Daughter at bedside and provided collateral history. Pt states that she was diagnosed w/ UTI by her PCP x2 wks ago, had been taking cefuroxime for the past 2 weeks with no improvement in urinary symptoms. Pt noticed foul smelling urine this morning. Pt was hospitalized in 2023 w/ UTI. Per daughter, pt w/ increased "confusion" which she describes as short-term memory loss -- daughter states this is typical for pt when she has a UTI. Additionally, pt reports several falls over the past week. Last Thursday, she dropped her walker on her L leg and subsequently noticed bruising and swelling. On Friday, she states that her walker tipped over, subsequently she fell on her R-side. She c/o R-sided abdominal pain which she attributes to her fall. Denies LOC, head trauma. Denies fevers, chills, dysuria, hematuria, N/V/D/C.     In the ED   VS: T(F): 97.8 HR: 87 BP: 163/72 RR: 17 SpO2: 95%  Labs show: H.7  WBC:7.11  Plt:181  Creatinine:1.8 Ca. 10.7 Lactate 2.6  UA: postive nitrites, Large leuks, mod zoltan,   S/p: 1g rocephin, 1L NS  Imaging  CT HEAD: There is no acute intracranial hemorrhage or depressed calvarial fracture. Chronic findings as above.  CT CERVICAL SPINE: No fracture or acute traumatic malalignment. Advanced multilevel degenerative changes of the cervical spine.  CT Chest: Circumferential gallbladder wall thickening, Few bilateral pulmonary nodules.    Renal consulted for acute on CKD  Chart reviewed  Known to office  Last outpt cr measured to be 1.5     (29 May 2024 13:58)       PAST MEDICAL & SURGICAL HISTORY:  Hypertension      Diabetes      TIA (transient ischemic attack)      Hip fracture      HLD (hyperlipidemia)      S/P ORIF (open reduction internal fixation) fracture  right hip           FAMILY HISTORY:  FH: asthma (Father)    [place]    Social History:Non smoker    MEDICATIONS  (STANDING):  aspirin enteric coated 81 milliGRAM(s) Oral <User Schedule>  atorvastatin 40 milliGRAM(s) Oral at bedtime  dextrose 10% Bolus 125 milliLiter(s) IV Bolus once  dextrose 5%. 1000 milliLiter(s) (100 mL/Hr) IV Continuous <Continuous>  dextrose 5%. 1000 milliLiter(s) (50 mL/Hr) IV Continuous <Continuous>  dextrose 50% Injectable 25 Gram(s) IV Push once  dextrose 50% Injectable 12.5 Gram(s) IV Push once  glucagon  Injectable 1 milliGRAM(s) IntraMuscular once  heparin   Injectable 5000 Unit(s) SubCutaneous every 8 hours  hydrochlorothiazide 12.5 milliGRAM(s) Oral daily  insulin lispro (ADMELOG) corrective regimen sliding scale   SubCutaneous three times a day before meals  insulin lispro (ADMELOG) corrective regimen sliding scale   SubCutaneous at bedtime  lidocaine   4% Patch 2 Patch Transdermal every 24 hours  losartan 50 milliGRAM(s) Oral daily  metoprolol succinate  milliGRAM(s) Oral daily  potassium chloride  10 mEq/100 mL IVPB 10 milliEquivalent(s) IV Intermittent once  sodium chloride 0.9%. 1000 milliLiter(s) (60 mL/Hr) IV Continuous <Continuous>    MEDICATIONS  (PRN):  acetaminophen     Tablet .. 650 milliGRAM(s) Oral every 6 hours PRN Mild Pain (1 - 3)  dextrose Oral Gel 15 Gram(s) Oral once PRN Blood Glucose LESS THAN 70 milliGRAM(s)/deciliter   Meds reviewed    Allergies    No Known Allergies    Intolerances         REVIEW OF SYSTEMS:    Review of Systems:   Constitutional: Denies fatigue  HEENT: Denies headaches and dizziness  Respiratory: denies SOB, cough, or wheezing  Cardiovascular: denies CP, palpitations  Gastrointestinal: +Abd pain  Genitourinary: +dysuria  Skin: denies rashes or itching  Musculoskeletal: denies muscle aches, joint swelling  Neurologic: Denies generalized weakness, denies loss of sensation, numbness, or tingling      Vital Signs Last 24 Hrs  T(C): 36.8 (2024 05:13), Max: 37.1 (2024 20:00)  T(F): 98.2 (2024 05:13), Max: 98.7 (2024 20:00)  HR: 63 (2024 05:13) (63 - 72)  BP: 137/61 (2024 05:13) (137/61 - 151/85)  BP(mean): --  RR: 18 (2024 05:13) (18 - 18)  SpO2: 92% (2024 05:13) (92% - 93%)    Parameters below as of 2024 05:13  Patient On (Oxygen Delivery Method): room air        Daily     Daily Weight in k.3 (30 May 2024 05:52)    PHYSICAL EXAM:    GENERAL: NAD  HEAD:  Atraumatic, Normocephalic  NECK: Supple  NERVOUS SYSTEM:  Awake and Alert  CHEST/LUNG: Clear to auscultation bilaterally; No rales, rhonchi, wheezing, or rubs  HEART: Regular rate and rhythm; No murmurs, rubs, or gallops  ABDOMEN: Soft, mild tenderness, Nondistended; Bowel sounds present  EXTREMITIES:  No Edema        LABS:     24: pending                                    12.4   7.97  )-----------( 192      ( 2024 12:45 )             38.6     06-01    139  |  109<H>  |  28<H>  ----------------------------<  178<H>  3.6   |  22  |  1.70<H>    Ca    9.6      2024 12:45    TPro  7.2  /  Alb  3.1<L>  /  TBili  0.3  /  DBili  x   /  AST  29  /  ALT  22  /  AlkPhos  97  06-01      Urinalysis Basic - ( 2024 12:45 )    Color: x / Appearance: x / SG: x / pH: x  Gluc: 178 mg/dL / Ketone: x  / Bili: x / Urobili: x   Blood: x / Protein: x / Nitrite: x   Leuk Esterase: x / RBC: x / WBC x   Sq Epi: x / Non Sq Epi: x / Bacteria: x        
S : 89y year old Female seen at bedside for left foot pain.  Pt states the walker fell on her foot, and she noticed bruising and swelling. SHe states it feels better today already.    Chief Complaint : Patient is a 89y old  Female who presents with a chief complaint of UTI (30 May 2024 10:22)    HPI : HPI:  89 yo female w/ pmh of HTN, HLD, T2DM, TIA, CKD III previous right hip fx w/ surgical pinning presents to the ED for multiple falls and foul smelling urine. Daughter at bedside and provided collateral history. Pt states that she was diagnosed w/ UTI by her PCP x2 wks ago, had been taking cefuroxime for the past 2 weeks with no improvement in urinary symptoms. Pt noticed foul smelling urine this morning. Pt was hospitalized in 2023 w/ UTI. Per daughter, pt w/ increased "confusion" which she describes as short-term memory loss -- daughter states this is typical for pt when she has a UTI. Additionally, pt reports several falls over the past week. Last Thursday, she dropped her walker on her L leg and subsequently noticed bruising and swelling. On Friday, she states that her walker tipped over, subsequently she fell on her R-side. She c/o R-sided abdominal pain which she attributes to her fall. Denies LOC, head trauma. Denies fevers, chills, dysuria, hematuria, N/V/D/C.     In the ED   VS: T(F): 97.8 HR: 87 BP: 163/72 RR: 17 SpO2: 95%  Labs show: H.7  WBC:7.11  Plt:181  Creatinine:1.8 Ca. 10.7 Lactate 2.6  UA: postive nitrites, Large leuks, mod zoltan,   S/p: 1g rocephin, 1L NS  Imaging  CT HEAD: There is no acute intracranial hemorrhage or depressed calvarial fracture. Chronic findings as above.  CT CERVICAL SPINE: No fracture or acute traumatic malalignment. Advanced multilevel degenerative changes of the cervical spine.  CT Chest: Circumferential gallbladder wall thickening, Few bilateral pulmonary nodules.         (29 May 2024 13:58)      Patient admits to  (-) Fevers, (-) Chills, (-) Nausea, (-) Vomiting, (-) Shortness of Breath      PMH: Hypertension    Diabetes    TIA (transient ischemic attack)    Hip fracture    HLD (hyperlipidemia)      PSH:No significant past surgical history    S/P ORIF (open reduction internal fixation) fracture        Allergies:No Known Allergies      Labs:                          11.3   4.94  )-----------( 143      ( 30 May 2024 04:30 )             34.8     WBC Trend  4.94 Date ( @ 04:30)  7.11 Date ( @ 10:15)      Chem      144  |  111<H>  |  26<H>  ----------------------------<  124<H>  3.4<L>   |  26  |  1.70<H>    Ca    9.5      30 May 2024 04:30  Phos  3.2       Mg     1.6         TPro  6.4  /  Alb  3.0<L>  /  TBili  0.8  /  DBili  x   /  AST  21  /  ALT  18  /  AlkPhos  88            T(F): 97.7 (24 @ 12:10), Max: 97.8 (24 @ 23:00)  HR: 106 (24 @ 12:10) (50 - 106)  BP: 187/79 (24 @ 12:51) (144/85 - 200/58)  RR: 20 (24 @ 12:10) (18 - 20)  SpO2: 92% (24 @ 12:51) (91% - 97%)  Wt(kg): --    O:   General: Pleasant  female NAD & AOX3.    Integument:  Skin warm, dry and supple bilateral.  Noted ecchymosis along digits 2,3,4 of left foot, plantar and dorsal aspect  Vascular: Dorsalis Pedis and Posterior Tibial pulses 2/4.  Capillary re-fill time less then 3 seconds digits 1-5 bilateral.  Noted minimal edema left forefoot  Neuro: Protective sensation intact to the level of the digits bilateral.  MSK: Muscle strength 4/5 all major muscle groups bilateral. Noted tenderness dorsal left foot at the 2-4th mpj.    
Patient is a 89y old  Female who presents with a chief complaint of UTI (30 May 2024 08:19)        HPI:  89 yo female w/ pmh of HTN, HLD, T2DM, TIA, CKD III previous right hip fx w/ surgical pinning presents to the ED for multiple falls and foul smelling urine. Daughter at bedside and provided collateral history. Pt states that she was diagnosed w/ UTI by her PCP x2 wks ago, had been taking cefuroxime for the past 2 weeks with no improvement in urinary symptoms. Pt noticed foul smelling urine this morning. Pt was hospitalized in 2023 w/ UTI. Per daughter, pt w/ increased "confusion" which she describes as short-term memory loss -- daughter states this is typical for pt when she has a UTI. Additionally, pt reports several falls over the past week. Last Thursday, she dropped her walker on her L leg and subsequently noticed bruising and swelling. On Friday, she states that her walker tipped over, subsequently she fell on her R-side. She c/o R-sided abdominal pain which she attributes to her fall. Denies LOC, head trauma. Denies fevers, chills, dysuria, hematuria, N/V/D/C.     In the ED   VS: T(F): 97.8 HR: 87 BP: 163/72 RR: 17 SpO2: 95%  Labs show: H.7  WBC:7.11  Plt:181  Creatinine:1.8 Ca. 10.7 Lactate 2.6  UA: postive nitrites, Large leuks, mod zoltan,   S/p: 1g rocephin, 1L NS  Imaging  CT HEAD: There is no acute intracranial hemorrhage or depressed calvarial fracture. Chronic findings as above.  CT CERVICAL SPINE: No fracture or acute traumatic malalignment. Advanced multilevel degenerative changes of the cervical spine.  CT Chest: Circumferential gallbladder wall thickening, Few bilateral pulmonary nodules.         (29 May 2024 13:58)      SUBJECTIVE & OBJECTIVE: Pt seen and examined at bedside. right upper quadrant pan     PHYSICAL EXAM:  T(C): 36.4 (24 @ 08:44), Max: 36.6 (24 @ 09:56)  HR: 61 (24 @ 07:00) (50 - 87)  BP: 171/74 (24 @ 07:00) (163/72 - 200/58)  RR: 18 (24 @ 07:00) (17 - 18)  SpO2: 96% (24 @ 07:00) (91% - 97%)  Wt(kg): --   Weight (kg): 63.5 ( @ 09:56)  GENERAL: NAD, well-groomed, well-developed  NECK: Supple, No JVD  NERVOUS SYSTEM:  Alert & Oriented X3,   CHEST/LUNG: Clear to auscultation bilaterally; No rales, rhonchi, wheezing, or rubs  HEART: Regular rate and rhythm; No murmurs, rubs, or gallops  ABDOMEN: RUQ pain   EXTREMITIES:  2+ Peripheral Pulses, No clubbing, cyanosis, or edema        MEDICATIONS  (STANDING):  aspirin enteric coated 81 milliGRAM(s) Oral <User Schedule>  atorvastatin 40 milliGRAM(s) Oral at bedtime  dextrose 10% Bolus 125 milliLiter(s) IV Bolus once  dextrose 5% + sodium chloride 0.45%. 1000 milliLiter(s) (75 mL/Hr) IV Continuous <Continuous>  dextrose 5%. 1000 milliLiter(s) (100 mL/Hr) IV Continuous <Continuous>  dextrose 5%. 1000 milliLiter(s) (50 mL/Hr) IV Continuous <Continuous>  dextrose 50% Injectable 25 Gram(s) IV Push once  dextrose 50% Injectable 12.5 Gram(s) IV Push once  glucagon  Injectable 1 milliGRAM(s) IntraMuscular once  heparin   Injectable 5000 Unit(s) SubCutaneous every 8 hours  hydrochlorothiazide 12.5 milliGRAM(s) Oral daily  insulin lispro (ADMELOG) corrective regimen sliding scale   SubCutaneous at bedtime  insulin lispro (ADMELOG) corrective regimen sliding scale   SubCutaneous three times a day before meals  lidocaine   4% Patch 2 Patch Transdermal every 24 hours  losartan 50 milliGRAM(s) Oral daily  metoprolol succinate  milliGRAM(s) Oral daily  sodium chloride 0.9%. 1000 milliLiter(s) (60 mL/Hr) IV Continuous <Continuous>    MEDICATIONS  (PRN):  acetaminophen     Tablet .. 650 milliGRAM(s) Oral every 6 hours PRN Mild Pain (1 - 3)  dextrose Oral Gel 15 Gram(s) Oral once PRN Blood Glucose LESS THAN 70 milliGRAM(s)/deciliter      LABS:                        11.3   4.94  )-----------( 143      ( 30 May 2024 04:30 )             34.8     05-30    144  |  111<H>  |  26<H>  ----------------------------<  124<H>  3.4<L>   |  26  |  1.70<H>    Ca    9.5      30 May 2024 04:30  Phos  3.2     30  Mg     1.6     30    TPro  6.4  /  Alb  3.0<L>  /  TBili  0.8  /  DBili  x   /  AST  21  /  ALT  18  /  AlkPhos  88  0530    PT/INR - ( 29 May 2024 10:15 )   PT: 12.0 sec;   INR: 1.03 ratio         PTT - ( 29 May 2024 10:15 )  PTT:25.8 sec  Urinalysis Basic - ( 30 May 2024 04:30 )    Color: x / Appearance: x / SG: x / pH: x  Gluc: 124 mg/dL / Ketone: x  / Bili: x / Urobili: x   Blood: x / Protein: x / Nitrite: x   Leuk Esterase: x / RBC: x / WBC x   Sq Epi: x / Non Sq Epi: x / Bacteria: x      Magnesium: 1.6 mg/dL ( @ 04:30)    CAPILLARY BLOOD GLUCOSE      POCT Blood Glucose.: 144 mg/dL (30 May 2024 07:16)  POCT Blood Glucose.: 133 mg/dL (29 May 2024 22:37)  POCT Blood Glucose.: 123 mg/dL (29 May 2024 17:39)      CAPILLARY BLOOD GLUCOSE      POCT Blood Glucose.: 144 mg/dL (30 May 2024 07:16)  POCT Blood Glucose.: 133 mg/dL (29 May 2024 22:37)  POCT Blood Glucose.: 123 mg/dL (29 May 2024 17:39)    CAPILLARY BLOOD GLUCOSE      POCT Blood Glucose.: 144 mg/dL (30 May 2024 07:16)            RECENT CULTURES:      RADIOLOGY & ADDITIONAL TESTS:                        DVT/GI ppx  Discussed with pt @ bedside

## 2024-06-04 NOTE — PROGRESS NOTE ADULT - ASSESSMENT
89 yo female w HTN, HLD, T2DM, TIA, CKD III previous right hip fx w/ surgical pinning presented to the ED for multiple falls and foul smelling urine. Diagnosed w/ UTI by her PCP x2 wks ago, had been taking cefuroxime with no improvement in urinary symptoms. Pt noticed foul smelling urine, several falls over the past week.    RECOMMENDATIONS  Pyelonephritis  noted urinary frequency, suprapubic tenderness, pyuria, frequent UTIs, no RUQ pain, no imaging, biochemical or symptoms to suggest alternate diagnosis other than gallbladder which is benign on exam so agree with  Zosyn started 5/29 -family and pt report that pt turned red as zosyn was finishing and told that she was experiencing red man syndrome and they brought up that this could have been addressed by slowing infusion. Of note is that red man syndrome is described with Vanco and this may have been an intolerance issue with Zosyn so with low suspicion for need to cover anaerobes and prior UTI with pan sensitive Klebsiella and current urine with >100,000 CFU/ml Escherichia coli  Ceftriaxone 2 grams IV daily but   can complete Rx with cefuroxime 500mg PO BID with last day 6/7    Recurrent UTIs will want to address recurrences after Rx for acute issues, pt to see her primary Dr Bauer at [facility], encouraged increased fluid intake    plan to go to rehab    From an ID standpoint no further requirement for inpatient status for the management of ID issues. Fine with discharge from ID standpoint when other medical issues no longer require inpatient care and social issues allow for a safe discharge plan. To schedule an outpatient ID follow up appointment please call our office at [phone]    Thank you for consulting us and involving us in the management of this most interesting and challenging case.  We will follow along in the care of this patient. Please call us at [phone] or text me directly on my cell# at [phone] with any concerns.        
89 yo female w HTN, HLD, T2DM, TIA, CKD III previous right hip fx w/ surgical pinning presented to the ED for multiple falls and foul smelling urine. Diagnosed w/ UTI by her PCP x2 wks ago, had been taking cefuroxime with no improvement in urinary symptoms. Pt noticed foul smelling urine, several falls over the past week.    RECOMMENDATIONS  Pyelonephritis  noted urinary frequency, suprapubic tenderness, pyuria, frequent UTIs, no RUQ pain, no imaging, biochemical or symptoms to suggest alternate diagnosis other than gallbladder which is benign on exam so agree with  Zosyn started 5/29 -family and pt report that pt turned red as zosyn was finishing and told that she was experiencing red man syndrome and they brought up that this could have been addressed by slowing infusion. Of note is that red man syndrome is described with Vanco and this may have been an intolerance issue with Zosyn so with low suspicion for need to cover anaerobes and prior UTI with pan sensitive Klebsiella and current urine with >100,000 CFU/ml Escherichia coli  Ceftriaxone 2 grams IV daily but   when sens back can look at dc to complete therapy with PO with last day 6/7    Recurrent UTIs will want to address recurrences after Rx for acute issues, pt to see her primary Dr Bauer at [facility], encouraged increased fluid intake    - daughter is interested in PT to prevent deconditioning but want to take pt home and not to rehab    Thank you for consulting us and involving us in the management of this most interesting and challenging case.  We will follow along in the care of this patient. Please call us at [phone] or text me directly on my cell# at [phone] with any concerns.    Starting tomorrow Dr Grijalva will be assuming care of this patient so please contact him with any questions, concerns or new micro data.      
89 yo female w/ pmh of HTN, HLD, T2DM, TIA, CKD presents w/ foul smelling urine; admitted for UTI and fall.     #Acute UTI with Pyelonephritis  -noted urinary frequency, suprapubic tenderness, pyuria, frequent UTIs,   -started on zosyn on 5/29.  ID is following. Pt and family complains of redness and skin flushed after zosyn infusion.  -urine culture shows >100,00 cfu of ecoli pan sensitive.   -Zosyn changed to Ceftriaxone 2 g daily. Sensitivities reviewed, switched to Ceftin until 6/7   -ID (Dr. Grijalva) consulted, recs appreciated    #Mechanical fall  -CT HEAD: There is no acute intracranial hemorrhage or depressed calvarial fracture  -CT CERVICAL SPINE: No fracture or acute traumatic malalignment. Advanced multilevel degenerative changes of the cervical spine.  -XR Right shoulder: Some osteoarthritic changes noted. No acute fracture or dislocation noted. There may be some slight increased density at the   right lung base.  -XR L ankle: Soft tissue prominence at the ankle and tarsal region. There is calcific ossific density seen by the medial malleolus which may be   related to trauma of indeterminate age.   -XR L foot: Positive findings related to the ankle region and tarsal region with prominent soft tissue swelling noted. Soft tissue swelling is also seen on the dorsum of the foot at the metatarsal level. Some osteoarthritic changes at the first MTP joint. Prominent calcifications related to the Achilles tendon region.   -Pain regiment: tylenol PO prn and lidocaine patch  -fall precautions  -Podiatry consulted for LLE pain and swelling, recs appreciated - no fracture, outpatient f/u.     #Imaging abnormality.   -CT Chest: Circumferential gallbladder wall thickening  -Bilirubin wnl  -Tender on palpation in right upper quadrant likely from fall  -HIDA scan negative for acute pino.    #Hypercalcemia.   -RESOLVED  -found to have hypercalcemia on admission, 10.6. corrected Ca is 11.5   -former smoker  -CT Chest: Few bilateral pulmonary nodules.  -PTH is 48  -Nephro consult blessing Aly appreciated    #Chronic kidney disease, unspecified CKD stage.   -Baseline Cr 1.7, Cr 1.8 on admission   -continue to monitor renal indices  -avoid nephrotoxic agents when possible  -Nephro consult blessing Aly appreciated    #HTN (hypertension)  -Chronic  -continue home losartan, hctz, metoprolol with parameters.    #T2DM (type 2 diabetes mellitus).   -type 2 DM on januvia  -hold oral glycemic agents  -hypoglycemia protocol  -low dose sliding scale  -accuchecks per protocol.  -A1C 7.7    #History of TIAs.   -continue home aspirin 81mg every other day  -continue home statin.    #HLD (hyperlipidemia).   -continue home statin    #Need for prophylactic measure.   -heparin subq q8hr 5000 units for dvt ppx.    
Acute on CKD Stage 3  Pyelonephritis  HTN with CKD  Hypokalemia      -Baseline Cr 1.5  -PO hydration encouraged  -BP improving on current regimen  -Abx per ID; had allergic rxn to Zosyn  -Daily chem  -No additional renal work up for now  -Billiary sludge noted on sono, HIDA scan done  -Creatinine worsened, Start gentle IVF    d/w primary    6/3/24-d/w daughter  
89 yo female w HTN, HLD, T2DM, TIA, CKD III previous right hip fx w/ surgical pinning presented to the ED for multiple falls and foul smelling urine. Diagnosed w/ UTI by her PCP x2 wks ago, had been taking cefuroxime with no improvement in urinary symptoms. Pt noticed foul smelling urine, several falls over the past week.    RECOMMENDATIONS  Pyelonephritis  noted urinary frequency, suprapubic tenderness, pyuria, frequent UTIs, no RUQ pain, no imaging, biochemical or symptoms to suggest alternate diagnosis other than gallbladder which is benign on exam so agree with  Zosyn started 5/29 -family and pt report that pt turned red as zosyn was finishing and told that she was experiencing red man syndrome and they brought up that this could have been addressed by slowing infusion. Of note is that red man syndrome is described with Vanco and this may have been an intolerance issue with Zosyn so with low suspicion for need to cover anaerobes and prior UTI with pan sensitive Klebsiella will rec  Ceftriaxone 2 grams IV daily with recs to follow based on HIDA and micro    Recurrent UTIs will want to address recurrences after Rx for acute issues    -discussed with Dr Shields that daughter is interested in PT today to prevent deconditioning    Thank you for consulting us and involving us in the management of this most interesting and challenging case.  We will follow along in the care of this patient. Please call us at [phone] or text me directly on my cell# at [phone] with any concerns.    
89 yo female w HTN, HLD, T2DM, TIA, CKD III previous right hip fx w/ surgical pinning presented to the ED for multiple falls and foul smelling urine. Diagnosed w/ UTI by her PCP x2 wks ago, had been taking cefuroxime with no improvement in urinary symptoms. Pt noticed foul smelling urine, several falls over the past week.    RECOMMENDATIONS  Pyelonephritis  noted urinary frequency, suprapubic tenderness, pyuria, frequent UTIs, no RUQ pain, no imaging, biochemical or symptoms to suggest alternate diagnosis other than gallbladder which is benign on exam so agree with  Zosyn started 5/29 -family and pt report that pt turned red as zosyn was finishing and told that she was experiencing red man syndrome and they brought up that this could have been addressed by slowing infusion. Of note is that red man syndrome is described with Vanco and this may have been an intolerance issue with Zosyn so with low suspicion for need to cover anaerobes and prior UTI with pan sensitive Klebsiella and current urine with >100,000 CFU/ml Escherichia coli  Ceftriaxone 2 grams IV daily but   can complete Rx with cefuroxime 500mg PO BID with last day 6/7    Recurrent UTIs will want to address recurrences after Rx for acute issues, pt to see her primary Dr Bauer at [facility], encouraged increased fluid intake    plan to go to rehab    From an ID standpoint no further requirement for inpatient status for the management of ID issues. Fine with discharge from ID standpoint when other medical issues no longer require inpatient care and social issues allow for a safe discharge plan. To schedule an outpatient ID follow up appointment please call our office at [phone]    Thank you for consulting us and involving us in the management of this most interesting and challenging case.  Please call us at [phone] or text me directly on my cell#[phone] with any concerns or further questions.          
Acute on CKD Stage 3  Pyelonephritis  HTN with CKD  Hypokalemia      -Baseline Cr 1.5  -Now with ELVIN which is likely to be prerenal  -Renal indices improved with fluctuation in Cr; s/p IVF when NPO  -PO hydration encouraged  -BP improving on current regimen  -Abx per ID; had allergic rxn to Zosyn  -Daily chem  -No additional renal work up for now  -GI eval for billiary sludge noted on sono, HIDA scan done    Renally stable for DC planning    
Acute on CKD Stage 3  Pyelonephritis  HTN with CKD  Hypokalemia      -Baseline Cr 1.5  -PO hydration encouraged  -BP improving on current regimen  -Abx per ID; had allergic rxn to Zosyn  -Daily chem  -No additional renal work up for now  -Billiary sludge noted on sono, HIDA scan done  -Creatinine stabilized; continue IVF until completion      6/3/24-d/w daughter  
Acute on CKD Stage 3  Pyelonephritis  HTN with CKD  Hypokalemia      -Baseline Cr 1.5  -Now with ELVIN which is likely to be prerenal  -Renal indices improved with fluctuation in Cr; s/p IVF when NPO  -PO hydration encouraged  -BP uncontrolled; add amlodipine 5mg  -Abx per ID; had allergic rxn to Zosyn  -Daily chem  -No additional renal work up for now  -GI eval for billiary sludge noted on sono, HIDA scan done    
89 yo female w/ pmh of HTN, HLD, T2DM, TIA, CKD presents w/ foul smelling urine; admitted for UTI and fall         Problem/Plan - 1:  ·  Problem: Acute UTI with Pyelonephritis  -noted urinary frequency, suprapubic tenderness, pyuria, frequent UTIs,   -started on zosyn on 5/29.  ID is following. Pt and family complains of redness and skin flushed after zosyn infusion.  -urine culture shows >100,00 cfu of ecoli pan sensitive.   - Zosyn changed to Ceftriaxone 2 g daily      Problem/Plan - 2:  ·  Problem: Fall.   ·   Mechanical fall  CT HEAD: There is no acute intracranial hemorrhage or depressed calvarial fracture  CT CERVICAL SPINE: No fracture or acute traumatic malalignment. Advanced multilevel degenerative changes of the cervical spine.  XR Right shoulder: Some osteoarthritic changes noted. No acute fracture or   dislocation noted. There may be some slight increased density at the   right lung base.  XR L ankle: Soft tissue prominence at the ankle and tarsal region. There is calcific ossific density seen by the medial malleolus which may be   related to trauma of indeterminate age.   XR L foot: Positive findings related to the ankle region and tarsal   region with prominent soft tissue swelling noted. Soft tissue swelling is   also seen on the dorsum of the foot at the metatarsal level. Some   osteoarthritic changes at the first MTP joint. Prominent calcifications   related to the Achilles tendon region.   Pain regiment: tylenol PO prn and lidocaine patch  fall precautions  f/u orthostatics  PT/OT/SW consult  -Podiatry consulted for LLE pain and swelling. no fracture.  appreciate input.  Note reviewed. appreciate input. outpatient f/u.      Problem/Plan - 3:  ·  Problem: Imaging abnormality.    # CT Chest: Circumferential gallbladder wall thickening  - bilirubin wnl  -tender on palpation in right Upper quadrant likely from fall  -HIDA scan negative for acute pino.     Problem/Plan - 4:  ·  Problem: Hypercalcemia.   ·  Plan: found to have hypercalcemia on admission, 10.6. corrected Ca is 11.5   former smoker  CT Chest: Few bilateral pulmonary nodules.  f/u PTH is 48.  AM BMP  -IVF as ordered  -Nephro consult Dr Su. follow up with nephrology in am      Problem/Plan - 5:  ·  Problem: Chronic kidney disease, unspecified CKD stage.   ·  Plan: Baseline Cr 1.7, Cr 1.8 on admission   s/p 1L NS  continue to monitor renal indices  avoid nephrotoxic agents when possible  c/w IVF NS @ 60cc/hr x 12 hrs.     Problem/Plan - 6:  ·  Problem: HTN (hypertension).   ·  Plan: chronic; 163/72 on admission  continue home losartan, hctz, metoprolol with parameters.     Problem/Plan - 7:  ·  Problem: T2DM (type 2 diabetes mellitus).   ·  Plan: type 2 DM on januvia  hold oral glycemic agents  hypoglycemia protocol  AM A1C  low dose sliding scale  accuchecks per protocol.     Problem/Plan - 8:  ·  Problem: History of TIAs.   ·  Plan: hx of tia  continue home aspirin 81mg every other day  continue home statin.     Problem/Plan - 9:  ·  Problem: HLD (hyperlipidemia).   ·  Plan: chronic  continue home statin  lipid panel in AM.     Problem/Plan - 10:  ·  Problem: Need for prophylactic measure.   ·  Plan; heparin subq q8hr 5000 units for dvt ppx.    
89 yo female w/ pmh of HTN, HLD, T2DM, TIA, CKD presents w/ foul smelling urine; admitted for UTI and fall.     #Acute UTI with Pyelonephritis  -noted urinary frequency, suprapubic tenderness, pyuria, frequent UTIs,   -started on zosyn on 5/29.  ID is following. Pt and family complains of redness and skin flushed after zosyn infusion.  -urine culture shows >100,00 cfu of ecoli pan sensitive.   -Zosyn changed to Ceftriaxone 2 g daily. Sensitivities reviewed, switched to Ceftin until 6/7  -ID (Dr. Grijalva) consulted, recs appreciated    #Mechanical fall  -CT HEAD: There is no acute intracranial hemorrhage or depressed calvarial fracture  -CT CERVICAL SPINE: No fracture or acute traumatic malalignment. Advanced multilevel degenerative changes of the cervical spine.  -XR Right shoulder: Some osteoarthritic changes noted. No acute fracture or dislocation noted. There may be some slight increased density at the   right lung base.  -XR L ankle: Soft tissue prominence at the ankle and tarsal region. There is calcific ossific density seen by the medial malleolus which may be   related to trauma of indeterminate age.   -XR L foot: Positive findings related to the ankle region and tarsal region with prominent soft tissue swelling noted. Soft tissue swelling is also seen on the dorsum of the foot at the metatarsal level. Some osteoarthritic changes at the first MTP joint. Prominent calcifications related to the Achilles tendon region.   -Pain regiment: tylenol PO prn and lidocaine patch  -fall precautions  -Podiatry consulted for LLE pain and swelling, recs appreciated - no fracture, outpatient f/u.     #Imaging abnormality.   - CT Chest: Circumferential gallbladder wall thickening  -bilirubin wnl  -tender on palpation in right upper quadrant likely from fall  -HIDA scan negative for acute pino.    #Hypercalcemia.   -RESOLVED  -found to have hypercalcemia on admission, 10.6. corrected Ca is 11.5   -former smoker  -CT Chest: Few bilateral pulmonary nodules.  -PTH is 48  -Nephro consult blessing Aly appreciated    #Chronic kidney disease, unspecified CKD stage.   -Baseline Cr 1.7, Cr 1.8 on admission   -continue to monitor renal indices  -avoid nephrotoxic agents when possible  -Nephro consult blessing Aly appreciated    #HTN (hypertension)  -Chronic  -continue home losartan, hctz, metoprolol with parameters.    #T2DM (type 2 diabetes mellitus).   -type 2 DM on januvia  -hold oral glycemic agents  -hypoglycemia protocol  -low dose sliding scale  -accuchecks per protocol.  -A1C 7.7    #History of TIAs.   -continue home aspirin 81mg every other day  -continue home statin.    #HLD (hyperlipidemia).   -continue home statin    #Need for prophylactic measure.   -heparin subq q8hr 5000 units for dvt ppx.    
Acute on CKD Stage 3  Pyelonephritis  HTN with CKD  Hypokalemia      -Baseline Cr 1.5  -Now with ELVIN which is likely to be prerenal  -Improving Cr with IVF; Recommend to continue gentle IVF while NPO  -Restart home antihypertensive regimen; if needs further help; increase losartan to 100mg  -KCL given  -Abx per ID; had allergic rxn to Zosyn  -Daily chem  -No additional renal work up for now  -GI eval for billiary sludge noted on sono, HIDA scan done    D/W Daughter
Left foot pain
89 yo female w/ pmh of HTN, HLD, T2DM, TIA, CKD presents w/ foul smelling urine; admitted for UTI and fall  
89 yo female w/ pmh of HTN, HLD, T2DM, TIA, CKD presents w/ foul smelling urine; admitted for UTI and fall.     #Acute UTI with Pyelonephritis  -noted urinary frequency, suprapubic tenderness, pyuria, frequent UTIs  -started on zosyn on 5/29.  ID is following. Pt and family complains of redness and skin flushed after zosyn infusion.  -urine culture shows >100,00 cfu of ecoli pan sensitive.   -Zosyn changed to Ceftriaxone 2 g daily. Sensitivities reviewed, switched to Ceftin until 6/7   -ID (Dr. Grijalva) consulted, recs appreciated    #Mechanical fall  -CT HEAD: There is no acute intracranial hemorrhage or depressed calvarial fracture  -CT CERVICAL SPINE: No fracture or acute traumatic malalignment. Advanced multilevel degenerative changes of the cervical spine.  -XR Right shoulder: Some osteoarthritic changes noted. No acute fracture or dislocation noted. There may be some slight increased density at the   right lung base.  -XR L ankle: Soft tissue prominence at the ankle and tarsal region. There is calcific ossific density seen by the medial malleolus which may be   related to trauma of indeterminate age.   -XR L foot: Positive findings related to the ankle region and tarsal region with prominent soft tissue swelling noted. Soft tissue swelling is also seen on the dorsum of the foot at the metatarsal level. Some osteoarthritic changes at the first MTP joint. Prominent calcifications related to the Achilles tendon region.   -Pain regiment: tylenol PO prn and lidocaine patch  -fall precautions  -Podiatry consulted for LLE pain and swelling, recs appreciated - no fracture, outpatient f/u.     #Imaging abnormality.   -CT Chest: Circumferential gallbladder wall thickening  -Bilirubin wnl  -Tender on palpation in right upper quadrant likely from fall  -HIDA scan negative for acute pino.    #Hypercalcemia.   -RESOLVED  -found to have hypercalcemia on admission, 10.6. corrected Ca is 11.5   -former smoker  -CT Chest: Few bilateral pulmonary nodules.  -PTH is 48  -Nephro consult blessing Aly appreciated    #Chronic kidney disease, unspecified CKD stage.   -Baseline Cr 1.7, Cr 1.8 on admission   -continue to monitor renal indices- Cr 2.1 today  -avoid nephrotoxic agents when possible  -Nephro consult Dr Su recs appreciated- will give gentle IVF for 24 hours and reassess    #HTN (hypertension)  -Chronic  -continue home losartan, hctz, metoprolol with parameters.    #T2DM (type 2 diabetes mellitus).   -type 2 DM on januvia  -hold oral glycemic agents  -hypoglycemia protocol  -low dose sliding scale  -accuchecks per protocol.  -A1C 7.7    #History of TIAs.   -continue home aspirin 81mg every other day  -continue home statin.    #HLD (hyperlipidemia).   -continue home statin    #Need for prophylactic measure.   -heparin subq q8hr 5000 units for dvt ppx.

## 2024-06-04 NOTE — SOCIAL WORK PROGRESS NOTE - NSSWPROGRESSNOTE_GEN_ALL_CORE
Patient to be discharged to [facility] at 3:30 via [facility] All in agreement. Copy of chart to follow

## 2024-06-04 NOTE — DISCHARGE NOTE NURSING/CASE MANAGEMENT/SOCIAL WORK - NSDCPEFALRISK_GEN_ALL_CORE
For information on Fall & Injury Prevention, visit: [email or web address]  [email or web address] OR  https://www.cdc.gov/steadi/patient.html

## 2024-06-05 ENCOUNTER — TRANSCRIPTION ENCOUNTER (OUTPATIENT)
Age: 89
End: 2024-06-05

## 2024-07-25 ENCOUNTER — NON-APPOINTMENT (OUTPATIENT)
Age: 89
End: 2024-07-25

## 2024-10-25 ENCOUNTER — APPOINTMENT (OUTPATIENT)
Dept: ORTHOPEDIC SURGERY | Facility: CLINIC | Age: 89
End: 2024-10-25
Payer: MEDICARE

## 2024-10-25 DIAGNOSIS — R20.0 ANESTHESIA OF SKIN: ICD-10-CM

## 2024-10-25 DIAGNOSIS — R20.2 ANESTHESIA OF SKIN: ICD-10-CM

## 2024-10-25 PROCEDURE — 99213 OFFICE O/P EST LOW 20 MIN: CPT

## 2024-12-06 ENCOUNTER — APPOINTMENT (OUTPATIENT)
Dept: ORTHOPEDIC SURGERY | Facility: CLINIC | Age: 89
End: 2024-12-06
Payer: MEDICARE

## 2024-12-06 DIAGNOSIS — G56.03 CARPAL TUNNEL SYNDROM,BILATERAL UPPER LIMBS: ICD-10-CM

## 2024-12-06 PROCEDURE — 99214 OFFICE O/P EST MOD 30 MIN: CPT | Mod: 25

## 2024-12-09 ENCOUNTER — NON-APPOINTMENT (OUTPATIENT)
Age: 89
End: 2024-12-09

## 2024-12-10 ENCOUNTER — APPOINTMENT (OUTPATIENT)
Dept: ORTHOPEDIC SURGERY | Facility: CLINIC | Age: 89
End: 2024-12-10

## 2024-12-13 ENCOUNTER — APPOINTMENT (OUTPATIENT)
Dept: PHYSICAL MEDICINE AND REHAB | Facility: CLINIC | Age: 89
End: 2024-12-13

## 2024-12-16 ENCOUNTER — APPOINTMENT (OUTPATIENT)
Dept: ORTHOPEDIC SURGERY | Facility: CLINIC | Age: 89
End: 2024-12-16
Payer: MEDICARE

## 2024-12-16 DIAGNOSIS — G56.03 CARPAL TUNNEL SYNDROM,BILATERAL UPPER LIMBS: ICD-10-CM

## 2024-12-16 PROCEDURE — 64721 CARPAL TUNNEL SURGERY: CPT | Mod: RT

## 2024-12-18 ENCOUNTER — INPATIENT (INPATIENT)
Facility: HOSPITAL | Age: 89
LOS: 4 days | Discharge: NURSING, REHAB OR LONG-TERM CARE | DRG: 470 | End: 2024-12-23
Attending: INTERNAL MEDICINE | Admitting: INTERNAL MEDICINE
Payer: MEDICARE

## 2024-12-18 VITALS
DIASTOLIC BLOOD PRESSURE: 66 MMHG | WEIGHT: 147.93 LBS | RESPIRATION RATE: 16 BRPM | HEART RATE: 57 BPM | HEIGHT: 60 IN | SYSTOLIC BLOOD PRESSURE: 202 MMHG | TEMPERATURE: 98 F

## 2024-12-18 DIAGNOSIS — E11.9 TYPE 2 DIABETES MELLITUS WITHOUT COMPLICATIONS: ICD-10-CM

## 2024-12-18 DIAGNOSIS — Z29.9 ENCOUNTER FOR PROPHYLACTIC MEASURES, UNSPECIFIED: ICD-10-CM

## 2024-12-18 DIAGNOSIS — S72.002A FRACTURE OF UNSPECIFIED PART OF NECK OF LEFT FEMUR, INITIAL ENCOUNTER FOR CLOSED FRACTURE: ICD-10-CM

## 2024-12-18 DIAGNOSIS — Z96.7 PRESENCE OF OTHER BONE AND TENDON IMPLANTS: Chronic | ICD-10-CM

## 2024-12-18 DIAGNOSIS — N17.9 ACUTE KIDNEY FAILURE, UNSPECIFIED: ICD-10-CM

## 2024-12-18 DIAGNOSIS — I10 ESSENTIAL (PRIMARY) HYPERTENSION: ICD-10-CM

## 2024-12-18 DIAGNOSIS — S72.009A FRACTURE OF UNSPECIFIED PART OF NECK OF UNSPECIFIED FEMUR, INITIAL ENCOUNTER FOR CLOSED FRACTURE: ICD-10-CM

## 2024-12-18 DIAGNOSIS — E78.5 HYPERLIPIDEMIA, UNSPECIFIED: ICD-10-CM

## 2024-12-18 LAB
ANION GAP SERPL CALC-SCNC: 9 MMOL/L — SIGNIFICANT CHANGE UP (ref 5–17)
APPEARANCE UR: CLEAR — SIGNIFICANT CHANGE UP
APTT BLD: 28.8 SEC — SIGNIFICANT CHANGE UP (ref 24.5–35.6)
BILIRUB UR-MCNC: NEGATIVE — SIGNIFICANT CHANGE UP
BUN SERPL-MCNC: 41 MG/DL — HIGH (ref 7–23)
CALCIUM SERPL-MCNC: 9.5 MG/DL — SIGNIFICANT CHANGE UP (ref 8.5–10.1)
CHLORIDE SERPL-SCNC: 109 MMOL/L — HIGH (ref 96–108)
CO2 SERPL-SCNC: 23 MMOL/L — SIGNIFICANT CHANGE UP (ref 22–31)
COLOR SPEC: YELLOW — SIGNIFICANT CHANGE UP
CREAT SERPL-MCNC: 2 MG/DL — HIGH (ref 0.5–1.3)
DIFF PNL FLD: NEGATIVE — SIGNIFICANT CHANGE UP
EGFR: 23 ML/MIN/1.73M2 — LOW
GLUCOSE SERPL-MCNC: 183 MG/DL — HIGH (ref 70–99)
GLUCOSE UR QL: NEGATIVE MG/DL — SIGNIFICANT CHANGE UP
HCT VFR BLD CALC: 34.1 % — LOW (ref 34.5–45)
HGB BLD-MCNC: 11.2 G/DL — LOW (ref 11.5–15.5)
INR BLD: 1.21 RATIO — HIGH (ref 0.85–1.16)
KETONES UR-MCNC: NEGATIVE MG/DL — SIGNIFICANT CHANGE UP
LEUKOCYTE ESTERASE UR-ACNC: ABNORMAL
MCHC RBC-ENTMCNC: 26.7 PG — LOW (ref 27–34)
MCHC RBC-ENTMCNC: 32.8 G/DL — SIGNIFICANT CHANGE UP (ref 32–36)
MCV RBC AUTO: 81.2 FL — SIGNIFICANT CHANGE UP (ref 80–100)
NITRITE UR-MCNC: NEGATIVE — SIGNIFICANT CHANGE UP
NRBC # BLD: 0 /100 WBCS — SIGNIFICANT CHANGE UP (ref 0–0)
PH UR: 5.5 — SIGNIFICANT CHANGE UP (ref 5–8)
PLATELET # BLD AUTO: 202 K/UL — SIGNIFICANT CHANGE UP (ref 150–400)
POTASSIUM SERPL-MCNC: 3.7 MMOL/L — SIGNIFICANT CHANGE UP (ref 3.5–5.3)
POTASSIUM SERPL-SCNC: 3.7 MMOL/L — SIGNIFICANT CHANGE UP (ref 3.5–5.3)
PROT UR-MCNC: 30 MG/DL
PROTHROM AB SERPL-ACNC: 14.1 SEC — HIGH (ref 9.9–13.4)
RBC # BLD: 4.2 M/UL — SIGNIFICANT CHANGE UP (ref 3.8–5.2)
RBC # FLD: 13.9 % — SIGNIFICANT CHANGE UP (ref 10.3–14.5)
SODIUM SERPL-SCNC: 141 MMOL/L — SIGNIFICANT CHANGE UP (ref 135–145)
SP GR SPEC: 1.02 — SIGNIFICANT CHANGE UP (ref 1–1.03)
UROBILINOGEN FLD QL: 0.2 MG/DL — SIGNIFICANT CHANGE UP (ref 0.2–1)
WBC # BLD: 10.37 K/UL — SIGNIFICANT CHANGE UP (ref 3.8–10.5)
WBC # FLD AUTO: 10.37 K/UL — SIGNIFICANT CHANGE UP (ref 3.8–10.5)

## 2024-12-18 PROCEDURE — 71045 X-RAY EXAM CHEST 1 VIEW: CPT | Mod: 26

## 2024-12-18 PROCEDURE — 99285 EMERGENCY DEPT VISIT HI MDM: CPT

## 2024-12-18 PROCEDURE — 73552 X-RAY EXAM OF FEMUR 2/>: CPT | Mod: 26,LT

## 2024-12-18 PROCEDURE — 93010 ELECTROCARDIOGRAM REPORT: CPT

## 2024-12-18 PROCEDURE — 73502 X-RAY EXAM HIP UNI 2-3 VIEWS: CPT | Mod: 26,LT

## 2024-12-18 RX ORDER — TRANEXAMIC ACID 650 MG/1
1000 TABLET ORAL ONCE
Refills: 0 | Status: DISCONTINUED | OUTPATIENT
Start: 2024-12-19 | End: 2024-12-23

## 2024-12-18 RX ORDER — SODIUM CHLORIDE 9 MG/ML
1000 INJECTION, SOLUTION INTRAMUSCULAR; INTRAVENOUS; SUBCUTANEOUS ONCE
Refills: 0 | Status: COMPLETED | OUTPATIENT
Start: 2024-12-18 | End: 2024-12-18

## 2024-12-18 RX ORDER — FENTANYL 75 UG/H
25 PATCH, EXTENDED RELEASE TRANSDERMAL ONCE
Refills: 0 | Status: DISCONTINUED | OUTPATIENT
Start: 2024-12-18 | End: 2024-12-18

## 2024-12-18 RX ORDER — FERROUS SULFATE 325(65) MG
1 TABLET ORAL
Refills: 0 | DISCHARGE

## 2024-12-18 RX ORDER — SODIUM CHLORIDE 9 MG/ML
1000 INJECTION, SOLUTION INTRAMUSCULAR; INTRAVENOUS; SUBCUTANEOUS
Refills: 0 | Status: DISCONTINUED | OUTPATIENT
Start: 2024-12-18 | End: 2024-12-19

## 2024-12-18 RX ORDER — LOSARTAN POTASSIUM 100 MG/1
25 TABLET, FILM COATED ORAL DAILY
Refills: 0 | Status: DISCONTINUED | OUTPATIENT
Start: 2024-12-18 | End: 2024-12-19

## 2024-12-18 RX ORDER — MORPHINE SULFATE 15 MG
4 TABLET, EXTENDED RELEASE ORAL ONCE
Refills: 0 | Status: DISCONTINUED | OUTPATIENT
Start: 2024-12-18 | End: 2024-12-18

## 2024-12-18 RX ORDER — HEPARIN SODIUM 1000 [USP'U]/ML
5000 INJECTION, SOLUTION INTRAVENOUS; SUBCUTANEOUS ONCE
Refills: 0 | Status: COMPLETED | OUTPATIENT
Start: 2024-12-18 | End: 2024-12-18

## 2024-12-18 RX ORDER — FUROSEMIDE 20 MG
1 TABLET ORAL
Refills: 0 | DISCHARGE

## 2024-12-18 RX ORDER — HYDROMORPHONE HCL 4 MG
0.5 TABLET ORAL ONCE
Refills: 0 | Status: DISCONTINUED | OUTPATIENT
Start: 2024-12-18 | End: 2024-12-18

## 2024-12-18 RX ORDER — ACETAMINOPHEN 80 MG/.8ML
1000 SOLUTION/ DROPS ORAL ONCE
Refills: 0 | Status: COMPLETED | OUTPATIENT
Start: 2024-12-18 | End: 2024-12-18

## 2024-12-18 RX ORDER — METOPROLOL TARTRATE 50 MG
100 TABLET ORAL DAILY
Refills: 0 | Status: DISCONTINUED | OUTPATIENT
Start: 2024-12-18 | End: 2024-12-19

## 2024-12-18 RX ORDER — ATORVASTATIN CALCIUM 40 MG/1
40 TABLET, FILM COATED ORAL AT BEDTIME
Refills: 0 | Status: DISCONTINUED | OUTPATIENT
Start: 2024-12-18 | End: 2024-12-19

## 2024-12-18 RX ADMIN — FENTANYL 25 MICROGRAM(S): 75 PATCH, EXTENDED RELEASE TRANSDERMAL at 20:05

## 2024-12-18 RX ADMIN — Medication 0.5 MILLIGRAM(S): at 22:51

## 2024-12-18 RX ADMIN — SODIUM CHLORIDE 75 MILLILITER(S): 9 INJECTION, SOLUTION INTRAMUSCULAR; INTRAVENOUS; SUBCUTANEOUS at 19:03

## 2024-12-18 RX ADMIN — HEPARIN SODIUM 5000 UNIT(S): 1000 INJECTION, SOLUTION INTRAVENOUS; SUBCUTANEOUS at 22:30

## 2024-12-18 RX ADMIN — ACETAMINOPHEN 400 MILLIGRAM(S): 80 SOLUTION/ DROPS ORAL at 19:05

## 2024-12-18 RX ADMIN — FENTANYL 25 MICROGRAM(S): 75 PATCH, EXTENDED RELEASE TRANSDERMAL at 19:03

## 2024-12-18 RX ADMIN — Medication 4 MILLIGRAM(S): at 20:10

## 2024-12-18 RX ADMIN — ACETAMINOPHEN 1000 MILLIGRAM(S): 80 SOLUTION/ DROPS ORAL at 20:05

## 2024-12-18 RX ADMIN — Medication 0.5 MILLIGRAM(S): at 23:50

## 2024-12-18 RX ADMIN — Medication 4 MILLIGRAM(S): at 21:10

## 2024-12-18 RX ADMIN — SODIUM CHLORIDE 1000 MILLILITER(S): 9 INJECTION, SOLUTION INTRAMUSCULAR; INTRAVENOUS; SUBCUTANEOUS at 22:30

## 2024-12-18 NOTE — H&P ADULT - GASTROINTESTINAL
details… normal/soft/nontender/normal active bowel sounds/no guarding/no rigidity/no organomegaly/no palpable shiva/no masses palpable/distended

## 2024-12-18 NOTE — H&P ADULT - PROBLEM SELECTOR PLAN 5
Chronic - BP elevated in the ED likely 2/2 to pain, to monitor BPs  - Continue home toprol and losartan starting tomorrow  -echo normal ef and no sig valve disease EF 64%   -  lasix Chronic - BP elevated in the ED likely 2/2 to pain, to monitor BPs  - Continue home toprol and losartan starting tomorrow  -echo normal ef and no sig valve disease EF 64%   - hold lasix for procedure leg swelling - but son states "always has swelling"  - doppler US b/l LE ordered, f/u results

## 2024-12-18 NOTE — ED ADULT NURSE NOTE - CHIEF COMPLAINT QUOTE
Pt brought in by EMS from the [facility] - complaining of pain in her left hip (non traumatic), numbness in left foot - had toes removed today by podiatrist. Pt also complaining of bilateral hand numbness. Pt unable to ambulate which is not her baseline. Pt also recently treated for UTI.

## 2024-12-18 NOTE — H&P ADULT - PROBLEM SELECTOR PLAN 4
History of CKD Cr 41/2 in the ED  - Baseline 1.7-2 in 6/2024.  - Continue to monitor with daily CMP History of CKD Cr 41/2 in the ED  - Baseline 1.7-2 in 6/2024.  - Continue to monitor with daily CMP  -Dr. Angel Ugarte consulted, f/u recs. History of CKD Cr 41/2 in the ED  - Baseline 1.7-2 in 6/2024.  - Continue to monitor with daily CMP  -Dr. Rachel Ugarte consulted, f/u recs. History of CKD Cr 41/2 in the ED  - Baseline 1.7-2 in 6/2024.  - Continue to monitor with daily CMP  -Dr. MARYA Ugarte consulted, f/u recs.

## 2024-12-18 NOTE — ED PROVIDER NOTE - CLINICAL SUMMARY MEDICAL DECISION MAKING FREE TEXT BOX
89-year-old female chief complaint left hip pain patient was walking with a walker and then suddenly felt a pop with immediate pain patient has a left hip fracture patient also notes she has been having treated for UTI otherwise no acute complaints.   no dysuria     GENERAL: Awake, alert, NAD  LUNGS: non labored breathing   ABDOMEN: Soft, , non tender, non distended, no rebound, no guarding  BACK: No midline spinal tenderness, no CVA tenderness  EXT: tenderness to left hip, neurobvasc intact otherwise   NEURO: A&Ox3. Moving all extremities.  SKIN: Warm and dry. No rash.  PSYCH: Normal affect.     given hx and pe will need ro  fx , labs imaging likely admission pt DID NOT hit head per the daughters only felt severe pain

## 2024-12-18 NOTE — H&P ADULT - NEUROLOGICAL
details… AAO x3/cranial nerves II-XII intact/sensation intact/responds to verbal commands/deep reflexes intact/no spontaneous movement

## 2024-12-18 NOTE — ED ADULT NURSE NOTE - OBJECTIVE STATEMENT
Pt received in bed alert and oriented with the c/o left hip pain (non-traumatic) along with numbness in left foot. Pt also complaining of bilateral hand numbness. Pt has carpal tunnel sx done 2 days ago to right hand dressing intact.  Pt unable to ambulate which is not her baseline. Pt also recently treated for UTI. As per Md's orders IV pauline placed blood specimen obtained and sent to the lab. Pt stable meds given and tolerated well. Pt has bruising to right knee and bruising at different stages of healing over entire body. Nursing care ongoing and safety maintained.

## 2024-12-18 NOTE — H&P ADULT - ATTENDING COMMENTS
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 Velasquez as well as R hip IMN w/ Dr. De Luna in 2023, s/p WALLANT R carpal tunnel release w/ Dr. Andrew 2 days ago (12/16/24) previous right hip fx w/ surgical pinning presents to the ED for L hip pain admitted for L hip fracture plan for hemiarthroplasty of L hip with Dr. De Luna on  12/19/24 at [facility]  Pt seen, Examined, case & care plan d/w pt, residents at [facility].  Consult-  Orthopedic-Dr De Luna  Cardio-[facility]  SCD  Bed rest  NPO for OR  Pt is Medically optimized for schedule Left Hip Hemiarthroplasty Moderate risk Sx   Pre Op IV antibiotics as per Orthopedics  Post Op DVT Prophylaxis as per Ortho  Post Op Incentive spirometry

## 2024-12-18 NOTE — H&P ADULT - PROBLEM SELECTOR PLAN 2
Patient with hx of TIA( per son at bedside was ~2017)  - Patient on aspirin and atorvastatin, to continue starting tomorrow - per son at bedside, received all medications this AM  - f/u AM TSH, Lipid

## 2024-12-18 NOTE — H&P ADULT - NSHPPHYSICALEXAM_GEN_ALL_CORE
T(C): 36.8 (12-18-24 @ 15:07), Max: 36.8 (12-18-24 @ 15:07)  HR: 57 (12-18-24 @ 15:07) (57 - 57)  BP: 202/66 (12-18-24 @ 15:07) (202/66 - 202/66)  RR: 16 (12-18-24 @ 15:07) (16 - 16)  SpO2: --

## 2024-12-18 NOTE — CONSULT NOTE ADULT - SUBJECTIVE AND OBJECTIVE BOX
89y Female presents with L hip pain. Patient states that she was ambulating w/ her walker earlier today when she heard a "pop" followed by immediate pain and difficulty bearing weight. Was assisted to her bed and was then unable to get up - prompting ambulance transfer to [facility] ED. Denies head strike/LOC/other orthopedic injuries at this time. Unable to ambulate since injury (at baseline ambulates with walker). Denies numbness/tingling in the affected extremity. Patient takes no AC meds. Last PO intake approximately 8AM today. 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 Velasquez as well as R hip IMN w/ Dr. De Luna in 2023. Underwent WALLANT R carpal tunnel release w/ Dr. Andrew 2 days ago (12/16/24).        PAST MEDICAL & SURGICAL HISTORY:  Hypertension      Diabetes      TIA (transient ischemic attack)      Hip fracture      HLD (hyperlipidemia)      S/P ORIF (open reduction internal fixation) fracture  right hip        Home Medications:  amLODIPine 5 mg oral tablet: 1 tab(s) orally once a day (04 Jun 2024 14:10)  aspirin 81 mg oral delayed release tablet: 1 tab(s) orally every other day (29 May 2024 15:32)  atorvastatin 40 mg oral tablet: 1 tab(s) orally once a day (at bedtime) (04 Jun 2024 14:09)  bacitracin 500 units/g topical ointment: 1 Apply topically to affected area once a day (04 Jun 2024 14:10)  cefuroxime 500 mg oral tablet: 1 tab(s) orally every 12 hours (04 Jun 2024 14:10)  famotidine 20 mg oral tablet: 1 tab(s) orally once a day (04 Jun 2024 14:10)  Januvia 25 mg oral tablet: 1 tab(s) orally once a day (29 May 2024 15:32)  lactobacillus acidophilus oral capsule: 1 cap(s) orally 2 times a day (04 Jun 2024 14:10)  lidocaine 4% topical film: Apply topically to affected area once a day (04 Jun 2024 14:10)  Metoprolol Succinate  mg oral tablet, extended release: 1 tab(s) orally once a day (29 May 2024 15:32)  simethicone 80 mg oral tablet, chewable: 1 tab(s) orally 3 times a day (04 Jun 2024 14:10)    Allergies    No Known Allergies    Intolerances                              11.2   10.37 )-----------( 202      ( 18 Dec 2024 18:50 )             34.1     12-18    141  |  109[H]  |  41[H]  ----------------------------<  183[H]  3.7   |  23  |  2.00[H]    Ca    9.5      18 Dec 2024 18:50      PT/INR - ( 18 Dec 2024 18:50 )   PT: 14.1 sec;   INR: 1.21 ratio         PTT - ( 18 Dec 2024 18:50 )  PTT:28.8 sec  Urinalysis Basic - ( 18 Dec 2024 18:50 )    Color: x / Appearance: x / SG: x / pH: x  Gluc: 183 mg/dL / Ketone: x  / Bili: x / Urobili: x   Blood: x / Protein: x / Nitrite: x   Leuk Esterase: x / RBC: x / WBC x   Sq Epi: x / Non Sq Epi: x / Bacteria: x          Vital Signs Last 24 Hrs  T(C): 36.8 (18 Dec 2024 15:07), Max: 36.8 (18 Dec 2024 15:07)  T(F): 98.2 (18 Dec 2024 15:07), Max: 98.2 (18 Dec 2024 15:07)  HR: 57 (18 Dec 2024 15:07) (57 - 57)  BP: 202/66 (18 Dec 2024 15:07) (202/66 - 202/66)  BP(mean): --  RR: 16 (18 Dec 2024 15:07) (16 - 16)  SpO2: --    Parameters below as of 18 Dec 2024 15:07  Patient On (Oxygen Delivery Method): room air        PHYSICAL EXAM    General: NAD, awake and alert, pleasant    LLE:  No open skin, externally rotated and shortened leg  +TTP at hip, otherwise NTTP throughout  Limited AROM at hip 2/2 pain, otherwise painless AROM throughout  Unable to SLR  Compartments soft and compressible  Motor: Fires HF/KE/Gsc/TA/EHL/FHL  Sensory: SILT SPN/DPN/Saph/Yury/Tib  2+ DP and PT pulses    Secondary Survey:   RUE: wrist/hand dressings C/D/I, No TTP over bony prominences, SILT, palpable pulses, full/painless range of motion, compartments soft  LUE: No TTP over bony prominences, SILT, palpable pulses, full/painless range of motion, compartments soft  RLE: No TTP over bony prominences, SILT, palpable pulses, full/painless range of motion, compartments soft  Spine: No bony tenderness, no palpable stepoffs  Head/Chest/Abdomen: No obvious visible trauma      IMAGING:  XR L hip/femur (personal read): Displaced femoral neck fx. No other acute obvious fxs or dislocations noted.      Assessment/Plan:  89y Female with L femoral neck fx    -Plan for hemiarthroplasty of L hip with Dr. De Luna tomorrow 12/9 at [facility]  -Patient and family aware of risks/benefits/alternatives of stated procedure  -Keep NPO except meds with IVF starting midnight for OR  -One dose of heparin tonight then hold DVT chemoppx if medically tolerable starting midnight for OR - use SCDs instead  -Preop CBC/BMP/coags/T&S/EKG/CXR  -Please document necessary medical optimization/clearances for stated procedure  -NWB LLE, WBAT R hand/UE - further R hand/UE management to be discussed w/ Dr. Andrew  -Pain control as needed  -Ice and elevate as tolerated  -Rest of managment per primary  -D/w Dr. De Luna, will advise if plan changes

## 2024-12-18 NOTE — H&P ADULT - PROBLEM SELECTOR PLAN 1
Pt w/ hx of R hip IMN now with L hip pain x1 day.  Xray left - L femoral neck fracture  - Received Given: ofirmev x1, fentanyl 25mcg IV x1, morphine 4mg IV x1 in the ED  - To give 0.5mg IV dilaudid, 1g ofirmev now for pain  - Pain PRN Tylenol 650 q6h, dilaudid 0.2mg IV q6 for moderate and dilaudid 0.5mg IV q6 for severe pain   - NPO after midnight.   - RCRI Class III risk, METS>4. Patient medically optimized as best as possible for planned operative procedure with orthopedics tomorrow. Benefits and risks of procedure explained.  - Cardiology consult ([facility]) for medical clearance , f/u recs  - Orthopedics Dr. De Luna following Pt w/ hx of R hip IMN now with L hip pain x1 day.  Xray left - L femoral neck fracture  - Received Given: ofirmev x1, fentanyl 25mcg IV x1, morphine 4mg IV x1 in the ED  - To give 0.5mg IV dilaudid, 1g ofirmev now for pain  - Pain PRN tramadol 50q6 for mild dilaudid 0.2mg IV q6 for moderate and dilaudid 0.5mg IV q6 for severe pain   - NPO after midnight.   - RCRI Class III risk, METS>4. Patient medically optimized as best as possible for planned operative procedure with orthopedics tomorrow. Benefits and risks of procedure explained.  - Cardiology consult ([facility]) for medical clearance , f/u recs  - Orthopedics Dr. De Luna following

## 2024-12-18 NOTE — H&P ADULT - HISTORY OF PRESENT ILLNESS
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 Velasquez as well as R hip IMN w/ Dr. De Luna in 2023, s/p WALLANT R carpal tunnel release w/ Dr. Andrew 2 days ago (12/16/24) previous right hip fx w/ surgical pinning presents to the ED for L hip pain. ambulating w/ her walker earlier today, was in a chair when she heard a "pop" followed by immediate pain and difficulty bearing weight. Was assisted to her bed and was then unable to get up - prompting ambulance transfer to [facility] [place] ED. Denies head strike/LOC/other orthopedic injuries at this time. Unable to ambulate since injury this morning (at baseline ambulates with walker). Denies numbness/tingling in the affected extremity. Patient takes no AC meds. Denies any current chest pain, sob, abd pain, Complaining of L sided hip pain.    Per son at bedside, patient had UTI last week was treated with course of nitrofurantoin, completed her course.    In the ED:  Vitals: T 98.2 HR 57 /66 RR 16 RA  Labs: H/H 11.2/34.1 PT/INR 14.1/1.21 BUN/Cr 41/2 Glu 183 GFR 23  Given: ofirmev x1, fentanyl 25mcg IV x1, morphine 4mg IV x1, on 75 cc/hr NS in the ED  CXR cardiomegaly, no acute cardiopulm disease.  EKG: SR 1st degree AVB w/ PVCs 64 bpm QTc 491     89 yo female w/ pmh of HTN, HLD, T2DM, TIA, CKD III, Hx of R hip CRPP in 2016 followed by hardware removal in 2017 w/ Dr. Velasquez as well as R hip IMN w/ Dr. De Luna in 2023, s/p WALLANT R carpal tunnel release w/ Dr. Andrew 2 days ago (12/16/24) previous right hip fx w/ surgical pinning presents to the ED for L hip pain. while  ambulating w/ her walker earlier today, was in a chair when she heard a "pop" followed by immediate pain and difficulty bearing weight. Was assisted to her bed and was then unable to get up - prompting ambulance transfer to [facility] ED. Denies head strike/LOC/other orthopedic injuries at this time. Unable to ambulate since injury this morning (at baseline ambulates with walker). Denies numbness/tingling in the affected extremity. Patient takes no AC meds. Denies any current chest pain, sob, abd pain, Complaining of L sided hip pain.    Per son at bedside, patient had UTI last week was treated with course of nitrofurantoin, completed her course.    In the ED:  Vitals: T 98.2 HR 57 /66 RR 16 RA  Labs: H/H 11.2/34.1 PT/INR 14.1/1.21 BUN/Cr 41/2 Glu 183 GFR 23  Given: ofirmev x1, fentanyl 25mcg IV x1, morphine 4mg IV x1, on 75 cc/hr NS in the ED  CXR cardiomegaly, no acute cardiopulm disease.  EKG: SR 1st degree AVB w/ PVCs 64 bpm QTc 491

## 2024-12-18 NOTE — H&P ADULT - ASSESSMENT
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 Velasquez as well as R hip IMN w/ Dr. De Luna in 2023, s/p WALLANT R carpal tunnel release w/ Dr. Andrew 2 days ago (12/16/24) previous right hip fx w/ surgical pinning presents to the ED for L hip pain admitted for L hip fracture plan for hemiarthroplasty of L hip with Dr. De Luna tomorrow 12/9 at [facility]   89 yo female w/ pmh of HTN, HLD, T2DM, TIA, CKD III, Hx of R hip CRPP in 2016 followed by hardware removal in 2017 w/ Dr. Velasquez as well as R hip IMN w/ Dr. De Luna in 2023, s/p WALLANT R carpal tunnel release w/ Dr. Andrew 2 days ago (12/16/24) previous right hip fx w/ surgical pinning presents to the ED for L hip pain admitted for L hip fracture plan for hemiarthroplasty of L hip with Dr. De Luna on  12/19/24 at [facility]

## 2024-12-18 NOTE — ED ADULT NURSE NOTE - MUSCULOSKELETAL ASSESSMENT
A/P 69M s/p LLE femoral endarterectomy  F/up FS after adjustments made to ISS/Lantus  pain control   ASA   SQH  CCD   F/U FSG   Dispo planning - - -

## 2024-12-18 NOTE — ED ADULT TRIAGE NOTE - NS ED NURSE AMBULANCES
[facility]
HOLD all psychiatric medications at this time - less is more / minimize any sedating side effects at this time to increase chances of successful extubation   - will gently reintroduce in the next few days depending on how patient is doing

## 2024-12-18 NOTE — ED ADULT TRIAGE NOTE - CHIEF COMPLAINT QUOTE
Pt brought in by EMS from the [facility] [place] - complaining of pain in her left hip (non traumatic), numbness in left foot - had toes removed today by podiatrist. Pt also complaining of bilateral hand numbness. Pt unable to ambulate which is not her baseline. Pt also recently treated for UTI.

## 2024-12-18 NOTE — H&P ADULT - PROBLEM SELECTOR PLAN 3
Chronic on Januvia 25 qd  -A1C: 7.7 5/2024  - f/u AM A1c  -Low ISS  - FS q6h  - NPO after midnight   -Hypoglycemic protocol.

## 2024-12-18 NOTE — H&P ADULT - CARDIOVASCULAR
vascular normal/regular rate and rhythm/S1 S2 present/no gallops/no rub/no murmur/vascular details… normal/regular rate and rhythm/S1 S2 present/no gallops/no rub/no murmur/no JVD/pedal edema/vascular

## 2024-12-18 NOTE — ED ADULT NURSE NOTE - PAIN RATING/NUMBER SCALE (0-10): ACTIVITY
8 (severe pain) Bexarotene Pregnancy And Lactation Text: This medication is Pregnancy Category X and should not be given to women who are pregnant or may become pregnant. This medication should not be used if you are breast feeding.

## 2024-12-18 NOTE — ED PROVIDER NOTE - WR ORDER DATE AND TIME 1
Expected Date Of Service: 08/22/2023 Performing Laboratory: -320 Bill For Surgical Tray: no Billing Type: [employer] 18-Dec-2024 18:31

## 2024-12-18 NOTE — H&P ADULT - NSHPSOCIALHISTORY_GEN_ALL_CORE
Tobacco: denies  EtOH: denies  Recreational drug use:denies  Lives with: child  ADLs/Ambulates: with walker

## 2024-12-18 NOTE — ED ADULT NURSE NOTE - NSFALLHARMRISKINTERV_ED_ALL_ED

## 2024-12-19 ENCOUNTER — TRANSCRIPTION ENCOUNTER (OUTPATIENT)
Age: 89
End: 2024-12-19

## 2024-12-19 DIAGNOSIS — D64.9 ANEMIA, UNSPECIFIED: ICD-10-CM

## 2024-12-19 LAB
A1C WITH ESTIMATED AVERAGE GLUCOSE RESULT: 8.4 % — HIGH (ref 4–5.6)
ALBUMIN SERPL ELPH-MCNC: 3.2 G/DL — LOW (ref 3.3–5)
ALP SERPL-CCNC: 152 U/L — HIGH (ref 40–120)
ALT FLD-CCNC: 24 U/L — SIGNIFICANT CHANGE UP (ref 12–78)
ANION GAP SERPL CALC-SCNC: 5 MMOL/L — SIGNIFICANT CHANGE UP (ref 5–17)
APTT BLD: 28.7 SEC — SIGNIFICANT CHANGE UP (ref 24.5–35.6)
AST SERPL-CCNC: 30 U/L — SIGNIFICANT CHANGE UP (ref 15–37)
BASOPHILS # BLD AUTO: 0.04 K/UL — SIGNIFICANT CHANGE UP (ref 0–0.2)
BASOPHILS NFR BLD AUTO: 0.5 % — SIGNIFICANT CHANGE UP (ref 0–2)
BILIRUB SERPL-MCNC: 0.7 MG/DL — SIGNIFICANT CHANGE UP (ref 0.2–1.2)
BUN SERPL-MCNC: 33 MG/DL — HIGH (ref 7–23)
CALCIUM SERPL-MCNC: 9.1 MG/DL — SIGNIFICANT CHANGE UP (ref 8.5–10.1)
CHLORIDE SERPL-SCNC: 114 MMOL/L — HIGH (ref 96–108)
CHOLEST SERPL-MCNC: 102 MG/DL — SIGNIFICANT CHANGE UP
CO2 SERPL-SCNC: 24 MMOL/L — SIGNIFICANT CHANGE UP (ref 22–31)
CREAT SERPL-MCNC: 1.8 MG/DL — HIGH (ref 0.5–1.3)
EGFR: 26 ML/MIN/1.73M2 — LOW
EOSINOPHIL # BLD AUTO: 0.46 K/UL — SIGNIFICANT CHANGE UP (ref 0–0.5)
EOSINOPHIL NFR BLD AUTO: 6 % — SIGNIFICANT CHANGE UP (ref 0–6)
ESTIMATED AVERAGE GLUCOSE: 194 MG/DL — HIGH (ref 68–114)
GLUCOSE BLDC GLUCOMTR-MCNC: 143 MG/DL — HIGH (ref 70–99)
GLUCOSE BLDC GLUCOMTR-MCNC: 152 MG/DL — HIGH (ref 70–99)
GLUCOSE BLDC GLUCOMTR-MCNC: 162 MG/DL — HIGH (ref 70–99)
GLUCOSE BLDC GLUCOMTR-MCNC: 175 MG/DL — HIGH (ref 70–99)
GLUCOSE SERPL-MCNC: 187 MG/DL — HIGH (ref 70–99)
HCT VFR BLD CALC: 32.4 % — LOW (ref 34.5–45)
HDLC SERPL-MCNC: 40 MG/DL — LOW
HGB BLD-MCNC: 10.6 G/DL — LOW (ref 11.5–15.5)
IMM GRANULOCYTES NFR BLD AUTO: 0.4 % — SIGNIFICANT CHANGE UP (ref 0–0.9)
INR BLD: 1.15 RATIO — SIGNIFICANT CHANGE UP (ref 0.85–1.16)
LIPID PNL WITH DIRECT LDL SERPL: 41 MG/DL — SIGNIFICANT CHANGE UP
LYMPHOCYTES # BLD AUTO: 1.13 K/UL — SIGNIFICANT CHANGE UP (ref 1–3.3)
LYMPHOCYTES # BLD AUTO: 14.7 % — SIGNIFICANT CHANGE UP (ref 13–44)
MCHC RBC-ENTMCNC: 26.8 PG — LOW (ref 27–34)
MCHC RBC-ENTMCNC: 32.7 G/DL — SIGNIFICANT CHANGE UP (ref 32–36)
MCV RBC AUTO: 81.8 FL — SIGNIFICANT CHANGE UP (ref 80–100)
MONOCYTES # BLD AUTO: 0.94 K/UL — HIGH (ref 0–0.9)
MONOCYTES NFR BLD AUTO: 12.2 % — SIGNIFICANT CHANGE UP (ref 2–14)
NEUTROPHILS # BLD AUTO: 5.09 K/UL — SIGNIFICANT CHANGE UP (ref 1.8–7.4)
NEUTROPHILS NFR BLD AUTO: 66.2 % — SIGNIFICANT CHANGE UP (ref 43–77)
NON HDL CHOLESTEROL: 62 MG/DL — SIGNIFICANT CHANGE UP
NRBC # BLD: 0 /100 WBCS — SIGNIFICANT CHANGE UP (ref 0–0)
PLATELET # BLD AUTO: 173 K/UL — SIGNIFICANT CHANGE UP (ref 150–400)
POTASSIUM SERPL-MCNC: 3.6 MMOL/L — SIGNIFICANT CHANGE UP (ref 3.5–5.3)
POTASSIUM SERPL-SCNC: 3.6 MMOL/L — SIGNIFICANT CHANGE UP (ref 3.5–5.3)
PROT SERPL-MCNC: 6.7 G/DL — SIGNIFICANT CHANGE UP (ref 6–8.3)
PROTHROM AB SERPL-ACNC: 13.5 SEC — HIGH (ref 9.9–13.4)
RBC # BLD: 3.96 M/UL — SIGNIFICANT CHANGE UP (ref 3.8–5.2)
RBC # FLD: 14 % — SIGNIFICANT CHANGE UP (ref 10.3–14.5)
SODIUM SERPL-SCNC: 143 MMOL/L — SIGNIFICANT CHANGE UP (ref 135–145)
TRIGL SERPL-MCNC: 116 MG/DL — SIGNIFICANT CHANGE UP
WBC # BLD: 7.69 K/UL — SIGNIFICANT CHANGE UP (ref 3.8–10.5)
WBC # FLD AUTO: 7.69 K/UL — SIGNIFICANT CHANGE UP (ref 3.8–10.5)

## 2024-12-19 PROCEDURE — 27236 TREAT THIGH FRACTURE: CPT | Mod: 79,LT

## 2024-12-19 PROCEDURE — 73501 X-RAY EXAM HIP UNI 1 VIEW: CPT | Mod: 26,LT

## 2024-12-19 PROCEDURE — 88311 DECALCIFY TISSUE: CPT | Mod: 26

## 2024-12-19 PROCEDURE — 99221 1ST HOSP IP/OBS SF/LOW 40: CPT | Mod: 57,GC

## 2024-12-19 PROCEDURE — 93970 EXTREMITY STUDY: CPT | Mod: 26

## 2024-12-19 PROCEDURE — 99222 1ST HOSP IP/OBS MODERATE 55: CPT

## 2024-12-19 PROCEDURE — 88304 TISSUE EXAM BY PATHOLOGIST: CPT | Mod: 26

## 2024-12-19 DEVICE — IMPLANTABLE DEVICE: Type: IMPLANTABLE DEVICE | Site: LEFT | Status: FUNCTIONAL

## 2024-12-19 DEVICE — CATHCART TAPERED SPACER MODULAR HD: Type: IMPLANTABLE DEVICE | Site: LEFT | Status: FUNCTIONAL

## 2024-12-19 RX ORDER — DEXTROSE MONOHYDRATE 25 G/50ML
25 INJECTION, SOLUTION INTRAVENOUS ONCE
Refills: 0 | Status: DISCONTINUED | OUTPATIENT
Start: 2024-12-19 | End: 2024-12-19

## 2024-12-19 RX ORDER — ACETAMINOPHEN 80 MG/.8ML
650 SOLUTION/ DROPS ORAL EVERY 6 HOURS
Refills: 0 | Status: DISCONTINUED | OUTPATIENT
Start: 2024-12-19 | End: 2024-12-19

## 2024-12-19 RX ORDER — TRAMADOL HYDROCHLORIDE 50 MG/1
25 TABLET ORAL EVERY 12 HOURS
Refills: 0 | Status: DISCONTINUED | OUTPATIENT
Start: 2024-12-19 | End: 2024-12-19

## 2024-12-19 RX ORDER — CEFAZOLIN SODIUM 1 G
2000 VIAL (EA) INJECTION EVERY 8 HOURS
Refills: 0 | Status: COMPLETED | OUTPATIENT
Start: 2024-12-19 | End: 2024-12-20

## 2024-12-19 RX ORDER — GINKGO BILOBA 40 MG
3 CAPSULE ORAL AT BEDTIME
Refills: 0 | Status: DISCONTINUED | OUTPATIENT
Start: 2024-12-19 | End: 2024-12-19

## 2024-12-19 RX ORDER — ACETAMINOPHEN 80 MG/.8ML
650 SOLUTION/ DROPS ORAL EVERY 6 HOURS
Refills: 0 | Status: DISCONTINUED | OUTPATIENT
Start: 2024-12-19 | End: 2024-12-21

## 2024-12-19 RX ORDER — LOSARTAN POTASSIUM 100 MG/1
25 TABLET, FILM COATED ORAL DAILY
Refills: 0 | Status: DISCONTINUED | OUTPATIENT
Start: 2024-12-19 | End: 2024-12-19

## 2024-12-19 RX ORDER — SENNOSIDES 8.6 MG/1
2 TABLET, FILM COATED ORAL AT BEDTIME
Refills: 0 | Status: DISCONTINUED | OUTPATIENT
Start: 2024-12-19 | End: 2024-12-19

## 2024-12-19 RX ORDER — METOPROLOL TARTRATE 50 MG
100 TABLET ORAL DAILY
Refills: 0 | Status: DISCONTINUED | OUTPATIENT
Start: 2024-12-19 | End: 2024-12-19

## 2024-12-19 RX ORDER — ONDANSETRON 4 MG/1
4 TABLET ORAL ONCE
Refills: 0 | Status: DISCONTINUED | OUTPATIENT
Start: 2024-12-19 | End: 2024-12-19

## 2024-12-19 RX ORDER — POLYETHYLENE GLYCOL 3350 17 G/DOSE
17 POWDER (GRAM) ORAL DAILY
Refills: 0 | Status: DISCONTINUED | OUTPATIENT
Start: 2024-12-19 | End: 2024-12-19

## 2024-12-19 RX ORDER — SODIUM CHLORIDE 9 MG/ML
1000 INJECTION, SOLUTION INTRAVENOUS
Refills: 0 | Status: DISCONTINUED | OUTPATIENT
Start: 2024-12-19 | End: 2024-12-19

## 2024-12-19 RX ORDER — ASPIRIN 81 MG
81 TABLET, DELAYED RELEASE (ENTERIC COATED) ORAL DAILY
Refills: 0 | Status: DISCONTINUED | OUTPATIENT
Start: 2024-12-19 | End: 2024-12-19

## 2024-12-19 RX ORDER — SENNOSIDES 8.6 MG/1
2 TABLET, FILM COATED ORAL AT BEDTIME
Refills: 0 | Status: DISCONTINUED | OUTPATIENT
Start: 2024-12-19 | End: 2024-12-23

## 2024-12-19 RX ORDER — METOPROLOL TARTRATE 50 MG
100 TABLET ORAL DAILY
Refills: 0 | Status: DISCONTINUED | OUTPATIENT
Start: 2024-12-19 | End: 2024-12-23

## 2024-12-19 RX ORDER — DEXTROSE MONOHYDRATE 25 G/50ML
12.5 INJECTION, SOLUTION INTRAVENOUS ONCE
Refills: 0 | Status: DISCONTINUED | OUTPATIENT
Start: 2024-12-19 | End: 2024-12-19

## 2024-12-19 RX ORDER — LOSARTAN POTASSIUM 100 MG/1
25 TABLET, FILM COATED ORAL DAILY
Refills: 0 | Status: DISCONTINUED | OUTPATIENT
Start: 2024-12-20 | End: 2024-12-21

## 2024-12-19 RX ORDER — ACETAMINOPHEN 80 MG/.8ML
750 SOLUTION/ DROPS ORAL ONCE
Refills: 0 | Status: COMPLETED | OUTPATIENT
Start: 2024-12-19 | End: 2024-12-20

## 2024-12-19 RX ORDER — HYDROMORPHONE HCL 4 MG
1 TABLET ORAL EVERY 4 HOURS
Refills: 0 | Status: DISCONTINUED | OUTPATIENT
Start: 2024-12-19 | End: 2024-12-19

## 2024-12-19 RX ORDER — ATORVASTATIN CALCIUM 40 MG/1
40 TABLET, FILM COATED ORAL AT BEDTIME
Refills: 0 | Status: DISCONTINUED | OUTPATIENT
Start: 2024-12-19 | End: 2024-12-23

## 2024-12-19 RX ORDER — CEFAZOLIN SODIUM 1 G
2000 VIAL (EA) INJECTION ONCE
Refills: 0 | Status: COMPLETED | OUTPATIENT
Start: 2024-12-19 | End: 2024-12-19

## 2024-12-19 RX ORDER — HYDROMORPHONE HCL 4 MG
0.2 TABLET ORAL EVERY 6 HOURS
Refills: 0 | Status: DISCONTINUED | OUTPATIENT
Start: 2024-12-19 | End: 2024-12-19

## 2024-12-19 RX ORDER — SODIUM CHLORIDE 9 MG/ML
1000 INJECTION, SOLUTION INTRAVENOUS
Refills: 0 | Status: DISCONTINUED | OUTPATIENT
Start: 2024-12-20 | End: 2024-12-21

## 2024-12-19 RX ORDER — HYDROMORPHONE HCL 4 MG
0.5 TABLET ORAL EVERY 4 HOURS
Refills: 0 | Status: DISCONTINUED | OUTPATIENT
Start: 2024-12-19 | End: 2024-12-19

## 2024-12-19 RX ORDER — GLUCAGON INJECTION, SOLUTION 0.5 MG/.1ML
1 INJECTION, SOLUTION SUBCUTANEOUS ONCE
Refills: 0 | Status: DISCONTINUED | OUTPATIENT
Start: 2024-12-19 | End: 2024-12-19

## 2024-12-19 RX ORDER — ONDANSETRON 4 MG/1
4 TABLET ORAL EVERY 6 HOURS
Refills: 0 | Status: DISCONTINUED | OUTPATIENT
Start: 2024-12-19 | End: 2024-12-22

## 2024-12-19 RX ORDER — HYDROMORPHONE HCL 4 MG
0.5 TABLET ORAL EVERY 6 HOURS
Refills: 0 | Status: DISCONTINUED | OUTPATIENT
Start: 2024-12-19 | End: 2024-12-19

## 2024-12-19 RX ORDER — OXYCODONE HCL 15 MG
5 TABLET ORAL EVERY 4 HOURS
Refills: 0 | Status: DISCONTINUED | OUTPATIENT
Start: 2024-12-19 | End: 2024-12-21

## 2024-12-19 RX ORDER — HYDROMORPHONE HCL 4 MG
0.5 TABLET ORAL
Refills: 0 | Status: DISCONTINUED | OUTPATIENT
Start: 2024-12-19 | End: 2024-12-19

## 2024-12-19 RX ORDER — TRAMADOL HYDROCHLORIDE 50 MG/1
50 TABLET ORAL EVERY 4 HOURS
Refills: 0 | Status: DISCONTINUED | OUTPATIENT
Start: 2024-12-19 | End: 2024-12-21

## 2024-12-19 RX ORDER — ENOXAPARIN SODIUM 60 MG/.6ML
30 INJECTION INTRAVENOUS; SUBCUTANEOUS EVERY 24 HOURS
Refills: 0 | Status: DISCONTINUED | OUTPATIENT
Start: 2024-12-20 | End: 2024-12-23

## 2024-12-19 RX ORDER — DEXTROSE MONOHYDRATE 25 G/50ML
15 INJECTION, SOLUTION INTRAVENOUS ONCE
Refills: 0 | Status: DISCONTINUED | OUTPATIENT
Start: 2024-12-19 | End: 2024-12-19

## 2024-12-19 RX ORDER — OXYCODONE HCL 15 MG
10 TABLET ORAL EVERY 4 HOURS
Refills: 0 | Status: DISCONTINUED | OUTPATIENT
Start: 2024-12-19 | End: 2024-12-21

## 2024-12-19 RX ORDER — INSULIN LISPRO 100/ML
VIAL (ML) SUBCUTANEOUS EVERY 6 HOURS
Refills: 0 | Status: DISCONTINUED | OUTPATIENT
Start: 2024-12-19 | End: 2024-12-19

## 2024-12-19 RX ORDER — MAGNESIUM HYDROXIDE 400 MG/5ML
30 SUSPENSION, ORAL (FINAL DOSE FORM) ORAL DAILY
Refills: 0 | Status: DISCONTINUED | OUTPATIENT
Start: 2024-12-19 | End: 2024-12-23

## 2024-12-19 RX ORDER — POLYETHYLENE GLYCOL 3350 17 G/DOSE
17 POWDER (GRAM) ORAL AT BEDTIME
Refills: 0 | Status: DISCONTINUED | OUTPATIENT
Start: 2024-12-19 | End: 2024-12-23

## 2024-12-19 RX ADMIN — Medication 0.2 MILLIGRAM(S): at 09:18

## 2024-12-19 RX ADMIN — Medication 1 MILLIGRAM(S): at 12:25

## 2024-12-19 RX ADMIN — Medication 0.5 MILLIGRAM(S): at 04:26

## 2024-12-19 RX ADMIN — Medication 0.5 MILLIGRAM(S): at 03:56

## 2024-12-19 RX ADMIN — Medication 1: at 11:49

## 2024-12-19 RX ADMIN — Medication 0.2 MILLIGRAM(S): at 08:07

## 2024-12-19 RX ADMIN — Medication 5 MILLIGRAM(S): at 23:53

## 2024-12-19 RX ADMIN — Medication 0.5 MILLIGRAM(S): at 10:18

## 2024-12-19 RX ADMIN — Medication 1: at 06:47

## 2024-12-19 RX ADMIN — Medication 100 MILLIGRAM(S): at 06:37

## 2024-12-19 RX ADMIN — SODIUM CHLORIDE 75 MILLILITER(S): 9 INJECTION, SOLUTION INTRAMUSCULAR; INTRAVENOUS; SUBCUTANEOUS at 06:39

## 2024-12-19 RX ADMIN — Medication 1 MILLIGRAM(S): at 13:15

## 2024-12-19 RX ADMIN — Medication 0.5 MILLIGRAM(S): at 10:49

## 2024-12-19 RX ADMIN — LOSARTAN POTASSIUM 25 MILLIGRAM(S): 100 TABLET, FILM COATED ORAL at 06:37

## 2024-12-19 NOTE — PATIENT PROFILE ADULT - FUNCTIONAL ASSESSMENT - BASIC MOBILITY 6.
1-calculated by average/Not able to assess (calculate score using [facility] averaging method)

## 2024-12-19 NOTE — CONSULT NOTE ADULT - ASSESSMENT
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 Velasquez as well as R hip IMN w/ Dr. De Luna in 2023, s/p WALLANT R carpal tunnel release w/ Dr. Andrew 2 days ago (12/16/24) previous right hip fx w/ surgical pinning presents to the ED for L hip pain. while  ambulating w/ her walker earlier today, was in a chair when she heard a "pop" followed by immediate pain and difficulty bearing weight. Patient schedules for hemiarthroplasty of L hip with Dr. De Luna 12/19.  Consulted for Cardiac optimization     Patient currently has no active cardiac conditions. No signs of ischemia, ADHF, clinical exam not consistent with stenotic valvular disease, no unstable arrhythmias noted. Baseline functional capacity is > 4 METS. RCRI score is II. Patient is currently hemodynamically stable. Patient is medically optimized at this time and understands the inherent risks of surgery. Routine hemodynamic monitoring is suggested.    #LE edema  - Last TTE 11/2023: EF 64%, trace MR  - 1+ edema b/l ankles, no signs of fluid overload on CXR or lung exam  - on home lasix 20mg qd, cont home lasix  - baseline Cr ~1.9 per daughter at bedside, Cr 1.8 today  - monitor renal indices     #Arrhythmia  - EKG: sinus rhythm with 1st deg heart block with PVCs, rate 64, Qtc 491, no ST changes  - prior EKGs without 1st deg heart block  - asymptomatic  - hemodynamically stable  - Monitor and replete lytes, keep K>4, Mg>2.    #HTN  - BP stable currently  - Continue home meds: lasix 20mg qd, losartan 25mg qd, metoprolol succinate 100mg qd  - Continue to monitor hemodynamics     #Ischemia   - No clear evidence of acute ischemia - no chest pain  - EKG: sinus rhythm with 1st deg heart block with PVCs, rate 64, Qtc 491, no ST changes  - Continue to monitor for signs or symptoms of ischemia     #Hx of TIA  - on home asa 81mg every other day, cont asa and statin      - Other cardiovascular workup will depend on clinical course.  - All other workup per primary team.  - Will continue to follow.       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 Velasquez as well as R hip IMN w/ Dr. De Luna in 2023, s/p WALLANT R carpal tunnel release w/ Dr. Andrew 2 days ago (12/16/24) previous right hip fx w/ surgical pinning presents to the ED for L hip pain. while  ambulating w/ her walker earlier today, was in a chair when she heard a "pop" followed by immediate pain and difficulty bearing weight. Patient schedules for hemiarthroplasty of L hip with Dr. De Luna 12/19.  Consulted for Cardiac optimization     Patient currently has no active cardiac conditions. No signs of ischemia, ADHF, clinical exam not consistent with stenotic valvular disease, no unstable arrhythmias noted. Baseline functional capacity is > 4 METS. RCRI score is I. Patient is currently hemodynamically stable. Patient is medically optimized at this time and understands the inherent risks of surgery. Routine hemodynamic monitoring is suggested.      #Arrhythmia  - EKG: sinus rhythm with 1st deg heart block with PVCs, rate 64, Qtc 491, no ST changes  - prior EKGs without 1st deg heart block  - asymptomatic  - of note, pt with hx of tachycardia 11/2023 pre-op for R hip sx - no documented afib -  resolved and has not recurred since  - currently hemodynamically stable  - Monitor and replete lytes, keep K>4, Mg>2.    #LE edema  - Last TTE 11/2023: EF 64%, trace MR  - 1+ edema b/l ankles, no signs of fluid overload on CXR or lung exam  - on home lasix 20mg qd, cont home lasix  - baseline Cr ~1.9 per daughter at bedside, Cr 1.8 today  - monitor renal indices     #HTN  - BP stable currently  - Continue home meds: lasix 20mg qd, losartan 25mg qd, metoprolol succinate 100mg qd  - Continue to monitor hemodynamics     #Ischemia   - No clear evidence of acute ischemia - no chest pain  - EKG: sinus rhythm with 1st deg heart block with PVCs, rate 64, Qtc 491, no ST changes  - Continue to monitor for signs or symptoms of ischemia     #Hx of TIA  - on home asa 81mg every other day, cont asa and statin      - Other cardiovascular workup will depend on clinical course.  - All other workup per primary team.  - Will continue to follow.

## 2024-12-19 NOTE — PROGRESS NOTE ADULT - ASSESSMENT
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 Velasquez as well as R hip IMN w/ Dr. De Luna in 2023, s/p WALLANT R carpal tunnel release w/ Dr. Andrew 2 days ago (12/16/24) previous right hip fx w/ surgical pinning presents to the ED for L hip pain admitted for L hip fracture plan for hemiarthroplasty of L hip with Dr. De Luna on  12/19/24 at [facility]

## 2024-12-19 NOTE — DISCHARGE NOTE PROVIDER - HOSPITAL COURSE
The patient is a 89y Female status L hip clinton-arthroplasty after sustained a FN fracture. Patient presented to [facility] after being medically cleared for an elective surgical procedure. The patient was taken to the operating room on date mentioned above. Prophylactic antibiotics were started before the procedure and continued for 24 hours. There were no complications during the procedure and patient tolerated the procedure well. The patient was transferred to the recovery room in stable condition and subsequently to the surgical floor. The patient was placed on Aspirin 81 for anticoagulation while in house. All home medications were continued. The patient received physical therapy daily and daily labs were followed. The dressing was kept clean, dry, intact. The rest of the hospital stay was unremarkable. ADMISSION DATE:  12-18-24    ---  FROM ADMISSION H+P:   HPI:  89 yo female w/ pmh of HTN, HLD, T2DM, TIA, CKD III, Hx of R hip CRPP in 2016 followed by hardware removal in 2017 w/ Dr. Velasquez as well as R hip IMN w/ Dr. De Luna in 2023, s/p WALLANT R carpal tunnel release w/ Dr. Andrew 2 days ago (12/16/24) previous right hip fx w/ surgical pinning presents to the ED for L hip pain. while  ambulating w/ her walker earlier today, was in a chair when she heard a "pop" followed by immediate pain and difficulty bearing weight. Was assisted to her bed and was then unable to get up - prompting ambulance transfer to [facility] ED. Denies head strike/LOC/other orthopedic injuries at this time. Unable to ambulate since injury this morning (at baseline ambulates with walker). Denies numbness/tingling in the affected extremity. Patient takes no AC meds. Denies any current chest pain, sob, abd pain, Complaining of L sided hip pain.    Per son at bedside, patient had UTI last week was treated with course of nitrofurantoin, completed her course.    In the ED:  Vitals: T 98.2 HR 57 /66 RR 16 RA  Labs: H/H 11.2/34.1 PT/INR 14.1/1.21 BUN/Cr 41/2 Glu 183 GFR 23  Given: ofirmev x1, fentanyl 25mcg IV x1, morphine 4mg IV x1, on 75 cc/hr NS in the ED  CXR cardiomegaly, no acute cardiopulm disease.  EKG: SR 1st degree AVB w/ PVCs 64 bpm QTc 491     (18 Dec 2024 21:04)      ---  HOSPITAL COURSE/PERTINENT LABS/PROCEDURES PERFORMED/PENDING TESTS:   The patient is a 89y Female status L hip clinton-arthroplasty after sustained a FN fracture. Patient presented to [facility] after being medically cleared for an elective surgical procedure. The patient was taken to the operating room on date mentioned above. Prophylactic antibiotics were started before the procedure and continued for 24 hours. There were no complications during the procedure and patient tolerated the procedure well. The patient was transferred to the recovery room in stable condition and subsequently to the surgical floor. The patient was placed on Aspirin 81 for anticoagulation while in house. All home medications were continued. The patient received physical therapy daily and daily labs were followed. The dressing was kept clean, dry, intact.   Of note, the patient also found to have urinary retention with Anderson catheter placed prior to OR. TOV started on 12/20.     Patient is stable for discharge as per primary medical team and consultants.    PT consulted, recommends discharge LEONEL    Patient showed improvement throughout hospitalization. Patient was seen and examined on day of discharge. Patient was medically optimized for discharge with close outpatient follow up.      ---  PATIENT CONDITION:  - stable  --  VITALS:       PHYSICAL EXAM ON DAY OF DISCHARGE:    ---  CONSULTANTS:   Orthopedics - Dr. De Luna  Cardiology - Dr. Escoto  Nephrology - Dr. Kaplan    ---  ADVANCED CARE PLANNING:  - Code status:    Full Code  - MOLST completed:      [ X ] NO     [  ] YES    ---  TIME SPENT:  I, the attending physician, was physically present for the key portions of the evaluation and management (E/M) service provided. The total amount of time spent reviewing the hospital notes, laboratory values, imaging findings, assessing/counseling the patient, discussing with consultant physicians, social work, nursing staff was -- minutes       ADMISSION DATE:  12-18-24    ---  FROM ADMISSION H+P:   HPI:  89 yo female w/ pmh of HTN, HLD, T2DM, TIA, CKD III, Hx of R hip CRPP in 2016 followed by hardware removal in 2017 w/ Dr. Velasquez as well as R hip IMN w/ Dr. De Luna in 2023, s/p WALLANT R carpal tunnel release w/ Dr. Andrew 2 days ago (12/16/24) previous right hip fx w/ surgical pinning presents to the ED for L hip pain. while  ambulating w/ her walker earlier today, was in a chair when she heard a "pop" followed by immediate pain and difficulty bearing weight. Was assisted to her bed and was then unable to get up - prompting ambulance transfer to [facility] ED. Denies head strike/LOC/other orthopedic injuries at this time. Unable to ambulate since injury this morning (at baseline ambulates with walker). Denies numbness/tingling in the affected extremity. Patient takes no AC meds. Denies any current chest pain, sob, abd pain, Complaining of L sided hip pain.    Per son at bedside, patient had UTI last week was treated with course of nitrofurantoin, completed her course.    In the ED:  Vitals: T 98.2 HR 57 /66 RR 16 RA  Labs: H/H 11.2/34.1 PT/INR 14.1/1.21 BUN/Cr 41/2 Glu 183 GFR 23  Given: ofirmev x1, fentanyl 25mcg IV x1, morphine 4mg IV x1, on 75 cc/hr NS in the ED  CXR cardiomegaly, no acute cardiopulm disease.  EKG: SR 1st degree AVB w/ PVCs 64 bpm QTc 491     (18 Dec 2024 21:04)      ---  HOSPITAL COURSE/PERTINENT LABS/PROCEDURES PERFORMED/PENDING TESTS:   The patient is a 89y Female status L hip clinton-arthroplasty after sustained a FN fracture. Patient presented to [facility] after being medically cleared for an elective surgical procedure. The patient was taken to the operating room on date mentioned above. Prophylactic antibiotics were started before the procedure and continued for 24 hours. There were no complications during the procedure and patient tolerated the procedure well. The patient was transferred to the recovery room in stable condition and subsequently to the surgical floor. The patient was placed on Aspirin 81 for anticoagulation while in house. All home medications were continued. The patient received physical therapy daily and daily labs were followed. The dressing was kept clean, dry, intact.     Of note, the patient also found to have urinary retention with Anderson catheter placed prior to OR. TOV started on 12/20. Patient failed her TOV and Anderson catheter was ultimately replaced to stay for after discharge and follow up with outpatient Urology as she had in the past.    Patient is stable for discharge as per primary medical team and consultants.    PT consulted, recommends discharge [facility]    Patient showed improvement throughout hospitalization. Patient was seen and examined on day of discharge. Patient was medically optimized for discharge with close outpatient follow up.      ---  PATIENT CONDITION:  - stable  --  VITALS:       PHYSICAL EXAM ON DAY OF DISCHARGE:    ---  CONSULTANTS:   Orthopedics - Dr. De Luna  Cardiology - Dr. Escoto  Nephrology - Dr. Kaplan    ---  ADVANCED CARE PLANNING:  - Code status:    Full Code  - MOLST completed:      [ X ] NO     [  ] YES    ---  TIME SPENT:  I, the attending physician, was physically present for the key portions of the evaluation and management (E/M) service provided. The total amount of time spent reviewing the hospital notes, laboratory values, imaging findings, assessing/counseling the patient, discussing with consultant physicians, social work, nursing staff was -- minutes       ADMISSION DATE:  12-18-24    ---  FROM ADMISSION H+P:   HPI:  89 yo female w/ pmh of HTN, HLD, T2DM, TIA, CKD III, Hx of R hip CRPP in 2016 followed by hardware removal in 2017 w/ Dr. Velasquez as well as R hip IMN w/ Dr. De Luna in 2023, s/p WALLANT R carpal tunnel release w/ Dr. Andrew 2 days ago (12/16/24) previous right hip fx w/ surgical pinning presents to the ED for L hip pain. while  ambulating w/ her walker earlier today, was in a chair when she heard a "pop" followed by immediate pain and difficulty bearing weight. Was assisted to her bed and was then unable to get up - prompting ambulance transfer to [facility] ED. Denies head strike/LOC/other orthopedic injuries at this time. Unable to ambulate since injury this morning (at baseline ambulates with walker). Denies numbness/tingling in the affected extremity. Patient takes no AC meds. Denies any current chest pain, sob, abd pain, Complaining of L sided hip pain.    Per son at bedside, patient had UTI last week was treated with course of nitrofurantoin, completed her course.    In the ED:  Vitals: T 98.2 HR 57 /66 RR 16 RA  Labs: H/H 11.2/34.1 PT/INR 14.1/1.21 BUN/Cr 41/2 Glu 183 GFR 23  Given: ofirmev x1, fentanyl 25mcg IV x1, morphine 4mg IV x1, on 75 cc/hr NS in the ED  CXR cardiomegaly, no acute cardiopulm disease.  EKG: SR 1st degree AVB w/ PVCs 64 bpm QTc 491     (18 Dec 2024 21:04)      ---  HOSPITAL COURSE/PERTINENT LABS/PROCEDURES PERFORMED/PENDING TESTS:   The patient is a 89y Female status L hip clinton-arthroplasty after sustained a FN fracture. Patient presented to [facility] after being medically cleared for an elective surgical procedure. The patient was taken to the operating room on date mentioned above. Prophylactic antibiotics were started before the procedure and continued for 24 hours. There were no complications during the procedure and patient tolerated the procedure well. The patient was transferred to the recovery room in stable condition and subsequently to the surgical floor. The patient was placed on Aspirin 81 for anticoagulation while in house. All home medications were continued. The patient received physical therapy daily and daily labs were followed. The dressing was kept clean, dry, intact.     Of note, the patient also found to have urinary retention with Galeana catheter placed prior to OR. TOV started on 12/20. Patient failed her TOV and Galeana catheter was ultimately replaced to stay for after discharge and follow up with outpatient Urology as she had in the past. Low stable H/H, seen by Renal, Cardiology & ortho team, BP Meds adjusted & increased, Cr improved with galeana cath. TOV at [facility], Repeat CBC,BMP at [facility]    Patient is stable for discharge as per primary medical team and consultants.    PT consulted, recommends discharge [facility]    Patient showed improvement throughout hospitalization. Patient was seen and examined on day of discharge. Patient was medically optimized for discharge with close outpatient follow up.      ---  PATIENT CONDITION:  - stable  --  VITALS:       PHYSICAL EXAM ON DAY OF DISCHARGE:    ---  CONSULTANTS:   Orthopedics - Dr. De Luna  Cardiology - Dr. Esctoo  Nephrology - Dr. Kaplan    ---  ADVANCED CARE PLANNING:  - Code status:    Full Code  - MOLST completed:      [ X ] NO     [  ] YES    ---  TIME SPENT:  I, the attending physician, was physically present for the key portions of the evaluation and management (E/M) service provided. The total amount of time spent reviewing the hospital notes, laboratory values, imaging findings, assessing/counseling the patient, discussing with consultant physicians, social work, nursing staff was 60 minutes

## 2024-12-19 NOTE — PROGRESS NOTE ADULT - PROBLEM SELECTOR PLAN 1
Pt w/ hx of R hip IMN now with L hip pain x1 day.  Xray left - L femoral neck fracture  - Received Given: ofirmev x1, fentanyl 25mcg IV x1, morphine 4mg IV x1 in the ED  - Pain PRN tramadol 50q6 for mild dilaudid 0.5mg IV q6 for moderate and dilaudid 1.0mg IV q4 for severe pain   - NPO until after OR  - RCRI Class III risk, METS>4. Patient medically optimized as best as possible for planned operative procedure with orthopedics 12/19. Benefits and risks of procedure explained.  - Cardiology consult ([facility]) for medical clearance , recs appreciated  - Orthopedics Dr. De Luna following Pt w/ hx of R hip IMN now with L hip pain x1 day.  Xray left - L femoral neck fracture  - Received Given: ofirmev x1, fentanyl 25mcg IV x1, morphine 4mg IV x1 in the ED  - Pain PRN tramadol 50q6 for mild dilaudid 0.5mg IV q6 for moderate and dilaudid 1.0mg IV q4 for severe pain   - NPO until after OR  - RCRI Class III risk, METS>4. Patient medically optimized as best as possible for planned operative procedure with orthopedics 12/19. Benefits and risks of procedure explained.  - Cardiology consult ([facility]) for medical clearance , recs appreciated  - Orthopedics Dr. De Luna following  NPO,IVF

## 2024-12-19 NOTE — PATIENT PROFILE ADULT - ARRIVAL FROM
[facility] living [facility]

## 2024-12-19 NOTE — DISCHARGE NOTE PROVIDER - NSDCCPCAREPLAN_GEN_ALL_CORE_FT
PRINCIPAL DISCHARGE DIAGNOSIS  Diagnosis: Hip fracture  Assessment and Plan of Treatment:      PRINCIPAL DISCHARGE DIAGNOSIS  Diagnosis: Hip fracture  Assessment and Plan of Treatment:       SECONDARY DISCHARGE DIAGNOSES  Diagnosis: HTN (hypertension)  Assessment and Plan of Treatment:     Diagnosis: Constipation  Assessment and Plan of Treatment:     Diagnosis: T2DM (type 2 diabetes mellitus)  Assessment and Plan of Treatment:     Diagnosis: Urinary retention  Assessment and Plan of Treatment:      PRINCIPAL DISCHARGE DIAGNOSIS  Diagnosis: Hip fracture  Assessment and Plan of Treatment: S/P left Hip Fracture   -Hemiarthroplasty of left hip  Pain meds as directed   WBAT Left Hip- Dr De Luna to follow as out pt  NWB Rt Hand -Dr Molina to follow as out pt  Senna 2 tab Q HS   Miralax daily   Lovenox 30 mg SC Daily      SECONDARY DISCHARGE DIAGNOSES  Diagnosis: HTN (hypertension)  Assessment and Plan of Treatment: Continue Toprol XL daily  Losratn 50 mg daily  LAsix 20 mg daily  Started on Hydralazine 25 mg 1 tab q 8 hrs      Diagnosis: Urinary retention  Assessment and Plan of Treatment: H/O Urinary Retention in Past  Failed TOV in hospital  Treat Constipation   Senna 2 tab q hs   TOV at [facility] after 2 week    Diagnosis: TIA (transient ischemic attack)  Assessment and Plan of Treatment: EC ASA 81 mg 1 tab every other day  Statin Daily      Diagnosis: T2DM (type 2 diabetes mellitus)  Assessment and Plan of Treatment: Continue Jenuvia   FS Q AC HS with Low Insulin correction Scale    Diagnosis: Constipation  Assessment and Plan of Treatment: Senna 2 tab Q HS  Miralax daily    Diagnosis: Anemia due to acute blood loss  Assessment and Plan of Treatment: Low stable H/H   Multifactorial 2/2 Hip Fracture, OR,  CKD 2, IVF   CBC in 1 week at [facility]    Diagnosis: Stage 2 chronic kidney disease  Assessment and Plan of Treatment: CKD 2   S/P -Urinary Retention -Anderson cath in place  BMP after 1 week    Diagnosis: Chronic GERD  Assessment and Plan of Treatment: On PPI daily  Pepcid daily

## 2024-12-19 NOTE — PRE-OP CHECKLIST - STERILIZATION AFFIRMATION
n/a CALL the DOCTOR:    - Any pain that appears to be getting worse.  -  If you have  not urinated 8 hours after surgery or have any difficulty urinating.     A responsible adult should be with you for the rest of the day and night for your safety and to help you if you needed.    Review attached FACT SHEET if applicable.

## 2024-12-19 NOTE — PROGRESS NOTE ADULT - SUBJECTIVE AND OBJECTIVE BOX
Ortho Progress Note         NELDA VERDIN is a 89yFemale who sustained a L FN fx after no known fall that presented to [facility] ED for further evalutation and surgical management. The patient is cleared for the OR today and will be doing for a clinton arthorplasty with Dr. De Luna today,  12/19  Patient reports feeling well, pain is well controlled and denies CP/SOB, Dizziness, N/V.  Denies is any new numbness or weakness.  Has no new complaints.     Overnight  the patient was straight cathed x 1 secondary to retention with 850 cc removed. Daughter at bedside and would like to avoid galeana if possible     T(C): 36.5 (12-19-24 @ 03:58), Max: 37.1 (12-18-24 @ 23:54)  HR: 78 (12-19-24 @ 06:00) (57 - 86)  BP: 148/76 (12-19-24 @ 06:00) (148/76 - 202/66)  RR: 18 (12-19-24 @ 04:25) (16 - 18)  SpO2: 96% (12-19-24 @ 04:25) (93% - 99%)          General Appearance:  Patient is examined at bedside.  Appears to have pain well-controlled.  In no apparent distress.      L Hip was Examined.      Skin is C/D/I  EHL/FHL/GS/AT are intact.   SILT L2-S1  Calves are Soft,  Compartments easily compressible, NTTP Bilaterally.   DP/PT Pulses are palpable, 2/2 Bilaterally     NELDA VERDIN is a 89yFemale cleared for OR today with Dr. De Luna for L hip clinton    DVT Proph: please hold at this time, SCD only  Incentive Spirometery  Pain Control  NWB LLE, bed rest until OR  Please Keep NPO until OR  Ancef to be given prior to OR  Please monitor bladder for residual volumes as indicated  D/W Dr De Luna, Agrees with the plan.   Dispo:  Pending PT Eval after OR

## 2024-12-19 NOTE — PROGRESS NOTE ADULT - PROBLEM SELECTOR PLAN 6
Chronic - BP elevated in the ED likely 2/2 to pain, to monitor BPs  - Continue home toprol and losartan starting tomorrow  -echo normal ef and no sig valve disease EF 64%   - hold lasix for procedure leg swelling - but son states "always has swelling"  - doppler US b/l LE ordered - negative for DVT B/L

## 2024-12-19 NOTE — PROGRESS NOTE ADULT - PROBLEM SELECTOR PLAN 3
Chronic on Januvia 25 qd  - A1c 8.4 from 7.7 last May  -Low ISS  - FS q6h  - NPO  -Hypoglycemic protocol.  - Diabetes NP consulted, f/u recs

## 2024-12-19 NOTE — CARE COORDINATION ASSESSMENT. - NSCAREPROVIDERS_GEN_ALL_CORE_FT
CARE PROVIDERS:  Accepting Physician: Everett Holguin  Administration: Adiel Lira  Administration: Edgar Paez  Admitting: Everett Holguin  Attending: Everett Holguin  Case Management: Delgado Morales  Consultant: Chuy Escoto  Consultant: Weil, Patricia  Consultant: Sarah Alves  Consultant: Sheng Rhodes  Consultant: Guy Kaplan  Covering Team: Ben Mayfield  Covering Team: Britni Saleem  Covering Team: Kale Arevalo  ED Attending: Tucker Yanez  ED Nurse: Stanley Ortiz  Nurse: Stanley Ortiz  Nurse: Soha Blount  Nurse: Coral Garsia  Nurse: Clari Chaney  Nurse: Riaz Hernandez  Ordered: ServiceAccount, SCMMLM  Ordered: ServiceAccount, SCMMLM  Ordered: ServiceAccount, SCMMLM  Ordered: Doctor, Unknown  Outpatient Provider: Abby Terrell  PCA/Nursing Assistant: Asha Chung  Primary Team: Everett Holguin  Primary Team: Robert Leon  Primary Team: Anthony Kelly  : Marie Queen// Supp. Assoc.: Pastor Medrano

## 2024-12-19 NOTE — CARE COORDINATION ASSESSMENT. - OTHER PERTINENT REFERRAL INFORMATION
Sw met with Pt and Dtrs Maday and Oneida at bedside. Pt is A & O x4 and able to make her needs known. Sw introduced self and role and pts and family expressed understanding. Pt lives at the [facility] ([phone]) and it completely Indep with her ADLS and uses a rollator at baseline. Pt was receiving Pt at the facility as he recently had carpal tunnel surgery. pt has a Hx of LEONEL in the past but family would like her to return directly to the [facility] if possible if she needs LEONEL they have a first choice of [place]. Sw will cont to follow.

## 2024-12-19 NOTE — DISCHARGE NOTE PROVIDER - NSDCMRMEDTOKEN_GEN_ALL_CORE_FT
aspirin 81 mg oral delayed release tablet: 1 tab(s) orally every other day  atorvastatin 40 mg oral tablet: 1 tab(s) orally once a day (at bedtime)  claritin 24hr: as needed  famotidine 20 mg oral tablet: 1 tab(s) orally once a day  ferrous sulfate 324 mg (65 mg elemental iron) oral tablet: 1 tab(s) orally once a day  furosemide 20 mg oral tablet: 1 tab(s) orally once a day  Januvia 25 mg oral tablet: 1 tab(s) orally once a day  losartan 25 mg oral tablet: 1 tab(s) orally once a day  Metoprolol Succinate  mg oral tablet, extended release: 1 tab(s) orally once a day   aspirin 81 mg oral delayed release tablet: 1 tab(s) orally every other day  Aspirin EC 81 mg oral delayed release tablet: 1 tab(s) orally once a day MDD: 1  atorvastatin 40 mg oral tablet: 1 tab(s) orally once a day (at bedtime)  Colace 100 mg oral capsule: 1 cap(s) orally 4 times a day as needed for  constipation  famotidine 20 mg oral tablet: 1 tab(s) orally once a day  ferrous sulfate 324 mg (65 mg elemental iron) oral tablet: 1 tab(s) orally once a day  furosemide 20 mg oral tablet: 1 tab(s) orally once a day  Januvia 25 mg oral tablet: 1 tab(s) orally once a day  losartan 25 mg oral tablet: 1 tab(s) orally once a day  Metoprolol Succinate  mg oral tablet, extended release: 1 tab(s) orally once a day  Narcan 4 mg/0.1 mL nasal spray: 1 spray(s) intranasally 4 times a day as needed for  overdose MDD: 4  ondansetron 4 mg oral tablet: 1 tab(s) orally 4 times a day as needed for  nausea MDD: 4  oxyCODONE 5 mg oral tablet: 1 tab(s) orally 4 times a day as needed for  pain MDD: 4   aspirin 81 mg oral delayed release tablet: 1 tab(s) orally every other day  Aspirin EC 81 mg oral delayed release tablet: 1 tab(s) orally once a day MDD: 1  atorvastatin 40 mg oral tablet: 1 tab(s) orally once a day (at bedtime)  Colace 100 mg oral capsule: 1 cap(s) orally 4 times a day as needed for  constipation  enoxaparin 30 mg/0.3 mL injectable solution: 30 milligram(s) subcutaneously once a day  famotidine 20 mg oral tablet: 1 tab(s) orally once a day  ferrous sulfate 324 mg (65 mg elemental iron) oral tablet: 1 tab(s) orally once a day  furosemide 20 mg oral tablet: 1 tab(s) orally once a day  Januvia 25 mg oral tablet: 1 tab(s) orally once a day  losartan 25 mg oral tablet: 1 tab(s) orally once a day  Metoprolol Succinate  mg oral tablet, extended release: 1 tab(s) orally once a day  Narcan 4 mg/0.1 mL nasal spray: 1 spray(s) intranasally 4 times a day as needed for  overdose MDD: 4  ondansetron 4 mg oral tablet: 1 tab(s) orally 4 times a day as needed for  nausea MDD: 4  oxyCODONE 5 mg oral tablet: 1 tab(s) orally 4 times a day as needed for  pain MDD: 4   acetaminophen 325 mg oral tablet: 2 tab(s) orally once a day  aspirin 81 mg oral delayed release tablet: 1 tab(s) orally every other day  atorvastatin 40 mg oral tablet: 1 tab(s) orally once a day (at bedtime)  enoxaparin 30 mg/0.3 mL injectable solution: 30 milligram(s) subcutaneously once a day  famotidine 20 mg oral tablet: 1 tab(s) orally once a day  ferrous sulfate 324 mg (65 mg elemental iron) oral tablet: 1 tab(s) orally once a day  furosemide 20 mg oral tablet: 1 tab(s) orally once a day  hydrALAZINE 25 mg oral tablet: 1 tab(s) orally every 8 hours  Januvia 25 mg oral tablet: 1 tab(s) orally once a day  losartan 50 mg oral tablet: 1 tab(s) orally once a day  Metoprolol Succinate  mg oral tablet, extended release: 1 tab(s) orally once a day  Narcan 4 mg/0.1 mL nasal spray: 1 spray(s) intranasally 4 times a day as needed for  overdose MDD: 4  oxyCODONE: 2.5 milligram(s) orally every 6 hours as needed for  severe pain  pantoprazole 40 mg oral delayed release tablet: 1 tab(s) orally once a day (before a meal)  polyethylene glycol 3350 oral powder for reconstitution: 17 gram(s) orally 2 times a day  senna leaf extract oral tablet: 2 tab(s) orally once a day (at bedtime)  traMADol 50 mg oral tablet: 0.5 tab(s) orally every 6 hours As needed Mild Pain (1 - 3)  traMADol 50 mg oral tablet: 1 tab(s) orally every 6 hours As needed Moderate Pain (4 - 6)

## 2024-12-19 NOTE — PROGRESS NOTE ADULT - SUBJECTIVE AND OBJECTIVE BOX
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 Velasquez as well as R hip IMN w/ Dr. De Luna in 2023, s/p WALLANT R carpal tunnel release w/ Dr. Andrew 2 days ago (12/16/24) previous right hip fx w/ surgical pinning presents to the ED for L hip pain. while  ambulating w/ her walker earlier today, was in a chair when she heard a "pop" followed by immediate pain and difficulty bearing weight. Was assisted to her bed and was then unable to get up - prompting ambulance transfer to [facility] ED. Denies head strike/LOC/other orthopedic injuries at this time. Unable to ambulate since injury this morning (at baseline ambulates with walker). Denies numbness/tingling in the affected extremity. Patient takes no AC meds. Denies any current chest pain, sob, abd pain, Complaining of L sided hip pain.    Per son at bedside, patient had UTI last week was treated with course of nitrofurantoin, completed her course.    In the ED:  Vitals: T 98.2 HR 57 /66 RR 16 RA  Labs: H/H 11.2/34.1 PT/INR 14.1/1.21 BUN/Cr 41/2 Glu 183 GFR 23  Given: ofirmev x1, fentanyl 25mcg IV x1, morphine 4mg IV x1, on 75 cc/hr NS in the ED  CXR cardiomegaly, no acute cardiopulm disease.  EKG: SR 1st degree AVB w/ PVCs 64 bpm QTc 491     (18 Dec 2024 21:04)    INTERVAL HPI:      OVERNIGHT EVENTS:    Home Medications:  aspirin 81 mg oral delayed release tablet: 1 tab(s) orally every other day (18 Dec 2024 23:36)  atorvastatin 40 mg oral tablet: 1 tab(s) orally once a day (at bedtime) (18 Dec 2024 23:36)  claritin 24hr: as needed (18 Dec 2024 23:36)  famotidine 20 mg oral tablet: 1 tab(s) orally once a day (18 Dec 2024 23:36)  ferrous sulfate 324 mg (65 mg elemental iron) oral tablet: 1 tab(s) orally once a day (18 Dec 2024 23:36)  furosemide 20 mg oral tablet: 1 tab(s) orally once a day (18 Dec 2024 23:36)  Januvia 25 mg oral tablet: 1 tab(s) orally once a day (18 Dec 2024 23:36)  losartan 25 mg oral tablet: 1 tab(s) orally once a day (18 Dec 2024 23:32)  Metoprolol Succinate  mg oral tablet, extended release: 1 tab(s) orally once a day (18 Dec 2024 23:36)      MEDICATIONS  (STANDING):  atorvastatin 40 milliGRAM(s) Oral at bedtime  dextrose 5%. 1000 milliLiter(s) (50 mL/Hr) IV Continuous <Continuous>  dextrose 5%. 1000 milliLiter(s) (100 mL/Hr) IV Continuous <Continuous>  dextrose 50% Injectable 25 Gram(s) IV Push once  dextrose 50% Injectable 12.5 Gram(s) IV Push once  dextrose 50% Injectable 25 Gram(s) IV Push once  glucagon  Injectable 1 milliGRAM(s) IntraMuscular once  insulin lispro (ADMELOG) corrective regimen sliding scale   SubCutaneous every 6 hours  losartan 25 milliGRAM(s) Oral daily  metoprolol succinate  milliGRAM(s) Oral daily  polyethylene glycol 3350 17 Gram(s) Oral daily  senna 2 Tablet(s) Oral at bedtime  sodium chloride 0.9%. 1000 milliLiter(s) (75 mL/Hr) IV Continuous <Continuous>    MEDICATIONS  (PRN):  dextrose Oral Gel 15 Gram(s) Oral once PRN Blood Glucose LESS THAN 70 milliGRAM(s)/deciliter  HYDROmorphone  Injectable 0.5 milliGRAM(s) IV Push every 4 hours PRN Moderate Pain (4 - 6)  HYDROmorphone  Injectable 1 milliGRAM(s) IV Push every 4 hours PRN Severe Pain (7 - 10)  melatonin 3 milliGRAM(s) Oral at bedtime PRN Insomnia  traMADol 25 milliGRAM(s) Oral every 12 hours PRN Mild Pain (1 - 3)      Allergies    No Known Allergies    Intolerances        Social History:  Tobacco: denies  EtOH: denies  Recreational drug use:denies  Lives with: child  ADLs/Ambulates: with walker (18 Dec 2024 21:04)      REVIEW OF SYSTEMS:  CONSTITUTIONAL: No fever, No chills, No fatigue, No myalgia, No Body ache, No Weakness  EYES: No eye pain,  No visual disturbances, No discharge, NO Redness  ENMT:  No ear pain, No nose bleed, No vertigo; No sinus pain, NO throat pain, No Congestion  NECK: No pain, No stiffness  RESPIRATORY: No cough, NO wheezing, No  hemoptysis, NO  shortness of breath  CARDIOVASCULAR: No chest pain, palpitations  GASTROINTESTINAL: No abdominal pain, NO epigastric pain. No nausea, No vomiting; No diarrhea, No constipation. [  ] BM  GENITOURINARY: No dysuria, No frequency, No urgency, No hematuria, NO incontinence  NEUROLOGICAL: No headaches, No dizziness, No numbness, No tingling, No tremors, No weakness  EXT: No Swelling, No Pain, No Edema  SKIN:  [  ] No itching, burning, rashes, or lesions   MUSCULOSKELETAL: No joint pain ,No Jt swelling; No muscle pain, No back pain, No extremity pain  PSYCHIATRIC: No depression,  No anxiety,  No mood swings ,No difficulty sleeping at night  PAIN SCALE: [  ] None  [  ] Other-  ROS Unable to obtain due to - [  ] Dementia  [  ] Lethargy [  ] Drowsy [  ] Sedated [  ] non verbal  REST OF REVIEW Of SYSTEM - [  ] Normal     Vital Signs Last 24 Hrs  T(C): 36.7 (19 Dec 2024 11:53), Max: 37.1 (18 Dec 2024 23:54)  T(F): 98.1 (19 Dec 2024 11:53), Max: 98.7 (18 Dec 2024 23:54)  HR: 80 (19 Dec 2024 11:53) (57 - 86)  BP: 170/110 (19 Dec 2024 11:53) (148/76 - 202/66)  BP(mean): --  RR: 17 (19 Dec 2024 11:53) (16 - 18)  SpO2: 97% (19 Dec 2024 11:53) (93% - 99%)    Parameters below as of 19 Dec 2024 04:25  Patient On (Oxygen Delivery Method): nasal cannula  O2 Flow (L/min): 2    Finger Stick        12-18 @ 07:01  -  12-19 @ 07:00  --------------------------------------------------------  IN: 0 mL / OUT: 850 mL / NET: -850 mL        PHYSICAL EXAM:  GENERAL:  [  ] NAD , [  ] well appearing, [  ] Agitated, [  ] Drowsy,  [  ] Lethargy, [  ] confused   HEAD:  [  ] Normal, [  ] Other  EYES:  [  ] EOMI, [  ] PERRLA, [  ] conjunctiva and sclera clear normal, [  ] Other,  [  ] Pallor,[  ] Discharge  ENMT:  [  ] Normal, [  ] Moist mucous membranes, [  ] Good dentition, [  ] No Thrush  NECK:  [  ] Supple, [  ] No JVD, [  ] Normal thyroid, [  ] Lymphadenopathy [  ] Other  CHEST/LUNG:  [  ] Clear to auscultation bilaterally, [  ] Breath Sounds equal B/L / Decrease, [  ] poor effort  [  ] No rales, [  ] No rhonchi  [  ]  No wheezing,   HEART:  [  ] Regular rate and rhythm, [  ] tachycardia, [  ] Bradycardia,  [  ] irregular  [  ] No murmurs, No rubs, No gallops, [  ] PPM in place (Mfr:  )  ABDOMEN:  [  ] Soft, [  ] Nontender, [  ] Nondistended, [  ] No mass, [  ] Bowel sounds present, [  ] obese  NERVOUS SYSTEM:  [  ] Alert & Oriented X3, [  ] Nonfocal  [  ] Confusion  [  ] Encephalopathic [  ] Sedated [  ] Unable to assess, [  ] Dementia [  ] Other-  EXTREMITIES: [  ] 2+ Peripheral Pulses, No clubbing, No cyanosis,  [  ] edema B/L lower EXT. [  ] PVD stasis skin changes B/L Lower EXT, [  ] wound  LYMPH: No lymphadenopathy noted  SKIN:  [  ] No rashes or lesions, [  ] Pressure Ulcers, [  ] ecchymosis, [  ] Skin Tears, [  ] Other    DIET: Diet, NPO after Midnight:      NPO Start Date: 18-Dec-2024,   NPO Start Time: 23:59  Except Medications (12-18-24 @ 20:13)  Diet, NPO:   Except Medications (12-18-24 @ 18:31)      LABS:                        10.6   7.69  )-----------( 173      ( 19 Dec 2024 04:30 )             32.4     19 Dec 2024 04:30    143    |  114    |  33     ----------------------------<  187    3.6     |  24     |  1.80     Ca    9.1        19 Dec 2024 04:30    TPro  6.7    /  Alb  3.2    /  TBili  0.7    /  DBili  x      /  AST  30     /  ALT  24     /  AlkPhos  152    19 Dec 2024 04:30    PT/INR - ( 19 Dec 2024 04:30 )   PT: 13.5 sec;   INR: 1.15 ratio         PTT - ( 19 Dec 2024 04:30 )  PTT:28.7 sec  Urinalysis Basic - ( 19 Dec 2024 04:30 )    Color: x / Appearance: x / SG: x / pH: x  Gluc: 187 mg/dL / Ketone: x  / Bili: x / Urobili: x   Blood: x / Protein: x / Nitrite: x   Leuk Esterase: x / RBC: x / WBC x   Sq Epi: x / Non Sq Epi: x / Bacteria: x                        Urinalysis with Rflx Culture (collected 18 Dec 2024 20:10)         Anemia Panel:      Thyroid Panel:                RADIOLOGY & ADDITIONAL TESTS:      HEALTH ISSUES - PROBLEM Dx:  Fracture of femoral neck, left    HTN (hypertension)    HLD (hyperlipidemia)    TIA (transient ischemic attack)    T2DM (type 2 diabetes mellitus)    Need for prophylactic measure    CKD (chronic kidney disease)            Consultant(s) Notes Reviewed:  [  ] YES     Care Discussed with [X] Consultants  [  ] Patient  [  ] Family [  ] HCP [  ]   [  ] Social Service  [  ] RN, [  ] Physical Therapy,[  ] Palliative care team  DVT PPX: [  ] Lovenox, [  ] S C Heparin, [  ] Coumadin, [  ] Xarelto, [  ] Eliquis, [  ] Pradaxa, [  ] IV Heparin drip, [  ] SCD [  ] Contraindication 2 to GI Bleed,[  ] Ambulation [  ] Contraindicated 2 to  bleed [  ] Contraindicated 2 to Brain Bleed  Advanced directive: [  ] None, [  ] DNR/DNI Patient is a 89y old  Female who presents with a chief complaint of L hip fracture (19 Dec 2024 13:43)    HPI:  89 yo female w/ pmh of HTN, HLD, T2DM, TIA, CKD III, 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 Velasquez as well as R hip IMN w/ Dr. De Luna in 2023, s/p WALLANT R carpal tunnel release w/ Dr. Andrew 2 days ago (12/16/24) previous right hip fx w/ surgical pinning presents to the ED for L hip pain. while  ambulating w/ her walker earlier today, was in a chair when she heard a "pop" followed by immediate pain and difficulty bearing weight. Was assisted to her bed and was then unable to get up - prompting ambulance transfer to [facility] ED. Denies head strike/LOC/other orthopedic injuries at this time. Unable to ambulate since injury this morning (at baseline ambulates with walker). Denies numbness/tingling in the affected extremity. Patient takes no AC meds. Denies any current chest pain, sob, abd pain, Complaining of L sided hip pain.    Per son at bedside, patient had UTI last week was treated with course of nitrofurantoin, completed her course.    In the ED:  Vitals: T 98.2 HR 57 /66 RR 16 RA  Labs: H/H 11.2/34.1 PT/INR 14.1/1.21 BUN/Cr 41/2 Glu 183 GFR 23  Given: ofirmev x1, fentanyl 25mcg IV x1, morphine 4mg IV x1, on 75 cc/hr NS in the ED  CXR cardiomegaly, no acute cardiopulm disease.  EKG: SR 1st degree AVB w/ PVCs 64 bpm QTc 491     (18 Dec 2024 21:04)    INTERVAL HPI:  12/19- Pt seen, examined, pt has better pain control, pt was found to be in urinary retention -Anderson cath placed -drained ~1 lit urine, NPO for OR      OVERNIGHT EVENTS: Urinary Retention    Home Medications:  aspirin 81 mg oral delayed release tablet: 1 tab(s) orally every other day (18 Dec 2024 23:36)  atorvastatin 40 mg oral tablet: 1 tab(s) orally once a day (at bedtime) (18 Dec 2024 23:36)  claritin 24hr: as needed (18 Dec 2024 23:36)  famotidine 20 mg oral tablet: 1 tab(s) orally once a day (18 Dec 2024 23:36)  ferrous sulfate 324 mg (65 mg elemental iron) oral tablet: 1 tab(s) orally once a day (18 Dec 2024 23:36)  furosemide 20 mg oral tablet: 1 tab(s) orally once a day (18 Dec 2024 23:36)  Januvia 25 mg oral tablet: 1 tab(s) orally once a day (18 Dec 2024 23:36)  losartan 25 mg oral tablet: 1 tab(s) orally once a day (18 Dec 2024 23:32)  Metoprolol Succinate  mg oral tablet, extended release: 1 tab(s) orally once a day (18 Dec 2024 23:36)      MEDICATIONS  (STANDING):  atorvastatin 40 milliGRAM(s) Oral at bedtime  dextrose 5%. 1000 milliLiter(s) (50 mL/Hr) IV Continuous <Continuous>  dextrose 5%. 1000 milliLiter(s) (100 mL/Hr) IV Continuous <Continuous>  dextrose 50% Injectable 25 Gram(s) IV Push once  dextrose 50% Injectable 12.5 Gram(s) IV Push once  dextrose 50% Injectable 25 Gram(s) IV Push once  glucagon  Injectable 1 milliGRAM(s) IntraMuscular once  insulin lispro (ADMELOG) corrective regimen sliding scale   SubCutaneous every 6 hours  losartan 25 milliGRAM(s) Oral daily  metoprolol succinate  milliGRAM(s) Oral daily  polyethylene glycol 3350 17 Gram(s) Oral daily  senna 2 Tablet(s) Oral at bedtime  sodium chloride 0.9%. 1000 milliLiter(s) (75 mL/Hr) IV Continuous <Continuous>    MEDICATIONS  (PRN):  dextrose Oral Gel 15 Gram(s) Oral once PRN Blood Glucose LESS THAN 70 milliGRAM(s)/deciliter  HYDROmorphone  Injectable 0.5 milliGRAM(s) IV Push every 4 hours PRN Moderate Pain (4 - 6)  HYDROmorphone  Injectable 1 milliGRAM(s) IV Push every 4 hours PRN Severe Pain (7 - 10)  melatonin 3 milliGRAM(s) Oral at bedtime PRN Insomnia  traMADol 25 milliGRAM(s) Oral every 12 hours PRN Mild Pain (1 - 3)      Allergies    No Known Allergies    Intolerances        Social History:  Tobacco: denies  EtOH: denies  Recreational drug use:denies  Lives with: child  ADLs/Ambulates: with walker (18 Dec 2024 21:04)      REVIEW OF SYSTEMS:  CONSTITUTIONAL: No fever, No chills, No fatigue, No myalgia, No Body ache, No Weakness  EYES: No eye pain,  No visual disturbances, No discharge, NO Redness  ENMT:  No ear pain, No nose bleed, No vertigo; No sinus pain, NO throat pain, No Congestion  NECK: No pain, No stiffness  RESPIRATORY: No cough, NO wheezing, No  hemoptysis, NO  shortness of breath  CARDIOVASCULAR: No chest pain, palpitations  GASTROINTESTINAL: No abdominal pain, NO epigastric pain. No nausea, No vomiting; No diarrhea, No constipation. [  ] BM  GENITOURINARY: No dysuria, No frequency, No urgency, No hematuria, NO incontinence  NEUROLOGICAL: No headaches, No dizziness, No numbness, No tingling, No tremors, No weakness  EXT: No Swelling, No Pain, No Edema  SKIN:  [x  ] No itching, burning, rashes, or lesions   MUSCULOSKELETAL: No joint pain ,No Jt swelling; No muscle pain, No back pain, No extremity pain  PSYCHIATRIC: No depression,  No anxiety,  No mood swings ,No difficulty sleeping at night  PAIN SCALE: [  ] None  [x  ] Other-Left Hip 4/10  ROS Unable to obtain due to - [  ] Dementia  [  ] Lethargy [  ] Drowsy [  ] Sedated [  ] non verbal  REST OF REVIEW Of SYSTEM - [  ] Normal     Vital Signs Last 24 Hrs  T(C): 36.7 (19 Dec 2024 11:53), Max: 37.1 (18 Dec 2024 23:54)  T(F): 98.1 (19 Dec 2024 11:53), Max: 98.7 (18 Dec 2024 23:54)  HR: 80 (19 Dec 2024 11:53) (57 - 86)  BP: 170/110 (19 Dec 2024 11:53) (148/76 - 202/66)  BP(mean): --  RR: 17 (19 Dec 2024 11:53) (16 - 18)  SpO2: 97% (19 Dec 2024 11:53) (93% - 99%)    Parameters below as of 19 Dec 2024 04:25  Patient On (Oxygen Delivery Method): nasal cannula  O2 Flow (L/min): 2    Finger Stick        12-18 @ 07:01  -  12-19 @ 07:00  --------------------------------------------------------  IN: 0 mL / OUT: 850 mL / NET: -850 mL        PHYSICAL EXAM:Justin crump  GENERAL:  [ x ] NAD , [ x ] well appearing, [  ] Agitated, [  ] Drowsy,  [  ] Lethargy, [  ] confused   HEAD:  [ x ] Normal, [  ] Other  EYES:  [ x ] EOMI, [x] PERRLA, [ x ] conjunctiva and sclera clear normal, [  ] Other,  [  ] Pallor,[  ] Discharge  ENMT:  [ x ] Normal, [x  ] Dry mucous membranes, [  ] Good dentition, [x  ] No Thrush  NECK:  [ x ] Supple, [ x ] No JVD, [ x ] Normal thyroid, [  ] Lymphadenopathy [  ] Other  CHEST/LUNG:  [ x ] Clear to auscultation bilaterally, [ x ] Breath Sounds equal B/L / Decrease, [x  ] poor effort  [ x ] No rales, [ x ] No rhonchi  [x  ]  No wheezing,   HEART:  [x  ] Regular rate and rhythm, [  ] tachycardia, [  ] Bradycardia,  [  ] irregular  [x  ] No murmurs, No rubs, No gallops, [  ] PPM in place (Mfr:  )  ABDOMEN:  [  ] Soft, [ x ] Nontender, [  x] Nondistended, [ x ] No mass, [ x ] Bowel sounds present, [ x ] obese  NERVOUS SYSTEM:  [ x ] Alert & Oriented X3, [ x ] Nonfocal  [  ] Confusion  [  ] Encephalopathic [  ] Sedated [  ] Unable to assess, [  ] Dementia [  ] Other-  EXTREMITIES: [x  ] 2+ Peripheral Pulses, No clubbing, No cyanosis,  [ x ] 2 +pitting  edema B/L lower EXT. [  x] PVD stasis skin changes B/L Lower EXT, [  ] wound  LYMPH: No lymphadenopathy noted  SKIN:  [  x] No rashes or lesions, [  ] Pressure Ulcers, [  ] ecchymosis, [  ] Skin Tears, [  ] Other    DIET: Diet, NPO after Midnight:      NPO Start Date: 18-Dec-2024,   NPO Start Time: 23:59  Except Medications (12-18-24 @ 20:13)  Diet, NPO:   Except Medications (12-18-24 @ 18:31)      LABS:                        10.6   7.69  )-----------( 173      ( 19 Dec 2024 04:30 )             32.4     19 Dec 2024 04:30    143    |  114    |  33     ----------------------------<  187    3.6     |  24     |  1.80     Ca    9.1        19 Dec 2024 04:30    TPro  6.7    /  Alb  3.2    /  TBili  0.7    /  DBili  x      /  AST  30     /  ALT  24     /  AlkPhos  152    19 Dec 2024 04:30    PT/INR - ( 19 Dec 2024 04:30 )   PT: 13.5 sec;   INR: 1.15 ratio         PTT - ( 19 Dec 2024 04:30 )  PTT:28.7 sec  Urinalysis Basic - ( 19 Dec 2024 04:30 )    Color: x / Appearance: x / SG: x / pH: x  Gluc: 187 mg/dL / Ketone: x  / Bili: x / Urobili: x   Blood: x / Protein: x / Nitrite: x   Leuk Esterase: x / RBC: x / WBC x   Sq Epi: x / Non Sq Epi: x / Bacteria: x      Urinalysis with Rflx Culture (collected 18 Dec 2024 20:10)      RADIOLOGY & ADDITIONAL TESTS:      HEALTH ISSUES - PROBLEM Dx:  Fracture of femoral neck, left    HTN (hypertension)    HLD (hyperlipidemia)    TIA (transient ischemic attack)    T2DM (type 2 diabetes mellitus)    Need for prophylactic measure    CKD (chronic kidney disease)            Consultant(s) Notes Reviewed:  [x  ] YES     Care Discussed with [X] Consultants  [ x ] Patient  [ x ] Family [  ] HCP [  ]   [  ] Social Service  [ x ] RN, [  ] Physical Therapy,[  ] Palliative care team  DVT PPX: [  ] Lovenox, [ x ] S C Heparin, [  ] Coumadin, [  ] Xarelto, [  ] Eliquis, [  ] Pradaxa, [  ] IV Heparin drip, [  ] SCD [  ] Contraindication 2 to GI Bleed,[  ] Ambulation [  ] Contraindicated 2 to  bleed [  ] Contraindicated 2 to Brain Bleed  Advanced directive: [ x ] None, [  ] DNR/DNI Patient is a 89y old  Female who presents with a chief complaint of L hip fracture (19 Dec 2024 13:43)    HPI:  89 yo female w/ pmh of HTN, HLD, T2DM, TIA, CKD III, 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 Velasquez as well as R hip IMN w/ Dr. De Luna in 2023, s/p WALLANT R carpal tunnel release w/ Dr. Andrew 2 days ago (12/16/24) previous right hip fx w/ surgical pinning presents to the ED for L hip pain. while  ambulating w/ her walker earlier today, was in a chair when she heard a "pop" followed by immediate pain and difficulty bearing weight. Was assisted to her bed and was then unable to get up - prompting ambulance transfer to [facility] ED. Denies head strike/LOC/other orthopedic injuries at this time. Unable to ambulate since injury this morning (at baseline ambulates with walker). Denies numbness/tingling in the affected extremity. Patient takes no AC meds. Denies any current chest pain, sob, abd pain, Complaining of L sided hip pain.    Per son at bedside, patient had UTI last week was treated with course of nitrofurantoin, completed her course.    In the ED:  Vitals: T 98.2 HR 57 /66 RR 16 RA  Labs: H/H 11.2/34.1 PT/INR 14.1/1.21 BUN/Cr 41/2 Glu 183 GFR 23  Given: ofirmev x1, fentanyl 25mcg IV x1, morphine 4mg IV x1, on 75 cc/hr NS in the ED  CXR cardiomegaly, no acute cardiopulm disease.  EKG: SR 1st degree AVB w/ PVCs 64 bpm QTc 491     (18 Dec 2024 21:04)    INTERVAL HPI:  12/19- Pt seen, examined, pt has better pain control, pt was found to be in urinary retention -Anderson cath placed -drained ~1 lit urine, NPO for OR cardio saw pt-No A Fib, not on AC       OVERNIGHT EVENTS: Urinary Retention    Home Medications:  aspirin 81 mg oral delayed release tablet: 1 tab(s) orally every other day (18 Dec 2024 23:36)  atorvastatin 40 mg oral tablet: 1 tab(s) orally once a day (at bedtime) (18 Dec 2024 23:36)  claritin 24hr: as needed (18 Dec 2024 23:36)  famotidine 20 mg oral tablet: 1 tab(s) orally once a day (18 Dec 2024 23:36)  ferrous sulfate 324 mg (65 mg elemental iron) oral tablet: 1 tab(s) orally once a day (18 Dec 2024 23:36)  furosemide 20 mg oral tablet: 1 tab(s) orally once a day (18 Dec 2024 23:36)  Januvia 25 mg oral tablet: 1 tab(s) orally once a day (18 Dec 2024 23:36)  losartan 25 mg oral tablet: 1 tab(s) orally once a day (18 Dec 2024 23:32)  Metoprolol Succinate  mg oral tablet, extended release: 1 tab(s) orally once a day (18 Dec 2024 23:36)      MEDICATIONS  (STANDING):  atorvastatin 40 milliGRAM(s) Oral at bedtime  dextrose 5%. 1000 milliLiter(s) (50 mL/Hr) IV Continuous <Continuous>  dextrose 5%. 1000 milliLiter(s) (100 mL/Hr) IV Continuous <Continuous>  dextrose 50% Injectable 25 Gram(s) IV Push once  dextrose 50% Injectable 12.5 Gram(s) IV Push once  dextrose 50% Injectable 25 Gram(s) IV Push once  glucagon  Injectable 1 milliGRAM(s) IntraMuscular once  insulin lispro (ADMELOG) corrective regimen sliding scale   SubCutaneous every 6 hours  losartan 25 milliGRAM(s) Oral daily  metoprolol succinate  milliGRAM(s) Oral daily  polyethylene glycol 3350 17 Gram(s) Oral daily  senna 2 Tablet(s) Oral at bedtime  sodium chloride 0.9%. 1000 milliLiter(s) (75 mL/Hr) IV Continuous <Continuous>    MEDICATIONS  (PRN):  dextrose Oral Gel 15 Gram(s) Oral once PRN Blood Glucose LESS THAN 70 milliGRAM(s)/deciliter  HYDROmorphone  Injectable 0.5 milliGRAM(s) IV Push every 4 hours PRN Moderate Pain (4 - 6)  HYDROmorphone  Injectable 1 milliGRAM(s) IV Push every 4 hours PRN Severe Pain (7 - 10)  melatonin 3 milliGRAM(s) Oral at bedtime PRN Insomnia  traMADol 25 milliGRAM(s) Oral every 12 hours PRN Mild Pain (1 - 3)      Allergies    No Known Allergies    Intolerances        Social History:  Tobacco: denies  EtOH: denies  Recreational drug use:denies  Lives with: child  ADLs/Ambulates: with walker (18 Dec 2024 21:04)      REVIEW OF SYSTEMS:  CONSTITUTIONAL: No fever, No chills, No fatigue, No myalgia, No Body ache, No Weakness  EYES: No eye pain,  No visual disturbances, No discharge, NO Redness  ENMT:  No ear pain, No nose bleed, No vertigo; No sinus pain, NO throat pain, No Congestion  NECK: No pain, No stiffness  RESPIRATORY: No cough, NO wheezing, No  hemoptysis, NO  shortness of breath  CARDIOVASCULAR: No chest pain, palpitations  GASTROINTESTINAL: No abdominal pain, NO epigastric pain. No nausea, No vomiting; No diarrhea, No constipation. [  ] BM  GENITOURINARY: No dysuria, No frequency, No urgency, No hematuria, NO incontinence  NEUROLOGICAL: No headaches, No dizziness, No numbness, No tingling, No tremors, No weakness  EXT: No Swelling, No Pain, No Edema  SKIN:  [x  ] No itching, burning, rashes, or lesions   MUSCULOSKELETAL: No joint pain ,No Jt swelling; No muscle pain, No back pain, No extremity pain  PSYCHIATRIC: No depression,  No anxiety,  No mood swings ,No difficulty sleeping at night  PAIN SCALE: [  ] None  [x  ] Other-Left Hip 4/10  ROS Unable to obtain due to - [  ] Dementia  [  ] Lethargy [  ] Drowsy [  ] Sedated [  ] non verbal  REST OF REVIEW Of SYSTEM - [  ] Normal     Vital Signs Last 24 Hrs  T(C): 36.7 (19 Dec 2024 11:53), Max: 37.1 (18 Dec 2024 23:54)  T(F): 98.1 (19 Dec 2024 11:53), Max: 98.7 (18 Dec 2024 23:54)  HR: 80 (19 Dec 2024 11:53) (57 - 86)  BP: 170/110 (19 Dec 2024 11:53) (148/76 - 202/66)  BP(mean): --  RR: 17 (19 Dec 2024 11:53) (16 - 18)  SpO2: 97% (19 Dec 2024 11:53) (93% - 99%)    Parameters below as of 19 Dec 2024 04:25  Patient On (Oxygen Delivery Method): nasal cannula  O2 Flow (L/min): 2    Finger Stick        12-18 @ 07:01  -  12-19 @ 07:00  --------------------------------------------------------  IN: 0 mL / OUT: 850 mL / NET: -850 mL        PHYSICAL EXAM:Justin crump  GENERAL:  [ x ] NAD , [ x ] well appearing, [  ] Agitated, [  ] Drowsy,  [  ] Lethargy, [  ] confused   HEAD:  [ x ] Normal, [  ] Other  EYES:  [ x ] EOMI, [x] PERRLA, [ x ] conjunctiva and sclera clear normal, [  ] Other,  [  ] Pallor,[  ] Discharge  ENMT:  [ x ] Normal, [x  ] Dry mucous membranes, [  ] Good dentition, [x  ] No Thrush  NECK:  [ x ] Supple, [ x ] No JVD, [ x ] Normal thyroid, [  ] Lymphadenopathy [  ] Other  CHEST/LUNG:  [ x ] Clear to auscultation bilaterally, [ x ] Breath Sounds equal B/L / Decrease, [x  ] poor effort  [ x ] No rales, [ x ] No rhonchi  [x  ]  No wheezing,   HEART:  [x  ] Regular rate and rhythm, [  ] tachycardia, [  ] Bradycardia,  [  ] irregular  [x  ] No murmurs, No rubs, No gallops, [  ] PPM in place (Mfr:  )  ABDOMEN:  [  ] Soft, [ x ] Nontender, [  x] Nondistended, [ x ] No mass, [ x ] Bowel sounds present, [ x ] obese  NERVOUS SYSTEM:  [ x ] Alert & Oriented X3, [ x ] Nonfocal  [  ] Confusion  [  ] Encephalopathic [  ] Sedated [  ] Unable to assess, [  ] Dementia [  ] Other-  EXTREMITIES: [x  ] 2+ Peripheral Pulses, No clubbing, No cyanosis,  [ x ] 2 +pitting  edema B/L lower EXT. [  x] PVD stasis skin changes B/L Lower EXT, [  ] wound  LYMPH: No lymphadenopathy noted  SKIN:  [  x] No rashes or lesions, [  ] Pressure Ulcers, [  ] ecchymosis, [  ] Skin Tears, [  ] Other    DIET: Diet, NPO after Midnight:      NPO Start Date: 18-Dec-2024,   NPO Start Time: 23:59  Except Medications (12-18-24 @ 20:13)  Diet, NPO:   Except Medications (12-18-24 @ 18:31)      LABS:                        10.6   7.69  )-----------( 173      ( 19 Dec 2024 04:30 )             32.4     19 Dec 2024 04:30    143    |  114    |  33     ----------------------------<  187    3.6     |  24     |  1.80     Ca    9.1        19 Dec 2024 04:30    TPro  6.7    /  Alb  3.2    /  TBili  0.7    /  DBili  x      /  AST  30     /  ALT  24     /  AlkPhos  152    19 Dec 2024 04:30    PT/INR - ( 19 Dec 2024 04:30 )   PT: 13.5 sec;   INR: 1.15 ratio         PTT - ( 19 Dec 2024 04:30 )  PTT:28.7 sec  Urinalysis Basic - ( 19 Dec 2024 04:30 )    Color: x / Appearance: x / SG: x / pH: x  Gluc: 187 mg/dL / Ketone: x  / Bili: x / Urobili: x   Blood: x / Protein: x / Nitrite: x   Leuk Esterase: x / RBC: x / WBC x   Sq Epi: x / Non Sq Epi: x / Bacteria: x      Urinalysis with Rflx Culture (collected 18 Dec 2024 20:10)      RADIOLOGY & ADDITIONAL TESTS:      HEALTH ISSUES - PROBLEM Dx:  Fracture of femoral neck, left    HTN (hypertension)    HLD (hyperlipidemia)    TIA (transient ischemic attack)    T2DM (type 2 diabetes mellitus)    Need for prophylactic measure    CKD (chronic kidney disease)            Consultant(s) Notes Reviewed:  [x  ] YES     Care Discussed with [X] Consultants  [ x ] Patient  [ x ] Family [  ] HCP [  ]   [  ] Social Service  [ x ] RN, [  ] Physical Therapy,[  ] Palliative care team  DVT PPX: [  ] Lovenox, [ x ] S C Heparin, [  ] Coumadin, [  ] Xarelto, [  ] Eliquis, [  ] Pradaxa, [  ] IV Heparin drip, [  ] SCD [  ] Contraindication 2 to GI Bleed,[  ] Ambulation [  ] Contraindicated 2 to  bleed [  ] Contraindicated 2 to Brain Bleed  Advanced directive: [ x ] None, [  ] DNR/DNI

## 2024-12-19 NOTE — CONSULT NOTE ADULT - ATTENDING COMMENTS
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 Velasquez as well as R hip IMN w/ Dr. De Luna in 2023, s/p WALLANT R carpal tunnel release w/ Dr. Andrew 2 days ago (12/16/24) previous right hip fx w/ surgical pinning presents to the ED for L hip pain. while  ambulating w/ her walker earlier today, was in a chair when she heard a "pop" followed by immediate pain and difficulty bearing weight. Patient schedules for hemiarthroplasty of L hip with Dr. De Luna 12/19.  Consulted for Cardiac optimization     Patient currently has no active cardiac conditions. No signs of ischemia, ADHF, clinical exam not consistent with stenotic valvular disease, no unstable arrhythmias noted. Baseline functional capacity is > 4 METS. RCRI score is I. Patient is currently hemodynamically stable. Patient is medically optimized at this time and understands the inherent risks of surgery. Routine hemodynamic monitoring is suggested.    no known cad and no acute ischemia\  with hip fx 11/2023 did have episode of tachycardia, initially felt to be af, but ultimately felt to not be  was on eliquis briefly at that time, never continued and no subseq arrhythmias noted  now presents with hip fx in need of hemiarthroplasty  considered optimized for planned procedure  routine hemodyn monitoring is recommended

## 2024-12-19 NOTE — PROGRESS NOTE ADULT - PROBLEM SELECTOR PLAN 4
History of CKD Cr 41/2 in the ED  - Baseline 1.7-2 in 6/2024.  - Continue to monitor with daily CMP  -Dr. MARYA Kaur Nephjasmin consulted, recs appreciated History of CKD Cr 41/2 in the ED  - Baseline 1.7-2 in 6/2024.  - Continue to monitor with daily CMP  -Dr. MARYA Kaur Nephro consulted, recs appreciated  Ac urinary retention   Anderson cath placed drained ~1000, 600  ml urine   BMP in AM History of CKD Cr 41/2 in the ED  - Baseline 1.7-2 in 6/2024.  - Continue to monitor with daily CMP  -Dr. MARYA Kaur Nephro consulted, recs appreciated  Ac urinary retention   Galeana cath placed drained ~1000, 600  ml urine   BMP in AM  As per dtr pt had galeana cath x 2 months with previous Sx

## 2024-12-19 NOTE — PATIENT PROFILE ADULT - FUNCTIONAL ASSESSMENT - BASIC MOBILITY 3.
[facility] can fill full amount today.  Patient aware and will  tomorrow. I will pend refill to Dr Tolliver to approve and send.  
[facility] doesn't have patient hydromorphone in stock and don't know when they will get it.   
1 = Total assistance

## 2024-12-19 NOTE — CONSULT NOTE ADULT - SUBJECTIVE AND OBJECTIVE BOX
[facility]    Aydee Escoto, Kedar, Nadira, Jacey, Hugo, Natan       [phone] (office)    Reason for Consult:    Interval HPI: Patient seen and examined at bedside. No acute events overnight. Patient scheduled for hemiarthroplasty of L hip with Dr. De Luna today. Patient has no cardiac history, does not follow outpatient with a cardiologist. Last echo was 2023 before last R hip arthroplasty showing EF 64%, trace MR. Patient complaining of back and L hip pain this morning. Denies any chest pain, palpitations, arm pain, increased leg swelling, shortness of breath. States she takes lasix at home but was never told she has heart failure. She used to be on HCTZ for HTN but switched to lasix per outpt nephrologist due to worsening kidney function on HCTZ. States when not in pain, able to walk with walker for 4-5 blocks without being short of breath and has no issues going up and down stairs with being short of breath.    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 Velasquez as well as R hip IMN w/ Dr. De Luna in 2023, s/p WALLANT R carpal tunnel release w/ Dr. Andrew 2 days ago (12/16/24) previous right hip fx w/ surgical pinning presents to the ED for L hip pain. while  ambulating w/ her walker earlier today, was in a chair when she heard a "pop" followed by immediate pain and difficulty bearing weight. Was assisted to her bed and was then unable to get up - prompting ambulance transfer to [facility] ED. Denies head strike/LOC/other orthopedic injuries at this time. Unable to ambulate since injury this morning (at baseline ambulates with walker). Denies numbness/tingling in the affected extremity. Patient takes no AC meds. Denies any current chest pain, sob, abd pain, Complaining of L sided hip pain.    Per son at bedside, patient had UTI last week was treated with course of nitrofurantoin, completed her course.    In the ED:  Vitals: T 98.2 HR 57 /66 RR 16 RA  Labs: H/H 11.2/34.1 PT/INR 14.1/1.21 BUN/Cr 41/2 Glu 183 GFR 23  Given: ofirmev x1, fentanyl 25mcg IV x1, morphine 4mg IV x1, on 75 cc/hr NS in the ED  CXR cardiomegaly, no acute cardiopulm disease.  EKG: SR 1st degree AVB w/ PVCs 64 bpm QTc 491     (18 Dec 2024 21:04)      PAST MEDICAL & SURGICAL HISTORY:  Hypertension  Diabetes  TIA (transient ischemic attack)  Hip fracture  HLD (hyperlipidemia)  S/P ORIF (open reduction internal fixation) fracture  right hip      SOCIAL HISTORY: No active tobacco, alcohol or illicit drug use    FAMILY HISTORY:  FH: asthma (Father)      Home Medications:  aspirin 81 mg oral delayed release tablet: 1 tab(s) orally every other day (18 Dec 2024 23:36)  atorvastatin 40 mg oral tablet: 1 tab(s) orally once a day (at bedtime) (18 Dec 2024 23:36)  claritin 24hr: as needed (18 Dec 2024 23:36)  famotidine 20 mg oral tablet: 1 tab(s) orally once a day (18 Dec 2024 23:36)  ferrous sulfate 324 mg (65 mg elemental iron) oral tablet: 1 tab(s) orally once a day (18 Dec 2024 23:36)  furosemide 20 mg oral tablet: 1 tab(s) orally once a day (18 Dec 2024 23:36)  Januvia 25 mg oral tablet: 1 tab(s) orally once a day (18 Dec 2024 23:36)  losartan 25 mg oral tablet: 1 tab(s) orally once a day (18 Dec 2024 23:32)  Metoprolol Succinate  mg oral tablet, extended release: 1 tab(s) orally once a day (18 Dec 2024 23:36)      MEDICATIONS  (STANDING):  aspirin  chewable 81 milliGRAM(s) Oral daily  atorvastatin 40 milliGRAM(s) Oral at bedtime  dextrose 5%. 1000 milliLiter(s) (50 mL/Hr) IV Continuous <Continuous>  dextrose 5%. 1000 milliLiter(s) (100 mL/Hr) IV Continuous <Continuous>  dextrose 50% Injectable 25 Gram(s) IV Push once  dextrose 50% Injectable 12.5 Gram(s) IV Push once  dextrose 50% Injectable 25 Gram(s) IV Push once  glucagon  Injectable 1 milliGRAM(s) IntraMuscular once  insulin lispro (ADMELOG) corrective regimen sliding scale   SubCutaneous every 6 hours  losartan 25 milliGRAM(s) Oral daily  metoprolol succinate  milliGRAM(s) Oral daily  polyethylene glycol 3350 17 Gram(s) Oral daily  senna 2 Tablet(s) Oral at bedtime  sodium chloride 0.9%. 1000 milliLiter(s) (75 mL/Hr) IV Continuous <Continuous>    MEDICATIONS  (PRN):  dextrose Oral Gel 15 Gram(s) Oral once PRN Blood Glucose LESS THAN 70 milliGRAM(s)/deciliter  HYDROmorphone  Injectable 0.2 milliGRAM(s) IV Push every 6 hours PRN Moderate Pain (4 - 6)  HYDROmorphone  Injectable 0.5 milliGRAM(s) IV Push every 6 hours PRN Severe Pain (7 - 10)  melatonin 3 milliGRAM(s) Oral at bedtime PRN Insomnia  traMADol 25 milliGRAM(s) Oral every 12 hours PRN Mild Pain (1 - 3)      Allergies    No Known Allergies    Intolerances      REVIEW OF SYSTEMS: Negative except as per HPI.    VITAL SIGNS:   Vital Signs Last 24 Hrs  T(C): 36.5 (19 Dec 2024 03:58), Max: 37.1 (18 Dec 2024 23:54)  T(F): 97.7 (19 Dec 2024 03:58), Max: 98.7 (18 Dec 2024 23:54)  HR: 78 (19 Dec 2024 06:00) (57 - 86)  BP: 148/76 (19 Dec 2024 06:00) (148/76 - 202/66)  BP(mean): --  RR: 18 (19 Dec 2024 04:25) (16 - 18)  SpO2: 96% (19 Dec 2024 04:25) (93% - 99%)    Parameters below as of 19 Dec 2024 04:25  Patient On (Oxygen Delivery Method): nasal cannula  O2 Flow (L/min): 2      I&O's Summary    18 Dec 2024 07:01  -  19 Dec 2024 07:00  --------------------------------------------------------  IN: 0 mL / OUT: 850 mL / NET: -850 mL        PHYSICAL EXAM:  Constitutional: in moderate distress due to pain  HEENT NC/AT, moist mucous membranes  Pulmonary: Non-labored, breath sounds are clear bilaterally, no wheezing, rales or rhonchi  Cardiovascular: +S1, S2, RRR, no murmur  Gastrointestinal: Soft, nontender, nondistended, normoactive bowel sounds  Extremities: 1+ b/l ankle edema, non pitting. L hip TTP  Neurological: Alert, strength and sensitivity are grossly intact  Skin: No obvious lesions/rashes  Psych: Mood & affect appropriate    LABS: All Labs Reviewed:                        10.6 7.69  )-----------( 173      ( 19 Dec 2024 04:30 )             32.4                         11.2   10.37 )-----------( 202      ( 18 Dec 2024 18:50 )             34.1     19 Dec 2024 04:30    143    |  114    |  33     ----------------------------<  187    3.6     |  24     |  1.80   18 Dec 2024 18:50    141    |  109    |  41     ----------------------------<  183    3.7     |  23     |  2.00     Ca    9.1        19 Dec 2024 04:30  Ca    9.5        18 Dec 2024 18:50    TPro  6.7    /  Alb  3.2    /  TBili  0.7    /  DBili  x      /  AST  30     /  ALT  24     /  AlkPhos  152    19 Dec 2024 04:30    PT/INR - ( 19 Dec 2024 04:30 )   PT: 13.5 sec;   INR: 1.15 ratio         PTT - ( 19 Dec 2024 04:30 )  PTT:28.7 sec        EKG: sinus rhythm with 1st deg heart block with PVCs, rate 64, Qtc 491    RADIOLOGY:    < from: Xray Chest 1 View- PORTABLE-Urgent (Xray Chest 1 View- PORTABLE-Urgent .) (12.18.24 @ 18:52) >  IMPRESSION: No acute cardiopulmonary disease process. Cardiomegaly.    < end of copied text >     [facility]    Aydee Escoto, Kedar, Nadira, Jacey, Hugo, Natan       [phone] (office)    Reason for Consult:    Interval HPI: Patient seen and examined at bedside. No acute events overnight. Patient scheduled for hemiarthroplasty of L hip with Dr. De Luna today. Patient has no cardiac history, does not follow outpatient with a cardiologist. Last echo was 2023 before last R hip arthroplasty showing EF 64%, trace MR. Patient complaining of back and L hip pain this morning. Denies any chest pain, palpitations, arm pain, increased leg swelling, shortness of breath. States she takes lasix at home but was never told she has heart failure. She used to be on HCTZ for HTN but switched to lasix per outpt nephrologist due to worsening kidney function on HCTZ. States when not in pain, able to walk with walker for 4-5 blocks without being short of breath and has no issues going up and down stairs with being short of breath.    Of note, 11/2023 patient had pre-operative transient tachycardia which resolved without intervention.     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 Velasquez as well as R hip IMN w/ Dr. De Luna in 2023, s/p WALLANT R carpal tunnel release w/ Dr. Andrew 2 days ago (12/16/24) previous right hip fx w/ surgical pinning presents to the ED for L hip pain. while  ambulating w/ her walker earlier today, was in a chair when she heard a "pop" followed by immediate pain and difficulty bearing weight. Was assisted to her bed and was then unable to get up - prompting ambulance transfer to [facility] ED. Denies head strike/LOC/other orthopedic injuries at this time. Unable to ambulate since injury this morning (at baseline ambulates with walker). Denies numbness/tingling in the affected extremity. Patient takes no AC meds. Denies any current chest pain, sob, abd pain, Complaining of L sided hip pain.    Per son at bedside, patient had UTI last week was treated with course of nitrofurantoin, completed her course.    In the ED:  Vitals: T 98.2 HR 57 /66 RR 16 RA  Labs: H/H 11.2/34.1 PT/INR 14.1/1.21 BUN/Cr 41/2 Glu 183 GFR 23  Given: ofirmev x1, fentanyl 25mcg IV x1, morphine 4mg IV x1, on 75 cc/hr NS in the ED  CXR cardiomegaly, no acute cardiopulm disease.  EKG: SR 1st degree AVB w/ PVCs 64 bpm QTc 491     (18 Dec 2024 21:04)      PAST MEDICAL & SURGICAL HISTORY:  Hypertension  Diabetes  TIA (transient ischemic attack)  Hip fracture  HLD (hyperlipidemia)  S/P ORIF (open reduction internal fixation) fracture  right hip      SOCIAL HISTORY: No active tobacco, alcohol or illicit drug use    FAMILY HISTORY:  FH: asthma (Father)      Home Medications:  aspirin 81 mg oral delayed release tablet: 1 tab(s) orally every other day (18 Dec 2024 23:36)  atorvastatin 40 mg oral tablet: 1 tab(s) orally once a day (at bedtime) (18 Dec 2024 23:36)  claritin 24hr: as needed (18 Dec 2024 23:36)  famotidine 20 mg oral tablet: 1 tab(s) orally once a day (18 Dec 2024 23:36)  ferrous sulfate 324 mg (65 mg elemental iron) oral tablet: 1 tab(s) orally once a day (18 Dec 2024 23:36)  furosemide 20 mg oral tablet: 1 tab(s) orally once a day (18 Dec 2024 23:36)  Januvia 25 mg oral tablet: 1 tab(s) orally once a day (18 Dec 2024 23:36)  losartan 25 mg oral tablet: 1 tab(s) orally once a day (18 Dec 2024 23:32)  Metoprolol Succinate  mg oral tablet, extended release: 1 tab(s) orally once a day (18 Dec 2024 23:36)      MEDICATIONS  (STANDING):  aspirin  chewable 81 milliGRAM(s) Oral daily  atorvastatin 40 milliGRAM(s) Oral at bedtime  dextrose 5%. 1000 milliLiter(s) (50 mL/Hr) IV Continuous <Continuous>  dextrose 5%. 1000 milliLiter(s) (100 mL/Hr) IV Continuous <Continuous>  dextrose 50% Injectable 25 Gram(s) IV Push once  dextrose 50% Injectable 12.5 Gram(s) IV Push once  dextrose 50% Injectable 25 Gram(s) IV Push once  glucagon  Injectable 1 milliGRAM(s) IntraMuscular once  insulin lispro (ADMELOG) corrective regimen sliding scale   SubCutaneous every 6 hours  losartan 25 milliGRAM(s) Oral daily  metoprolol succinate  milliGRAM(s) Oral daily  polyethylene glycol 3350 17 Gram(s) Oral daily  senna 2 Tablet(s) Oral at bedtime  sodium chloride 0.9%. 1000 milliLiter(s) (75 mL/Hr) IV Continuous <Continuous>    MEDICATIONS  (PRN):  dextrose Oral Gel 15 Gram(s) Oral once PRN Blood Glucose LESS THAN 70 milliGRAM(s)/deciliter  HYDROmorphone  Injectable 0.2 milliGRAM(s) IV Push every 6 hours PRN Moderate Pain (4 - 6)  HYDROmorphone  Injectable 0.5 milliGRAM(s) IV Push every 6 hours PRN Severe Pain (7 - 10)  melatonin 3 milliGRAM(s) Oral at bedtime PRN Insomnia  traMADol 25 milliGRAM(s) Oral every 12 hours PRN Mild Pain (1 - 3)      Allergies    No Known Allergies    Intolerances      REVIEW OF SYSTEMS: Negative except as per HPI.    VITAL SIGNS:   Vital Signs Last 24 Hrs  T(C): 36.5 (19 Dec 2024 03:58), Max: 37.1 (18 Dec 2024 23:54)  T(F): 97.7 (19 Dec 2024 03:58), Max: 98.7 (18 Dec 2024 23:54)  HR: 78 (19 Dec 2024 06:00) (57 - 86)  BP: 148/76 (19 Dec 2024 06:00) (148/76 - 202/66)  BP(mean): --  RR: 18 (19 Dec 2024 04:25) (16 - 18)  SpO2: 96% (19 Dec 2024 04:25) (93% - 99%)    Parameters below as of 19 Dec 2024 04:25  Patient On (Oxygen Delivery Method): nasal cannula  O2 Flow (L/min): 2      I&O's Summary    18 Dec 2024 07:01  -  19 Dec 2024 07:00  --------------------------------------------------------  IN: 0 mL / OUT: 850 mL / NET: -850 mL        PHYSICAL EXAM:  Constitutional: in moderate distress due to pain  HEENT NC/AT, moist mucous membranes  Pulmonary: Non-labored, breath sounds are clear bilaterally, no wheezing, rales or rhonchi  Cardiovascular: +S1, S2, RRR, no murmur  Gastrointestinal: Soft, nontender, nondistended, normoactive bowel sounds  Extremities: 1+ b/l ankle edema, non pitting. L hip TTP  Neurological: Alert, strength and sensitivity are grossly intact  Skin: No obvious lesions/rashes  Psych: Mood & affect appropriate    LABS: All Labs Reviewed:                        10.6   7.69  )-----------( 173      ( 19 Dec 2024 04:30 )             32.4                         11.2   10.37 )-----------( 202      ( 18 Dec 2024 18:50 )             34.1     19 Dec 2024 04:30    143    |  114    |  33     ----------------------------<  187    3.6     |  24     |  1.80   18 Dec 2024 18:50    141    |  109    |  41     ----------------------------<  183    3.7     |  23     |  2.00     Ca    9.1        19 Dec 2024 04:30  Ca    9.5        18 Dec 2024 18:50    TPro  6.7    /  Alb  3.2    /  TBili  0.7    /  DBili  x      /  AST  30     /  ALT  24     /  AlkPhos  152    19 Dec 2024 04:30    PT/INR - ( 19 Dec 2024 04:30 )   PT: 13.5 sec;   INR: 1.15 ratio         PTT - ( 19 Dec 2024 04:30 )  PTT:28.7 sec        EKG: sinus rhythm with 1st deg heart block with PVCs, rate 64, Qtc 491    RADIOLOGY:    < from: Xray Chest 1 View- PORTABLE-Urgent (Xray Chest 1 View- PORTABLE-Urgent .) (12.18.24 @ 18:52) >  IMPRESSION: No acute cardiopulmonary disease process. Cardiomegaly.    < end of copied text >

## 2024-12-19 NOTE — SOCIAL WORK PROGRESS NOTE - NSSWPROGRESSNOTE_GEN_ALL_CORE
Sw was consulted for Dispo planning. Pt is Pending PT/OT for discharge recommendations. Sw will con to follow for safe DC planning.

## 2024-12-19 NOTE — PATIENT PROFILE ADULT - SAFE PLACE TO LIVE
CM delivered 2nd IMM delivered within 4 hours for DC, verbal explanation of patient rights at bedside. Pt voiced understanding of discharge MCR rights and is agreeable to discharge. no

## 2024-12-19 NOTE — DISCHARGE NOTE PROVIDER - NSDCFUADDINST_GEN_ALL_CORE_FT
Orthopedic Surgery DC Instructions:    1. PAIN CONTROL: Please take pain medications as needed and as prescribed. If there are issues with pain control, please call Dr. De Luna office.   2. ACTIVITY: WB Status: with assistive devices as needed (i.e. crutches/cane/walker).  3. DVT/PE Prophylaxis: Please take Aspirin 81 BID to prevent blood clots until further instructed by Dr. De Luna.   4. PHYSICAL THERAPY: You should receive physical therapy daily.  5. Sutures: sutures are absorbable  6. BANDAGE: Do NOT remove dressing. This will be changed at your office postoperative visit.  7. BATHING: Showering is allowed, however sponge baths are recommended. You must keep your bandage/splint/cast clean, dry, and intact. Please cover with a plastic bag/wrap to prevent dressing from becoming wet. Do NOT submerge the surgical site in water. Avoid baths/pools/hot tubs until cleared by your surgeon.   8. FOLLOW UP: Follow up with Dr. De Luna  in the office in 1 week after discharge. Please call the office for appointment if you do not already have one.    Follow up with your PCP within one month of discharge Orthopedic Surgery DC Instructions:    1. PAIN CONTROL: Please take pain medications as needed and as prescribed. If there are issues with pain control, please call Dr. De Luna office.   2. ACTIVITY: WB Status: with assistive devices as needed (i.e. crutches/cane/walker).  3. DVT/PE Prophylaxis: Please take Aspirin 81 BID to prevent blood clots until further instructed by Dr. De Luna.   4. PHYSICAL THERAPY: You should receive physical therapy daily.  5. Sutures: sutures are absorbable  6. BANDAGE: Do NOT remove dressing. This will be changed at your office postoperative visit.  7. BATHING: Showering is allowed, however sponge baths are recommended. You must keep your bandage/splint/cast clean, dry, and intact. Please cover with a plastic bag/wrap to prevent dressing from becoming wet. Do NOT submerge the surgical site in water. Avoid baths/pools/hot tubs until cleared by your surgeon.   8. FOLLOW UP: Follow up with Dr. De Luna  in the office in 1 week after discharge. Please call the office for appointment if you do not already have one.    Follow up with your PCP within one month of discharge  9. Due to the patients recent carpal tunnel surgery the patient must obtain a rolling walker with forearm supports as she is not able to bear weight about the R wrist/hand. Please ensure this is ordered and delivered to the patient immediately upon return to the rehab facility. Please ensure that the patient remains NWB to the R wrist and hand until cleared by Dr. Andrew her original hand surgeon. Orthopedic Surgery DC Instructions:    1. PAIN CONTROL: Please take pain medications as needed and as prescribed. If there are issues with pain control, please call Dr. De Luna office.   2. ACTIVITY: WB Status: with assistive devices as needed (i.e. crutches/cane/walker).  3. DVT/PE Prophylaxis: Please take Aspirin 81 BID to prevent blood clots until further instructed by Dr. De Luna.   4. PHYSICAL THERAPY: You should receive physical therapy daily.  5. Sutures: sutures are absorbable  6. BANDAGE: Do NOT remove dressing. This will be changed at your office postoperative visit.  7. BATHING: Showering is allowed, however sponge baths are recommended. You must keep your bandage/splint/cast clean, dry, and intact. Please cover with a plastic bag/wrap to prevent dressing from becoming wet. Do NOT submerge the surgical site in water. Avoid baths/pools/hot tubs until cleared by your surgeon.   8. FOLLOW UP: Follow up with Dr. De Luna  in the office in 1 week after discharge. Please call the office for appointment if you do not already have one.    Follow up with your PCP within one month of discharge  9. Due to the patients recent carpal tunnel surgery the patient must obtain a rolling walker with forearm supports as she is not able to bear weight about the R wrist/hand. Please ensure this is ordered and delivered to the patient immediately upon return to the rehab facility. Please ensure that the patient remains NWB to the R wrist and hand until cleared by Dr. Andrew her original hand surgeon. Please provide forearm support attachments for the walkers available at the facility until she is able to obtain a forearm rolling waker.

## 2024-12-19 NOTE — CONSULT NOTE ADULT - SUBJECTIVE AND OBJECTIVE BOX
[facility]                             Nephrology and Hypertension                             Marcin Kaplan                                          [phone]     Patient is a 89y old  Female who presents with a chief complaint of L hip fracture (19 Dec 2024 11:24)       HPI:  89 yo female w/ pmh of HTN, HLD, T2DM, TIA, CKD III, Hx of R hip CRPP in 2016 followed by hardware removal in 2017 w/ Dr. Velasquez as well as R hip IMN w/ Dr. De Luna in 2023, s/p WALLANT R carpal tunnel release w/ Dr. Andrew 2 days ago (12/16/24) previous right hip fx w/ surgical pinning presents to the ED for L hip pain. while  ambulating w/ her walker earlier today, was in a chair when she heard a "pop" followed by immediate pain and difficulty bearing weight. Was assisted to her bed and was then unable to get up - prompting ambulance transfer to [facility] ED. Denies head strike/LOC/other orthopedic injuries at this time. Unable to ambulate since injury this morning (at baseline ambulates with walker). Denies numbness/tingling in the affected extremity. Patient takes no AC meds. Denies any current chest pain, sob, abd pain, Complaining of L sided hip pain.Per son at bedside, patient had UTI last week was treated with course of nitrofurantoin, completed her course.    Nephrology is c/s for ELVIN on CKD today. I saw her in the ED, daugther at bedside. She has c.o pain. She does not recall events. She has CKd but does not see Nephrology. Per daughter, baseline SCr is 1.8-1.9. On chart review, baseline SCr is 1.5-1.9, SCr was >2 on admission is now downtrending. She does endorse not eating as well. She has no sob or dizziness when I saw her. Is s/p bolus and now on maint ivf.     In the ED:  Vitals: T 98.2 HR 57 /66 RR 16 RA  Labs: H/H 11.2/34.1 PT/INR 14.1/1.21 BUN/Cr 41/2 Glu 183 GFR 23  Given: ofirmev x1, fentanyl 25mcg IV x1, morphine 4mg IV x1, on 75 cc/hr NS in the ED  CXR cardiomegaly, no acute cardiopulm disease.  EKG: SR 1st degree AVB w/ PVCs 64 bpm QTc 491         PAST MEDICAL & SURGICAL HISTORY:  Hypertension      Diabetes      TIA (transient ischemic attack)      Hip fracture      HLD (hyperlipidemia)      S/P ORIF (open reduction internal fixation) fracture  right hip           FAMILY HISTORY:  FH: asthma (Father)    NC    Social History:Non smoker    MEDICATIONS  (STANDING):  atorvastatin 40 milliGRAM(s) Oral at bedtime  dextrose 5%. 1000 milliLiter(s) (50 mL/Hr) IV Continuous <Continuous>  dextrose 5%. 1000 milliLiter(s) (100 mL/Hr) IV Continuous <Continuous>  dextrose 50% Injectable 25 Gram(s) IV Push once  dextrose 50% Injectable 12.5 Gram(s) IV Push once  dextrose 50% Injectable 25 Gram(s) IV Push once  glucagon  Injectable 1 milliGRAM(s) IntraMuscular once  insulin lispro (ADMELOG) corrective regimen sliding scale   SubCutaneous every 6 hours  losartan 25 milliGRAM(s) Oral daily  metoprolol succinate  milliGRAM(s) Oral daily  polyethylene glycol 3350 17 Gram(s) Oral daily  senna 2 Tablet(s) Oral at bedtime  sodium chloride 0.9%. 1000 milliLiter(s) (75 mL/Hr) IV Continuous <Continuous>    MEDICATIONS  (PRN):  dextrose Oral Gel 15 Gram(s) Oral once PRN Blood Glucose LESS THAN 70 milliGRAM(s)/deciliter  HYDROmorphone  Injectable 0.5 milliGRAM(s) IV Push every 4 hours PRN Moderate Pain (4 - 6)  HYDROmorphone  Injectable 1 milliGRAM(s) IV Push every 4 hours PRN Severe Pain (7 - 10)  melatonin 3 milliGRAM(s) Oral at bedtime PRN Insomnia  traMADol 25 milliGRAM(s) Oral every 12 hours PRN Mild Pain (1 - 3)   Meds reviewed    Allergies    No Known Allergies    Intolerances         REVIEW OF SYSTEMS: as per HPI    Review of Systems:   Constitutional: Denies fatigue  HEENT: Denies headaches and dizziness  Respiratory: denies SOB, cough, or wheezing  Cardiovascular: denies CP, palpitations  Gastrointestinal: Denies nausea, denies vomiting, diarrhea, constipation, abdominal pain, or bloody stools  Genitourinary: denies painful urination, increased frequency, urgency, or bloody urine  Skin: denies rashes or itching  Musculoskeletal: denies muscle aches, joint swelling  Neurologic: Denies generalized weakness, denies loss of sensation, numbness, or tingling      Vital Signs Last 24 Hrs  T(C): 36.7 (19 Dec 2024 11:53), Max: 37.1 (18 Dec 2024 23:54)  T(F): 98.1 (19 Dec 2024 11:53), Max: 98.7 (18 Dec 2024 23:54)  HR: 80 (19 Dec 2024 11:53) (57 - 86)  BP: 170/110 (19 Dec 2024 11:53) (148/76 - 202/66)  BP(mean): --  RR: 17 (19 Dec 2024 11:53) (16 - 18)  SpO2: 97% (19 Dec 2024 11:53) (93% - 99%)    Parameters below as of 19 Dec 2024 04:25  Patient On (Oxygen Delivery Method): nasal cannula  O2 Flow (L/min): 2    Daily Height in cm: 152.4 (18 Dec 2024 21:04)    Daily     PHYSICAL EXAM:    GENERAL: NAD  HEAD:  Atraumatic, Normocephalic  EYES: EOMI, conjunctiva and sclera clear  ENMT: No Drainage from nares, No drainage from ears  NERVOUS SYSTEM:  Awake and Alert  CHEST/LUNG: Clear to percussion bilaterally  EXTREMITIES:  No Edema, RUE cast noted, decreased ROM LE  SKIN: No rashes No obvious ecchymosis      LABS:                        10.6   7.69  )-----------( 173      ( 19 Dec 2024 04:30 )             32.4     12-19    143  |  114[H]  |  33[H]  ----------------------------<  187[H]  3.6   |  24  |  1.80[H]    Ca    9.1      19 Dec 2024 04:30    TPro  6.7  /  Alb  3.2[L]  /  TBili  0.7  /  DBili  x   /  AST  30  /  ALT  24  /  AlkPhos  152[H]  12-19    PT/INR - ( 19 Dec 2024 04:30 )   PT: 13.5 sec;   INR: 1.15 ratio         PTT - ( 19 Dec 2024 04:30 )  PTT:28.7 sec  Urinalysis Basic - ( 19 Dec 2024 04:30 )    Color: x / Appearance: x / SG: x / pH: x  Gluc: 187 mg/dL / Ketone: x  / Bili: x / Urobili: x   Blood: x / Protein: x / Nitrite: x   Leuk Esterase: x / RBC: x / WBC x   Sq Epi: x / Non Sq Epi: x / Bacteria: x              RADIOLOGY & ADDITIONAL TESTS: noted

## 2024-12-19 NOTE — CONSULT NOTE ADULT - ASSESSMENT
ELVIN on CKD 3  HTN with CKD 3  Anemia  Hip fracture  Volume depletion      -ELVIN likely 2/2 IV volume depletion  -SCR baseline is 1.5-1.9, is back to baseline now  -SCr was 2 on admission  -Electrolytes are normal  -Will monitor labs, hold renal imaging since renal function better  -Check iron panel with am labs  -Continue ivf while npo, once eating can dc  -OR today for hip fracture  -BP elevated in setting of pain, started on ARB this am, if SCr rises with this, would rec holding and change to bb or ccb for bp control    Thank you for consult, call with questions.  ELVIN on CKD 3  HTN with CKD 3  Anemia  Hip fracture  Volume depletion      -ELVIN likely 2/2 IV volume depletion  -SCR baseline is 1.5-1.9, is back to baseline now  -SCr was 2 on admission  -Electrolytes are normal  -Will monitor labs, hold renal imaging since renal function better  -Check iron panel with am labs  -Continue ivf while npo, once eating can dc  -OR today for hip fracture  -BP elevated in setting of pain, started on ARB this am, if SCr rises with this, would rec holding and change to CCB, is already on BB also    Thank you for consult, call with questions.

## 2024-12-19 NOTE — PROGRESS NOTE ADULT - SUBJECTIVE AND OBJECTIVE BOX
Postop Check    Patient tolerated the procedure well. Patient seen and examined at bedside in PACU. No acute complaints at this time. Pain well controlled. Denies chest pain, shortness of breath, nausea or vomiting.     PE:  Vital Signs Last 24 Hrs  T(C): 36.9 (12-19-24 @ 17:10), Max: 37.1 (12-18-24 @ 23:54)  T(F): 98.4 (12-19-24 @ 17:10), Max: 98.7 (12-18-24 @ 23:54)  HR: 67 (12-19-24 @ 17:30) (58 - 86)  BP: 198/70 (12-19-24 @ 17:30) (134/60 - 199/77)  BP(mean): --  RR: 21 (12-19-24 @ 17:30) (17 - 23)  SpO2: 97% (12-19-24 @ 17:30) (93% - 99%)    General: NAD, resting comfortably in bed  LLE:   Dressing in place C/D/I  SCD in place bilaterally  No calf tenderness   Motor: + EHL/FHL/TA/GSC  Sensory: SILT SPN/DPN/Yury/Saph/Tib  DP/PT 2+      A/P:  89y f s/p L hip hemiarthroplasty w/ Dr. De Luna POD 0  -PT/OT  -WBAT LLE w/ posterior precautions - abduction pillow on while lying down and sitting  -NWB R hand - otherwise WBAT from R forearm and above, can use platform walker  -Pain Control  -DVT ppx: per primary - start POD1 if feasible  -Anderson out POD1 if feasible  -Continue perioperative abx x 24 hours  -FU AM Labs  -Rest, ice, compress, and elevate the extremity as tolerated  -Incentive Spirometry  -Medical management appreciated  -Dispo per PT  -D/w Dr. De Luna, will update plan as needed

## 2024-12-19 NOTE — DISCHARGE NOTE PROVIDER - NSDCCPTREATMENT_GEN_ALL_CORE_FT
PRINCIPAL PROCEDURE  Procedure: Hemiarthroplasty, hip, for femur fracture, with total hip arthroplasty if indicated  Findings and Treatment:

## 2024-12-19 NOTE — DISCHARGE NOTE PROVIDER - NSDCACTIVITY_GEN_ALL_CORE
Follow Instructions Provided by your Surgical Team No heavy lifting/straining/Follow Instructions Provided by your Surgical Team Bathing allowed/Do not drive or operate machinery/Walking - Indoors allowed/No heavy lifting/straining/Follow Instructions Provided by your Surgical Team

## 2024-12-19 NOTE — PATIENT PROFILE ADULT - FALL HARM RISK - RISK INTERVENTIONS

## 2024-12-19 NOTE — PROGRESS NOTE ADULT - PROBLEM SELECTOR PLAN 2
Patient with hx of TIA( per son at bedside was ~2017)  - Patient on aspirin and atorvastatin, to continue starting tomorrow - per son at bedside, received all medications this AM  - f/u lipid profile Patient with hx of TIA( per son at bedside was ~2017)  - Patient on aspirin and atorvastatin, to continue starting tomorrow - per son at bedside, received all medications this AM-continue ASA every other day , statin daiy  - f/u lipid profile

## 2024-12-19 NOTE — DISCHARGE NOTE PROVIDER - CARE PROVIDER_API CALL
ALBERTO BALDWIN  [address], [address]  [place] [address]  Phone: [phone]  Fax: [phone]  Follow Up Time:

## 2024-12-19 NOTE — CONSULT NOTE ADULT - SUBJECTIVE AND OBJECTIVE BOX
HPI  89yFemale w/ c/o left hip pain s/p getting up from a chair yesterady Unable to bear weight in the LLE since the fall. Denies headstrike or LOC. Denies numbness/tingling in the LLE. Denies any other trauma/injuries at this time. At baseline, community ambulator w/o assistive devices.    ROS  Negative unless otherwise specified in HPI.    PAST MEDICAL & SURGICAL Hx  PAST MEDICAL & SURGICAL HISTORY:  Hypertension      Diabetes      TIA (transient ischemic attack)      Hip fracture      HLD (hyperlipidemia)      S/P ORIF (open reduction internal fixation) fracture  right hip          MEDICATIONS  Home Medications:  aspirin 81 mg oral delayed release tablet: 1 tab(s) orally every other day (18 Dec 2024 23:36)  atorvastatin 40 mg oral tablet: 1 tab(s) orally once a day (at bedtime) (18 Dec 2024 23:36)  claritin 24hr: as needed (18 Dec 2024 23:36)  famotidine 20 mg oral tablet: 1 tab(s) orally once a day (18 Dec 2024 23:36)  ferrous sulfate 324 mg (65 mg elemental iron) oral tablet: 1 tab(s) orally once a day (18 Dec 2024 23:36)  furosemide 20 mg oral tablet: 1 tab(s) orally once a day (18 Dec 2024 23:36)  Januvia 25 mg oral tablet: 1 tab(s) orally once a day (18 Dec 2024 23:36)  losartan 25 mg oral tablet: 1 tab(s) orally once a day (18 Dec 2024 23:32)  Metoprolol Succinate  mg oral tablet, extended release: 1 tab(s) orally once a day (18 Dec 2024 23:36)      ALLERGIES  No Known Allergies      FAMILY Hx  FAMILY HISTORY:  FH: asthma (Father)        SOCIAL Hx  Social History:  Tobacco: denies  EtOH: denies  Recreational drug use:denies  Lives with: child  ADLs/Ambulates: with walker (18 Dec 2024 21:04)      VITALS  Vital Signs Last 24 Hrs  T(C): 36.9 (19 Dec 2024 17:10), Max: 37.1 (18 Dec 2024 23:54)  T(F): 98.4 (19 Dec 2024 17:10), Max: 98.7 (18 Dec 2024 23:54)  HR: 67 (19 Dec 2024 17:30) (58 - 86)  BP: 198/70 (19 Dec 2024 17:30) (134/60 - 199/77)  BP(mean): --  RR: 21 (19 Dec 2024 17:30) (17 - 23)  SpO2: 97% (19 Dec 2024 17:30) (93% - 99%)    Parameters below as of 19 Dec 2024 17:30  Patient On (Oxygen Delivery Method): nasal cannula  O2 Flow (L/min): 2      PHYSICAL EXAM  Gen: Lying in bed, NAD  Resp: No increased WOB  LLE:  Skin intact, shortened and externally rotated, +edema and +ecchymosis over R hip  +TTP over R hip, no TTP along remainder of extremity; compartments soft  Limited ROM at hip 2/2 pain  +Log roll test  +Pain with axial loading  Motor: TA/EHL/GS/FHL intact  Sensory: DP/SP/Tib/Yury/Saph SILT  +DP pulse, WWP    Secondary survey:  No TTP along spine or other extremities, pelvis grossly stable, SILT and compartments soft throughout    LABS                        10.6   7.69  )-----------( 173      ( 19 Dec 2024 04:30 )             32.4     12-19    143  |  114[H]  |  33[H]  ----------------------------<  187[H]  3.6   |  24  |  1.80[H]    Ca    9.1      19 Dec 2024 04:30    TPro  6.7  /  Alb  3.2[L]  /  TBili  0.7  /  DBili  x   /  AST  30  /  ALT  24  /  AlkPhos  152[H]  12-19    PT/INR - ( 19 Dec 2024 04:30 )   PT: 13.5 sec;   INR: 1.15 ratio         PTT - ( 19 Dec 2024 04:30 )  PTT:28.7 sec    IMAGING  XRs: L femoral neck fx (personal read)    The patient is a 89-year-old female with a past medical history as below sustained above injury following mechanical fall. Admitted to the hospital for medical optimization. Physical exam reveals closed injury and intact neurovascular exam. Radiographs demonstrate femoral neck hip fracture; severe osteoporosis. Based on injury pattern and desire to promote mobilization and decrease pain, surgical intervention recommended. The risks, benefits, alternatives of various treatment options were discussed with the patient and family.      Non-operative treatment:   Benefit - No surgical/anesthetic risk  Risks - Persistent pain, malunion/nonunion, delayed mobilization/ prolonged immobility and attendant medical risks.    Open reduction internal fixation  Benefit - higher rate of fracture union, better chance for more anatomic alignment of fracture vs nonop; no risk of dislocation  Risks - malunion, nonunion, periprosthetic fracture, general risks of surgery (infection, wound healing problems, persistent pain, neurovascular injury, need for further surgery), avascular necrosis, general perioperative medical risks (cardiac, pulmonary, renal, GI, as well as VTE)    Arthroplasty (partial vs total)  Benefit - immediate weightbearing, mobilization, no risk of fracture malunion/nonunion  Risk - higher blood loss, operative time, periprosthetic fracture, dislocation, leg length inequality vs ORIF    After discussion with the patient and family, would recommend hip hemiarthroplasty. Patient and family agree w/ plan, and informed consent obtained.    The nature and purpose of the total hip replacement, alternative method(s) of treatment, the material risks involved, and the possibility of complications were fully explained to the patient. The patient was told the most common risks and complications associated with a hip replacement include, but are not limited to blood clots in the leg, fatal pulmonary embolism, dislocation of the prosthesis, intraoperative and postoperative fractures of the femur or aceabulum, infection, failure of the prosthesis or grafting materials, complications from anesthesia, reactions to blood transfusions, postoperative leg length inequality, instability of the hip replacement, nerve damage or injury, vascular injury, delayed wound healing, infections, other injury or even death. Also, the patient was told that after undergoing a hip replacement there may still be pain or disability. The patient was informed that the success of this operation in part depends upon the mechanical devices which are going to be implanted and that these devices can fail or malfunction, and may need to be repaired or replaced and there are no guarantees as to the longevity of this device or its part and that it or its parts could fail prematurely. Finally, the patient was asked to follow completely and fully with all advice and recommended treatments, and that recovery and ultimate outcome are affected by their compliance with recommended treatment.           ASSESSMENT & PLAN  In brief, this is a 89yFemale w/ left hip femoral neck fracture.  -NWB LLE, bedrest  -appreciate cardiology and renal recommendations  -OR on 12/19/2024  -f/u preop: CBC, BMP, coags, T&S x2, CXR, EKG,  -NPO past midnight, IVF  -hold chemical DVT ppx for OR; SCDs OK  -Medical clearance/optimization for OR  -pain control  -ice/cold compress  -obtain Vit D level. Supplementation with daily Ca/Vit D if medically appropriate  -Outpt osteoporosis workup

## 2024-12-19 NOTE — ED ADULT NURSE REASSESSMENT NOTE - NSFALLHARMRISKINTERV_ED_ALL_ED

## 2024-12-19 NOTE — ED ADULT NURSE REASSESSMENT NOTE - NS ED NURSE REASSESS COMMENT FT1
Report rec'd for pt in ED admitted for hip fx. Pt reports some relief from pain, VS hypertensive but stable. Pt sleeping but easy to arouse. Family at bedside, bed in lowest position, safety maintained, pt placed on cardiac monitor for tele admission. Monitoring ongoing.
Patient received from EDMUNDO Angel. Patient A&O4, son at bedside. awaiting bed assignment. Bs pending.

## 2024-12-19 NOTE — DISCHARGE NOTE PROVIDER - NSDCFUSCHEDAPPT_GEN_ALL_CORE_FT
[facility]  ORTHOSURG [address]  Scheduled Appointment: 01/03/2025

## 2024-12-20 ENCOUNTER — TRANSCRIPTION ENCOUNTER (OUTPATIENT)
Age: 89
End: 2024-12-20

## 2024-12-20 LAB
ALBUMIN SERPL ELPH-MCNC: 2.8 G/DL — LOW (ref 3.3–5)
ALBUMIN SERPL ELPH-MCNC: 2.9 G/DL — LOW (ref 3.3–5)
ALP SERPL-CCNC: 135 U/L — HIGH (ref 40–120)
ALP SERPL-CCNC: 137 U/L — HIGH (ref 40–120)
ALT FLD-CCNC: 22 U/L — SIGNIFICANT CHANGE UP (ref 12–78)
ALT FLD-CCNC: 24 U/L — SIGNIFICANT CHANGE UP (ref 12–78)
ANION GAP SERPL CALC-SCNC: 10 MMOL/L — SIGNIFICANT CHANGE UP (ref 5–17)
ANION GAP SERPL CALC-SCNC: 8 MMOL/L — SIGNIFICANT CHANGE UP (ref 5–17)
AST SERPL-CCNC: 38 U/L — HIGH (ref 15–37)
AST SERPL-CCNC: 46 U/L — HIGH (ref 15–37)
BILIRUB SERPL-MCNC: 0.5 MG/DL — SIGNIFICANT CHANGE UP (ref 0.2–1.2)
BILIRUB SERPL-MCNC: 0.5 MG/DL — SIGNIFICANT CHANGE UP (ref 0.2–1.2)
BUN SERPL-MCNC: 28 MG/DL — HIGH (ref 7–23)
BUN SERPL-MCNC: 31 MG/DL — HIGH (ref 7–23)
CALCIUM SERPL-MCNC: 8.7 MG/DL — SIGNIFICANT CHANGE UP (ref 8.5–10.1)
CALCIUM SERPL-MCNC: 9.1 MG/DL — SIGNIFICANT CHANGE UP (ref 8.5–10.1)
CHLORIDE SERPL-SCNC: 111 MMOL/L — HIGH (ref 96–108)
CHLORIDE SERPL-SCNC: 111 MMOL/L — HIGH (ref 96–108)
CO2 SERPL-SCNC: 21 MMOL/L — LOW (ref 22–31)
CO2 SERPL-SCNC: 22 MMOL/L — SIGNIFICANT CHANGE UP (ref 22–31)
CREAT SERPL-MCNC: 1.6 MG/DL — HIGH (ref 0.5–1.3)
CREAT SERPL-MCNC: 1.8 MG/DL — HIGH (ref 0.5–1.3)
EGFR: 26 ML/MIN/1.73M2 — LOW
EGFR: 30 ML/MIN/1.73M2 — LOW
FERRITIN SERPL-MCNC: 250 NG/ML — SIGNIFICANT CHANGE UP (ref 13–330)
GLUCOSE BLDC GLUCOMTR-MCNC: 133 MG/DL — HIGH (ref 70–99)
GLUCOSE BLDC GLUCOMTR-MCNC: 165 MG/DL — HIGH (ref 70–99)
GLUCOSE BLDC GLUCOMTR-MCNC: 189 MG/DL — HIGH (ref 70–99)
GLUCOSE SERPL-MCNC: 195 MG/DL — HIGH (ref 70–99)
GLUCOSE SERPL-MCNC: 203 MG/DL — HIGH (ref 70–99)
HCT VFR BLD CALC: 31.5 % — LOW (ref 34.5–45)
HCT VFR BLD CALC: 31.6 % — LOW (ref 34.5–45)
HGB BLD-MCNC: 10.3 G/DL — LOW (ref 11.5–15.5)
HGB BLD-MCNC: 10.4 G/DL — LOW (ref 11.5–15.5)
IRON SATN MFR SERPL: 12 % — LOW (ref 14–50)
IRON SATN MFR SERPL: 26 UG/DL — LOW (ref 30–160)
MAGNESIUM SERPL-MCNC: 1.9 MG/DL — SIGNIFICANT CHANGE UP (ref 1.6–2.6)
MCHC RBC-ENTMCNC: 27.2 PG — SIGNIFICANT CHANGE UP (ref 27–34)
MCHC RBC-ENTMCNC: 27.6 PG — SIGNIFICANT CHANGE UP (ref 27–34)
MCHC RBC-ENTMCNC: 32.6 G/DL — SIGNIFICANT CHANGE UP (ref 32–36)
MCHC RBC-ENTMCNC: 33 G/DL — SIGNIFICANT CHANGE UP (ref 32–36)
MCV RBC AUTO: 83.4 FL — SIGNIFICANT CHANGE UP (ref 80–100)
MCV RBC AUTO: 83.6 FL — SIGNIFICANT CHANGE UP (ref 80–100)
NRBC # BLD: 0 /100 WBCS — SIGNIFICANT CHANGE UP (ref 0–0)
NRBC # BLD: 0 /100 WBCS — SIGNIFICANT CHANGE UP (ref 0–0)
PHOSPHATE SERPL-MCNC: 4.1 MG/DL — SIGNIFICANT CHANGE UP (ref 2.5–4.5)
PLATELET # BLD AUTO: 148 K/UL — LOW (ref 150–400)
PLATELET # BLD AUTO: 168 K/UL — SIGNIFICANT CHANGE UP (ref 150–400)
POTASSIUM SERPL-MCNC: 3.8 MMOL/L — SIGNIFICANT CHANGE UP (ref 3.5–5.3)
POTASSIUM SERPL-MCNC: 3.8 MMOL/L — SIGNIFICANT CHANGE UP (ref 3.5–5.3)
POTASSIUM SERPL-SCNC: 3.8 MMOL/L — SIGNIFICANT CHANGE UP (ref 3.5–5.3)
POTASSIUM SERPL-SCNC: 3.8 MMOL/L — SIGNIFICANT CHANGE UP (ref 3.5–5.3)
PROT SERPL-MCNC: 6.4 G/DL — SIGNIFICANT CHANGE UP (ref 6–8.3)
PROT SERPL-MCNC: 6.6 G/DL — SIGNIFICANT CHANGE UP (ref 6–8.3)
RBC # BLD: 3.77 M/UL — LOW (ref 3.8–5.2)
RBC # BLD: 3.79 M/UL — LOW (ref 3.8–5.2)
RBC # FLD: 14 % — SIGNIFICANT CHANGE UP (ref 10.3–14.5)
RBC # FLD: 14.1 % — SIGNIFICANT CHANGE UP (ref 10.3–14.5)
SODIUM SERPL-SCNC: 141 MMOL/L — SIGNIFICANT CHANGE UP (ref 135–145)
SODIUM SERPL-SCNC: 142 MMOL/L — SIGNIFICANT CHANGE UP (ref 135–145)
TIBC SERPL-MCNC: 225 UG/DL — SIGNIFICANT CHANGE UP (ref 220–430)
UIBC SERPL-MCNC: 198 UG/DL — SIGNIFICANT CHANGE UP (ref 110–370)
WBC # BLD: 9.35 K/UL — SIGNIFICANT CHANGE UP (ref 3.8–10.5)
WBC # BLD: 9.7 K/UL — SIGNIFICANT CHANGE UP (ref 3.8–10.5)
WBC # FLD AUTO: 9.35 K/UL — SIGNIFICANT CHANGE UP (ref 3.8–10.5)
WBC # FLD AUTO: 9.7 K/UL — SIGNIFICANT CHANGE UP (ref 3.8–10.5)

## 2024-12-20 PROCEDURE — 99232 SBSQ HOSP IP/OBS MODERATE 35: CPT

## 2024-12-20 PROCEDURE — 99221 1ST HOSP IP/OBS SF/LOW 40: CPT

## 2024-12-20 RX ORDER — ONDANSETRON 4 MG/1
1 TABLET ORAL
Qty: 20 | Refills: 0
Start: 2024-12-20 | End: 2024-12-24

## 2024-12-20 RX ORDER — OXYCODONE HCL 15 MG
1 TABLET ORAL
Qty: 20 | Refills: 0
Start: 2024-12-20 | End: 2024-12-24

## 2024-12-20 RX ORDER — POLYETHYLENE GLYCOL 3350 17 G/DOSE
17 POWDER (GRAM) ORAL
Refills: 0 | Status: DISCONTINUED | OUTPATIENT
Start: 2024-12-20 | End: 2024-12-23

## 2024-12-20 RX ORDER — DEXTROSE MONOHYDRATE 25 G/50ML
15 INJECTION, SOLUTION INTRAVENOUS ONCE
Refills: 0 | Status: DISCONTINUED | OUTPATIENT
Start: 2024-12-20 | End: 2024-12-23

## 2024-12-20 RX ORDER — SODIUM CHLORIDE 9 MG/ML
1000 INJECTION, SOLUTION INTRAVENOUS
Refills: 0 | Status: DISCONTINUED | OUTPATIENT
Start: 2024-12-20 | End: 2024-12-23

## 2024-12-20 RX ORDER — ASPIRIN 81 MG
81 TABLET, DELAYED RELEASE (ENTERIC COATED) ORAL
Refills: 0 | Status: DISCONTINUED | OUTPATIENT
Start: 2024-12-21 | End: 2024-12-23

## 2024-12-20 RX ORDER — DEXTROSE MONOHYDRATE 25 G/50ML
25 INJECTION, SOLUTION INTRAVENOUS ONCE
Refills: 0 | Status: DISCONTINUED | OUTPATIENT
Start: 2024-12-20 | End: 2024-12-23

## 2024-12-20 RX ORDER — INSULIN LISPRO 100/ML
VIAL (ML) SUBCUTANEOUS
Refills: 0 | Status: DISCONTINUED | OUTPATIENT
Start: 2024-12-20 | End: 2024-12-23

## 2024-12-20 RX ORDER — DEXTROSE MONOHYDRATE 25 G/50ML
12.5 INJECTION, SOLUTION INTRAVENOUS ONCE
Refills: 0 | Status: DISCONTINUED | OUTPATIENT
Start: 2024-12-20 | End: 2024-12-23

## 2024-12-20 RX ORDER — NALOXONE HCL 0.4 MG/ML
1 VIAL (ML) INJECTION
Qty: 4 | Refills: 0
Start: 2024-12-20 | End: 2024-12-20

## 2024-12-20 RX ORDER — ASPIRIN 81 MG
1 TABLET, DELAYED RELEASE (ENTERIC COATED) ORAL
Qty: 30 | Refills: 0
Start: 2024-12-20 | End: 2025-01-18

## 2024-12-20 RX ORDER — GLUCAGON INJECTION, SOLUTION 0.5 MG/.1ML
1 INJECTION, SOLUTION SUBCUTANEOUS ONCE
Refills: 0 | Status: DISCONTINUED | OUTPATIENT
Start: 2024-12-20 | End: 2024-12-23

## 2024-12-20 RX ORDER — INSULIN LISPRO 100/ML
VIAL (ML) SUBCUTANEOUS AT BEDTIME
Refills: 0 | Status: DISCONTINUED | OUTPATIENT
Start: 2024-12-20 | End: 2024-12-23

## 2024-12-20 RX ADMIN — Medication 100 MILLIGRAM(S): at 09:32

## 2024-12-20 RX ADMIN — Medication 5 MILLIGRAM(S): at 09:32

## 2024-12-20 RX ADMIN — ACETAMINOPHEN 650 MILLIGRAM(S): 80 SOLUTION/ DROPS ORAL at 23:32

## 2024-12-20 RX ADMIN — Medication 5 MILLIGRAM(S): at 00:15

## 2024-12-20 RX ADMIN — Medication 1: at 12:02

## 2024-12-20 RX ADMIN — Medication 100 MILLIGRAM(S): at 02:17

## 2024-12-20 RX ADMIN — Medication 17 GRAM(S): at 22:07

## 2024-12-20 RX ADMIN — ACETAMINOPHEN 650 MILLIGRAM(S): 80 SOLUTION/ DROPS ORAL at 09:31

## 2024-12-20 RX ADMIN — ENOXAPARIN SODIUM 30 MILLIGRAM(S): 60 INJECTION INTRAVENOUS; SUBCUTANEOUS at 09:32

## 2024-12-20 RX ADMIN — Medication 100 MILLIGRAM(S): at 09:36

## 2024-12-20 RX ADMIN — ACETAMINOPHEN 300 MILLIGRAM(S): 80 SOLUTION/ DROPS ORAL at 02:17

## 2024-12-20 RX ADMIN — SENNOSIDES 2 TABLET(S): 8.6 TABLET, FILM COATED ORAL at 22:08

## 2024-12-20 RX ADMIN — ACETAMINOPHEN 650 MILLIGRAM(S): 80 SOLUTION/ DROPS ORAL at 17:33

## 2024-12-20 RX ADMIN — TRAMADOL HYDROCHLORIDE 50 MILLIGRAM(S): 50 TABLET ORAL at 14:11

## 2024-12-20 RX ADMIN — TRAMADOL HYDROCHLORIDE 50 MILLIGRAM(S): 50 TABLET ORAL at 18:11

## 2024-12-20 RX ADMIN — LOSARTAN POTASSIUM 25 MILLIGRAM(S): 100 TABLET, FILM COATED ORAL at 09:32

## 2024-12-20 RX ADMIN — ATORVASTATIN CALCIUM 40 MILLIGRAM(S): 40 TABLET, FILM COATED ORAL at 22:08

## 2024-12-20 NOTE — OCCUPATIONAL THERAPY INITIAL EVALUATION ADULT - RANGE OF MOTION EXAMINATION, UPPER EXTREMITY
RUE not assessed due to NWB restrictions. LUE WFL; pt reports tingling that is PTA./bilateral UE Active ROM was WFL  (within functional limits) Distal RUE not assessed due to recent surgery. LUE and proximal RUE grossly WFL; pt reports tingling that is chronic

## 2024-12-20 NOTE — DISCHARGE NOTE NURSING/CASE MANAGEMENT/SOCIAL WORK - SOCIAL WORKER'S NAME
Zo Carias // Zo Carias // Marly Shah, [facility] [phone]

## 2024-12-20 NOTE — PROGRESS NOTE ADULT - PROBLEM SELECTOR PLAN 3
Chronic on Januvia 25 qd  - A1c 8.4 from 7.7 last May  -Low ISS  - FS q6h  - NPO  -Hypoglycemic protocol.  -Diabetes NP consulted, recs appreciated

## 2024-12-20 NOTE — CASE MANAGEMENT PROGRESS NOTE - NSCMPROGRESSNOTE_GEN_ALL_CORE
CM consult for stairs noted. patient adm for [facility] and will likely return to [facility]. CM will cont to follow.

## 2024-12-20 NOTE — OCCUPATIONAL THERAPY INITIAL EVALUATION ADULT - PERTINENT HX OF CURRENT PROBLEM, REHAB EVAL
As per EMR; 89 yo female w/ pmh of HTN, HLD, T2DM, TIA, CKD III, Hx of R hip CRPP in 2016 followed by hardware removal in 2017 w/ Dr. Velasquez as well as R hip IMN w/ Dr. De Luna in 2023, s/p WALLANT R carpal tunnel release w/ Dr. Andrew 2 days ago (12/16/24) previous right hip fx w/ surgical pinning presents to the ED for L hip pain. while  ambulating w/ her walker earlier today, was in a chair when she heard a "pop" followed by immediate pain and difficulty bearing weight. Was assisted to her bed and was then unable to get up - prompting ambulance transfer to [facility] ED. Denies head strike/LOC/other orthopedic injuries at this time. Unable to ambulate since injury this morning (at baseline ambulates with walker). Denies numbness/tingling in the affected extremity. Patient takes no AC meds. Denies any current chest pain, sob, abd pain, Complaining of L sided hip pain. S/p L hip hemiarthroplasty on 12/19/24 w/ Dr. De Luna, POD 1.

## 2024-12-20 NOTE — PROGRESS NOTE ADULT - PROBLEM SELECTOR PLAN 1
Pt w/ hx of R hip IMN now with L hip pain x1 day.  Xray left - L femoral neck fracture  - Pain control per orthopedics; tramadol for mild, oxycodone 5 for moderate, 10 for severe  - RCRI Class III risk, METS>4. Patient medically optimized as best as possible for planned operative procedure with orthopedics 12/19. Benefits and risks of procedure explained.  - POD#1 - was able to ambulate with walker and assistance from PT  - Cardiology consult ([facility]) for medical clearance , recs appreciated  - Orthopedics Dr. De Luna following  - PT following - recommend LEONEL Pt w/ hx of R hip IMN now with L hip pain x1 day.  Xray left - L femoral neck fracture-Hemiarthroplasty of left hip  - Pain control per orthopedics; tramadol for mild, oxycodone 5 for moderate, 10 for severe  - POD#1 - was able to ambulate with walker and assistance from PT  - Cardiology consult ([facility]) for medical clearance , recs appreciated  - Orthopedics Dr. De Luna following d/w ok for D/C  - PT following - recommend LEONEL

## 2024-12-20 NOTE — PROGRESS NOTE ADULT - ASSESSMENT
ELVIN on CKD 3  HTN with CKD 3  Anemia  Hip fracture  Volume depletion      -ELVIN likely 2/2 IV volume depletion  -SCR baseline is 1.5-1.9, is back to baseline now  -SCr was 2 on admission  -Electrolytes are normal  -Will monitor labs, hold renal imaging since renal function better  -Check iron panel with am labs  -Continue ivf while npo, once eating can dc  -S/P OR 12/19/24for hip fracture  -BP elevated in setting of pain, started on ARB this am, if SCr rises with this, would rec holding and change to CCB, is already on BB also    d/w daughter

## 2024-12-20 NOTE — PROGRESS NOTE ADULT - SUBJECTIVE AND OBJECTIVE BOX
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 Velasquez as well as R hip IMN w/ Dr. De Luna in 2023, s/p WALLANT R carpal tunnel release w/ Dr. Andrew 2 days ago (12/16/24) previous right hip fx w/ surgical pinning presents to the ED for L hip pain. while  ambulating w/ her walker earlier today, was in a chair when she heard a "pop" followed by immediate pain and difficulty bearing weight. Was assisted to her bed and was then unable to get up - prompting ambulance transfer to [facility] ED. Denies head strike/LOC/other orthopedic injuries at this time. Unable to ambulate since injury this morning (at baseline ambulates with walker). Denies numbness/tingling in the affected extremity. Patient takes no AC meds. Denies any current chest pain, sob, abd pain, Complaining of L sided hip pain.    Per son at bedside, patient had UTI last week was treated with course of nitrofurantoin, completed her course.    In the ED:  Vitals: T 98.2 HR 57 /66 RR 16 RA  Labs: H/H 11.2/34.1 PT/INR 14.1/1.21 BUN/Cr 41/2 Glu 183 GFR 23  Given: ofirmev x1, fentanyl 25mcg IV x1, morphine 4mg IV x1, on 75 cc/hr NS in the ED  CXR cardiomegaly, no acute cardiopulm disease.  EKG: SR 1st degree AVB w/ PVCs 64 bpm QTc 491     (18 Dec 2024 21:04)    INTERVAL HPI:  12/19- Pt seen, examined, pt has better pain control, pt was found to be in urinary retention -Galeana cath placed -drained ~1 lit urine, NPO for OR cardio saw pt-No A Fib, not on AC   12/20-Pt seen and examined at bedside with daughter present. Pt agitated initially and refused morning medications. With my morning discussion, patient became calm and accepting of morning medications. States that she feels "off" mentally because of the pain medications she has been getting. Patient is aware of TOV to take place in the afternoon. PO diet reinitiated. States her pain is well controlled.    OVERNIGHT EVENTS:  agitated and refusing medications this morning    Home Medications:  aspirin 81 mg oral delayed release tablet: 1 tab(s) orally every other day (18 Dec 2024 23:36)  atorvastatin 40 mg oral tablet: 1 tab(s) orally once a day (at bedtime) (18 Dec 2024 23:36)  famotidine 20 mg oral tablet: 1 tab(s) orally once a day (18 Dec 2024 23:36)  ferrous sulfate 324 mg (65 mg elemental iron) oral tablet: 1 tab(s) orally once a day (18 Dec 2024 23:36)  furosemide 20 mg oral tablet: 1 tab(s) orally once a day (18 Dec 2024 23:36)  Januvia 25 mg oral tablet: 1 tab(s) orally once a day (18 Dec 2024 23:36)  losartan 25 mg oral tablet: 1 tab(s) orally once a day (18 Dec 2024 23:32)  Metoprolol Succinate  mg oral tablet, extended release: 1 tab(s) orally once a day (18 Dec 2024 23:36)      MEDICATIONS  (STANDING):  acetaminophen     Tablet .. 650 milliGRAM(s) Oral every 6 hours  atorvastatin 40 milliGRAM(s) Oral at bedtime  dextrose 5%. 1000 milliLiter(s) (50 mL/Hr) IV Continuous <Continuous>  dextrose 5%. 1000 milliLiter(s) (100 mL/Hr) IV Continuous <Continuous>  dextrose 50% Injectable 25 Gram(s) IV Push once  dextrose 50% Injectable 12.5 Gram(s) IV Push once  dextrose 50% Injectable 25 Gram(s) IV Push once  enoxaparin Injectable 30 milliGRAM(s) SubCutaneous every 24 hours  glucagon  Injectable 1 milliGRAM(s) IntraMuscular once  insulin lispro (ADMELOG) corrective regimen sliding scale   SubCutaneous three times a day before meals  insulin lispro (ADMELOG) corrective regimen sliding scale   SubCutaneous at bedtime  lactated ringers. 1000 milliLiter(s) (100 mL/Hr) IV Continuous <Continuous>  losartan 25 milliGRAM(s) Oral daily  metoprolol succinate  milliGRAM(s) Oral daily  polyethylene glycol 3350 17 Gram(s) Oral at bedtime  senna 2 Tablet(s) Oral at bedtime  tranexamic acid IVPB 1000 milliGRAM(s) IV Intermittent Once  tranexamic acid IVPB 1000 milliGRAM(s) IV Intermittent Once    MEDICATIONS  (PRN):  dextrose Oral Gel 15 Gram(s) Oral once PRN Blood Glucose LESS THAN 70 milliGRAM(s)/deciliter  magnesium hydroxide Suspension 30 milliLiter(s) Oral daily PRN Constipation  ondansetron Injectable 4 milliGRAM(s) IV Push every 6 hours PRN Nausea and/or Vomiting  oxyCODONE    IR 5 milliGRAM(s) Oral every 4 hours PRN Moderate Pain (4 - 6)  oxyCODONE    IR 10 milliGRAM(s) Oral every 4 hours PRN Severe Pain (7 - 10)  polyethylene glycol 3350 17 Gram(s) Oral two times a day PRN Constipation  traMADol 50 milliGRAM(s) Oral every 4 hours PRN Mild Pain (1 - 3)      Allergies    No Known Allergies    Intolerances        Social History:  Tobacco: denies  EtOH: denies  Recreational drug use:denies  Lives with: child  ADLs/Ambulates: with walker (18 Dec 2024 21:04)      REVIEW OF SYSTEMS: I am okay.  CONSTITUTIONAL: No fever, No chills, No fatigue, No myalgia, No Body ache, No Weakness  EYES: No eye pain,  No visual disturbances, No discharge, NO Redness  ENMT:  No ear pain, No nose bleed, No vertigo; No sinus pain, NO throat pain, No Congestion  NECK: No pain, No stiffness  RESPIRATORY: No cough, NO wheezing, No  hemoptysis, NO  shortness of breath  CARDIOVASCULAR: No chest pain, palpitations  GASTROINTESTINAL: No abdominal pain, NO epigastric pain. No nausea, No vomiting; No diarrhea, No constipation. [ X ] BM - urge to go but has not  GENITOURINARY: +galeana in place, No dysuria, No frequency, No urgency, No hematuria, NO incontinence  NEUROLOGICAL: No headaches, No dizziness, No numbness, No tingling, No tremors, No weakness  EXT: No Swelling, No Pain, No Edema  SKIN:  [ X ] No itching, burning, rashes, or lesions   MUSCULOSKELETAL: No joint pain ,No Jt swelling; No muscle pain, No back pain, No extremity pain  PSYCHIATRIC: No depression,  No anxiety,  No mood swings ,No difficulty sleeping at night  PAIN SCALE: [  ] None  [ X ] Other- 3 but to nonoperated hip  ROS Unable to obtain due to - [  ] Dementia  [  ] Lethargy [  ] Drowsy [  ] Sedated [  ] non verbal  REST OF REVIEW Of SYSTEM - [ X ] Normal     Vital Signs Last 24 Hrs  T(C): 36.6 (20 Dec 2024 11:27), Max: 36.9 (19 Dec 2024 16:37)  T(F): 97.9 (20 Dec 2024 11:27), Max: 98.4 (19 Dec 2024 16:37)  HR: 90 (20 Dec 2024 11:27) (56 - 90)  BP: 145/61 (20 Dec 2024 11:27) (134/60 - 199/77)  BP(mean): --  RR: 18 (20 Dec 2024 11:27) (12 - 23)  SpO2: 92% (20 Dec 2024 11:27) (91% - 98%)    Parameters below as of 20 Dec 2024 11:27  Patient On (Oxygen Delivery Method): room air      Finger Stick        12-19 @ 07:01  -  12-20 @ 07:00  --------------------------------------------------------  IN: 1100 mL / OUT: 500 mL / NET: 600 mL        PHYSICAL EXAM:  GENERAL:  [ X ] NAD , [ X ] well appearing, [  ] Agitated, [  ] Drowsy,  [  ] Lethargy, [  ] confused   HEAD:  [  ] Normal, [  ] Other  EYES:  [ X ] EOMI, [ X ] PERRLA, [X  ] conjunctiva and sclera clear normal, [  ] Other,  [  ] Pallor,[  ] Discharge  ENMT:  [  ] Normal, [ X ] Moist mucous membranes, [  ] Good dentition, [  ] No Thrush  NECK:  [  ] Supple, [  ] No JVD, [  ] Normal thyroid, [  ] Lymphadenopathy [  ] Other  CHEST/LUNG:  [ X ] Clear to auscultation bilaterally, [  ] Breath Sounds equal B/L / Decrease, [  ] poor effort  [ X ] No rales, [ X ] No rhonchi  [  X]  No wheezing,   HEART:  [ X ] Regular rate and rhythm, [  ] tachycardia, [  ] Bradycardia,  [  ] irregular  [  ] No murmurs, No rubs, No gallops, [  ] PPM in place (Mfr:  )  ABDOMEN:  [ X ] Soft, [X  ] Nontender, [ X ] Nondistended, [  ] No mass, [  ] Bowel sounds present, [ X ] obese  NERVOUS SYSTEM:  [  ] Alert & Oriented X3, [  ] Nonfocal  [  ] Confusion  [  ] Encephalopathic [  ] Sedated [  ] Unable to assess, [  ] Dementia [  ] Other-  EXTREMITIES: [ X ] 2+ Peripheral Pulses, No clubbing, No cyanosis,  [ X ] nonpitting edema B/L lower EXT [X] healing carpal tunnel wound to Right wrist [X] dressings c/d/i to suture of left hip [  ] PVD stasis skin changes B/L Lower EXT, [  ] wound  LYMPH: No lymphadenopathy noted  SKIN:  [ X ] No rashes or lesions, [  ] Pressure Ulcers, [  ] ecchymosis, [  ] Skin Tears, [  ] Other    DIET: Diet, Regular:   Consistent Carbohydrate No Snacks (12-20-24 @ 10:06)  Diet, Full Liquid (12-19-24 @ 12:47)  Diet, Clear Liquid (12-19-24 @ 12:47)      LABS:                        10.3   9.35  )-----------( 168      ( 20 Dec 2024 07:45 )             31.6     20 Dec 2024 07:45    142    |  111    |  31     ----------------------------<  203    3.8     |  21     |  1.80     Ca    9.1        20 Dec 2024 07:45  Phos  4.1       20 Dec 2024 07:45  Mg     1.9       20 Dec 2024 07:45    TPro  6.6    /  Alb  2.9    /  TBili  0.5    /  DBili  x      /  AST  46     /  ALT  22     /  AlkPhos  137    20 Dec 2024 07:45    PT/INR - ( 19 Dec 2024 04:30 )   PT: 13.5 sec;   INR: 1.15 ratio         PTT - ( 19 Dec 2024 04:30 )  PTT:28.7 sec  Urinalysis Basic - ( 20 Dec 2024 07:45 )    Color: x / Appearance: x / SG: x / pH: x  Gluc: 203 mg/dL / Ketone: x  / Bili: x / Urobili: x   Blood: x / Protein: x / Nitrite: x   Leuk Esterase: x / RBC: x / WBC x   Sq Epi: x / Non Sq Epi: x / Bacteria: x        Culture Results:   <10,000 CFU/mL Normal Urogenital Daniela (12-18 @ 20:10)      culture blood  -- Clean Catch 12-18 @ 20:10    culture urine  --  12-18 @ 20:10              Culture - Urine (collected 18 Dec 2024 20:10)  Source: Clean Catch  Final Report (20 Dec 2024 09:25):    <10,000 CFU/mL Normal Urogenital Daniela    Urinalysis with Rflx Culture (collected 18 Dec 2024 20:10)         Anemia Panel:      Thyroid Panel:                RADIOLOGY & ADDITIONAL TESTS:      HEALTH ISSUES - PROBLEM Dx:  Fracture of femoral neck, left    TIA (transient ischemic attack)    T2DM (type 2 diabetes mellitus)    CKD (chronic kidney disease)    Anemia    HTN (hypertension)    HLD (hyperlipidemia)    Need for prophylactic measure            Consultant(s) Notes Reviewed:  [  ] YES     Care Discussed with [X] Consultants  [  ] Patient  [  ] Family [  ] HCP [  ]   [  ] Social Service  [  ] RN, [  ] Physical Therapy,[  ] Palliative care team  DVT PPX: [  ] Lovenox, [  ] S C Heparin, [  ] Coumadin, [  ] Xarelto, [  ] Eliquis, [  ] Pradaxa, [  ] IV Heparin drip, [  ] SCD [  ] Contraindication 2 to GI Bleed,[  ] Ambulation [  ] Contraindicated 2 to  bleed [  ] Contraindicated 2 to Brain Bleed  Advanced directive: [  ] None, [  ] DNR/DNI Patient is a 89y old  Female who presents with a chief complaint of L hip fracture (20 Dec 2024 10:06)    HPI:  89 yo female w/ pmh of HTN, HLD, T2DM, TIA, CKD III, 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 Velasquez as well as R hip IMN w/ Dr. De Luna in 2023, s/p WALLANT R carpal tunnel release w/ Dr. Andrew 2 days ago (12/16/24) previous right hip fx w/ surgical pinning presents to the ED for L hip pain. while  ambulating w/ her walker earlier today, was in a chair when she heard a "pop" followed by immediate pain and difficulty bearing weight. Was assisted to her bed and was then unable to get up - prompting ambulance transfer to [facility] ED. Denies head strike/LOC/other orthopedic injuries at this time. Unable to ambulate since injury this morning (at baseline ambulates with walker). Denies numbness/tingling in the affected extremity. Patient takes no AC meds. Denies any current chest pain, sob, abd pain, Complaining of L sided hip pain.    Per son at bedside, patient had UTI last week was treated with course of nitrofurantoin, completed her course.    In the ED:  Vitals: T 98.2 HR 57 /66 RR 16 RA  Labs: H/H 11.2/34.1 PT/INR 14.1/1.21 BUN/Cr 41/2 Glu 183 GFR 23  Given: ofirmev x1, fentanyl 25mcg IV x1, morphine 4mg IV x1, on 75 cc/hr NS in the ED  CXR cardiomegaly, no acute cardiopulm disease.  EKG: SR 1st degree AVB w/ PVCs 64 bpm QTc 491     (18 Dec 2024 21:04)    INTERVAL HPI:  12/19- Pt seen, examined, pt has better pain control, pt was found to be in urinary retention -Galeana cath placed -drained ~1 lit urine, NPO for OR cardio saw pt-No A Fib, not on AC   12/20-Pt seen and examined at bedside with daughter present. Pt agitated initially and refused morning medications. With my morning discussion, patient became calm and accepting of morning medications. States that she feels "off" mentally because of the pain medications she has been getting. Patient is aware of TOV to take place in the afternoon. PO diet reinitiated. States her pain is well controlled. Follow Bladder Scan    OVERNIGHT EVENTS:  agitated and refusing medications this morning    Home Medications:  aspirin 81 mg oral delayed release tablet: 1 tab(s) orally every other day (18 Dec 2024 23:36)  atorvastatin 40 mg oral tablet: 1 tab(s) orally once a day (at bedtime) (18 Dec 2024 23:36)  famotidine 20 mg oral tablet: 1 tab(s) orally once a day (18 Dec 2024 23:36)  ferrous sulfate 324 mg (65 mg elemental iron) oral tablet: 1 tab(s) orally once a day (18 Dec 2024 23:36)  furosemide 20 mg oral tablet: 1 tab(s) orally once a day (18 Dec 2024 23:36)  Januvia 25 mg oral tablet: 1 tab(s) orally once a day (18 Dec 2024 23:36)  losartan 25 mg oral tablet: 1 tab(s) orally once a day (18 Dec 2024 23:32)  Metoprolol Succinate  mg oral tablet, extended release: 1 tab(s) orally once a day (18 Dec 2024 23:36)      MEDICATIONS  (STANDING):  acetaminophen     Tablet .. 650 milliGRAM(s) Oral every 6 hours  atorvastatin 40 milliGRAM(s) Oral at bedtime  dextrose 5%. 1000 milliLiter(s) (50 mL/Hr) IV Continuous <Continuous>  dextrose 5%. 1000 milliLiter(s) (100 mL/Hr) IV Continuous <Continuous>  dextrose 50% Injectable 25 Gram(s) IV Push once  dextrose 50% Injectable 12.5 Gram(s) IV Push once  dextrose 50% Injectable 25 Gram(s) IV Push once  enoxaparin Injectable 30 milliGRAM(s) SubCutaneous every 24 hours  glucagon  Injectable 1 milliGRAM(s) IntraMuscular once  insulin lispro (ADMELOG) corrective regimen sliding scale   SubCutaneous three times a day before meals  insulin lispro (ADMELOG) corrective regimen sliding scale   SubCutaneous at bedtime  lactated ringers. 1000 milliLiter(s) (100 mL/Hr) IV Continuous <Continuous>  losartan 25 milliGRAM(s) Oral daily  metoprolol succinate  milliGRAM(s) Oral daily  polyethylene glycol 3350 17 Gram(s) Oral at bedtime  senna 2 Tablet(s) Oral at bedtime  tranexamic acid IVPB 1000 milliGRAM(s) IV Intermittent Once  tranexamic acid IVPB 1000 milliGRAM(s) IV Intermittent Once    MEDICATIONS  (PRN):  dextrose Oral Gel 15 Gram(s) Oral once PRN Blood Glucose LESS THAN 70 milliGRAM(s)/deciliter  magnesium hydroxide Suspension 30 milliLiter(s) Oral daily PRN Constipation  ondansetron Injectable 4 milliGRAM(s) IV Push every 6 hours PRN Nausea and/or Vomiting  oxyCODONE    IR 5 milliGRAM(s) Oral every 4 hours PRN Moderate Pain (4 - 6)  oxyCODONE    IR 10 milliGRAM(s) Oral every 4 hours PRN Severe Pain (7 - 10)  polyethylene glycol 3350 17 Gram(s) Oral two times a day PRN Constipation  traMADol 50 milliGRAM(s) Oral every 4 hours PRN Mild Pain (1 - 3)      Allergies    No Known Allergies    Intolerances        Social History:  Tobacco: denies  EtOH: denies  Recreational drug use:denies  Lives with: child  ADLs/Ambulates: with walker (18 Dec 2024 21:04)      REVIEW OF SYSTEMS: I am okay.  CONSTITUTIONAL: No fever, No chills, No fatigue, No myalgia, No Body ache, No Weakness  EYES: No eye pain,  No visual disturbances, No discharge, NO Redness  ENMT:  No ear pain, No nose bleed, No vertigo; No sinus pain, NO throat pain, No Congestion  NECK: No pain, No stiffness  RESPIRATORY: No cough, NO wheezing, No  hemoptysis, NO  shortness of breath  CARDIOVASCULAR: No chest pain, palpitations  GASTROINTESTINAL: No abdominal pain, NO epigastric pain. No nausea, No vomiting; No diarrhea, No constipation. [ X ] BM - urge to go but has not  GENITOURINARY: +galeana in place, No dysuria, No frequency, No urgency, No hematuria, NO incontinence  NEUROLOGICAL: No headaches, No dizziness, No numbness, No tingling, No tremors, No weakness  EXT: No Swelling, No Pain, No Edema  SKIN:  [ X ] No itching, burning, rashes, or lesions   MUSCULOSKELETAL: No joint pain ,No Jt swelling; No muscle pain, No back pain, No extremity pain  PSYCHIATRIC: No depression,  No anxiety,  No mood swings ,No difficulty sleeping at night  PAIN SCALE: [  ] None  [ X ] Other- 3 but to non operated hip  ROS Unable to obtain due to - [  ] Dementia  [  ] Lethargy [  ] Drowsy [  ] Sedated [  ] non verbal  REST OF REVIEW Of SYSTEM - [ X ] Normal     Vital Signs Last 24 Hrs  T(C): 36.6 (20 Dec 2024 11:27), Max: 36.9 (19 Dec 2024 16:37)  T(F): 97.9 (20 Dec 2024 11:27), Max: 98.4 (19 Dec 2024 16:37)  HR: 90 (20 Dec 2024 11:27) (56 - 90)  BP: 145/61 (20 Dec 2024 11:27) (134/60 - 199/77)  BP(mean): --  RR: 18 (20 Dec 2024 11:27) (12 - 23)  SpO2: 92% (20 Dec 2024 11:27) (91% - 98%)    Parameters below as of 20 Dec 2024 11:27  Patient On (Oxygen Delivery Method): room air      Finger Stick        12-19 @ 07:01  -  12-20 @ 07:00  --------------------------------------------------------  IN: 1100 mL / OUT: 500 mL / NET: 600 mL        PHYSICAL EXAM: OOB to Chair  GENERAL:  [ X ] NAD , [ X ] well appearing, [  ] Agitated, [  ] Drowsy,  [  ] Lethargy, [  ] confused   HEAD:  [ xNormal, [  ] Other  EYES:  [ X ] EOMI, [ X ] PERRLA, [X  ] conjunctiva and sclera clear normal, [  ] Other,  [  ] Pallor,[  ] Discharge  ENMT:  [ x ] Normal, [ X ] Moist mucous membranes, [  ] Good dentition, [ x ] No Thrush  NECK:  [ x ] Supple, [ x ] No JVD, [ x ] Normal thyroid, [  ] Lymphadenopathy [  ] Other  CHEST/LUNG:  [ X ] Clear to auscultation bilaterally, [ x ] Breath Sounds equal B/L / Decrease, [ x ] poor effort  [ X ] No rales, [ X ] No rhonchi  [  X]  No wheezing,   HEART:  [ X ] Regular rate and rhythm, [  ] tachycardia, [  ] Bradycardia,  [  ] irregular  [  x] No murmurs, No rubs, No gallops, [  ] PPM in place (Mfr:  )  ABDOMEN:  [ X ] Soft, [X  ] Nontender, [ X ] Nondistended, [  ] No mass, [ x ] Bowel sounds present, [ X ] obese  NERVOUS SYSTEM:  [ x ] Alert & Oriented X3, [ x ] Nonfocal  [  ] Confusion  [  ] Encephalopathic [  ] Sedated [  ] Unable to assess, [  ] Dementia [  ] Other-  EXTREMITIES: [ X ] 2+ Peripheral Pulses, No clubbing, No cyanosis,  [ X ] nonpitting edema B/L lower EXT [X] healing carpal tunnel wound to Right wrist [X] dressings c/d/i to suture of left hip [  ] PVD stasis skin changes B/L Lower EXT, [  ] wound  LYMPH: No lymphadenopathy noted  SKIN:  [ X ] No rashes or lesions, [  ] Pressure Ulcers, [  ] ecchymosis, [  ] Skin Tears, [  ] Other    DIET: Diet, Regular:   Consistent Carbohydrate No Snacks (12-20-24 @ 10:06)  Diet, Full Liquid (12-19-24 @ 12:47)  Diet, Clear Liquid (12-19-24 @ 12:47)      LABS:                        10.3   9.35  )-----------( 168      ( 20 Dec 2024 07:45 )             31.6     20 Dec 2024 07:45    142    |  111    |  31     ----------------------------<  203    3.8     |  21     |  1.80     Ca    9.1        20 Dec 2024 07:45  Phos  4.1       20 Dec 2024 07:45  Mg     1.9       20 Dec 2024 07:45    TPro  6.6    /  Alb  2.9    /  TBili  0.5    /  DBili  x      /  AST  46     /  ALT  22     /  AlkPhos  137    20 Dec 2024 07:45    PT/INR - ( 19 Dec 2024 04:30 )   PT: 13.5 sec;   INR: 1.15 ratio         PTT - ( 19 Dec 2024 04:30 )  PTT:28.7 sec  Urinalysis Basic - ( 20 Dec 2024 07:45 )    Color: x / Appearance: x / SG: x / pH: x  Gluc: 203 mg/dL / Ketone: x  / Bili: x / Urobili: x   Blood: x / Protein: x / Nitrite: x   Leuk Esterase: x / RBC: x / WBC x   Sq Epi: x / Non Sq Epi: x / Bacteria: x        Culture Results:   <10,000 CFU/mL Normal Urogenital Daniela (12-18 @ 20:10)      culture blood  -- Clean Catch 12-18 @ 20:10    culture urine  --  12-18 @ 20:10    Culture - Urine (collected 18 Dec 2024 20:10)  Source: Clean Catch  Final Report (20 Dec 2024 09:25):    <10,000 CFU/mL Normal Urogenital Daniela    Urinalysis with Rflx Culture (collected 18 Dec 2024 20:10)       RADIOLOGY & ADDITIONAL TESTS:      HEALTH ISSUES - PROBLEM Dx:  Fracture of femoral neck, left    TIA (transient ischemic attack)    T2DM (type 2 diabetes mellitus)    CKD (chronic kidney disease)    Anemia    HTN (hypertension)    HLD (hyperlipidemia)    Need for prophylactic measure      Consultant(s) Notes Reviewed:  [ x ] YES     Care Discussed with [X] Consultants  [ x ] Patient  [x  ] Family- dtr [  ] HCP [  ]   [ x ] Social Service  [x  ] RN, [  x] Physical Therapy,[  ] Palliative care team  DVT PPX: [x] Lovenox, [  ] S C Heparin, [  ] Coumadin, [  ] Xarelto, [  ] Eliquis, [  ] Pradaxa, [  ] IV Heparin drip, [  ] SCD [  ] Contraindication 2 to GI Bleed,[  ] Ambulation [  ] Contraindicated 2 to  bleed [  ] Contraindicated 2 to Brain Bleed  Advanced directive: [ x ] None, [  ] DNR/DNI

## 2024-12-20 NOTE — OCCUPATIONAL THERAPY INITIAL EVALUATION ADULT - ADL RETRAINING, OT EVAL
Pt will dress lower body with max A, seated, AE as needed within 2-5 sessions. Pt will dress upper body with Chayo, seated, within 2-5 sessions.

## 2024-12-20 NOTE — PROGRESS NOTE ADULT - SUBJECTIVE AND OBJECTIVE BOX
[facility] -- Tigist Mari, Kedar Bajwa Savella, , Hugo Gama  Office # [phone]    Follow Up:      Subjective/Observations:     REVIEW OF SYSTEMS: All other review of systems is negative unless indicated above  PAST MEDICAL & SURGICAL HISTORY:  Hypertension      Diabetes      TIA (transient ischemic attack)      Hip fracture      HLD (hyperlipidemia)      S/P ORIF (open reduction internal fixation) fracture  right hip        MEDICATIONS  (STANDING):  acetaminophen     Tablet .. 650 milliGRAM(s) Oral every 6 hours  atorvastatin 40 milliGRAM(s) Oral at bedtime  ceFAZolin   IVPB 2000 milliGRAM(s) IV Intermittent every 8 hours  enoxaparin Injectable 30 milliGRAM(s) SubCutaneous every 24 hours  lactated ringers. 1000 milliLiter(s) (100 mL/Hr) IV Continuous <Continuous>  losartan 25 milliGRAM(s) Oral daily  metoprolol succinate  milliGRAM(s) Oral daily  polyethylene glycol 3350 17 Gram(s) Oral at bedtime  senna 2 Tablet(s) Oral at bedtime  tranexamic acid IVPB 1000 milliGRAM(s) IV Intermittent Once  tranexamic acid IVPB 1000 milliGRAM(s) IV Intermittent Once    MEDICATIONS  (PRN):  magnesium hydroxide Suspension 30 milliLiter(s) Oral daily PRN Constipation  ondansetron Injectable 4 milliGRAM(s) IV Push every 6 hours PRN Nausea and/or Vomiting  oxyCODONE    IR 5 milliGRAM(s) Oral every 4 hours PRN Moderate Pain (4 - 6)  oxyCODONE    IR 10 milliGRAM(s) Oral every 4 hours PRN Severe Pain (7 - 10)  traMADol 50 milliGRAM(s) Oral every 4 hours PRN Mild Pain (1 - 3)    Allergies    No Known Allergies    Intolerances      Vital Signs Last 24 Hrs  T(C): 36.6 (20 Dec 2024 04:55), Max: 36.9 (19 Dec 2024 16:37)  T(F): 97.8 (20 Dec 2024 04:55), Max: 98.4 (19 Dec 2024 16:37)  HR: 61 (20 Dec 2024 04:55) (56 - 80)  BP: 169/53 (20 Dec 2024 04:55) (134/60 - 199/77)  BP(mean): --  RR: 18 (20 Dec 2024 04:55) (12 - 23)  SpO2: 92% (20 Dec 2024 04:55) (91% - 98%)    Parameters below as of 20 Dec 2024 04:55  Patient On (Oxygen Delivery Method): room air      I&O's Summary    19 Dec 2024 07:01  -  20 Dec 2024 07:00  --------------------------------------------------------  IN: 1100 mL / OUT: 500 mL / NET: 600 mL      Weight (kg): 67.1 (12-19 @ 17:10)  PHYSICAL EXAM:  TELE:   Constitutional: NAD, awake and alert, well-developed  HEENT: Moist Mucous Membranes, Anicteric  Pulmonary: Non-labored, breath sounds are clear bilaterally, No wheezing, rales or rhonchi  Cardiovascular: Regular, S1 and S2, No murmurs, rubs, gallops or clicks  Gastrointestinal: Bowel Sounds present, soft, nontender.   Lymph: No peripheral edema. No lymphadenopathy.  Skin: No visible rashes or ulcers.  Psych:  Mood & affect appropriate  LABS: All Labs Reviewed:                        10.4   9.70  )-----------( 148      ( 19 Dec 2024 23:50 )             31.5                         10.6   7.69  )-----------( 173      ( 19 Dec 2024 04:30 )             32.4                         11.2   10.37 )-----------( 202      ( 18 Dec 2024 18:50 )             34.1     19 Dec 2024 23:50    141    |  111    |  28     ----------------------------<  195    3.8     |  22     |  1.60   19 Dec 2024 04:30    143    |  114    |  33     ----------------------------<  187    3.6     |  24     |  1.80   18 Dec 2024 18:50    141    |  109    |  41     ----------------------------<  183    3.7     |  23     |  2.00     Ca    8.7        19 Dec 2024 23:50  Ca    9.1        19 Dec 2024 04:30  Ca    9.5        18 Dec 2024 18:50    TPro  6.4    /  Alb  2.8    /  TBili  0.5    /  DBili  x      /  AST  38     /  ALT  24     /  AlkPhos  135    19 Dec 2024 23:50  TPro  6.7    /  Alb  3.2    /  TBili  0.7    /  DBili  x      /  AST  30     /  ALT  24     /  AlkPhos  152    19 Dec 2024 04:30    PT/INR - ( 19 Dec 2024 04:30 )   PT: 13.5 sec;   INR: 1.15 ratio         PTT - ( 19 Dec 2024 04:30 )  PTT:28.7 sec      12 Lead ECG:   Ventricular Rate 64 BPM    Atrial Rate 64 BPM    P-R Interval 224 ms    QRS Duration 80 ms    Q-T Interval 476 ms    QTC Calculation(Bazett) 491 ms    R Axis 22 degrees    T Axis 53 degrees    Diagnosis Line Sinus rhythm with 1st degree AV block with premature supraventricular complexes  Prolonged QT  Abnormal ECG  When compared with ECG of 29-MAY-2024 10:56,  premature supraventricular complexes are now present  Confirmed by Chuy Escoto MD ([id number]) on 12/19/2024 12:41:53 PM (12-18-24 @ 17:07)      TRANSTHORACIC ECHOCARDIOGRAM REPORT  ________________________________________________________________________________                                      _______       Pt. Name:       NELDA VERDIN Study Date:    11/24/2023  MRN:            [id number]       YOB: 1930  Accession #:    [id number]      Age:           89 years  Account#:       [id number]     Gender:        F  Heart Rate:                    Height:        62.99 in (160.00 cm)  Rhythm:                        Weight:   114.64 lb (52.00 kg)  Blood Pressure: 158/98 mmHg    BSA/BMI:       1.53 m² / 20.31 kg/m²  ________________________________________________________________________________________  Referring Physician:    [id number] Addy Mueller  Interpreting Physician: Chuy Escoto  Primary Sonographer:    Juanito Bahena    CPT:               ECHO TTE WO CON COMP W DOPP - 07994.m  Indication(s):     Abnormal electrocardiogram ECG/EKG - R94.31  Procedure:         Transthoracic echocardiogram with 2-D, M-modeand complete                     spectral and color flow Doppler.  Ordering Location: [facility]  Study Information: Image quality for this study is technically difficult.    _______________________________________________________________________________________     CONCLUSIONS:      1. Technically difficult image quality.   2. The patient is tachycardic throughout this examination.   3. Left ventricular systolic function is normal with an ejection fraction of 64 % by Swanson's method of disks.   4. The right ventricle is not well visualized.   5. Structurally normal mitral valve with normal leaflet excursion.   6. There is mild calcification of the mitral valve annulus.   7. The left atrium is normal.   8. Trace mitral regurgitation.   9. Aortic valve was not well visualized.  10. Moderate calcification of the aortic valve leaflets.  11. The inferior vena cava is normal in size measuring 1.30 cm in diameter, (normal <2.1cm) with normal inspiratory collapse (normal >50%) consistent with normal right atrial pressure (~3, range 0-5mmHg).    ________________________________________________________________________________________  FINDINGS:     Left Ventricle:  Left ventricular systolic function is normal with a calculated ejection fraction of 64 % by the Swanson's biplane method of disks. There is normal left ventricular diastolic function.     Right Ventricle:  The right ventricle is not well visualized. Tricuspid annular plane systolic excursion (TAPSE) is 1.7 cm (normal >=1.7 cm).     Left Atrium:  The left atrium is normal with an indexed volume of 21.82 ml/m².     Right Atrium:  The right atrium was not well visualized.     Aortic Valve:  The aortic valve was not well visualized. The aortic valve anatomy cannot be determined. There is moderate calcification of the aortic valve leaflets.     Mitral Valve:  Structurally normal mitral valve with normal leaflet excursion. There is mild calcification of the mitral valve annulus. There is trace mitral regurgitation.     Tricuspid Valve:  The tricuspid valve was not well visualized.     Pulmonic Valve:  The pulmonic valve was not well visualized.     Aorta:  The aortic root at the sinuses of Valsalva is normal in size, measuring 3.00 cm (indexed 1.97 cm/m²).     Systemic Veins:  The inferior vena cava is normal in size measuring 1.30 cm in diameter, (normal <2.1cm) with normal inspiratory collapse (normal >50%) consistent with normal right atrial pressure (~3, range 0-5mmHg).  ____________________________________________________________________  QUANTITATIVE DATA:  Left Ventricle Measurements: (Indexed to BSA)     IVSd (2D):   1.2 cm  LVPWd (2D):  1.0 cm  LVIDd (2D):  3.5 cm  LVIDs (2D):  2.5 cm  LV Mass:     121 g  79.2 g/m²  LV Vol d, MOD A2C: 33.7 ml 22.08 ml/m²  LV Vol d, MOD A4C: 18.8 ml 12.32 ml/m²  LV Vol d, MOD BP:  25.5 ml 16.69 ml/m²  LV Vol s, MOD A2C: 11.5 ml 7.53 ml/m²  LV Vol s, MOD A4C: 7.7 ml  5.06 ml/m²  LV Vol s, MOD BP:  9.2 ml  6.02 ml/m²  LVOT SV MOD BP:    16.3 ml  LV EF% MOD BP:     64 %     MV E Vmax:    1.33 m/s  MV A Vmax:    0.50 m/s  MV E/A:       2.67  e' lateral:   21.20 cm/s  e' medial:    12.80 cm/s  E/e' lateral: 6.27  E/e' medial:  10.39  E/e' Average: 7.82  MV DT:        143 msec    Aorta Measurements: (normal range) (Indexed to BSA)     Sinuses of Valsalva: 3.00 cm (2.7 - 3.3 cm)  Left Atrium Measurements: (Indexed to BSA)  LA Diam 2D: 3.40 cm    Right Ventricle Measurements:     TAPSE: 1.7 cm    LVOT / RVOT/ Qp/Qs Data: (Indexed to BSA)  LVOT Diameter: 1.80 cm    Mitral Valve Measurements:     MV E Vmax: 1.3 m/s  MV A Vmax: 0.5 m/s  MV E/A:    2.7     Tricuspid Valve Measurements:     RA Pressure: 3 mmHg    ________________________________________________________________________________________  Electronicallysigned on 11/25/2023 at 6:17:31 AM by Chuy Escoto         *** Final ***      [facility] -- Tigist Mari, Kedar Bajwa Savella, , Hugo Gama  Office # [phone]    Follow Up: Cardiac Optimization     Subjective/Observations: Awake and alert with very mild confusion.  Comfortable on RA.  Denies postop pain.  Not in any form of respiratory discomfort.  No significant tele events    REVIEW OF SYSTEMS: All other review of systems is negative unless indicated above  PAST MEDICAL & SURGICAL HISTORY:  Hypertension      Diabetes      TIA (transient ischemic attack)      Hip fracture      HLD (hyperlipidemia)      S/P ORIF (open reduction internal fixation) fracture  right hip    MEDICATIONS  (STANDING):  acetaminophen     Tablet .. 650 milliGRAM(s) Oral every 6 hours  atorvastatin 40 milliGRAM(s) Oral at bedtime  ceFAZolin   IVPB 2000 milliGRAM(s) IV Intermittent every 8 hours  enoxaparin Injectable 30 milliGRAM(s) SubCutaneous every 24 hours  lactated ringers. 1000 milliLiter(s) (100 mL/Hr) IV Continuous <Continuous>  losartan 25 milliGRAM(s) Oral daily  metoprolol succinate  milliGRAM(s) Oral daily  polyethylene glycol 3350 17 Gram(s) Oral at bedtime  senna 2 Tablet(s) Oral at bedtime  tranexamic acid IVPB 1000 milliGRAM(s) IV Intermittent Once  tranexamic acid IVPB 1000 milliGRAM(s) IV Intermittent Once    MEDICATIONS  (PRN):  magnesium hydroxide Suspension 30 milliLiter(s) Oral daily PRN Constipation  ondansetron Injectable 4 milliGRAM(s) IV Push every 6 hours PRN Nausea and/or Vomiting  oxyCODONE    IR 5 milliGRAM(s) Oral every 4 hours PRN Moderate Pain (4 - 6)  oxyCODONE    IR 10 milliGRAM(s) Oral every 4 hours PRN Severe Pain (7 - 10)  traMADol 50 milliGRAM(s) Oral every 4 hours PRN Mild Pain (1 - 3)    Allergies    No Known Allergies    Intolerances    Vital Signs Last 24 Hrs  T(C): 36.6 (20 Dec 2024 04:55), Max: 36.9 (19 Dec 2024 16:37)  T(F): 97.8 (20 Dec 2024 04:55), Max: 98.4 (19 Dec 2024 16:37)  HR: 61 (20 Dec 2024 04:55) (56 - 80)  BP: 169/53 (20 Dec 2024 04:55) (134/60 - 199/77)  BP(mean): --  RR: 18 (20 Dec 2024 04:55) (12 - 23)  SpO2: 92% (20 Dec 2024 04:55) (91% - 98%)    Parameters below as of 20 Dec 2024 04:55  Patient On (Oxygen Delivery Method): room air      I&O's Summary    19 Dec 2024 07:01  -  20 Dec 2024 07:00  --------------------------------------------------------  IN: 1100 mL / OUT: 500 mL / NET: 600 mL      Weight (kg): 67.1 (12-19 @ 17:10)  PHYSICAL EXAM:  TELE: NSR with PAC's  Constitutional: NAD, awake and alert, well-developed  HEENT: Moist Mucous Membranes, Anicteric  Pulmonary: Non-labored, breath sounds are clear bilaterally, No wheezing, rales or rhonchi  Cardiovascular: Regular, S1 and S2, No murmurs, rubs, gallops or clicks  Gastrointestinal: Bowel Sounds present, soft, nontender.   Lymph: No peripheral edema. No lymphadenopathy.  Skin: No visible rashes or ulcers.  Left hip incision with dressing dry and intact  Psych:  Mood & affect: Mildly confused but reoriented  LABS: All Labs Reviewed:                        10.4   9.70  )-----------( 148      ( 19 Dec 2024 23:50 )             31.5                         10.6   7.69  )-----------( 173      ( 19 Dec 2024 04:30 )             32.4                         11.2   10.37 )-----------( 202      ( 18 Dec 2024 18:50 )             34.1     19 Dec 2024 23:50    141    |  111    |  28     ----------------------------<  195    3.8     |  22     |  1.60   19 Dec 2024 04:30    143    |  114    |  33     ----------------------------<  187    3.6     |  24     |  1.80   18 Dec 2024 18:50    141    |  109    |  41     ----------------------------<  183    3.7     |  23     |  2.00     Ca    8.7        19 Dec 2024 23:50  Ca    9.1        19 Dec 2024 04:30  Ca    9.5        18 Dec 2024 18:50    TPro  6.4    /  Alb  2.8    /  TBili  0.5    /  DBili  x      /  AST  38     /  ALT  24     /  AlkPhos  135    19 Dec 2024 23:50  TPro  6.7    /  Alb  3.2    /  TBili  0.7    /  DBili  x      /  AST  30     /  ALT  24     /  AlkPhos  152    19 Dec 2024 04:30    PT/INR - ( 19 Dec 2024 04:30 )   PT: 13.5 sec;   INR: 1.15 ratio         PTT - ( 19 Dec 2024 04:30 )  PTT:28.7 sec      12 Lead ECG:   Ventricular Rate 64 BPM    Atrial Rate 64 BPM    P-R Interval 224 ms    QRS Duration 80 ms    Q-T Interval 476 ms    QTC Calculation(Bazett) 491 ms    R Axis 22 degrees    T Axis 53 degrees    Diagnosis Line Sinus rhythm with 1st degree AV block with premature supraventricular complexes  Prolonged QT  Abnormal ECG  When compared with ECG of 29-MAY-2024 10:56,  premature supraventricular complexes are now present  Confirmed by Tigist MALHOTRA, Chuy ([id number]) on 12/19/2024 12:41:53 PM (12-18-24 @ 17:07)      TRANSTHORACIC ECHOCARDIOGRAM REPORT  ________________________________________________________________________________                                      _______       Pt. Name:       NELDA VERDIN Study Date:    11/24/2023  MRN:            [id number]       YOB: 1930  Accession #:    [id number]      Age:           89 years  Account#:       [id number]     Gender:        F  Heart Rate:                    Height:        62.99 in (160.00 cm)  Rhythm:                        Weight:   114.64 lb (52.00 kg)  Blood Pressure: 158/98 mmHg    BSA/BMI:       1.53 m² / 20.31 kg/m²  ________________________________________________________________________________________  Referring Physician:    [id number] Addy Mueller  Interpreting Physician: Chuy Escoto  Primary Sonographer:    Juanito Bahena    CPT:               ECHO TTE WO CON COMP W DOPP - 73542.m  Indication(s):     Abnormal electrocardiogram ECG/EKG - R94.31  Procedure:         Transthoracic echocardiogram with 2-D, M-modeand complete                     spectral and color flow Doppler.  Ordering Location: [facility]  Study Information: Image quality for this study is technically difficult.    _______________________________________________________________________________________     CONCLUSIONS:      1. Technically difficult image quality.   2. The patient is tachycardic throughout this examination.   3. Left ventricular systolic function is normal with an ejection fraction of 64 % by Swanson's method of disks.   4. The right ventricle is not well visualized.   5. Structurally normal mitral valve with normal leaflet excursion.   6. There is mild calcification of the mitral valve annulus.   7. The left atrium is normal.   8. Trace mitral regurgitation.   9. Aortic valve was not well visualized.  10. Moderate calcification of the aortic valve leaflets.  11. The inferior vena cava is normal in size measuring 1.30 cm in diameter, (normal <2.1cm) with normal inspiratory collapse (normal >50%) consistent with normal right atrial pressure (~3, range 0-5mmHg).    ________________________________________________________________________________________  FINDINGS:     Left Ventricle:  Left ventricular systolic function is normal with a calculated ejection fraction of 64 % by the Swanson's biplane method of disks. There is normal left ventricular diastolic function.     Right Ventricle:  The right ventricle is not well visualized. Tricuspid annular plane systolic excursion (TAPSE) is 1.7 cm (normal >=1.7 cm).     Left Atrium:  The left atrium is normal with an indexed volume of 21.82 ml/m².     Right Atrium:  The right atrium was not well visualized.     Aortic Valve:  The aortic valve was not well visualized. The aortic valve anatomy cannot be determined. There is moderate calcification of the aortic valve leaflets.     Mitral Valve:  Structurally normal mitral valve with normal leaflet excursion. There is mild calcification of the mitral valve annulus. There is trace mitral regurgitation.     Tricuspid Valve:  The tricuspid valve was not well visualized.     Pulmonic Valve:  The pulmonic valve was not well visualized.     Aorta:  The aortic root at the sinuses of Valsalva is normal in size, measuring 3.00 cm (indexed 1.97 cm/m²).     Systemic Veins:  The inferior vena cava is normal in size measuring 1.30 cm in diameter, (normal <2.1cm) with normal inspiratory collapse (normal >50%) consistent with normal right atrial pressure (~3, range 0-5mmHg).  ____________________________________________________________________  QUANTITATIVE DATA:  Left Ventricle Measurements: (Indexed to BSA)     IVSd (2D):   1.2 cm  LVPWd (2D):  1.0 cm  LVIDd (2D):  3.5 cm  LVIDs (2D):  2.5 cm  LV Mass:     121 g  79.2 g/m²  LV Vol d, MOD A2C: 33.7 ml 22.08 ml/m²  LV Vol d, MOD A4C: 18.8 ml 12.32 ml/m²  LV Vol d, MOD BP:  25.5 ml 16.69 ml/m²  LV Vol s, MOD A2C: 11.5 ml 7.53 ml/m²  LV Vol s, MOD A4C: 7.7 ml  5.06 ml/m²  LV Vol s, MOD BP:  9.2 ml  6.02 ml/m²  LVOT SV MOD BP:    16.3 ml  LV EF% MOD BP:     64 %     MV E Vmax:    1.33 m/s  MV A Vmax:    0.50 m/s  MV E/A:       2.67  e' lateral:   21.20 cm/s  e' medial:    12.80 cm/s  E/e' lateral: 6.27  E/e' medial:  10.39  E/e' Average: 7.82  MV DT:        143 msec    Aorta Measurements: (normal range) (Indexed to BSA)     Sinuses of Valsalva: 3.00 cm (2.7 - 3.3 cm)  Left Atrium Measurements: (Indexed to BSA)  LA Diam 2D: 3.40 cm    Right Ventricle Measurements:     TAPSE: 1.7 cm    LVOT / RVOT/ Qp/Qs Data: (Indexed to BSA)  LVOT Diameter: 1.80 cm    Mitral Valve Measurements:     MV E Vmax: 1.3 m/s  MV A Vmax: 0.5 m/s  MV E/A:    2.7     Tricuspid Valve Measurements:     RA Pressure: 3 mmHg    ________________________________________________________________________________________  Electronicallysigned on 11/25/2023 at 6:17:31 AM by Chuy Escoto         *** Final ***

## 2024-12-20 NOTE — PROGRESS NOTE ADULT - PROBLEM SELECTOR PLAN 6
Chronic - BP elevated in the ED likely 2/2 to pain, to monitor BPs  - Continue home toprol and losartan starting tomorrow  -echo normal ef and no sig valve disease EF 64%   - hold lasix for procedure leg swelling - but son states "always has swelling"  - doppler US b/l LE ordered - negative for DVT B/L Chronic - BP elevated in the ED likely 2/2 to pain, to monitor BPs  - Continue home Toprol XL  and losartan starting tomorrow  -echo normal ef and no sig valve disease EF 64%   - hold lasix for procedure leg swelling - but son states "always has swelling"- Restart Lasix on d/c  - doppler US b/l LE ordered - negative for DVT B/L

## 2024-12-20 NOTE — OCCUPATIONAL THERAPY INITIAL EVALUATION ADULT - ADDITIONAL COMMENTS
Pt lives in a assisted living facility. Daughter reports bathroom is fully assessable. PTA pt was independent with ADLs/IADLs and functional mobility with a rollator. Pt owns rollator and a reacher.

## 2024-12-20 NOTE — PROGRESS NOTE ADULT - NS ATTEND AMEND GEN_ALL_CORE FT
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 Velasquez as well as R hip IMN w/ Dr. De Luna in 2023, s/p WALLANT R carpal tunnel release w/ Dr. Andrew 2 days ago (12/16/24) previous right hip fx w/ surgical pinning presents to the ED for L hip pain. while  ambulating w/ her walker earlier today, was in a chair when she heard a "pop" followed by immediate pain and difficulty bearing weight. Patient schedules for hemiarthroplasty of L hip with Dr. De Luna 12/19.  Consulted for Cardiac optimization     - s/p Lt hip hemiarthroplasty.  Tolerated procedure well  - Tele: NSR with PAC's.  Can D/C  - Continue home ASA and statin for Hx of TIA  - Can continue home low dose Lasix.  Creatinine likely at baseline  - BP elevated peaking at 190 overnight, likely, reactive to pain and agitation  - Continue home Toprol  mg for now

## 2024-12-20 NOTE — CONSULT NOTE ADULT - PROBLEM SELECTOR RECOMMENDATION 9
Type 2 A1c 8.2% adm fracture of femoral neck, left  conservative management given age and comorbidities  resume ANGELINA FOLEY post/op  CC diet and accucheck ACHS  FU appt: Dr. Escobar  DSC recommendations: D/C to rehab with januvia 25mg daily and daily glucose monitoring, lifestyle and diet modifications  diabetes education provided as documented above  Diabetes support info and cell # [phone] given   Goal 100-180 mg/dL; 140-180 mg/dL in critical care areas

## 2024-12-20 NOTE — PROGRESS NOTE ADULT - PROBLEM SELECTOR PLAN 4
History of CKD Cr 41/2 in the ED  - Baseline 1.7-2 in 6/2024.  - Continue to monitor with daily CMP  -Dr. MARYA Kaur Nephro consulted, recs appreciated  Ac urinary retention   Galeana cath placed drained ~1000, 600  ml urine   -Galeana cath removed at 13:00 today. TOV to take place with first BS @ 18:00. Low threshold to replace Galeana and have outpt Urologyper dtr pt had galeana cath x 2 months with previous Sx History of CKD Cr 41/2 in the ED Cr improving   - Baseline 1.7-2 in 6/2024.  - Continue to monitor with daily CMP  -Dr. MARYA Kaur Nephro consulted, recs appreciated  Ac urinary retention   Galeana cath placed drained ~1000, 600  ml urine   -Galeana cath removed at 13:00 today. TOV to take place with first BS @ 18:00. Low threshold to replace Galeana and have outpt Urology as per dtr pt had galeana cath x 2 months with previous Sx

## 2024-12-20 NOTE — PROGRESS NOTE ADULT - PROBLEM SELECTOR PLAN 5
Chronic, baseline Hb 10-11  - Currently at baseline  - f/u iron panel in AM - still pending result Chronic, baseline Hb 10-11  - Currently at baseline  - CBC in aM

## 2024-12-20 NOTE — PROGRESS NOTE ADULT - PROBLEM SELECTOR PLAN 2
Patient with hx of TIA( per son at bedside was ~2017)  - Patient on aspirin and atorvastatin, to continue starting tomorrow - per son at bedside, received all medications this AM-continue ASA every other day, statin daily

## 2024-12-20 NOTE — PROGRESS NOTE ADULT - SUBJECTIVE AND OBJECTIVE BOX
Progress Note     Patient seen and examined at bedside this AM. Patient is confused and agitate this morning. She is oriented and consolable but upset in regards to having to wear the abduction pillow for posterior hip percautions as well as about having a galeana in place. Both were explained to the patient at length at bedside. There may be a component of delirium or still recovering form anesthesias effects given the late finish time for the case.     No acute complaints at this time in regard to her hip. Pain well controlled. Denies chest pain, shortness of breath, nausea or vomiting.     PE:  LABS:                        10.3   9.35  )-----------( 168      ( 20 Dec 2024 07:45 )             31.6     12-19    141  |  111[H]  |  28[H]  ----------------------------<  195[H]  3.8   |  22  |  1.60[H]    Ca    8.7      19 Dec 2024 23:50    TPro  6.4  /  Alb  2.8[L]  /  TBili  0.5  /  DBili  x   /  AST  38[H]  /  ALT  24  /  AlkPhos  135[H]  12-19    PT/INR - ( 19 Dec 2024 04:30 )   PT: 13.5 sec;   INR: 1.15 ratio         PTT - ( 19 Dec 2024 04:30 )  PTT:28.7 sec  Urinalysis Basic - ( 19 Dec 2024 23:50 )    Color: x / Appearance: x / SG: x / pH: x  Gluc: 195 mg/dL / Ketone: x  / Bili: x / Urobili: x   Blood: x / Protein: x / Nitrite: x   Leuk Esterase: x / RBC: x / WBC x   Sq Epi: x / Non Sq Epi: x / Bacteria: x        VITAL SIGNS:  T(C): 36.6 (12-20-24 @ 04:55), Max: 36.9 (12-19-24 @ 16:37)  HR: 61 (12-20-24 @ 04:55) (56 - 80)  BP: 169/53 (12-20-24 @ 04:55) (134/60 - 199/77)  RR: 18 (12-20-24 @ 04:55) (12 - 23)  SpO2: 92% (12-20-24 @ 04:55) (91% - 98%)Postop Check    General: NAD, resting comfortably in bed  LLE:   Dressing in place C/D/I  SCD in place bilaterally  No calf tenderness   Motor: + EHL/FHL/TA/GSC  Sensory: SILT SPN/DPN/Yury/Saph/Tib  DP/PT 2+      A/P:  89y f s/p L hip hemiarthroplasty w/ Dr. De Luna POD 0  -PT/OT  -WBAT LLE w/ posterior precautions - abduction pillow on while lying down and sitting  -NWB R hand - otherwise WBAT from R forearm and above, can use platform walker  -Pain Control  -DVT ppx: per primary - start POD1 if feasible  -Galeana out POD1 if feasible  -Continue perioperative abx x 24 hours  -FU AM Labs  -Rest, ice, compress, and elevate the extremity as tolerated  -Incentive Spirometry  -Medical management appreciated  -Dispo per PT  -D/w Dr. De Luna, will update plan as needed Progress Note     Patient seen and examined at bedside this AM. Patient is confused and agitate this morning. She is oriented and consolable but upset in regards to having to wear the abduction pillow for posterior hip percautions as well as about having a galeana in place. Both were explained to the patient at length at bedside. There may be a component of delirium or still recovering form anesthesias effects given the late finish time for the case.     No acute complaints at this time in regard to her hip. Pain well controlled. Denies chest pain, shortness of breath, nausea or vomiting.     PE:  LABS:                        10.3   9.35  )-----------( 168      ( 20 Dec 2024 07:45 )             31.6     12-19    141  |  111[H]  |  28[H]  ----------------------------<  195[H]  3.8   |  22  |  1.60[H]    Ca    8.7      19 Dec 2024 23:50    TPro  6.4  /  Alb  2.8[L]  /  TBili  0.5  /  DBili  x   /  AST  38[H]  /  ALT  24  /  AlkPhos  135[H]  12-19    PT/INR - ( 19 Dec 2024 04:30 )   PT: 13.5 sec;   INR: 1.15 ratio         PTT - ( 19 Dec 2024 04:30 )  PTT:28.7 sec  Urinalysis Basic - ( 19 Dec 2024 23:50 )    Color: x / Appearance: x / SG: x / pH: x  Gluc: 195 mg/dL / Ketone: x  / Bili: x / Urobili: x   Blood: x / Protein: x / Nitrite: x   Leuk Esterase: x / RBC: x / WBC x   Sq Epi: x / Non Sq Epi: x / Bacteria: x        VITAL SIGNS:  T(C): 36.6 (12-20-24 @ 04:55), Max: 36.9 (12-19-24 @ 16:37)  HR: 61 (12-20-24 @ 04:55) (56 - 80)  BP: 169/53 (12-20-24 @ 04:55) (134/60 - 199/77)  RR: 18 (12-20-24 @ 04:55) (12 - 23)  SpO2: 92% (12-20-24 @ 04:55) (91% - 98%)Postop Check    General: NAD, resting comfortably in bed  LLE:   Dressing in place C/D/I  SCD in place bilaterally  No calf tenderness   Motor: + EHL/FHL/TA/GSC  Sensory: SILT SPN/DPN/Yury/Saph/Tib  DP/PT 2+      A/P:  89y f s/p L hip hemiarthroplasty w/ Dr. De Luna POD 1  -PT/OT  -WBAT LLE w/ posterior precautions - abduction pillow on while lying down and sitting  -NWB R hand - otherwise WBAT from R forearm and above, can use platform walker  -Pain Control  -DVT ppx: per primary - start POD1 if feasible  -Galeana out POD1 if feasible  -Continue perioperative abx x 24 hours  -FU AM Labs  -Rest, ice, compress, and elevate the extremity as tolerated  -Incentive Spirometry  -Medical management appreciated  -Dispo per PT  -D/w Dr. De Luna, will update plan as needed Progress Note     Patient seen and examined at bedside this AM. Patient is confused and agitate this morning. She is oriented and consolable but upset in regards to having to wear the abduction pillow for posterior hip percautions as well as about having a galeana in place. Both were explained to the patient at length at bedside. There may be a component of delirium or still recovering form anesthesias effects given the late finish time for the case.     No acute complaints at this time in regard to her hip. Pain well controlled. Denies chest pain, shortness of breath, nausea or vomiting.     PE:  LABS:                        10.3   9.35  )-----------( 168      ( 20 Dec 2024 07:45 )             31.6     12-19    141  |  111[H]  |  28[H]  ----------------------------<  195[H]  3.8   |  22  |  1.60[H]    Ca    8.7      19 Dec 2024 23:50    TPro  6.4  /  Alb  2.8[L]  /  TBili  0.5  /  DBili  x   /  AST  38[H]  /  ALT  24  /  AlkPhos  135[H]  12-19    PT/INR - ( 19 Dec 2024 04:30 )   PT: 13.5 sec;   INR: 1.15 ratio         PTT - ( 19 Dec 2024 04:30 )  PTT:28.7 sec  Urinalysis Basic - ( 19 Dec 2024 23:50 )    Color: x / Appearance: x / SG: x / pH: x  Gluc: 195 mg/dL / Ketone: x  / Bili: x / Urobili: x   Blood: x / Protein: x / Nitrite: x   Leuk Esterase: x / RBC: x / WBC x   Sq Epi: x / Non Sq Epi: x / Bacteria: x        VITAL SIGNS:  T(C): 36.6 (12-20-24 @ 04:55), Max: 36.9 (12-19-24 @ 16:37)  HR: 61 (12-20-24 @ 04:55) (56 - 80)  BP: 169/53 (12-20-24 @ 04:55) (134/60 - 199/77)  RR: 18 (12-20-24 @ 04:55) (12 - 23)  SpO2: 92% (12-20-24 @ 04:55) (91% - 98%)Postop Check    General: NAD, resting comfortably in bed  LLE:   Dressing in place C/D/I  SCD in place bilaterally  No calf tenderness   Motor: + EHL/FHL/TA/GSC  Sensory: SILT SPN/DPN/Yury/Saph/Tib  DP/PT 2+      A/P:  89y f s/p L hip hemiarthroplasty w/ Dr. De Luna POD 1  -PT/OT  -WBAT LLE w/ posterior precautions - abduction pillow on while lying down and sitting  -NWB R hand - otherwise WBAT from R forearm and above, can use platform walker  -Pain Control  -DVT ppx: per primary - start POD1 if feasible  -Continue perioperative abx x 24 hours  -FU AM Labs  -Rest, ice, compress, and elevate the extremity as tolerated  -Incentive Spirometry  -Medical management appreciated  -Dispo per PT  -D/w Dr. De Luna, will update plan as needed

## 2024-12-20 NOTE — PROGRESS NOTE ADULT - ASSESSMENT
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 Velasquez as well as R hip IMN w/ Dr. De Luna in 2023, s/p WALLANT R carpal tunnel release w/ Dr. Andrew 2 days ago (12/16/24) previous right hip fx w/ surgical pinning presents to the ED for L hip pain admitted for L hip fracture plan for hemiarthroplasty of L hip with Dr. De Luna on  12/19/24 at [facility]   89 yo female w/ pmh of HTN, HLD, T2DM, TIA, CKD III, Hx of R hip CRPP in 2016 followed by hardware removal in 2017 w/ Dr. Velasquez as well as R hip IMN w/ Dr. De Luna in 2023, s/p WALLANT R carpal tunnel release w/ Dr. Andrew 2 days ago (12/16/24) previous right hip fx w/ surgical pinning presents to the ED for L hip pain admitted for L hip fracture plan for hemiarthroplasty of L hip with Dr. De Luna on  12/19/24 at [facility].

## 2024-12-20 NOTE — PROGRESS NOTE ADULT - SUBJECTIVE AND OBJECTIVE BOX
[facility]                             Nephrology and Hypertension                             Marcin Kaplan                                          [phone]     Patient is a 89y old  Female who presents with a chief complaint of L hip fracture (19 Dec 2024 11:24)       HPI:  89 yo female w/ pmh of HTN, HLD, T2DM, TIA, CKD III, Hx of R hip CRPP in 2016 followed by hardware removal in 2017 w/ Dr. Velasquez as well as R hip IMN w/ Dr. De Luna in 2023, s/p WALLANT R carpal tunnel release w/ Dr. Andrew 2 days ago (12/16/24) previous right hip fx w/ surgical pinning presents to the ED for L hip pain. while  ambulating w/ her walker earlier today, was in a chair when she heard a "pop" followed by immediate pain and difficulty bearing weight. Was assisted to her bed and was then unable to get up - prompting ambulance transfer to [facility] ED. Denies head strike/LOC/other orthopedic injuries at this time. Unable to ambulate since injury this morning (at baseline ambulates with walker). Denies numbness/tingling in the affected extremity. Patient takes no AC meds. Denies any current chest pain, sob, abd pain, Complaining of L sided hip pain.Per son at bedside, patient had UTI last week was treated with course of nitrofurantoin, completed her course.    Nephrology is c/s for ELVIN on CKD today. I saw her in the ED, daugther at bedside. She has c.o pain. She does not recall events. She has CKd but does not see Nephrology. Per daughter, baseline SCr is 1.8-1.9. On chart review, baseline SCr is 1.5-1.9, SCr was >2 on admission is now downtrending. She does endorse not eating as well. She has no sob or dizziness when I saw her. Is s/p bolus and now on maint ivf.     C/O hip pain.  Not eating as much.         PAST MEDICAL & SURGICAL HISTORY:  Hypertension      Diabetes      TIA (transient ischemic attack)      Hip fracture      HLD (hyperlipidemia)      S/P ORIF (open reduction internal fixation) fracture  right hip           FAMILY HISTORY:  FH: asthma (Father)    NC    Social History:Non smoker    MEDICATIONS  (STANDING):  atorvastatin 40 milliGRAM(s) Oral at bedtime  dextrose 5%. 1000 milliLiter(s) (50 mL/Hr) IV Continuous <Continuous>  dextrose 5%. 1000 milliLiter(s) (100 mL/Hr) IV Continuous <Continuous>  dextrose 50% Injectable 25 Gram(s) IV Push once  dextrose 50% Injectable 12.5 Gram(s) IV Push once  dextrose 50% Injectable 25 Gram(s) IV Push once  glucagon  Injectable 1 milliGRAM(s) IntraMuscular once  insulin lispro (ADMELOG) corrective regimen sliding scale   SubCutaneous every 6 hours  losartan 25 milliGRAM(s) Oral daily  metoprolol succinate  milliGRAM(s) Oral daily  polyethylene glycol 3350 17 Gram(s) Oral daily  senna 2 Tablet(s) Oral at bedtime  sodium chloride 0.9%. 1000 milliLiter(s) (75 mL/Hr) IV Continuous <Continuous>    MEDICATIONS  (PRN):  dextrose Oral Gel 15 Gram(s) Oral once PRN Blood Glucose LESS THAN 70 milliGRAM(s)/deciliter  HYDROmorphone  Injectable 0.5 milliGRAM(s) IV Push every 4 hours PRN Moderate Pain (4 - 6)  HYDROmorphone  Injectable 1 milliGRAM(s) IV Push every 4 hours PRN Severe Pain (7 - 10)  melatonin 3 milliGRAM(s) Oral at bedtime PRN Insomnia  traMADol 25 milliGRAM(s) Oral every 12 hours PRN Mild Pain (1 - 3)   Meds reviewed    Allergies    No Known Allergies    Intolerances         REVIEW OF SYSTEMS: as per HPI    as above      ICU Vital Signs Last 24 Hrs  T(C): 36.6 (20 Dec 2024 09:30), Max: 36.9 (19 Dec 2024 16:37)  T(F): 97.9 (20 Dec 2024 09:30), Max: 98.4 (19 Dec 2024 16:37)  HR: 67 (20 Dec 2024 09:30) (56 - 80)  BP: 160/65 (20 Dec 2024 09:30) (134/60 - 199/77)  BP(mean): --  ABP: --  ABP(mean): --  RR: 18 (20 Dec 2024 09:30) (12 - 23)  SpO2: 94% (20 Dec 2024 09:30) (91% - 98%)    O2 Parameters below as of 20 Dec 2024 09:30  Patient On (Oxygen Delivery Method): room air          PHYSICAL EXAM:    GENERAL: NAD  HEAD:  Atraumatic, Normocephalic  EYES: EOMI, conjunctiva and sclera clear  ENMT: No Drainage from nares, No drainage from ears  NERVOUS SYSTEM:  Awake and Alert  CHEST/LUNG: Clear to percussion bilaterally  EXTREMITIES:  No Edema, RUE cast noted, decreased ROM LE  SKIN: No rashes No obvious ecchymosis      LABS:                                    10.3   9.35  )-----------( 168      ( 20 Dec 2024 07:45 )             31.6     12-20    142  |  111[H]  |  31[H]  ----------------------------<  203[H]  3.8   |  21[L]  |  1.80[H]    Ca    9.1      20 Dec 2024 07:45  Phos  4.1     12-20  Mg     1.9     12-20    TPro  6.6  /  Alb  2.9[L]  /  TBili  0.5  /  DBili  x   /  AST  46[H]  /  ALT  22  /  AlkPhos  137[H]  12-20    PT/INR - ( 19 Dec 2024 04:30 )   PT: 13.5 sec;   INR: 1.15 ratio         PTT - ( 19 Dec 2024 04:30 )  PTT:28.7 sec  Urinalysis Basic - ( 20 Dec 2024 07:45 )    Color: x / Appearance: x / SG: x / pH: x  Gluc: 203 mg/dL / Ketone: x  / Bili: x / Urobili: x   Blood: x / Protein: x / Nitrite: x   Leuk Esterase: x / RBC: x / WBC x   Sq Epi: x / Non Sq Epi: x / Bacteria: x

## 2024-12-20 NOTE — OCCUPATIONAL THERAPY INITIAL EVALUATION ADULT - WEIGHT-BEARING RESTRICTIONS: STAND/SIT, REHAB EVAL
Called patient mother informed that patient's medication was sent to his pharmacy. She stated understanding and thank you.    NWELMA CONROY, WBAT MEGE

## 2024-12-20 NOTE — CAREGIVER ENGAGEMENT NOTE - CAREGIVER OUTREACH NOTES - FREE TEXT
SWI spoke to pt's daughter Maday and discussed d/c plan. Maday understands and agrees with a d/c plan to [place] tomorrow at 2:00pm. SWI to follow and remain available for any needs. SWI spoke to pt's daughter Maday and discussed d/c planning. Maday understands and agrees with a d/c plan to [facility] tomorrow. Maday/pt accepting bed offer. TAHIR scheduled beto via [facility] at 2:00pm tomorrow. SWI to follow and remain available for any needs.

## 2024-12-20 NOTE — CONSULT NOTE ADULT - ASSESSMENT
Physical Exam:   Vital Signs Last 24 Hrs  T(C): 36.6 (20 Dec 2024 09:30), Max: 36.9 (19 Dec 2024 16:37)  T(F): 97.9 (20 Dec 2024 09:30), Max: 98.4 (19 Dec 2024 16:37)  HR: 67 (20 Dec 2024 09:30) (56 - 80)  BP: 160/65 (20 Dec 2024 09:30) (134/60 - 199/77)  BP(mean): --  RR: 18 (20 Dec 2024 09:30) (12 - 23)  SpO2: 94% (20 Dec 2024 09:30) (91% - 98%)    Parameters below as of 20 Dec 2024 09:30  Patient On (Oxygen Delivery Method): room air         CAPILLARY BLOOD GLUCOSE      POCT Blood Glucose.: 162 mg/dL (19 Dec 2024 21:23)  POCT Blood Glucose.: 143 mg/dL (19 Dec 2024 17:13)  POCT Blood Glucose.: 152 mg/dL (19 Dec 2024 11:34)        DIET: CC  >50%

## 2024-12-20 NOTE — DISCHARGE NOTE NURSING/CASE MANAGEMENT/SOCIAL WORK - PATIENT PORTAL LINK FT
You can access the FollowMyHealth Patient Portal offered by [facility] by registering at the following website: [email or web address]. By joining [facility]’s FollowMyHealth portal, you will also be able to view your health information using other applications (apps) compatible with our system.

## 2024-12-20 NOTE — CONSULT NOTE ADULT - CONSULT REASON
Cardiac Optimization
Left Hip Pain
L hip pain  consulted: 1915  seen: 1916
ELVIN on CKD
89y A1C with Estimated Average Glucose Result: 8.4 % (12-19-24 @ 04:30)   diabetes mellitus uncontrolled type 2

## 2024-12-20 NOTE — CONSULT NOTE ADULT - SUBJECTIVE AND OBJECTIVE BOX
Patient is a 89y old  Female who presents with a chief complaint of L hip fracture (20 Dec 2024 09:52)    Type: 2 DM DX many years, no known complications, no Endocrine PCP Dr. Jean Munoz, a1c 8.2% was 7.7% in May. states a1c goals <8.5%.  Rx home januvia 25mg daily, does not do f/s, sees PCP q3 months for bloodwork. pt recently [facility] to the [facility], reports has been snacking a lot, reviewed CC diet, carb identification/poriton control, prioritizng lean protein and nonstarchy vegetables. provided diabetes daily meal planning guide, avoiding concentrated sweets. pt does regular daily exercise, recc 30 minutes mod intensity as tolerated. discussed use of insulin inpatient for glycemic control, CC diet, discussed affects of reduced mobility on BG. rec conservative management, verbal education provided.  diabetes education provided- A1c measure and BG targets  fasting, <180 2 hours postmeal. medication MOA and considerations/side effects, inhospital BGM frequency and insulin administration, s/s of hyperglycemia/hypoglycemia and management, glycemic control and preventing complications, consistent carb diet, balanced plate method, consistent meal planning. sick day management, provider f/u  Hx ASCVD, CKD, HF    HPI:  89 yo female w/ pmh of HTN, HLD, T2DM, TIA, CKD III, Hx of R hip CRPP in 2016 followed by hardware removal in 2017 w/ Dr. Velasquez as well as R hip IMN w/ Dr. De Luna in 2023, s/p WALLANT R carpal tunnel release w/ Dr. Andrew 2 days ago (12/16/24) previous right hip fx w/ surgical pinning presents to the ED for L hip pain. while  ambulating w/ her walker earlier today, was in a chair when she heard a "pop" followed by immediate pain and difficulty bearing weight. Was assisted to her bed and was then unable to get up - prompting ambulance transfer to [facility] ED. Denies head strike/LOC/other orthopedic injuries at this time. Unable to ambulate since injury this morning (at baseline ambulates with walker). Denies numbness/tingling in the affected extremity. Patient takes no AC meds. Denies any current chest pain, sob, abd pain, Complaining of L sided hip pain.    Per son at bedside, patient had UTI last week was treated with course of nitrofurantoin, completed her course.    In the ED:  Vitals: T 98.2 HR 57 /66 RR 16 RA  Labs: H/H 11.2/34.1 PT/INR 14.1/1.21 BUN/Cr 41/2 Glu 183 GFR 23  Given: ofirmev x1, fentanyl 25mcg IV x1, morphine 4mg IV x1, on 75 cc/hr NS in the ED  CXR cardiomegaly, no acute cardiopulm disease.  EKG: SR 1st degree AVB w/ PVCs 64 bpm QTc 491     (18 Dec 2024 21:04)      PAST MEDICAL & SURGICAL HISTORY:  Hypertension      Diabetes      TIA (transient ischemic attack)      Hip fracture      HLD (hyperlipidemia)      S/P ORIF (open reduction internal fixation) fracture  right hip      Allergies    No Known Allergies    Intolerances        MEDICATIONS  (STANDING):  acetaminophen     Tablet .. 650 milliGRAM(s) Oral every 6 hours  atorvastatin 40 milliGRAM(s) Oral at bedtime  dextrose 5%. 1000 milliLiter(s) (50 mL/Hr) IV Continuous <Continuous>  dextrose 5%. 1000 milliLiter(s) (100 mL/Hr) IV Continuous <Continuous>  dextrose 50% Injectable 25 Gram(s) IV Push once  dextrose 50% Injectable 12.5 Gram(s) IV Push once  dextrose 50% Injectable 25 Gram(s) IV Push once  enoxaparin Injectable 30 milliGRAM(s) SubCutaneous every 24 hours  glucagon  Injectable 1 milliGRAM(s) IntraMuscular once  insulin lispro (ADMELOG) corrective regimen sliding scale   SubCutaneous three times a day before meals  insulin lispro (ADMELOG) corrective regimen sliding scale   SubCutaneous at bedtime  lactated ringers. 1000 milliLiter(s) (100 mL/Hr) IV Continuous <Continuous>  losartan 25 milliGRAM(s) Oral daily  metoprolol succinate  milliGRAM(s) Oral daily  polyethylene glycol 3350 17 Gram(s) Oral at bedtime  senna 2 Tablet(s) Oral at bedtime  tranexamic acid IVPB 1000 milliGRAM(s) IV Intermittent Once  tranexamic acid IVPB 1000 milliGRAM(s) IV Intermittent Once

## 2024-12-20 NOTE — PHYSICAL THERAPY INITIAL EVALUATION ADULT - PERTINENT HX OF CURRENT PROBLEM, REHAB EVAL
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 Velasquez as well as R hip IMN w/ Dr. De Luna in 2023, s/p WALLANT R carpal tunnel release w/ Dr. Andrew 2 days ago (12/16/24) previous right hip fx w/ surgical pinning presents to the ED for L hip pain. while  ambulating w/ her walker earlier today, was in a chair when she heard a "pop" followed by immediate pain and difficulty bearing weight. Was assisted to her bed and was then unable to get up - prompting ambulance transfer to [facility] ED. Denies head strike/LOC/other orthopedic injuries at this time. Unable to ambulate since injury this morning (at baseline ambulates with walker). Denies numbness/tingling in the affected extremity. Patient takes no AC meds. Denies any current chest pain, sob, abd pain, Complaining of L sided hip pain.

## 2024-12-20 NOTE — OCCUPATIONAL THERAPY INITIAL EVALUATION ADULT - DIAGNOSIS, OT EVAL
Pt with decreased ADL status and impairments with functional mobility due to LLE weakness and decreased activity bay.

## 2024-12-20 NOTE — DISCHARGE NOTE NURSING/CASE MANAGEMENT/SOCIAL WORK - FINANCIAL ASSISTANCE
[facility] provides services at a reduced cost to those who are determined to be eligible through [facility]’s financial assistance program. Information regarding [facility]’s financial assistance program can be found by going to [email or web address] or by calling [phone].

## 2024-12-20 NOTE — PROGRESS NOTE ADULT - ASSESSMENT
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 Velasquez as well as R hip IMN w/ Dr. De Luna in 2023, s/p WALLANT R carpal tunnel release w/ Dr. Andrew 2 days ago (12/16/24) previous right hip fx w/ surgical pinning presents to the ED for L hip pain. while  ambulating w/ her walker earlier today, was in a chair when she heard a "pop" followed by immediate pain and difficulty bearing weight. Patient schedules for hemiarthroplasty of L hip with Dr. De Luna 12/19.  Consulted for Cardiac optimization     Cardiac Optimization/HTN  - s/p Lt hip hemiarthroplasty.  Tolerated procedure well  - Pain control   - PT eval    - No anginal complaints  - EKG: sinus rhythm with 1st deg heart block with PVCs, rate 64, Qtc 491, no ST changes  - Tele: NSR with PAC's.  Can D/C  - Continue home ASA and statin for Hx of TIA    - +BLE edema but no clear evidence of volume overload  - No 2 requirement  - Last TTE 11/2023: EF 64%, no significant valvular disease  - Can continue home low dose Lasix.  Creatinine likely at baseline    - BP elevated peaking at 190 overnight, likely, reactive to pain and agitation  - Continue home Toprol  mg for now    - Monitor and replete lytes, keep K>4, Mg>2.  - Will continue to follow.    Anabel Jones DNP, NP-C, AGACNP-C  Cardiology   Call TEAMS

## 2024-12-20 NOTE — DISCHARGE NOTE NURSING/CASE MANAGEMENT/SOCIAL WORK - NSDCPELOVENOXFU_GEN_ALL_CORE
Go for blood tests as directed. Your doctor will do lab tests at regular visits to monitor the effects of this medicine. Please follow up with your doctor and keep your health care provider appointments
Yes

## 2024-12-20 NOTE — OCCUPATIONAL THERAPY INITIAL EVALUATION ADULT - GENERAL OBSERVATIONS, REHAB EVAL
Pt received supine in bed with HOB elevated with abduction pillow, +SCDs, +galeana, +IV, +tele, and in NAD.

## 2024-12-20 NOTE — OCCUPATIONAL THERAPY INITIAL EVALUATION ADULT - LEVEL OF INDEPENDENCE, OT EVAL
seated/maximum assist (25% patients effort) Based on clinical judgement, sponge bathe seated/maximum assist (25% patients effort)

## 2024-12-21 LAB
ALBUMIN SERPL ELPH-MCNC: 2.3 G/DL — LOW (ref 3.3–5)
ALP SERPL-CCNC: 104 U/L — SIGNIFICANT CHANGE UP (ref 40–120)
ALT FLD-CCNC: <6 U/L — LOW (ref 12–78)
ANION GAP SERPL CALC-SCNC: 6 MMOL/L — SIGNIFICANT CHANGE UP (ref 5–17)
AST SERPL-CCNC: 49 U/L — HIGH (ref 15–37)
BILIRUB SERPL-MCNC: 0.4 MG/DL — SIGNIFICANT CHANGE UP (ref 0.2–1.2)
BUN SERPL-MCNC: 35 MG/DL — HIGH (ref 7–23)
CALCIUM SERPL-MCNC: 8.6 MG/DL — SIGNIFICANT CHANGE UP (ref 8.5–10.1)
CHLORIDE SERPL-SCNC: 111 MMOL/L — HIGH (ref 96–108)
CO2 SERPL-SCNC: 23 MMOL/L — SIGNIFICANT CHANGE UP (ref 22–31)
CREAT SERPL-MCNC: 1.7 MG/DL — HIGH (ref 0.5–1.3)
EGFR: 28 ML/MIN/1.73M2 — LOW
GLUCOSE BLDC GLUCOMTR-MCNC: 169 MG/DL — HIGH (ref 70–99)
GLUCOSE BLDC GLUCOMTR-MCNC: 175 MG/DL — HIGH (ref 70–99)
GLUCOSE BLDC GLUCOMTR-MCNC: 180 MG/DL — HIGH (ref 70–99)
GLUCOSE BLDC GLUCOMTR-MCNC: 183 MG/DL — HIGH (ref 70–99)
GLUCOSE SERPL-MCNC: 167 MG/DL — HIGH (ref 70–99)
HCT VFR BLD CALC: 27.1 % — LOW (ref 34.5–45)
HGB BLD-MCNC: 8.9 G/DL — LOW (ref 11.5–15.5)
MAGNESIUM SERPL-MCNC: 1.7 MG/DL — SIGNIFICANT CHANGE UP (ref 1.6–2.6)
MCHC RBC-ENTMCNC: 27.2 PG — SIGNIFICANT CHANGE UP (ref 27–34)
MCHC RBC-ENTMCNC: 32.8 G/DL — SIGNIFICANT CHANGE UP (ref 32–36)
MCV RBC AUTO: 82.9 FL — SIGNIFICANT CHANGE UP (ref 80–100)
NRBC # BLD: 0 /100 WBCS — SIGNIFICANT CHANGE UP (ref 0–0)
PHOSPHATE SERPL-MCNC: 3.7 MG/DL — SIGNIFICANT CHANGE UP (ref 2.5–4.5)
PLATELET # BLD AUTO: 157 K/UL — SIGNIFICANT CHANGE UP (ref 150–400)
POTASSIUM SERPL-MCNC: 3.9 MMOL/L — SIGNIFICANT CHANGE UP (ref 3.5–5.3)
POTASSIUM SERPL-SCNC: 3.9 MMOL/L — SIGNIFICANT CHANGE UP (ref 3.5–5.3)
PROT SERPL-MCNC: 5.5 G/DL — LOW (ref 6–8.3)
RBC # BLD: 3.27 M/UL — LOW (ref 3.8–5.2)
RBC # FLD: 14.3 % — SIGNIFICANT CHANGE UP (ref 10.3–14.5)
SODIUM SERPL-SCNC: 140 MMOL/L — SIGNIFICANT CHANGE UP (ref 135–145)
WBC # BLD: 9.05 K/UL — SIGNIFICANT CHANGE UP (ref 3.8–10.5)
WBC # FLD AUTO: 9.05 K/UL — SIGNIFICANT CHANGE UP (ref 3.8–10.5)

## 2024-12-21 RX ORDER — LOSARTAN POTASSIUM 100 MG/1
50 TABLET, FILM COATED ORAL DAILY
Refills: 0 | Status: DISCONTINUED | OUTPATIENT
Start: 2024-12-22 | End: 2024-12-23

## 2024-12-21 RX ORDER — ENOXAPARIN SODIUM 60 MG/.6ML
30 INJECTION INTRAVENOUS; SUBCUTANEOUS
Qty: 1 | Refills: 0
Start: 2024-12-21 | End: 2025-01-19

## 2024-12-21 RX ORDER — TRAMADOL HYDROCHLORIDE 50 MG/1
50 TABLET ORAL EVERY 6 HOURS
Refills: 0 | Status: DISCONTINUED | OUTPATIENT
Start: 2024-12-21 | End: 2024-12-23

## 2024-12-21 RX ORDER — ACETAMINOPHEN 80 MG/.8ML
650 SOLUTION/ DROPS ORAL DAILY
Refills: 0 | Status: DISCONTINUED | OUTPATIENT
Start: 2024-12-21 | End: 2024-12-23

## 2024-12-21 RX ORDER — TRAMADOL HYDROCHLORIDE 50 MG/1
25 TABLET ORAL EVERY 6 HOURS
Refills: 0 | Status: DISCONTINUED | OUTPATIENT
Start: 2024-12-21 | End: 2024-12-23

## 2024-12-21 RX ORDER — PANTOPRAZOLE 40 MG/1
40 TABLET, DELAYED RELEASE ORAL
Refills: 0 | Status: DISCONTINUED | OUTPATIENT
Start: 2024-12-21 | End: 2024-12-23

## 2024-12-21 RX ORDER — HYDRALAZINE HYDROCHLORIDE 10 MG/1
10 TABLET ORAL ONCE
Refills: 0 | Status: COMPLETED | OUTPATIENT
Start: 2024-12-21 | End: 2024-12-21

## 2024-12-21 RX ORDER — OXYCODONE HCL 15 MG
5 TABLET ORAL EVERY 6 HOURS
Refills: 0 | Status: DISCONTINUED | OUTPATIENT
Start: 2024-12-21 | End: 2024-12-22

## 2024-12-21 RX ADMIN — Medication 1: at 12:07

## 2024-12-21 RX ADMIN — ACETAMINOPHEN 650 MILLIGRAM(S): 80 SOLUTION/ DROPS ORAL at 05:09

## 2024-12-21 RX ADMIN — ONDANSETRON 4 MILLIGRAM(S): 4 TABLET ORAL at 22:51

## 2024-12-21 RX ADMIN — Medication 1: at 08:06

## 2024-12-21 RX ADMIN — Medication 100 MILLIGRAM(S): at 05:09

## 2024-12-21 RX ADMIN — SENNOSIDES 2 TABLET(S): 8.6 TABLET, FILM COATED ORAL at 21:22

## 2024-12-21 RX ADMIN — Medication 1: at 17:25

## 2024-12-21 RX ADMIN — Medication 17 GRAM(S): at 21:21

## 2024-12-21 RX ADMIN — ACETAMINOPHEN 650 MILLIGRAM(S): 80 SOLUTION/ DROPS ORAL at 00:00

## 2024-12-21 RX ADMIN — ATORVASTATIN CALCIUM 40 MILLIGRAM(S): 40 TABLET, FILM COATED ORAL at 21:22

## 2024-12-21 RX ADMIN — HYDRALAZINE HYDROCHLORIDE 10 MILLIGRAM(S): 10 TABLET ORAL at 22:51

## 2024-12-21 RX ADMIN — ACETAMINOPHEN 650 MILLIGRAM(S): 80 SOLUTION/ DROPS ORAL at 12:07

## 2024-12-21 RX ADMIN — HYDRALAZINE HYDROCHLORIDE 10 MILLIGRAM(S): 10 TABLET ORAL at 21:22

## 2024-12-21 RX ADMIN — ENOXAPARIN SODIUM 30 MILLIGRAM(S): 60 INJECTION INTRAVENOUS; SUBCUTANEOUS at 08:08

## 2024-12-21 RX ADMIN — LOSARTAN POTASSIUM 25 MILLIGRAM(S): 100 TABLET, FILM COATED ORAL at 05:09

## 2024-12-21 RX ADMIN — Medication 81 MILLIGRAM(S): at 08:08

## 2024-12-21 RX ADMIN — ACETAMINOPHEN 650 MILLIGRAM(S): 80 SOLUTION/ DROPS ORAL at 05:12

## 2024-12-21 NOTE — PROGRESS NOTE ADULT - SUBJECTIVE AND OBJECTIVE BOX
Patient is a 89y old  Female who presents with a chief complaint of L hip fracture (21 Dec 2024 12:25)    HPI:  89 yo female w/ pmh of HTN, HLD, T2DM, TIA, CKD III, Hx of R hip CRPP in 2016 followed by hardware removal in 2017 w/ Dr. Velasquez as well as R hip IMN w/ Dr. De Luna in 2023, s/p WALLANT R carpal tunnel release w/ Dr. Andrew 2 days ago (12/16/24) previous right hip fx w/ surgical pinning presents to the ED for L hip pain. while  ambulating w/ her walker earlier today, was in a chair when she heard a "pop" followed by immediate pain and difficulty bearing weight. Was assisted to her bed and was then unable to get up - prompting ambulance transfer to [facility] ED. Denies head strike/LOC/other orthopedic injuries at this time. Unable to ambulate since injury this morning (at baseline ambulates with walker). Denies numbness/tingling in the affected extremity. Patient takes no AC meds. Denies any current chest pain, sob, abd pain, Complaining of L sided hip pain.    Per son at bedside, patient had UTI last week was treated with course of nitrofurantoin, completed her course.    In the ED:  Vitals: T 98.2 HR 57 /66 RR 16 RA  Labs: H/H 11.2/34.1 PT/INR 14.1/1.21 BUN/Cr 41/2 Glu 183 GFR 23  Given: ofirmev x1, fentanyl 25mcg IV x1, morphine 4mg IV x1, on 75 cc/hr NS in the ED  CXR cardiomegaly, no acute cardiopulm disease.  EKG: SR 1st degree AVB w/ PVCs 64 bpm QTc 491     (18 Dec 2024 21:04)      INTERVAL HPI:  12/19- Pt seen, examined, pt has better pain control, pt was found to be in urinary retention -Anderson cath placed -drained ~1 lit urine, NPO for OR cardio saw pt-No A Fib, not on AC   12/20- POD #1 Pt seen and examined at bedside with daughter present. Pt agitated initially and refused morning medications. With my morning discussion, patient became calm and accepting of morning medications. States that she feels "off" mentally because of the pain medications she has been getting. Patient is aware of TOV to take place in the afternoon. PO diet reinitiated. States her pain is well controlled. Follow Bladder Scan  12/21: POD # 2 t seen, Examined ,Failed TOV-Anderson cath replaced, Pain Rt Hand with recent Sx  Low stable H/H  , Cr 1.7    OVERNIGHT EVENTS: NONE    Home Medications:  aspirin 81 mg oral delayed release tablet: 1 tab(s) orally every other day (18 Dec 2024 23:36)  atorvastatin 40 mg oral tablet: 1 tab(s) orally once a day (at bedtime) (18 Dec 2024 23:36)  famotidine 20 mg oral tablet: 1 tab(s) orally once a day (18 Dec 2024 23:36)  ferrous sulfate 324 mg (65 mg elemental iron) oral tablet: 1 tab(s) orally once a day (18 Dec 2024 23:36)  furosemide 20 mg oral tablet: 1 tab(s) orally once a day (18 Dec 2024 23:36)  Januvia 25 mg oral tablet: 1 tab(s) orally once a day (18 Dec 2024 23:36)  losartan 25 mg oral tablet: 1 tab(s) orally once a day (18 Dec 2024 23:32)  Metoprolol Succinate  mg oral tablet, extended release: 1 tab(s) orally once a day (18 Dec 2024 23:36)      MEDICATIONS  (STANDING):  acetaminophen     Tablet .. 650 milliGRAM(s) Oral daily  aspirin enteric coated 81 milliGRAM(s) Oral <User Schedule>  atorvastatin 40 milliGRAM(s) Oral at bedtime  dextrose 5%. 1000 milliLiter(s) (50 mL/Hr) IV Continuous <Continuous>  dextrose 5%. 1000 milliLiter(s) (100 mL/Hr) IV Continuous <Continuous>  dextrose 50% Injectable 25 Gram(s) IV Push once  dextrose 50% Injectable 25 Gram(s) IV Push once  dextrose 50% Injectable 12.5 Gram(s) IV Push once  enoxaparin Injectable 30 milliGRAM(s) SubCutaneous every 24 hours  glucagon  Injectable 1 milliGRAM(s) IntraMuscular once  insulin lispro (ADMELOG) corrective regimen sliding scale   SubCutaneous three times a day before meals  insulin lispro (ADMELOG) corrective regimen sliding scale   SubCutaneous at bedtime  metoprolol succinate  milliGRAM(s) Oral daily  pantoprazole    Tablet 40 milliGRAM(s) Oral before breakfast  polyethylene glycol 3350 17 Gram(s) Oral at bedtime  senna 2 Tablet(s) Oral at bedtime  tranexamic acid IVPB 1000 milliGRAM(s) IV Intermittent Once  tranexamic acid IVPB 1000 milliGRAM(s) IV Intermittent Once    MEDICATIONS  (PRN):  dextrose Oral Gel 15 Gram(s) Oral once PRN Blood Glucose LESS THAN 70 milliGRAM(s)/deciliter  magnesium hydroxide Suspension 30 milliLiter(s) Oral daily PRN Constipation  ondansetron Injectable 4 milliGRAM(s) IV Push every 6 hours PRN Nausea and/or Vomiting  oxyCODONE    IR 5 milliGRAM(s) Oral every 6 hours PRN Severe Pain (7 - 10)  polyethylene glycol 3350 17 Gram(s) Oral two times a day PRN Constipation  traMADol 25 milliGRAM(s) Oral every 6 hours PRN Mild Pain (1 - 3)  traMADol 50 milliGRAM(s) Oral every 6 hours PRN Moderate Pain (4 - 6)      Allergies    No Known Allergies    Intolerances        Social History:  Tobacco: denies  EtOH: denies  Recreational drug use:denies  Lives with: child  ADLs/Ambulates: with walker (18 Dec 2024 21:04)      REVIEW OF SYSTEMS:  CONSTITUTIONAL: No fever, No chills, No fatigue, No myalgia, No Body ache, No Weakness  EYES: No eye pain,  No visual disturbances, No discharge, NO Redness  ENMT:  No ear pain, No nose bleed, No vertigo; No sinus pain, NO throat pain, No Congestion  NECK: No pain, No stiffness  RESPIRATORY: No cough, NO wheezing, No  hemoptysis, NO  shortness of breath  CARDIOVASCULAR: No chest pain, palpitations  GASTROINTESTINAL: No abdominal pain, NO epigastric pain. No nausea, No vomiting; No diarrhea, No constipation. [  ] BM  GENITOURINARY: No dysuria, No frequency, No urgency, No hematuria, NO incontinence  NEUROLOGICAL: No headaches, No dizziness, No numbness, No tingling, No tremors, No weakness  EXT: No Swelling, No Pain, No Edema  SKIN:  [x  ] No itching, burning, rashes, or lesions   MUSCULOSKELETAL: No joint pain ,No Jt swelling; No muscle pain, No back pain, No extremity pain  PSYCHIATRIC: No depression,  No anxiety,  No mood swings ,No difficulty sleeping at night  PAIN SCALE: [  ] None  [ x ] Other-Rt hand   ROS Unable to obtain due to - [  ] Dementia  [  ] Lethargy [  ] Drowsy [  ] Sedated [  ] non verbal  REST OF REVIEW Of SYSTEM - [ x ] Normal     Vital Signs Last 24 Hrs  T(C): 36.6 (21 Dec 2024 11:34), Max: 36.6 (20 Dec 2024 20:06)  T(F): 97.9 (21 Dec 2024 11:34), Max: 97.9 (21 Dec 2024 00:10)  HR: 65 (21 Dec 2024 11:34) (65 - 66)  BP: 175/58 (21 Dec 2024 11:34) (149/62 - 175/58)  BP(mean): --  RR: 20 (21 Dec 2024 11:34) (18 - 20)  SpO2: 91% (21 Dec 2024 11:34) (91% - 96%)    Parameters below as of 21 Dec 2024 11:34  Patient On (Oxygen Delivery Method): room air      Finger Stick        12-20 @ 07:01  -  12-21 @ 07:00  --------------------------------------------------------  IN: 0 mL / OUT: 600 mL / NET: -600 mL    12-21 @ 07:01  -  12-21 @ 16:37  --------------------------------------------------------  IN: 0 mL / OUT: 300 mL / NET: -300 mL          PHYSICAL EXAM: OOB to Chair  GENERAL:  [ X ] NAD , [ X ] well appearing, [  ] Agitated, [  ] Drowsy,  [  ] Lethargy, [  ] confused   HEAD:  [ xNormal, [  ] Other  EYES:  [ X ] EOMI, [ X ] PERRLA, [X  ] conjunctiva and sclera clear normal, [  ] Other,  [  ] Pallor,[  ] Discharge  ENMT:  [ x ] Normal, [ X ] Moist mucous membranes, [  ] Good dentition, [ x ] No Thrush  NECK:  [ x ] Supple, [ x ] No JVD, [ x ] Normal thyroid, [  ] Lymphadenopathy [  ] Other  CHEST/LUNG:  [ X ] Clear to auscultation bilaterally, [ x ] Breath Sounds equal B/L / Decrease, [ x ] poor effort  [ X ] No rales, [ X ] No rhonchi  [  X]  No wheezing,   HEART:  [ X ] Regular rate and rhythm, [  ] tachycardia, [  ] Bradycardia,  [  ] irregular  [  x] No murmurs, No rubs, No gallops, [  ] PPM in place (Mfr:  )  ABDOMEN:  [ X ] Soft, [X  ] Nontender, [ X ] Nondistended, [  ] No mass, [ x ] Bowel sounds present, [ X ] obese  NERVOUS SYSTEM:  [ x ] Alert & Oriented X3, [ x ] Nonfocal  [  ] Confusion  [  ] Encephalopathic [  ] Sedated [  ] Unable to assess, [  ] Dementia [  ] Other-  EXTREMITIES: [ X ] 2+ Peripheral Pulses, No clubbing, No cyanosis,  [ X ] nonpitting edema B/L lower EXT [X] healing suture  carpal tunnel wound to Right wrist swelling  [X] dressings c/d/i to suture of left hip [  ] PVD stasis skin changes B/L Lower EXT, [  ] wound  LYMPH: No lymphadenopathy noted  SKIN:  [ X ] No rashes or lesions, [  ] Pressure Ulcers, [  ] ecchymosis, [  ] Skin Tears, [  ] Other    DIET: Diet, Consistent Carbohydrate w/Evening Snack:   DASH/TLC Sodium & Cholesterol Restricted (12-21-24 @ 13:54)      LABS:                        8.9    9.05  )-----------( 157      ( 21 Dec 2024 07:45 )             27.1     21 Dec 2024 07:45    140    |  111    |  35     ----------------------------<  167    3.9     |  23     |  1.70     Ca    8.6        21 Dec 2024 07:45  Phos  3.7       21 Dec 2024 07:45  Mg     1.7       21 Dec 2024 07:45    TPro  5.5    /  Alb  2.3    /  TBili  0.4    /  DBili  x      /  AST  49     /  ALT  <6     /  AlkPhos  104    21 Dec 2024 07:45      Urinalysis Basic - ( 21 Dec 2024 07:45 )    Color: x / Appearance: x / SG: x / pH: x  Gluc: 167 mg/dL / Ketone: x  / Bili: x / Urobili: x   Blood: x / Protein: x / Nitrite: x   Leuk Esterase: x / RBC: x / WBC x   Sq Epi: x / Non Sq Epi: x / Bacteria: x        Culture Results:   <10,000 CFU/mL Normal Urogenital Daniela (12-18 @ 20:10)      Culture - Urine (collected 18 Dec 2024 20:10)  Source: Clean Catch  Final Report (20 Dec 2024 09:25):    <10,000 CFU/mL Normal Urogenital Daniela    Urinalysis with Rflx Culture (collected 18 Dec 2024 20:10)         Anemia Panel:  Iron - Total Binding Capacity.: 225 ug/dL (12-20-24 @ 07:45)  Iron Total: 26 ug/dL (12-20-24 @ 07:45)  Ferritin: 250 ng/mL (12-20-24 @ 07:45)      RADIOLOGY & ADDITIONAL TESTS:      HEALTH ISSUES - PROBLEM Dx:  Fracture of femoral neck, left    HTN (hypertension)    HLD (hyperlipidemia)    TIA (transient ischemic attack)    T2DM (type 2 diabetes mellitus)    Need for prophylactic measure    CKD (chronic kidney disease)    Anemia            Consultant(s) Notes Reviewed:  x] YES     Care Discussed with [X] Consultants  [x  ] Patient  [ x ] Family [  ] HCP [  ]   [x  ] Social Service  [x  ] RN, [  ] Physical Therapy,[  ] Palliative care team  DVT PPX: [ x ] Lovenox, [  ] S C Heparin, [  ] Coumadin, [  ] Xarelto, [  ] Eliquis, [  ] Pradaxa, [  ] IV Heparin drip, [  ] SCD [  ] Contraindication 2 to GI Bleed,[  ] Ambulation [  ] Contraindicated 2 to  bleed [  ] Contraindicated 2 to Brain Bleed  Advanced directive: [ x ] None, [  ] DNR/DNI

## 2024-12-21 NOTE — PROGRESS NOTE ADULT - ASSESSMENT
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 Velasquez as well as R hip IMN w/ Dr. De Luna in 2023, s/p WALLANT R carpal tunnel release w/ Dr. Andrew 2 days ago (12/16/24) previous right hip fx w/ surgical pinning presents to the ED for L hip pain admitted for L hip fracture plan for hemiarthroplasty of L hip with Dr. De Luna on  12/19/24 at [facility].

## 2024-12-21 NOTE — CAREGIVER ENGAGEMENT NOTE - CAREGIVER OUTREACH NOTES - FREE TEXT
Per MD, d/c cancelled until the AM. TAHIR met with pt and pt's dtr Maday at bedside to discuss d/c planning. Maday understanding that pt d/c changed to Sunday. TAHIR spoke with Nano @ [facility] admissions and she states that pt can admit to [facility] tomorrow if stable. TAHIR scheduled beto via [facility] for 2pm tomorrow. SW to follow and remain available for any needs.

## 2024-12-21 NOTE — PROGRESS NOTE ADULT - SUBJECTIVE AND OBJECTIVE BOX
Patient seen and examined at bedside this AM. Patient had to be straight cathed x 1 for retention with 350 cc voided, otherwise no acute events  No acute complaints at this time in regard to her hip. Pain well controlled. Denies chest pain, shortness of breath, nausea or vomiting.     LABS:                        10.3   9.35  )-----------( 168      ( 20 Dec 2024 07:45 )             31.6     12-20    142  |  111[H]  |  31[H]  ----------------------------<  203[H]  3.8   |  21[L]  |  1.80[H]    Ca    9.1      20 Dec 2024 07:45  Phos  4.1     12-20  Mg     1.9     12-20    TPro  6.6  /  Alb  2.9[L]  /  TBili  0.5  /  DBili  x   /  AST  46[H]  /  ALT  22  /  AlkPhos  137[H]  12-20      Urinalysis Basic - ( 20 Dec 2024 07:45 )    Color: x / Appearance: x / SG: x / pH: x  Gluc: 203 mg/dL / Ketone: x  / Bili: x / Urobili: x   Blood: x / Protein: x / Nitrite: x   Leuk Esterase: x / RBC: x / WBC x   Sq Epi: x / Non Sq Epi: x / Bacteria: x        VITAL SIGNS:  T(C): 36.6 (12-21-24 @ 04:26), Max: 36.6 (12-20-24 @ 09:30)  HR: 66 (12-21-24 @ 04:26) (65 - 90)  BP: 171/68 (12-21-24 @ 04:26) (145/61 - 171/68)  RR: 20 (12-21-24 @ 04:26) (18 - 20)  SpO2: 94% (12-21-24 @ 04:26) (92% - 96%)    General: NAD, resting comfortably in bed  LLE:   Dressing in place C/D/I  SCD in place bilaterally  No calf tenderness   Motor: + EHL/FHL/TA/GSC  Sensory: SILT SPN/DPN/Yury/Saph/Tib  DP/PT 2+      A/P:  89y f s/p L hip hemiarthroplasty w/ Dr. De Luna POD 2  -PT/OT  -WBAT LLE w/ posterior precautions - abduction pillow on while lying down and sitting  -NWB R hand - otherwise WBAT from R forearm and above, can use platform walker  -Pain Control  -DVT ppx: per primary - start POD1 if feasible  -Continue perioperative abx x 24 hours  -FU AM Labs  -Rest, ice, compress, and elevate the extremity as tolerated  -Incentive Spirometry  -Medical management appreciated  -Dispo per PT  -D/w Dr. De Luna, will update plan as needed

## 2024-12-21 NOTE — PROGRESS NOTE ADULT - PROBLEM SELECTOR PLAN 4
History of CKD 2- 3  - Baseline 1.7-2 in 6/2024.  - Continue to monitor with daily CMP  -Dr. MARYA Kaur Nephro follow up   urinary retention -Failed TOV   Galeana cath replaced   - as per dtr pt had galeana cath x 2 months with previous Sx

## 2024-12-21 NOTE — PROGRESS NOTE ADULT - PROBLEM SELECTOR PLAN 3
Chronic - BP elevated in the ED likely 2/2 to pain, to monitor BPs  - Continue home Toprol XL  and losartan daily   -echo normal ef and no sig valve disease EF 64%   - restart  lasix for leg swelling -   - doppler US b/l LE ordered - negative for DVT B/L

## 2024-12-21 NOTE — PROGRESS NOTE ADULT - PROBLEM SELECTOR PLAN 5
Patient with hx of TIA( per son at bedside was ~2017)  - Patient on aspirin  q OD and atorvastatin, to continue starting tomorrow - per son at bedside, received all medications this AM-continue ASA every other day, statin daily

## 2024-12-21 NOTE — PROGRESS NOTE ADULT - PROBLEM SELECTOR PLAN 1
Pt w/ hx of R hip IMN now with L hip pain x1 day.  Xray left - L femoral neck fracture-Hemiarthroplasty of left hip  - Pain control per orthopedics; tramadol for mild, Tramadol for Moderate & oxycodone 5 for severe pain PRN  - POD#2  - was able to ambulate with walker and assistance from PT  - Cardiology follow up Dr Arias  Bp elevated   - Orthopedics Dr. De Luna following  ok for D/C as per Residents   - PT following - recommend LEONEL

## 2024-12-21 NOTE — PROGRESS NOTE ADULT - SUBJECTIVE AND OBJECTIVE BOX
[facility]                             Nephrology and Hypertension                             Marcin Kaplan                                          [phone]     Patient is a 89y old  Female who presents with a chief complaint of L hip fracture (19 Dec 2024 11:24)       HPI:  89 yo female w/ pmh of HTN, HLD, T2DM, TIA, CKD III, Hx of R hip CRPP in 2016 followed by hardware removal in 2017 w/ Dr. Velasquez as well as R hip IMN w/ Dr. De Luna in 2023, s/p WALLANT R carpal tunnel release w/ Dr. Andrew 2 days ago (12/16/24) previous right hip fx w/ surgical pinning presents to the ED for L hip pain. while  ambulating w/ her walker earlier today, was in a chair when she heard a "pop" followed by immediate pain and difficulty bearing weight. Was assisted to her bed and was then unable to get up - prompting ambulance transfer to [facility] ED. Denies head strike/LOC/other orthopedic injuries at this time. Unable to ambulate since injury this morning (at baseline ambulates with walker). Denies numbness/tingling in the affected extremity. Patient takes no AC meds. Denies any current chest pain, sob, abd pain, Complaining of L sided hip pain.Per son at bedside, patient had UTI last week was treated with course of nitrofurantoin, completed her course.    Nephrology is c/s for ELVIN on CKD today. I saw her in the ED, daugther at bedside. She has c.o pain. She does not recall events. She has CKd but does not see Nephrology. Per daughter, baseline SCr is 1.8-1.9. On chart review, baseline SCr is 1.5-1.9, SCr was >2 on admission is now downtrending. She does endorse not eating as well. She has no sob or dizziness when I saw her. Is s/p bolus and now on maint ivf.     No acute events noted       PAST MEDICAL & SURGICAL HISTORY:  Hypertension      Diabetes      TIA (transient ischemic attack)      Hip fracture      HLD (hyperlipidemia)      S/P ORIF (open reduction internal fixation) fracture  right hip           FAMILY HISTORY:  FH: asthma (Father)    [place]    Social History:Non smoker    MEDICATIONS  (STANDING):  atorvastatin 40 milliGRAM(s) Oral at bedtime  dextrose 5%. 1000 milliLiter(s) (50 mL/Hr) IV Continuous <Continuous>  dextrose 5%. 1000 milliLiter(s) (100 mL/Hr) IV Continuous <Continuous>  dextrose 50% Injectable 25 Gram(s) IV Push once  dextrose 50% Injectable 12.5 Gram(s) IV Push once  dextrose 50% Injectable 25 Gram(s) IV Push once  glucagon  Injectable 1 milliGRAM(s) IntraMuscular once  insulin lispro (ADMELOG) corrective regimen sliding scale   SubCutaneous every 6 hours  losartan 25 milliGRAM(s) Oral daily  metoprolol succinate  milliGRAM(s) Oral daily  polyethylene glycol 3350 17 Gram(s) Oral daily  senna 2 Tablet(s) Oral at bedtime  sodium chloride 0.9%. 1000 milliLiter(s) (75 mL/Hr) IV Continuous <Continuous>    MEDICATIONS  (PRN):  dextrose Oral Gel 15 Gram(s) Oral once PRN Blood Glucose LESS THAN 70 milliGRAM(s)/deciliter  HYDROmorphone  Injectable 0.5 milliGRAM(s) IV Push every 4 hours PRN Moderate Pain (4 - 6)  HYDROmorphone  Injectable 1 milliGRAM(s) IV Push every 4 hours PRN Severe Pain (7 - 10)  melatonin 3 milliGRAM(s) Oral at bedtime PRN Insomnia  traMADol 25 milliGRAM(s) Oral every 12 hours PRN Mild Pain (1 - 3)   Meds reviewed    Allergies    No Known Allergies    Intolerances         REVIEW OF SYSTEMS: as per HPI    as above      Vital Signs Last 24 Hrs  T(C): 36.6 (21 Dec 2024 11:34), Max: 36.6 (20 Dec 2024 20:06)  T(F): 97.9 (21 Dec 2024 11:34), Max: 97.9 (21 Dec 2024 00:10)  HR: 65 (21 Dec 2024 11:34) (65 - 66)  BP: 175/58 (21 Dec 2024 11:34) (149/62 - 175/58)  BP(mean): --  RR: 20 (21 Dec 2024 11:34) (18 - 20)  SpO2: 91% (21 Dec 2024 11:34) (91% - 96%)    Parameters below as of 21 Dec 2024 11:34  Patient On (Oxygen Delivery Method): room air          PHYSICAL EXAM:    GENERAL: NAD  HEAD:  Atraumatic, Normocephalic  EYES: EOMI, conjunctiva and sclera clear  ENMT: No Drainage from nares, No drainage from ears  NERVOUS SYSTEM:  Awake and Alert  CHEST/LUNG: Clear bilaterally  EXTREMITIES:  No Edema, RUE cast noted, decreased ROM LE  SKIN: No rashes No obvious ecchymosis      LABS:                                                     8.9    9.05  )-----------( 157      ( 21 Dec 2024 07:45 )             27.1     12-21    140  |  111[H]  |  35[H]  ----------------------------<  167[H]  3.9   |  23  |  1.70[H]    Ca    8.6      21 Dec 2024 07:45  Phos  3.7     12-21  Mg     1.7     12-21    TPro  5.5[L]  /  Alb  2.3[L]  /  TBili  0.4  /  DBili  x   /  AST  49[H]  /  ALT  <6[L]  /  AlkPhos  104  12-21      Urinalysis Basic - ( 21 Dec 2024 07:45 )    Color: x / Appearance: x / SG: x / pH: x  Gluc: 167 mg/dL / Ketone: x  / Bili: x / Urobili: x   Blood: x / Protein: x / Nitrite: x   Leuk Esterase: x / RBC: x / WBC x   Sq Epi: x / Non Sq Epi: x / Bacteria: x

## 2024-12-21 NOTE — PROGRESS NOTE ADULT - ASSESSMENT
ELVIN on CKD 3  HTN with CKD 3  Anemia  Hip fracture  Volume depletion      -ELVIN likely 2/2 IV volume depletion  -SCR baseline is 1.5-1.9, is back to baseline now and remains stable  -SCr was 2 on admission  -Electrolytes are normal  -Iron panel reviewed. start PO iron  -s/p IVF  -S/P OR 12/19/24for hip fracture  -BP elevated; increase losartan 50mg    DC planning per primary team

## 2024-12-21 NOTE — PROVIDER CONTACT NOTE (OTHER) - ASSESSMENT
pt in bed alert and oriented, with periods of confusion. pt denies pain and SOB and chest pain. pt complains of abdominal pain.

## 2024-12-22 DIAGNOSIS — K59.00 CONSTIPATION, UNSPECIFIED: ICD-10-CM

## 2024-12-22 LAB
ANION GAP SERPL CALC-SCNC: 12 MMOL/L — SIGNIFICANT CHANGE UP (ref 5–17)
BUN SERPL-MCNC: 37 MG/DL — HIGH (ref 7–23)
CALCIUM SERPL-MCNC: 9.1 MG/DL — SIGNIFICANT CHANGE UP (ref 8.5–10.1)
CHLORIDE SERPL-SCNC: 104 MMOL/L — SIGNIFICANT CHANGE UP (ref 96–108)
CO2 SERPL-SCNC: 24 MMOL/L — SIGNIFICANT CHANGE UP (ref 22–31)
CREAT SERPL-MCNC: 1.5 MG/DL — HIGH (ref 0.5–1.3)
EGFR: 32 ML/MIN/1.73M2 — LOW
GLUCOSE BLDC GLUCOMTR-MCNC: 148 MG/DL — HIGH (ref 70–99)
GLUCOSE BLDC GLUCOMTR-MCNC: 173 MG/DL — HIGH (ref 70–99)
GLUCOSE BLDC GLUCOMTR-MCNC: 200 MG/DL — HIGH (ref 70–99)
GLUCOSE BLDC GLUCOMTR-MCNC: 225 MG/DL — HIGH (ref 70–99)
GLUCOSE SERPL-MCNC: 215 MG/DL — HIGH (ref 70–99)
HCT VFR BLD CALC: 30.2 % — LOW (ref 34.5–45)
HGB BLD-MCNC: 10.2 G/DL — LOW (ref 11.5–15.5)
MCHC RBC-ENTMCNC: 27.5 PG — SIGNIFICANT CHANGE UP (ref 27–34)
MCHC RBC-ENTMCNC: 33.8 G/DL — SIGNIFICANT CHANGE UP (ref 32–36)
MCV RBC AUTO: 81.4 FL — SIGNIFICANT CHANGE UP (ref 80–100)
NRBC # BLD: 0 /100 WBCS — SIGNIFICANT CHANGE UP (ref 0–0)
PLATELET # BLD AUTO: 189 K/UL — SIGNIFICANT CHANGE UP (ref 150–400)
POTASSIUM SERPL-MCNC: 3.5 MMOL/L — SIGNIFICANT CHANGE UP (ref 3.5–5.3)
POTASSIUM SERPL-SCNC: 3.5 MMOL/L — SIGNIFICANT CHANGE UP (ref 3.5–5.3)
RBC # BLD: 3.71 M/UL — LOW (ref 3.8–5.2)
RBC # FLD: 14.1 % — SIGNIFICANT CHANGE UP (ref 10.3–14.5)
SODIUM SERPL-SCNC: 140 MMOL/L — SIGNIFICANT CHANGE UP (ref 135–145)
WBC # BLD: 10.53 K/UL — HIGH (ref 3.8–10.5)
WBC # FLD AUTO: 10.53 K/UL — HIGH (ref 3.8–10.5)

## 2024-12-22 PROCEDURE — 93010 ELECTROCARDIOGRAM REPORT: CPT

## 2024-12-22 RX ORDER — POTASSIUM CHLORIDE 600 MG/1
40 TABLET, FILM COATED, EXTENDED RELEASE ORAL ONCE
Refills: 0 | Status: COMPLETED | OUTPATIENT
Start: 2024-12-22 | End: 2024-12-22

## 2024-12-22 RX ORDER — TRIMETHOBENZAMIDE HCL 250 MG
200 CAPSULE ORAL EVERY 8 HOURS
Refills: 0 | Status: DISCONTINUED | OUTPATIENT
Start: 2024-12-22 | End: 2024-12-23

## 2024-12-22 RX ORDER — BISACODYL 5 MG
10 TABLET, DELAYED RELEASE (ENTERIC COATED) ORAL DAILY
Refills: 0 | Status: DISCONTINUED | OUTPATIENT
Start: 2024-12-22 | End: 2024-12-23

## 2024-12-22 RX ORDER — OXYCODONE HCL 15 MG
2.5 TABLET ORAL EVERY 6 HOURS
Refills: 0 | Status: DISCONTINUED | OUTPATIENT
Start: 2024-12-22 | End: 2024-12-23

## 2024-12-22 RX ORDER — HYDRALAZINE HYDROCHLORIDE 10 MG/1
25 TABLET ORAL EVERY 8 HOURS
Refills: 0 | Status: DISCONTINUED | OUTPATIENT
Start: 2024-12-22 | End: 2024-12-23

## 2024-12-22 RX ORDER — BISACODYL 5 MG
10 TABLET, DELAYED RELEASE (ENTERIC COATED) ORAL ONCE
Refills: 0 | Status: COMPLETED | OUTPATIENT
Start: 2024-12-22 | End: 2024-12-22

## 2024-12-22 RX ADMIN — Medication 100 MILLIGRAM(S): at 07:01

## 2024-12-22 RX ADMIN — Medication 10 MILLIGRAM(S): at 09:37

## 2024-12-22 RX ADMIN — LOSARTAN POTASSIUM 50 MILLIGRAM(S): 100 TABLET, FILM COATED ORAL at 07:01

## 2024-12-22 RX ADMIN — Medication 2: at 12:08

## 2024-12-22 RX ADMIN — ONDANSETRON 4 MILLIGRAM(S): 4 TABLET ORAL at 07:00

## 2024-12-22 RX ADMIN — Medication 1: at 08:18

## 2024-12-22 RX ADMIN — HYDRALAZINE HYDROCHLORIDE 25 MILLIGRAM(S): 10 TABLET ORAL at 12:14

## 2024-12-22 RX ADMIN — ATORVASTATIN CALCIUM 40 MILLIGRAM(S): 40 TABLET, FILM COATED ORAL at 21:37

## 2024-12-22 RX ADMIN — Medication 200 MILLIGRAM(S): at 21:46

## 2024-12-22 RX ADMIN — ENOXAPARIN SODIUM 30 MILLIGRAM(S): 60 INJECTION INTRAVENOUS; SUBCUTANEOUS at 08:24

## 2024-12-22 RX ADMIN — POTASSIUM CHLORIDE 40 MILLIEQUIVALENT(S): 600 TABLET, FILM COATED, EXTENDED RELEASE ORAL at 15:13

## 2024-12-22 RX ADMIN — ACETAMINOPHEN 650 MILLIGRAM(S): 80 SOLUTION/ DROPS ORAL at 11:50

## 2024-12-22 RX ADMIN — PANTOPRAZOLE 40 MILLIGRAM(S): 40 TABLET, DELAYED RELEASE ORAL at 07:00

## 2024-12-22 RX ADMIN — ACETAMINOPHEN 650 MILLIGRAM(S): 80 SOLUTION/ DROPS ORAL at 12:10

## 2024-12-22 RX ADMIN — HYDRALAZINE HYDROCHLORIDE 25 MILLIGRAM(S): 10 TABLET ORAL at 21:37

## 2024-12-22 NOTE — PROGRESS NOTE ADULT - SUBJECTIVE AND OBJECTIVE BOX
[facility]                             Nephrology and Hypertension                             Marcin Kaplan                                          [phone]     Patient is a 89y old  Female who presents with a chief complaint of L hip fracture (19 Dec 2024 11:24)       HPI:  89 yo female w/ pmh of HTN, HLD, T2DM, TIA, CKD III, Hx of R hip CRPP in 2016 followed by hardware removal in 2017 w/ Dr. Velasquez as well as R hip IMN w/ Dr. De Luna in 2023, s/p WALLANT R carpal tunnel release w/ Dr. Andrew 2 days ago (12/16/24) previous right hip fx w/ surgical pinning presents to the ED for L hip pain. while  ambulating w/ her walker earlier today, was in a chair when she heard a "pop" followed by immediate pain and difficulty bearing weight. Was assisted to her bed and was then unable to get up - prompting ambulance transfer to [facility] ED. Denies head strike/LOC/other orthopedic injuries at this time. Unable to ambulate since injury this morning (at baseline ambulates with walker). Denies numbness/tingling in the affected extremity. Patient takes no AC meds. Denies any current chest pain, sob, abd pain, Complaining of L sided hip pain.Per son at bedside, patient had UTI last week was treated with course of nitrofurantoin, completed her course.    Nephrology is c/s for ELVIN on CKD today. I saw her in the ED, daugther at bedside. She has c.o pain. She does not recall events. She has CKd but does not see Nephrology. Per daughter, baseline SCr is 1.8-1.9. On chart review, baseline SCr is 1.5-1.9, SCr was >2 on admission is now downtrending. She does endorse not eating as well. She has no sob or dizziness when I saw her. Is s/p bolus and now on maint ivf.     No acute events noted       PAST MEDICAL & SURGICAL HISTORY:  Hypertension      Diabetes      TIA (transient ischemic attack)      Hip fracture      HLD (hyperlipidemia)      S/P ORIF (open reduction internal fixation) fracture  right hip           FAMILY HISTORY:  FH: asthma (Father)    [place]    Social History:Non smoker    MEDICATIONS  (STANDING):  acetaminophen     Tablet .. 650 milliGRAM(s) Oral daily  aspirin enteric coated 81 milliGRAM(s) Oral <User Schedule>  atorvastatin 40 milliGRAM(s) Oral at bedtime  dextrose 5%. 1000 milliLiter(s) (50 mL/Hr) IV Continuous <Continuous>  dextrose 5%. 1000 milliLiter(s) (100 mL/Hr) IV Continuous <Continuous>  dextrose 50% Injectable 25 Gram(s) IV Push once  dextrose 50% Injectable 12.5 Gram(s) IV Push once  dextrose 50% Injectable 25 Gram(s) IV Push once  enoxaparin Injectable 30 milliGRAM(s) SubCutaneous every 24 hours  glucagon  Injectable 1 milliGRAM(s) IntraMuscular once  insulin lispro (ADMELOG) corrective regimen sliding scale   SubCutaneous three times a day before meals  insulin lispro (ADMELOG) corrective regimen sliding scale   SubCutaneous at bedtime  losartan 50 milliGRAM(s) Oral daily  metoprolol succinate  milliGRAM(s) Oral daily  pantoprazole    Tablet 40 milliGRAM(s) Oral before breakfast  polyethylene glycol 3350 17 Gram(s) Oral at bedtime  senna 2 Tablet(s) Oral at bedtime  tranexamic acid IVPB 1000 milliGRAM(s) IV Intermittent Once  tranexamic acid IVPB 1000 milliGRAM(s) IV Intermittent Once    MEDICATIONS  (PRN):  dextrose Oral Gel 15 Gram(s) Oral once PRN Blood Glucose LESS THAN 70 milliGRAM(s)/deciliter  magnesium hydroxide Suspension 30 milliLiter(s) Oral daily PRN Constipation  oxyCODONE    IR 5 milliGRAM(s) Oral every 6 hours PRN Severe Pain (7 - 10)  polyethylene glycol 3350 17 Gram(s) Oral two times a day PRN Constipation  traMADol 25 milliGRAM(s) Oral every 6 hours PRN Mild Pain (1 - 3)  traMADol 50 milliGRAM(s) Oral every 6 hours PRN Moderate Pain (4 - 6)      Allergies    No Known Allergies    Intolerances         REVIEW OF SYSTEMS: as per HPI    as above    Vital Signs Last 24 Hrs  T(C): 36.8 (22 Dec 2024 04:35), Max: 36.8 (21 Dec 2024 21:50)  T(F): 98.2 (22 Dec 2024 04:35), Max: 98.2 (21 Dec 2024 21:50)  HR: 87 (22 Dec 2024 04:35) (65 - 87)  BP: 188/74 (22 Dec 2024 00:23) (175/58 - 194/82)  BP(mean): --  RR: 20 (22 Dec 2024 04:35) (18 - 20)  SpO2: 94% (22 Dec 2024 04:35) (91% - 94%)    Parameters below as of 22 Dec 2024 04:35  Patient On (Oxygen Delivery Method): room air          PHYSICAL EXAM:    GENERAL: NAD  HEAD:  Atraumatic, Normocephalic  EYES: EOMI, conjunctiva and sclera clear  ENMT: No Drainage from nares, No drainage from ears  NERVOUS SYSTEM:  Awake and Alert  CHEST/LUNG: Clear bilaterally  EXTREMITIES:  No Edema, RUE cast noted, decreased ROM LE  SKIN: No rashes No obvious ecchymosis      LABS:             12/22/24: pending                                        8.9    9.05  )-----------( 157      ( 21 Dec 2024 07:45 )             27.1     12-21    140  |  111[H]  |  35[H]  ----------------------------<  167[H]  3.9   |  23  |  1.70[H]    Ca    8.6      21 Dec 2024 07:45  Phos  3.7     12-21  Mg     1.7     12-21    TPro  5.5[L]  /  Alb  2.3[L]  /  TBili  0.4  /  DBili  x   /  AST  49[H]  /  ALT  <6[L]  /  AlkPhos  104  12-21      Urinalysis Basic - ( 21 Dec 2024 07:45 )    Color: x / Appearance: x / SG: x / pH: x  Gluc: 167 mg/dL / Ketone: x  / Bili: x / Urobili: x   Blood: x / Protein: x / Nitrite: x   Leuk Esterase: x / RBC: x / WBC x   Sq Epi: x / Non Sq Epi: x / Bacteria: x

## 2024-12-22 NOTE — PROGRESS NOTE ADULT - ASSESSMENT
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 Velasquez as well as R hip IMN w/ Dr. De Luna in 2023, s/p WALLANT R carpal tunnel release w/ Dr. Andrew 2 days ago (12/16/24) previous right hip fx w/ surgical pinning presents to the ED for L hip pain admitted for L hip fracture plan for hemiarthroplasty of L hip with Dr. De Luna on  12/19/24 at [facility].

## 2024-12-22 NOTE — PROGRESS NOTE ADULT - SUBJECTIVE AND OBJECTIVE BOX
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 Velasquez as well as R hip IMN w/ Dr. De Luna in 2023, s/p WALLANT R carpal tunnel release w/ Dr. Andrew 2 days ago (12/16/24) previous right hip fx w/ surgical pinning presents to the ED for L hip pain. while  ambulating w/ her walker earlier today, was in a chair when she heard a "pop" followed by immediate pain and difficulty bearing weight. Was assisted to her bed and was then unable to get up - prompting ambulance transfer to [facility] ED. Denies head strike/LOC/other orthopedic injuries at this time. Unable to ambulate since injury this morning (at baseline ambulates with walker). Denies numbness/tingling in the affected extremity. Patient takes no AC meds. Denies any current chest pain, sob, abd pain, Complaining of L sided hip pain.    Per son at bedside, patient had UTI last week was treated with course of nitrofurantoin, completed her course.    In the ED:  Vitals: T 98.2 HR 57 /66 RR 16 RA  Labs: H/H 11.2/34.1 PT/INR 14.1/1.21 BUN/Cr 41/2 Glu 183 GFR 23  Given: ofirmev x1, fentanyl 25mcg IV x1, morphine 4mg IV x1, on 75 cc/hr NS in the ED  CXR cardiomegaly, no acute cardiopulm disease.  EKG: SR 1st degree AVB w/ PVCs 64 bpm QTc 491     (18 Dec 2024 21:04)    INTERVAL HPI:  12/19- Pt seen, examined, pt has better pain control, pt was found to be in urinary retention -Anderson cath placed -drained ~1 lit urine, NPO for OR cardio saw pt-No A Fib, not on AC   12/20- POD #1 Pt seen and examined at bedside with daughter present. Pt agitated initially and refused morning medications. With my morning discussion, patient became calm and accepting of morning medications. States that she feels "off" mentally because of the pain medications she has been getting. Patient is aware of TOV to take place in the afternoon. PO diet reinitiated. States her pain is well controlled. Follow Bladder Scan  12/21: POD # 2 t seen, Examined ,Failed TOV-Anderson cath replaced, Pain Rt Hand with recent Sx  Low stable H/H  , Cr 1.7  12/22: POD #3. Patient seen and examined at bedside complaining of continued nausea and vomiting from the night previous. States she has not pooped in >5 days and now feels like her abdomen is bloated.  H/H increased. Cr 1.5    OVERNIGHT EVENTS:  Patient with elevated BP requiring Hydralazine PO 10 and IV push 10 last night. Patient w/ nausea and vomiting 2-4x throughout the night requring Zofran IV x2.     Home Medications:  aspirin 81 mg oral delayed release tablet: 1 tab(s) orally every other day (18 Dec 2024 23:36)  atorvastatin 40 mg oral tablet: 1 tab(s) orally once a day (at bedtime) (18 Dec 2024 23:36)  famotidine 20 mg oral tablet: 1 tab(s) orally once a day (18 Dec 2024 23:36)  ferrous sulfate 324 mg (65 mg elemental iron) oral tablet: 1 tab(s) orally once a day (18 Dec 2024 23:36)  furosemide 20 mg oral tablet: 1 tab(s) orally once a day (18 Dec 2024 23:36)  Januvia 25 mg oral tablet: 1 tab(s) orally once a day (18 Dec 2024 23:36)  losartan 25 mg oral tablet: 1 tab(s) orally once a day (18 Dec 2024 23:32)  Metoprolol Succinate  mg oral tablet, extended release: 1 tab(s) orally once a day (18 Dec 2024 23:36)      MEDICATIONS  (STANDING):  acetaminophen     Tablet .. 650 milliGRAM(s) Oral daily  aspirin enteric coated 81 milliGRAM(s) Oral <User Schedule>  atorvastatin 40 milliGRAM(s) Oral at bedtime  dextrose 5%. 1000 milliLiter(s) (50 mL/Hr) IV Continuous <Continuous>  dextrose 5%. 1000 milliLiter(s) (100 mL/Hr) IV Continuous <Continuous>  dextrose 50% Injectable 25 Gram(s) IV Push once  dextrose 50% Injectable 12.5 Gram(s) IV Push once  dextrose 50% Injectable 25 Gram(s) IV Push once  enoxaparin Injectable 30 milliGRAM(s) SubCutaneous every 24 hours  glucagon  Injectable 1 milliGRAM(s) IntraMuscular once  hydrALAZINE 25 milliGRAM(s) Oral every 8 hours  insulin lispro (ADMELOG) corrective regimen sliding scale   SubCutaneous three times a day before meals  insulin lispro (ADMELOG) corrective regimen sliding scale   SubCutaneous at bedtime  losartan 50 milliGRAM(s) Oral daily  metoprolol succinate  milliGRAM(s) Oral daily  pantoprazole    Tablet 40 milliGRAM(s) Oral before breakfast  polyethylene glycol 3350 17 Gram(s) Oral at bedtime  senna 2 Tablet(s) Oral at bedtime  tranexamic acid IVPB 1000 milliGRAM(s) IV Intermittent Once  tranexamic acid IVPB 1000 milliGRAM(s) IV Intermittent Once    MEDICATIONS  (PRN):  bisacodyl Suppository 10 milliGRAM(s) Rectal daily PRN Constipation  dextrose Oral Gel 15 Gram(s) Oral once PRN Blood Glucose LESS THAN 70 milliGRAM(s)/deciliter  magnesium hydroxide Suspension 30 milliLiter(s) Oral daily PRN Constipation  oxyCODONE    IR 2.5 milliGRAM(s) Oral every 6 hours PRN Severe Pain (7 - 10)  polyethylene glycol 3350 17 Gram(s) Oral two times a day PRN Constipation  traMADol 25 milliGRAM(s) Oral every 6 hours PRN Mild Pain (1 - 3)  traMADol 50 milliGRAM(s) Oral every 6 hours PRN Moderate Pain (4 - 6)  trimethobenzamide Injectable 200 milliGRAM(s) IntraMuscular every 8 hours PRN Nausea and/or Vomiting      Allergies    No Known Allergies    Intolerances        Social History:  Tobacco: denies  EtOH: denies  Recreational drug use:denies  Lives with: child  ADLs/Ambulates: with walker (18 Dec 2024 21:04)      REVIEW OF SYSTEMS:  CONSTITUTIONAL: No fever, No chills, No fatigue, No myalgia, No Body ache, No Weakness  EYES: No eye pain,  No visual disturbances, No discharge, NO Redness  ENMT:  No ear pain, No nose bleed, No vertigo; No sinus pain, NO throat pain, No Congestion  NECK: No pain, No stiffness  RESPIRATORY: No cough, NO wheezing, No  hemoptysis, NO  shortness of breath  CARDIOVASCULAR: No chest pain, palpitations  GASTROINTESTINAL: +CONSTIPATION, +N/V No abdominal pain, NO epigastric pain; No diarrhea, [ X ] BM - No BM  GENITOURINARY: No dysuria, No frequency, No urgency, No hematuria, NO incontinence  NEUROLOGICAL: No headaches, No dizziness, No numbness, No tingling, No tremors, No weakness  EXT: No Swelling, No Pain, No Edema  SKIN:  [ X ] No itching, burning, rashes, or lesions   MUSCULOSKELETAL: No joint pain ,No Jt swelling; No muscle pain, No back pain, No extremity pain  PSYCHIATRIC: No depression,  No anxiety,  No mood swings ,No difficulty sleeping at night  PAIN SCALE: [  X] None  [  ] Other-  ROS Unable to obtain due to - [  ] Dementia  [  ] Lethargy [  ] Drowsy [  ] Sedated [  ] non verbal  REST OF REVIEW Of SYSTEM - [X  ] Normal     Vital Signs Last 24 Hrs  T(C): 36.8 (22 Dec 2024 04:35), Max: 36.8 (21 Dec 2024 21:50)  T(F): 98.2 (22 Dec 2024 04:35), Max: 98.2 (21 Dec 2024 21:50)  HR: 87 (22 Dec 2024 04:35) (65 - 87)  BP: 188/74 (22 Dec 2024 00:23) (175/58 - 194/82)  BP(mean): --  RR: 20 (22 Dec 2024 04:35) (18 - 20)  SpO2: 94% (22 Dec 2024 04:35) (91% - 94%)    Parameters below as of 22 Dec 2024 04:35  Patient On (Oxygen Delivery Method): room air      Finger Stick        12-21 @ 07:01  -  12-22 @ 07:00  --------------------------------------------------------  IN: 0 mL / OUT: 1100 mL / NET: -1100 mL        PHYSICAL EXAM:   GENERAL:  [ X ] NAD , [ X ] well appearing, [  ] Agitated, [  ] Drowsy,  [  ] Lethargy, [  ] confused   HEAD:  [ xNormal, [  ] Other  EYES:  [ X ] EOMI, [ X ] PERRLA, [X  ] conjunctiva and sclera clear normal, [  ] Other,  [  ] Pallor,[  ] Discharge  ENMT:  [ x ] Normal, [ X ] Moist mucous membranes, [  ] Good dentition, [ x ] No Thrush  NECK:  [ x ] Supple, [ x ] No JVD, [ x ] Normal thyroid, [  ] Lymphadenopathy [  ] Other  CHEST/LUNG:  [ X ] Clear to auscultation bilaterally, [ x ] Breath Sounds equal B/L / Decrease, [ x ] poor effort  [ X ] No rales, [ X ] No rhonchi  [  X]  No wheezing,   HEART:  [ X ] Regular rate and rhythm, [  ] tachycardia, [  ] Bradycardia,  [  ] irregular  [  x] No murmurs, No rubs, No gallops, [  ] PPM in place (Mfr:  )  ABDOMEN:  [ X ] Soft, [ X ] Nontender, [X  ] Distended, [  ] No mass, [ X ] Bowel sounds present, [ X ] obese  NERVOUS SYSTEM:  [ x ] Alert & Oriented X3, [ x ] Nonfocal  [  ] Confusion  [  ] Encephalopathic [  ] Sedated [  ] Unable to assess, [  ] Dementia [  ] Other-  EXTREMITIES: [ X ] 2+ Peripheral Pulses, No clubbing, No cyanosis,  [ X ] nonpitting edema B/L lower EXT [X] healing suture  carpal tunnel wound to Right wrist swelling  [X] dressings c/d/i to suture of left hip [  ] PVD stasis skin changes B/L Lower EXT, [  ] wound  LYMPH: No lymphadenopathy noted  SKIN:  [ X ] No rashes or lesions, [  ] Pressure Ulcers, [  ] ecchymosis, [  ] Skin Tears, [  ] Other    DIET: Diet, Consistent Carbohydrate w/Evening Snack:   DASH/TLC Sodium & Cholesterol Restricted (12-21-24 @ 13:54)      LABS:                        10.2   10.53 )-----------( 189      ( 22 Dec 2024 07:00 )             30.2     22 Dec 2024 07:00    140    |  104    |  37     ----------------------------<  215    3.5     |  24     |  1.50     Ca    9.1        22 Dec 2024 07:00        Urinalysis Basic - ( 22 Dec 2024 07:00 )    Color: x / Appearance: x / SG: x / pH: x  Gluc: 215 mg/dL / Ketone: x  / Bili: x / Urobili: x   Blood: x / Protein: x / Nitrite: x   Leuk Esterase: x / RBC: x / WBC x   Sq Epi: x / Non Sq Epi: x / Bacteria: x        Culture Results:   <10,000 CFU/mL Normal Urogenital Daniela (12-18 @ 20:10)                  Culture - Urine (collected 18 Dec 2024 20:10)  Source: Clean Catch  Final Report (20 Dec 2024 09:25):    <10,000 CFU/mL Normal Urogenital Daniela    Urinalysis with Rflx Culture (collected 18 Dec 2024 20:10)         Anemia Panel:  Iron - Total Binding Capacity.: 225 ug/dL (12-20-24 @ 07:45)  Iron Total: 26 ug/dL (12-20-24 @ 07:45)  Ferritin: 250 ng/mL (12-20-24 @ 07:45)      Thyroid Panel:                RADIOLOGY & ADDITIONAL TESTS:      HEALTH ISSUES - PROBLEM Dx:  Fracture of femoral neck, left    TIA (transient ischemic attack)    T2DM (type 2 diabetes mellitus)    CKD (chronic kidney disease)    Anemia    HTN (hypertension)    HLD (hyperlipidemia)    Need for prophylactic measure            Consultant(s) Notes Reviewed:  [  ] YES     Care Discussed with [X] Consultants  [  ] Patient  [  ] Family [  ] HCP [  ]   [  ] Social Service  [  ] RN, [  ] Physical Therapy,[  ] Palliative care team  DVT PPX: [  ] Lovenox, [  ] S C Heparin, [  ] Coumadin, [  ] Xarelto, [  ] Eliquis, [  ] Pradaxa, [  ] IV Heparin drip, [  ] SCD [  ] Contraindication 2 to GI Bleed,[  ] Ambulation [  ] Contraindicated 2 to  bleed [  ] Contraindicated 2 to Brain Bleed  Advanced directive: [  ] None, [  ] DNR/DNI Patient is a 89y old  Female who presents with a chief complaint of L hip fracture (22 Dec 2024 07:27)    HPI:  89 yo female w/ pmh of HTN, HLD, T2DM, TIA, CKD III, 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 Velasquez as well as R hip IMN w/ Dr. De Luna in 2023, s/p WALLANT R carpal tunnel release w/ Dr. Andrew 2 days ago (12/16/24) previous right hip fx w/ surgical pinning presents to the ED for L hip pain. while  ambulating w/ her walker earlier today, was in a chair when she heard a "pop" followed by immediate pain and difficulty bearing weight. Was assisted to her bed and was then unable to get up - prompting ambulance transfer to [facility] ED. Denies head strike/LOC/other orthopedic injuries at this time. Unable to ambulate since injury this morning (at baseline ambulates with walker). Denies numbness/tingling in the affected extremity. Patient takes no AC meds. Denies any current chest pain, sob, abd pain, Complaining of L sided hip pain.    Per son at bedside, patient had UTI last week was treated with course of nitrofurantoin, completed her course.    In the ED:  Vitals: T 98.2 HR 57 /66 RR 16 RA  Labs: H/H 11.2/34.1 PT/INR 14.1/1.21 BUN/Cr 41/2 Glu 183 GFR 23  Given: ofirmev x1, fentanyl 25mcg IV x1, morphine 4mg IV x1, on 75 cc/hr NS in the ED  CXR cardiomegaly, no acute cardiopulm disease.  EKG: SR 1st degree AVB w/ PVCs 64 bpm QTc 491     (18 Dec 2024 21:04)    INTERVAL HPI:  12/19- Pt seen, examined, pt has better pain control, pt was found to be in urinary retention -Anderson cath placed -drained ~1 lit urine, NPO for OR cardio saw pt-No A Fib, not on AC   12/20- POD #1 Pt seen and examined at bedside with daughter present. Pt agitated initially and refused morning medications. With my morning discussion, patient became calm and accepting of morning medications. States that she feels "off" mentally because of the pain medications she has been getting. Patient is aware of TOV to take place in the afternoon. PO diet reinitiated. States her pain is well controlled. Follow Bladder Scan  12/21: POD # 2 t seen, Examined ,Failed TOV-Anderson cath replaced, Pain Rt Hand with recent Sx  Low stable H/H  , Cr 1.7  12/22: POD #3. Patient seen and examined at bedside complaining of continued nausea and vomiting from the night previous. States she has not pooped in >5 days and now feels like her abdomen is bloated.  H/H increased. Cr 1.5    OVERNIGHT EVENTS:  Patient with elevated BP requiring Hydralazine PO 10 and IV push 10 last night. Patient w/ nausea and vomiting 2-4x throughout the night requiring Zofran IV x2.     Home Medications:  aspirin 81 mg oral delayed release tablet: 1 tab(s) orally every other day (18 Dec 2024 23:36)  atorvastatin 40 mg oral tablet: 1 tab(s) orally once a day (at bedtime) (18 Dec 2024 23:36)  famotidine 20 mg oral tablet: 1 tab(s) orally once a day (18 Dec 2024 23:36)  ferrous sulfate 324 mg (65 mg elemental iron) oral tablet: 1 tab(s) orally once a day (18 Dec 2024 23:36)  furosemide 20 mg oral tablet: 1 tab(s) orally once a day (18 Dec 2024 23:36)  Januvia 25 mg oral tablet: 1 tab(s) orally once a day (18 Dec 2024 23:36)  losartan 25 mg oral tablet: 1 tab(s) orally once a day (18 Dec 2024 23:32)  Metoprolol Succinate  mg oral tablet, extended release: 1 tab(s) orally once a day (18 Dec 2024 23:36)      MEDICATIONS  (STANDING):  acetaminophen     Tablet .. 650 milliGRAM(s) Oral daily  aspirin enteric coated 81 milliGRAM(s) Oral <User Schedule>  atorvastatin 40 milliGRAM(s) Oral at bedtime  dextrose 5%. 1000 milliLiter(s) (50 mL/Hr) IV Continuous <Continuous>  dextrose 5%. 1000 milliLiter(s) (100 mL/Hr) IV Continuous <Continuous>  dextrose 50% Injectable 25 Gram(s) IV Push once  dextrose 50% Injectable 12.5 Gram(s) IV Push once  dextrose 50% Injectable 25 Gram(s) IV Push once  enoxaparin Injectable 30 milliGRAM(s) SubCutaneous every 24 hours  glucagon  Injectable 1 milliGRAM(s) IntraMuscular once  hydrALAZINE 25 milliGRAM(s) Oral every 8 hours  insulin lispro (ADMELOG) corrective regimen sliding scale   SubCutaneous three times a day before meals  insulin lispro (ADMELOG) corrective regimen sliding scale   SubCutaneous at bedtime  losartan 50 milliGRAM(s) Oral daily  metoprolol succinate  milliGRAM(s) Oral daily  pantoprazole    Tablet 40 milliGRAM(s) Oral before breakfast  polyethylene glycol 3350 17 Gram(s) Oral at bedtime  senna 2 Tablet(s) Oral at bedtime  tranexamic acid IVPB 1000 milliGRAM(s) IV Intermittent Once  tranexamic acid IVPB 1000 milliGRAM(s) IV Intermittent Once    MEDICATIONS  (PRN):  bisacodyl Suppository 10 milliGRAM(s) Rectal daily PRN Constipation  dextrose Oral Gel 15 Gram(s) Oral once PRN Blood Glucose LESS THAN 70 milliGRAM(s)/deciliter  magnesium hydroxide Suspension 30 milliLiter(s) Oral daily PRN Constipation  oxyCODONE    IR 2.5 milliGRAM(s) Oral every 6 hours PRN Severe Pain (7 - 10)  polyethylene glycol 3350 17 Gram(s) Oral two times a day PRN Constipation  traMADol 25 milliGRAM(s) Oral every 6 hours PRN Mild Pain (1 - 3)  traMADol 50 milliGRAM(s) Oral every 6 hours PRN Moderate Pain (4 - 6)  trimethobenzamide Injectable 200 milliGRAM(s) IntraMuscular every 8 hours PRN Nausea and/or Vomiting      Allergies    No Known Allergies    Intolerances        Social History:  Tobacco: denies  EtOH: denies  Recreational drug use:denies  Lives with: child  ADLs/Ambulates: with walker (18 Dec 2024 21:04)      REVIEW OF SYSTEMS: tired   CONSTITUTIONAL: No fever, No chills, No fatigue, No myalgia, No Body ache, No Weakness  EYES: No eye pain,  No visual disturbances, No discharge, NO Redness  ENMT:  No ear pain, No nose bleed, No vertigo; No sinus pain, NO throat pain, No Congestion  NECK: No pain, No stiffness  RESPIRATORY: No cough, NO wheezing, No  hemoptysis, NO  shortness of breath  CARDIOVASCULAR: No chest pain, palpitations  GASTROINTESTINAL: +CONSTIPATION, +N/V No abdominal pain, NO epigastric pain; No diarrhea, [ X ] BM -1  GENITOURINARY: No dysuria, No frequency, No urgency, No hematuria, NO incontinence  NEUROLOGICAL: No headaches, No dizziness, No numbness, No tingling, No tremors, No weakness  EXT: No Swelling, No Pain, No Edema  SKIN:  [ X ] No itching, burning, rashes, or lesions   MUSCULOSKELETAL: No joint pain ,No Jt swelling; No muscle pain, No back pain, No extremity pain  PSYCHIATRIC: No depression,  No anxiety,  No mood swings ,No difficulty sleeping at night  PAIN SCALE: [  X] None  [  ] Other-  ROS Unable to obtain due to - [  ] Dementia  [  ] Lethargy [  ] Drowsy [  ] Sedated [  ] non verbal  REST OF REVIEW Of SYSTEM - [X  ] Normal     Vital Signs Last 24 Hrs  T(C): 36.8 (22 Dec 2024 04:35), Max: 36.8 (21 Dec 2024 21:50)  T(F): 98.2 (22 Dec 2024 04:35), Max: 98.2 (21 Dec 2024 21:50)  HR: 87 (22 Dec 2024 04:35) (65 - 87)  BP: 188/74 (22 Dec 2024 00:23) (175/58 - 194/82)  BP(mean): --  RR: 20 (22 Dec 2024 04:35) (18 - 20)  SpO2: 94% (22 Dec 2024 04:35) (91% - 94%)    Parameters below as of 22 Dec 2024 04:35  Patient On (Oxygen Delivery Method): room air      Finger Stick        12-21 @ 07:01  -  12-22 @ 07:00  --------------------------------------------------------  IN: 0 mL / OUT: 1100 mL / NET: -1100 mL        PHYSICAL EXAM: Justin   GENERAL:  [ X ] NAD , [ X ] well appearing, [  ] Agitated, [  ] Drowsy,  [  ] Lethargy, [  ] confused   HEAD:  [ x] Normal, [  ] Other  EYES:  [ X ] EOMI, [ X ] PERRLA, [X  ] conjunctiva and sclera clear normal, [  ] Other,  [  ] Pallor,[  ] Discharge  ENMT:  [ x ] Normal, [ X ] Moist mucous membranes, [  ] Good dentition, [ x ] No Thrush  NECK:  [ x ] Supple, [ x ] No JVD, [ x ] Normal thyroid, [  ] Lymphadenopathy [  ] Other  CHEST/LUNG:  [ X ] Clear to auscultation bilaterally, [ x ] Breath Sounds equal B/L / Decrease, [ x ] poor effort  [ X ] No rales, [ X ] No rhonchi  [  X]  No wheezing,   HEART:  [ X ] Regular rate and rhythm, [  ] tachycardia, [  ] Bradycardia,  [  ] irregular  [  x] No murmurs, No rubs, No gallops, [  ] PPM in place (Mfr:  )  ABDOMEN:  [ X ] Soft, [ X ] Nontender, [X  ] Distended, [  ] No mass, [ X ] Bowel sounds present, [ X ] obese  NERVOUS SYSTEM:  [ x ] Alert & Oriented X3, [ x ] Nonfocal  [  ] Confusion  [  ] Encephalopathic [  ] Sedated [  ] Unable to assess, [  ] Dementia [  ] Other-  EXTREMITIES: [ X ] 2+ Peripheral Pulses, No clubbing, No cyanosis,  [ X ] nonpitting edema B/L lower EXT [X] healing suture  carpal tunnel wound to Right wrist swelling  [X] dressings c/d/i to suture of left hip [  ] PVD stasis skin changes B/L Lower EXT, [  ] wound  LYMPH: No lymphadenopathy noted  SKIN:  [ X ] No rashes or lesions, [  ] Pressure Ulcers, [  ] ecchymosis, [  ] Skin Tears, [  ] Other    DIET: Diet, Consistent Carbohydrate w/Evening Snack:   DASH/TLC Sodium & Cholesterol Restricted (12-21-24 @ 13:54)      LABS:                        10.2   10.53 )-----------( 189      ( 22 Dec 2024 07:00 )             30.2     22 Dec 2024 07:00    140    |  104    |  37     ----------------------------<  215    3.5     |  24     |  1.50     Ca    9.1        22 Dec 2024 07:00        Urinalysis Basic - ( 22 Dec 2024 07:00 )    Color: x / Appearance: x / SG: x / pH: x  Gluc: 215 mg/dL / Ketone: x  / Bili: x / Urobili: x   Blood: x / Protein: x / Nitrite: x   Leuk Esterase: x / RBC: x / WBC x   Sq Epi: x / Non Sq Epi: x / Bacteria: x        Culture Results:   <10,000 CFU/mL Normal Urogenital Daniela (12-18 @ 20:10)        Culture - Urine (collected 18 Dec 2024 20:10)  Source: Clean Catch  Final Report (20 Dec 2024 09:25):    <10,000 CFU/mL Normal Urogenital Daniela    Urinalysis with Rflx Culture (collected 18 Dec 2024 20:10)         Anemia Panel:  Iron - Total Binding Capacity.: 225 ug/dL (12-20-24 @ 07:45)  Iron Total: 26 ug/dL (12-20-24 @ 07:45)  Ferritin: 250 ng/mL (12-20-24 @ 07:45)        RADIOLOGY & ADDITIONAL TESTS:      HEALTH ISSUES - PROBLEM Dx:  Fracture of femoral neck, left    TIA (transient ischemic attack)    T2DM (type 2 diabetes mellitus)    CKD (chronic kidney disease)    Anemia    HTN (hypertension)    HLD (hyperlipidemia)    Need for prophylactic measure            Consultant(s) Notes Reviewed:  [x  ] YES     Care Discussed with [X] Consultants  [x  ] Patient  [  ] Family [  ] HCP [  ]   [x  ] Social Service  [ x ] RN, [  ] Physical Therapy,[  ] Palliative care team  DVT PPX: [x] Lovenox, [  ] S C Heparin, [  ] Coumadin, [  ] Xarelto, [  ] Eliquis, [  ] Pradaxa, [  ] IV Heparin drip, [  ] SCD [  ] Contraindication 2 to GI Bleed,[  ] Ambulation [  ] Contraindicated 2 to  bleed [  ] Contraindicated 2 to Brain Bleed  Advanced directive: [x  ] None, [  ] DNR/DNI

## 2024-12-22 NOTE — PROGRESS NOTE ADULT - PROBLEM SELECTOR PLAN 5
History of CKD 2- 3  - Baseline 1.7-2 in 6/2024.  - Continue to monitor with daily CMP  -Dr. MARYA Kaur Nephro recs appreciated   urinary retention -Failed TOV   Galeana cath replaced   - as per dtr pt had galeana cath x 2 months with previous Sx

## 2024-12-22 NOTE — PROGRESS NOTE ADULT - PROBLEM SELECTOR PLAN 4
Acute constipation immediately prior and after orthopedic surgery likely 2/2 pain vs medication induced (opioids)  -Last recalled BM 5-6 days ago  -Patient without opioid use x24 hours  -Nausea/vomiting and abdominal distention as of last night s/p Zofran x2  -Repeat EKG conducted with Qtc wnl  -Prn Miralax, qhs senna, prn dulcolax with STAT dose now  -Will refrain from enema at this time w/ recent hip surgery

## 2024-12-22 NOTE — PROGRESS NOTE ADULT - PROBLEM SELECTOR PLAN 1
Pt w/ hx of R hip IMN now with L hip pain x1 day.  Xray left - L femoral neck fracture-Hemiarthroplasty of left hip  - Pain control per orthopedics; tramadol for mild, Tramadol for Moderate & oxycodone 5 for severe pain PRN  - POD#2  - was able to ambulate with walker and assistance from PT  - Cardiology follow up Dr Arias  Bp elevated   - Orthopedics Dr. De Luna following  ok for D/C as per Residents   - PT following - recommend LEONEL Pt w/ hx of R hip IMN now with L hip pain x1 day.  Xray left - L femoral neck fracture-Hemiarthroplasty of left hip  - Pain control per orthopedics; tramadol for mild, Tramadol for Moderate & oxycodone 5 for severe pain PRN  - POD#2  - was able to ambulate with walker and assistance from PT  - Cardiology follow up Dr Arias  Bp elevated   - Orthopedics Dr. De Luna following  ok for D/C as per Residents   - PT following - recommend LEONEL  Bowel regimen

## 2024-12-22 NOTE — PROVIDER CONTACT NOTE (OTHER) - ACTION/TREATMENT ORDERED:
Hydralazine was given as per MD order
NNO at this time, ongoing monitoring
Repeat bladder scan @0400. No other interventions ordered at the time.
zofran given as per MD order

## 2024-12-22 NOTE — PROGRESS NOTE ADULT - PROBLEM SELECTOR PLAN 3
Chronic - BP elevated in the ED likely 2/2 to pain, to monitor BPs  - Continue home Toprol XL  and losartan daily   -echo normal ef and no sig valve disease EF 64%   - Losartan increased per Nephro  - Hydralazine q8h started  - Patient requiring Hydralazine 10mg PO and 10mg IV push last night for HTN urgency  - doppler US b/l LE ordered - negative for DVT B/L Chronic - BP elevated in the ED likely 2/2 to pain, to monitor BPs  - Continue home Toprol XL  and losartan daily   -echo normal ef and no sig valve disease EF 64%   - Losartan increased per Nephro  - Hydralazine 25 mg q8h started  - Patient requiring Hydralazine 10mg PO and 10mg IV push last night for HTN  - doppler US b/l LE ordered - negative for DVT B/L

## 2024-12-22 NOTE — PROGRESS NOTE ADULT - SUBJECTIVE AND OBJECTIVE BOX
Patient seen and examined at bedside this AM. Patient was hypertensive overnight and treated with hydralazine per primary team. Anderson catheter was replaced due to retention as well. Patient making good urine at present  No acute complaints at this time in regard to her hip. Pain well controlled. Denies chest pain, shortness of breath, nausea or vomiting.     LABS:                        8.9    9.05  )-----------( 157      ( 21 Dec 2024 07:45 )             27.1     12-21    140  |  111[H]  |  35[H]  ----------------------------<  167[H]  3.9   |  23  |  1.70[H]    Ca    8.6      21 Dec 2024 07:45  Phos  3.7     12-21  Mg     1.7     12-21    TPro  5.5[L]  /  Alb  2.3[L]  /  TBili  0.4  /  DBili  x   /  AST  49[H]  /  ALT  <6[L]  /  AlkPhos  104  12-21      Urinalysis Basic - ( 21 Dec 2024 07:45 )    Color: x / Appearance: x / SG: x / pH: x  Gluc: 167 mg/dL / Ketone: x  / Bili: x / Urobili: x   Blood: x / Protein: x / Nitrite: x   Leuk Esterase: x / RBC: x / WBC x   Sq Epi: x / Non Sq Epi: x / Bacteria: x        VITAL SIGNS:  T(C): 36.8 (12-22-24 @ 04:35), Max: 36.8 (12-21-24 @ 21:50)  HR: 87 (12-22-24 @ 04:35) (65 - 87)  BP: 188/74 (12-22-24 @ 00:23) (175/58 - 194/82)  RR: 20 (12-22-24 @ 04:35) (18 - 20)  SpO2: 94% (12-22-24 @ 04:35) (91% - 94%)  SpO2: 94% (12-21-24 @ 04:26) (92% - 96%)    General: NAD, resting comfortably in bed  LLE:   Dressing in place C/D/I  SCD in place bilaterally  No calf tenderness   Motor: + EHL/FHL/TA/GSC  Sensory: SILT SPN/DPN/Yury/Saph/Tib  DP/PT 2+      A/P:  89y f s/p L hip hemiarthroplasty w/ Dr. De Luna POD 3  -PT/OT  -WBAT LLE w/ posterior precautions - abduction pillow on while lying down and sitting  -NWB R hand - otherwise WBAT from R forearm and above, can use platform walker  -Pain Control  -DVT ppx: per primary - start POD1 if feasible; Lov recommended  -Continue perioperative abx x 24 hours  -FU AM Labs  -Rest, ice, compress, and elevate the extremity as tolerated  -Incentive Spirometry  -Medical management appreciated  -Dispo per PT, return to [facility] on  12/22 at 1400  -D/w Dr. De Luna, will update plan as needed

## 2024-12-22 NOTE — PROGRESS NOTE ADULT - ASSESSMENT
ELVIN on CKD 3  HTN with CKD 3  Anemia  Hip fracture  Volume depletion      -ELVIN likely 2/2 IV volume depletion  -SCR baseline is 1.5-1.9, is back to baseline now and remains stable  -SCr was 2 on admission  -Electrolytes are normal  -Iron panel reviewed. start PO iron  -s/p IVF  -S/P OR 12/19/24for hip fracture  -BP elevated; increased losartan 50mg 12/22/24; monitor and will titrate up as indicated  -Will follow up repeat labs    DC planning per primary team

## 2024-12-23 VITALS
OXYGEN SATURATION: 93 % | HEART RATE: 70 BPM | SYSTOLIC BLOOD PRESSURE: 144 MMHG | RESPIRATION RATE: 18 BRPM | TEMPERATURE: 98 F | DIASTOLIC BLOOD PRESSURE: 61 MMHG

## 2024-12-23 LAB
ALBUMIN SERPL ELPH-MCNC: 2.1 G/DL — LOW (ref 3.3–5)
ALP SERPL-CCNC: 112 U/L — SIGNIFICANT CHANGE UP (ref 40–120)
ALT FLD-CCNC: 10 U/L — LOW (ref 12–78)
ANION GAP SERPL CALC-SCNC: 8 MMOL/L — SIGNIFICANT CHANGE UP (ref 5–17)
AST SERPL-CCNC: 35 U/L — SIGNIFICANT CHANGE UP (ref 15–37)
BILIRUB SERPL-MCNC: 0.3 MG/DL — SIGNIFICANT CHANGE UP (ref 0.2–1.2)
BUN SERPL-MCNC: 41 MG/DL — HIGH (ref 7–23)
CALCIUM SERPL-MCNC: 8.7 MG/DL — SIGNIFICANT CHANGE UP (ref 8.5–10.1)
CHLORIDE SERPL-SCNC: 107 MMOL/L — SIGNIFICANT CHANGE UP (ref 96–108)
CO2 SERPL-SCNC: 25 MMOL/L — SIGNIFICANT CHANGE UP (ref 22–31)
CREAT SERPL-MCNC: 1.5 MG/DL — HIGH (ref 0.5–1.3)
EGFR: 32 ML/MIN/1.73M2 — LOW
GLUCOSE BLDC GLUCOMTR-MCNC: 200 MG/DL — HIGH (ref 70–99)
GLUCOSE BLDC GLUCOMTR-MCNC: 224 MG/DL — HIGH (ref 70–99)
GLUCOSE BLDC GLUCOMTR-MCNC: 226 MG/DL — HIGH (ref 70–99)
GLUCOSE SERPL-MCNC: 208 MG/DL — HIGH (ref 70–99)
HCT VFR BLD CALC: 27.1 % — LOW (ref 34.5–45)
HGB BLD-MCNC: 8.9 G/DL — LOW (ref 11.5–15.5)
MCHC RBC-ENTMCNC: 27.1 PG — SIGNIFICANT CHANGE UP (ref 27–34)
MCHC RBC-ENTMCNC: 32.8 G/DL — SIGNIFICANT CHANGE UP (ref 32–36)
MCV RBC AUTO: 82.6 FL — SIGNIFICANT CHANGE UP (ref 80–100)
NRBC # BLD: 0 /100 WBCS — SIGNIFICANT CHANGE UP (ref 0–0)
PLATELET # BLD AUTO: 218 K/UL — SIGNIFICANT CHANGE UP (ref 150–400)
POTASSIUM SERPL-MCNC: 4.2 MMOL/L — SIGNIFICANT CHANGE UP (ref 3.5–5.3)
POTASSIUM SERPL-SCNC: 4.2 MMOL/L — SIGNIFICANT CHANGE UP (ref 3.5–5.3)
PROT SERPL-MCNC: 5.8 G/DL — LOW (ref 6–8.3)
RBC # BLD: 3.28 M/UL — LOW (ref 3.8–5.2)
RBC # FLD: 14.4 % — SIGNIFICANT CHANGE UP (ref 10.3–14.5)
SODIUM SERPL-SCNC: 140 MMOL/L — SIGNIFICANT CHANGE UP (ref 135–145)
SURGICAL PATHOLOGY STUDY: SIGNIFICANT CHANGE UP
WBC # BLD: 12.44 K/UL — HIGH (ref 3.8–10.5)
WBC # FLD AUTO: 12.44 K/UL — HIGH (ref 3.8–10.5)

## 2024-12-23 PROCEDURE — 83540 ASSAY OF IRON: CPT

## 2024-12-23 PROCEDURE — 85025 COMPLETE CBC W/AUTO DIFF WBC: CPT

## 2024-12-23 PROCEDURE — 88304 TISSUE EXAM BY PATHOLOGIST: CPT

## 2024-12-23 PROCEDURE — 83550 IRON BINDING TEST: CPT

## 2024-12-23 PROCEDURE — 80048 BASIC METABOLIC PNL TOTAL CA: CPT

## 2024-12-23 PROCEDURE — 84100 ASSAY OF PHOSPHORUS: CPT

## 2024-12-23 PROCEDURE — 87086 URINE CULTURE/COLONY COUNT: CPT

## 2024-12-23 PROCEDURE — 80061 LIPID PANEL: CPT

## 2024-12-23 PROCEDURE — 85730 THROMBOPLASTIN TIME PARTIAL: CPT

## 2024-12-23 PROCEDURE — 85610 PROTHROMBIN TIME: CPT

## 2024-12-23 PROCEDURE — 85027 COMPLETE CBC AUTOMATED: CPT

## 2024-12-23 PROCEDURE — 96365 THER/PROPH/DIAG IV INF INIT: CPT

## 2024-12-23 PROCEDURE — 83036 HEMOGLOBIN GLYCOSYLATED A1C: CPT

## 2024-12-23 PROCEDURE — 93005 ELECTROCARDIOGRAM TRACING: CPT

## 2024-12-23 PROCEDURE — 73501 X-RAY EXAM HIP UNI 1 VIEW: CPT

## 2024-12-23 PROCEDURE — 97530 THERAPEUTIC ACTIVITIES: CPT

## 2024-12-23 PROCEDURE — 83735 ASSAY OF MAGNESIUM: CPT

## 2024-12-23 PROCEDURE — 73552 X-RAY EXAM OF FEMUR 2/>: CPT

## 2024-12-23 PROCEDURE — 97116 GAIT TRAINING THERAPY: CPT

## 2024-12-23 PROCEDURE — 97166 OT EVAL MOD COMPLEX 45 MIN: CPT

## 2024-12-23 PROCEDURE — 36415 COLL VENOUS BLD VENIPUNCTURE: CPT

## 2024-12-23 PROCEDURE — C1776: CPT

## 2024-12-23 PROCEDURE — 71045 X-RAY EXAM CHEST 1 VIEW: CPT

## 2024-12-23 PROCEDURE — 82728 ASSAY OF FERRITIN: CPT

## 2024-12-23 PROCEDURE — 88311 DECALCIFY TISSUE: CPT

## 2024-12-23 PROCEDURE — 80053 COMPREHEN METABOLIC PANEL: CPT

## 2024-12-23 PROCEDURE — 82962 GLUCOSE BLOOD TEST: CPT

## 2024-12-23 PROCEDURE — 99232 SBSQ HOSP IP/OBS MODERATE 35: CPT

## 2024-12-23 PROCEDURE — 99285 EMERGENCY DEPT VISIT HI MDM: CPT | Mod: 25

## 2024-12-23 PROCEDURE — 86900 BLOOD TYPING SEROLOGIC ABO: CPT

## 2024-12-23 PROCEDURE — 81001 URINALYSIS AUTO W/SCOPE: CPT

## 2024-12-23 PROCEDURE — 97162 PT EVAL MOD COMPLEX 30 MIN: CPT

## 2024-12-23 PROCEDURE — 86850 RBC ANTIBODY SCREEN: CPT

## 2024-12-23 PROCEDURE — 93970 EXTREMITY STUDY: CPT

## 2024-12-23 PROCEDURE — 97110 THERAPEUTIC EXERCISES: CPT

## 2024-12-23 PROCEDURE — 73502 X-RAY EXAM HIP UNI 2-3 VIEWS: CPT

## 2024-12-23 PROCEDURE — 86901 BLOOD TYPING SEROLOGIC RH(D): CPT

## 2024-12-23 PROCEDURE — C1713: CPT

## 2024-12-23 RX ORDER — HYDRALAZINE HYDROCHLORIDE 10 MG/1
1 TABLET ORAL
Qty: 0 | Refills: 0 | DISCHARGE
Start: 2024-12-23

## 2024-12-23 RX ORDER — LOSARTAN POTASSIUM 100 MG/1
1 TABLET, FILM COATED ORAL
Refills: 0 | DISCHARGE

## 2024-12-23 RX ORDER — SENNOSIDES 8.6 MG/1
2 TABLET, FILM COATED ORAL AT BEDTIME
Refills: 0 | Status: DISCONTINUED | OUTPATIENT
Start: 2024-12-23 | End: 2024-12-23

## 2024-12-23 RX ORDER — SENNOSIDES 8.6 MG/1
2 TABLET, FILM COATED ORAL
Qty: 0 | Refills: 0 | DISCHARGE
Start: 2024-12-23

## 2024-12-23 RX ORDER — PANTOPRAZOLE 40 MG/1
1 TABLET, DELAYED RELEASE ORAL
Qty: 0 | Refills: 0 | DISCHARGE
Start: 2024-12-23

## 2024-12-23 RX ORDER — TRAMADOL HYDROCHLORIDE 50 MG/1
1 TABLET ORAL
Qty: 0 | Refills: 0 | DISCHARGE
Start: 2024-12-23

## 2024-12-23 RX ORDER — ACETAMINOPHEN 80 MG/.8ML
2 SOLUTION/ DROPS ORAL
Qty: 0 | Refills: 0 | DISCHARGE
Start: 2024-12-23

## 2024-12-23 RX ORDER — FUROSEMIDE 20 MG
20 TABLET ORAL DAILY
Refills: 0 | Status: DISCONTINUED | OUTPATIENT
Start: 2024-12-23 | End: 2024-12-23

## 2024-12-23 RX ORDER — POLYETHYLENE GLYCOL 3350 17 G/DOSE
17 POWDER (GRAM) ORAL
Qty: 0 | Refills: 0 | DISCHARGE
Start: 2024-12-23

## 2024-12-23 RX ORDER — LOSARTAN POTASSIUM 100 MG/1
1 TABLET, FILM COATED ORAL
Qty: 0 | Refills: 0 | DISCHARGE
Start: 2024-12-23

## 2024-12-23 RX ORDER — TRAMADOL HYDROCHLORIDE 50 MG/1
0.5 TABLET ORAL
Qty: 0 | Refills: 0 | DISCHARGE
Start: 2024-12-23

## 2024-12-23 RX ORDER — OXYCODONE HCL 15 MG
2.5 TABLET ORAL
Qty: 0 | Refills: 0 | DISCHARGE
Start: 2024-12-23

## 2024-12-23 RX ORDER — BISACODYL 5 MG
10 TABLET, DELAYED RELEASE (ENTERIC COATED) ORAL ONCE
Refills: 0 | Status: COMPLETED | OUTPATIENT
Start: 2024-12-23 | End: 2024-12-23

## 2024-12-23 RX ORDER — POLYETHYLENE GLYCOL 3350 17 G/DOSE
17 POWDER (GRAM) ORAL
Refills: 0 | Status: DISCONTINUED | OUTPATIENT
Start: 2024-12-23 | End: 2024-12-23

## 2024-12-23 RX ADMIN — Medication 100 MILLIGRAM(S): at 05:47

## 2024-12-23 RX ADMIN — HYDRALAZINE HYDROCHLORIDE 25 MILLIGRAM(S): 10 TABLET ORAL at 05:47

## 2024-12-23 RX ADMIN — Medication 2: at 09:12

## 2024-12-23 RX ADMIN — Medication 2: at 12:15

## 2024-12-23 RX ADMIN — ACETAMINOPHEN 650 MILLIGRAM(S): 80 SOLUTION/ DROPS ORAL at 14:30

## 2024-12-23 RX ADMIN — ENOXAPARIN SODIUM 30 MILLIGRAM(S): 60 INJECTION INTRAVENOUS; SUBCUTANEOUS at 09:18

## 2024-12-23 RX ADMIN — HYDRALAZINE HYDROCHLORIDE 25 MILLIGRAM(S): 10 TABLET ORAL at 14:14

## 2024-12-23 RX ADMIN — Medication 10 MILLIGRAM(S): at 09:18

## 2024-12-23 RX ADMIN — ACETAMINOPHEN 650 MILLIGRAM(S): 80 SOLUTION/ DROPS ORAL at 11:35

## 2024-12-23 RX ADMIN — Medication 81 MILLIGRAM(S): at 09:18

## 2024-12-23 RX ADMIN — Medication 200 MILLIGRAM(S): at 11:35

## 2024-12-23 RX ADMIN — PANTOPRAZOLE 40 MILLIGRAM(S): 40 TABLET, DELAYED RELEASE ORAL at 05:57

## 2024-12-23 RX ADMIN — LOSARTAN POTASSIUM 50 MILLIGRAM(S): 100 TABLET, FILM COATED ORAL at 05:48

## 2024-12-23 NOTE — PROGRESS NOTE ADULT - PROBLEM SELECTOR PROBLEM 7
HLD (hyperlipidemia)
T2DM (type 2 diabetes mellitus)
HLD (hyperlipidemia)
HLD (hyperlipidemia)
T2DM (type 2 diabetes mellitus)

## 2024-12-23 NOTE — PROGRESS NOTE ADULT - PROBLEM SELECTOR PROBLEM 1
Fracture of femoral neck, left

## 2024-12-23 NOTE — PROGRESS NOTE ADULT - NS ATTEND OPT1 GEN_ALL_CORE
Airway  Date/Time: 11/1/2021 2:05 PM  Urgency: elective      General Information and Staff    Patient location during procedure: OR  Anesthesiologist: Denilson Venegas MD  Performed: anesthesiologist     Indications and Patient Condition  Indications for I attest my time as attending is greater than 50% of the total combined time spent on qualifying patient care activities by the PA/NP and attending.

## 2024-12-23 NOTE — PROGRESS NOTE ADULT - SUBJECTIVE AND OBJECTIVE BOX
[facility]                             Nephrology and Hypertension                             Marcin Kaplan                                          [phone]     Patient is a 89y old  Female who presents with a chief complaint of L hip fracture (19 Dec 2024 11:24)       HPI:  89 yo female w/ pmh of HTN, HLD, T2DM, TIA, CKD III, Hx of R hip CRPP in 2016 followed by hardware removal in 2017 w/ Dr. Velasquez as well as R hip IMN w/ Dr. De Luna in 2023, s/p WALLANT R carpal tunnel release w/ Dr. Andrew 2 days ago (12/16/24) previous right hip fx w/ surgical pinning presents to the ED for L hip pain. while  ambulating w/ her walker earlier today, was in a chair when she heard a "pop" followed by immediate pain and difficulty bearing weight. Was assisted to her bed and was then unable to get up - prompting ambulance transfer to [facility] ED. Denies head strike/LOC/other orthopedic injuries at this time. Unable to ambulate since injury this morning (at baseline ambulates with walker). Denies numbness/tingling in the affected extremity. Patient takes no AC meds. Denies any current chest pain, sob, abd pain, Complaining of L sided hip pain.Per son at bedside, patient had UTI last week was treated with course of nitrofurantoin, completed her course.    Nephrology is c/s for ELVIN on CKD today. I saw her in the ED, daugther at bedside. She has c.o pain. She does not recall events. She has CKd but does not see Nephrology. Per daughter, baseline SCr is 1.8-1.9. On chart review, baseline SCr is 1.5-1.9, SCr was >2 on admission is now downtrending. She does endorse not eating as well. She has no sob or dizziness when I saw her. Is s/p bolus and now on maint ivf.     No acute events noted       PAST MEDICAL & SURGICAL HISTORY:  Hypertension      Diabetes      TIA (transient ischemic attack)      Hip fracture      HLD (hyperlipidemia)      S/P ORIF (open reduction internal fixation) fracture  right hip           FAMILY HISTORY:  FH: asthma (Father)    NC    Social History:Non smoker    MEDICATIONS  (STANDING):  acetaminophen     Tablet .. 650 milliGRAM(s) Oral daily  aspirin enteric coated 81 milliGRAM(s) Oral <User Schedule>  atorvastatin 40 milliGRAM(s) Oral at bedtime  dextrose 5%. 1000 milliLiter(s) (50 mL/Hr) IV Continuous <Continuous>  dextrose 5%. 1000 milliLiter(s) (100 mL/Hr) IV Continuous <Continuous>  dextrose 50% Injectable 25 Gram(s) IV Push once  dextrose 50% Injectable 12.5 Gram(s) IV Push once  dextrose 50% Injectable 25 Gram(s) IV Push once  enoxaparin Injectable 30 milliGRAM(s) SubCutaneous every 24 hours  glucagon  Injectable 1 milliGRAM(s) IntraMuscular once  insulin lispro (ADMELOG) corrective regimen sliding scale   SubCutaneous three times a day before meals  insulin lispro (ADMELOG) corrective regimen sliding scale   SubCutaneous at bedtime  losartan 50 milliGRAM(s) Oral daily  metoprolol succinate  milliGRAM(s) Oral daily  pantoprazole    Tablet 40 milliGRAM(s) Oral before breakfast  polyethylene glycol 3350 17 Gram(s) Oral at bedtime  senna 2 Tablet(s) Oral at bedtime  tranexamic acid IVPB 1000 milliGRAM(s) IV Intermittent Once  tranexamic acid IVPB 1000 milliGRAM(s) IV Intermittent Once    MEDICATIONS  (PRN):  dextrose Oral Gel 15 Gram(s) Oral once PRN Blood Glucose LESS THAN 70 milliGRAM(s)/deciliter  magnesium hydroxide Suspension 30 milliLiter(s) Oral daily PRN Constipation  oxyCODONE    IR 5 milliGRAM(s) Oral every 6 hours PRN Severe Pain (7 - 10)  polyethylene glycol 3350 17 Gram(s) Oral two times a day PRN Constipation  traMADol 25 milliGRAM(s) Oral every 6 hours PRN Mild Pain (1 - 3)  traMADol 50 milliGRAM(s) Oral every 6 hours PRN Moderate Pain (4 - 6)      Allergies    No Known Allergies    Intolerances         REVIEW OF SYSTEMS: as per HPI    as above    Vital Signs Last 24 Hrs  T(C): 36.8 (23 Dec 2024 09:15), Max: 37.1 (23 Dec 2024 04:41)  T(F): 98.2 (23 Dec 2024 09:15), Max: 98.8 (23 Dec 2024 04:41)  HR: 70 (23 Dec 2024 09:15) (58 - 81)  BP: 154/67 (23 Dec 2024 09:15) (119/48 - 171/53)  BP(mean): --  RR: 18 (23 Dec 2024 09:15) (18 - 19)  SpO2: 94% (23 Dec 2024 09:15) (91% - 96%)    Parameters below as of 23 Dec 2024 09:15  Patient On (Oxygen Delivery Method): room air          PHYSICAL EXAM:    GENERAL: NAD  HEAD:  Atraumatic, Normocephalic  EYES: EOMI, conjunctiva and sclera clear  ENMT: No Drainage from nares, No drainage from ears  NERVOUS SYSTEM:  Awake and Alert  CHEST/LUNG: Clear bilaterally  EXTREMITIES:  No Edema, RUE cast noted, decreased ROM LE  SKIN: No rashes No obvious ecchymosis      LABS:                        8.9    12.44 )-----------( 218      ( 23 Dec 2024 08:16 )             27.1     12-23    140  |  107  |  41[H]  ----------------------------<  208[H]  4.2   |  25  |  1.50[H]    Ca    8.7      23 Dec 2024 08:16    TPro  5.8[L]  /  Alb  2.1[L]  /  TBili  0.3  /  DBili  x   /  AST  35  /  ALT  10[L]  /  AlkPhos  112  12-23      Urinalysis Basic - ( 23 Dec 2024 08:16 )    Color: x / Appearance: x / SG: x / pH: x  Gluc: 208 mg/dL / Ketone: x  / Bili: x / Urobili: x   Blood: x / Protein: x / Nitrite: x   Leuk Esterase: x / RBC: x / WBC x   Sq Epi: x / Non Sq Epi: x / Bacteria: x

## 2024-12-23 NOTE — PROGRESS NOTE ADULT - PROBLEM SELECTOR PLAN 7
Chronic on Januvia 25 qd  - A1c 8.4 from 7.7 last May  -Low ISS  - FS q6h  - NPO  -Hypoglycemic protocol.  -Diabetes NP consulted, recs appreciated
Chronic on Januvia 25 qd  - A1c 8.4 from 7.7 last May  -Low ISS  - FS q6h  - NPO  -Hypoglycemic protocol.  -Diabetes NP consulted, recs appreciated
Chronic  - Continue atorvastatin  - lipid profile wnl aside from mildly reduced HDL
Chronic  - Continue atorvastatin  - lipid profile wnl aside from mildly reduced HDL
Chronic  - Continue atorvastatin  - Lipid profile in AM

## 2024-12-23 NOTE — PROGRESS NOTE ADULT - PROBLEM SELECTOR PLAN 2
Chronic, baseline Hb 10-11   Likely Ac on Chronic 2/2 Hip Fracture , Surgery & IVF   - CBC daily. Chronic, baseline Hb 10-11   Likely Ac on Chronic 2/2 Hip Fracture , Surgery & IVF   - CBC as LEONEL in 1 week -dtr aware

## 2024-12-23 NOTE — PROGRESS NOTE ADULT - ATTENDING COMMENTS
Patient seen and examined at bedside. Sitting in chair and eating dinner. Overall pain is moderately controlled s/p L hip clinton after femoral neck fracture on 12/19/2024    Exam:  Bandage c/d/i  Abduction pillow in place  Fires EHL/FHL/TA/GSC  SILT sa/ramirez/sp/dp/t  Toes wwp    Plan: 89F s/p L hip clinton on 12/19/2024 with recent R carpal tunnel release  - WBAT LLE with platform walker  - abduction pillow and posterior hip precautions  - DVT ppx  - page orthopedics with questions or concerns  - call [phone] to schedule follow-up appointment 2 weeks from discharge
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 Velasquez as well as R hip IMN w/ Dr. De Luna in 2023, s/p WALLANT R carpal tunnel release w/ Dr. Andrew 2 days ago (12/16/24) previous right hip fx w/ surgical pinning presents to the ED for L hip pain admitted for L hip fracture plan for hemiarthroplasty of L hip with Dr. De Luna on  12/19/24 at [facility]  Pt seen, Examined, case & care plan d/w SW , residents at [facility]. d/w Dtr a & family at bed side   Consult-.  D/W Dtr -Maday at [facility].  Orthopedic-Dr De Luna 's Resident at [facility]   Cardio- Dr Arias follow up  SCD  Post Op DVT Prophylaxis as per Ortho  Post Op Incentive spirometry.   Total care time is 60 minutes.
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 Velasquez as well as R hip IMN w/ Dr. De Luna in 2023, s/p WALLANT R carpal tunnel release w/ Dr. Andrew 2 days ago (12/16/24) previous right hip fx w/ surgical pinning presents to the ED for L hip pain admitted for L hip fracture plan for hemiarthroplasty of L hip with Dr. De Luna on  12/19/24 at [facility]  Pt seen, Examined, case & care plan d/w pt, residents at [facility]. d/w Dtr at bed side   Consult-.  D/W Dtr -Maday at [facility].  Orthopedic-Dr De Luna d/w at [facility]   Cardio- Dr Bajwa  Pre op Eval in chart   SCD  Post Op DVT Prophylaxis as per Ortho  Post Op Incentive spirometry.   Total care time is 60 minutes.
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 Velasquez as well as R hip IMN w/ Dr. De Luna in 2023, s/p WALLANT R carpal tunnel release w/ Dr. Andrew 2 days ago (12/16/24) previous right hip fx w/ surgical pinning presents to the ED for L hip pain admitted for L hip fracture plan for hemiarthroplasty of L hip with Dr. De Luna on  12/19/24 at [facility]  Pt seen, Examined, case & care plan d/w SW , residents at [facility]. d/w Dtr a & family at bed side -dtr about increase in all BP Meds , TOV at [facility] after 2 weeks   Consult-.  Orthopedic-follow up-d/w stable for d/c   Cardio- Dr Waddell  follow up   Renal-DR Kaur -Increase ARB  on d/c   Post Op DVT Prophylaxis as per Ortho  Post Op Incentive spirometry.   PO diet  d/c to [facility] today   Total  d/c care time is 60 minutes.
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 Velasquez as well as R hip IMN w/ Dr. De Luna in 2023, s/p WALLANT R carpal tunnel release w/ Dr. Andrew 2 days ago (12/16/24) previous right hip fx w/ surgical pinning presents to the ED for L hip pain admitted for L hip fracture plan for hemiarthroplasty of L hip with Dr. De Luna on  12/19/24 at [facility]  Pt seen, Examined, case & care plan d/w pt, residents at [facility]. d/w Dtr at bed side   Consult-  Orthopedic-Dr De Luna  Cardio- Dr Escoto Pre op Eval in chart   SCD  Bed rest  NPO for OR  Pt is Medically optimized for schedule Left Hip Hemiarthroplasty Moderate risk Sx   Pre Op IV antibiotics as per Orthopedics  Post Op DVT Prophylaxis as per Ortho  Post Op Incentive spirometry.   Total care time is 60 minutes.
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 Velasquez as well as R hip IMN w/ Dr. De Luna in 2023, s/p WALLANT R carpal tunnel release w/ Dr. Andrew 2 days ago (12/16/24) previous right hip fx w/ surgical pinning presents to the ED for L hip pain admitted for L hip fracture plan for hemiarthroplasty of L hip with Dr. De Luna on  12/19/24 at [facility]  Pt seen, Examined, case & care plan d/w  , residents at [facility]. d/w Dtr a & family at bed side   Consult-.  Orthopedic-follow up  Cardio- Dr Arias follow up for BP  Renal-DR Kaur -Increase ARB for BP  Post Op DVT Prophylaxis as per Ortho  Post Op Incentive spirometry.   PO diet  AM labs   Incentive spirometry   Total care time is 60 minutes.

## 2024-12-23 NOTE — PROGRESS NOTE ADULT - SUBJECTIVE AND OBJECTIVE BOX
[facility] -- Tigist Mari Pannella, Patel, Savella, Goodger, Cohen: Office # [phone]    Follow Up:  Cardiac clearance     Subjective/Observations: Patient awake, alert, resting in bed. Denies chest pain, palpitations and dizziness. Denies any difficulty breathing. No orthopnea and PND. Tolerating room air. C/o nausea overnight and received Tigan.     REVIEW OF SYSTEMS: All other review of systems are negative unless indicated above    PAST MEDICAL & SURGICAL HISTORY:  Hypertension      Hypertension      Diabetes      TIA (transient ischemic attack)      Hip fracture      HLD (hyperlipidemia)      S/P ORIF (open reduction internal fixation) fracture  right hip      MEDICATIONS  (STANDING):  acetaminophen     Tablet .. 650 milliGRAM(s) Oral daily  aspirin enteric coated 81 milliGRAM(s) Oral <User Schedule>  atorvastatin 40 milliGRAM(s) Oral at bedtime  dextrose 5%. 1000 milliLiter(s) (50 mL/Hr) IV Continuous <Continuous>  dextrose 5%. 1000 milliLiter(s) (100 mL/Hr) IV Continuous <Continuous>  dextrose 50% Injectable 25 Gram(s) IV Push once  dextrose 50% Injectable 12.5 Gram(s) IV Push once  dextrose 50% Injectable 25 Gram(s) IV Push once  enoxaparin Injectable 30 milliGRAM(s) SubCutaneous every 24 hours  glucagon  Injectable 1 milliGRAM(s) IntraMuscular once  hydrALAZINE 25 milliGRAM(s) Oral every 8 hours  insulin lispro (ADMELOG) corrective regimen sliding scale   SubCutaneous three times a day before meals  insulin lispro (ADMELOG) corrective regimen sliding scale   SubCutaneous at bedtime  losartan 50 milliGRAM(s) Oral daily  metoprolol succinate  milliGRAM(s) Oral daily  pantoprazole    Tablet 40 milliGRAM(s) Oral before breakfast  polyethylene glycol 3350 17 Gram(s) Oral at bedtime  senna 2 Tablet(s) Oral at bedtime  tranexamic acid IVPB 1000 milliGRAM(s) IV Intermittent Once  tranexamic acid IVPB 1000 milliGRAM(s) IV Intermittent Once    MEDICATIONS  (PRN):  bisacodyl Suppository 10 milliGRAM(s) Rectal daily PRN Constipation  dextrose Oral Gel 15 Gram(s) Oral once PRN Blood Glucose LESS THAN 70 milliGRAM(s)/deciliter  magnesium hydroxide Suspension 30 milliLiter(s) Oral daily PRN Constipation  oxyCODONE    IR 2.5 milliGRAM(s) Oral every 6 hours PRN Severe Pain (7 - 10)  polyethylene glycol 3350 17 Gram(s) Oral two times a day PRN Constipation  traMADol 25 milliGRAM(s) Oral every 6 hours PRN Mild Pain (1 - 3)  traMADol 50 milliGRAM(s) Oral every 6 hours PRN Moderate Pain (4 - 6)  trimethobenzamide Injectable 200 milliGRAM(s) IntraMuscular every 8 hours PRN Nausea and/or Vomiting    Allergies  No Known Allergies    Vital Signs Last 24 Hrs  T(C): 37.1 (23 Dec 2024 04:41), Max: 37.1 (23 Dec 2024 04:41)  T(F): 98.8 (23 Dec 2024 04:41), Max: 98.8 (23 Dec 2024 04:41)  HR: 71 (23 Dec 2024 04:41) (58 - 81)  BP: 160/62 (23 Dec 2024 04:41) (119/48 - 187/67)  BP(mean): --  RR: 18 (23 Dec 2024 04:41) (18 - 20)  SpO2: 93% (23 Dec 2024 04:41) (91% - 96%)    Parameters below as of 23 Dec 2024 04:41  Patient On (Oxygen Delivery Method): room air      I&O's Summary    22 Dec 2024 07:01  -  23 Dec 2024 07:00  --------------------------------------------------------  IN: 0 mL / OUT: 800 mL / NET: -800 mL      TELE:   PHYSICAL EXAM:  Constitutional: NAD, awake and alert  HEENT: Moist Mucous Membranes, Anicteric  Pulmonary: Non-labored, breath sounds are clear bilaterally, No wheezing, rales or rhonchi  Cardiovascular: Regular, S1 and S2, No murmurs, No rubs, gallops or clicks  Gastrointestinal:  soft, nontender, nondistended   Lymph: No peripheral edema. No lymphadenopathy.   Skin: No visible rashes or ulcers. Left hip dressing intact.   Psych:  Mood & affect appropriate      LABS: All Labs Reviewed:                        10.2   10.53 )-----------( 189      ( 22 Dec 2024 07:00 )             30.2                         8.9    9.05  )-----------( 157      ( 21 Dec 2024 07:45 )             27.1     22 Dec 2024 07:00    140    |  104    |  37     ----------------------------<  215    3.5     |  24     |  1.50   21 Dec 2024 07:45    140    |  111    |  35     ----------------------------<  167    3.9     |  23     |  1.70     Ca    9.1        22 Dec 2024 07:00  Ca    8.6        21 Dec 2024 07:45  Phos  3.7       21 Dec 2024 07:45  Mg     1.7       21 Dec 2024 07:45    TPro  5.5    /  Alb  2.3    /  TBili  0.4    /  DBili  x      /  AST  49     /  ALT  <6     /  AlkPhos  104    21 Dec 2024 07:45     Cholesterol: 102 mg/dL (12-19-24 @ 04:30)  HDL Cholesterol: 40 mg/dL (12-19-24 @ 04:30)  Triglycerides, Serum: 116 mg/dL (12-19-24 @ 04:30)    12 Lead ECG:   Ventricular Rate 64 BPM    Atrial Rate 64 BPM    P-R Interval 224 ms    QRS Duration 80 ms    Q-T Interval 476 ms    QTC Calculation(Bazett) 491 ms    R Axis 22 degrees    T Axis 53 degrees    Diagnosis Line Sinus rhythm with 1st degree AV block with premature supraventricular complexes  Prolonged QT  Abnormal ECG  When compared with ECG of 29-MAY-2024 10:56,  premature supraventricular complexes are now present  Confirmed by Chuy Escoto MD ([id number]) on 12/19/2024 12:41:53 PM (12-18-24 @ 17:07)      TRANSTHORACIC ECHOCARDIOGRAM REPORT  ________________________________________________________________________________                                      _______       Pt. Name:       NELDA VERDIN Study Date:    11/24/2023  MRN:            [id number]       YOB: 1930  Accession #:    [id number]      Age:           89 years  Account#:       [id number]     Gender:        F  Heart Rate:                    Height:        62.99 in (160.00 cm)  Rhythm:                        Weight:   114.64 lb (52.00 kg)  Blood Pressure: 158/98 mmHg    BSA/BMI:       1.53 m² / 20.31 kg/m²  ________________________________________________________________________________________  Referring Physician:    [id number] Addy Mueller  Interpreting Physician: Chuy Escoto  Primary Sonographer:    Juanito Bahena    CPT:               ECHO TTE WO CON COMP W DOPP - 82523.m  Indication(s):     Abnormal electrocardiogram ECG/EKG - R94.31  Procedure:         Transthoracic echocardiogram with 2-D, M-modeand complete                     spectral and color flow Doppler.  Ordering Location: [facility]  Study Information: Image quality for this study is technically difficult.    _______________________________________________________________________________________     CONCLUSIONS:      1. Technically difficult image quality.   2. The patient is tachycardic throughout this examination.   3. Left ventricular systolic function is normal with an ejection fraction of 64 % by Swanson's method of disks.   4. The right ventricle is not well visualized.   5. Structurally normal mitral valve with normal leaflet excursion.   6. There is mild calcification of the mitral valve annulus.   7. The left atrium is normal.   8. Trace mitral regurgitation.   9. Aortic valve was not well visualized.  10. Moderate calcification of the aortic valve leaflets.  11. The inferior vena cava is normal in size measuring 1.30 cm in diameter, (normal <2.1cm) with normal inspiratory collapse (normal >50%) consistent with normal right atrial pressure (~3, range 0-5mmHg).    ________________________________________________________________________________________  FINDINGS:     Left Ventricle:  Left ventricular systolic function is normal with a calculated ejection fraction of 64 % by the Swanson's biplane method of disks. There is normal left ventricular diastolic function.     Right Ventricle:  The right ventricle is not well visualized. Tricuspid annular plane systolic excursion (TAPSE) is 1.7 cm (normal >=1.7 cm).     Left Atrium:  The left atrium is normal with an indexed volume of 21.82 ml/m².     Right Atrium:  The right atrium was not well visualized.     Aortic Valve:  The aortic valve was not well visualized. The aortic valve anatomy cannot be determined. There is moderate calcification of the aortic valve leaflets.     Mitral Valve:  Structurally normal mitral valve with normal leaflet excursion. There is mild calcification of the mitral valve annulus. There is trace mitral regurgitation.     Tricuspid Valve:  The tricuspid valve was not well visualized.     Pulmonic Valve:  The pulmonic valve was not well visualized.     Aorta:  The aortic root at the sinuses of Valsalva is normal in size, measuring 3.00 cm (indexed 1.97 cm/m²).     Systemic Veins:  The inferior vena cava is normal in size measuring 1.30 cm in diameter, (normal <2.1cm) with normal inspiratory collapse (normal >50%) consistent with normal right atrial pressure (~3, range 0-5mmHg).  ____________________________________________________________________  QUANTITATIVE DATA:  Left Ventricle Measurements: (Indexed to BSA)     IVSd (2D):   1.2 cm  LVPWd (2D):  1.0 cm  LVIDd (2D):  3.5 cm  LVIDs (2D):  2.5 cm  LV Mass:     121 g  79.2 g/m²  LV Vol d, MOD A2C: 33.7 ml 22.08 ml/m²  LV Vol d, MOD A4C: 18.8 ml 12.32 ml/m²  LV Vol d, MOD BP:  25.5 ml 16.69 ml/m²  LV Vol s, MOD A2C: 11.5 ml 7.53 ml/m²  LV Vol s, MOD A4C: 7.7 ml  5.06 ml/m²  LV Vol s, MOD BP:  9.2 ml  6.02 ml/m²  LVOT SV MOD BP:    16.3 ml  LV EF% MOD BP:     64 %     MV E Vmax:    1.33 m/s  MV A Vmax:    0.50 m/s  MV E/A:       2.67  e' lateral:   21.20 cm/s  e' medial:    12.80 cm/s  E/e' lateral: 6.27  E/e' medial:  10.39  E/e' Average: 7.82  MV DT:        143 msec    Aorta Measurements: (normal range) (Indexed to BSA)     Sinuses of Valsalva: 3.00 cm (2.7 - 3.3 cm)       Left Atrium Measurements: (Indexed to BSA)  LA Diam 2D: 3.40 cm    Right Ventricle Measurements:     TAPSE: 1.7 cm       LVOT / RVOT/ Qp/Qs Data: (Indexed to BSA)  LVOT Diameter: 1.80 cm    Mitral Valve Measurements:     MV E Vmax: 1.3 m/s  MV A Vmax: 0.5 m/s  MV E/A:    2.7       Tricuspid Valve Measurements:     RA Pressure: 3 mmHg    ________________________________________________________________________________________  Electronicallysigned on 11/25/2023 at 6:17:31 AM by Chuy Escoto         *** Final ***   [facility] -- Tigist Mari Pannella, Patel, Savella, Goodger, Cohen: Office # [phone]    Follow Up:  Cardiac clearance     Subjective/Observations: Patient awake, alert, resting in bed. Denies chest pain, palpitations and dizziness. Denies any difficulty breathing. No orthopnea and PND. Tolerating room air. C/o nausea overnight and received Tigan. Continues to be nauseous at times.     REVIEW OF SYSTEMS: All other review of systems are negative unless indicated above    PAST MEDICAL & SURGICAL HISTORY:  Hypertension      Hypertension      Diabetes      TIA (transient ischemic attack)      Hip fracture      HLD (hyperlipidemia)      S/P ORIF (open reduction internal fixation) fracture  right hip      MEDICATIONS  (STANDING):  acetaminophen     Tablet .. 650 milliGRAM(s) Oral daily  aspirin enteric coated 81 milliGRAM(s) Oral <User Schedule>  atorvastatin 40 milliGRAM(s) Oral at bedtime  dextrose 5%. 1000 milliLiter(s) (50 mL/Hr) IV Continuous <Continuous>  dextrose 5%. 1000 milliLiter(s) (100 mL/Hr) IV Continuous <Continuous>  dextrose 50% Injectable 25 Gram(s) IV Push once  dextrose 50% Injectable 12.5 Gram(s) IV Push once  dextrose 50% Injectable 25 Gram(s) IV Push once  enoxaparin Injectable 30 milliGRAM(s) SubCutaneous every 24 hours  glucagon  Injectable 1 milliGRAM(s) IntraMuscular once  hydrALAZINE 25 milliGRAM(s) Oral every 8 hours  insulin lispro (ADMELOG) corrective regimen sliding scale   SubCutaneous three times a day before meals  insulin lispro (ADMELOG) corrective regimen sliding scale   SubCutaneous at bedtime  losartan 50 milliGRAM(s) Oral daily  metoprolol succinate  milliGRAM(s) Oral daily  pantoprazole    Tablet 40 milliGRAM(s) Oral before breakfast  polyethylene glycol 3350 17 Gram(s) Oral at bedtime  senna 2 Tablet(s) Oral at bedtime  tranexamic acid IVPB 1000 milliGRAM(s) IV Intermittent Once  tranexamic acid IVPB 1000 milliGRAM(s) IV Intermittent Once    MEDICATIONS  (PRN):  bisacodyl Suppository 10 milliGRAM(s) Rectal daily PRN Constipation  dextrose Oral Gel 15 Gram(s) Oral once PRN Blood Glucose LESS THAN 70 milliGRAM(s)/deciliter  magnesium hydroxide Suspension 30 milliLiter(s) Oral daily PRN Constipation  oxyCODONE    IR 2.5 milliGRAM(s) Oral every 6 hours PRN Severe Pain (7 - 10)  polyethylene glycol 3350 17 Gram(s) Oral two times a day PRN Constipation  traMADol 25 milliGRAM(s) Oral every 6 hours PRN Mild Pain (1 - 3)  traMADol 50 milliGRAM(s) Oral every 6 hours PRN Moderate Pain (4 - 6)  trimethobenzamide Injectable 200 milliGRAM(s) IntraMuscular every 8 hours PRN Nausea and/or Vomiting    Allergies  No Known Allergies    Vital Signs Last 24 Hrs  T(C): 37.1 (23 Dec 2024 04:41), Max: 37.1 (23 Dec 2024 04:41)  T(F): 98.8 (23 Dec 2024 04:41), Max: 98.8 (23 Dec 2024 04:41)  HR: 71 (23 Dec 2024 04:41) (58 - 81)  BP: 160/62 (23 Dec 2024 04:41) (119/48 - 187/67)  BP(mean): --  RR: 18 (23 Dec 2024 04:41) (18 - 20)  SpO2: 93% (23 Dec 2024 04:41) (91% - 96%)    Parameters below as of 23 Dec 2024 04:41  Patient On (Oxygen Delivery Method): room air      I&O's Summary    22 Dec 2024 07:01  -  23 Dec 2024 07:00  --------------------------------------------------------  IN: 0 mL / OUT: 800 mL / NET: -800 mL      TELE: Not on telemetry   PHYSICAL EXAM:  Constitutional: NAD, awake and alert  HEENT: Moist Mucous Membranes, Anicteric  Pulmonary: Non-labored, breath sounds are clear bilaterally, No wheezing, rales or rhonchi  Cardiovascular: Regular, S1 and S2, No murmurs, No rubs, gallops or clicks  Gastrointestinal:  soft, nontender, nondistended   Lymph: No peripheral edema. No lymphadenopathy.   Skin: No visible rashes or ulcers. Left hip dressing intact.   Psych:  Mood & affect appropriate      LABS: All Labs Reviewed:                        10.2   10.53 )-----------( 189      ( 22 Dec 2024 07:00 )             30.2                         8.9    9.05  )-----------( 157      ( 21 Dec 2024 07:45 )             27.1     22 Dec 2024 07:00    140    |  104    |  37     ----------------------------<  215    3.5     |  24     |  1.50   21 Dec 2024 07:45    140    |  111    |  35     ----------------------------<  167    3.9     |  23     |  1.70     Ca    9.1        22 Dec 2024 07:00  Ca    8.6        21 Dec 2024 07:45  Phos  3.7       21 Dec 2024 07:45  Mg     1.7       21 Dec 2024 07:45    TPro  5.5    /  Alb  2.3    /  TBili  0.4    /  DBili  x      /  AST  49     /  ALT  <6     /  AlkPhos  104    21 Dec 2024 07:45     Cholesterol: 102 mg/dL (12-19-24 @ 04:30)  HDL Cholesterol: 40 mg/dL (12-19-24 @ 04:30)  Triglycerides, Serum: 116 mg/dL (12-19-24 @ 04:30)    12 Lead ECG:   Ventricular Rate 64 BPM    Atrial Rate 64 BPM    P-R Interval 224 ms    QRS Duration 80 ms    Q-T Interval 476 ms    QTC Calculation(Bazett) 491 ms    R Axis 22 degrees    T Axis 53 degrees    Diagnosis Line Sinus rhythm with 1st degree AV block with premature supraventricular complexes  Prolonged QT  Abnormal ECG  When compared with ECG of 29-MAY-2024 10:56,  premature supraventricular complexes are now present  Confirmed by Chuy Escoto MD ([id number]) on 12/19/2024 12:41:53 PM (12-18-24 @ 17:07)      TRANSTHORACIC ECHOCARDIOGRAM REPORT  ________________________________________________________________________________                                      _______       Pt. Name:       NELDA VERDIN Study Date:    11/24/2023  MRN:            [id number]       YOB: 1930  Accession #:    [id number]      Age:           89 years  Account#:       [id number]     Gender:        F  Heart Rate:                    Height:        62.99 in (160.00 cm)  Rhythm:                        Weight:   114.64 lb (52.00 kg)  Blood Pressure: 158/98 mmHg    BSA/BMI:       1.53 m² / 20.31 kg/m²  ________________________________________________________________________________________  Referring Physician:    [id number] Addy Mueller  Interpreting Physician: Chuy Escoto  Primary Sonographer:    Juanito Bahena    CPT:               ECHO TTE WO CON COMP W DOPP - 77666.m  Indication(s):     Abnormal electrocardiogram ECG/EKG - R94.31  Procedure:         Transthoracic echocardiogram with 2-D, M-modeand complete                     spectral and color flow Doppler.  Ordering Location: [facility]  Study Information: Image quality for this study is technically difficult.    _______________________________________________________________________________________     CONCLUSIONS:      1. Technically difficult image quality.   2. The patient is tachycardic throughout this examination.   3. Left ventricular systolic function is normal with an ejection fraction of 64 % by Swanson's method of disks.   4. The right ventricle is not well visualized.   5. Structurally normal mitral valve with normal leaflet excursion.   6. There is mild calcification of the mitral valve annulus.   7. The left atrium is normal.   8. Trace mitral regurgitation.   9. Aortic valve was not well visualized.  10. Moderate calcification of the aortic valve leaflets.  11. The inferior vena cava is normal in size measuring 1.30 cm in diameter, (normal <2.1cm) with normal inspiratory collapse (normal >50%) consistent with normal right atrial pressure (~3, range 0-5mmHg).    ________________________________________________________________________________________  FINDINGS:     Left Ventricle:  Left ventricular systolic function is normal with a calculated ejection fraction of 64 % by the Swanson's biplane method of disks. There is normal left ventricular diastolic function.     Right Ventricle:  The right ventricle is not well visualized. Tricuspid annular plane systolic excursion (TAPSE) is 1.7 cm (normal >=1.7 cm).     Left Atrium:  The left atrium is normal with an indexed volume of 21.82 ml/m².     Right Atrium:  The right atrium was not well visualized.     Aortic Valve:  The aortic valve was not well visualized. The aortic valve anatomy cannot be determined. There is moderate calcification of the aortic valve leaflets.     Mitral Valve:  Structurally normal mitral valve with normal leaflet excursion. There is mild calcification of the mitral valve annulus. There is trace mitral regurgitation.     Tricuspid Valve:  The tricuspid valve was not well visualized.     Pulmonic Valve:  The pulmonic valve was not well visualized.     Aorta:  The aortic root at the sinuses of Valsalva is normal in size, measuring 3.00 cm (indexed 1.97 cm/m²).     Systemic Veins:  The inferior vena cava is normal in size measuring 1.30 cm in diameter, (normal <2.1cm) with normal inspiratory collapse (normal >50%) consistent with normal right atrial pressure (~3, range 0-5mmHg).  ____________________________________________________________________  QUANTITATIVE DATA:  Left Ventricle Measurements: (Indexed to BSA)     IVSd (2D):   1.2 cm  LVPWd (2D):  1.0 cm  LVIDd (2D):  3.5 cm  LVIDs (2D):  2.5 cm  LV Mass:     121 g  79.2 g/m²  LV Vol d, MOD A2C: 33.7 ml 22.08 ml/m²  LV Vol d, MOD A4C: 18.8 ml 12.32 ml/m²  LV Vol d, MOD BP:  25.5 ml 16.69 ml/m²  LV Vol s, MOD A2C: 11.5 ml 7.53 ml/m²  LV Vol s, MOD A4C: 7.7 ml  5.06 ml/m²  LV Vol s, MOD BP:  9.2 ml  6.02 ml/m²  LVOT SV MOD BP:    16.3 ml  LV EF% MOD BP:     64 %     MV E Vmax:    1.33 m/s  MV A Vmax:    0.50 m/s  MV E/A:       2.67  e' lateral:   21.20 cm/s  e' medial:    12.80 cm/s  E/e' lateral: 6.27  E/e' medial:  10.39  E/e' Average: 7.82  MV DT:        143 msec    Aorta Measurements: (normal range) (Indexed to BSA)     Sinuses of Valsalva: 3.00 cm (2.7 - 3.3 cm)       Left Atrium Measurements: (Indexed to BSA)  LA Diam 2D: 3.40 cm    Right Ventricle Measurements:     TAPSE: 1.7 cm       LVOT / RVOT/ Qp/Qs Data: (Indexed to BSA)  LVOT Diameter: 1.80 cm    Mitral Valve Measurements:     MV E Vmax: 1.3 m/s  MV A Vmax: 0.5 m/s  MV E/A:    2.7       Tricuspid Valve Measurements:     RA Pressure: 3 mmHg    ________________________________________________________________________________________  Electronicallysigned on 11/25/2023 at 6:17:31 AM by Chuy Escoto         *** Final ***

## 2024-12-23 NOTE — PROGRESS NOTE ADULT - PROBLEM SELECTOR PROBLEM 3
HTN (hypertension)
T2DM (type 2 diabetes mellitus)
HTN (hypertension)
HTN (hypertension)
T2DM (type 2 diabetes mellitus)

## 2024-12-23 NOTE — PROGRESS NOTE ADULT - PROBLEM/PLAN-1
DISPLAY PLAN FREE TEXT
DISPLAY PLAN FREE TEXT
poor balance
DISPLAY PLAN FREE TEXT

## 2024-12-23 NOTE — PROGRESS NOTE ADULT - PROBLEM SELECTOR PLAN 4
Acute constipation immediately prior and after orthopedic surgery likely 2/2 pain vs medication induced (opioids)  -Last recalled BM 5-6 days ago  -Patient without opioid use x24 hours  -Nausea/vomiting and abdominal distention as of last night s/p Zofran x2  -Repeat EKG conducted with Qtc wnl  -Standing Miralax, qhs senna, s/p dulcolax suppository yesterday inducing BM  -Will refrain from enema at this time w/ recent hip surgery

## 2024-12-23 NOTE — PROGRESS NOTE ADULT - ASSESSMENT
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 Velasquez as well as R hip IMN w/ Dr. De Luna in 2023, s/p WALLANT R carpal tunnel release w/ Dr. Andrew 2 days ago (12/16/24) previous right hip fx w/ surgical pinning presents to the ED for L hip pain. while  ambulating w/ her walker earlier today, was in a chair when she heard a "pop" followed by immediate pain and difficulty bearing weight. Patient schedules for hemiarthroplasty of L hip with Dr. De Luna 12/19. Consulted for Cardiac optimization     - s/p Lt hip hemiarthroplasty.  Tolerated procedure well  - Pain control   - PT eval    - EKG: sinus rhythm with 1st deg heart block with PVCs, rate 64, Qtc 491, no ST changes  - No evidence of any active ischemia   - Tele: NSR with PAC's  - Continue home ASA and statin for Hx of TIA    - +BLE edema but no clear evidence of volume overload  - No 2 requirement  - Last TTE 11/2023: EF 64%, no significant valvular disease  - Can resume home low dose Lasix.  - Creatinine:  <--1.50,  <--1.70,  <--1.80,  <--1.60,  <--1.80,  <--2.00    - BP labile 110-180s   - Continue home Toprol  mg, losartan and hydralazine  - Can up titrate hydralazine if BP remains elevated.     - D/c planning per primary team.   - Monitor and replete lytes, keep K>4, Mg>2.  - Will continue to follow.    Colette Brar, MS FNP, AGACNP  Nurse Practitioner- Cardiology   Please call on TEAMS   89 yo female w/ pmh of HTN, HLD, T2DM, TIA, CKD III, Hx of R hip CRPP in 2016 followed by hardware removal in 2017 w/ Dr. Velasquez as well as R hip IMN w/ Dr. De Luna in 2023, s/p WALLANT R carpal tunnel release w/ Dr. Andrew 2 days ago (12/16/24) previous right hip fx w/ surgical pinning presents to the ED for L hip pain. while  ambulating w/ her walker earlier today, was in a chair when she heard a "pop" followed by immediate pain and difficulty bearing weight. Patient schedules for hemiarthroplasty of L hip with Dr. De Luna 12/19. Consulted for Cardiac optimization     - s/p Lt hip hemiarthroplasty.  Tolerated procedure well  - Pain control   - PT eval    - EKG: sinus rhythm with 1st deg heart block with PVCs, rate 64, Qtc 491, no ST changes  - No evidence of any active ischemia   - Continue home ASA and statin for Hx of TIA    - +BLE edema but no clear evidence of volume overload  - No 2 requirement  - Last TTE 11/2023: EF 64%, no significant valvular disease  - Can resume home low dose Lasix.  - Creatinine:  <--1.50,  <--1.70,  <--1.80,  <--1.60,  <--1.80,  <--2.00    - BP stable and controlled with -180s likely in the setting of pain and nausea   - Continue home Toprol  mg, losartan and hydralazine  - Can up titrate hydralazine if BP remains elevated.     - D/c planning per primary team.   - Monitor and replete lytes, keep K>4, Mg>2.  - Will continue to follow.    Colette Brar, MS FNP, AGAP  Nurse Practitioner- Cardiology   Please call on TEAMS   89 yo female w/ pmh of HTN, HLD, T2DM, TIA, CKD III, Hx of R hip CRPP in 2016 followed by hardware removal in 2017 w/ Dr. Velasquez as well as R hip IMN w/ Dr. De Luna in 2023, s/p WALLANT R carpal tunnel release w/ Dr. Andrew 2 days ago (12/16/24) previous right hip fx w/ surgical pinning presents to the ED for L hip pain. while  ambulating w/ her walker earlier today, was in a chair when she heard a "pop" followed by immediate pain and difficulty bearing weight. Patient schedules for hemiarthroplasty of L hip with Dr. De Luna 12/19. Consulted for Cardiac optimization     - s/p Lt hip hemiarthroplasty.  Tolerated procedure well  - Pain control   - PT eval    - EKG: sinus rhythm with 1st deg heart block with PVCs, rate 64, Qtc 491, no ST changes  - No evidence of any active ischemia   - Continue home ASA and statin for Hx of TIA    - +BLE edema but no clear evidence of volume overload  - No 2 requirement  - Last TTE 11/2023: EF 64%, no significant valvular disease  - Can resume home low dose Lasix.  - Creatinine:  <--1.50,  <--1.70,  <--1.80,  <--1.60,  <--1.80,  <--2.00    - BP stable and controlled with -180s likely in the setting of pain and nausea   - Continue home Toprol  mg, losartan and hydralazine  - Can up titrate hydralazine if BP remains elevated.   - still with nausea    - D/c planning per primary team.   - Monitor and replete lytes, keep K>4, Mg>2.  - Will continue to follow.    Colette Brar, MS FNP, AGACNP  Nurse Practitioner- Cardiology   Please call on TEAMS

## 2024-12-23 NOTE — CAREGIVER ENGAGEMENT NOTE - CAREGIVER OUTREACH NOTES - FREE TEXT
Per MD, pt stable for d/c later today. SW confirmed with [facility] that they have a bed available for today. TAHIR met with pt and dtr Maday at bedside regarding d/c planning. Accepting bed for [facility] today. [facility] via [facility] scheduled for 3pm. SW to follow and remain available for any needs.

## 2024-12-23 NOTE — PROGRESS NOTE ADULT - PROBLEM SELECTOR PROBLEM 6
HTN (hypertension)
T2DM (type 2 diabetes mellitus)
HTN (hypertension)
TIA (transient ischemic attack)
TIA (transient ischemic attack)

## 2024-12-23 NOTE — PROGRESS NOTE ADULT - SUBJECTIVE AND OBJECTIVE BOX
Patient seen and examined at bedside this AM. Patient was unable to leave for [facility] on 12/22 due to elevated BP and several bouts of emesis. Patient further had yet to have BM since arrival at hospital. Patient treated for nausea and constipation by medical team. Emesis and constipation both resolved. Will work for dispo and discharge today.    LABS:                        10.2   10.53 )-----------( 189      ( 22 Dec 2024 07:00 )             30.2     12-22    140  |  104  |  37[H]  ----------------------------<  215[H]  3.5   |  24  |  1.50[H]    Ca    9.1      22 Dec 2024 07:00  Phos  3.7     12-21  Mg     1.7     12-21    TPro  5.5[L]  /  Alb  2.3[L]  /  TBili  0.4  /  DBili  x   /  AST  49[H]  /  ALT  <6[L]  /  AlkPhos  104  12-21      Urinalysis Basic - ( 22 Dec 2024 07:00 )    Color: x / Appearance: x / SG: x / pH: x  Gluc: 215 mg/dL / Ketone: x  / Bili: x / Urobili: x   Blood: x / Protein: x / Nitrite: x   Leuk Esterase: x / RBC: x / WBC x   Sq Epi: x / Non Sq Epi: x / Bacteria: x        VITAL SIGNS:  T(C): 37.1 (12-23-24 @ 04:41), Max: 37.1 (12-23-24 @ 04:41)  HR: 71 (12-23-24 @ 04:41) (58 - 81)  BP: 160/62 (12-23-24 @ 04:41) (119/48 - 187/67)  RR: 18 (12-23-24 @ 04:41) (18 - 20)  SpO2: 93% (12-23-24 @ 04:41) (91% - 96%)    General: NAD, resting comfortably in bed  LLE:   Dressing in place C/D/I  SCD in place bilaterally  No calf tenderness   Motor: + EHL/FHL/TA/GSC  Sensory: SILT SPN/DPN/Yury/Saph/Tib  DP/PT 2+      A/P:  89y f s/p L hip hemiarthroplasty w/ Dr. De Luna POD 4  -PT/OT  -WBAT LLE w/ posterior precautions - abduction pillow on while lying down and sitting  -NWB R hand - otherwise WBAT from R forearm and above, can use platform walker, please obtain while at rehab facility  -Pain Control  -DVT ppx: per primary - start POD1 if feasible; Lov recommended  -Continue perioperative abx x 24 hours  -FU AM Labs  -Rest, ice, compress, and elevate the extremity as tolerated  -Incentive Spirometry  -Medical management appreciated  -Dispo per PT, return to [facility] on  12/23 at 1400  -D/w Dr. De Luna, will update plan as needed

## 2024-12-23 NOTE — PROGRESS NOTE ADULT - PROBLEM/PLAN-3
DISPLAY PLAN FREE TEXT
yes
DISPLAY PLAN FREE TEXT

## 2024-12-23 NOTE — PROGRESS NOTE ADULT - ASSESSMENT
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 Velasquez as well as R hip IMN w/ Dr. De Luna in 2023, s/p WALLANT R carpal tunnel release w/ Dr. Andrew 2 days ago (12/16/24) previous right hip fx w/ surgical pinning presents to the ED for L hip pain admitted for L hip fracture plan for hemiarthroplasty of L hip with Dr. De Luna on  12/19/24 at [facility].

## 2024-12-23 NOTE — PROGRESS NOTE ADULT - PROBLEM SELECTOR PROBLEM 4
Constipation
CKD (chronic kidney disease)
CKD (chronic kidney disease)
Constipation
CKD (chronic kidney disease)

## 2024-12-23 NOTE — PROGRESS NOTE ADULT - PROBLEM SELECTOR PLAN 9
s/p reduced Lovenox dosing 2/2 decreased egfr    Dispo: -Accepted to [facility] ([facility])
s/p reduced Lovenox dosing 2/2 decreased egfr    Dispo: -Accepted to [facility] ([facility]) for 12/21

## 2024-12-23 NOTE — PROGRESS NOTE ADULT - PROBLEM SELECTOR PLAN 1
Pt w/ hx of R hip IMN now with L hip pain x1 day.  Xray left - L femoral neck fracture-Hemiarthroplasty of left hip  - Pain control per orthopedics; tramadol for mild, Tramadol for Moderate & oxycodone 5 for severe pain PRN  - POD#2, was able to ambulate with walker and assistance from PT  - Cardiology follow up Dr Arias  Bp elevated   - Orthopedics Dr. De Luna following  ok for D/C as per Residents   - PT following - recommend [facility]  Bowel regimen Pt w/ hx of R hip IMN now with L hip pain x1 day.  Xray left - L femoral neck fracture-Hemiarthroplasty of left hip  - Pain control per orthopedics; tramadol for mild, Tramadol for Moderate & oxycodone 5 for severe pain PRN  - POD#2, was able to ambulate with walker and assistance from PT  - Cardiology follow up Dr Waddell   - Orthopedics Dr. De Luna following  ok for D/C as per Residents   - PT following - recommend [facility]  Bowel regimen in place

## 2024-12-23 NOTE — PROGRESS NOTE ADULT - SUBJECTIVE AND OBJECTIVE BOX
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 Velasquez as well as R hip IMN w/ Dr. De Luna in 2023, s/p WALLANT R carpal tunnel release w/ Dr. Andrew 2 days ago (12/16/24) previous right hip fx w/ surgical pinning presents to the ED for L hip pain. while  ambulating w/ her walker earlier today, was in a chair when she heard a "pop" followed by immediate pain and difficulty bearing weight. Was assisted to her bed and was then unable to get up - prompting ambulance transfer to [facility] ED. Denies head strike/LOC/other orthopedic injuries at this time. Unable to ambulate since injury this morning (at baseline ambulates with walker). Denies numbness/tingling in the affected extremity. Patient takes no AC meds. Denies any current chest pain, sob, abd pain, Complaining of L sided hip pain.    Per son at bedside, patient had UTI last week was treated with course of nitrofurantoin, completed her course.    In the ED:  Vitals: T 98.2 HR 57 /66 RR 16 RA  Labs: H/H 11.2/34.1 PT/INR 14.1/1.21 BUN/Cr 41/2 Glu 183 GFR 23  Given: ofirmev x1, fentanyl 25mcg IV x1, morphine 4mg IV x1, on 75 cc/hr NS in the ED  CXR cardiomegaly, no acute cardiopulm disease.  EKG: SR 1st degree AVB w/ PVCs 64 bpm QTc 491     (18 Dec 2024 21:04)    INTERVAL HPI:  12/19- Pt seen, examined, pt has better pain control, pt was found to be in urinary retention -Anderson cath placed -drained ~1 lit urine, NPO for OR cardio saw pt-No A Fib, not on AC   12/20- POD #1 Pt seen and examined at bedside with daughter present. Pt agitated initially and refused morning medications. With my morning discussion, patient became calm and accepting of morning medications. States that she feels "off" mentally because of the pain medications she has been getting. Patient is aware of TOV to take place in the afternoon. PO diet reinitiated. States her pain is well controlled. Follow Bladder Scan  12/21: POD # 2 t seen, Examined ,Failed TOV-Anderson cath replaced, Pain Rt Hand with recent Sx  Low stable H/H  , Cr 1.7  12/22: POD #3. Patient seen and examined at bedside complaining of continued nausea and vomiting from the night previous. States she has not pooped in >5 days and now feels like her abdomen is bloated.  H/H increased. Cr 1.5  12/23: POD #4. Pt seen and examined at bedside. States that she had a large BM yesterday. Still has mild tension in her abdomen and intermittent nausea without vomiting. No other complaints. Pain well controlled. H/H decreased back to 8.9    OVERNIGHT EVENTS:  None    Home Medications:  aspirin 81 mg oral delayed release tablet: 1 tab(s) orally every other day (18 Dec 2024 23:36)  atorvastatin 40 mg oral tablet: 1 tab(s) orally once a day (at bedtime) (18 Dec 2024 23:36)  famotidine 20 mg oral tablet: 1 tab(s) orally once a day (18 Dec 2024 23:36)  ferrous sulfate 324 mg (65 mg elemental iron) oral tablet: 1 tab(s) orally once a day (18 Dec 2024 23:36)  furosemide 20 mg oral tablet: 1 tab(s) orally once a day (18 Dec 2024 23:36)  Januvia 25 mg oral tablet: 1 tab(s) orally once a day (18 Dec 2024 23:36)  losartan 25 mg oral tablet: 1 tab(s) orally once a day (18 Dec 2024 23:32)  Metoprolol Succinate  mg oral tablet, extended release: 1 tab(s) orally once a day (18 Dec 2024 23:36)      MEDICATIONS  (STANDING):  acetaminophen     Tablet .. 650 milliGRAM(s) Oral daily  aspirin enteric coated 81 milliGRAM(s) Oral <User Schedule>  atorvastatin 40 milliGRAM(s) Oral at bedtime  dextrose 5%. 1000 milliLiter(s) (50 mL/Hr) IV Continuous <Continuous>  dextrose 5%. 1000 milliLiter(s) (100 mL/Hr) IV Continuous <Continuous>  dextrose 50% Injectable 25 Gram(s) IV Push once  dextrose 50% Injectable 12.5 Gram(s) IV Push once  dextrose 50% Injectable 25 Gram(s) IV Push once  enoxaparin Injectable 30 milliGRAM(s) SubCutaneous every 24 hours  furosemide    Tablet 20 milliGRAM(s) Oral daily  glucagon  Injectable 1 milliGRAM(s) IntraMuscular once  hydrALAZINE 25 milliGRAM(s) Oral every 8 hours  insulin lispro (ADMELOG) corrective regimen sliding scale   SubCutaneous three times a day before meals  insulin lispro (ADMELOG) corrective regimen sliding scale   SubCutaneous at bedtime  losartan 50 milliGRAM(s) Oral daily  metoprolol succinate  milliGRAM(s) Oral daily  pantoprazole    Tablet 40 milliGRAM(s) Oral before breakfast  polyethylene glycol 3350 17 Gram(s) Oral two times a day  senna 2 Tablet(s) Oral at bedtime  tranexamic acid IVPB 1000 milliGRAM(s) IV Intermittent Once  tranexamic acid IVPB 1000 milliGRAM(s) IV Intermittent Once    MEDICATIONS  (PRN):  dextrose Oral Gel 15 Gram(s) Oral once PRN Blood Glucose LESS THAN 70 milliGRAM(s)/deciliter  magnesium hydroxide Suspension 30 milliLiter(s) Oral daily PRN Constipation  oxyCODONE    IR 2.5 milliGRAM(s) Oral every 6 hours PRN Severe Pain (7 - 10)  traMADol 50 milliGRAM(s) Oral every 6 hours PRN Moderate Pain (4 - 6)  traMADol 25 milliGRAM(s) Oral every 6 hours PRN Mild Pain (1 - 3)  trimethobenzamide Injectable 200 milliGRAM(s) IntraMuscular every 8 hours PRN Nausea and/or Vomiting      Allergies    No Known Allergies    Intolerances        Social History:  Tobacco: denies  EtOH: denies  Recreational drug use:denies  Lives with: child  ADLs/Ambulates: with walker (18 Dec 2024 21:04)      REVIEW OF SYSTEMS:  CONSTITUTIONAL: No fever, No chills, No fatigue, No myalgia, No Body ache, No Weakness  EYES: No eye pain,  No visual disturbances, No discharge, NO Redness  ENMT:  No ear pain, No nose bleed, No vertigo; No sinus pain, NO throat pain, No Congestion  NECK: No pain, No stiffness  RESPIRATORY: No cough, NO wheezing, No  hemoptysis, NO  shortness of breath  CARDIOVASCULAR: No chest pain, palpitations  GASTROINTESTINAL: +Nausea No abdominal pain, NO epigastric pain; No diarrhea, [ X ] BM -1  GENITOURINARY: No dysuria, No frequency, No urgency, No hematuria, NO incontinence  NEUROLOGICAL: No headaches, No dizziness, No numbness, No tingling, No tremors, No weakness  EXT: No Swelling, No Pain, No Edema  SKIN:  [ X ] No itching, burning, rashes, or lesions   MUSCULOSKELETAL: No joint pain ,No Jt swelling; No muscle pain, No back pain, No extremity pain  PSYCHIATRIC: No depression,  No anxiety,  No mood swings ,No difficulty sleeping at night  PAIN SCALE: [  X] None  [  ] Other-  ROS Unable to obtain due to - [  ] Dementia  [  ] Lethargy [  ] Drowsy [  ] Sedated [  ] non verbal  REST OF REVIEW Of SYSTEM - [X  ] Normal     Vital Signs Last 24 Hrs  T(C): 36.8 (23 Dec 2024 09:15), Max: 37.1 (23 Dec 2024 04:41)  T(F): 98.2 (23 Dec 2024 09:15), Max: 98.8 (23 Dec 2024 04:41)  HR: 70 (23 Dec 2024 09:15) (58 - 81)  BP: 154/67 (23 Dec 2024 09:15) (119/48 - 187/67)  BP(mean): --  RR: 18 (23 Dec 2024 09:15) (18 - 20)  SpO2: 94% (23 Dec 2024 09:15) (91% - 96%)    Parameters below as of 23 Dec 2024 09:15  Patient On (Oxygen Delivery Method): room air      Finger Stick        12-22 @ 07:01  -  12-23 @ 07:00  --------------------------------------------------------  IN: 0 mL / OUT: 800 mL / NET: -800 mL        PHYSICAL EXAM:  GENERAL:  [ X ] NAD , [X  ] well appearing, [  ] Agitated, [  ] Drowsy,  [  ] Lethargy, [  ] confused   HEAD:  [  ] Normal, [  ] Other  EYES:  [ X ] EOMI, [ X ] PERRLA, [X  ] conjunctiva and sclera clear normal, [  ] Other,  [  ] Pallor,[  ] Discharge  ENMT:  [  ] Normal, [ X ] Moist mucous membranes, [  ] Good dentition, [  ] No Thrush  NECK:  [  ] Supple, [  ] No JVD, [  ] Normal thyroid, [  ] Lymphadenopathy [  ] Other  CHEST/LUNG:  [ X ] Clear to auscultation bilaterally, [ X ] Breath Sounds equal B/L, [  ] poor effort  [ X ] No rales, [X  ] No rhonchi  [X  ]  No wheezing,   HEART:  [ X ] Regular rate and rhythm - extra beats auscultated, [  ] tachycardia, [  ] Bradycardia,  [  ] irregular  [ X ] No murmurs, No rubs, No gallops, [  ] PPM in place (Mfr:  )  ABDOMEN:  [ X ] Soft, [ X ] Nontender, [ X ] Mildly distended, [  ] No mass, [  ] Bowel sounds present, [  ] obese  NERVOUS SYSTEM:  [X  ] Alert & Oriented X3, [  ] Nonfocal  [  ] Confusion  [  ] Encephalopathic [  ] Sedated [  ] Unable to assess, [  ] Dementia [  ] Other-  EXTREMITIES: [X  ] 2+ Peripheral Pulses, No clubbing, No cyanosis,  [X  ] nonpitting edema B/L lower EXT. [  ] PVD stasis skin changes B/L Lower EXT, [  ] wound  LYMPH: No lymphadenopathy noted  SKIN:  [ X ] No rashes or lesions, [  ] Pressure Ulcers, [  ] ecchymosis, [  ] Skin Tears, [  ] Other    DIET: Diet, Consistent Carbohydrate w/Evening Snack:   DASH/TLC Sodium & Cholesterol Restricted (12-21-24 @ 13:54)      LABS:                        8.9    12.44 )-----------( 218      ( 23 Dec 2024 08:16 )             27.1       Ca    9.1        22 Dec 2024 07:00        Urinalysis Basic - ( 22 Dec 2024 07:00 )    Color: x / Appearance: x / SG: x / pH: x  Gluc: 215 mg/dL / Ketone: x  / Bili: x / Urobili: x   Blood: x / Protein: x / Nitrite: x   Leuk Esterase: x / RBC: x / WBC x   Sq Epi: x / Non Sq Epi: x / Bacteria: x        Culture Results:   <10,000 CFU/mL Normal Urogenital Daniela (12-18 @ 20:10)                  Culture - Urine (collected 18 Dec 2024 20:10)  Source: Clean Catch  Final Report (20 Dec 2024 09:25):    <10,000 CFU/mL Normal Urogenital Daniela    Urinalysis with Rflx Culture (collected 18 Dec 2024 20:10)         Anemia Panel:  Iron - Total Binding Capacity.: 225 ug/dL (12-20-24 @ 07:45)  Iron Total: 26 ug/dL (12-20-24 @ 07:45)  Ferritin: 250 ng/mL (12-20-24 @ 07:45)      Thyroid Panel:                RADIOLOGY & ADDITIONAL TESTS:      HEALTH ISSUES - PROBLEM Dx:  Fracture of femoral neck, left    Anemia    HTN (hypertension)    Constipation    CKD (chronic kidney disease)    TIA (transient ischemic attack)    T2DM (type 2 diabetes mellitus)    HLD (hyperlipidemia)    Need for prophylactic measure            Consultant(s) Notes Reviewed:  [ X ] YES     Care Discussed with [X] Consultants  [  ] Patient  [  ] Family [  ] HCP [  ]   [  ] Social Service  [  ] RN, [  ] Physical Therapy,[  ] Palliative care team  DVT PPX: [  ] Lovenox, [  ] S C Heparin, [  ] Coumadin, [  ] Xarelto, [  ] Eliquis, [  ] Pradaxa, [  ] IV Heparin drip, [  ] SCD [  ] Contraindication 2 to GI Bleed,[  ] Ambulation [  ] Contraindicated 2 to  bleed [  ] Contraindicated 2 to Brain Bleed  Advanced directive: [  ] None, [  ] DNR/DNI Patient is a 89y old  Female who presents with a chief complaint of L hip fracture (23 Dec 2024 08:16)    HPI:  89 yo female w/ pmh of HTN, HLD, T2DM, TIA, CKD III, 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 Velasquez as well as R hip IMN w/ Dr. De Luna in 2023, s/p WALLANT R carpal tunnel release w/ Dr. Andrew 2 days ago (12/16/24) previous right hip fx w/ surgical pinning presents to the ED for L hip pain. while  ambulating w/ her walker earlier today, was in a chair when she heard a "pop" followed by immediate pain and difficulty bearing weight. Was assisted to her bed and was then unable to get up - prompting ambulance transfer to [facility] ED. Denies head strike/LOC/other orthopedic injuries at this time. Unable to ambulate since injury this morning (at baseline ambulates with walker). Denies numbness/tingling in the affected extremity. Patient takes no AC meds. Denies any current chest pain, sob, abd pain, Complaining of L sided hip pain.    Per son at bedside, patient had UTI last week was treated with course of nitrofurantoin, completed her course.    In the ED:  Vitals: T 98.2 HR 57 /66 RR 16 RA  Labs: H/H 11.2/34.1 PT/INR 14.1/1.21 BUN/Cr 41/2 Glu 183 GFR 23  Given: ofirmev x1, fentanyl 25mcg IV x1, morphine 4mg IV x1, on 75 cc/hr NS in the ED  CXR cardiomegaly, no acute cardiopulm disease.  EKG: SR 1st degree AVB w/ PVCs 64 bpm QTc 491     (18 Dec 2024 21:04)    INTERVAL HPI:  12/19- Pt seen, examined, pt has better pain control, pt was found to be in urinary retention -Anderson cath placed -drained ~1 lit urine, NPO for OR cardio saw pt-No A Fib, not on AC   12/20- POD #1 Pt seen and examined at bedside with daughter present. Pt agitated initially and refused morning medications. With my morning discussion, patient became calm and accepting of morning medications. States that she feels "off" mentally because of the pain medications she has been getting. Patient is aware of TOV to take place in the afternoon. PO diet reinitiated. States her pain is well controlled. Follow Bladder Scan  12/21: POD # 2 t seen, Examined ,Failed TOV-Anderson cath replaced, Pain Rt Hand with recent Sx  Low stable H/H  , Cr 1.7  12/22: POD #3. Patient seen and examined at bedside complaining of continued nausea and vomiting from the night previous. States she has not pooped in >5 days and now feels like her abdomen is bloated.  H/H increased. Cr 1.5  12/23: POD #4. Pt seen and examined at bedside. States that she had a large BM yesterday. Still has mild tension in her abdomen and intermittent nausea without vomiting. No other complaints. Pain well controlled. H/H decreased back to 8.9    OVERNIGHT EVENTS:-None    Home Medications:  aspirin 81 mg oral delayed release tablet: 1 tab(s) orally every other day (18 Dec 2024 23:36)  atorvastatin 40 mg oral tablet: 1 tab(s) orally once a day (at bedtime) (18 Dec 2024 23:36)  famotidine 20 mg oral tablet: 1 tab(s) orally once a day (18 Dec 2024 23:36)  ferrous sulfate 324 mg (65 mg elemental iron) oral tablet: 1 tab(s) orally once a day (18 Dec 2024 23:36)  furosemide 20 mg oral tablet: 1 tab(s) orally once a day (18 Dec 2024 23:36)  Januvia 25 mg oral tablet: 1 tab(s) orally once a day (18 Dec 2024 23:36)  losartan 25 mg oral tablet: 1 tab(s) orally once a day (18 Dec 2024 23:32)  Metoprolol Succinate  mg oral tablet, extended release: 1 tab(s) orally once a day (18 Dec 2024 23:36)      MEDICATIONS  (STANDING):  acetaminophen     Tablet .. 650 milliGRAM(s) Oral daily  aspirin enteric coated 81 milliGRAM(s) Oral <User Schedule>  atorvastatin 40 milliGRAM(s) Oral at bedtime  dextrose 5%. 1000 milliLiter(s) (50 mL/Hr) IV Continuous <Continuous>  dextrose 5%. 1000 milliLiter(s) (100 mL/Hr) IV Continuous <Continuous>  dextrose 50% Injectable 25 Gram(s) IV Push once  dextrose 50% Injectable 12.5 Gram(s) IV Push once  dextrose 50% Injectable 25 Gram(s) IV Push once  enoxaparin Injectable 30 milliGRAM(s) SubCutaneous every 24 hours  furosemide    Tablet 20 milliGRAM(s) Oral daily  glucagon  Injectable 1 milliGRAM(s) IntraMuscular once  hydrALAZINE 25 milliGRAM(s) Oral every 8 hours  insulin lispro (ADMELOG) corrective regimen sliding scale   SubCutaneous three times a day before meals  insulin lispro (ADMELOG) corrective regimen sliding scale   SubCutaneous at bedtime  losartan 50 milliGRAM(s) Oral daily  metoprolol succinate  milliGRAM(s) Oral daily  pantoprazole    Tablet 40 milliGRAM(s) Oral before breakfast  polyethylene glycol 3350 17 Gram(s) Oral two times a day  senna 2 Tablet(s) Oral at bedtime  tranexamic acid IVPB 1000 milliGRAM(s) IV Intermittent Once  tranexamic acid IVPB 1000 milliGRAM(s) IV Intermittent Once    MEDICATIONS  (PRN):  dextrose Oral Gel 15 Gram(s) Oral once PRN Blood Glucose LESS THAN 70 milliGRAM(s)/deciliter  magnesium hydroxide Suspension 30 milliLiter(s) Oral daily PRN Constipation  oxyCODONE    IR 2.5 milliGRAM(s) Oral every 6 hours PRN Severe Pain (7 - 10)  traMADol 50 milliGRAM(s) Oral every 6 hours PRN Moderate Pain (4 - 6)  traMADol 25 milliGRAM(s) Oral every 6 hours PRN Mild Pain (1 - 3)  trimethobenzamide Injectable 200 milliGRAM(s) IntraMuscular every 8 hours PRN Nausea and/or Vomiting      Allergies    No Known Allergies    Intolerances        Social History:  Tobacco: denies  EtOH: denies  Recreational drug use:denies  Lives with: child  ADLs/Ambulates: with walker (18 Dec 2024 21:04)      REVIEW OF SYSTEMS: i am ok  CONSTITUTIONAL: No fever, No chills, No fatigue, No myalgia, No Body ache, No Weakness  EYES: No eye pain,  No visual disturbances, No discharge, NO Redness  ENMT:  No ear pain, No nose bleed, No vertigo; No sinus pain, NO throat pain, No Congestion  NECK: No pain, No stiffness  RESPIRATORY: No cough, NO wheezing, No  hemoptysis, NO  shortness of breath  CARDIOVASCULAR: No chest pain, palpitations  GASTROINTESTINAL: +Nausea No abdominal pain, NO epigastric pain; No diarrhea, [ X ] BM -1  GENITOURINARY: No dysuria, No frequency, No urgency, No hematuria, NO incontinence  NEUROLOGICAL: No headaches, No dizziness, No numbness, No tingling, No tremors, No weakness  EXT: No Swelling, No Pain, No Edema  SKIN:  [ X ] No itching, burning, rashes, or lesions   MUSCULOSKELETAL: No joint pain ,No Jt swelling; No muscle pain, No back pain, No extremity pain  PSYCHIATRIC: No depression,  No anxiety,  No mood swings ,No difficulty sleeping at night  PAIN SCALE: [  ] None  [ x ] Other-rt hand Sx site  ROS Unable to obtain due to - [  ] Dementia  [  ] Lethargy [  ] Drowsy [  ] Sedated [  ] non verbal  REST OF REVIEW Of SYSTEM - [X  ] Normal     Vital Signs Last 24 Hrs  T(C): 36.8 (23 Dec 2024 09:15), Max: 37.1 (23 Dec 2024 04:41)  T(F): 98.2 (23 Dec 2024 09:15), Max: 98.8 (23 Dec 2024 04:41)  HR: 70 (23 Dec 2024 09:15) (58 - 81)  BP: 154/67 (23 Dec 2024 09:15) (119/48 - 187/67)  BP(mean): --  RR: 18 (23 Dec 2024 09:15) (18 - 20)  SpO2: 94% (23 Dec 2024 09:15) (91% - 96%)    Parameters below as of 23 Dec 2024 09:15  Patient On (Oxygen Delivery Method): room air      Finger Stick        12-22 @ 07:01  -  12-23 @ 07:00  --------------------------------------------------------  IN: 0 mL / OUT: 800 mL / NET: -800 mL        PHYSICAL EXAM:  GENERAL:  [ X ] NAD , [X  ] well appearing, [  ] Agitated, [  ] Drowsy,  [  ] Lethargy, [  ] confused   HEAD:  [  x] Normal, [  ] Other  EYES:  [ X ] EOMI, [ X ] PERRLA, [X  ] conjunctiva and sclera clear normal, [  ] Other,  [x  ] Pallor,[  ] Discharge  ENMT:  [  ] Normal, [ X ] Moist mucous membranes, [x  ] Good dentition, [ x ] No Thrush  NECK:  [x  ] Supple, [x  ] No JVD, [ x ] Normal thyroid, [  ] Lymphadenopathy [  ] Other  CHEST/LUNG:  [ X ] Clear to auscultation bilaterally, [ X ] Breath Sounds equal B/L, [  ] poor effort  [ X ] No rales, [X  ] No rhonchi  [X  ]  No wheezing,   HEART:  [ X ] Regular rate and rhythm - extra beats auscultated, [  ] tachycardia, [  ] Bradycardia,  [  ] irregular  [ X ] No murmurs, No rubs, No gallops, [  ] PPM in place (Mfr:  )  ABDOMEN:  [ X ] Soft, [ X ] Nontender, [ X ] Mildly distended, [ x ] No mass, [x] Bowel sounds present, [ x ] obese  NERVOUS SYSTEM:  [X  ] Alert & Oriented X3, [ x ] Nonfocal  [  ] Confusion  [  ] Encephalopathic [  ] Sedated [  ] Unable to assess, [  ] Dementia [  ] Other-  EXTREMITIES: [X  ] 2+ Peripheral Pulses, No clubbing, No cyanosis,  [X  ] nonpitting edema B/L lower EXT. [  ] PVD stasis skin changes B/L Lower EXT, [  ] wound  LYMPH: No lymphadenopathy noted  SKIN:  [ X ] No rashes or lesions, [  ] Pressure Ulcers, [  ] ecchymosis, [  ] Skin Tears, [  ] Other    DIET: Diet, Consistent Carbohydrate w/Evening Snack:   DASH/TLC Sodium & Cholesterol Restricted (12-21-24 @ 13:54)      LABS:                        8.9    12.44 )-----------( 218      ( 23 Dec 2024 08:16 )             27.1     23 Dec 2024 08:16    140    |  107    |  41     ----------------------------<  208    4.2     |  25     |  1.50     Ca    8.7        23 Dec 2024 08:16    TPro  5.8    /  Alb  2.1    /  TBili  0.3    /  DBili  x      /  AST  35     /  ALT  10     /  AlkPhos  112    23 Dec 2024 08:16    Ca    9.1        22 Dec 2024 07:00        Urinalysis Basic - ( 22 Dec 2024 07:00 )    Color: x / Appearance: x / SG: x / pH: x  Gluc: 215 mg/dL / Ketone: x  / Bili: x / Urobili: x   Blood: x / Protein: x / Nitrite: x   Leuk Esterase: x / RBC: x / WBC x   Sq Epi: x / Non Sq Epi: x / Bacteria: x        Culture Results:   <10,000 CFU/mL Normal Urogenital Daniela (12-18 @ 20:10)        Culture - Urine (collected 18 Dec 2024 20:10)  Source: Clean Catch  Final Report (20 Dec 2024 09:25):    <10,000 CFU/mL Normal Urogenital Daniela    Urinalysis with Rflx Culture (collected 18 Dec 2024 20:10)         Anemia Panel:  Iron - Total Binding Capacity.: 225 ug/dL (12-20-24 @ 07:45)  Iron Total: 26 ug/dL (12-20-24 @ 07:45)  Ferritin: 250 ng/mL (12-20-24 @ 07:45)    RADIOLOGY & ADDITIONAL TESTS:      HEALTH ISSUES - PROBLEM Dx:  Fracture of femoral neck, left    Anemia    HTN (hypertension)    Constipation    CKD (chronic kidney disease)    TIA (transient ischemic attack)    T2DM (type 2 diabetes mellitus)    HLD (hyperlipidemia)    Need for prophylactic measure            Consultant(s) Notes Reviewed:  [ X ] YES     Care Discussed with [X] Consultants  [ x ] Patient  [x] Family [  ] HCP [  ]   [ x ] Social Service  [ x ] RN, [  ] Physical Therapy,[  ] Palliative care team  DVT PPX: [ x ] Lovenox, [  ] S C Heparin, [  ] Coumadin, [  ] Xarelto, [  ] Eliquis, [  ] Pradaxa, [  ] IV Heparin drip, [  ] SCD [  ] Contraindication 2 to GI Bleed,[  ] Ambulation [  ] Contraindicated 2 to  bleed [  ] Contraindicated 2 to Brain Bleed  Advanced directive: [x  ] None, [  ] DNR/DNI

## 2024-12-23 NOTE — PROGRESS NOTE ADULT - PROBLEM SELECTOR PLAN 8
Chronic  - Continue atorvastatin  - lipid profile wnl aside from mildly reduced HDL
Chronic  - Continue atorvastatin  - lipid profile wnl aside from mildly reduced HDL
s/p reduced lovenox dosing 2/2 decreased egfr    Dispo: -Accepted to [place] ([facility]) for 12/21
s/p heparin 5000u, hold AC for procedure tomorrow AM, can restart Heparin SC following surgery
s/p reduced Lovenox dosing 2/2 decreased egfr    Dispo: -Accepted to [place] ([facility]) for 12/21

## 2024-12-23 NOTE — PROGRESS NOTE ADULT - PROBLEM SELECTOR PROBLEM 5
CKD (chronic kidney disease)
TIA (transient ischemic attack)
Anemia
CKD (chronic kidney disease)
Anemia

## 2024-12-23 NOTE — PROGRESS NOTE ADULT - NS ATTEND AMEND GEN_ALL_CORE FT
89 yo female w/ HTN, HLD, T2DM, TIA, CKD III, Hx of R hip CRPP in 2016 followed by hardware removal in 2017 w/ Dr. Velasquez as well as R hip IMN w/ Dr. De Luna in 2023, s/p WALLANT R carpal tunnel release w/ Dr. Andrew 2 days ago (12/16/24) previous right hip fx w/ surgical pinning presents to the ED for L hip pain. while  ambulating w/ her walker earlier today, was in a chair when she heard a "pop" followed by immediate pain and difficulty bearing weight. Patient schedules for hemiarthroplasty of L hip with Dr. De Luna 12/19. Consulted for Cardiac optimization     - s/p Lt hip hemiarthroplasty.  Tolerated procedure well  - Pain control   - PT eval    - EKG: sinus rhythm with 1st deg heart block with PVCs, rate 64, Qtc 491, no ST changes  - No evidence of any active ischemia   - Continue home ASA and statin for Hx of TIA    - +BLE edema but no clear evidence of volume overload  - No 2 requirement  - Last TTE 11/2023: EF 64%, no significant valvular disease  - Can resume home low dose Lasix.  - Creatinine:  <--1.50,  <--1.70,  <--1.80,  <--1.60,  <--1.80,  <--2.00    - BP stable and controlled with -180s likely in the setting of pain and nausea   - Continue home Toprol  mg, losartan and hydralazine  - Can up titrate hydralazine if BP remains elevated.     - D/c planning per primary team.   - Monitor and replete lytes, keep K>4, Mg>2.  - Will continue to follow.

## 2024-12-23 NOTE — PROGRESS NOTE ADULT - PROVIDER SPECIALTY LIST ADULT
Internal Medicine
Nephrology
Orthopedics
Cardiology
Internal Medicine
Nephrology
Cardiology
Internal Medicine
Nephrology
Orthopedics
Nephrology
Internal Medicine
Internal Medicine

## 2024-12-23 NOTE — PROGRESS NOTE ADULT - PROBLEM SELECTOR PLAN 5
History of CKD 2- 3  - Baseline 1.7-2 in 6/2024.  - Continue to monitor with daily CMP  -Dr. MARYA Kaur Nephro recs appreciated   urinary retention -Failed TOV   Galeana cath replaced   - as per dtr pt had galeana cath x 2 months with previous Sx History of CKD 2- 3 Cr improving   - Baseline 1.7-2 in 6/2024.  - Continue to monitor with daily CMP  -Dr. MARYA Kaur Nephro recs appreciated   urinary retention -Failed TOV   Galeana cath replaced   - as per dtr pt had galeana cath x 2 months with previous Sx

## 2024-12-23 NOTE — PROGRESS NOTE ADULT - PROBLEM SELECTOR PLAN 3
Chronic - BP elevated in the ED likely 2/2 to pain, to monitor BPs  - Continue home Toprol XL  and losartan daily   -echo normal ef and no sig valve disease EF 64%   - Losartan increased per Nephro  - Hydralazine 25 mg q8h started  - restarted home furosemide  - doppler US b/l LE ordered - negative for DVT B/L Chronic - BP elevated in the ED likely 2/2 to pain, to monitor BPs  - Continue home Toprol XL  and losartan daily   -echo normal ef and no sig valve disease EF 64%   - Losartan 50 mg daily per Nephro  - Hydralazine 25 mg q8h continue   - restarted home furosemide 20 mg daily as per cardio  - doppler US b/l LE ordered - negative for DVT B/L

## 2024-12-23 NOTE — PROGRESS NOTE ADULT - ASSESSMENT
ELVIN on CKD 3  HTN with CKD 3  Anemia  Hip fracture  Volume depletion      -ELVIN likely 2/2 IV volume depletion  -SCR baseline is 1.5-1.9, is back to baseline now and remains stable  -SCr was 2 on admission  -Electrolytes are normal  -Iron panel reviewed. start PO iron  -s/p IVF  -S/P OR 12/19/24 for hip fracture  -BP control improving; increased losartan 50mg 12/22/24; monitor and will titrate up as indicated. Hydralazine added       DC planning per primary team

## 2024-12-26 ENCOUNTER — TRANSCRIPTION ENCOUNTER (OUTPATIENT)
Age: 89
End: 2024-12-26

## 2024-12-30 ENCOUNTER — TRANSCRIPTION ENCOUNTER (OUTPATIENT)
Age: 89
End: 2024-12-30

## 2024-12-30 DIAGNOSIS — G56.03 CARPAL TUNNEL SYNDROM,BILATERAL UPPER LIMBS: ICD-10-CM

## 2025-01-02 ENCOUNTER — TRANSCRIPTION ENCOUNTER (OUTPATIENT)
Age: 89
End: 2025-01-02

## 2025-01-03 ENCOUNTER — TRANSCRIPTION ENCOUNTER (OUTPATIENT)
Age: 89
End: 2025-01-03

## 2025-01-03 ENCOUNTER — APPOINTMENT (OUTPATIENT)
Dept: ORTHOPEDIC SURGERY | Facility: CLINIC | Age: 89
End: 2025-01-03

## 2025-01-07 ENCOUNTER — NON-APPOINTMENT (OUTPATIENT)
Age: 89
End: 2025-01-07

## 2025-01-10 ENCOUNTER — APPOINTMENT (OUTPATIENT)
Dept: ORTHOPEDIC SURGERY | Facility: CLINIC | Age: 89
End: 2025-01-10
Payer: MEDICARE

## 2025-01-10 DIAGNOSIS — Z96.642 PRESENCE OF LEFT ARTIFICIAL HIP JOINT: ICD-10-CM

## 2025-01-10 PROCEDURE — 73502 X-RAY EXAM HIP UNI 2-3 VIEWS: CPT

## 2025-01-10 PROCEDURE — 99024 POSTOP FOLLOW-UP VISIT: CPT

## 2025-01-13 NOTE — ED PROVIDER NOTE - WR ORDER NAME 3
PHYSICAL / OCCUPATIONAL THERAPY - DAILY TREATMENT NOTE (updated )    Patient Name: Elizabeth Galicia    Date: 2025    : 1941  Insurance: Payor: MEDICARE / Plan: MEDICARE PART A AND B / Product Type: *No Product type* /      Patient  verified Yes   Visit #   Current / Total 10 16   Time   In / Out 11:10 11:50   Pain   In / Out 0 0   Subjective Functional Status/Changes: Pt reports difficulty with dynamic balance such as walking.     TREATMENT AREA =  Other abnormalities of gait and mobility [R26.89]    OBJECTIVE         Therapeutic Procedures:  Tx Min Billable or 1:1 Min (if diff from Tx Min) Procedure, Rationale, Specifics   15 15 [id number] Therapeutic Exercise (timed):  increase ROM, strength, coordination, balance, and proprioception to improve patient's ability to progress to PLOF and address remaining functional goals. (see flow sheet as applicable)     Details if applicable:       15 15 14188 Neuromuscular Re-Education (timed):  improve balance, coordination, kinesthetic sense, posture, core stability and proprioception to improve patient's ability to develop conscious control of individual muscles and awareness of position of extremities in order to progress to PLOF and address remaining functional goals. (see flow sheet as applicable)     Details if applicable:       29463 Manual Therapy (timed):  decrease pain, increase ROM, and increase tissue extensibility to improve patient's ability to progress to PLOF and address remaining functional goals.  The manual therapy interventions were performed at a separate and distinct time from the therapeutic activities interventions . (see flow sheet as applicable)     Details if applicable:     10 10 43261 Therapeutic Activity (timed):  use of dynamic activities replicating functional movements to increase ROM, strength, coordination, balance, and proprioception in order to improve patient's ability to progress to PLOF and address remaining functional 
Xray Femur 2 Views, Left

## 2025-01-16 ENCOUNTER — TRANSCRIPTION ENCOUNTER (OUTPATIENT)
Age: 89
End: 2025-01-16

## 2025-01-17 ENCOUNTER — TRANSCRIPTION ENCOUNTER (OUTPATIENT)
Age: 89
End: 2025-01-17

## 2025-01-22 NOTE — ED PROVIDER NOTE - ATTENDING CONTRIBUTION TO CARE
89 y/o F with c/o left high pain x few weeks.  Denies any trauma. Concern for occult fracture with hx of osteopenia.  PE TTP over medial thigh.  no deformity. ecchymosis or signs of trauma.  XR neg for acute pathology.  PT for ambulation. DISPLAY PLAN FREE TEXT

## 2025-02-02 NOTE — PROVIDER CONTACT NOTE (OTHER) - SITUATION
HPI - General
General
Date of Service: 25
HPI Narrative
DANNA SOARES, is a 24 F who presents  at 38.2 with questionable gush of fluid. none further. good active fetus. no vb. irregular non painful ctx. 


Maternal Data
Information
ASHELY Calculator
 Estimated Delivery Date Method Current WG
Current Estimate 25 Ultrasound #1 38w 2d
Other Estimates 25 LMP (Uncertain) 34w 3d


PFSH
PFSH
Medical History (Reviewed 25 @ 15:25 by Prosha Schneider)

OCD (obsessive compulsive disorder)
ADD (attention deficit disorder)
Depression with anxiety
bipolar


Home Medications

?Medication ?Instructions ?Recorded ?Last Taken ?Type
docosahexaenoic acid 200 mg 200 mg PO DAILY 24 08:00 History
capsule (Prenatal DHA)    
blood sugar diagnostic (Blood #120 ea 24 Unknown Rx
Glucose Test strips)    
blood-glucose meter #1 ea 24 Unknown Rx
lancets #200 ea 24 Unknown Rx




Allergy/AdvReac Type Severity Reaction Status Date / Time
promethazine (From Phenergan) Allergy Severe Other Verified 25 03:43
codeine Allergy  HALLUCINATI Verified 25 03:42
   ONS  
polyethylene glycol 3350 AdvReac  Hives Verified 25 03:42
(From Miralax)     


Family History (Reviewed 25 @ 15:25 by Porsha Schneider)
Other
 Colon cancer
 Hypertension
 Lung cancer
 Ovarian cancer
 Schizophrenia
 Uterine cancer
 bipolar



Surgical History (Reviewed 25 @ 15:25 by Porsha Schneider)

S/P tonsillectomy
s/p finger surgery
S/P appendectomy


Social History (Reviewed 25 @ 15:25 by Porsha Schneider)
adopted:  No 
household members:  family 
housing:  house 
number of children:  1 
current occupational status:  employed 
current occupation:  dancer 
current occupational exposures/hazards:  No 
pets and animals:  Yes pets and animals: cat(s) and dog(s) 
history of recent travel:  No 
sexually active:  Yes 
Smoking Status:  Never smoker 
alcohol intake:  never 
substance use type:  marijuana 
caffeine:  No 
what type of physical activity do you participate in:  aerobics 
frequency:  5-6 times per week 
seatbelt use:  never 
do you feel safe at home:  Yes 
additional social history:  Boyfriend- Alexander 



Pregnancy History

        4            Elective abortions        
Hx Para        1            Spontaneous abortions        2
Hx # Term Pregnancies                    Ectopic pregnancies        
Hx #  Pregnancies                    Multiple births        
                    # of living children        1


Past Pregnancies
Del. Date Name GA/Weeks Outcome Route Bth Weight Infant Gen Labor Lgth Anesthesia Del Locatn Provider FOB
21 Ani 39 live birth - full term  7lbs 7oz Female  epidural [facility] 

Delivery Date: 21  Last Updated by: Ese Garrido
      IoL chronic decreased FM


Visit Details

Expected Delivery Route/Plan

Labor Preferences-
CB/BF classes: no
labor support person: Marcela Munoz
labor intervention preferences: []
pain management options preferred: epidural
cut cord/dad catch: Marcela cut cord
breastfeeding: bottle
PP birth control planned: discussed
discussed possible routes of delivery and associated risks: []
special requests: []

Plans
Covid status: []
Flu vaccine: declines
Tdap vaccine: []
Rhogam: NA
LARC form signed: yes
Problem list reviewed and updated with the most current plan of care details and appropriate orders placed.  
Relevant counseling for the gestational age provided.
Continue routine prenatal care and follow up unless otherwise noted in visit notes/problem list details


OB Flowsheet
Initial Weight: Not Recorded

Date
<del>????????????</del>
EGA Weight BP Urine Prot
<del>????????????</del>
Glucose FHR FuHt Pres Dilation
<del>????????????</del>
Effaced Fetal St Visit Note
24
<del>????????????</del>
13w 0d 107 lb 4 oz 105/69 
<del>????????????</del>
 153   
<del>????????????</del>
  KW- CRL 61mm. GA 13.0 weeks. Accepts NIPT and carrier
KW- CRL 61mm. GA 13.0 weeks. Accepts NIPT and carrier. encouraged to stop smoking 
24
<del>????????????</del>
17w 3d 107 lb 109/67 
<del>????????????</del>
 145   
<del>????????????</del>
  JV- no cramping, spotting, or complaints. has anatomy ultrasound ordered for . did not do NIPT. 
10/16/24
<del>????????????</del>
22w 5d 117 lb 8 oz 111/67 
<del>????????????</del>
 153   
<del>????????????</del>
  JV- no lof, vaginal bleeding, or dec fm. declines flu vaccine. needs new ob labs still and will do that today. 
24
<del>????????????</del>
26w 5d 127 lb 2 oz 110/70 Negative 
<del>????????????</del>
Negative  134 26  
<del>????????????</del>
  MH-No VB, LOF. Good Fm. 3rd trim labs pending
24
<del>????????????</del>
28w 4d 127 lb 4 oz 128/75 
<del>????????????</del>
 140 28  
<del>????????????</del>
  SM- checking sugars most WNL= will continue to check 
24
<del>????????????</del>
30w 5d 132 lb 8 oz 110/60 Negative 
<del>????????????</del>
Negative  147 30  
<del>????????????</del>
  MH-No VB, LOF. Good FM. Reviewed glucose/stable
25
<del>????????????</del>
34w 3d 140 lb 4 oz 110/68 Negative 
<del>????????????</del>
Negative  134 33  
<del>????????????</del>
  MH-No VB, lof. Good FM.  Just checked glucose 7 days. abnormals noted.  Reviewed testing times, food options renata lunch when always high.  Consult dietitian. Tox screen
25
<del>????????????</del>
35w 6d 141 lb 8 oz 114/75 Negative 
<del>????????????</del>
Negative  140 35  
<del>????????????</del>
  SM- met with nutritionist, some elevated BS, overall WNL, no vb lof good fm nor egular ctx
25
<del>????????????</del>
36w 4d 145 lb 4 oz 120/76 
<del>????????????</del>
 144 36 Cephalic 1
<del>????????????</del>
30 -3 JV- gbs collected. no complaints. glucose log reviewed and normal. only a couple of 2 hr pp over 120 (122 or 121). growth scan to be done soon. 
25
<del>????????????</del>
38w 0d 144 lb 110/69 Negative 
<del>????????????</del>
Negative  140 38  3
<del>????????????</del>
60 -2 LC- 3625g EFW on todays growth scan. mostly normal pp(121-122 if over), all normal fasting. 39 week IOL set up.


NST
FHR Rate Baby A
Baseline: 130
Variability:: Moderate
Accelerations:: 15 x 15
Decelerations:: None
NST Reactive:: Yes
FHR Category:: Category I
Uterine Activity:: irreg

Assessment & Plan
(1) No leakage of amniotic fluid into vagina: 
COMMENT:       rom neg. no change in cervical exam. d/c home
PLAN: 
Plan

Patient presents for triage evaluation secondary to rule out ROM
FHT:  Moderate variability reactive no decelerations category I tracing
[place]:  irregular Contractions
Assessment and plan:  Reactive NST, reassuring maternal and fetal status patient discharged to home to follow-up in office prn.  See problem list details for additional plan information.

Charges/Coding
Procedures Urinary/Genital
52xxx-59xxx: 33628-08 Fetal non-stress test Interp
Pt is a & o x 4, Pt vomit x 1
Pt awake, alert & oriented x 4, BP elevated
Pt vomit , content yellow in color  /74 s/p stat hydralazine & PRN Zofran as per MD order
bladder scan showed 313ml

## 2025-03-06 ENCOUNTER — APPOINTMENT (OUTPATIENT)
Dept: ORTHOPEDIC SURGERY | Facility: CLINIC | Age: 89
End: 2025-03-06
Payer: MEDICARE

## 2025-03-06 DIAGNOSIS — M53.3 SACROCOCCYGEAL DISORDERS, NOT ELSEWHERE CLASSIFIED: ICD-10-CM

## 2025-03-06 PROCEDURE — 73502 X-RAY EXAM HIP UNI 2-3 VIEWS: CPT

## 2025-03-06 PROCEDURE — 99213 OFFICE O/P EST LOW 20 MIN: CPT | Mod: 24

## 2025-03-09 ENCOUNTER — INPATIENT (INPATIENT)
Facility: HOSPITAL | Age: 89
LOS: 3 days | Discharge: NURSING, REHAB OR LONG-TERM CARE | DRG: 195 | End: 2025-03-13
Attending: STUDENT IN AN ORGANIZED HEALTH CARE EDUCATION/TRAINING PROGRAM | Admitting: STUDENT IN AN ORGANIZED HEALTH CARE EDUCATION/TRAINING PROGRAM
Payer: MEDICARE

## 2025-03-09 VITALS
HEART RATE: 59 BPM | TEMPERATURE: 98 F | WEIGHT: 139.99 LBS | HEIGHT: 61 IN | SYSTOLIC BLOOD PRESSURE: 153 MMHG | DIASTOLIC BLOOD PRESSURE: 58 MMHG | OXYGEN SATURATION: 97 % | RESPIRATION RATE: 17 BRPM

## 2025-03-09 DIAGNOSIS — J18.9 PNEUMONIA, UNSPECIFIED ORGANISM: ICD-10-CM

## 2025-03-09 DIAGNOSIS — D64.9 ANEMIA, UNSPECIFIED: ICD-10-CM

## 2025-03-09 DIAGNOSIS — R53.1 WEAKNESS: ICD-10-CM

## 2025-03-09 DIAGNOSIS — E78.5 HYPERLIPIDEMIA, UNSPECIFIED: ICD-10-CM

## 2025-03-09 DIAGNOSIS — I10 ESSENTIAL (PRIMARY) HYPERTENSION: ICD-10-CM

## 2025-03-09 DIAGNOSIS — E11.9 TYPE 2 DIABETES MELLITUS WITHOUT COMPLICATIONS: ICD-10-CM

## 2025-03-09 DIAGNOSIS — M53.3 SACROCOCCYGEAL DISORDERS, NOT ELSEWHERE CLASSIFIED: ICD-10-CM

## 2025-03-09 DIAGNOSIS — Z86.73 PERSONAL HISTORY OF TRANSIENT ISCHEMIC ATTACK (TIA), AND CEREBRAL INFARCTION WITHOUT RESIDUAL DEFICITS: ICD-10-CM

## 2025-03-09 DIAGNOSIS — Z96.7 PRESENCE OF OTHER BONE AND TENDON IMPLANTS: Chronic | ICD-10-CM

## 2025-03-09 DIAGNOSIS — Z29.9 ENCOUNTER FOR PROPHYLACTIC MEASURES, UNSPECIFIED: ICD-10-CM

## 2025-03-09 DIAGNOSIS — R74.8 ABNORMAL LEVELS OF OTHER SERUM ENZYMES: ICD-10-CM

## 2025-03-09 DIAGNOSIS — Z87.440 PERSONAL HISTORY OF URINARY (TRACT) INFECTIONS: ICD-10-CM

## 2025-03-09 LAB
ALBUMIN SERPL ELPH-MCNC: 2.8 G/DL — LOW (ref 3.3–5)
ALP SERPL-CCNC: 414 U/L — HIGH (ref 40–120)
ALT FLD-CCNC: 63 U/L — SIGNIFICANT CHANGE UP (ref 12–78)
ANION GAP SERPL CALC-SCNC: 10 MMOL/L — SIGNIFICANT CHANGE UP (ref 5–17)
APPEARANCE UR: CLEAR — SIGNIFICANT CHANGE UP
AST SERPL-CCNC: 58 U/L — HIGH (ref 15–37)
BASOPHILS # BLD AUTO: 0.04 K/UL — SIGNIFICANT CHANGE UP (ref 0–0.2)
BASOPHILS NFR BLD AUTO: 0.4 % — SIGNIFICANT CHANGE UP (ref 0–2)
BILIRUB SERPL-MCNC: 0.5 MG/DL — SIGNIFICANT CHANGE UP (ref 0.2–1.2)
BILIRUB UR-MCNC: NEGATIVE — SIGNIFICANT CHANGE UP
BUN SERPL-MCNC: 30 MG/DL — HIGH (ref 7–23)
CALCIUM SERPL-MCNC: 9.4 MG/DL — SIGNIFICANT CHANGE UP (ref 8.5–10.1)
CHLORIDE SERPL-SCNC: 104 MMOL/L — SIGNIFICANT CHANGE UP (ref 96–108)
CO2 SERPL-SCNC: 26 MMOL/L — SIGNIFICANT CHANGE UP (ref 22–31)
COLOR SPEC: YELLOW — SIGNIFICANT CHANGE UP
CREAT SERPL-MCNC: 1.9 MG/DL — HIGH (ref 0.5–1.3)
DIFF PNL FLD: NEGATIVE — SIGNIFICANT CHANGE UP
EGFR: 24 ML/MIN/1.73M2 — LOW
EGFR: 24 ML/MIN/1.73M2 — LOW
EOSINOPHIL # BLD AUTO: 0.76 K/UL — HIGH (ref 0–0.5)
EOSINOPHIL NFR BLD AUTO: 8.5 % — HIGH (ref 0–6)
ERYTHROCYTE [SEDIMENTATION RATE] IN BLOOD: 94 MM/HR — HIGH (ref 0–20)
GLUCOSE SERPL-MCNC: 132 MG/DL — HIGH (ref 70–99)
GLUCOSE UR QL: NEGATIVE MG/DL — SIGNIFICANT CHANGE UP
HCT VFR BLD CALC: 32.2 % — LOW (ref 34.5–45)
HGB BLD-MCNC: 10.5 G/DL — LOW (ref 11.5–15.5)
IMM GRANULOCYTES NFR BLD AUTO: 0.4 % — SIGNIFICANT CHANGE UP (ref 0–0.9)
KETONES UR-MCNC: NEGATIVE MG/DL — SIGNIFICANT CHANGE UP
LEUKOCYTE ESTERASE UR-ACNC: NEGATIVE — SIGNIFICANT CHANGE UP
LYMPHOCYTES # BLD AUTO: 1.53 K/UL — SIGNIFICANT CHANGE UP (ref 1–3.3)
LYMPHOCYTES # BLD AUTO: 17.2 % — SIGNIFICANT CHANGE UP (ref 13–44)
MAGNESIUM SERPL-MCNC: 2 MG/DL — SIGNIFICANT CHANGE UP (ref 1.6–2.6)
MCHC RBC-ENTMCNC: 26.8 PG — LOW (ref 27–34)
MCHC RBC-ENTMCNC: 32.6 G/DL — SIGNIFICANT CHANGE UP (ref 32–36)
MCV RBC AUTO: 82.1 FL — SIGNIFICANT CHANGE UP (ref 80–100)
MONOCYTES # BLD AUTO: 1.1 K/UL — HIGH (ref 0–0.9)
MONOCYTES NFR BLD AUTO: 12.3 % — SIGNIFICANT CHANGE UP (ref 2–14)
NEUTROPHILS # BLD AUTO: 5.45 K/UL — SIGNIFICANT CHANGE UP (ref 1.8–7.4)
NEUTROPHILS NFR BLD AUTO: 61.2 % — SIGNIFICANT CHANGE UP (ref 43–77)
NITRITE UR-MCNC: NEGATIVE — SIGNIFICANT CHANGE UP
NRBC BLD AUTO-RTO: 0 /100 WBCS — SIGNIFICANT CHANGE UP (ref 0–0)
PH UR: 5 — SIGNIFICANT CHANGE UP (ref 5–8)
PLATELET # BLD AUTO: 248 K/UL — SIGNIFICANT CHANGE UP (ref 150–400)
POTASSIUM SERPL-MCNC: 3.5 MMOL/L — SIGNIFICANT CHANGE UP (ref 3.5–5.3)
POTASSIUM SERPL-SCNC: 3.5 MMOL/L — SIGNIFICANT CHANGE UP (ref 3.5–5.3)
PROT SERPL-MCNC: 8 G/DL — SIGNIFICANT CHANGE UP (ref 6–8.3)
PROT UR-MCNC: 30 MG/DL
RAPID RVP RESULT: SIGNIFICANT CHANGE UP
RBC # BLD: 3.92 M/UL — SIGNIFICANT CHANGE UP (ref 3.8–5.2)
RBC # FLD: 14.6 % — HIGH (ref 10.3–14.5)
SARS-COV-2 RNA SPEC QL NAA+PROBE: SIGNIFICANT CHANGE UP
SODIUM SERPL-SCNC: 140 MMOL/L — SIGNIFICANT CHANGE UP (ref 135–145)
SP GR SPEC: 1.01 — SIGNIFICANT CHANGE UP (ref 1–1.03)
UROBILINOGEN FLD QL: 0.2 MG/DL — SIGNIFICANT CHANGE UP (ref 0.2–1)
WBC # BLD: 8.92 K/UL — SIGNIFICANT CHANGE UP (ref 3.8–10.5)
WBC # FLD AUTO: 8.92 K/UL — SIGNIFICANT CHANGE UP (ref 3.8–10.5)

## 2025-03-09 PROCEDURE — 99285 EMERGENCY DEPT VISIT HI MDM: CPT | Mod: FS

## 2025-03-09 PROCEDURE — 72131 CT LUMBAR SPINE W/O DYE: CPT | Mod: 26

## 2025-03-09 PROCEDURE — 71045 X-RAY EXAM CHEST 1 VIEW: CPT | Mod: 26

## 2025-03-09 PROCEDURE — 99223 1ST HOSP IP/OBS HIGH 75: CPT

## 2025-03-09 RX ORDER — INSULIN LISPRO 100 U/ML
INJECTION, SOLUTION INTRAVENOUS; SUBCUTANEOUS
Refills: 0 | Status: DISCONTINUED | OUTPATIENT
Start: 2025-03-09 | End: 2025-03-13

## 2025-03-09 RX ORDER — GLUCAGON 3 MG/1
1 POWDER NASAL ONCE
Refills: 0 | Status: DISCONTINUED | OUTPATIENT
Start: 2025-03-09 | End: 2025-03-13

## 2025-03-09 RX ORDER — ENOXAPARIN SODIUM 100 MG/ML
30 INJECTION SUBCUTANEOUS EVERY 24 HOURS
Refills: 0 | Status: DISCONTINUED | OUTPATIENT
Start: 2025-03-09 | End: 2025-03-13

## 2025-03-09 RX ORDER — AZITHROMYCIN 250 MG
500 CAPSULE ORAL ONCE
Refills: 0 | Status: COMPLETED | OUTPATIENT
Start: 2025-03-09 | End: 2025-03-09

## 2025-03-09 RX ORDER — DEXTROSE 50 % IN WATER 50 %
15 SYRINGE (ML) INTRAVENOUS ONCE
Refills: 0 | Status: DISCONTINUED | OUTPATIENT
Start: 2025-03-09 | End: 2025-03-13

## 2025-03-09 RX ORDER — FUROSEMIDE 10 MG/ML
20 INJECTION INTRAMUSCULAR; INTRAVENOUS DAILY
Refills: 0 | Status: DISCONTINUED | OUTPATIENT
Start: 2025-03-09 | End: 2025-03-13

## 2025-03-09 RX ORDER — ATORVASTATIN CALCIUM 80 MG/1
40 TABLET, FILM COATED ORAL AT BEDTIME
Refills: 0 | Status: DISCONTINUED | OUTPATIENT
Start: 2025-03-09 | End: 2025-03-13

## 2025-03-09 RX ORDER — ASPIRIN 325 MG
81 TABLET ORAL
Refills: 0 | Status: DISCONTINUED | OUTPATIENT
Start: 2025-03-09 | End: 2025-03-13

## 2025-03-09 RX ORDER — METOPROLOL SUCCINATE 50 MG/1
100 TABLET, EXTENDED RELEASE ORAL DAILY
Refills: 0 | Status: DISCONTINUED | OUTPATIENT
Start: 2025-03-09 | End: 2025-03-12

## 2025-03-09 RX ORDER — DEXTROSE 50 % IN WATER 50 %
25 SYRINGE (ML) INTRAVENOUS ONCE
Refills: 0 | Status: DISCONTINUED | OUTPATIENT
Start: 2025-03-09 | End: 2025-03-13

## 2025-03-09 RX ORDER — FERROUS SULFATE 137(45) MG
325 TABLET, EXTENDED RELEASE ORAL DAILY
Refills: 0 | Status: DISCONTINUED | OUTPATIENT
Start: 2025-03-09 | End: 2025-03-13

## 2025-03-09 RX ORDER — ACETAMINOPHEN 500 MG/5ML
650 LIQUID (ML) ORAL EVERY 6 HOURS
Refills: 0 | Status: DISCONTINUED | OUTPATIENT
Start: 2025-03-09 | End: 2025-03-13

## 2025-03-09 RX ORDER — DEXTROSE 50 % IN WATER 50 %
12.5 SYRINGE (ML) INTRAVENOUS ONCE
Refills: 0 | Status: DISCONTINUED | OUTPATIENT
Start: 2025-03-09 | End: 2025-03-13

## 2025-03-09 RX ORDER — SODIUM CHLORIDE 9 G/1000ML
1000 INJECTION, SOLUTION INTRAVENOUS
Refills: 0 | Status: DISCONTINUED | OUTPATIENT
Start: 2025-03-09 | End: 2025-03-13

## 2025-03-09 RX ORDER — CEFTRIAXONE 500 MG/1
1000 INJECTION, POWDER, FOR SOLUTION INTRAMUSCULAR; INTRAVENOUS ONCE
Refills: 0 | Status: COMPLETED | OUTPATIENT
Start: 2025-03-09 | End: 2025-03-09

## 2025-03-09 RX ORDER — INSULIN LISPRO 100 U/ML
INJECTION, SOLUTION INTRAVENOUS; SUBCUTANEOUS AT BEDTIME
Refills: 0 | Status: DISCONTINUED | OUTPATIENT
Start: 2025-03-09 | End: 2025-03-13

## 2025-03-09 RX ORDER — LOSARTAN POTASSIUM 100 MG/1
50 TABLET, FILM COATED ORAL DAILY
Refills: 0 | Status: DISCONTINUED | OUTPATIENT
Start: 2025-03-09 | End: 2025-03-13

## 2025-03-09 RX ADMIN — Medication 1000 MILLILITER(S): at 13:08

## 2025-03-09 RX ADMIN — ATORVASTATIN CALCIUM 40 MILLIGRAM(S): 80 TABLET, FILM COATED ORAL at 23:00

## 2025-03-09 RX ADMIN — Medication 250 MILLIGRAM(S): at 21:06

## 2025-03-09 RX ADMIN — CEFTRIAXONE 100 MILLIGRAM(S): 500 INJECTION, POWDER, FOR SOLUTION INTRAMUSCULAR; INTRAVENOUS at 19:38

## 2025-03-09 RX ADMIN — ENOXAPARIN SODIUM 30 MILLIGRAM(S): 100 INJECTION SUBCUTANEOUS at 23:00

## 2025-03-09 NOTE — H&P ADULT - ATTENDING COMMENTS
89 yo F healthy female admitted for back pain (pt has chronic back pain). Pt's daughter noted pt to be more lethargic than usual. Pt was having episodes of confusion a week prior and was noted to have at UTI for which she was placed on Macrobid with resolution of UTI. Pt states that her physical therapist started having her use a stationary bike to aid in her back pain. CXR reading some lower infiltrate but no pt has no signs of PNA, no leukocytosis or any indication of any infection. Pt's orthopedic doctor consulted though likely there will be no surgical intervention as CT of lumbar/sacral spine showing chronic/known issues, follow up note. No further abx. PT/OT and pain control.

## 2025-03-09 NOTE — H&P ADULT - NSHPPHYSICALEXAM_GEN_ALL_CORE
T(C): 36.8 (03-09-25 @ 12:25), Max: 36.8 (03-09-25 @ 12:25)  T(F): 98.3 (03-09-25 @ 12:25), Max: 98.3 (03-09-25 @ 12:25)  HR: 55 (03-09-25 @ 14:48) (55 - 59)  BP: 177/52 (03-09-25 @ 14:48) (153/58 - 177/52)  ABP: --  ABP(mean): --  RR: 17 (03-09-25 @ 14:48) (17 - 17)  SpO2: 97% (03-09-25 @ 14:48) (97% - 97%)    CONSTITUTIONAL: Well groomed, no apparent distress  EYES: No conjunctival or scleral injection, non-icteric  ENMT: Oral mucosa with moist membranes.   NECK: Supple, symmetric and without tracheal deviation   RESP: No respiratory distress, no use of accessory muscles; CTA b/l, no WRR  CV: RRR, +S1S2, no peripheral edema  GI: Soft, NT, ND  MSK: TTP lower lumbar/sacral area  SKIN: No rashes or ulcers noted  NEURO: Sensation intact in upper and lower extremities b/l to light touch   PSYCH: Appropriate insight/judgment; A+O x 3, mood and affect appropriate T(C): 36.8 (03-09-25 @ 12:25), Max: 36.8 (03-09-25 @ 12:25)  T(F): 98.3 (03-09-25 @ 12:25), Max: 98.3 (03-09-25 @ 12:25)  HR: 55 (03-09-25 @ 14:48) (55 - 59)  BP: 177/52 (03-09-25 @ 14:48) (153/58 - 177/52)  ABP: --  ABP(mean): --  RR: 17 (03-09-25 @ 14:48) (17 - 17)  SpO2: 97% (03-09-25 @ 14:48) (97% - 97%)    CONSTITUTIONAL: Well groomed, no apparent distress  EYES: No conjunctival or scleral injection, non-icteric  ENMT: Oral mucosa with moist membranes.   NECK: Supple, symmetric and without tracheal deviation   RESP: No respiratory distress, no use of accessory muscles; CTA b/l  CV: RRR, +S1S2, no peripheral edema  GI: Soft, NT, ND  MSK: Mild TTP lower lumbar/sacral area, normal ROM   SKIN: No rashes or ulcers noted  NEURO: Sensation intact in upper and lower extremities b/l to light touch   PSYCH: Appropriate insight/judgment; A+O x 3, mood and affect appropriate

## 2025-03-09 NOTE — H&P ADULT - PROBLEM SELECTOR PLAN 7
Chronic   -  /58 on admission   - Continue home toprol, losartan, lasix w/ hold parameters  - Continue to monitor hemodynamics Patient with hx of TIA( per son at bedside was ~2017)  - Patient on aspirin and atorvastatin  - Lipid panel from 12/2024 reviewed

## 2025-03-09 NOTE — H&P ADULT - NSHPSOCIALHISTORY_GEN_ALL_CORE
No  Tobacco: denies  EtOH: denies  Recreational drug use: denies  Lives with: child  ADLs/Ambulates: with walker No  Tobacco: denies  EtOH: denies  Recreational drug use: denies  Lives with: child  ADLs/Ambulates: with walker  From [facility]

## 2025-03-09 NOTE — H&P ADULT - PROBLEM SELECTOR PLAN 5
Chronic  - Baseline 1.7-2 in 12/2024  - BUN/Cr 30/1.9 on admission  - Continue to monitor on daily CMP

## 2025-03-09 NOTE — ED ADULT TRIAGE NOTE - TEMPERATURE IN CELSIUS (DEGREES C)
Verified Results  URINE CULTURE 21Sep2018 12:32PM GOKUL ALDRIDGE   REASON FOR VISIT: See Reason(s) for Study   Ordering Provider: Gokul Aldridge     Test Name Result Flag Reference   URINE CULTURE  O    COLONY COUNT  10,000 - 20,000   Gram Negative Hector Isolated O    Escherichia coli Isolated O      GRAM NEGATIVE BRYAN 21Sep2018 12:32PM GOKUL ALDRIDGE   REASON FOR VISIT: See Reason(s) for Study   Ordering Provider: Gokul Aldridge     Test Name Result Flag Reference   PIPERACILLIN/TAZOBACTAM <=4 S    TRIMETHOPRIM/SULFAMETHOXAZOLE <=20 S    TOBRAMYCIN <=1 S    NITROFURANTOIN <=16 S    MEROPENEM <=0.25 S    LEVOFLOXACIN <=0.12 S    GENTAMICIN <=1 S    CIPROFLOXACIN <=0.25 S    CEFEPIME <=1 S    CEFTRIAXONE <=1 S    CEFTAZIDIME <=1 S    CEFOXITIN <=4 S    CEFAZOLIN <=4 S    AMPICILLIN/SULBACTAM <=2 S    AMPICILLIN <=2 S    AMIKACIN <=2 S    ESBL NEG         Message   Please notify patient her urine culture did not show any specific infection, several bacteria were isolated however with low colony count unlikely to have an active Infection      36.6

## 2025-03-09 NOTE — ED PROVIDER NOTE - CARE PLAN
Principal Discharge DX:	Right lower lobe pneumonia  Secondary Diagnosis:	Weakness  Secondary Diagnosis:	Low back pain of sacral or sacrococcygeal region   1

## 2025-03-09 NOTE — H&P ADULT - NSHPREVIEWOFSYSTEMS_GEN_ALL_CORE
REVIEW OF SYSTEMS:  CONSTITUTIONAL: + Generalized weakness,  No fever/chills or appetite changes.  HENMT: No HA, lightheadedness/dizziness  RESPIRATORY: No cough, wheezing, hemoptysis; No shortness of breath.  CARDIOVASCULAR: No chest pain, palpitations.  GASTROINTESTINAL: No abdominal or epigastric pain. No nausea or vomiting; No diarrhea or constipation.  GENITOURINARY: No dysuria, changes in frequency, hematuria, or incontinence  NEUROLOGICAL: Baseline strength. Sensation intact bilaterally.  SKIN: No itching, rashes  MUSCULOSKELETAL: + Lower back/ pelvic pain, No joint swelling REVIEW OF SYSTEMS:  CONSTITUTIONAL: + Generalized weakness,  No fever/chills or appetite changes.  HENMT: No HA, lightheadedness/dizziness  RESPIRATORY: No cough, wheezing, hemoptysis; No shortness of breath.  CARDIOVASCULAR: No chest pain, palpitations.  GASTROINTESTINAL: No abdominal or epigastric pain. No nausea or vomiting; No diarrhea or constipation.  GENITOURINARY: No dysuria, changes in frequency, hematuria, or incontinence  NEUROLOGICAL: Baseline strength. Sensation intact bilaterally.  SKIN: No itching, rashes  MUSCULOSKELETAL: + Lower back/ sacral pain, No joint swelling

## 2025-03-09 NOTE — H&P ADULT - PROBLEM SELECTOR PLAN 8
Chronic on Januvia   - A1C 8.4 on 12/2024  -Low ISS  -Regular finger sticks  -Consistent carb diet  -Hypoglycemic protocol. Chronic   -  /58 on admission   - Continue home toprol, losartan, lasix w/ hold parameters  - Continue to monitor hemodynamics

## 2025-03-09 NOTE — H&P ADULT - PROBLEM SELECTOR PLAN 9
Chronic  - Continue atorvastatin Chronic on Januvia   - A1C 8.4 on 12/2024  -Low ISS  -Regular finger sticks  -Consistent carb diet  -Hypoglycemic protocol.

## 2025-03-09 NOTE — H&P ADULT - PROBLEM SELECTOR PLAN 3
As per patient, patient had recent UTI and completed course of macrobid  - UA neg on admission  - Patient no longer has signs/ symptoms of infection  - Hold off on abx at this time

## 2025-03-09 NOTE — H&P ADULT - PROBLEM SELECTOR PLAN 4
Chronic Stable Normocytic Anemia  Baseline: 10.4-10.6 range  - H/H 10.5/32.3  -Iron studies, Folate, B12  -Target Hb>7.0  - Continue to monitor on daily CBC Chronic Stable Normocytic Anemia  Baseline: 10.4-10.6 range  - H/H 10.5/32.3  - Continue home iron for hx of PADMINI   -Target Hb>7.0  - Continue to monitor on daily CBC

## 2025-03-09 NOTE — H&P ADULT - HISTORY OF PRESENT ILLNESS
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 Velasquez as well as R hip IMN w/ Dr. De Luna in 2023 presents for weakness and increased lethargy.      IN THE ED:  Temp  97.9 F , HR 59  ,  /58  ,RR 17 , SpO2 97% RA   S/P Rocephin IV x1, Azithro IV x1, 1L Bolus NS IV x1   EKG: Sinus bradycardia w/ 1st deg AV block, VR 51bpm   Labs significant for: H/H 10.5/32.3, BUN/Cr 30/1.9, alk phos 414, UA neg, CRP pending, RVP panel, ESR pending   Imaging: CT lumbar spine:   Findings suspicious for an age-indeterminate sacral insufficiency   fracture at S2. Findings suspicious for severe multilevel lumbar spinal canal   stenosis and severe left neuroforaminal stenosis at L5-S1. ; CXR: patchy right lower lobe opacity? 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 Velasquez as well as R hip IMN w/ Dr. De Luna in 2023 presents for weakness and increased lethargy. Daughter at bedside. As per patient, she has been feeling increasingly weak over the last few days. As per daughter, she was "acting weird" and didn't remember certain things before she was brought into the hospital. However, she is now at her normal baseline. Patient had a recent UTI and was treated with 5 day course of macrobid. Patient does not have any urinary symptoms. Patient also has been having sacral/lower back pain since last week and states it started after using the bike during physical therapy. Patient saw her orthopedist on Thursday where an XR was done which had no acute findings. Patient was told to do MRI in 3 weeks if she was still having pain. Patient has been using Tylenol for pain relief intermittently and currently has no pain. Patient denies fevers, chills, body aches, chest pain, palpitations, sob, abdominal pain, n/v, dysuria, urinary frequency Denies trauma/fall, difficulty ambulating No sick contacts.       IN THE ED:  Temp  97.9 F , HR 59  ,  /58  ,RR 17 , SpO2 97% RA   S/P Rocephin IV x1, Azithro IV x1, 1L Bolus NS IV x1   EKG: Sinus bradycardia w/ 1st deg AV block, VR 51bpm   Labs significant for: H/H 10.5/32.3, BUN/Cr 30/1.9, alk phos 414, UA neg, CRP pending, RVP panel, ESR pending   Imaging: CT lumbar spine:   Findings suspicious for an age-indeterminate sacral insufficiency   fracture at S2. Findings suspicious for severe multilevel lumbar spinal canal   stenosis and severe left neuroforaminal stenosis at L5-S1. ; CXR: patchy right lower lobe opacity?

## 2025-03-09 NOTE — H&P ADULT - PROBLEM SELECTOR PLAN 1
Patient presents w/ weakness likely 2/2 recent UTI and sacral pain  -S/P Rocephin IV x1, Azithro IV x1, 1L Bolus NS IV x1  in ED   - CT lumbar spine:   Findings suspicious for an age-indeterminate sacral insufficiency fracture at S2. Findings suspicious for severe multilevel lumbar spinal canal stenosis and severe left neuroforaminal stenosis at L5-S1  - CXR: patchy right lower lobe opacity? on wet read, f/u official results  - UA neg   - Patient afebrile, no leukocytosis presents, non toxic appearing  - Hold off on antibiotics at this time as patient has no signs/symptoms of infection   - F/u ESR, CRP, RVP panel (pending)  - PT consulted, will appreciate recs  - Ortho consulted, will appreciate recs

## 2025-03-09 NOTE — ED ADULT NURSE NOTE - OBJECTIVE STATEMENT
Pt presents to the ED s/p weakness. Pt has a strong odor to her urine. Pt just completed a course of Macrobid for a UTI. Pt denies fever/ chills.

## 2025-03-09 NOTE — ED PROVIDER NOTE - CLINICAL SUMMARY MEDICAL DECISION MAKING FREE TEXT BOX
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 Velasquez as well as R hip IMN w/ Dr. De Luna in 2023,  pw lethargy for last few days. pt was altered last week and was bay dshe had a uti and started on macrobid but has become more sleepy, no fevesr, chills, nasuea, vomiting. did have talibone pain and went to ortho where she had xray which was normal and has script for MRI.  pt well apeparing, vitals wnl  abd nontender, will check labs, lytes, ua xr

## 2025-03-09 NOTE — H&P ADULT - PROBLEM SELECTOR PLAN 10
Lovenox renally dosed for chemical DVT ppx    #Dispo  - PT consulted, will appreciate recs Chronic  - Continue atorvastatin

## 2025-03-09 NOTE — H&P ADULT - PROBLEM SELECTOR PLAN 2
Patient presents w/ sacral pain  - Denies fall, acute trauma   - CT lumbar spine:   Findings suspicious for an age-indeterminate sacral insufficiency fracture at S2. Findings suspicious for severe multilevel lumbar spinal canal stenosis and severe left neuroforaminal stenosis at L5-S1  - Pain control PRN  - PT consulted, will appreciate recs  - Ortho consulted, will appreciate recs

## 2025-03-09 NOTE — ED ADULT NURSE NOTE - PLAN OF CARE DISCUSSED WITH:
Subjective     Timoteo Wong is a 64 year old male who presents to clinic today for the following health issues:    HPI   Acute Illness   Acute illness concerns: URI  Onset: one month     Fever: no    Chills/Sweats: no    Headache (location?): no    Sinus Pressure: no    Conjunctivitis:  no    Ear Pain: no    Rhinorrhea: YES    Congestion: YES    Sore Throat: no     Cough is improving some. Laryngitis is still present off and on.    Cough: YES-productive of clear sputum    Wheeze: YES    Decreased Appetite: no    Nausea: no    Vomiting: no    Diarrhea:  no    Dysuria/Freq.: no    Fatigue/Achiness: no    Sick/Strep Exposure: no     Therapies Tried and outcome: tessalon- which help, deltasone, zithromax with some relief         Patient Active Problem List   Diagnosis     CARDIOVASCULAR SCREENING; LDL GOAL LESS THAN 160     Obesity     Actinic keratosis     Screening for prostate cancer     Past Surgical History:   Procedure Laterality Date     COLONOSCOPY  3/20/2014    Procedure: COLONOSCOPY;  Colonoscopy;  Surgeon: Jonn Olivares MD;  Location: [facility]     HC NIPPLE EXPLORATION  1970    cyst removal     HC REMOVAL OF TONSILS,<13 Y/O  1964       Social History     Tobacco Use     Smoking status: Never Smoker     Smokeless tobacco: Never Used   Substance Use Topics     Alcohol use: Yes     Frequency: Never     Drinks per session: Patient refused     Binge frequency: Never     Comment: rarely     Family History   Problem Relation Age of Onset     Cerebrovascular Disease Father      Diabetes Father      Asthma Father      Diverticulitis Mother      Mental Illness Sister      Cancer - colorectal No family hx of      Colon Cancer No family hx of          Current Outpatient Medications   Medication Sig Dispense Refill     benzonatate (TESSALON) 200 MG capsule Take 1 capsule (200 mg) by mouth 3 times daily as needed for cough 30 capsule 0     losartan (COZAAR) 50 MG tablet Take 1 tablet (50 mg) by mouth daily 90 tablet 1      methylPREDNISolone (MEDROL DOSEPAK) 4 MG tablet therapy pack Follow Package Directions 21 tablet 0     sildenafil (VIAGRA) 50 MG tablet Take 1 tablet (50 mg) by mouth daily as needed 30 min to 4 hrs before sex. Do not use with nitroglycerin, terazosin or doxazosin. 6 tablet 1     zolpidem (AMBIEN) 10 MG tablet Take tablet by mouth 15 minutes prior to sleep, for Sleep Study 1 tablet 0     Allergies   Allergen Reactions     No Known Drug Allergies      Seasonal Allergies          Reviewed and updated as needed this visit by Provider         Review of Systems   ROS COMP: Constitutional, HEENT, cardiovascular, pulmonary, gi and gu systems are negative, except as otherwise noted.        Objective    BP (!) 142/66 (BP Location: Right arm, Patient Position: Chair, Cuff Size: Adult Large)   Pulse 82   Temp 98.2  F (36.8  C) (Oral)   Wt 120.3 kg (265 lb 3 oz)   SpO2 95%   BMI 35.97 kg/m    Body mass index is 35.97 kg/m .         Physical Exam   GENERAL: healthy, alert and no distress  EYES: Eyes grossly normal to inspection, PERRL and conjunctivae and sclerae normal  HENT: ear canals and TM's normal, nose and mouth without ulcers or lesions  RESP: lungs clear to auscultation - no rales, rhonchi or wheezes  CV: regular rate and rhythm, normal S1 S2, no S3 or S4, no murmur, click or rub, no peripheral edema and peripheral pulses strong  MS: no gross musculoskeletal defects noted, no edema  SKIN: no suspicious lesions or rashes  NEURO: Normal strength and tone, mentation intact and speech normal  PSYCH: mentation appears normal, affect normal/bright  LYMPH: anterior cervical: enlarged tender nodes    Diagnostic Test Results:  none         Assessment & Plan     (R05) Cough present for greater than 3 weeks  (primary encounter diagnosis)    Comment: Try another round of steroid. Will try medrol instead this time. Did not recommend another antibiotic at this time since lungs are clear, x-ray is normal from last visit and  the z pack is still effective.    Plan: methylPREDNISolone (MEDROL DOSEPAK) 4 MG tablet        therapy pack                 Patient Instructions   Take the complete course of the steroid.    Continue to use tessalon perles as needed for cough.    Follow-up if not improving in 1 week or sooner if worsening.            No follow-ups on file.    Yordy Chavez PA-C  [facility]         Patient/Family

## 2025-03-09 NOTE — PATIENT PROFILE ADULT - FALL HARM RISK - HARM RISK INTERVENTIONS

## 2025-03-09 NOTE — H&P ADULT - PROBLEM SELECTOR PLAN 6
Patient with hx of TIA( per son at bedside was ~2017)  - Patient on aspirin and atorvastatin  - Lipid panel from 12/2024 reviewed Alk phos 414, AST 58 on admission  - Will obtain GGT levels, f/u results  - Continue to monitor on daily CMP

## 2025-03-09 NOTE — ED PROVIDER NOTE - OBJECTIVE STATEMENT
89 F hx DM, hip fx, HLD, htn, TIA, BIB daughters due to weakness, fatigue. States ~6 days ago pt had her urine checked bc family noticed she was not acting her usual self. Was started on Macrobid. Since 4 days ago pt with generalized weakness, difficulty getting up in the morning. Pt with low back/pelvic pain, saw dr dawkins, negative xray per family. Denies fall. Has been using bike @ physical therapy recently. Denies f/c, cp, sob, cough, abd pain, urinary complaints, numbness, weakness.    pmd go conklin

## 2025-03-09 NOTE — H&P ADULT - ASSESSMENT
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 Velasquez as well as R hip IMN w/ Dr. De Luna in 2023 presents for weakness and increased lethargy.      IN THE ED:  Temp  97.9 F , HR 59  ,  /58  ,RR 17 , SpO2 97% RA   S/P Rocephin IV x1, Azithro IV x1, 1L Bolus NS IV x1   EKG: Sinus bradycardia w/ 1st deg AV block, VR 51bpm   Labs significant for: H/H 10.5/32.3, BUN/Cr 30/1.9, alk phos 414, UA neg, CRP pending, RVP panel, ESR pending   Imaging: CT lumbar spine:   Findings suspicious for an age-indeterminate sacral insufficiency   fracture at S2. Findings suspicious for severe multilevel lumbar spinal canal   stenosis and severe left neuroforaminal stenosis at L5-S1. ; CXR: patchy right lower lobe opacity?   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 Velasquez as well as R hip IMN w/ Dr. De Luna in 2023 presents for weakness and increased lethargy. Admitted for weakness.

## 2025-03-09 NOTE — CONSULT NOTE ADULT - SUBJECTIVE AND OBJECTIVE BOX
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 Velasquez as well as R hip IMN w/ Dr. De Luna in 2023 presents for weakness and increased lethargy. Daughter at bedside. As per patient, she has been feeling increasingly weak over the last few days. As per daughter, she was "acting weird" and didn't remember certain things before she was brought into the hospital. However, she is now at her normal baseline. Patient had a recent UTI and was treated with 5 day course of macrobid. Patient does not have any urinary symptoms. Patient also has been having sacral/lower back pain since last week and states it started after using the bike during physical therapy. Patient saw her orthopedist on Thursday where an XR was done which had no acute findings. Patient was told to do MRI in 3 weeks if she was still having pain. Patient has been using Tylenol for pain relief intermittently and currently has no pain. Patient denies fevers, chills, body aches, chest pain, palpitations, sob, abdominal pain, n/v, dysuria, urinary frequency Denies trauma/fall, difficulty ambulating No sick contacts.     Ortho consulted for sacral insufficiency fracture.  Patient admits to no recent falls or injuries.  Patient states pain is only when sitting and upon standing.  No difficulties with ambulation.  Pain localized to lower back, no radiating pain, numbness/tingling.     Vitals:  T(C): 36.8 (03-09-25 @ 20:37), Max: 36.8 (03-09-25 @ 12:25)  HR: 59 (03-09-25 @ 20:37) (55 - 59)  BP: 182/64 (03-09-25 @ 20:37) (153/58 - 182/64)  RR: 18 (03-09-25 @ 20:37) (17 - 18)  SpO2: 95% (03-09-25 @ 20:37) (95% - 97%)    PMH:  Pneumonia due to infectious organism  Anesthesia of skin-R20.0  CARPAL TUNNEL SYNDROM,BILATERAL UPPER LIMBS  Presence of left artificial hip joint-Z96.642  Sacrococcygeal disorders, not elsewhere classified-M53.3    FH: asthma (Father)    Handoff    MEWS Score    Hypertension    Diabetes    TIA (transient ischemic attack)    Hip fracture    HLD (hyperlipidemia)    Right lower lobe pneumonia    Weakness    Sacral pain    Recent urinary tract infection    HTN (hypertension)    T2DM (type 2 diabetes mellitus)    HLD (hyperlipidemia)    Chronic kidney disease, unspecified CKD stage    Need for prophylactic measure    Anemia    History of TIAs    Elevated alkaline phosphatase level    No significant past surgical history    S/P ORIF (open reduction internal fixation) fracture    TIRED AND POSSIBLE UTI    76    Weakness    Low back pain of sacral or sacrococcygeal region    [email or web address]      Exam:  Gen: NAD, Resting comfortably    BL LE:  Skin intact, no erythema or ecchymosis  Externally and shortened leg  TTP by hip, nowhere else along RLE  Motor: + EHL/FHL/TA/GSC  +SILT: SPN/DPN/Yury/Saph/Tib  + DP  Compartments soft and compressible  No calf tenderness    Secondary Assessment:  TTP at sacrum  NC/AT, NTTP of clavicles, NTTP of C-,T -Spine,  UEs: NTTP of Shoulders, Elbows, Wrists, Hands; NT with AROM/PROM of Shoulders, Elbows, Wrists, Hands; AIN/PIN/Med/Uln/Msc/Rad/Ax intact  L/R LE: Able to SLR, NT with Log Roll, NT with Heel Strike, NTTP of Hip, Knee, Ankle, foot; NT with AROM/PROM of Hip, Knee, Ankle, foot; Q/H/Gsc/TA/EHL/FHL intact    Imaging:  < from: CT Lumbar Spine No Cont (03.09.25 @ 16:41) >  FINDINGS:    Osteopenia. Osteoarthritis of the sacroiliac joints. Severe right hip   osteoarthritis. Partially visualized postsurgical changes of the hips.    There are 5 lumbar type vertebral bodies. Moderate lumbar dextroscoliosis   with an apex near L1-L2. Mild right lateral subluxation of L2 on L3. Very   mild right lateral subluxation of T12 on L1. Grade 2 anterolisthesis of   L5 on S1. Mild retrolisthesis of L2 on L3. Mild retrolisthesis of L1 on   L2. Very mild retrolisthesis of T12 on L1. Lumbar alignment otherwise  maintained. Lumbar vertebral body heights maintained. No acute fracture   of the lumbar spine. Multilevel endplate degenerative changes.    There is angulation of the anterior cortex at S2, suspicious for sacral   insufficiency fracture.    Multilevel lumbar spondylosis with findings suspicious for severe spinal   canal stenosis at L1-L2, L2-L3, L3-L4, L4-L5, L5-S1. Severe left   neuroforaminal stenosis at L5-S1..    Paraspinal Tissues: Partially visualized small right pleural effusion.   Patchy partially visualized right lower lobe opacities. Atrophy of the   lower dorsal paraspinal musculature. Severe atherosclerotic disease.   Colonic diverticulosis. Incompletely evaluated but statistically likely   benign left renal cyst. Punctate nonobstructive renal collecting system   calculi versus atherosclerotic disease.  ______________________  IMPRESSION:  1.  Findings suspicious for an age-indeterminate sacral insufficiency   fracture at S2. Consider further evaluation with MR as clinically  indicated.  2.  No acute osseous abnormality of the lumbar spine.  3.  Findings suspicious for severe multilevel lumbar spinal canal   stenosis and severe left neuroforaminal stenosis at L5-S1.  4.  Partially visualized right pleural effusion and patchy right lower   lobe opacities.    < end of copied text >

## 2025-03-09 NOTE — CONSULT NOTE ADULT - ASSESSMENT
A/P: 89yFemale with an age-indeterminate sacral insufficiency fracture at S2  -Recommend MRI of Lumbar/Sacrum  -Analgesia   -PT/OT: WBAT with assistive devices  -No surgical interventions at this time  -Will continue to follow  -Discussed with Dr. De Luna who is in agreement with above plan    Ananda Alegria PA-C  Orthopedic Surgery  Pager #[phone]

## 2025-03-09 NOTE — H&P ADULT - NSHPOUTPATIENTPROVIDERS_GEN_ALL_CORE
Form completed and sent to Physician's Office electronically via [employer] messaging for review and signature.     Completed form will be faxed to [employer] at [phone]   PCP: Dr. Luz Pena PCP: Dr. Luz Pena  Ortho: Dr. De Luna

## 2025-03-09 NOTE — ED ADULT NURSE NOTE - NSFALLHARMRISKINTERV_ED_ALL_ED

## 2025-03-10 ENCOUNTER — TRANSCRIPTION ENCOUNTER (OUTPATIENT)
Age: 89
End: 2025-03-10

## 2025-03-10 LAB
A1C WITH ESTIMATED AVERAGE GLUCOSE RESULT: 6.6 % — HIGH (ref 4–5.6)
ALBUMIN SERPL ELPH-MCNC: 2.4 G/DL — LOW (ref 3.3–5)
ALP SERPL-CCNC: 373 U/L — HIGH (ref 40–120)
ALT FLD-CCNC: 51 U/L — SIGNIFICANT CHANGE UP (ref 12–78)
ANION GAP SERPL CALC-SCNC: 6 MMOL/L — SIGNIFICANT CHANGE UP (ref 5–17)
AST SERPL-CCNC: 49 U/L — HIGH (ref 15–37)
BASOPHILS # BLD AUTO: 0.03 K/UL — SIGNIFICANT CHANGE UP (ref 0–0.2)
BASOPHILS NFR BLD AUTO: 0.4 % — SIGNIFICANT CHANGE UP (ref 0–2)
BILIRUB SERPL-MCNC: 0.4 MG/DL — SIGNIFICANT CHANGE UP (ref 0.2–1.2)
BUN SERPL-MCNC: 27 MG/DL — HIGH (ref 7–23)
CALCIUM SERPL-MCNC: 9.2 MG/DL — SIGNIFICANT CHANGE UP (ref 8.5–10.1)
CHLORIDE SERPL-SCNC: 106 MMOL/L — SIGNIFICANT CHANGE UP (ref 96–108)
CO2 SERPL-SCNC: 27 MMOL/L — SIGNIFICANT CHANGE UP (ref 22–31)
CREAT SERPL-MCNC: 1.7 MG/DL — HIGH (ref 0.5–1.3)
CRP SERPL-MCNC: 111 MG/L — HIGH
EGFR: 28 ML/MIN/1.73M2 — LOW
EGFR: 28 ML/MIN/1.73M2 — LOW
EOSINOPHIL # BLD AUTO: 0.74 K/UL — HIGH (ref 0–0.5)
EOSINOPHIL NFR BLD AUTO: 9.6 % — HIGH (ref 0–6)
ESTIMATED AVERAGE GLUCOSE: 143 MG/DL — HIGH (ref 68–114)
GGT SERPL-CCNC: 226 U/L — HIGH (ref 8–40)
GLUCOSE SERPL-MCNC: 129 MG/DL — HIGH (ref 70–99)
HCT VFR BLD CALC: 28.9 % — LOW (ref 34.5–45)
HGB BLD-MCNC: 9.5 G/DL — LOW (ref 11.5–15.5)
IMM GRANULOCYTES NFR BLD AUTO: 0.4 % — SIGNIFICANT CHANGE UP (ref 0–0.9)
LYMPHOCYTES # BLD AUTO: 1.68 K/UL — SIGNIFICANT CHANGE UP (ref 1–3.3)
LYMPHOCYTES # BLD AUTO: 21.8 % — SIGNIFICANT CHANGE UP (ref 13–44)
MCHC RBC-ENTMCNC: 26.8 PG — LOW (ref 27–34)
MCHC RBC-ENTMCNC: 32.9 G/DL — SIGNIFICANT CHANGE UP (ref 32–36)
MCV RBC AUTO: 81.6 FL — SIGNIFICANT CHANGE UP (ref 80–100)
MONOCYTES # BLD AUTO: 0.93 K/UL — HIGH (ref 0–0.9)
MONOCYTES NFR BLD AUTO: 12 % — SIGNIFICANT CHANGE UP (ref 2–14)
NEUTROPHILS # BLD AUTO: 4.31 K/UL — SIGNIFICANT CHANGE UP (ref 1.8–7.4)
NEUTROPHILS NFR BLD AUTO: 55.8 % — SIGNIFICANT CHANGE UP (ref 43–77)
NRBC BLD AUTO-RTO: 0 /100 WBCS — SIGNIFICANT CHANGE UP (ref 0–0)
PLATELET # BLD AUTO: 207 K/UL — SIGNIFICANT CHANGE UP (ref 150–400)
POTASSIUM SERPL-MCNC: 3.4 MMOL/L — LOW (ref 3.5–5.3)
POTASSIUM SERPL-SCNC: 3.4 MMOL/L — LOW (ref 3.5–5.3)
PROT SERPL-MCNC: 6.5 G/DL — SIGNIFICANT CHANGE UP (ref 6–8.3)
RBC # BLD: 3.54 M/UL — LOW (ref 3.8–5.2)
RBC # FLD: 14.6 % — HIGH (ref 10.3–14.5)
SODIUM SERPL-SCNC: 139 MMOL/L — SIGNIFICANT CHANGE UP (ref 135–145)
WBC # BLD: 7.72 K/UL — SIGNIFICANT CHANGE UP (ref 3.8–10.5)
WBC # FLD AUTO: 7.72 K/UL — SIGNIFICANT CHANGE UP (ref 3.8–10.5)

## 2025-03-10 PROCEDURE — 99232 SBSQ HOSP IP/OBS MODERATE 35: CPT

## 2025-03-10 PROCEDURE — 72148 MRI LUMBAR SPINE W/O DYE: CPT | Mod: 26

## 2025-03-10 RX ADMIN — Medication 650 MILLIGRAM(S): at 10:10

## 2025-03-10 RX ADMIN — Medication 325 MILLIGRAM(S): at 12:26

## 2025-03-10 RX ADMIN — Medication 81 MILLIGRAM(S): at 05:06

## 2025-03-10 RX ADMIN — ENOXAPARIN SODIUM 30 MILLIGRAM(S): 100 INJECTION SUBCUTANEOUS at 22:09

## 2025-03-10 RX ADMIN — FUROSEMIDE 20 MILLIGRAM(S): 10 INJECTION INTRAMUSCULAR; INTRAVENOUS at 05:05

## 2025-03-10 RX ADMIN — Medication 650 MILLIGRAM(S): at 11:10

## 2025-03-10 RX ADMIN — Medication 650 MILLIGRAM(S): at 17:15

## 2025-03-10 RX ADMIN — LOSARTAN POTASSIUM 50 MILLIGRAM(S): 100 TABLET, FILM COATED ORAL at 05:06

## 2025-03-10 RX ADMIN — Medication 650 MILLIGRAM(S): at 18:15

## 2025-03-10 RX ADMIN — ATORVASTATIN CALCIUM 40 MILLIGRAM(S): 80 TABLET, FILM COATED ORAL at 22:09

## 2025-03-10 NOTE — PHYSICAL THERAPY INITIAL EVALUATION ADULT - ADDITIONAL COMMENTS
Pt reports she lives in [facility], where she has no stairs to negotiate, has aide from 7:30am-2:30pm daily who assists in ADLs, uses RW to ambulate, PT and OT services PTA.

## 2025-03-10 NOTE — DISCHARGE NOTE PROVIDER - CARE PROVIDERS DIRECT ADDRESSES
[email or web address]

## 2025-03-10 NOTE — GOALS OF CARE CONVERSATION - ADVANCED CARE PLANNING - CONVERSATION DETAILS
STARS- Met with patient at bedside along with amos Galvan by phone. Reviewed patient's medical and social history as well as events leading to patient's hospitalization. Writer discussed patient's current diagnosis (weakness, PNA, UTI, DM, Hip FX, HLD, HTN, TIA), medical condition and management. Inquired about advanced directives. Patient and family report, patient has a HCP, POA and Living Will and daughter Mandy is primary health care agent and daughter Akua is alternate agent. Inquired about patient's wishes regarding extent of medical care to be provided including  thoughts regarding cardiopulmonary resuscitation and mechanical ventilation. Discussed when CPR occurs and possible outcomes. Patient states her wishes are for CPR however states she would not want to live on machines long term. All questions answered.

## 2025-03-10 NOTE — DISCHARGE NOTE PROVIDER - CARE PROVIDER_API CALL
Ananda De Luna  Joint Reconstruction  [address], [address]  [place] [address]  Phone: [phone]  Fax: [phone]  Follow Up Time:     Dc Tilley  Orthopaedic Surgery  [address] [place] [address]  Phone: [phone]  Fax: [phone]  Follow Up Time:    Ananda De Luna  Joint Reconstruction  [address], [address]  [place] [address]  Phone: [phone]  Fax: [phone]  Established Patient  Follow Up Time:     Dc Tilley  Orthopaedic Surgery  [address] [place] [address]  Phone: [phone]  Fax: [phone]  Follow Up Time:     JUNIOR MELVIN  [address] [place] [address]  Phone: [phone]  Fax: [phone]  Established Patient  Follow Up Time: 1 week

## 2025-03-10 NOTE — PROGRESS NOTE ADULT - SUBJECTIVE AND OBJECTIVE BOX
Patient is a 89y old  Female who presents with a chief complaint of Weakness (10 Mar 2025 13:08)      INTERVAL HPI/OVERNIGHT EVENTS: Patient seen and examined at bedside. No overnight events occurred. Patient has no complaints at this time. Denies fevers, chills, headache, lightheadedness, chest pain, dyspnea, abdominal pain, n/v/d/c.    MEDICATIONS  (STANDING):  aspirin enteric coated 81 milliGRAM(s) Oral <User Schedule>  atorvastatin 40 milliGRAM(s) Oral at bedtime  dextrose 5%. 1000 milliLiter(s) (50 mL/Hr) IV Continuous <Continuous>  dextrose 5%. 1000 milliLiter(s) (100 mL/Hr) IV Continuous <Continuous>  dextrose 50% Injectable 25 Gram(s) IV Push once  dextrose 50% Injectable 12.5 Gram(s) IV Push once  dextrose 50% Injectable 25 Gram(s) IV Push once  enoxaparin Injectable 30 milliGRAM(s) SubCutaneous every 24 hours  famotidine    Tablet 10 milliGRAM(s) Oral every 48 hours  ferrous    sulfate 325 milliGRAM(s) Oral daily  furosemide    Tablet 20 milliGRAM(s) Oral daily  glucagon  Injectable 1 milliGRAM(s) IntraMuscular once  insulin lispro (ADMELOG) corrective regimen sliding scale   SubCutaneous three times a day before meals  insulin lispro (ADMELOG) corrective regimen sliding scale   SubCutaneous at bedtime  losartan 50 milliGRAM(s) Oral daily  metoprolol succinate  milliGRAM(s) Oral daily    MEDICATIONS  (PRN):  acetaminophen     Tablet .. 650 milliGRAM(s) Oral every 6 hours PRN Mild Pain (1 - 3)  dextrose Oral Gel 15 Gram(s) Oral once PRN Blood Glucose LESS THAN 70 milliGRAM(s)/deciliter      Allergies    No Known Allergies    Intolerances        REVIEW OF SYSTEMS:  CONSTITUTIONAL: No fever or chills  HEENT:  No headache, no sore throat  RESPIRATORY: No cough, wheezing, or shortness of breath  CARDIOVASCULAR: No chest pain, palpitations  GASTROINTESTINAL: No abd pain, nausea, vomiting, or diarrhea  GENITOURINARY: No dysuria, frequency, or hematuria  NEUROLOGICAL: no focal weakness or dizziness  MUSCULOSKELETAL:admits sacral pain     Vital Signs Last 24 Hrs  T(C): 36.8 (10 Mar 2025 12:23), Max: 37.1 (09 Mar 2025 22:28)  T(F): 98.2 (10 Mar 2025 12:23), Max: 98.8 (09 Mar 2025 22:28)  HR: 61 (10 Mar 2025 12:23) (52 - 64)  BP: 165/72 (10 Mar 2025 12:23) (146/64 - 182/64)  BP(mean): --  RR: 18 (10 Mar 2025 12:23) (17 - 18)  SpO2: 92% (10 Mar 2025 12:23) (92% - 97%)    Parameters below as of 10 Mar 2025 12:23  Patient On (Oxygen Delivery Method): room air        PHYSICAL EXAM:  GENERAL: NAD  HEENT:  anicteric, moist mucous membranes  CHEST/LUNG:  CTA b/l, no rales, wheezes, or rhonchi  HEART:  RRR, S1, S2  ABDOMEN:  BS+, soft, nontender, nondistended  EXTREMITIES: no edema, cyanosis, or calf tenderness  NERVOUS SYSTEM: answers questions and follows commands appropriately    LABS:                        9.5    7.72  )-----------( 207      ( 10 Mar 2025 07:20 )             28.9     CBC Full  -  ( 10 Mar 2025 07:20 )  WBC Count : 7.72 K/uL  Hemoglobin : 9.5 g/dL  Hematocrit : 28.9 %  Platelet Count - Automated : 207 K/uL  Mean Cell Volume : 81.6 fl  Mean Cell Hemoglobin : 26.8 pg  Mean Cell Hemoglobin Concentration : 32.9 g/dL  Auto Neutrophil # : x  Auto Lymphocyte # : x  Auto Monocyte # : x  Auto Eosinophil # : x  Auto Basophil # : x  Auto Neutrophil % : x  Auto Lymphocyte % : x  Auto Monocyte % : x  Auto Eosinophil % : x  Auto Basophil % : x    10 Mar 2025 07:20    139    |  106    |  27     ----------------------------<  129    3.4     |  27     |  1.70     Ca    9.2        10 Mar 2025 07:20    TPro  6.5    /  Alb  2.4    /  TBili  0.4    /  DBili  x      /  AST  49     /  ALT  51     /  AlkPhos  373    10 Mar 2025 07:20      Urinalysis Basic - ( 10 Mar 2025 07:20 )    Color: x / Appearance: x / SG: x / pH: x  Gluc: 129 mg/dL / Ketone: x  / Bili: x / Urobili: x   Blood: x / Protein: x / Nitrite: x   Leuk Esterase: x / RBC: x / WBC x   Sq Epi: x / Non Sq Epi: x / Bacteria: x      CAPILLARY BLOOD GLUCOSE      POCT Blood Glucose.: 141 mg/dL (10 Mar 2025 12:12)  POCT Blood Glucose.: 147 mg/dL (10 Mar 2025 08:20)  POCT Blood Glucose.: 245 mg/dL (09 Mar 2025 22:51)        Urinalysis with Rflx Culture (collected 03-09-25 @ 13:00)        RADIOLOGY & ADDITIONAL TESTS:    Personally reviewed.     Consultant(s) Notes Reviewed:  [x] YES  [ ] NO

## 2025-03-10 NOTE — DISCHARGE NOTE PROVIDER - NSDCCPCAREPLAN_GEN_ALL_CORE_FT
PRINCIPAL DISCHARGE DIAGNOSIS  Diagnosis: Right lower lobe pneumonia  Assessment and Plan of Treatment:       SECONDARY DISCHARGE DIAGNOSES  Diagnosis: Weakness  Assessment and Plan of Treatment:     Diagnosis: Low back pain of sacral or sacrococcygeal region  Assessment and Plan of Treatment:     Diagnosis: Chronic kidney disease, unspecified CKD stage  Assessment and Plan of Treatment:     Diagnosis: T2DM (type 2 diabetes mellitus)  Assessment and Plan of Treatment:      PRINCIPAL DISCHARGE DIAGNOSIS  Diagnosis: Weakness  Assessment and Plan of Treatment:       SECONDARY DISCHARGE DIAGNOSES  Diagnosis: HTN (hypertension)  Assessment and Plan of Treatment:     Diagnosis: Low back pain of sacral or sacrococcygeal region  Assessment and Plan of Treatment:     Diagnosis: Chronic kidney disease, unspecified CKD stage  Assessment and Plan of Treatment:     Diagnosis: T2DM (type 2 diabetes mellitus)  Assessment and Plan of Treatment:      PRINCIPAL DISCHARGE DIAGNOSIS  Diagnosis: Weakness  Assessment and Plan of Treatment: You presented with weakness and lethargy suspected to be related to recent UTI and sacral pain. You were seen by physical therapy and recommended for home PT. There was no active infection noted in this hospitalization. Your blood pressure medications were adjusted to see if this helps with your level of alertness.      SECONDARY DISCHARGE DIAGNOSES  Diagnosis: HTN (hypertension)  Assessment and Plan of Treatment: You had uncontrolled blood pressure in the hospital. Your toprol XL was decreased due to some slow heart rates. Amlodipine was added for BP control. Please continue:  lasix 20mg daily  amlodipine 5mg daily  toprol xl 50mg daily.  Follow up with PCP and cardiology    Diagnosis: Low back pain of sacral or sacrococcygeal region  Assessment and Plan of Treatment: You were seen by orthopedics for L4 vertebral compression fracture. They recommended no intervention or brace. Pain control with lidocaine patch and tylenol as needed. Follow up with Dr. Tilley for further management.  MRI showed:  1.  Acute compression fracture of the L4 vertebra, with mild loss of   vertebral body height.  2.  Chronic healed compression deformity in the sacrum.  3.  Chronic L5-S1 anterolisthesis, with moderate to severe bilateral   foraminal narrowing.  4.  Moderate to severe L2-3 central canal stenosis, with moderate   narrowing at L1-2 and L5-S1.    Diagnosis: Chronic kidney disease, unspecified CKD stage  Assessment and Plan of Treatment: You have CKD stage 3. Your creatinine remained stable at ~1.7.    Diagnosis: T2DM (type 2 diabetes mellitus)  Assessment and Plan of Treatment: Please resume your home Januvia.

## 2025-03-10 NOTE — CAREGIVER ENGAGEMENT NOTE - CAREGIVER OUTREACH NOTES - FREE TEXT
Met patient and daughter Oneida at bedside and daughter Mandy via telephone.  Explained role of CM, verbalized understanding. Pt was made aware a CM will remain available through hospitalization.  Contact information given in discharge/ transitions resource folder. A CM remains available throughout stay

## 2025-03-10 NOTE — PHYSICAL THERAPY INITIAL EVALUATION ADULT - BALANCE DISTURBANCE, IDENTIFIED IMPAIRMENT CONTRIBUTE, REHAB EVAL
impaired postural control/decreased strength Partial Purse String (Simple) Text: Given the location of the defect and the characteristics of the surrounding skin a simple purse string closure was deemed most appropriate.  Undermining was performed circumfirentially around the surgical defect.  A purse string suture was then placed and tightened. Wound tension only allowed a partial closure of the circular defect.

## 2025-03-10 NOTE — PROGRESS NOTE ADULT - SUBJECTIVE AND OBJECTIVE BOX
Pt seen and examined at bedside. No acute events overnight. Pain controlled, states that back pain has been present for weeks and minimal at this time, denies any numbness or tingling. Denies nausea, vomiting, chest pain, or shortness of breath.         LABS:                        9.5    7.72  )-----------( 207      ( 10 Mar 2025 07:20 )             28.9     03-10    139  |  106  |  27[H]  ----------------------------<  129[H]  3.4[L]   |  27  |  1.70[H]    Ca    9.2      10 Mar 2025 07:20  Mg     2.0     03-09    TPro  6.5  /  Alb  2.4[L]  /  TBili  0.4  /  DBili  x   /  AST  49[H]  /  ALT  51  /  AlkPhos  373[H]  03-10      Urinalysis Basic - ( 10 Mar 2025 07:20 )    Color: x / Appearance: x / SG: x / pH: x  Gluc: 129 mg/dL / Ketone: x  / Bili: x / Urobili: x   Blood: x / Protein: x / Nitrite: x   Leuk Esterase: x / RBC: x / WBC x   Sq Epi: x / Non Sq Epi: x / Bacteria: x        Urinalysis with Rflx Culture (collected 03-09-25 @ 13:00)      VITAL SIGNS:  T(C): 36.9 (03-10-25 @ 04:27), Max: 37.1 (03-09-25 @ 22:28)  HR: 52 (03-10-25 @ 04:27) (52 - 64)  BP: 152/50 (03-10-25 @ 04:27) (146/64 - 182/64)  RR: 18 (03-10-25 @ 04:27) (17 - 18)  SpO2: 93% (03-10-25 @ 04:27) (93% - 97%)        EXAM:  Gen: lying comfortably, in NAD    Motor:                   C5                C6              C7               C8           T1   R            5/5                5/5            5/5             5/5          5/5  L             5/5               5/5             5/5             5/5          5/5                L2             L3             L4               L5            S1  R         5/5           5/5          5/5             5/5           5/5  L          5/5          5/5           5/5             5/5           5/5    Sensory:            C5         C6         C7      C8       T1        (0=absent, 1=impaired, 2=normal, NT=not testable)  R         2            2           2        2         2  L          2            2           2        2         2               L2          L3         L4      L5       S1         (0=absent, 1=impaired, 2=normal, NT=not testable)  R         2            2            2        2        2  L          2            2           2        2         2    Negative Dejesus, Babinski, and clonus bilaterally  Radial 2+ bilaterally  DP 2+ bilaterally   Able to SLR BLLE          A/P:  89yFemale with 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 De Luna and will update with any further recommendations  Ortho to sign off, please reconsult us with any concerns  Pt seen and examined at bedside. No acute events overnight. Pain controlled, states that back pain has been present for weeks and minimal at this time, denies any numbness or tingling. Denies nausea, vomiting, chest pain, or shortness of breath.         LABS:                        9.5    7.72  )-----------( 207      ( 10 Mar 2025 07:20 )             28.9     03-10    139  |  106  |  27[H]  ----------------------------<  129[H]  3.4[L]   |  27  |  1.70[H]    Ca    9.2      10 Mar 2025 07:20  Mg     2.0     03-09    TPro  6.5  /  Alb  2.4[L]  /  TBili  0.4  /  DBili  x   /  AST  49[H]  /  ALT  51  /  AlkPhos  373[H]  03-10      Urinalysis Basic - ( 10 Mar 2025 07:20 )    Color: x / Appearance: x / SG: x / pH: x  Gluc: 129 mg/dL / Ketone: x  / Bili: x / Urobili: x   Blood: x / Protein: x / Nitrite: x   Leuk Esterase: x / RBC: x / WBC x   Sq Epi: x / Non Sq Epi: x / Bacteria: x        Urinalysis with Rflx Culture (collected 03-09-25 @ 13:00)      VITAL SIGNS:  T(C): 36.9 (03-10-25 @ 04:27), Max: 37.1 (03-09-25 @ 22:28)  HR: 52 (03-10-25 @ 04:27) (52 - 64)  BP: 152/50 (03-10-25 @ 04:27) (146/64 - 182/64)  RR: 18 (03-10-25 @ 04:27) (17 - 18)  SpO2: 93% (03-10-25 @ 04:27) (93% - 97%)        EXAM:  Gen: lying comfortably, in NAD    Motor:                   C5                C6              C7               C8           T1   R            5/5                5/5            5/5             5/5          5/5  L             5/5               5/5             5/5             5/5          5/5                L2             L3             L4               L5            S1  R         5/5           5/5          5/5             5/5           5/5  L          5/5          5/5           5/5             5/5           5/5    Sensory:            C5         C6         C7      C8       T1        (0=absent, 1=impaired, 2=normal, NT=not testable)  R         2            2           2        2         2  L          2            2           2        2         2               L2          L3         L4      L5       S1         (0=absent, 1=impaired, 2=normal, NT=not testable)  R         2            2            2        2        2  L          2            2           2        2         2    Negative Dejesus, Babinski, and clonus bilaterally  Radial 2+ bilaterally  DP 2+ bilaterally   Able to SLR BLLE          A/P:  89yFemale with S2 anterior cortex insufficiency fracture 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 De Luna after discharge in his outpatient clinic    Discussed with Dr. De Luna and will update with any further recommendations  Ortho to sign off, please reconsult us with any concerns

## 2025-03-10 NOTE — DISCHARGE NOTE PROVIDER - NSDCFUSCHEDAPPT_GEN_ALL_CORE_FT
Ananda De Luna  [facility]  ORTHOSURG [address] [facility]  Scheduled Appointment: 03/27/2025

## 2025-03-10 NOTE — CARE COORDINATION ASSESSMENT. - OTHER PERTINENT DISCHARGE PLANNING INFORMATION:
Met patient and daughter Oneida at bedside.  Oneida called sister Mandy [phone] who is the healthcare proxy. Patient gave CM permission to speak in the presence of daughters. Explained role of CM, verbalized understanding. Pt was made aware a CM will remain available through hospitalization.  Contact information given in discharge/ transitions resource folder. Patient resides in [facility], has [facility] [phone] fax [phone], PCP Jean Escobar. Receiving PT/OT w/[facility]. Patient reports she ambulates w/walker, has Medicaid aides 7hrs/7d w/[facility], coordinator is Miriam [phone] ext [phone], [facility] Plan [phone]

## 2025-03-10 NOTE — DISCHARGE NOTE PROVIDER - PROVIDER TOKENS
Not applicable PROVIDER:[TOKEN:[[id number]]],PROVIDER:[TOKEN:[[id number]]] PROVIDER:[TOKEN:[[id number]],ESTABLISHEDPATIENT:[T]],PROVIDER:[TOKEN:[[id number]]],PROVIDER:[TOKEN:[[id number]],FOLLOWUP:[1 week],ESTABLISHEDPATIENT:[T]]

## 2025-03-10 NOTE — DISCHARGE NOTE PROVIDER - NSDCMRMEDTOKEN_GEN_ALL_CORE_FT
aspirin 81 mg oral delayed release tablet: 1 tab(s) orally every other day  atorvastatin 40 mg oral tablet: 1 tab(s) orally once a day (at bedtime)  famotidine 20 mg oral tablet: 1 tab(s) orally once a day  ferrous sulfate 324 mg (65 mg elemental iron) oral tablet: 1 tab(s) orally once a day  furosemide 20 mg oral tablet: 1 tab(s) orally once a day  Januvia 25 mg oral tablet: 1 tab(s) orally once a day  losartan 50 mg oral tablet: 1 tab(s) orally once a day  Metoprolol Succinate  mg oral tablet, extended release: 1 tab(s) orally once a day   aspirin 81 mg oral delayed release tablet: 1 tab(s) orally every other day  atorvastatin 40 mg oral tablet: 1 tab(s) orally once a day (at bedtime)  famotidine 20 mg oral tablet: 1 tab(s) orally once a day  ferrous sulfate 324 mg (65 mg elemental iron) oral tablet: 1 tab(s) orally once a day  furosemide 20 mg oral tablet: 1 tab(s) orally once a day  Januvia 25 mg oral tablet: 1 tab(s) orally once a day  losartan 50 mg oral tablet: 1 tab(s) orally once a day   amLODIPine 5 mg oral tablet: 1 tab(s) orally once a day  aspirin 81 mg oral delayed release tablet: 1 tab(s) orally every other day  atorvastatin 40 mg oral tablet: 1 tab(s) orally once a day (at bedtime)  famotidine 20 mg oral tablet: 1 tab(s) orally once a day  ferrous sulfate 324 mg (65 mg elemental iron) oral tablet: 1 tab(s) orally once a day  furosemide 20 mg oral tablet: 1 tab(s) orally once a day  Januvia 25 mg oral tablet: 1 tab(s) orally once a day  lidocaine 4% topical film: Apply topically to affected area once a day as needed for pain  losartan 50 mg oral tablet: 1 tab(s) orally once a day  metoprolol succinate 50 mg oral tablet, extended release: 1 tab(s) orally once a day

## 2025-03-10 NOTE — PHYSICAL THERAPY INITIAL EVALUATION ADULT - PERTINENT HX OF CURRENT PROBLEM, REHAB EVAL
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 Velasquez as well as R hip IMN w/ Dr. De Luna in 2023 presents for weakness and increased lethargy. Daughter at bedside. As per patient, she has been feeling increasingly weak over the last few days. As per daughter, she was "acting weird" and didn't remember certain things before she was brought into the hospital. However, she is now at her normal baseline. Patient had a recent UTI and was treated with 5 day course of macrobid. Patient does not have any urinary symptoms. Patient also has been having sacral/lower back pain since last week and states it started after using the bike during physical therapy. Patient saw her orthopedist on Thursday where an XR was done which had no acute findings. Patient was told to do MRI in 3 weeks if she was still having pain. Patient has been using Tylenol for pain relief intermittently and currently has no pain. Patient denies fevers, chills, body aches, chest pain, palpitations, sob, abdominal pain, n/v, dysuria, urinary frequency Denies trauma/fall, difficulty ambulating No sick contacts.

## 2025-03-10 NOTE — CARE COORDINATION ASSESSMENT. - NSCAREPROVIDERS_GEN_ALL_CORE_FT
CARE PROVIDERS:  Accepting Physician: Erika Bermudez  Access Services: Adeola Nieves  Administration: Jeremiah Rodriguez  Administration: Adiel Lira  Admitting: Erika Bermudez  Attending: Negin Tamayo  Case Management: Delgado Morales  Consultant: Fermín Thompson  Consultant: Ananda Alegria  Covering Team: Anthony Salcedo  ED ACP: Swati Fletcher  ED Attending: Sherry Cooper ED Nurse: Vonda Garcia  Nurse: Vonda aGrcia  Nurse: Maria Guadalupe Weiss  Nurse: Rossy Kemp  Nurse: Andreia Patel  Nurse: Rosalind Serrano  Nurse: Emily Caicedo  Nurse: Marta Ureña  Ordered: Doctor, Unknown  Outpatient Provider: Abby Terrell  Override: Diamond Ochoa  Override: Emily Caicedo  Override: Rossy Kemp  PCA/Nursing Assistant: Yolis Miller  PCA/Nursing Assistant: Caron Carrillo  Physical Therapy: Luma Alexandra  Physical Therapy: Newton Chacon  Primary Team: Fritz Capps  Primary Team: Erika Bermudez  Primary Team: Deepti Farr  Primary Team: Negin Tamayo  Primary Team: Sarah Evans  Primary Team: Ivelisse Mauricio  Research: Alexandrea Hines  : Katiana Metcalf  : Kenyatta Maza  Team: [facility], Team  Team: [facility], Team  UR// Supp. Assoc.: Sharmila Cary  UR// Supp. Assoc.: Minnie Palomares   CARE PROVIDERS:  Accepting Physician: Erika Bermudez  Administration: Adiel Lira  Administration: Jeremiah Rodirguez  Admitting: Erika Bermudez  Attending: Negin Tamayo  Case Management: Delgado Morales  Consultant: Fermín Thmopson  Consultant: Ananda Alegria  Covering Team: Anthony Salcedo  ED ACP: Swati Fletcher  ED Attending: Sherry Cooper ED Nurse: Vonda Garcia  Nurse: Vonda Garcia  Nurse: Tomeka Forman  Nurse: Marta Ureña  Nurse: Rossy Kemp  Nurse: Andreia Patel  Ordered: Doctor, Unknown  Outpatient Provider: Abby Terrell  Override: Tomeka Forman  Override: Diamond Ochoa  Override: Emily Caicedo  Override: Rossy Kemp  PCA/Nursing Assistant: Yolis Miller  PCA/Nursing Assistant: Caron Carrillo  Physical Therapy: Luma Alexandra  Physical Therapy: Newton Chacon  Primary Team: Fritz Capps  Primary Team: Erika Bermudez  Primary Team: Ivelisse Mauricio  Primary Team: Sarah Evans  Primary Team: Deepti Farr  Primary Team: Negin Tamayo  Research: Alexandrea Hines  : Kenyatta Maza  : Katiana Metcalf  Team: [facility] [employer] T[facility], Team  Team: [facility], Team  UR// Supp. Assoc.: Sharmila Cary

## 2025-03-10 NOTE — PATIENT CHOICE NOTE. - NSPTCHOICESTATE_GEN_ALL_CORE

## 2025-03-10 NOTE — DISCHARGE NOTE PROVIDER - HOSPITAL COURSE
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 Velasquez as well as R hip IMN w/ Dr. De Luna in 2023 presents for weakness and increased lethargy. Daughter at bedside. As per patient, she has been feeling increasingly weak over the last few days. As per daughter, she was "acting weird" and didn't remember certain things before she was brought into the hospital. However, she is now at her normal baseline. Patient had a recent UTI and was treated with 5 day course of macrobid. Patient does not have any urinary symptoms. Patient also has been having sacral/lower back pain since last week and states it started after using the bike during physical therapy. Patient saw her orthopedist on Thursday where an XR was done which had no acute findings. Patient was told to do MRI in 3 weeks if she was still having pain. Patient has been using Tylenol for pain relief intermittently and currently has no pain. Patient denies fevers, chills, body aches, chest pain, palpitations, sob, abdominal pain, n/v, dysuria, urinary frequency Denies trauma/fall, difficulty ambulating No sick contacts.       IN THE ED:  Temp  97.9 F , HR 59  ,  /58  ,RR 17 , SpO2 97% RA   S/P Rocephin IV x1, Azithro IV x1, 1L Bolus NS IV x1   EKG: Sinus bradycardia w/ 1st deg AV block, VR 51bpm   Labs significant for: H/H 10.5/32.3, BUN/Cr 30/1.9, alk phos 414, UA neg, CRP pending, RVP panel, ESR pending   Imaging: CT lumbar spine:   Findings suspicious for an age-indeterminate sacral insufficiency   fracture at S2. Findings suspicious for severe multilevel lumbar spinal canal   stenosis and severe left neuroforaminal stenosis at L5-S1. ; CXR: patchy right lower lobe opacity? (09 Mar 2025 19:30)      ---  HOSPITAL COURSE: Patient admitted to medicine floor for management of     Pt seen and examined on day of discharge. Patient is medically optimized for discharge to home with close outpatient followup.    PHYSICAL EXAM ON DAY OF DISCHARGE:        ---  CONSULTANTS:     ---  TIME SPENT:  I, the attending physician, was physically present for the key portions of the evaluation and management (E/M) service provided. 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 Velasquez as well as R hip IMN w/ Dr. De Luna in 2023 presents for weakness and increased lethargy. Daughter at bedside. As per patient, she has been feeling increasingly weak over the last few days. As per daughter, she was "acting weird" and didn't remember certain things before she was brought into the hospital. However, she is now at her normal baseline. Patient had a recent UTI and was treated with 5 day course of macrobid. Patient does not have any urinary symptoms. Patient also has been having sacral/lower back pain since last week and states it started after using the bike during physical therapy. Patient saw her orthopedist on Thursday where an XR was done which had no acute findings. Patient was told to do MRI in 3 weeks if she was still having pain. Patient has been using Tylenol for pain relief intermittently and currently has no pain. Patient denies fevers, chills, body aches, chest pain, palpitations, sob, abdominal pain, n/v, dysuria, urinary frequency Denies trauma/fall, difficulty ambulating No sick contacts.       IN THE ED:  Temp  97.9 F , HR 59  ,  /58  ,RR 17 , SpO2 97% RA   S/P Rocephin IV x1, Azithro IV x1, 1L Bolus NS IV x1   EKG: Sinus bradycardia w/ 1st deg AV block, VR 51bpm   Labs significant for: H/H 10.5/32.3, BUN/Cr 30/1.9, alk phos 414, UA neg, CRP pending, RVP panel, ESR pending   Imaging: CT lumbar spine:   Findings suspicious for an age-indeterminate sacral insufficiency   fracture at S2. Findings suspicious for severe multilevel lumbar spinal canal   stenosis and severe left neuroforaminal stenosis at L5-S1. ; CXR: patchy right lower lobe opacity? (09 Mar 2025 19:30)      ---  HOSPITAL COURSE: Patient admitted to medicine floor for management of Weakness.  Patient presented w/ weakness likely 2/2 recent UTI and sacral pain. CT lumbar spine:   Findings suspicious for an age-indeterminate sacral insufficiency fracture at S2. Findings suspicious for severe multilevel lumbar spinal canal stenosis and severe left neuroforaminal stenosis at L5-S1. MR lumbar spine with acute L4 compression fx.  UA neg. Patient afebrile, no leukocytosis presents, non toxic appearing was monitored off antibiotics. Pneumonia was ruled out. Ortho consulted, no surgical intervention. Outpatient follow up. Patient was able to ambulate with PT. She is stable for discharge back to her custodial with home PT.         Pt seen and examined on day of discharge. Patient is medically optimized for discharge to home with close outpatient followup.    PHYSICAL EXAM ON DAY OF DISCHARGE:        ---  CONSULTANTS:     ---  TIME SPENT:  I, the attending physician, was physically present for the key portions of the evaluation and management (E/M) service provided. 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 Velasquez as well as R hip IMN w/ Dr. De Luna in 2023 presents for weakness and increased lethargy. Daughter at bedside. As per patient, she has been feeling increasingly weak over the last few days. As per daughter, she was "acting weird" and didn't remember certain things before she was brought into the hospital. However, she is now at her normal baseline. Patient had a recent UTI and was treated with 5 day course of macrobid. Patient does not have any urinary symptoms. Patient also has been having sacral/lower back pain since last week and states it started after using the bike during physical therapy. Patient saw her orthopedist on Thursday where an XR was done which had no acute findings. Patient was told to do MRI in 3 weeks if she was still having pain. Patient has been using Tylenol for pain relief intermittently and currently has no pain. Patient denies fevers, chills, body aches, chest pain, palpitations, sob, abdominal pain, n/v, dysuria, urinary frequency Denies trauma/fall, difficulty ambulating No sick contacts.       IN THE ED:  Temp  97.9 F , HR 59  ,  /58  ,RR 17 , SpO2 97% RA   S/P Rocephin IV x1, Azithro IV x1, 1L Bolus NS IV x1   EKG: Sinus bradycardia w/ 1st deg AV block, VR 51bpm   Labs significant for: H/H 10.5/32.3, BUN/Cr 30/1.9, alk phos 414, UA neg, CRP pending, RVP panel, ESR pending   Imaging: CT lumbar spine:   Findings suspicious for an age-indeterminate sacral insufficiency   fracture at S2. Findings suspicious for severe multilevel lumbar spinal canal   stenosis and severe left neuroforaminal stenosis at L5-S1. ; CXR: patchy right lower lobe opacity? (09 Mar 2025 19:30)      ---  HOSPITAL COURSE: Patient admitted to medicine floor for management of Weakness.  Patient presented w/ weakness likely 2/2 recent UTI and sacral pain. CT lumbar spine:   Findings suspicious for an age-indeterminate sacral insufficiency fracture at S2. Findings suspicious for severe multilevel lumbar spinal canal stenosis and severe left neuroforaminal stenosis at L5-S1. MR lumbar spine with acute L4 compression fx.  UA neg. Patient afebrile, no leukocytosis presents, non toxic appearing was monitored off antibiotics. Pneumonia was ruled out. Ortho consulted, no surgical intervention. Outpatient follow up. Patient was able to ambulate with PT. Patient with hypertensive urgency and bradycardia as well. Was missing BB due to bradycardia. Reduced toprol XL to 50mg daily and added amlodipine 5 mg daily. She is stable for discharge back to her [facility] with home PT.     Pt seen and examined on day of discharge. Patient is medically optimized for discharge to home with close outpatient followup.

## 2025-03-11 LAB
ALBUMIN SERPL ELPH-MCNC: 2.4 G/DL — LOW (ref 3.3–5)
ALP SERPL-CCNC: 433 U/L — HIGH (ref 40–120)
ALT FLD-CCNC: 61 U/L — SIGNIFICANT CHANGE UP (ref 12–78)
ANION GAP SERPL CALC-SCNC: 7 MMOL/L — SIGNIFICANT CHANGE UP (ref 5–17)
AST SERPL-CCNC: 72 U/L — HIGH (ref 15–37)
BASOPHILS # BLD AUTO: 0.04 K/UL — SIGNIFICANT CHANGE UP (ref 0–0.2)
BASOPHILS NFR BLD AUTO: 0.5 % — SIGNIFICANT CHANGE UP (ref 0–2)
BILIRUB SERPL-MCNC: 0.3 MG/DL — SIGNIFICANT CHANGE UP (ref 0.2–1.2)
BUN SERPL-MCNC: 28 MG/DL — HIGH (ref 7–23)
CALCIUM SERPL-MCNC: 9.4 MG/DL — SIGNIFICANT CHANGE UP (ref 8.5–10.1)
CHLORIDE SERPL-SCNC: 104 MMOL/L — SIGNIFICANT CHANGE UP (ref 96–108)
CO2 SERPL-SCNC: 26 MMOL/L — SIGNIFICANT CHANGE UP (ref 22–31)
CREAT SERPL-MCNC: 1.7 MG/DL — HIGH (ref 0.5–1.3)
EGFR: 28 ML/MIN/1.73M2 — LOW
EGFR: 28 ML/MIN/1.73M2 — LOW
EOSINOPHIL # BLD AUTO: 0.56 K/UL — HIGH (ref 0–0.5)
EOSINOPHIL NFR BLD AUTO: 7 % — HIGH (ref 0–6)
GLUCOSE SERPL-MCNC: 135 MG/DL — HIGH (ref 70–99)
HCT VFR BLD CALC: 30 % — LOW (ref 34.5–45)
HGB BLD-MCNC: 9.7 G/DL — LOW (ref 11.5–15.5)
IMM GRANULOCYTES NFR BLD AUTO: 0.5 % — SIGNIFICANT CHANGE UP (ref 0–0.9)
LYMPHOCYTES # BLD AUTO: 1.51 K/UL — SIGNIFICANT CHANGE UP (ref 1–3.3)
LYMPHOCYTES # BLD AUTO: 18.8 % — SIGNIFICANT CHANGE UP (ref 13–44)
MAGNESIUM SERPL-MCNC: 2 MG/DL — SIGNIFICANT CHANGE UP (ref 1.6–2.6)
MCHC RBC-ENTMCNC: 26.6 PG — LOW (ref 27–34)
MCHC RBC-ENTMCNC: 32.3 G/DL — SIGNIFICANT CHANGE UP (ref 32–36)
MCV RBC AUTO: 82.4 FL — SIGNIFICANT CHANGE UP (ref 80–100)
MONOCYTES # BLD AUTO: 0.95 K/UL — HIGH (ref 0–0.9)
MONOCYTES NFR BLD AUTO: 11.8 % — SIGNIFICANT CHANGE UP (ref 2–14)
NEUTROPHILS # BLD AUTO: 4.92 K/UL — SIGNIFICANT CHANGE UP (ref 1.8–7.4)
NEUTROPHILS NFR BLD AUTO: 61.4 % — SIGNIFICANT CHANGE UP (ref 43–77)
NRBC BLD AUTO-RTO: 0 /100 WBCS — SIGNIFICANT CHANGE UP (ref 0–0)
PHOSPHATE SERPL-MCNC: 3.5 MG/DL — SIGNIFICANT CHANGE UP (ref 2.5–4.5)
PLATELET # BLD AUTO: 209 K/UL — SIGNIFICANT CHANGE UP (ref 150–400)
POTASSIUM SERPL-MCNC: 3.3 MMOL/L — LOW (ref 3.5–5.3)
POTASSIUM SERPL-SCNC: 3.3 MMOL/L — LOW (ref 3.5–5.3)
PROT SERPL-MCNC: 6.8 G/DL — SIGNIFICANT CHANGE UP (ref 6–8.3)
RBC # BLD: 3.64 M/UL — LOW (ref 3.8–5.2)
RBC # FLD: 14.6 % — HIGH (ref 10.3–14.5)
SODIUM SERPL-SCNC: 137 MMOL/L — SIGNIFICANT CHANGE UP (ref 135–145)
WBC # BLD: 8.02 K/UL — SIGNIFICANT CHANGE UP (ref 3.8–10.5)
WBC # FLD AUTO: 8.02 K/UL — SIGNIFICANT CHANGE UP (ref 3.8–10.5)

## 2025-03-11 PROCEDURE — 99233 SBSQ HOSP IP/OBS HIGH 50: CPT

## 2025-03-11 RX ORDER — LIDOCAINE HYDROCHLORIDE 20 MG/ML
1 JELLY TOPICAL DAILY
Refills: 0 | Status: DISCONTINUED | OUTPATIENT
Start: 2025-03-11 | End: 2025-03-12

## 2025-03-11 RX ADMIN — ATORVASTATIN CALCIUM 40 MILLIGRAM(S): 80 TABLET, FILM COATED ORAL at 21:15

## 2025-03-11 RX ADMIN — METOPROLOL SUCCINATE 100 MILLIGRAM(S): 50 TABLET, EXTENDED RELEASE ORAL at 05:09

## 2025-03-11 RX ADMIN — LIDOCAINE HYDROCHLORIDE 1 PATCH: 20 JELLY TOPICAL at 19:31

## 2025-03-11 RX ADMIN — LIDOCAINE HYDROCHLORIDE 1 PATCH: 20 JELLY TOPICAL at 23:30

## 2025-03-11 RX ADMIN — Medication 325 MILLIGRAM(S): at 12:07

## 2025-03-11 RX ADMIN — FUROSEMIDE 20 MILLIGRAM(S): 10 INJECTION INTRAMUSCULAR; INTRAVENOUS at 05:09

## 2025-03-11 RX ADMIN — Medication 10 MILLIGRAM(S): at 12:42

## 2025-03-11 RX ADMIN — LIDOCAINE HYDROCHLORIDE 1 PATCH: 20 JELLY TOPICAL at 11:47

## 2025-03-11 RX ADMIN — Medication 25 MILLIGRAM(S): at 08:42

## 2025-03-11 RX ADMIN — Medication 40 MILLIEQUIVALENT(S): at 16:35

## 2025-03-11 RX ADMIN — INSULIN LISPRO 1: 100 INJECTION, SOLUTION INTRAVENOUS; SUBCUTANEOUS at 12:45

## 2025-03-11 RX ADMIN — LOSARTAN POTASSIUM 50 MILLIGRAM(S): 100 TABLET, FILM COATED ORAL at 05:09

## 2025-03-11 RX ADMIN — ENOXAPARIN SODIUM 30 MILLIGRAM(S): 100 INJECTION SUBCUTANEOUS at 22:24

## 2025-03-11 NOTE — PROGRESS NOTE ADULT - SUBJECTIVE AND OBJECTIVE BOX
Patient is a 89y old  Female who presents with a chief complaint of Weakness (11 Mar 2025 09:52)      INTERVAL HPI/OVERNIGHT EVENTS: Patient seen and examined at bedside. No overnight events occurred. Noted to be hypertensive this AM despite home regimen.  Patient has no complaints at this time. Denies fevers, chills, headache, lightheadedness, chest pain, dyspnea, abdominal pain, n/v/d/c.    MEDICATIONS  (STANDING):  aspirin enteric coated 81 milliGRAM(s) Oral <User Schedule>  atorvastatin 40 milliGRAM(s) Oral at bedtime  dextrose 5%. 1000 milliLiter(s) (50 mL/Hr) IV Continuous <Continuous>  dextrose 5%. 1000 milliLiter(s) (100 mL/Hr) IV Continuous <Continuous>  dextrose 50% Injectable 25 Gram(s) IV Push once  dextrose 50% Injectable 12.5 Gram(s) IV Push once  dextrose 50% Injectable 25 Gram(s) IV Push once  enoxaparin Injectable 30 milliGRAM(s) SubCutaneous every 24 hours  famotidine    Tablet 10 milliGRAM(s) Oral every 48 hours  ferrous    sulfate 325 milliGRAM(s) Oral daily  furosemide    Tablet 20 milliGRAM(s) Oral daily  glucagon  Injectable 1 milliGRAM(s) IntraMuscular once  insulin lispro (ADMELOG) corrective regimen sliding scale   SubCutaneous three times a day before meals  insulin lispro (ADMELOG) corrective regimen sliding scale   SubCutaneous at bedtime  losartan 50 milliGRAM(s) Oral daily  metoprolol succinate  milliGRAM(s) Oral daily    MEDICATIONS  (PRN):  acetaminophen     Tablet .. 650 milliGRAM(s) Oral every 6 hours PRN Mild Pain (1 - 3)  dextrose Oral Gel 15 Gram(s) Oral once PRN Blood Glucose LESS THAN 70 milliGRAM(s)/deciliter  lidocaine   4% Patch 1 Patch Transdermal daily PRN pain      Allergies    No Known Allergies    Intolerances        REVIEW OF SYSTEMS:  CONSTITUTIONAL: No fever or chills  HEENT:  No headache, no sore throat  RESPIRATORY: No cough, wheezing, or shortness of breath  CARDIOVASCULAR: No chest pain, palpitations  GASTROINTESTINAL: No abd pain, nausea, vomiting, or diarrhea  GENITOURINARY: No dysuria, frequency, or hematuria  NEUROLOGICAL: no focal weakness or dizziness  MUSCULOSKELETAL: admits to low back/sacral pain with walking     Vital Signs Last 24 Hrs  T(C): 36.7 (11 Mar 2025 04:44), Max: 36.8 (10 Mar 2025 12:23)  T(F): 98 (11 Mar 2025 04:44), Max: 98.2 (10 Mar 2025 12:23)  HR: 60 (11 Mar 2025 07:31) (59 - 61)  BP: 180/80 (11 Mar 2025 08:30) (156/66 - 185/65)  BP(mean): --  RR: 18 (11 Mar 2025 04:44) (18 - 18)  SpO2: 92% (11 Mar 2025 04:44) (92% - 93%)    Parameters below as of 11 Mar 2025 04:44  Patient On (Oxygen Delivery Method): room air        PHYSICAL EXAM:  GENERAL: NAD  HEENT:  anicteric, moist mucous membranes  CHEST/LUNG:  CTA b/l, no rales, wheezes, or rhonchi  HEART:  RRR, S1, S2  ABDOMEN:  BS+, soft, nontender, nondistended  EXTREMITIES: no edema, cyanosis, or calf tenderness  NERVOUS SYSTEM: answers questions and follows commands appropriately    LABS:                        9.7    8.02  )-----------( 209      ( 11 Mar 2025 06:04 )             30.0     CBC Full  -  ( 11 Mar 2025 06:04 )  WBC Count : 8.02 K/uL  Hemoglobin : 9.7 g/dL  Hematocrit : 30.0 %  Platelet Count - Automated : 209 K/uL  Mean Cell Volume : 82.4 fl  Mean Cell Hemoglobin : 26.6 pg  Mean Cell Hemoglobin Concentration : 32.3 g/dL  Auto Neutrophil # : x  Auto Lymphocyte # : x  Auto Monocyte # : x  Auto Eosinophil # : x  Auto Basophil # : x  Auto Neutrophil % : x  Auto Lymphocyte % : x  Auto Monocyte % : x  Auto Eosinophil % : x  Auto Basophil % : x    11 Mar 2025 06:04    137    |  104    |  28     ----------------------------<  135    3.3     |  26     |  1.70     Ca    9.4        11 Mar 2025 06:04  Phos  3.5       11 Mar 2025 06:04  Mg     2.0       11 Mar 2025 06:04    TPro  6.8    /  Alb  2.4    /  TBili  0.3    /  DBili  x      /  AST  72     /  ALT  61     /  AlkPhos  433    11 Mar 2025 06:04      Urinalysis Basic - ( 11 Mar 2025 06:04 )    Color: x / Appearance: x / SG: x / pH: x  Gluc: 135 mg/dL / Ketone: x  / Bili: x / Urobili: x   Blood: x / Protein: x / Nitrite: x   Leuk Esterase: x / RBC: x / WBC x   Sq Epi: x / Non Sq Epi: x / Bacteria: x      CAPILLARY BLOOD GLUCOSE      POCT Blood Glucose.: 139 mg/dL (11 Mar 2025 07:58)  POCT Blood Glucose.: 160 mg/dL (10 Mar 2025 21:35)  POCT Blood Glucose.: 124 mg/dL (10 Mar 2025 17:40)  POCT Blood Glucose.: 141 mg/dL (10 Mar 2025 12:12)        Urinalysis with Rflx Culture (collected 03-09-25 @ 13:00)        RADIOLOGY & ADDITIONAL TESTS:    Personally reviewed.     Consultant(s) Notes Reviewed:  [x] YES  [ ] NO

## 2025-03-11 NOTE — CASE MANAGEMENT PROGRESS NOTE - NSCMPROGRESSNOTE_GEN_ALL_CORE
CM received call from Hoda from [facility] [phone] needs dc summary faxed to [phone]. Medicaid aides 7hrs/7d  with [facility], coordinator is Miriam [phone] ext [phone]. Receiving PT/OT w/[facility]. CM answered all questions to the best of my abilities. CM remains available throughout hospital stay.

## 2025-03-11 NOTE — CAREGIVER ENGAGEMENT NOTE - CAREGIVER OUTREACH NOTES - FREE TEXT
CM speak to caregiver Akua and Mandy (over the phone) to explain role and transitions of care and possible discharge plans back to [facility] with [facility].  All questions answered to the best of my abilities.  CM remains available throughout the hospital stay.

## 2025-03-11 NOTE — CONSULT NOTE ADULT - SUBJECTIVE AND OBJECTIVE BOX
Patient is a 89yFemale home-ambulator with walker who is admitted to [facility] for lethargy and weakness. Orthopedics was consulted for MRI finding of acute L4 VCF. Patient states that her back began hurting a few weeks prior while riding a bike during physical therapy. Pateint denies any falls, trauma, or injuries. States ability to walk following the onset of pain. Denies any numbness or tingling. Denies any saddle anesthesia or bowel/bladder incontinence. Denies having any other pain elsewhere. No other orthopedic concerns at this time.    Medical Hx:  Hypertension    Diabetes    TIA (transient ischemic attack)    Hip fracture    HLD (hyperlipidemia)        Meds:      Allergies:  No Known Allergies      PHYSICAL EXAM:  T(C): 36.7 (03-11-25 @ 04:44), Max: 36.8 (03-10-25 @ 12:23)  HR: 60 (03-11-25 @ 07:31) (59 - 61)  BP: 180/80 (03-11-25 @ 08:30) (156/66 - 185/65)  RR: 18 (03-11-25 @ 04:44) (18 - 18)  SpO2: 92% (03-11-25 @ 04:44) (92% - 93%)    Gen: lying comfortably, in NAD    Motor:                   C5                C6              C7               C8           T1   R            5/5                5/5            5/5             5/5          5/5  L             5/5               5/5             5/5             5/5          5/5                L2             L3             L4               L5            S1  R         5/5           5/5          5/5             5/5           5/5  L          5/5          5/5           5/5             5/5           5/5    Sensory:            C5         C6         C7      C8       T1        (0=absent, 1=impaired, 2=normal, NT=not testable)  R         2            2           2        2         2  L          2            2           2        2         2               L2          L3         L4      L5       S1         (0=absent, 1=impaired, 2=normal, NT=not testable)  R         2            2            2        2        2  L          2            2           2        2         2    Negative Dejesus, Babinski, and clonus bilaterally  Radial 2+ bilaterally  DP 2+ bilaterally       < from: MR Lumbar Spine No Cont (03.10.25 @ 16:24) >  1.  Acute compression fracture of the L4 vertebra, with mild loss of   vertebral body height.  2.  Chronic healed compression deformity in the sacrum.  3.  Chronic L5-S1 anterolisthesis, with moderate to severe bilateral   foraminal narrowing.  4.  Moderate to severe L2-3 central canal stenosis, with moderate   narrowing at L1-2 and L5-S1.    < end of copied text >        A/P: 89yFemale w/ L4 VCF    - Imaging findings reviewed and discussed with patient  - WBAT  - PT/OT  - Pain meds as needed  - DVT ppx per primary    !!!!!! Incomplete Consult Note, More Information to Follow  !!!!!!  !!!!!! Incomplete Consult Note, More Information to Follow  !!!!!!  !!!!!! Incomplete Consult Note, More Information to Follow  !!!!!!  !!!!!! Incomplete Consult Note, More Information to Follow  !!!!!!  !!!!!! Incomplete Consult Note, More Information to Follow  !!!!!!      Patient is a 89yFemale home-ambulator with walker who is admitted to [facility] for lethargy and weakness. Orthopedics was consulted for MRI finding of acute L4 VCF. Patient states that her back began hurting a couple weeks prior while riding a bike during physical therapy. Pateint denies any falls, trauma, or injuries. States ability to walk following the onset of pain. Denies any numbness or tingling. Denies any saddle anesthesia or bowel/bladder incontinence. Denies having any other pain elsewhere. No other orthopedic concerns at this time. Worked with PT yesterday and was able to ambulate with walker with some aching if any.    Medical Hx:  Hypertension    Diabetes    TIA (transient ischemic attack)    Hip fracture    HLD (hyperlipidemia)        Meds:      Allergies:  No Known Allergies      PHYSICAL EXAM:  T(C): 36.7 (03-11-25 @ 04:44), Max: 36.8 (03-10-25 @ 12:23)  HR: 60 (03-11-25 @ 07:31) (59 - 61)  BP: 180/80 (03-11-25 @ 08:30) (156/66 - 185/65)  RR: 18 (03-11-25 @ 04:44) (18 - 18)  SpO2: 92% (03-11-25 @ 04:44) (92% - 93%)    Gen: lying comfortably, in NAD    Motor:                   C5                C6              C7               C8           T1   R            5/5                5/5            5/5             5/5          5/5  L             5/5               5/5             5/5             5/5          5/5                L2             L3             L4               L5            S1  R         5/5           5/5          5/5             5/5           5/5  L          5/5          5/5           5/5             5/5           5/5    Sensory:            C5         C6         C7      C8       T1        (0=absent, 1=impaired, 2=normal, NT=not testable)  R         2            2           2        2         2  L          2            2           2        2         2               L2          L3         L4      L5       S1         (0=absent, 1=impaired, 2=normal, NT=not testable)  R         2            2            2        2        2  L          2            2           2        2         2    Negative Dejesus, Babinski, and clonus bilaterally  Radial 2+ bilaterally  DP 2+ bilaterally     Secondary Survey:   RLE/LLE/RUE/LUE: No TTP over bony prominences, SILT, palpable pulses, full/painless range of motion, compartments soft          < from: MR Lumbar Spine No Cont (03.10.25 @ 16:24) >  1.  Acute compression fracture of the L4 vertebra, with mild loss of   vertebral body height.  2.  Chronic healed compression deformity in the sacrum.  3.  Chronic L5-S1 anterolisthesis, with moderate to severe bilateral   foraminal narrowing.  4.  Moderate to severe L2-3 central canal stenosis, with moderate   narrowing at L1-2 and L5-S1.    < end of copied text >        A/P: 89yFemale w/ L4 VCF, ambulating with rolling walker    - Imaging findings reviewed and discussed with patient  - WBAT  - PT/OT  - Pain meds as needed  - DVT ppx per primary    - No acute orthopedic surgical intervention indicated at this time  - Orthopedically stable for discharge  - Discussed with patient and all questions answered  - Patient can follow up with Dr. Tilley for further evaluation and management after discharge    Discussed with Dr. Tilley who is in agreement with the above plan  Ortho to sign off, please reconsult us with any concerns

## 2025-03-11 NOTE — DISCHARGE NOTE PROVIDER - ATTENDING DISCHARGE PHYSICAL EXAMINATION:
[Time Spent: ___ minutes] : I have spent [unfilled] minutes of time on the encounter which excludes teaching and separately reported services.
T(C): 36.4 (03-13-25 @ 05:30), Max: 37 (03-12-25 @ 11:16)  HR: 69 (03-13-25 @ 05:30) (69 - 85)  BP: 175/74 (03-13-25 @ 05:30) (136/60 - 175/74)  RR: 18 (03-13-25 @ 05:30) (18 - 18)  SpO2: 95% (03-13-25 @ 05:30) (94% - 96%)    GENERAL: NAD  HEENT:  anicteric, moist mucous membranes  CHEST/LUNG:  CTA b/l, no rales, wheezes, or rhonchi  HEART:  RRR, S1, S2  ABDOMEN:  BS+, soft, nontender, nondistended  EXTREMITIES: left leg swollen compared to right with calf tenderness  NERVOUS SYSTEM: answers questions and follows commands appropriately  PSYCH: normal affect

## 2025-03-12 ENCOUNTER — TRANSCRIPTION ENCOUNTER (OUTPATIENT)
Age: 89
End: 2025-03-12

## 2025-03-12 LAB
ALBUMIN SERPL ELPH-MCNC: 2.5 G/DL — LOW (ref 3.3–5)
ALP SERPL-CCNC: 455 U/L — HIGH (ref 40–120)
ALT FLD-CCNC: 57 U/L — SIGNIFICANT CHANGE UP (ref 12–78)
ANION GAP SERPL CALC-SCNC: 10 MMOL/L — SIGNIFICANT CHANGE UP (ref 5–17)
AST SERPL-CCNC: 55 U/L — HIGH (ref 15–37)
BASOPHILS # BLD AUTO: 0.05 K/UL — SIGNIFICANT CHANGE UP (ref 0–0.2)
BASOPHILS NFR BLD AUTO: 0.5 % — SIGNIFICANT CHANGE UP (ref 0–2)
BILIRUB SERPL-MCNC: 0.4 MG/DL — SIGNIFICANT CHANGE UP (ref 0.2–1.2)
BUN SERPL-MCNC: 29 MG/DL — HIGH (ref 7–23)
CALCIUM SERPL-MCNC: 9.3 MG/DL — SIGNIFICANT CHANGE UP (ref 8.5–10.1)
CHLORIDE SERPL-SCNC: 102 MMOL/L — SIGNIFICANT CHANGE UP (ref 96–108)
CO2 SERPL-SCNC: 26 MMOL/L — SIGNIFICANT CHANGE UP (ref 22–31)
CREAT SERPL-MCNC: 1.7 MG/DL — HIGH (ref 0.5–1.3)
EGFR: 28 ML/MIN/1.73M2 — LOW
EGFR: 28 ML/MIN/1.73M2 — LOW
EOSINOPHIL # BLD AUTO: 0.47 K/UL — SIGNIFICANT CHANGE UP (ref 0–0.5)
EOSINOPHIL NFR BLD AUTO: 4.8 % — SIGNIFICANT CHANGE UP (ref 0–6)
GLUCOSE SERPL-MCNC: 146 MG/DL — HIGH (ref 70–99)
HCT VFR BLD CALC: 32.8 % — LOW (ref 34.5–45)
HGB BLD-MCNC: 10.7 G/DL — LOW (ref 11.5–15.5)
IMM GRANULOCYTES NFR BLD AUTO: 0.6 % — SIGNIFICANT CHANGE UP (ref 0–0.9)
LYMPHOCYTES # BLD AUTO: 1.69 K/UL — SIGNIFICANT CHANGE UP (ref 1–3.3)
LYMPHOCYTES # BLD AUTO: 17.1 % — SIGNIFICANT CHANGE UP (ref 13–44)
MAGNESIUM SERPL-MCNC: 1.9 MG/DL — SIGNIFICANT CHANGE UP (ref 1.6–2.6)
MCHC RBC-ENTMCNC: 26.6 PG — LOW (ref 27–34)
MCHC RBC-ENTMCNC: 32.6 G/DL — SIGNIFICANT CHANGE UP (ref 32–36)
MCV RBC AUTO: 81.6 FL — SIGNIFICANT CHANGE UP (ref 80–100)
MONOCYTES # BLD AUTO: 1 K/UL — HIGH (ref 0–0.9)
MONOCYTES NFR BLD AUTO: 10.1 % — SIGNIFICANT CHANGE UP (ref 2–14)
NEUTROPHILS # BLD AUTO: 6.62 K/UL — SIGNIFICANT CHANGE UP (ref 1.8–7.4)
NEUTROPHILS NFR BLD AUTO: 66.9 % — SIGNIFICANT CHANGE UP (ref 43–77)
NRBC BLD AUTO-RTO: 0 /100 WBCS — SIGNIFICANT CHANGE UP (ref 0–0)
PHOSPHATE SERPL-MCNC: 2.6 MG/DL — SIGNIFICANT CHANGE UP (ref 2.5–4.5)
PLATELET # BLD AUTO: 230 K/UL — SIGNIFICANT CHANGE UP (ref 150–400)
POTASSIUM SERPL-MCNC: 3.7 MMOL/L — SIGNIFICANT CHANGE UP (ref 3.5–5.3)
POTASSIUM SERPL-SCNC: 3.7 MMOL/L — SIGNIFICANT CHANGE UP (ref 3.5–5.3)
PROT SERPL-MCNC: 7.5 G/DL — SIGNIFICANT CHANGE UP (ref 6–8.3)
RBC # BLD: 4.02 M/UL — SIGNIFICANT CHANGE UP (ref 3.8–5.2)
RBC # FLD: 14.6 % — HIGH (ref 10.3–14.5)
SODIUM SERPL-SCNC: 138 MMOL/L — SIGNIFICANT CHANGE UP (ref 135–145)
WBC # BLD: 9.89 K/UL — SIGNIFICANT CHANGE UP (ref 3.8–10.5)
WBC # FLD AUTO: 9.89 K/UL — SIGNIFICANT CHANGE UP (ref 3.8–10.5)

## 2025-03-12 PROCEDURE — 93970 EXTREMITY STUDY: CPT | Mod: 26

## 2025-03-12 PROCEDURE — 99233 SBSQ HOSP IP/OBS HIGH 50: CPT

## 2025-03-12 PROCEDURE — 99222 1ST HOSP IP/OBS MODERATE 55: CPT

## 2025-03-12 RX ORDER — LIDOCAINE HYDROCHLORIDE 20 MG/ML
1 JELLY TOPICAL DAILY
Refills: 0 | Status: DISCONTINUED | OUTPATIENT
Start: 2025-03-12 | End: 2025-03-13

## 2025-03-12 RX ORDER — METOPROLOL SUCCINATE 50 MG/1
50 TABLET, EXTENDED RELEASE ORAL DAILY
Refills: 0 | Status: DISCONTINUED | OUTPATIENT
Start: 2025-03-12 | End: 2025-03-13

## 2025-03-12 RX ORDER — AMLODIPINE BESYLATE 10 MG/1
5 TABLET ORAL DAILY
Refills: 0 | Status: DISCONTINUED | OUTPATIENT
Start: 2025-03-12 | End: 2025-03-13

## 2025-03-12 RX ADMIN — LOSARTAN POTASSIUM 50 MILLIGRAM(S): 100 TABLET, FILM COATED ORAL at 05:06

## 2025-03-12 RX ADMIN — ENOXAPARIN SODIUM 30 MILLIGRAM(S): 100 INJECTION SUBCUTANEOUS at 21:43

## 2025-03-12 RX ADMIN — ATORVASTATIN CALCIUM 40 MILLIGRAM(S): 80 TABLET, FILM COATED ORAL at 21:43

## 2025-03-12 RX ADMIN — FUROSEMIDE 20 MILLIGRAM(S): 10 INJECTION INTRAMUSCULAR; INTRAVENOUS at 05:06

## 2025-03-12 RX ADMIN — AMLODIPINE BESYLATE 5 MILLIGRAM(S): 10 TABLET ORAL at 12:40

## 2025-03-12 RX ADMIN — INSULIN LISPRO 1: 100 INJECTION, SOLUTION INTRAVENOUS; SUBCUTANEOUS at 12:39

## 2025-03-12 RX ADMIN — Medication 5 MILLIGRAM(S): at 05:36

## 2025-03-12 RX ADMIN — LIDOCAINE HYDROCHLORIDE 1 PATCH: 20 JELLY TOPICAL at 12:41

## 2025-03-12 RX ADMIN — Medication 81 MILLIGRAM(S): at 05:13

## 2025-03-12 RX ADMIN — Medication 325 MILLIGRAM(S): at 12:39

## 2025-03-12 RX ADMIN — LIDOCAINE HYDROCHLORIDE 1 PATCH: 20 JELLY TOPICAL at 19:55

## 2025-03-12 NOTE — PROGRESS NOTE ADULT - PROBLEM SELECTOR PLAN 4
Chronic Stable Normocytic Anemia  Baseline: 10.4-10.6 range  - H/H 10.5/32.3  - Continue home iron for hx of PADMINI   -Target Hb>7.0  - Continue to monitor on daily CBC

## 2025-03-12 NOTE — PROGRESS NOTE ADULT - PROBLEM SELECTOR PLAN 9
Chronic on Januvia   - A1C 8.4 on 12/2024  -Low ISS  -Regular finger sticks  -Consistent carb diet  -Hypoglycemic protocol.

## 2025-03-12 NOTE — CASE MANAGEMENT PROGRESS NOTE - NSCMPROGRESSNOTE_GEN_ALL_CORE
Patient for discharge back to [facility] tomorrow.  CM met with patient and daughter Akua at bedside to discussed discharge disposition is home with [facility]. CM spoke to Miriam from [facility] [phone] who stated aide is available for tomorrow.  CM called [facility] [phone] and message left for Jennifer for authorization of aide. Referral for [facility] with [facility] sent. Family to transport patient back to the [facility] tomorrow. Daughter verbalized understanding of the transition plan and is in agreement. CM answered all questions to the best of my abilities. CM remains available throughout hospital stay.

## 2025-03-12 NOTE — CONSULT NOTE ADULT - ATTENDING COMMENTS
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 Velasquez as well as R hip IMN w/ Dr. De Luna in 2023 presents for weakness and increased lethargy.     The patient has persistent hypertension.  She had similar bouts of hypertension per her daughter the last time she was admitted.  He was discharged on hydralazine at that time but did not continue taking it at home as a daughter claims that her blood pressure has been stable.    Her blood pressure at this time is still elevated.  She has not been getting her metoprolol given her baseline bradycardia.  I would decrease her Toprol to 50 mg daily.  I do not think that she will take hydralazine long-term.  Would start her on amlodipine 2.5 mg daily and titrate up as needed.  Would recommend increasing her losartan to 100 mg daily as an outpatient with close monitoring of her renal function and potassium if warranted.    No signs of significant ischemia or volume overload.    Previous TTE showed EF 64%    Further cardiac workup will depend on clinical course.   Recommend following up as an outpatient for further cardiology testing.

## 2025-03-12 NOTE — PROGRESS NOTE ADULT - PROBLEM SELECTOR PLAN 1
Patient presents w/ weakness likely 2/2 recent UTI and sacral pain  -S/P Rocephin IV x1, Azithro IV x1, 1L Bolus NS IV x1  in ED   - CT lumbar spine:   Findings suspicious for an age-indeterminate sacral insufficiency fracture at S2. Findings suspicious for severe multilevel lumbar spinal canal stenosis and severe left neuroforaminal stenosis at L5-S1  - CXR: patchy right lower lobe opacity? on wet read, f/u official results  - UA neg   - Patient afebrile, no leukocytosis presents, non toxic appearing  - Hold off on antibiotics at this time as patient has no signs/symptoms of infection   - F/u ESR, CRP, RVP panel (pending)  - PT consulted, will appreciate recs  - Ortho consulted, will appreciate recs
Patient presents w/ weakness likely 2/2 recent UTI and sacral pain  -S/P Rocephin IV x1, Azithro IV x1, 1L Bolus NS IV x1  in ED   - CT lumbar spine:   Findings suspicious for an age-indeterminate sacral insufficiency fracture at S2. Findings suspicious for severe multilevel lumbar spinal canal stenosis and severe left neuroforaminal stenosis at L5-S1  - MR lumbar spine with acute L4 compression fx - per orthopedics NO role for brace  - UA neg   - Patient afebrile, no leukocytosis presents, non toxic appearing  - Hold off on antibiotics at this time as patient has no signs/symptoms of infection. no clinical concerns for pneumonia    - Dopplers negative for DVT  - PT consulted, will appreciate recs  - Ortho consulted, will appreciate recs
Patient presents w/ weakness likely 2/2 recent UTI and sacral pain  -S/P Rocephin IV x1, Azithro IV x1, 1L Bolus NS IV x1  in ED   - CT lumbar spine:   Findings suspicious for an age-indeterminate sacral insufficiency fracture at S2. Findings suspicious for severe multilevel lumbar spinal canal stenosis and severe left neuroforaminal stenosis at L5-S1  - MR lumbar spine with acute L4 compression fx   - UA neg   - Patient afebrile, no leukocytosis presents, non toxic appearing  - Hold off on antibiotics at this time as patient has no signs/symptoms of infection. no clinical concerns for pneumonia    - F/u ESR, CRP, RVP panel (pending)  - PT consulted, will appreciate recs  - Ortho consulted, will appreciate recs

## 2025-03-12 NOTE — DISCHARGE NOTE NURSING/CASE MANAGEMENT/SOCIAL WORK - NSSCTYPOFSERV_GEN_ALL_CORE
RN, Physical therapy, and OT.  Please contact the home care agency at the above phone number if you have not heard from them within 24-48 hrs after your hospital discharge.

## 2025-03-12 NOTE — DISCHARGE NOTE NURSING/CASE MANAGEMENT/SOCIAL WORK - FINANCIAL ASSISTANCE
[facility] provides services at a reduced cost to those who are determined to be eligible through [facility]’s financial assistance program. Information regarding [facility]’s financial assistance program can be found by going to [email or web address] or by calling [phone].

## 2025-03-12 NOTE — CONSULT NOTE ADULT - CONSULT REASON
HTN
Time consulted 0930  Time seen 0945  Consulted for L4 VCF
age-indeterminate sacral insufficiency fracture at S2 on CT

## 2025-03-12 NOTE — PROGRESS NOTE ADULT - PROBLEM SELECTOR PLAN 7
Patient with hx of TIA( per son at bedside was ~2017)  - Patient on aspirin and atorvastatin  - Lipid panel from 12/2024 reviewed

## 2025-03-12 NOTE — PROGRESS NOTE ADULT - NSPROGADDITIONALINFOA_GEN_ALL_CORE
dc planning tomorrow back to [facility] - hypertensive urgency with bradycardia noted - meds adjusted and if stable can go back to [facility] tmr - d/w daughter and at [facility]. also discussed with cards. Daughter had q about brace - I spoke to ortho - no role for brace. recent surgery and b/l calf tenderness - on exam left le is swollen compared to right and has calf tenderness. Obtained US which excluded DVT - daughter already knew results as it popped up on her portal. family very involved and caring - daughter updated. I remain available for any updates as needed!

## 2025-03-12 NOTE — PROGRESS NOTE ADULT - SUBJECTIVE AND OBJECTIVE BOX
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 Velasquez as well as R hip IMN w/ Dr. De Luna in 2023 presents for weakness and increased lethargy. Daughter at bedside. As per patient, she has been feeling increasingly weak over the last few days. As per daughter, she was "acting weird" and didn't remember certain things before she was brought into the hospital. However, she is now at her normal baseline. Patient had a recent UTI and was treated with 5 day course of macrobid. Patient does not have any urinary symptoms. Patient also has been having sacral/lower back pain since last week and states it started after using the bike during physical therapy. Patient saw her orthopedist on Thursday where an XR was done which had no acute findings. Patient was told to do MRI in 3 weeks if she was still having pain. Patient has been using Tylenol for pain relief intermittently and currently has no pain. Patient denies fevers, chills, body aches, chest pain, palpitations, sob, abdominal pain, n/v, dysuria, urinary frequency Denies trauma/fall, difficulty ambulating No sick contacts.       IN THE ED:  Temp  97.9 F , HR 59  ,  /58  ,RR 17 , SpO2 97% RA   S/P Rocephin IV x1, Azithro IV x1, 1L Bolus NS IV x1   EKG: Sinus bradycardia w/ 1st deg AV block, VR 51bpm   Labs significant for: H/H 10.5/32.3, BUN/Cr 30/1.9, alk phos 414, UA neg, CRP pending, RVP panel, ESR pending   Imaging: CT lumbar spine:   Findings suspicious for an age-indeterminate sacral insufficiency   fracture at S2. Findings suspicious for severe multilevel lumbar spinal canal   stenosis and severe left neuroforaminal stenosis at L5-S1. ; CXR: patchy right lower lobe opacity? (09 Mar 2025 19:30)      INTERVAL HPI/OVERNIGHT EVENTS: Patient was seen and examined. No acute events occurred overnight. No new complaints. She has no pain. Is sleeping a lot and lethargic per daughter. No evidence of UTI. BP was quite high this AM - as usual per daughter - states meds are adjusted inpatient and she becomes hypotensive at home.    I&O's Summary    11 Mar 2025 07:01  -  12 Mar 2025 07:00  --------------------------------------------------------  IN: 0 mL / OUT: 1400 mL / NET: -1400 mL    12 Mar 2025 07:01  -  12 Mar 2025 23:13  --------------------------------------------------------  IN: 0 mL / OUT: 1500 mL / NET: -1500 mL        PAST MEDICAL & SURGICAL HISTORY:  Hypertension      Diabetes      TIA (transient ischemic attack)      Hip fracture      HLD (hyperlipidemia)      S/P ORIF (open reduction internal fixation) fracture  right hip          LABS:                        10.7   9.89  )-----------( 230      ( 12 Mar 2025 07:38 )             32.8     03-12    138  |  102  |  29[H]  ----------------------------<  146[H]  3.7   |  26  |  1.70[H]    Ca    9.3      12 Mar 2025 07:38  Phos  2.6     03-12  Mg     1.9     03-12    TPro  7.5  /  Alb  2.5[L]  /  TBili  0.4  /  DBili  x   /  AST  55[H]  /  ALT  57  /  AlkPhos  455[H]  03-12      Urinalysis Basic - ( 12 Mar 2025 07:38 )    Color: x / Appearance: x / SG: x / pH: x  Gluc: 146 mg/dL / Ketone: x  / Bili: x / Urobili: x   Blood: x / Protein: x / Nitrite: x   Leuk Esterase: x / RBC: x / WBC x   Sq Epi: x / Non Sq Epi: x / Bacteria: x      CAPILLARY BLOOD GLUCOSE      POCT Blood Glucose.: 149 mg/dL (12 Mar 2025 21:36)  POCT Blood Glucose.: 125 mg/dL (12 Mar 2025 16:56)  POCT Blood Glucose.: 183 mg/dL (12 Mar 2025 12:05)  POCT Blood Glucose.: 137 mg/dL (12 Mar 2025 07:57)        Urinalysis Basic - ( 12 Mar 2025 07:38 )    Color: x / Appearance: x / SG: x / pH: x  Gluc: 146 mg/dL / Ketone: x  / Bili: x / Urobili: x   Blood: x / Protein: x / Nitrite: x   Leuk Esterase: x / RBC: x / WBC x   Sq Epi: x / Non Sq Epi: x / Bacteria: x        MEDICATIONS  (STANDING):  amLODIPine   Tablet 5 milliGRAM(s) Oral daily  aspirin enteric coated 81 milliGRAM(s) Oral <User Schedule>  atorvastatin 40 milliGRAM(s) Oral at bedtime  dextrose 5%. 1000 milliLiter(s) (50 mL/Hr) IV Continuous <Continuous>  dextrose 5%. 1000 milliLiter(s) (100 mL/Hr) IV Continuous <Continuous>  dextrose 50% Injectable 25 Gram(s) IV Push once  dextrose 50% Injectable 12.5 Gram(s) IV Push once  dextrose 50% Injectable 25 Gram(s) IV Push once  enoxaparin Injectable 30 milliGRAM(s) SubCutaneous every 24 hours  famotidine    Tablet 10 milliGRAM(s) Oral every 48 hours  ferrous    sulfate 325 milliGRAM(s) Oral daily  furosemide    Tablet 20 milliGRAM(s) Oral daily  glucagon  Injectable 1 milliGRAM(s) IntraMuscular once  insulin lispro (ADMELOG) corrective regimen sliding scale   SubCutaneous three times a day before meals  insulin lispro (ADMELOG) corrective regimen sliding scale   SubCutaneous at bedtime  lidocaine   4% Patch 1 Patch Transdermal daily  losartan 50 milliGRAM(s) Oral daily  metoprolol succinate ER 50 milliGRAM(s) Oral daily    MEDICATIONS  (PRN):  acetaminophen     Tablet .. 650 milliGRAM(s) Oral every 6 hours PRN Mild Pain (1 - 3)  dextrose Oral Gel 15 Gram(s) Oral once PRN Blood Glucose LESS THAN 70 milliGRAM(s)/deciliter      REVIEW OF SYSTEMS:  CONSTITUTIONAL: No fever or chills; +tired  HEENT:  No headache, no sore throat  RESPIRATORY: No cough, wheezing, or shortness of breath  CARDIOVASCULAR: No chest pain, palpitations  GASTROINTESTINAL: No abdominal pain, nausea, vomiting, or diarrhea  GENITOURINARY: No dysuria, frequency, or hematuria  NEUROLOGICAL: no focal weakness or dizziness  MUSCULOSKELETAL: no myalgias  PSYCH: no recent changes in mood    RADIOLOGY & ADDITIONAL TESTS:    Imaging Personally Reviewed:  [x] YES  [ ] NO    Consultant(s) Notes Reviewed:  [x] YES  [ ] NO    PHYSICAL EXAM:  T(C): 36.4 (03-12-25 @ 20:07), Max: 37 (03-12-25 @ 11:16)  HR: 78 (03-12-25 @ 20:07) (53 - 85)  BP: 154/62 (03-12-25 @ 20:07) (136/60 - 210/62)  RR: 18 (03-12-25 @ 20:07) (18 - 18)  SpO2: 94% (03-12-25 @ 20:07) (92% - 96%)    GENERAL: NAD  HEENT:  anicteric, moist mucous membranes  CHEST/LUNG:  CTA b/l, no rales, wheezes, or rhonchi  HEART:  RRR, S1, S2  ABDOMEN:  BS+, soft, nontender, nondistended  EXTREMITIES: left leg swollen compared to right with calf tenderness  NERVOUS SYSTEM: answers questions and follows commands appropriately  PSYCH: normal affect    Care Discussed with Consultants/Other Providers [x] YES  [ ] NO

## 2025-03-12 NOTE — CHART NOTE - NSCHARTNOTEFT_GEN_A_CORE
Called by RN, manual BP elevated at 200/70, HR 56; per RN, patient asymptomatic, appears comfortable. RN gave AM losartan and lasix. Toprol XL held for Hold parameter HR.     Will give hydralazine 5mg IV x1 now. Follow up repeat BP.    Continue to monitor.  RN to notify with any changes.

## 2025-03-12 NOTE — PROGRESS NOTE ADULT - PROBLEM SELECTOR PLAN 6
Alk phos 414, AST 58 on admission  - Continue to monitor on daily CMP
Alk phos 414, AST 58 on admission  - Will obtain GGT levels, f/u results  - Continue to monitor on daily CMP
Alk phos 414, AST 58 on admission  - Continue to monitor on daily CMP

## 2025-03-12 NOTE — PROGRESS NOTE ADULT - PROBLEM SELECTOR PLAN 11
Lovenox renally dosed for chemical DVT ppx    #Dispo  - PT consulted, will appreciate recs
Lovenox renally dosed for chemical DVT ppx    #Dispo  - PT consulted, recommending return to shelter with home PT
Lovenox renally dosed for chemical DVT ppx    #Dispo  - PT consulted, recommending return to group home with home PT

## 2025-03-12 NOTE — DISCHARGE NOTE NURSING/CASE MANAGEMENT/SOCIAL WORK - PATIENT PORTAL LINK FT
You can access the FollowMyHealth Patient Portal offered by [facility] by registering at the following website: [email or web address]. By joining [employer]’s FollowMyHealth portal, you will also be able to view your health information using other applications (apps) compatible with our system.

## 2025-03-12 NOTE — CONSULT NOTE ADULT - SUBJECTIVE AND OBJECTIVE BOX
Patient is a 89y old  Female who presents with a chief complaint of Weakness (11 Mar 2025 12:00)      HPI:  89yo F w/ PMH of HTN, HLD, T2DM, TIA, CKD III, Hx of R hip CRPP in 2016 followed by hardware removal in 2017 w/ Dr. Velasquez as well as R hip IMN w/ Dr. De Luna in 2023 presents for weakness and increased lethargy. Daughter at bedside. As per patient, she has been feeling increasingly weak over the last few days. As per daughter, she was "acting weird" and didn't remember certain things before she was brought into the hospital. However, she is now at her normal baseline. Patient had a recent UTI and was treated with 5 day course of macrobid. Patient does not have any urinary symptoms. Patient also has been having sacral/lower back pain since last week and states it started after using the bike during physical therapy. Patient saw her orthopedist on Thursday where an XR was done which had no acute findings. Patient was told to do MRI in 3 weeks if she was still having pain. Patient has been using Tylenol for pain relief intermittently and currently has no pain. Patient denies fevers, chills, body aches, chest pain, palpitations, sob, abdominal pain, n/v, dysuria, urinary frequency Denies trauma/fall, difficulty ambulating No sick contacts.       IN THE ED:  Temp  97.9 F , HR 59  ,  /58  ,RR 17 , SpO2 97% RA   S/P Rocephin IV x1, Azithro IV x1, 1L Bolus NS IV x1   EKG: Sinus bradycardia w/ 1st deg AV block, VR 51bpm   Labs significant for: H/H 10.5/32.3, BUN/Cr 30/1.9, alk phos 414, UA neg, CRP pending, RVP panel, ESR pending   Imaging: CT lumbar spine:   Findings suspicious for an age-indeterminate sacral insufficiency   fracture at S2. Findings suspicious for severe multilevel lumbar spinal canal   stenosis and severe left neuroforaminal stenosis at L5-S1. ; CXR: patchy right lower lobe opacity? (09 Mar 2025 19:30)      PAST MEDICAL & SURGICAL HISTORY:  Hypertension      Diabetes      TIA (transient ischemic attack)      Hip fracture      HLD (hyperlipidemia)      S/P ORIF (open reduction internal fixation) fracture  right hip                MEDICATIONS  (STANDING):  amLODIPine   Tablet 5 milliGRAM(s) Oral daily  aspirin enteric coated 81 milliGRAM(s) Oral <User Schedule>  atorvastatin 40 milliGRAM(s) Oral at bedtime  dextrose 5%. 1000 milliLiter(s) (50 mL/Hr) IV Continuous <Continuous>  dextrose 5%. 1000 milliLiter(s) (100 mL/Hr) IV Continuous <Continuous>  dextrose 50% Injectable 25 Gram(s) IV Push once  dextrose 50% Injectable 12.5 Gram(s) IV Push once  dextrose 50% Injectable 25 Gram(s) IV Push once  enoxaparin Injectable 30 milliGRAM(s) SubCutaneous every 24 hours  famotidine    Tablet 10 milliGRAM(s) Oral every 48 hours  ferrous    sulfate 325 milliGRAM(s) Oral daily  furosemide    Tablet 20 milliGRAM(s) Oral daily  glucagon  Injectable 1 milliGRAM(s) IntraMuscular once  insulin lispro (ADMELOG) corrective regimen sliding scale   SubCutaneous three times a day before meals  insulin lispro (ADMELOG) corrective regimen sliding scale   SubCutaneous at bedtime  losartan 50 milliGRAM(s) Oral daily  metoprolol succinate  milliGRAM(s) Oral daily    MEDICATIONS  (PRN):  acetaminophen     Tablet .. 650 milliGRAM(s) Oral every 6 hours PRN Mild Pain (1 - 3)  dextrose Oral Gel 15 Gram(s) Oral once PRN Blood Glucose LESS THAN 70 milliGRAM(s)/deciliter  lidocaine   4% Patch 1 Patch Transdermal daily PRN pain      FAMILY HISTORY:  FH: asthma (Father)      Denies Family history of CAD or early MI      Constitutional: denies fever, chills  HEENT: denies blurry vision, difficulty hearing  Respiratory: denies SOB, KAUR, cough  Cardiovascular: denies CP, palpitations, orthopnea, PND, LE edema  Gastrointestinal: denies nausea, vomiting, abdominal pain  Genitourinary: denies urinary changes  Skin: Denies rashes, itching  Neurologic: denies headache, weakness, dizziness  MSK: +back pain, +leg pain  Hematology/Oncology: denies bleeding, easy bruising  ROS negative except as noted above      SOCIAL HISTORY:    No tobacco, alcohol or recreational drug use    Vital Signs Last 24 Hrs  T(C): 37 (12 Mar 2025 11:16), Max: 37 (12 Mar 2025 11:16)  T(F): 98.6 (12 Mar 2025 11:16), Max: 98.6 (12 Mar 2025 11:16)  HR: 85 (12 Mar 2025 11:16) (53 - 85)  BP: 136/60 (12 Mar 2025 11:16) (136/60 - 210/62)  BP(mean): --  RR: 18 (12 Mar 2025 11:16) (18 - 18)  SpO2: 96% (12 Mar 2025 11:16) (92% - 99%)    Parameters below as of 12 Mar 2025 11:16  Patient On (Oxygen Delivery Method): room air        Physical Exam:  General: Well developed, well nourished, NAD  Neurology: A&Ox3, nonfocal, sensation intact   Respiratory: CTA B/L, No W/R/R  CV: RRR, +S1/S2, no murmurs, rubs or gallops  Abdominal: Soft, NT, ND +BSx4, no palpable masses  Extremities: No C/C/E, + peripheral pulses  MSK: Normal ROM, no joint erythema or warmth, no joint swelling   Skin: warm, dry, normal color        I&O's Detail    11 Mar 2025 07:01  -  12 Mar 2025 07:00  --------------------------------------------------------  IN:  Total IN: 0 mL    OUT:    Voided (mL): 1400 mL  Total OUT: 1400 mL    Total NET: -1400 mL          LABS:                        10.7   9.89  )-----------( 230      ( 12 Mar 2025 07:38 )             32.8     03-12    138  |  102  |  29[H]  ----------------------------<  146[H]  3.7   |  26  |  1.70[H]    Ca    9.3      12 Mar 2025 07:38  Phos  2.6     03-12  Mg     1.9     03-12    TPro  7.5  /  Alb  2.5[L]  /  TBili  0.4  /  DBili  x   /  AST  55[H]  /  ALT  57  /  AlkPhos  455[H]  03-12          Urinalysis Basic - ( 12 Mar 2025 07:38 )    Color: x / Appearance: x / SG: x / pH: x  Gluc: 146 mg/dL / Ketone: x  / Bili: x / Urobili: x   Blood: x / Protein: x / Nitrite: x   Leuk Esterase: x / RBC: x / WBC x   Sq Epi: x / Non Sq Epi: x / Bacteria: x      I&O's Summary    11 Mar 2025 07:01  -  12 Mar 2025 07:00  --------------------------------------------------------  IN: 0 mL / OUT: 1400 mL / NET: -1400 mL      BNP    RADIOLOGY & ADDITIONAL STUDIES:      ECG: NSR @ 76bpm with PACs    ECHO (11/24/2023)  FINDINGS:  CONCLUSIONS:      1. Technically difficult image quality.   2. The patient is tachycardic throughout this examination.   3. Left ventricular systolic function is normal with an ejection fraction of 64 % by Swanson's method of disks.   4. The right ventricle is not well visualized.   5. Structurally normal mitral valve with normal leaflet excursion.   6. There is mild calcification of the mitral valve annulus.   7. The left atrium is normal.   8. Trace mitral regurgitation.   9. Aortic valve was not well visualized.  10. Moderate calcification of the aortic valve leaflets.  11. The inferior vena cava is normal in size measuring 1.30 cm in diameter, (normal <2.1cm) with normal inspiratory collapse (normal >50%) consistent with normal right atrial pressure (~3, range 0-5mmHg).     Patient is a 89y old  Female who presents with a chief complaint of Weakness (11 Mar 2025 12:00)      HPI:  89yo F w/ PMH of HTN, HLD, T2DM, TIA, CKD III, Hx of R hip CRPP in 2016 followed by hardware removal in 2017 w/ Dr. Velasquez as well as R hip IMN w/ Dr. De Luna in 2023 presents for weakness and increased lethargy. Daughter at bedside. As per patient, she has been feeling increasingly weak over the last few days. As per daughter, she was "acting weird" and didn't remember certain things before she was brought into the hospital. However, she is now at her normal baseline. Patient had a recent UTI and was treated with 5 day course of macrobid. Patient does not have any urinary symptoms. Patient also has been having sacral/lower back pain since last week and states it started after using the bike during physical therapy. Patient saw her orthopedist on Thursday where an XR was done which had no acute findings. Patient was told to do MRI in 3 weeks if she was still having pain. Patient has been using Tylenol for pain relief intermittently and currently has no pain. Patient denies fevers, chills, body aches, chest pain, palpitations, sob, abdominal pain, n/v, dysuria, urinary frequency Denies trauma/fall, difficulty ambulating No sick contacts.       IN THE ED:  Temp  97.9 F , HR 59  ,  /58  ,RR 17 , SpO2 97% RA   S/P Rocephin IV x1, Azithro IV x1, 1L Bolus NS IV x1   EKG: Sinus bradycardia w/ 1st deg AV block, VR 51bpm   Labs significant for: H/H 10.5/32.3, BUN/Cr 30/1.9, alk phos 414, UA neg, CRP pending, RVP panel, ESR pending   Imaging: CT lumbar spine:   Findings suspicious for an age-indeterminate sacral insufficiency   fracture at S2. Findings suspicious for severe multilevel lumbar spinal canal   stenosis and severe left neuroforaminal stenosis at L5-S1. ; CXR: patchy right lower lobe opacity?        PAST MEDICAL & SURGICAL HISTORY:  Hypertension      Diabetes      TIA (transient ischemic attack)      Hip fracture      HLD (hyperlipidemia)      S/P ORIF (open reduction internal fixation) fracture  right hip                MEDICATIONS  (STANDING):  amLODIPine   Tablet 5 milliGRAM(s) Oral daily  aspirin enteric coated 81 milliGRAM(s) Oral <User Schedule>  atorvastatin 40 milliGRAM(s) Oral at bedtime  dextrose 5%. 1000 milliLiter(s) (50 mL/Hr) IV Continuous <Continuous>  dextrose 5%. 1000 milliLiter(s) (100 mL/Hr) IV Continuous <Continuous>  dextrose 50% Injectable 25 Gram(s) IV Push once  dextrose 50% Injectable 12.5 Gram(s) IV Push once  dextrose 50% Injectable 25 Gram(s) IV Push once  enoxaparin Injectable 30 milliGRAM(s) SubCutaneous every 24 hours  famotidine    Tablet 10 milliGRAM(s) Oral every 48 hours  ferrous    sulfate 325 milliGRAM(s) Oral daily  furosemide    Tablet 20 milliGRAM(s) Oral daily  glucagon  Injectable 1 milliGRAM(s) IntraMuscular once  insulin lispro (ADMELOG) corrective regimen sliding scale   SubCutaneous three times a day before meals  insulin lispro (ADMELOG) corrective regimen sliding scale   SubCutaneous at bedtime  losartan 50 milliGRAM(s) Oral daily  metoprolol succinate  milliGRAM(s) Oral daily    MEDICATIONS  (PRN):  acetaminophen     Tablet .. 650 milliGRAM(s) Oral every 6 hours PRN Mild Pain (1 - 3)  dextrose Oral Gel 15 Gram(s) Oral once PRN Blood Glucose LESS THAN 70 milliGRAM(s)/deciliter  lidocaine   4% Patch 1 Patch Transdermal daily PRN pain      FAMILY HISTORY:  FH: asthma (Father)      Denies Family history of CAD or early MI      Constitutional: denies fever, chills  HEENT: denies blurry vision, difficulty hearing  Respiratory: denies SOB, KAUR, cough  Cardiovascular: denies CP, palpitations, orthopnea, PND, LE edema  Gastrointestinal: denies nausea, vomiting, abdominal pain  Genitourinary: denies urinary changes  Skin: Denies rashes, itching  Neurologic: denies headache, weakness, dizziness  MSK: +back pain, +leg pain  Hematology/Oncology: denies bleeding, easy bruising  ROS negative except as noted above      SOCIAL HISTORY:    No tobacco, alcohol or recreational drug use    Vital Signs Last 24 Hrs  T(C): 37 (12 Mar 2025 11:16), Max: 37 (12 Mar 2025 11:16)  T(F): 98.6 (12 Mar 2025 11:16), Max: 98.6 (12 Mar 2025 11:16)  HR: 85 (12 Mar 2025 11:16) (53 - 85)  BP: 136/60 (12 Mar 2025 11:16) (136/60 - 210/62)  BP(mean): --  RR: 18 (12 Mar 2025 11:16) (18 - 18)  SpO2: 96% (12 Mar 2025 11:16) (92% - 99%)    Parameters below as of 12 Mar 2025 11:16  Patient On (Oxygen Delivery Method): room air        Physical Exam:  General: Well developed, well nourished, NAD  Neurology: A&Ox3, nonfocal, sensation intact   Respiratory: CTA B/L, No W/R/R  CV: RRR, +S1/S2, no murmurs, rubs or gallops  Abdominal: Soft, NT, ND +BSx4, no palpable masses  Extremities: No C/C/E, + peripheral pulses  MSK: Normal ROM, no joint erythema or warmth, no joint swelling   Skin: warm, dry, normal color  psych: appropriate mood and affect       I&O's Detail    11 Mar 2025 07:01  -  12 Mar 2025 07:00  --------------------------------------------------------  IN:  Total IN: 0 mL    OUT:    Voided (mL): 1400 mL  Total OUT: 1400 mL    Total NET: -1400 mL          LABS:                        10.7   9.89  )-----------( 230      ( 12 Mar 2025 07:38 )             32.8     03-12    138  |  102  |  29[H]  ----------------------------<  146[H]  3.7   |  26  |  1.70[H]    Ca    9.3      12 Mar 2025 07:38  Phos  2.6     03-12  Mg     1.9     03-12    TPro  7.5  /  Alb  2.5[L]  /  TBili  0.4  /  DBili  x   /  AST  55[H]  /  ALT  57  /  AlkPhos  455[H]  03-12          Urinalysis Basic - ( 12 Mar 2025 07:38 )    Color: x / Appearance: x / SG: x / pH: x  Gluc: 146 mg/dL / Ketone: x  / Bili: x / Urobili: x   Blood: x / Protein: x / Nitrite: x   Leuk Esterase: x / RBC: x / WBC x   Sq Epi: x / Non Sq Epi: x / Bacteria: x      I&O's Summary    11 Mar 2025 07:01  -  12 Mar 2025 07:00  --------------------------------------------------------  IN: 0 mL / OUT: 1400 mL / NET: -1400 mL      BNP    RADIOLOGY & ADDITIONAL STUDIES:      ECG: NSR @ 76bpm with PACs    ECHO (11/24/2023)  FINDINGS:  CONCLUSIONS:      1. Technically difficult image quality.   2. The patient is tachycardic throughout this examination.   3. Left ventricular systolic function is normal with an ejection fraction of 64 % by Swanson's method of disks.   4. The right ventricle is not well visualized.   5. Structurally normal mitral valve with normal leaflet excursion.   6. There is mild calcification of the mitral valve annulus.   7. The left atrium is normal.   8. Trace mitral regurgitation.   9. Aortic valve was not well visualized.  10. Moderate calcification of the aortic valve leaflets.  11. The inferior vena cava is normal in size measuring 1.30 cm in diameter, (normal <2.1cm) with normal inspiratory collapse (normal >50%) consistent with normal right atrial pressure (~3, range 0-5mmHg).

## 2025-03-12 NOTE — PROGRESS NOTE ADULT - PROBLEM SELECTOR PLAN 2
Patient presents w/ sacral pain  - Denies fall, acute trauma   - CT lumbar spine:   Findings suspicious for an age-indeterminate sacral insufficiency fracture at S2. Findings suspicious for severe multilevel lumbar spinal canal stenosis and severe left neuroforaminal stenosis at L5-S1  - acute L4 compression fx on MR  - Pain control PRN  - PT consulted, will appreciate recs  - Ortho consulted, will appreciate recs
Patient presents w/ sacral pain  - Denies fall, acute trauma   - CT lumbar spine:   Findings suspicious for an age-indeterminate sacral insufficiency fracture at S2. Findings suspicious for severe multilevel lumbar spinal canal stenosis and severe left neuroforaminal stenosis at L5-S1  - acute L4 compression fx on MR  - Pain control PRN - per daughter patient gets sleepy with tylenol... will utilize lidocaine patches standing  - PT consulted, will appreciate recs  - Ortho consulted, will appreciate recs
Patient presents w/ sacral pain  - Denies fall, acute trauma   - CT lumbar spine:   Findings suspicious for an age-indeterminate sacral insufficiency fracture at S2. Findings suspicious for severe multilevel lumbar spinal canal stenosis and severe left neuroforaminal stenosis at L5-S1  - fu mr lumbosacral   - Pain control PRN  - PT consulted, will appreciate recs  - Ortho consulted, will appreciate recs

## 2025-03-12 NOTE — PROGRESS NOTE ADULT - PROBLEM SELECTOR PLAN 8
Chronic   -  /58 on admission   - Continue home toprol, losartan, lasix w/ hold parameters  - Continue to monitor hemodynamics
Chronic with hypertensive urgency - possibly related to hospital setting  -  /58 on admission   - On home toprol, losartan, lasix  - Noted bradycardia so will reduce toprol to 50mg and add norvasc  - Cardio consult appreciated  - Continue to monitor hemodynamics
Chronic   -  /58 on admission   - Continue home toprol, losartan, lasix w/ hold parameters  - Continue to monitor hemodynamics

## 2025-03-12 NOTE — CONSULT NOTE ADULT - ASSESSMENT
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 Velasquez as well as R hip IMN w/ Dr. De Luna in 2023 presents for weakness and increased lethargy.     - consulted for persistent HTN and asymptomatic bradycardia. Has been missing Toprol intermittently due to HR parameter, BP likely unrelated to pain as well controlled now  - Given age, acceptable BPs 140s/80s  - Recommend increasing losartan to 100mg qd with close outpatient monitoring of renal function, should improve mild hypokalema  - Recommend decreasing Toprol to 50mg qd  - Alternatively, can trial Amlodipine 2.5mg or Nifedipine   - Patient's daughter stated that the patient will refuse Hydralazine at home. Recommend discontinuing  - Regardless of discharge regimen, needs PCP/Cardiology follow-up    - Patient is not complaining of any cardiac symptoms at this time.  - No clear evidence of acute ischemia  - No acute changes on EKG compared to previous.  - No meaningful evidence of volume overload.  - Previous TTE showed EF 64%    - Monitor and replete lytes, keep K>4, Mg>2.  - Strict I/Os, daily weights.  - Other cardiovascular workup will depend on clinical course.  - All other workup per primary team.  - Will continue to follow.

## 2025-03-12 NOTE — PROGRESS NOTE ADULT - TIME BILLING
direct patient care including but not limited to reviewing chart, medications ,laboratory data, imaging reports, discussion of plan of care with consultants on the case, coordination of care with multidisciplinary team involved in the case and discussion of plan with patient and daughter javier who are agreeable to plan of care and verbalized understanding the anticipated hospital course and treatment plan.      Time spent excludes teaching and separately reported services.
activities including direct patient care, counseling and/or coordinating care, reviewing notes/lab data/imaging, and discussion with multidisciplinary team
direct patient care including but not limited to reviewing chart, medications ,laboratory data, imaging reports, discussion of plan of care with consultants on the case, coordination of care with multidisciplinary team involved in the case and discussion of plan with patient and daughters who are agreeable to plan of care and verbalized understanding the anticipated hospital course and treatment plan.      Time spent excludes teaching and separately reported services.

## 2025-03-13 VITALS
SYSTOLIC BLOOD PRESSURE: 135 MMHG | HEART RATE: 83 BPM | TEMPERATURE: 98 F | DIASTOLIC BLOOD PRESSURE: 88 MMHG | OXYGEN SATURATION: 98 % | RESPIRATION RATE: 18 BRPM

## 2025-03-13 PROCEDURE — 81001 URINALYSIS AUTO W/SCOPE: CPT

## 2025-03-13 PROCEDURE — 82962 GLUCOSE BLOOD TEST: CPT

## 2025-03-13 PROCEDURE — 99232 SBSQ HOSP IP/OBS MODERATE 35: CPT

## 2025-03-13 PROCEDURE — 86140 C-REACTIVE PROTEIN: CPT

## 2025-03-13 PROCEDURE — 85025 COMPLETE CBC W/AUTO DIFF WBC: CPT

## 2025-03-13 PROCEDURE — 71045 X-RAY EXAM CHEST 1 VIEW: CPT

## 2025-03-13 PROCEDURE — 97162 PT EVAL MOD COMPLEX 30 MIN: CPT

## 2025-03-13 PROCEDURE — 72131 CT LUMBAR SPINE W/O DYE: CPT | Mod: MC

## 2025-03-13 PROCEDURE — 0225U NFCT DS DNA&RNA 21 SARSCOV2: CPT

## 2025-03-13 PROCEDURE — 83735 ASSAY OF MAGNESIUM: CPT

## 2025-03-13 PROCEDURE — 93970 EXTREMITY STUDY: CPT

## 2025-03-13 PROCEDURE — 83036 HEMOGLOBIN GLYCOSYLATED A1C: CPT

## 2025-03-13 PROCEDURE — 97530 THERAPEUTIC ACTIVITIES: CPT

## 2025-03-13 PROCEDURE — 97116 GAIT TRAINING THERAPY: CPT

## 2025-03-13 PROCEDURE — 85652 RBC SED RATE AUTOMATED: CPT

## 2025-03-13 PROCEDURE — 99239 HOSP IP/OBS DSCHRG MGMT >30: CPT

## 2025-03-13 PROCEDURE — 82977 ASSAY OF GGT: CPT

## 2025-03-13 PROCEDURE — 36415 COLL VENOUS BLD VENIPUNCTURE: CPT

## 2025-03-13 PROCEDURE — 99285 EMERGENCY DEPT VISIT HI MDM: CPT | Mod: 25

## 2025-03-13 PROCEDURE — 72148 MRI LUMBAR SPINE W/O DYE: CPT | Mod: MC

## 2025-03-13 PROCEDURE — 93005 ELECTROCARDIOGRAM TRACING: CPT

## 2025-03-13 PROCEDURE — 80053 COMPREHEN METABOLIC PANEL: CPT

## 2025-03-13 PROCEDURE — 84100 ASSAY OF PHOSPHORUS: CPT

## 2025-03-13 RX ORDER — LIDOCAINE HYDROCHLORIDE 20 MG/ML
1 JELLY TOPICAL
Qty: 6 | Refills: 0
Start: 2025-03-13 | End: 2025-04-11

## 2025-03-13 RX ORDER — AMLODIPINE BESYLATE 10 MG/1
1 TABLET ORAL
Qty: 30 | Refills: 0
Start: 2025-03-13 | End: 2025-04-11

## 2025-03-13 RX ORDER — LIDOCAINE HYDROCHLORIDE 20 MG/ML
1 JELLY TOPICAL
Qty: 30 | Refills: 0
Start: 2025-03-13 | End: 2025-04-11

## 2025-03-13 RX ORDER — METOPROLOL SUCCINATE 50 MG/1
1 TABLET, EXTENDED RELEASE ORAL
Qty: 30 | Refills: 0
Start: 2025-03-13 | End: 2025-04-11

## 2025-03-13 RX ADMIN — LOSARTAN POTASSIUM 50 MILLIGRAM(S): 100 TABLET, FILM COATED ORAL at 05:53

## 2025-03-13 RX ADMIN — Medication 10 MILLIGRAM(S): at 11:01

## 2025-03-13 RX ADMIN — AMLODIPINE BESYLATE 5 MILLIGRAM(S): 10 TABLET ORAL at 05:52

## 2025-03-13 RX ADMIN — FUROSEMIDE 20 MILLIGRAM(S): 10 INJECTION INTRAMUSCULAR; INTRAVENOUS at 05:52

## 2025-03-13 RX ADMIN — METOPROLOL SUCCINATE 50 MILLIGRAM(S): 50 TABLET, EXTENDED RELEASE ORAL at 05:52

## 2025-03-13 RX ADMIN — LIDOCAINE HYDROCHLORIDE 1 PATCH: 20 JELLY TOPICAL at 00:00

## 2025-03-13 RX ADMIN — Medication 325 MILLIGRAM(S): at 11:01

## 2025-03-13 RX ADMIN — LIDOCAINE HYDROCHLORIDE 1 PATCH: 20 JELLY TOPICAL at 11:02

## 2025-03-13 NOTE — PROGRESS NOTE ADULT - ASSESSMENT
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 Velasquez as well as R hip IMN w/ Dr. De Luna in 2023 presents for weakness and increased lethargy.     - consulted for persistent HTN and asymptomatic bradycardia. Has been missing Toprol intermittently due to HR parameter, BP likely unrelated to pain as well controlled now  - Given age, acceptable BPs 140s/80s  - Recommend increasing losartan to 100mg qd with close outpatient monitoring of renal function, should improve mild hypokalema  - CW Toprol to 50mg qd  - Amlodipine 5mg   - Continue off hydralazine as Patient's daughter stated that the patient will refuse Hydralazine at home.    - Regardless of discharge regimen, needs PCP/Cardiology follow-up    - Patient is not complaining of any cardiac symptoms at this time.  - No clear evidence of acute ischemia  - No acute changes on EKG compared to previous.  - No meaningful evidence of volume overload.  - Previous TTE showed EF 64%    - Monitor and replete lytes, keep K>4, Mg>2.  - Strict I/Os, daily weights.  - Other cardiovascular workup will depend on clinical course.  - All other workup per primary team.  - Will continue to follow.      Stanley Jacobo DNP, AGACNP-BC  Cardiology   Call Teams       
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 Velasquez as well as R hip IMN w/ Dr. De Luna in 2023 presents for weakness and increased lethargy. Admitted for weakness.       
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 Velasquez as well as R hip IMN w/ Dr. De Luna in 2023 presents for weakness and increased lethargy. Admitted for weakness.       
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 Velasquez as well as R hip IMN w/ Dr. De Luna in 2023 presents for weakness and increased lethargy. Admitted for weakness and lethargy.

## 2025-03-13 NOTE — CAREGIVER ENGAGEMENT NOTE - CAREGIVER OUTREACH NOTES - FREE TEXT
IM select note    Patient: India Lemos Date: 2017   : 1952 Attending: Chandler Rogers MD   64 year old female      Chief Complaint: VDRF    Subjective:   Eyes open  Doesn't track  Not following commands  Tolerates TM         Problem List:   Patient Active Problem List   Diagnosis   • Chest pain   • Hypertension   • Morbid obesity   • History of cardiovascular stress test   • Hx of myocardial perfusion scan   • Hx of Doppler echocardiogram   • History of cardiac catheterization       Allergies: ALLERGIES:  No Known Allergies    Medications/Infusions: Reviewed    Review of Systems: Review of systems not obtained due to patient's current status        Vital reviewed in chart     Tele- NSR          Physical Exam:   GENERAL: The patient is encephalopathic, nonverbal. She does open her eyes spontaneously, but nonpurposefully.  HEENT: Head is grossly atraumatic. Pupils are difficult to assess.  NECK: Supple, with tracheostomy.  CHEST: Poor chest excursion, with postoperative changes.  HEART: Irregular, with systolic murmur.  ABDOMEN: Benign, soft, obese. Bowel sounds present. PEG tube is intact.  EXTREMITIES: Bilateral stasis.  NEUROLOGIC: With encephalopathy. Nonverbal.        Laboratory Results:     Recent Labs  Lab 17  0613 17  0610 17  0955 17  0512  17  1800 17  0550   WBC  --   --   --   --   --   --  13.3*   HCT  --   --   --   --   --   --  25.9*   HGB  --   --   --   --   --   --  8.0*   PLT  --   --   --   --   --   --  368   INR 2.4 3.3  --  4.0  < >  --  3.0   SODIUM  --   --  141  --   --  140  --    POTASSIUM  --   --  4.4  --   --  4.2  --    CHLORIDE  --   --  100  --   --  98  --    CO2  --   --  30  --   --  29  --    CALCIUM  --   --  9.1  --   --  9.0  --    GLUCOSE  --   --  217*  --   --  231*  --    BUN  --   --  67*  --   --  98*  --    CREATININE  --   --  3.67*  --   --  5.08*  --    GFRNA  --   --  12  --   --  8  --    PHOS  --   --  3.5  --   --   3.9  --    < > = values in this interval not displayed.    Imaging: No results found.      Assessment:     1. Ventilator-dependent respiratory failure, tracheostomy. Plan for continued weaning trials.  2. Cardiac arrest and asystole, anoxic encephalopathy.  3. Dysphagia with percutaneous endoscopic gastrostomy tube.  4. Severe aortic stenosis requiring bioprosthetic aortic valve.  5. Recent pericardial tamponade requiring pericardial window.  6. Cardiomyopathy, ejection fraction 30%.  7. End-stage renal disease, on dialysis.  8. Severe sleep apnea.  9. Diabetes, type 2.  10. Paroxysmal atrial fibrillation.  11. Intertrigo under both breasts.  12. Sacral pressure wounds.  No CAD, clean coronaries,   S/p GIB/ PRBC  Drug hepatitis      Plans/Recommendations:   HD  TF  Trach care  Monitor labs LFT's  Q2 hr turn  Overall prognosis guarded   CM speak to caregiver to explain role and transitions of care and discharge plans.  All questions answered to the best of my abilities.  CM remains available throughout the hospital stay.

## 2025-03-13 NOTE — PROGRESS NOTE ADULT - NS ATTEND AMEND GEN_ALL_CORE FT
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 Velasquez as well as R hip IMN w/ Dr. De Luna in 2023 presents for weakness and increased lethargy.     - consulted for persistent HTN and asymptomatic bradycardia. Has been missing Toprol intermittently due to HR parameter, BP likely unrelated to pain as well controlled now  - Recommend increasing losartan to 100mg qd with close outpatient monitoring of renal function  - CW Toprol to 50mg qd  - Amlodipine 5mg   - Continue off hydralazine as Patient's daughter stated that the patient will refuse Hydralazine at home.    - Regardless of discharge regimen, needs PCP/Cardiology follow-up

## 2025-03-13 NOTE — CASE MANAGEMENT PROGRESS NOTE - NSCMPROGRESSNOTE_GEN_ALL_CORE
Per MD, patient is medically cleared for discharge home today.  CM met with patient and daughter Maday at bedside to discussed discharge disposition is home with [facility] home care. CM spoke to Shabana from [facility]  [phone] for resumptions of aides.  DC documents faxed to [phone]. Cm called Sade at the [facility] and left message x2.  Called Justin at the [facility] and left message x2.  Finally obtained clearance from the [facility] to come back. Family to transport patient home. Patient verbalized understanding of the transition plan and is in agreement. CM answered all questions to the best of my abilities. CM remains available throughout hospital stay.

## 2025-03-13 NOTE — PHARMACOTHERAPY INTERVENTION NOTE - COMMENTS
[facility] Counseling and Discharge Reconciliation    Discussed medication list with the patient's daughters including indications, directions, and adverse effects to monitor. Family verbalized understanding and had no further questions.    Time spent counselin minutes  Notes: clarified metoprolol dose decrease, addition of amlodipine and famotidine dosing

## 2025-03-13 NOTE — PROGRESS NOTE ADULT - SUBJECTIVE AND OBJECTIVE BOX
[facility] -- Tigist Mari Pannella, Patel, Savella, Goodger, Cohen: Office # [phone]    Follow Up:  HTN    Subjective/Observations: No events overnight resting comfortably in bed.  No complaints of chest pain, dyspnea, or palpitations reported. No signs of orthopnea or PND. Tolerating room air     REVIEW OF SYSTEMS: All other review of systems are negative unless indicated above    PAST MEDICAL & SURGICAL HISTORY:  Hypertension      Diabetes      TIA (transient ischemic attack)      Hip fracture      HLD (hyperlipidemia)      S/P ORIF (open reduction internal fixation) fracture  right hip          MEDICATIONS  (STANDING):  amLODIPine   Tablet 5 milliGRAM(s) Oral daily  aspirin enteric coated 81 milliGRAM(s) Oral <User Schedule>  atorvastatin 40 milliGRAM(s) Oral at bedtime  dextrose 5%. 1000 milliLiter(s) (50 mL/Hr) IV Continuous <Continuous>  dextrose 5%. 1000 milliLiter(s) (100 mL/Hr) IV Continuous <Continuous>  dextrose 50% Injectable 25 Gram(s) IV Push once  dextrose 50% Injectable 12.5 Gram(s) IV Push once  dextrose 50% Injectable 25 Gram(s) IV Push once  enoxaparin Injectable 30 milliGRAM(s) SubCutaneous every 24 hours  famotidine    Tablet 10 milliGRAM(s) Oral every 48 hours  ferrous    sulfate 325 milliGRAM(s) Oral daily  furosemide    Tablet 20 milliGRAM(s) Oral daily  glucagon  Injectable 1 milliGRAM(s) IntraMuscular once  insulin lispro (ADMELOG) corrective regimen sliding scale   SubCutaneous three times a day before meals  insulin lispro (ADMELOG) corrective regimen sliding scale   SubCutaneous at bedtime  lidocaine   4% Patch 1 Patch Transdermal daily  losartan 50 milliGRAM(s) Oral daily  metoprolol succinate ER 50 milliGRAM(s) Oral daily    MEDICATIONS  (PRN):  acetaminophen     Tablet .. 650 milliGRAM(s) Oral every 6 hours PRN Mild Pain (1 - 3)  dextrose Oral Gel 15 Gram(s) Oral once PRN Blood Glucose LESS THAN 70 milliGRAM(s)/deciliter    Allergies    No Known Allergies    Intolerances      Vital Signs Last 24 Hrs  T(C): 36.4 (13 Mar 2025 05:30), Max: 37 (12 Mar 2025 11:16)  T(F): 97.6 (13 Mar 2025 05:30), Max: 98.6 (12 Mar 2025 11:16)  HR: 69 (13 Mar 2025 05:30) (69 - 85)  BP: 175/74 (13 Mar 2025 05:30) (136/60 - 175/74)  BP(mean): --  RR: 18 (13 Mar 2025 05:30) (18 - 18)  SpO2: 95% (13 Mar 2025 05:30) (94% - 96%)    Parameters below as of 13 Mar 2025 05:30  Patient On (Oxygen Delivery Method): room air      I&O's Summary    12 Mar 2025 07:01  -  13 Mar 2025 07:00  --------------------------------------------------------  IN: 0 mL / OUT: 2500 mL / NET: -2500 mL          TELE: Off cardiac monitor   PHYSICAL EXAM:  Constitutional: NAD, awake and alert  HEENT: Moist Mucous Membranes, Anicteric  Pulmonary: Non-labored, breath sounds are clear bilaterally, No wheezing, rales or rhonchi  Cardiovascular: Regular, S1 and S2, No murmurs, No rubs, gallops or clicks  Gastrointestinal:  soft, nontender, nondistended   Lymph: No peripheral edema. No lymphadenopathy.   Skin: No visible rashes or ulcers.  Psych:  Mood & affect appropriate      LABS: All Labs Reviewed:                        10.7   9.89  )-----------( 230      ( 12 Mar 2025 07:38 )             32.8                         9.7    8.02  )-----------( 209      ( 11 Mar 2025 06:04 )             30.0     12 Mar 2025 07:38    138    |  102    |  29     ----------------------------<  146    3.7     |  26     |  1.70   11 Mar 2025 06:04    137    |  104    |  28     ----------------------------<  135    3.3     |  26     |  1.70     Ca    9.3        12 Mar 2025 07:38  Ca    9.4        11 Mar 2025 06:04  Phos  2.6       12 Mar 2025 07:38  Phos  3.5       11 Mar 2025 06:04  Mg     1.9       12 Mar 2025 07:38  Mg     2.0       11 Mar 2025 06:04    TPro  7.5    /  Alb  2.5    /  TBili  0.4    /  DBili  x      /  AST  55     /  ALT  57     /  AlkPhos  455    12 Mar 2025 07:38  TPro  6.8    /  Alb  2.4    /  TBili  0.3    /  DBili  x      /  AST  72     /  ALT  61     /  AlkPhos  433    11 Mar 2025 06:04   LIVER FUNCTIONS - ( 12 Mar 2025 07:38 )  Alb: 2.5 g/dL / Pro: 7.5 g/dL / ALK PHOS: 455 U/L / ALT: 57 U/L / AST: 55 U/L / GGT: x             12 Lead ECG:   Ventricular Rate 51 BPM    Atrial Rate 51 BPM    P-R Interval 240 ms    QRS Duration 86 ms    Q-T Interval 480 ms    QTC Calculation(Bazett) 442 ms    P Axis 91 degrees    R Axis 11 degrees    T Axis 38 degrees    Diagnosis Line Sinus bradycardia with marked sinus arrhythmia with 1st degree AV block  Confirmed by TERESA JOSEPH ([id number]) on 3/9/2025 3:25:47 PM (03-09-25 @ 12:50)      TRANSTHORACIC ECHOCARDIOGRAM REPORT  ________________________________________________________________________________                                      _______       Pt. Name:       NELDA VERDIN Study Date:    11/24/2023  MRN:            [id number]       YOB: 1930  Accession #:    [id number]      Age:           89 years  Account#:       [id number]     Gender:        F  Heart Rate:                    Height:        62.99 in (160.00 cm)  Rhythm:                        Weight:   114.64 lb (52.00 kg)  Blood Pressure: 158/98 mmHg    BSA/BMI:       1.53 m² / 20.31 kg/m²  ________________________________________________________________________________________  Referring Physician:    [id number] Addy Mueller  Interpreting Physician: Chuy Escoto  Primary Sonographer:    Juanito Bahena    CPT:               ECHO TTE WO CON COMP W DOPP - 69423.m  Indication(s):     Abnormal electrocardiogram ECG/EKG - R94.31  Procedure:         Transthoracic echocardiogram with 2-D, M-modeand complete                     spectral and color flow Doppler.  Ordering Location: [facility]  Study Information: Image quality for this study is technically difficult.    _______________________________________________________________________________________     CONCLUSIONS:      1. Technically difficult image quality.   2. The patient is tachycardic throughout this examination.   3. Left ventricular systolic function is normal with an ejection fraction of 64 % by Swanson's method of disks.   4. The right ventricle is not well visualized.   5. Structurally normal mitral valve with normal leaflet excursion.   6. There is mild calcification of the mitral valve annulus.   7. The left atrium is normal.   8. Trace mitral regurgitation.   9. Aortic valve was not well visualized.  10. Moderate calcification of the aortic valve leaflets.  11. The inferior vena cava is normal in size measuring 1.30 cm in diameter, (normal <2.1cm) with normal inspiratory collapse (normal >50%) consistent with normal right atrial pressure (~3, range 0-5mmHg).    ________________________________________________________________________________________  FINDINGS:     Left Ventricle:  Left ventricular systolic function is normal with a calculated ejection fraction of 64 % by the Swanson's biplane method of disks. There is normal left ventricular diastolic function.     Right Ventricle:  The right ventricle is not well visualized. Tricuspid annular plane systolic excursion (TAPSE) is 1.7 cm (normal >=1.7 cm).     Left Atrium:  The left atrium is normal with an indexed volume of 21.82 ml/m².     Right Atrium:  The right atrium was not well visualized.     Aortic Valve:  The aortic valve was not well visualized. The aortic valve anatomy cannot be determined. There is moderate calcification of the aortic valve leaflets.     Mitral Valve:  Structurally normal mitral valve with normal leaflet excursion. There is mild calcification of the mitral valve annulus. There is trace mitral regurgitation.     Tricuspid Valve:  The tricuspid valve was not well visualized.     Pulmonic Valve:  The pulmonic valve was not well visualized.     Aorta:  The aortic root at the sinuses of Valsalva is normal in size, measuring 3.00 cm (indexed 1.97 cm/m²).     Systemic Veins:  The inferior vena cava is normal in size measuring 1.30 cm in diameter, (normal <2.1cm) with normal inspiratory collapse (normal >50%) consistent with normal right atrial pressure (~3, range 0-5mmHg).  ____________________________________________________________________  QUANTITATIVE DATA:  Left Ventricle Measurements: (Indexed to BSA)     IVSd (2D):   1.2 cm  LVPWd (2D):  1.0 cm  LVIDd (2D):  3.5 cm  LVIDs (2D):  2.5 cm  LV Mass:     121 g  79.2 g/m²  LV Vol d, MOD A2C: 33.7 ml 22.08 ml/m²  LV Vol d, MOD A4C: 18.8 ml 12.32 ml/m²  LV Vol d, MOD BP:  25.5 ml 16.69 ml/m²  LV Vol s, MOD A2C: 11.5 ml 7.53 ml/m²  LV Vol s, MOD A4C: 7.7 ml  5.06 ml/m²  LV Vol s, MOD BP:  9.2 ml  6.02 ml/m²  LVOT SV MOD BP:    16.3 ml  LV EF% MOD BP:     64 %     MV E Vmax:    1.33 m/s  MV A Vmax:    0.50 m/s  MV E/A:       2.67  e' lateral:   21.20 cm/s  e' medial:    12.80 cm/s  E/e' lateral: 6.27  E/e' medial:  10.39  E/e' Average: 7.82  MV DT:        143 msec    Aorta Measurements: (normal range) (Indexed to BSA)     Sinuses of Valsalva: 3.00 cm (2.7 - 3.3 cm)       Left Atrium Measurements: (Indexed to BSA)  LA Diam 2D: 3.40 cm    Right Ventricle Measurements:     TAPSE: 1.7 cm       LVOT / RVOT/ Qp/Qs Data: (Indexed to BSA)  LVOT Diameter: 1.80 cm    Mitral Valve Measurements:     MV E Vmax: 1.3 m/s  MV A Vmax: 0.5 m/s  MV E/A:    2.7       Tricuspid Valve Measurements:     RA Pressure: 3 mmHg    ________________________________________________________________________________________  Electronicallysigned on 11/25/2023 at 6:17:31 AM by Chuy Escoto         *** Final ***

## 2025-03-19 ENCOUNTER — APPOINTMENT (OUTPATIENT)
Dept: CARE COORDINATION | Facility: HOME HEALTH | Age: 89
End: 2025-03-19
Payer: MEDICARE

## 2025-03-19 VITALS
HEART RATE: 59 BPM | RESPIRATION RATE: 17 BRPM | OXYGEN SATURATION: 96 % | SYSTOLIC BLOOD PRESSURE: 142 MMHG | DIASTOLIC BLOOD PRESSURE: 54 MMHG

## 2025-03-19 DIAGNOSIS — I10 ESSENTIAL (PRIMARY) HYPERTENSION: ICD-10-CM

## 2025-03-19 DIAGNOSIS — R53.1 WEAKNESS: ICD-10-CM

## 2025-03-19 DIAGNOSIS — E78.5 HYPERLIPIDEMIA, UNSPECIFIED: ICD-10-CM

## 2025-03-19 DIAGNOSIS — E11.9 TYPE 2 DIABETES MELLITUS W/OUT COMPLICATIONS: ICD-10-CM

## 2025-03-19 DIAGNOSIS — D64.9 ANEMIA, UNSPECIFIED: ICD-10-CM

## 2025-03-19 DIAGNOSIS — K21.9 GASTRO-ESOPHAGEAL REFLUX DISEASE W/OUT ESOPHAGITIS: ICD-10-CM

## 2025-03-19 PROCEDURE — 99495 TRANSJ CARE MGMT MOD F2F 14D: CPT

## 2025-03-20 PROBLEM — K21.9 GERD (GASTROESOPHAGEAL REFLUX DISEASE): Status: ACTIVE | Noted: 2025-03-20

## 2025-03-20 PROBLEM — R53.1 WEAKNESS: Status: ACTIVE | Noted: 2025-03-20

## 2025-03-20 PROBLEM — E78.5 HYPERLIPIDEMIA: Status: ACTIVE | Noted: 2025-03-20

## 2025-03-20 PROBLEM — D64.9 ANEMIA: Status: ACTIVE | Noted: 2025-03-20

## 2025-03-20 RX ORDER — CHLORHEXIDINE GLUCONATE 4 %
325 (65 FE) LIQUID (ML) TOPICAL DAILY
Refills: 0 | Status: ACTIVE | COMMUNITY
Start: 2025-03-20

## 2025-03-20 RX ORDER — PANTOPRAZOLE 40 MG/1
40 TABLET, DELAYED RELEASE ORAL DAILY
Qty: 30 | Refills: 2 | Status: ACTIVE | COMMUNITY
Start: 2025-03-20

## 2025-03-20 RX ORDER — ALOGLIPTIN 12.5 MG/1
12.5 TABLET, FILM COATED ORAL DAILY
Refills: 0 | Status: ACTIVE | COMMUNITY
Start: 2025-03-20

## 2025-03-20 RX ORDER — LOSARTAN POTASSIUM 25 MG/1
25 TABLET, FILM COATED ORAL
Qty: 90 | Refills: 3 | Status: ACTIVE | COMMUNITY
Start: 2025-03-20

## 2025-03-20 RX ORDER — FUROSEMIDE 20 MG/1
20 TABLET ORAL
Qty: 90 | Refills: 0 | Status: ACTIVE | COMMUNITY
Start: 2025-03-20

## 2025-03-20 RX ORDER — AMLODIPINE BESYLATE 5 MG/1
5 TABLET ORAL DAILY
Refills: 0 | Status: ACTIVE | COMMUNITY
Start: 2025-03-20

## 2025-03-20 RX ORDER — FAMOTIDINE 20 MG/1
20 TABLET, FILM COATED ORAL DAILY
Refills: 0 | Status: ACTIVE | COMMUNITY
Start: 2025-03-20

## 2025-03-20 RX ORDER — METOPROLOL SUCCINATE 50 MG/1
50 TABLET, EXTENDED RELEASE ORAL
Qty: 90 | Refills: 3 | Status: ACTIVE | COMMUNITY
Start: 2025-03-20

## 2025-03-20 RX ORDER — HYDRALAZINE HYDROCHLORIDE 50 MG/1
50 TABLET ORAL TWICE DAILY
Refills: 0 | Status: ACTIVE | COMMUNITY
Start: 2025-03-20

## 2025-03-20 RX ORDER — LOSARTAN POTASSIUM 50 MG/1
50 TABLET, FILM COATED ORAL DAILY
Qty: 30 | Refills: 0 | Status: ACTIVE | COMMUNITY
Start: 2025-03-20

## 2025-03-20 RX ORDER — ATORVASTATIN CALCIUM 40 MG/1
40 TABLET, FILM COATED ORAL
Qty: 90 | Refills: 3 | Status: ACTIVE | COMMUNITY
Start: 2025-03-20

## 2025-04-16 ENCOUNTER — INPATIENT (INPATIENT)
Facility: HOSPITAL | Age: 89
LOS: 5 days | Discharge: NURSING, REHAB OR LONG-TERM CARE | DRG: 445 | End: 2025-04-22
Attending: INTERNAL MEDICINE | Admitting: INTERNAL MEDICINE
Payer: MEDICARE

## 2025-04-16 VITALS
RESPIRATION RATE: 19 BRPM | OXYGEN SATURATION: 99 % | DIASTOLIC BLOOD PRESSURE: 66 MMHG | SYSTOLIC BLOOD PRESSURE: 165 MMHG | WEIGHT: 139.99 LBS | HEART RATE: 58 BPM | HEIGHT: 61 IN | TEMPERATURE: 98 F

## 2025-04-16 DIAGNOSIS — N39.0 URINARY TRACT INFECTION, SITE NOT SPECIFIED: ICD-10-CM

## 2025-04-16 DIAGNOSIS — K81.0 ACUTE CHOLECYSTITIS: ICD-10-CM

## 2025-04-16 DIAGNOSIS — Z96.7 PRESENCE OF OTHER BONE AND TENDON IMPLANTS: Chronic | ICD-10-CM

## 2025-04-16 DIAGNOSIS — E11.9 TYPE 2 DIABETES MELLITUS WITHOUT COMPLICATIONS: ICD-10-CM

## 2025-04-16 DIAGNOSIS — D63.8 ANEMIA IN OTHER CHRONIC DISEASES CLASSIFIED ELSEWHERE: ICD-10-CM

## 2025-04-16 DIAGNOSIS — I10 ESSENTIAL (PRIMARY) HYPERTENSION: ICD-10-CM

## 2025-04-16 DIAGNOSIS — R60.0 LOCALIZED EDEMA: ICD-10-CM

## 2025-04-16 DIAGNOSIS — E78.5 HYPERLIPIDEMIA, UNSPECIFIED: ICD-10-CM

## 2025-04-16 DIAGNOSIS — Z29.9 ENCOUNTER FOR PROPHYLACTIC MEASURES, UNSPECIFIED: ICD-10-CM

## 2025-04-16 DIAGNOSIS — Z86.73 PERSONAL HISTORY OF TRANSIENT ISCHEMIC ATTACK (TIA), AND CEREBRAL INFARCTION WITHOUT RESIDUAL DEFICITS: ICD-10-CM

## 2025-04-16 LAB
ALBUMIN SERPL ELPH-MCNC: 2.1 G/DL — LOW (ref 3.3–5)
ALP SERPL-CCNC: 959 U/L — HIGH (ref 40–120)
ALT FLD-CCNC: 63 U/L — SIGNIFICANT CHANGE UP (ref 12–78)
ANION GAP SERPL CALC-SCNC: 10 MMOL/L — SIGNIFICANT CHANGE UP (ref 5–17)
APPEARANCE UR: CLEAR — SIGNIFICANT CHANGE UP
APPEARANCE UR: CLEAR — SIGNIFICANT CHANGE UP
APTT BLD: 31.3 SEC — SIGNIFICANT CHANGE UP (ref 24.5–35.6)
AST SERPL-CCNC: 139 U/L — HIGH (ref 15–37)
BACTERIA # UR AUTO: ABNORMAL /HPF
BASOPHILS # BLD AUTO: 0.06 K/UL — SIGNIFICANT CHANGE UP (ref 0–0.2)
BASOPHILS NFR BLD AUTO: 0.6 % — SIGNIFICANT CHANGE UP (ref 0–2)
BILIRUB SERPL-MCNC: 0.8 MG/DL — SIGNIFICANT CHANGE UP (ref 0.2–1.2)
BILIRUB UR-MCNC: NEGATIVE — SIGNIFICANT CHANGE UP
BILIRUB UR-MCNC: NEGATIVE — SIGNIFICANT CHANGE UP
BUN SERPL-MCNC: 27 MG/DL — HIGH (ref 7–23)
CALCIUM SERPL-MCNC: 9.2 MG/DL — SIGNIFICANT CHANGE UP (ref 8.5–10.1)
CHLORIDE SERPL-SCNC: 100 MMOL/L — SIGNIFICANT CHANGE UP (ref 96–108)
CK MB CFR SERPL CALC: <1 NG/ML — SIGNIFICANT CHANGE UP (ref 0–3.6)
CO2 SERPL-SCNC: 28 MMOL/L — SIGNIFICANT CHANGE UP (ref 22–31)
COLOR SPEC: YELLOW — SIGNIFICANT CHANGE UP
COLOR SPEC: YELLOW — SIGNIFICANT CHANGE UP
CREAT SERPL-MCNC: 2 MG/DL — HIGH (ref 0.5–1.3)
DIFF PNL FLD: NEGATIVE — SIGNIFICANT CHANGE UP
DIFF PNL FLD: NEGATIVE — SIGNIFICANT CHANGE UP
EGFR: 23 ML/MIN/1.73M2 — LOW
EGFR: 23 ML/MIN/1.73M2 — LOW
EOSINOPHIL # BLD AUTO: 0.13 K/UL — SIGNIFICANT CHANGE UP (ref 0–0.5)
EOSINOPHIL NFR BLD AUTO: 1.4 % — SIGNIFICANT CHANGE UP (ref 0–6)
EPI CELLS # UR: PRESENT
GLUCOSE BLDC GLUCOMTR-MCNC: 114 MG/DL — HIGH (ref 70–99)
GLUCOSE SERPL-MCNC: 131 MG/DL — HIGH (ref 70–99)
GLUCOSE UR QL: NEGATIVE MG/DL — SIGNIFICANT CHANGE UP
GLUCOSE UR QL: NEGATIVE MG/DL — SIGNIFICANT CHANGE UP
HCT VFR BLD CALC: 28.9 % — LOW (ref 34.5–45)
HGB BLD-MCNC: 9.2 G/DL — LOW (ref 11.5–15.5)
IMM GRANULOCYTES NFR BLD AUTO: 0.6 % — SIGNIFICANT CHANGE UP (ref 0–0.9)
INR BLD: 1.16 RATIO — SIGNIFICANT CHANGE UP (ref 0.85–1.16)
KETONES UR-MCNC: NEGATIVE MG/DL — SIGNIFICANT CHANGE UP
KETONES UR-MCNC: NEGATIVE MG/DL — SIGNIFICANT CHANGE UP
LEUKOCYTE ESTERASE UR-ACNC: ABNORMAL
LEUKOCYTE ESTERASE UR-ACNC: ABNORMAL
LYMPHOCYTES # BLD AUTO: 0.99 K/UL — LOW (ref 1–3.3)
LYMPHOCYTES # BLD AUTO: 10.4 % — LOW (ref 13–44)
MAGNESIUM SERPL-MCNC: 1.9 MG/DL — SIGNIFICANT CHANGE UP (ref 1.6–2.6)
MCHC RBC-ENTMCNC: 25.6 PG — LOW (ref 27–34)
MCHC RBC-ENTMCNC: 31.8 G/DL — LOW (ref 32–36)
MCV RBC AUTO: 80.5 FL — SIGNIFICANT CHANGE UP (ref 80–100)
MONOCYTES # BLD AUTO: 1.16 K/UL — HIGH (ref 0–0.9)
MONOCYTES NFR BLD AUTO: 12.2 % — SIGNIFICANT CHANGE UP (ref 2–14)
NEUTROPHILS # BLD AUTO: 7.08 K/UL — SIGNIFICANT CHANGE UP (ref 1.8–7.4)
NEUTROPHILS NFR BLD AUTO: 74.8 % — SIGNIFICANT CHANGE UP (ref 43–77)
NITRITE UR-MCNC: NEGATIVE — SIGNIFICANT CHANGE UP
NITRITE UR-MCNC: NEGATIVE — SIGNIFICANT CHANGE UP
NRBC BLD AUTO-RTO: 0 /100 WBCS — SIGNIFICANT CHANGE UP (ref 0–0)
NT-PROBNP SERPL-SCNC: 6542 PG/ML — HIGH (ref 0–450)
PH UR: 5.5 — SIGNIFICANT CHANGE UP (ref 5–8)
PH UR: 5.5 — SIGNIFICANT CHANGE UP (ref 5–8)
PLATELET # BLD AUTO: 283 K/UL — SIGNIFICANT CHANGE UP (ref 150–400)
POTASSIUM SERPL-MCNC: 3.3 MMOL/L — LOW (ref 3.5–5.3)
POTASSIUM SERPL-SCNC: 3.3 MMOL/L — LOW (ref 3.5–5.3)
PROT SERPL-MCNC: 7.7 G/DL — SIGNIFICANT CHANGE UP (ref 6–8.3)
PROT UR-MCNC: 30 MG/DL
PROT UR-MCNC: 30 MG/DL
PROTHROM AB SERPL-ACNC: 13.7 SEC — HIGH (ref 9.9–13.4)
RBC # BLD: 3.59 M/UL — LOW (ref 3.8–5.2)
RBC # FLD: 15.7 % — HIGH (ref 10.3–14.5)
RBC CASTS # UR COMP ASSIST: 0 /HPF — SIGNIFICANT CHANGE UP (ref 0–4)
SODIUM SERPL-SCNC: 138 MMOL/L — SIGNIFICANT CHANGE UP (ref 135–145)
SP GR SPEC: 1.01 — SIGNIFICANT CHANGE UP (ref 1–1.03)
SP GR SPEC: 1.01 — SIGNIFICANT CHANGE UP (ref 1–1.03)
TROPONIN I, HIGH SENSITIVITY RESULT: 21.5 NG/L — SIGNIFICANT CHANGE UP
UROBILINOGEN FLD QL: 0.2 MG/DL — SIGNIFICANT CHANGE UP (ref 0.2–1)
UROBILINOGEN FLD QL: 0.2 MG/DL — SIGNIFICANT CHANGE UP (ref 0.2–1)
WBC # BLD: 9.48 K/UL — SIGNIFICANT CHANGE UP (ref 3.8–10.5)
WBC # FLD AUTO: 9.48 K/UL — SIGNIFICANT CHANGE UP (ref 3.8–10.5)
WBC UR QL: 25 /HPF — HIGH (ref 0–5)

## 2025-04-16 PROCEDURE — 99285 EMERGENCY DEPT VISIT HI MDM: CPT | Mod: FS

## 2025-04-16 PROCEDURE — 93010 ELECTROCARDIOGRAM REPORT: CPT

## 2025-04-16 PROCEDURE — 93970 EXTREMITY STUDY: CPT | Mod: 26

## 2025-04-16 PROCEDURE — 71045 X-RAY EXAM CHEST 1 VIEW: CPT | Mod: 26

## 2025-04-16 PROCEDURE — 76705 ECHO EXAM OF ABDOMEN: CPT | Mod: 26

## 2025-04-16 PROCEDURE — 78226 HEPATOBILIARY SYSTEM IMAGING: CPT | Mod: 26

## 2025-04-16 PROCEDURE — 74176 CT ABD & PELVIS W/O CONTRAST: CPT | Mod: 26

## 2025-04-16 PROCEDURE — 99223 1ST HOSP IP/OBS HIGH 75: CPT

## 2025-04-16 RX ORDER — FERROUS SULFATE 137(45) MG
325 TABLET, EXTENDED RELEASE ORAL DAILY
Refills: 0 | Status: DISCONTINUED | OUTPATIENT
Start: 2025-04-16 | End: 2025-04-22

## 2025-04-16 RX ORDER — HEPARIN SODIUM 1000 [USP'U]/ML
5000 INJECTION INTRAVENOUS; SUBCUTANEOUS ONCE
Refills: 0 | Status: COMPLETED | OUTPATIENT
Start: 2025-04-16 | End: 2025-04-16

## 2025-04-16 RX ORDER — FUROSEMIDE 10 MG/ML
20 INJECTION INTRAMUSCULAR; INTRAVENOUS DAILY
Refills: 0 | Status: DISCONTINUED | OUTPATIENT
Start: 2025-04-16 | End: 2025-04-17

## 2025-04-16 RX ORDER — GLUCAGON 3 MG/1
1 POWDER NASAL ONCE
Refills: 0 | Status: DISCONTINUED | OUTPATIENT
Start: 2025-04-16 | End: 2025-04-22

## 2025-04-16 RX ORDER — DEXTROSE 50 % IN WATER 50 %
25 SYRINGE (ML) INTRAVENOUS ONCE
Refills: 0 | Status: DISCONTINUED | OUTPATIENT
Start: 2025-04-16 | End: 2025-04-22

## 2025-04-16 RX ORDER — INSULIN LISPRO 100 U/ML
INJECTION, SOLUTION INTRAVENOUS; SUBCUTANEOUS EVERY 6 HOURS
Refills: 0 | Status: DISCONTINUED | OUTPATIENT
Start: 2025-04-16 | End: 2025-04-17

## 2025-04-16 RX ORDER — DEXTROSE 50 % IN WATER 50 %
15 SYRINGE (ML) INTRAVENOUS ONCE
Refills: 0 | Status: DISCONTINUED | OUTPATIENT
Start: 2025-04-16 | End: 2025-04-22

## 2025-04-16 RX ORDER — FUROSEMIDE 10 MG/ML
20 INJECTION INTRAMUSCULAR; INTRAVENOUS ONCE
Refills: 0 | Status: COMPLETED | OUTPATIENT
Start: 2025-04-16 | End: 2025-04-16

## 2025-04-16 RX ORDER — ASPIRIN 325 MG
81 TABLET ORAL DAILY
Refills: 0 | Status: DISCONTINUED | OUTPATIENT
Start: 2025-04-16 | End: 2025-04-17

## 2025-04-16 RX ORDER — SODIUM CHLORIDE 9 G/1000ML
1000 INJECTION, SOLUTION INTRAVENOUS
Refills: 0 | Status: DISCONTINUED | OUTPATIENT
Start: 2025-04-16 | End: 2025-04-22

## 2025-04-16 RX ORDER — INSULIN LISPRO 100 U/ML
INJECTION, SOLUTION INTRAVENOUS; SUBCUTANEOUS EVERY 6 HOURS
Refills: 0 | Status: DISCONTINUED | OUTPATIENT
Start: 2025-04-16 | End: 2025-04-16

## 2025-04-16 RX ORDER — LOSARTAN POTASSIUM 100 MG/1
50 TABLET, FILM COATED ORAL DAILY
Refills: 0 | Status: DISCONTINUED | OUTPATIENT
Start: 2025-04-16 | End: 2025-04-16

## 2025-04-16 RX ORDER — POTASSIUM CHLORIDE, DEXTROSE MONOHYDRATE AND SODIUM CHLORIDE 150; 5; 900 MG/100ML; G/100ML; MG/100ML
1000 INJECTION, SOLUTION INTRAVENOUS
Refills: 0 | Status: DISCONTINUED | OUTPATIENT
Start: 2025-04-16 | End: 2025-04-17

## 2025-04-16 RX ORDER — LORATADINE 5 MG/5ML
0 SOLUTION ORAL
Refills: 0 | DISCHARGE

## 2025-04-16 RX ORDER — DEXTROSE 50 % IN WATER 50 %
12.5 SYRINGE (ML) INTRAVENOUS ONCE
Refills: 0 | Status: DISCONTINUED | OUTPATIENT
Start: 2025-04-16 | End: 2025-04-22

## 2025-04-16 RX ORDER — METOPROLOL SUCCINATE 50 MG/1
100 TABLET, EXTENDED RELEASE ORAL DAILY
Refills: 0 | Status: DISCONTINUED | OUTPATIENT
Start: 2025-04-16 | End: 2025-04-22

## 2025-04-16 RX ORDER — ATORVASTATIN CALCIUM 80 MG/1
40 TABLET, FILM COATED ORAL AT BEDTIME
Refills: 0 | Status: DISCONTINUED | OUTPATIENT
Start: 2025-04-16 | End: 2025-04-17

## 2025-04-16 RX ORDER — CEFTRIAXONE 500 MG/1
1000 INJECTION, POWDER, FOR SOLUTION INTRAMUSCULAR; INTRAVENOUS ONCE
Refills: 0 | Status: COMPLETED | OUTPATIENT
Start: 2025-04-16 | End: 2025-04-16

## 2025-04-16 RX ADMIN — POTASSIUM CHLORIDE, DEXTROSE MONOHYDRATE AND SODIUM CHLORIDE 75 MILLILITER(S): 150; 5; 900 INJECTION, SOLUTION INTRAVENOUS at 22:03

## 2025-04-16 RX ADMIN — FUROSEMIDE 20 MILLIGRAM(S): 10 INJECTION INTRAMUSCULAR; INTRAVENOUS at 12:53

## 2025-04-16 RX ADMIN — POTASSIUM CHLORIDE, DEXTROSE MONOHYDRATE AND SODIUM CHLORIDE 75 MILLILITER(S): 150; 5; 900 INJECTION, SOLUTION INTRAVENOUS at 20:35

## 2025-04-16 RX ADMIN — CEFTRIAXONE 1000 MILLIGRAM(S): 500 INJECTION, POWDER, FOR SOLUTION INTRAMUSCULAR; INTRAVENOUS at 13:46

## 2025-04-16 RX ADMIN — Medication 40 MILLIEQUIVALENT(S): at 20:35

## 2025-04-16 RX ADMIN — HEPARIN SODIUM 5000 UNIT(S): 1000 INJECTION INTRAVENOUS; SUBCUTANEOUS at 21:15

## 2025-04-16 RX ADMIN — CEFTRIAXONE 100 MILLIGRAM(S): 500 INJECTION, POWDER, FOR SOLUTION INTRAMUSCULAR; INTRAVENOUS at 13:07

## 2025-04-16 NOTE — H&P ADULT - NEUROLOGICAL
normal/sensation intact negative AAOx 3/normal/cranial nerves II-XII intact/sensation intact/deep reflexes intact/no spontaneous movement

## 2025-04-16 NOTE — PATIENT PROFILE ADULT - FALL HARM RISK - HARM RISK INTERVENTIONS

## 2025-04-16 NOTE — ED PROVIDER NOTE - CADM POA PRESS ULCER
Strep throat culture was done in urgent care office. Results came back negative. Called pt with normal results per Dr. Segovia. With Dr. Segovia's recommendation, pt was recommended to take Tylenol or Motrin OTC. Pt verbalizes understanding.     Lauro Bobo MA      No

## 2025-04-16 NOTE — CONSULT NOTE ADULT - ASSESSMENT
Surgical Att.  Case discussed with surgical team.  We requested a HIDA scan to r/o acute cholecystitis and cardiology evaluation.  Will follow up. Surgical Att.  Case discussed with surgical team.  We requested a HIDA scan to r/o acute cholecystitis and cardiology evaluation.  Will follow up.  Surg. Att.  Pt seen on rounds. She has no abdominal pain. She is not tender in RUQ and doesnt have any GI complaints.  HiDA was positive.  At this point will treat with antibiotics and observe the pt.

## 2025-04-16 NOTE — H&P ADULT - SKIN/BREAST
CARDIOLOGY PROGRESS NOTE      HISTORY OF PRESENT ILLNESS: Allegra Mendieta is a 82 year old female with a past medical history of hypertension, hyperlipidemia, hypothyroidism, and osteoarthritis who was admitted on 1/28/2023 with complaints of  chest discomfort in the setting of uncontrolled and labile hypertension.  Patient underwent nuclear stress test 01/29/2023 which demonstrated small to medium size area of mild to moderately reduced perfusion in the apical to mid inferior lateral wall.   Given abnormal findings, cardiac catheterization completed on 1/30/23 with moderate CAD (50% RCA).  Echocardiogram completed 1/30/23 which demonstrated normal LVEF of 70% with no regional wall motion abnormalities.      OVERNIGHT EVENTS:   None    SUBJECTIVE:  Patient seen bedside this morning,  present.  Patient reports feeling well from a cardiovascular standpoint, denying current cardiac complaints including chest pain, dyspnea on exertion, dizziness, syncope/near syncope, orthopnea or PND.  Right radial cath site non-tender.     SCHEDULED MEDICATIONS  • NIFEdipine XL (PROCARDIA XL) ER tablet 60 mg Oral Daily   • clonazePAM (KlonoPIN) tablet 1 mg Oral Nightly   • sodium chloride (PF) 0.9 % injection 2 mL Intracatheter 2 times per day   • atorvastatin (LIPITOR) tablet 40 mg Oral Daily   • lisinopril (ZESTRIL) tablet 20 mg Oral BID   • levothyroxine (SYNTHROID, LEVOTHROID) tablet 75 mcg Oral Daily   • sodium chloride (PF) 0.9 % injection 2 mL Intracatheter 2 times per day   • [Held by provider] enoxaparin (LOVENOX) injection 40 mg Subcutaneous Daily   • aspirin chewable 81 mg Oral Daily       OBJECTIVE  Vitals 24 Hour Range Last Value    Temp Temp  Min: 97.6 °F (36.4 °C)  Max: 98.6 °F (37 °C) 97.6 °F (36.4 °C)   HR Pulse  Min: 57  Max: 86 (!) 57   RR Resp  Min: 13  Max: 21 18   BP BP  Min: 120/89  Max: 183/83 138/63   Pulse Ox SpO2  Min: 93 %  Max: 99 %     O2 No data recorded         Today Admit   Weight 89.9 kg (198 lb  3.2 oz) (01/31/23 0645) Weight: 86.2 kg (190 lb) (01/28/23 1643)     CONSTITUTIONAL: No acute distress; well-developed, well-nourished.   NECK: Supple, without rigidity. Trachea in midline. No neck or axillary lymphadenopathy. No thyromegaly. Carotids 2+ bilaterally without bruits. No jugular venous distention  RESPIRATORY: Symmetrical lung expansion without accessory muscle use. Lungs are clear without wheezing, rales or rhonchi throughout  CARDIAC: The PMI is non-displaced. S1S2 regular rate and rhythm.  No S3 or S4. No murmurs, clicks, or pericardial friction rub.  EXTREMITIES: Without clubbing, cyanosis or edema. Pedal and radial pulses palpable. No lower extremity varicosities.  Right radial cath site CDI, without evidence of hematoma.      Recent Labs   Lab 01/31/23  0435 01/28/23  1922 01/28/23  1701   WBC  --   --  5.1   HCT  --   --  38.0   HGB  --   --  12.8   PLT  --   --  198   SODIUM 135 133* 133*   POTASSIUM 4.2 4.3 4.2   CHLORIDE 103 100 99   CO2 22 23 24   BUN 19 17 17   CREATININE 1.07* 1.30* 1.42*   GLUCOSE 131* 109* 96   NTPROB  --   --  400   AST  --   --  22   GPT  --   --  23   ALKPT  --   --  81   BILIRUBIN  --   --  0.7       Telemetry Interpretation: Normal Sinus Rhythm.  Ventricular rates 50-70s      ASSESSMENT/PLAN:    1.  Chest pain / Abnormal NM stress test / Moderate CAD:  - patient denies chest pain today  - nuclear stress test 01/29/2023 which demonstrated small to medium size area of mild to moderately reduced perfusion in the apical to mid inferior lateral wall  - cardiac catheterization completed on 1/30/23 with moderate CAD (50% RCA)  - continue aspirin and statin therapy.  No beta blocker therapy given borderline bradycardia.     2.  Hypertension- labile control:  - continue lisinopril 20 mg twice daily  - will add Nifedipine 60 mg daily  - patient instructed to monitor blood pressures at home- approximately 2 hours after taking her antihypertensives.  Further titration of  medications can be made on an outpatient basis      3.  Chronic kidney disease - stage 3:  - stable      Patient is stable from a cardiology standpoint and okay to discharge home today.  Patient will follow-up in the outpatient Cardiology clinic in 2 weeks.      Thank you for the opportunity to participate in the care of Allegra Mendieta.    CONCETTA Perez  Cardiology  Pager: [phone]  1/31/2023      Patient was seen in collaboration with Dr. Terry who formulated the Assessment and Plan         negative

## 2025-04-16 NOTE — H&P ADULT - PROBLEM SELECTOR PLAN 5
- As per chart review, patient has had hx of TIA around 2017 per son  - c/w home asa, statin   - f/u lipid panel Chronic, on admission 165/66   - Continue home medications Toprol,, Lasix with hold parameters  - holding home losartan in the setting of cheri/ CKD 3  - Monitor routine hemodynamics  Hypokalemia-K Dur 40 meq x 2 dose   BMP in AM

## 2025-04-16 NOTE — H&P ADULT - CARDIOVASCULAR
normal/regular rate and rhythm/S1 S2 present/no gallops/no rub/no murmur details… normal/regular rate and rhythm/S1 S2 present/no gallops/no rub/no murmur/no JVD/normal PMI/pedal edema/vascular

## 2025-04-16 NOTE — H&P ADULT - PROBLEM SELECTOR PLAN 1
- On admission: AlkP 959,    - In ED given, x1 Rocephin, x 1 Lasix   - CT a/p: Diffuse gallbladder wall thickening/edema  - RUQ US: Irregular gallbladder wall thickening to 1.2 cm increased from prior   study. Cholelithiasis. Consider acute or chronic cholecystitis. Prominent pancreas, incompletely assessed, which can also be characterized with MRI  - f/u Hida scan   - start iv abx   - NPO after midnight   - Continue to monitor on daily CMP.  - Surgery consulted, f/u recs  - GI consulted, f/u recs - On admission: AlkP 959,    - In ED given, x1 Rocephin, x 1 Lasix   - CT a/p: Diffuse gallbladder wall thickening/edema  - RUQ US: Irregular gallbladder wall thickening to 1.2 cm increased from prior   study. Cholelithiasis. Consider acute or chronic cholecystitis. Prominent pancreas, incompletely assessed, which can also be characterized with MRI  - f/u Hida scan   - start rocephin?  - NPO after midnight   - Continue to monitor on daily CMP.  - Surgery consulted, f/u recs  - GI consulted, f/u recs - On admission: AlkP 959,    - In ED given, x1 Rocephin, x 1 Lasix   - CT a/p: Diffuse gallbladder wall thickening/edema  - RUQ US: Irregular gallbladder wall thickening to 1.2 cm increased from prior   study. Cholelithiasis. Consider acute or chronic cholecystitis. Prominent pancreas, incompletely assessed, which can also be characterized with MRI  - Hida scan:  There is prompt, homogeneous uptake of radiopharmaceutical by the hepatocytes. Activity is first seen in the bowel at about 15 minutes. The gallbladder is not visualized at any time during the study. Findings are compatible with acute cholecystitis.  - start rocephin?  - NPO after midnight   - Continue to monitor on daily CMP.  - Surgery consulted, f/u recs  - GI consulted, f/u recs - On admission: AlkP 959,    - In ED given, x1 Rocephin, x 1 Lasix   - CT a/p: Diffuse gallbladder wall thickening/edema  - RUQ US: Irregular gallbladder wall thickening to 1.2 cm increased from prior   study. Cholelithiasis. Consider acute or chronic cholecystitis. Prominent pancreas, incompletely assessed, which can also be characterized with MRI  - Hida scan:  There is prompt, homogeneous uptake of radiopharmaceutical by the hepatocytes. Activity is first seen in the bowel at about 15 minutes. The gallbladder is not visualized at any time during the study. Findings are compatible with acute cholecystitis.  - start zosyn q8h  - NPO after midnight   - Continue to monitor on daily CMP.  - Surgery consulted, f/u recs  - GI consulted, f/u recs - On admission: AlkP 959,    - In ED given, x1 Rocephin, x 1 Lasix   - CT a/p: Diffuse gallbladder wall thickening/edema  - RUQ US: Irregular gallbladder wall thickening to 1.2 cm increased from prior   study. Cholelithiasis. Consider acute or chronic cholecystitis. Prominent pancreas, incompletely assessed, which can also be characterized with MRI  - Hida scan:  There is prompt, homogeneous uptake of radiopharmaceutical by the hepatocytes. Activity is first seen in the bowel at about 15 minutes. The gallbladder is not visualized at any time during the study. Findings are compatible with acute cholecystitis.  - start zosyn q8h IVPB   - NPO after midnight   - Continue to monitor on daily CMP.  GI-Dr Dupree -work up as above  - Surgery consulted, f/u recs-Dr Arreola  - GI consulted, f/u recs

## 2025-04-16 NOTE — ED PROVIDER NOTE - CLINICAL SUMMARY MEDICAL DECISION MAKING FREE TEXT BOX
89-year-old female PMH of HTN, HLD, DM 2, TIA, CKD stage III, presents to the emergency department with daughter from [facility] assisted [facility] with complaints of swelling of her bilateral legs, confusion, patient states that she otherwise feels well, denies chest pain, no shortness of breath, noted mild bilateral lower pedal edema, will follow-up ultrasound rule out DVT, daughter states that when she gets like this she has a UTI, will follow-up CBC, CMP, urinalysis, and reevaluate.

## 2025-04-16 NOTE — ED PROVIDER NOTE - TOBACCO USE
Vies multiple times regarding need to completely abstain from cigarette smoking.   Unknown if ever smoked

## 2025-04-16 NOTE — H&P ADULT - HISTORY OF PRESENT ILLNESS
89yo F w/ PMH of HTN, HLD, T2DM, TIA, CKD III, Hx of R hip CRPP in 2016 presents to the ED with her daughter for bilateral leg swelling times few days and frequent urination.  Patient has a history of frequent UTIs her daughter was concerned for UTI brought her in for evaluation.  Daughter reports today her leg swelling is improved, she resides at the assisted living and is compliant with her Lasix.  No reports of chest pain, shortness of breath, fever chills, leg pain, abdominal pain, nausea vomiting diarrhea, dysuria or hematuria.    Of note, patient was admitted 3/9-3/13 for management of weakness likely 2/2 UTI and sacral pain. UA was neg, no leukocytosis and was monitored off abx. Course was complicated by hypertensive urgency and bradycardia which was treated with BP med changes.     Denies fever, chills, chest pain, palpitations, SOB, cough, abdominal pain, nausea, vomiting, diarrhea, constipation, hematochezia, melena, urinary frequency, urgency, dysuria, hematuria, headaches, changes in vision, dizziness, numbness, tingling. No other complaints at this time.    Denies recent travel, recent antibiotic use, or sick contacts.        ED course:  Vital Signs T(F):  HR:98F  BP: 165/66  RR: 19  SpO2: 99 % on RA  Labs significant for: K+ 3.3, BUN 27, Cr 2.00, AlkP 959, , eGFR 23, pro-BNP 6542  UA: 30 protein, small LE, 25 wbc, mod bacteria  EKG: Sinus bradycardia with 1st degree AV block with premature supraventricular complexes, 51 bpm, QT/QTc 494/455 ms  In ED given,  x1 Rocephin, x 1 Lasix,     Imaging  CXR: negative  CT a/p: Diffuse gallbladder wall thickening/edema  RUQ US: Irregular gallbladder wall thickening to 1.2 cm increased from prior   study. Cholelithiasis. Consider acute or chronic cholecystitis. Prominent pancreas, incompletely assessed, which can also be characterized with MRI.  US duplex: No evidence of deep venous thrombosis in either lower extremity.     89yo F w/ PMH of HTN, HLD, T2DM, TIA, CKD III, Hx of R hip CRPP in 2016 presents to the ED with her daughter for bilateral leg swelling times few days and fatigue for the past few days. Spoke to daughter via phone who reports that patient has a history of UTIs. Second daughter at bedside with the patient reports that she was having urinary frequency. Wanted to bring her in for evaluation and states that the legs have been bothering her for the past few days as well. Patient is from [place] and is compliant with medications.     Of note, patient was admitted 3/9-3/13 for management of weakness likely 2/2 UTI and sacral pain. UA was neg, no leukocytosis and was monitored off abx. Course was complicated by hypertensive urgency and bradycardia which was treated with BP med changes.     Denies fever, chills, chest pain, palpitations, SOB, cough, abdominal pain, nausea, vomiting, diarrhea, constipation, hematochezia, melena, urgency, dysuria, hematuria, headaches, changes in vision, dizziness, numbness, tingling. No other complaints at this time.    ED course:  Vital Signs T(F):  HR:98F  BP: 165/66  RR: 19  SpO2: 99 % on RA  Labs significant for: K+ 3.3, BUN 27, Cr 2.00, AlkP 959, , eGFR 23, pro-BNP 6542  UA: 30 protein, small LE, 25 wbc, mod bacteria  EKG: Sinus bradycardia with 1st degree AV block with premature supraventricular complexes, 51 bpm, QT/QTc 494/455 ms  In ED given,  x1 Rocephin, x 1 Lasix,     Imaging  CXR: negative  CT a/p: Diffuse gallbladder wall thickening/edema  RUQ US: Irregular gallbladder wall thickening to 1.2 cm increased from prior   study. Cholelithiasis. Consider acute or chronic cholecystitis. Prominent pancreas, incompletely assessed, which can also be characterized with MRI.  US duplex: No evidence of deep venous thrombosis in either lower extremity.     89yo F w/ PMH of HTN, HLD, T2DM, TIA, CKD III, Hx of R hip CRPP in 2016 presents to the ED with her daughter for bilateral leg swelling times few days and fatigue for the past few days. Spoke to daughter via phone who reports that patient has a history of UTIs. Second daughter at bedside with the patient reports that she was having urinary frequency. Wanted to bring her in for evaluation and states that the legs have been bothering her for the past few days as well. Patient is from [place] and is compliant with medications. Was recently treated for UTI with abx.     Of note, patient was admitted 3/9-3/13 for management of weakness likely 2/2 UTI and sacral pain. UA was neg, no leukocytosis and was monitored off abx. Course was complicated by hypertensive urgency and bradycardia which was treated with BP med changes.     Denies fever, chills, chest pain, palpitations, SOB, cough, abdominal pain, nausea, vomiting, diarrhea, constipation, hematochezia, melena, urgency, dysuria, hematuria, headaches, changes in vision, dizziness, numbness, tingling. No other complaints at this time.    ED course:  Vital Signs T(F):  HR:98F  BP: 165/66  RR: 19  SpO2: 99 % on RA  Labs significant for: K+ 3.3, BUN 27, Cr 2.00, AlkP 959, , eGFR 23, pro-BNP 6542  UA: 30 protein, small LE, 25 wbc, mod bacteria  EKG: Sinus bradycardia with 1st degree AV block with premature supraventricular complexes, 51 bpm, QT/QTc 494/455 ms  In ED given,  x1 Rocephin, x 1 Lasix,     Imaging  CXR: negative  CT a/p: Diffuse gallbladder wall thickening/edema  RUQ US: Irregular gallbladder wall thickening to 1.2 cm increased from prior   study. Cholelithiasis. Consider acute or chronic cholecystitis. Prominent pancreas, incompletely assessed, which can also be characterized with MRI.  US duplex: No evidence of deep venous thrombosis in either lower extremity.     89yo F w/ PMH of HTN, HLD, T2DM, TIA, CKD III, Hx of R hip CRPP in 2016, compression Fx spine ,anemia  presents to the ED with her daughter for bilateral leg swelling times few days and fatigue for the past few days. Spoke to daughter via phone who reports that patient has a history of UTIs. Second daughter at bedside with the patient reports that she was having urinary frequency. Wanted to bring her in for evaluation and states that the legs have been bothering her for the past few days as well. Patient is from [place] and is compliant with medications. Was recently treated for UTI with abx.     Of note, patient was admitted 3/9-3/13 for management of weakness likely 2/2 UTI and sacral pain. UA was neg, no leukocytosis and was monitored off abx. Course was complicated by hypertensive urgency and bradycardia which was treated with BP med changes.     Denies fever, chills, chest pain, palpitations, SOB, cough, abdominal pain, nausea, vomiting, diarrhea, constipation, hematochezia, melena, urgency, dysuria, hematuria, headaches, changes in vision, dizziness, numbness, tingling. No other complaints at this time.    ED course:  Vital Signs T(F):  HR:98F  BP: 165/66  RR: 19  SpO2: 99 % on RA  Labs significant for: K+ 3.3, BUN 27, Cr 2.00, AlkP 959, , eGFR 23, pro-BNP 6542  UA: 30 protein, small LE, 25 wbc, mod bacteria  EKG: Sinus bradycardia with 1st degree AV block with premature supraventricular complexes, 51 bpm, QT/QTc 494/455 ms  In ED given,  x1 Rocephin, x 1 Lasix,     Imaging  CXR: negative  CT a/p: Diffuse gallbladder wall thickening/edema  RUQ US: Irregular gallbladder wall thickening to 1.2 cm increased from prior   study. Cholelithiasis. Consider acute or chronic cholecystitis. Prominent pancreas, incompletely assessed, which can also be characterized with MRI.  US duplex: No evidence of deep venous thrombosis in either lower extremity.

## 2025-04-16 NOTE — H&P ADULT - NSICDXPASTMEDICALHX_GEN_ALL_CORE_FT
PAST MEDICAL HISTORY:  Diabetes     Hip fracture     HLD (hyperlipidemia)     Hypertension     TIA (transient ischemic attack)      PAST MEDICAL HISTORY:  Anemia of chronic disease     Diabetes     Hip fracture     HLD (hyperlipidemia)     Hypertension     Lumbar compression fracture     Stage 3 chronic kidney disease     TIA (transient ischemic attack)

## 2025-04-16 NOTE — ED ADULT NURSE NOTE - NSFALLHARMRISKINTERV_ED_ALL_ED

## 2025-04-16 NOTE — ED PROVIDER NOTE - PHYSICAL EXAMINATION
Gen: Well appearing in NAD.   ENT: oral mucosa moist   Head: atraumatic  Heart: s1/s2, RRR  Lung: CTA b/l, no wheezing/rhonchi or rales. No tachypnea or hypoxia   Abd: soft, NT/ND, NO RLQ TTP no rebound or guarding, NCVAT  Msk: no calf pain,  2+ pedal edema b/l  Neuro: AAO x3, patient moving all extremity equally  Skin: Normal for race.   Psych: Calm and cooperative

## 2025-04-16 NOTE — ED ADULT TRIAGE NOTE - CHIEF COMPLAINT QUOTE
Several days increasing swelling to legs.  Increased confusion.  Lethargy.  Urinary symptoms with increased voiding.  Hx UTI

## 2025-04-16 NOTE — H&P ADULT - PROBLEM SELECTOR PLAN 8
chronic  - c/w statin Chronic on Januvia  - Low ISS  - f/u a1c   - regular finger sticks  - consistent carb diet  - hypoglycemic protocol

## 2025-04-16 NOTE — H&P ADULT - PROBLEM SELECTOR PLAN 6
Chronic, on admission 165/66   - Continue home medications toprol, losartan, lasix with hold parameters  - Monitor routine hemodynamics Chronic, on admission 165/66   - Continue home medications toprol,, lasix with hold parameters  - holding home losartan in the setting of cheri  - Monitor routine hemodynamics - chronic   - ELVIN on CKD 3  - Baseline Cr 1.7  -  BUN 27, Cr 2.00,  eGFR 23  - start gentle ivf @40cc   - continue to monitor on daily CMP  - Avoid nephrotoxic agents.  Renal -Dr TORRES  called   HOLD Losartan

## 2025-04-16 NOTE — H&P ADULT - PROBLEM SELECTOR PLAN 4
- chronic   - Baseline Cr 1.7  -  BUN 27, Cr 2.00,  eGFR 23  - continue to monitor on daily CMP - chronic   - ELVIN on CKD  - Baseline Cr 1.7  -  BUN 27, Cr 2.00,  eGFR 23  - continue to monitor on daily CMP  - Avoid nephrotoxic agents. - chronic   - ELVIN on CKD  - Baseline Cr 1.7  -  BUN 27, Cr 2.00,  eGFR 23  - start gentle ivf @40cc   - continue to monitor on daily CMP  - Avoid nephrotoxic agents. Chronic Anemia ACd with CKD 3   Anemia work up  Hematology -Dr Renetta alvarez  CBC in AM

## 2025-04-16 NOTE — ED PROVIDER NOTE - PROGRESS NOTE DETAILS
Patient noted to have elevated liver enzymes, CT abdomen and pelvis obtained, called gastroenterology PA for consultation. HEAVEN Ge: All results reviewed with patient and her family at bedside.  Patient was evaluated by GI and surgical team for ultrasound and abdominal CAT scan results.  Surgical team ordered HIDA scan and will continue to follow patient upon admission.  Patient also noted to have a UTI which she was started on antibiotics for.  Spoke with Dr. Everett Holguin  for admission

## 2025-04-16 NOTE — H&P ADULT - ASSESSMENT
89yo F w/ PMH of HTN, HLD, T2DM, TIA, CKD III, Hx of R hip CRPP in 2016 presents to the ED with her daughter for bilateral leg swelling times few days and frequent urination currently admitted for possible UTI and acute pino.  89yo F w/ PMH of HTN, HLD, T2DM, TIA, CKD III, Hx of R hip CRPP in 2016, Anemia, Compression Fx  presents to the ED with her daughter for bilateral leg swelling times few days and frequent urination currently admitted for acute pino. R/O UTI.

## 2025-04-16 NOTE — ED PROVIDER NOTE - WR ORDER STATUS 1
Performed Valtrex Pregnancy And Lactation Text: this medication is Pregnancy Category B and is considered safe during pregnancy. This medication is not directly found in breast milk but it's metabolite acyclovir is present.

## 2025-04-16 NOTE — H&P ADULT - PROBLEM SELECTOR PLAN 9
- hsq?  - PT consult - hsq one time dose stat given for possible planned procedure, day team to continue   - PT consult chronic  - c/w statin

## 2025-04-16 NOTE — H&P ADULT - ATTENDING COMMENTS
89yo F w/ PMH of HTN, HLD, T2DM, TIA, CKD III, Hx of R hip CRPP in 2016, Anemia, Compression Fx  presents to the ED with her daughter for bilateral leg swelling times few days and frequent urination currently admitted for acute pino. R/O UTI.  pt seen, examined, case & care plan d/w pt ,residents at detail.  Consults-  GI-Dr Long -work up, repeat LFT  SX -Dr Arreola-Ac Pino , ABx   Renal-DR TATE Su-MELISSA  group-CKD 3  Hematology-Dr Jackson group   AM labs   NPO -IVF with Caution  DVT ppx  D/W Sx PA HIDA Scan + for AC Pino

## 2025-04-16 NOTE — H&P ADULT - PROBLEM SELECTOR PLAN 2
- Patient has a history of frequent UTIs and has been having urinary frequency of the past few days.   - UA: 30 protein, small LE, 25 wbc, mod bacteria  - start Rocephin - Patient has a history of frequent UTIs and has been having urinary frequency of the past few days. Was treated recently for UTI with abx.   - UA: 30 protein, small LE, 25 wbc, mod bacteria  - start Rocephin?  - f/u urine cx   - continue to monitor for worsening symptoms - Patient has a history of frequent UTIs and has been having urinary frequency of the past few days. Was treated recently for UTI with abx.   - UA: 30 protein, small LE, 25 wbc, mod bacteria  - start zosyn q8h  - f/u urine cx   - continue to monitor for worsening symptoms - Patient has a history of frequent UTIs and has been having urinary frequency of the past few days. Was treated recently for UTI with abx.   Less likely   -S/P 1 dose of IV Rocephin in ER  - UA: 30 protein, small LE, 25 wbc, mod bacteria  - start zosyn q8h  - f/u urine cx   - continue to monitor for worsening symptoms

## 2025-04-16 NOTE — ED ADULT TRIAGE NOTE - NS ED TRIAGE AVPU SCALE
SAFETY CHECKLIST  ID Bands and Risk clasps correct and in place (DNR, Fall risk, Allergy, Latex, Limb):  Yes  All Lines Reconciled and labeled correctly: Yes  Whiteboard updated:Yes  Environmental interventions: Yes - See PCS  Verify Tele #: NA       Alert-The patient is alert, awake and responds to voice. The patient is oriented to time, place, and person. The triage nurse is able to obtain subjective information.

## 2025-04-16 NOTE — ED ADULT NURSE NOTE - OBJECTIVE STATEMENT
Patient presents to ED with apparent AMS and possible UTI.  Patient daughter states she was drowsy this AM.  Patient is A+Ox4.  patient states that the past 2 days she has not able to walk due to her leg swelling.  she usually walks with a walker at home.

## 2025-04-16 NOTE — CONSULT NOTE ADULT - ASSESSMENT
ELVIN on CKD 3  Renal Cyst  Hypokalemia  HTN    -Baseline Creatinine 1.4  -ELVIN Likely 2/2 Decreased EABV  -Check Urine lytes  -Check UA  -CT abd - Left renal cyst. No hydronephrosis.  -IVF NS + KCL  -Check mag/phos in AM    d/w family

## 2025-04-16 NOTE — CONSULT NOTE ADULT - ASSESSMENT
Transaminitis  Bilateral leg swelling    CT a/p (4/16): Diffuse gallbladder wall thickening/edema. Right upper quadrant ultrasound is recommended to evaluate for cholecystitis.    Plan:  - CT noted and d/w pt and family  - Check RUQ US  - Suspect transaminitis likely congestive however will follow up US  - If US positive for cholecystitis would obtain surgical evaluation  - Discussed with pt and family  - Discussed with ER PA    I reviewed the overnight course of events on the unit, re-confirming the patient history. I discussed the care with the patient and their family  The plan of care was discussed with the physician assistant and modifications were made to the notation where appropriate.   Differential diagnosis and plan of care discussed with patient after the evaluation  35 minutes spent on total encounter of which more than fifty percent of the encounter was spent counseling and/or coordinating care by the attending physician.  Advanced care planning was discussed with patient and family.  Advanced care planning forms were reviewed and discussed.  Risks, benefits and alternatives of gastroenterologic procedures were discussed in detail and all questions were answered.

## 2025-04-16 NOTE — H&P ADULT - PROBLEM SELECTOR PLAN 7
Chronic on Januvia  - Low ISS  - f/u a1c   - regular finger sticks  - consistent carb diet  - hypoglycemic protocol - As per chart review, patient has had hx of TIA around 2017 per son  - c/w home asa, statin   - f/u lipid panel

## 2025-04-16 NOTE — H&P ADULT - PROBLEM SELECTOR PLAN 3
- Patient presents with b/l leg swelling.   - pro-BNP 6542  - US duplex: No evidence of deep venous thrombosis in either lower extremity  - will continue to monitor hemodynamic status - Patient presents with b/l leg swelling.   - pro-BNP 6542  - US duplex: No evidence of deep venous thrombosis in either lower extremity  - c/w home lasix   - of note, patient was previously started on amlodipine but cardio dc'd due to adverse effects   - will continue to monitor hemodynamic status

## 2025-04-16 NOTE — ED ADULT NURSE NOTE - NSFALLLASTSIX_ED_ALL_ED
No.

## 2025-04-17 LAB
A1C WITH ESTIMATED AVERAGE GLUCOSE RESULT: 7 % — HIGH (ref 4–5.6)
ALBUMIN SERPL ELPH-MCNC: 2 G/DL — LOW (ref 3.3–5)
ALP SERPL-CCNC: 969 U/L — HIGH (ref 40–120)
ALT FLD-CCNC: 50 U/L — SIGNIFICANT CHANGE UP (ref 12–78)
ANION GAP SERPL CALC-SCNC: 7 MMOL/L — SIGNIFICANT CHANGE UP (ref 5–17)
AST SERPL-CCNC: 106 U/L — HIGH (ref 15–37)
BASOPHILS # BLD AUTO: 0.05 K/UL — SIGNIFICANT CHANGE UP (ref 0–0.2)
BASOPHILS NFR BLD AUTO: 0.5 % — SIGNIFICANT CHANGE UP (ref 0–2)
BILIRUB SERPL-MCNC: 0.6 MG/DL — SIGNIFICANT CHANGE UP (ref 0.2–1.2)
BUN SERPL-MCNC: 27 MG/DL — HIGH (ref 7–23)
CALCIUM SERPL-MCNC: 9.5 MG/DL — SIGNIFICANT CHANGE UP (ref 8.5–10.1)
CHLORIDE SERPL-SCNC: 104 MMOL/L — SIGNIFICANT CHANGE UP (ref 96–108)
CHOLEST SERPL-MCNC: 132 MG/DL — SIGNIFICANT CHANGE UP
CO2 SERPL-SCNC: 27 MMOL/L — SIGNIFICANT CHANGE UP (ref 22–31)
CREAT ?TM UR-MCNC: 25 MG/DL — SIGNIFICANT CHANGE UP
CREAT SERPL-MCNC: 1.9 MG/DL — HIGH (ref 0.5–1.3)
EGFR: 24 ML/MIN/1.73M2 — LOW
EGFR: 24 ML/MIN/1.73M2 — LOW
EOSINOPHIL # BLD AUTO: 0.13 K/UL — SIGNIFICANT CHANGE UP (ref 0–0.5)
EOSINOPHIL NFR BLD AUTO: 1.3 % — SIGNIFICANT CHANGE UP (ref 0–6)
ESTIMATED AVERAGE GLUCOSE: 154 MG/DL — HIGH (ref 68–114)
FERRITIN SERPL-MCNC: 1492 NG/ML — HIGH (ref 13–330)
FOLATE SERPL-MCNC: 10.6 NG/ML — SIGNIFICANT CHANGE UP
GLUCOSE BLDC GLUCOMTR-MCNC: 104 MG/DL — HIGH (ref 70–99)
GLUCOSE BLDC GLUCOMTR-MCNC: 108 MG/DL — HIGH (ref 70–99)
GLUCOSE BLDC GLUCOMTR-MCNC: 114 MG/DL — HIGH (ref 70–99)
GLUCOSE BLDC GLUCOMTR-MCNC: 115 MG/DL — HIGH (ref 70–99)
GLUCOSE BLDC GLUCOMTR-MCNC: 119 MG/DL — HIGH (ref 70–99)
GLUCOSE BLDC GLUCOMTR-MCNC: 130 MG/DL — HIGH (ref 70–99)
GLUCOSE SERPL-MCNC: 106 MG/DL — HIGH (ref 70–99)
HCT VFR BLD CALC: 27.4 % — LOW (ref 34.5–45)
HDLC SERPL-MCNC: 31 MG/DL — LOW
HGB BLD-MCNC: 9 G/DL — LOW (ref 11.5–15.5)
IMM GRANULOCYTES NFR BLD AUTO: 0.6 % — SIGNIFICANT CHANGE UP (ref 0–0.9)
IRON SATN MFR SERPL: 16 % — SIGNIFICANT CHANGE UP (ref 14–50)
IRON SATN MFR SERPL: 29 UG/DL — LOW (ref 30–160)
LDLC SERPL-MCNC: 70 MG/DL — SIGNIFICANT CHANGE UP
LIPID PNL WITH DIRECT LDL SERPL: 70 MG/DL — SIGNIFICANT CHANGE UP
LYMPHOCYTES # BLD AUTO: 1.24 K/UL — SIGNIFICANT CHANGE UP (ref 1–3.3)
LYMPHOCYTES # BLD AUTO: 12.1 % — LOW (ref 13–44)
MAGNESIUM SERPL-MCNC: 1.9 MG/DL — SIGNIFICANT CHANGE UP (ref 1.6–2.6)
MCHC RBC-ENTMCNC: 26.3 PG — LOW (ref 27–34)
MCHC RBC-ENTMCNC: 32.8 G/DL — SIGNIFICANT CHANGE UP (ref 32–36)
MCV RBC AUTO: 80.1 FL — SIGNIFICANT CHANGE UP (ref 80–100)
MONOCYTES # BLD AUTO: 1.21 K/UL — HIGH (ref 0–0.9)
MONOCYTES NFR BLD AUTO: 11.8 % — SIGNIFICANT CHANGE UP (ref 2–14)
NEUTROPHILS # BLD AUTO: 7.57 K/UL — HIGH (ref 1.8–7.4)
NEUTROPHILS NFR BLD AUTO: 73.7 % — SIGNIFICANT CHANGE UP (ref 43–77)
NONHDLC SERPL-MCNC: 101 MG/DL — SIGNIFICANT CHANGE UP
NRBC BLD AUTO-RTO: 0 /100 WBCS — SIGNIFICANT CHANGE UP (ref 0–0)
OSMOLALITY UR: 335 MOSM/KG — SIGNIFICANT CHANGE UP (ref 50–1200)
PHOSPHATE SERPL-MCNC: 3.3 MG/DL — SIGNIFICANT CHANGE UP (ref 2.5–4.5)
PLATELET # BLD AUTO: 292 K/UL — SIGNIFICANT CHANGE UP (ref 150–400)
POTASSIUM SERPL-MCNC: 3.7 MMOL/L — SIGNIFICANT CHANGE UP (ref 3.5–5.3)
POTASSIUM SERPL-SCNC: 3.7 MMOL/L — SIGNIFICANT CHANGE UP (ref 3.5–5.3)
POTASSIUM UR-SCNC: 18 MMOL/L — SIGNIFICANT CHANGE UP
PROT ?TM UR-MCNC: 28 MG/DL — HIGH (ref 0–12)
PROT SERPL-MCNC: 6.5 G/DL — SIGNIFICANT CHANGE UP (ref 6–8.3)
PROT SERPL-MCNC: 7.4 G/DL — SIGNIFICANT CHANGE UP (ref 6–8.3)
PROT/CREAT UR-RTO: 1.1 RATIO — HIGH (ref 0–0.2)
RBC # BLD: 3.42 M/UL — LOW (ref 3.8–5.2)
RBC # FLD: 15.9 % — HIGH (ref 10.3–14.5)
SODIUM SERPL-SCNC: 138 MMOL/L — SIGNIFICANT CHANGE UP (ref 135–145)
SODIUM UR-SCNC: 119 MMOL/L — SIGNIFICANT CHANGE UP
T3 SERPL-MCNC: 85 NG/DL — SIGNIFICANT CHANGE UP (ref 80–200)
T4 AB SER-ACNC: 8.1 UG/DL — SIGNIFICANT CHANGE UP (ref 4.6–12)
TIBC SERPL-MCNC: 184 UG/DL — LOW (ref 220–430)
TRIGL SERPL-MCNC: 184 MG/DL — HIGH
TSH SERPL-MCNC: 4.4 UIU/ML — HIGH (ref 0.36–3.74)
UIBC SERPL-MCNC: 155 UG/DL — SIGNIFICANT CHANGE UP (ref 110–370)
UUN UR-MCNC: 161 MG/DL — SIGNIFICANT CHANGE UP
VIT B12 SERPL-MCNC: 915 PG/ML — SIGNIFICANT CHANGE UP (ref 232–1245)
WBC # BLD: 10.26 K/UL — SIGNIFICANT CHANGE UP (ref 3.8–10.5)
WBC # FLD AUTO: 10.26 K/UL — SIGNIFICANT CHANGE UP (ref 3.8–10.5)

## 2025-04-17 PROCEDURE — 99233 SBSQ HOSP IP/OBS HIGH 50: CPT

## 2025-04-17 RX ORDER — PIPERACILLIN-TAZO-DEXTROSE,ISO 2.25G/50ML
3.38 IV SOLUTION, PIGGYBACK PREMIX FROZEN(ML) INTRAVENOUS ONCE
Refills: 0 | Status: COMPLETED | OUTPATIENT
Start: 2025-04-17 | End: 2025-04-17

## 2025-04-17 RX ORDER — POTASSIUM CHLORIDE, DEXTROSE MONOHYDRATE AND SODIUM CHLORIDE 150; 5; 900 MG/100ML; G/100ML; MG/100ML
1000 INJECTION, SOLUTION INTRAVENOUS
Refills: 0 | Status: DISCONTINUED | OUTPATIENT
Start: 2025-04-17 | End: 2025-04-19

## 2025-04-17 RX ORDER — PIPERACILLIN-TAZO-DEXTROSE,ISO 2.25G/50ML
3.38 IV SOLUTION, PIGGYBACK PREMIX FROZEN(ML) INTRAVENOUS EVERY 8 HOURS
Refills: 0 | Status: DISCONTINUED | OUTPATIENT
Start: 2025-04-17 | End: 2025-04-18

## 2025-04-17 RX ORDER — AMLODIPINE BESYLATE 10 MG/1
10 TABLET ORAL DAILY
Refills: 0 | Status: DISCONTINUED | OUTPATIENT
Start: 2025-04-17 | End: 2025-04-17

## 2025-04-17 RX ORDER — HEPARIN SODIUM 1000 [USP'U]/ML
5000 INJECTION INTRAVENOUS; SUBCUTANEOUS EVERY 12 HOURS
Refills: 0 | Status: DISCONTINUED | OUTPATIENT
Start: 2025-04-17 | End: 2025-04-22

## 2025-04-17 RX ORDER — INSULIN LISPRO 100 U/ML
INJECTION, SOLUTION INTRAVENOUS; SUBCUTANEOUS AT BEDTIME
Refills: 0 | Status: DISCONTINUED | OUTPATIENT
Start: 2025-04-17 | End: 2025-04-22

## 2025-04-17 RX ORDER — LOSARTAN POTASSIUM 100 MG/1
50 TABLET, FILM COATED ORAL DAILY
Refills: 0 | Status: DISCONTINUED | OUTPATIENT
Start: 2025-04-17 | End: 2025-04-20

## 2025-04-17 RX ORDER — AMLODIPINE BESYLATE 10 MG/1
5 TABLET ORAL DAILY
Refills: 0 | Status: DISCONTINUED | OUTPATIENT
Start: 2025-04-17 | End: 2025-04-17

## 2025-04-17 RX ORDER — INSULIN LISPRO 100 U/ML
INJECTION, SOLUTION INTRAVENOUS; SUBCUTANEOUS
Refills: 0 | Status: DISCONTINUED | OUTPATIENT
Start: 2025-04-17 | End: 2025-04-22

## 2025-04-17 RX ADMIN — FUROSEMIDE 20 MILLIGRAM(S): 10 INJECTION INTRAMUSCULAR; INTRAVENOUS at 06:07

## 2025-04-17 RX ADMIN — Medication 25 GRAM(S): at 22:22

## 2025-04-17 RX ADMIN — METOPROLOL SUCCINATE 100 MILLIGRAM(S): 50 TABLET, EXTENDED RELEASE ORAL at 06:07

## 2025-04-17 RX ADMIN — Medication 20 MILLIGRAM(S): at 12:32

## 2025-04-17 RX ADMIN — Medication 200 GRAM(S): at 12:32

## 2025-04-17 RX ADMIN — Medication 25 GRAM(S): at 16:58

## 2025-04-17 RX ADMIN — HEPARIN SODIUM 5000 UNIT(S): 1000 INJECTION INTRAVENOUS; SUBCUTANEOUS at 17:33

## 2025-04-17 RX ADMIN — POTASSIUM CHLORIDE, DEXTROSE MONOHYDRATE AND SODIUM CHLORIDE 75 MILLILITER(S): 150; 5; 900 INJECTION, SOLUTION INTRAVENOUS at 12:32

## 2025-04-17 RX ADMIN — Medication 325 MILLIGRAM(S): at 12:32

## 2025-04-17 RX ADMIN — LOSARTAN POTASSIUM 50 MILLIGRAM(S): 100 TABLET, FILM COATED ORAL at 15:00

## 2025-04-17 NOTE — CARE COORDINATION ASSESSMENT. - REASON FOR CONSULT
SW met with pt and her daughter in law Browning at bedside in order to conduct assessment and to discuss SW roles with good understanding.   SW also reached out to pt's daughter/Akua at ([phone]) in order to obtained additional information about pt's home care services./coordination of care/discharge planning

## 2025-04-17 NOTE — CARE COORDINATION ASSESSMENT. - NSCAREPROVIDERS_GEN_ALL_CORE_FT
CARE PROVIDERS:  Accepting Physician: Everett Holguin  Access Services: Stephie Betancourt  Administration: Adiel Lira  Admitting: Everett Holguin  Attending: Everett Holguin  Case Management: Amanda Enriquez  Consultant: Guerrero Chris  Consultant: Jean Dupree  Consultant: Chuy Macias  Consultant: Myles Arreola  Consultant: Lourdes Whaley  Consultant: Cristina Paris  Consultant: Lewis Ramos  Consultant: Negin Crockett  Consultant: Carlos Enrique Child  Consultant: Rosana Paul  Consultant: Lionel Calabrese  Consultant: Argelia Sawyer  Consultant: Joseph Beasley  Covering Team: Hammad Mc  Covering Team: Braeden Beard  Covering Team: Cristina Nolasco  Covering Team: Zo Ramirez  ED ACP: Diamond Ge ED Attending: Frank Perdomo ED Nurse: Babar Dudley  Nurse: Sade Hull  Nurse: Nguyen Garsia  Ordered: Doctor, Unknown  Ordered: Talia, [facility]  Outpatient Provider: Abby Terrell  Outpatient Provider: Ananda De Luna  Override: Tanya Arechiga  Override: Vivian Whittaker  Override: Cami Frye  PCA/Nursing Assistant: Caron Wang  Physical Therapy: Luis Rowley  Primary Team: Everett Holguin  Primary Team: Jasvir Sandhu  Primary Team: Tyler De La Paz  Registered Dietitian: Edwina Hernandez  : Vianey Ruiz

## 2025-04-17 NOTE — PROGRESS NOTE ADULT - SUBJECTIVE AND OBJECTIVE BOX
SURGERY NOTE ON BEHALF OF DR. DEWEY / GENERAL SURGERY:    S: Patient seen and examined at bedside. Patient reports no abdominal pain; said she's never had abdominal pain and only came in for leg discomfort. No nausea or vomiting.     MEDICATIONS:  atorvastatin 40 milliGRAM(s) Oral at bedtime  dextrose 5%. 1000 milliLiter(s) IV Continuous <Continuous>  dextrose 5%. 1000 milliLiter(s) IV Continuous <Continuous>  dextrose 50% Injectable 25 Gram(s) IV Push once  dextrose 50% Injectable 12.5 Gram(s) IV Push once  dextrose 50% Injectable 25 Gram(s) IV Push once  dextrose Oral Gel 15 Gram(s) Oral once PRN  famotidine    Tablet 20 milliGRAM(s) Oral daily  ferrous    sulfate 325 milliGRAM(s) Oral daily  furosemide    Tablet 20 milliGRAM(s) Oral daily  glucagon  Injectable 1 milliGRAM(s) IntraMuscular once  insulin lispro (ADMELOG) corrective regimen sliding scale   SubCutaneous every 6 hours  metoprolol succinate  milliGRAM(s) Oral daily  sodium chloride 0.9% with potassium chloride 20 mEq/L 1000 milliLiter(s) IV Continuous <Continuous>      O:  Vital Signs Last 24 Hrs  T(C): 36.6 (17 Apr 2025 05:50), Max: 37.1 (16 Apr 2025 17:00)  T(F): 97.9 (17 Apr 2025 05:50), Max: 98.8 (16 Apr 2025 17:00)  HR: 64 (17 Apr 2025 05:50) (58 - 65)  BP: 186/82 (17 Apr 2025 05:50) (165/66 - 186/82)  BP(mean): --  RR: 17 (17 Apr 2025 05:50) (16 - 19)  SpO2: 95% (17 Apr 2025 05:50) (95% - 100%)    Parameters below as of 17 Apr 2025 05:50  Patient On (Oxygen Delivery Method): room air        I&O SUMMARY:    04-16-25 @ 07:01  -  04-17-25 @ 07:00  --------------------------------------------------------  IN: 0 mL / OUT: 450 mL / NET: -450 mL        PHYSICAL EXAM:  Gen: NAD, non-toxic  Lungs: Breathing comfortably on RA  Card: Regular rate  Abd: Soft, NT, ND.  Negative Kunz. No rebound/guarding.    Ext: Moving all extremities    LABS:                        9.0    10.26 )-----------( 292      ( 17 Apr 2025 06:26 )             27.4     04-17    138  |  104  |  27[H]  ----------------------------<  106[H]  3.7   |  27  |  1.90[H]    Ca    9.5      17 Apr 2025 06:26  Phos  3.3     04-17  Mg     1.9     04-17    TPro  7.4  /  Alb  2.0[L]  /  TBili  0.6  /  DBili  x   /  AST  106[H]  /  ALT  50  /  AlkPhos  969[H]  04-17    PT/INR - ( 16 Apr 2025 10:40 )   PT: 13.7 sec;   INR: 1.16 ratio         PTT - ( 16 Apr 2025 10:40 )  PTT:31.3 sec          RADIOLOGY:  4/16 RUQ US  IMPRESSION:  Irregular gallbladder wall thickening to 1.2 cm increased from prior   study. Cholelithiasis. Consider acute or chronic cholecystitis. Limited   evaluation for underlying gallbladder mass. Recommend surgical evaluation   and MRI abdomen/MRCP.

## 2025-04-17 NOTE — CONSULT NOTE ADULT - SUBJECTIVE AND OBJECTIVE BOX
89yo F w/ PMH of HTN, HLD, T2DM, TIA, CKD III, Hx of R hip CRPP in 2016 presents to the ED with her daughter for bilateral leg swelling times few days and frequent urination.  Patient has a history of frequent UTIs her daughter was concerned for UTI brought her in for evaluation.                               [facility]                             Nephrology and Hypertension                             Marcin Kaplan                                          [phone]     Patient is a 89y old  Female who presents with a chief complaint of Acute pino (2025 18:04)       HPI:  89yo F w/ PMH of HTN, HLD, T2DM, TIA, CKD III, Hx of R hip CRPP in 2016 presents to the ED with her daughter for bilateral leg swelling times few days and fatigue for the past few days. Spoke to daughter via phone who reports that patient has a history of UTIs. Second daughter at bedside with the patient reports that she was having urinary frequency. Wanted to bring her in for evaluation and states that the legs have been bothering her for the past few days as well. Patient is from [place] and is compliant with medications. Was recently treated for UTI with abx.   Of note, patient was admitted 3/9-3/13 for management of weakness likely 2/2 UTI and sacral pain. UA was neg, no leukocytosis and was monitored off abx. Course was complicated by hypertensive urgency and bradycardia which was treated with BP med changes.   Denies fever, chills, chest pain, palpitations, SOB, cough, abdominal pain, nausea, vomiting, diarrhea, constipation, hematochezia, melena, urgency, dysuria, hematuria, headaches, changes in vision, dizziness, numbness, tingling. No other complaints at this time.  Renal Consulted for ELVIN on CKD.  She is urinating well.  No N/V/SOB.  Has not seen nephrologist prior.     PAST MEDICAL & SURGICAL HISTORY:  Hypertension      Diabetes      TIA (transient ischemic attack)      Hip fracture      HLD (hyperlipidemia)      Stage 3 chronic kidney disease      Anemia of chronic disease      Lumbar compression fracture      S/P ORIF (open reduction internal fixation) fracture  right hip           FAMILY HISTORY:  FH: asthma (Father)    NC    Social History:Non smoker    MEDICATIONS  (STANDING):  aspirin enteric coated 81 milliGRAM(s) Oral daily  atorvastatin 40 milliGRAM(s) Oral at bedtime  dextrose 5%. 1000 milliLiter(s) (50 mL/Hr) IV Continuous <Continuous>  dextrose 5%. 1000 milliLiter(s) (100 mL/Hr) IV Continuous <Continuous>  dextrose 50% Injectable 25 Gram(s) IV Push once  dextrose 50% Injectable 12.5 Gram(s) IV Push once  dextrose 50% Injectable 25 Gram(s) IV Push once  famotidine    Tablet 20 milliGRAM(s) Oral daily  ferrous    sulfate 325 milliGRAM(s) Oral daily  furosemide    Tablet 20 milliGRAM(s) Oral daily  glucagon  Injectable 1 milliGRAM(s) IntraMuscular once  heparin   Injectable 5000 Unit(s) SubCutaneous once  insulin lispro (ADMELOG) corrective regimen sliding scale   SubCutaneous every 6 hours  metoprolol succinate  milliGRAM(s) Oral daily  potassium chloride    Tablet ER 40 milliEquivalent(s) Oral every 4 hours  sodium chloride 0.9% with potassium chloride 20 mEq/L 1000 milliLiter(s) (75 mL/Hr) IV Continuous <Continuous>    MEDICATIONS  (PRN):  dextrose Oral Gel 15 Gram(s) Oral once PRN Blood Glucose LESS THAN 70 milliGRAM(s)/deciliter   Meds reviewed    Allergies    No Known Allergies    Intolerances         REVIEW OF SYSTEMS:    Review of Systems:   Constitutional: Denies fatigue  HEENT: Denies headaches and dizziness  Respiratory: denies SOB, cough, or wheezing  Cardiovascular: denies CP, palpitations  Gastrointestinal: Denies nausea, denies vomiting, diarrhea, constipation, abdominal pain, or bloody stools  Genitourinary: denies painful urination, increased frequency, urgency, or bloody urine  Skin: denies rashes or itching  Musculoskeletal: denies muscle aches, joint swelling  Neurologic: Denies generalized weakness, denies loss of sensation, numbness, or tingling      Vital Signs Last 24 Hrs  T(C): 37.1 (2025 17:00), Max: 37.1 (2025 17:00)  T(F): 98.8 (2025 17:00), Max: 98.8 (2025 17:00)  HR: 65 (2025 17:00) (58 - 65)  BP: 165/70 (2025 17:00) (165/66 - 183/61)  BP(mean): --  RR: 18 (2025 17:00) (18 - 19)  SpO2: 100% (2025 17:00) (99% - 100%)    Parameters below as of 2025 17:00  Patient On (Oxygen Delivery Method): room air      Daily Height in cm: 154.94 (2025 09:32)    Daily     PHYSICAL EXAM:    GENERAL: NAD  HEAD:  Atraumatic, Normocephalic  EYES: EOMI, conjunctiva and sclera clear  ENMT: No Drainage from nares, No drainage from ears  NERVOUS SYSTEM:  Awake and Alert  CHEST/LUNG: Clear to percussion bilaterally  EXTREMITIES:  trace Edema  SKIN: No rashes No obvious ecchymosis      LABS:                        9.2    9.48  )-----------( 283      ( 2025 10:40 )             28.9         138  |  100  |  27[H]  ----------------------------<  131[H]  3.3[L]   |  28  |  2.00[H]    Ca    9.2      2025 10:40  Mg     1.9         TPro  7.7  /  Alb  2.1[L]  /  TBili  0.8  /  DBili  x   /  AST  139[H]  /  ALT  63  /  AlkPhos  959[H]      PT/INR - ( 2025 10:40 )   PT: 13.7 sec;   INR: 1.16 ratio         PTT - ( 2025 10:40 )  PTT:31.3 sec  Urinalysis Basic - ( 2025 11:59 )    Color: Yellow / Appearance: Clear / S.011 / pH: x  Gluc: x / Ketone: Negative mg/dL  / Bili: Negative / Urobili: 0.2 mg/dL   Blood: x / Protein: 30 mg/dL / Nitrite: Negative   Leuk Esterase: Small / RBC: 0 /HPF / WBC 25 /HPF   Sq Epi: x / Non Sq Epi: x / Bacteria: Moderate /HPF      Magnesium: 1.9 mg/dL ( @ 10:40)          RADIOLOGY & ADDITIONAL TESTS:  
[facility]  Owen Zarco PA-C  [address] [place] [id number] [phone]      Chief Complaint:  Patient is a 89y old  Female who presents with a chief complaint of bilateral leg swelling    HPI:  89yo F w/ PMH of HTN, HLD, T2DM, TIA, CKD III, Hx of R hip CRPP in 2016 presents to the ED with her daughter for bilateral leg swelling times few days and frequent urination.  Patient has a history of frequent UTIs her daughter was concerned for UTI brought her in for evaluation.  Daughter reports today her leg swelling is improved, she resides at the assisted living and is compliant with her Lasix.  No reports of chest pain, shortness of breath, fever chills, leg pain, abdominal pain, nausea vomiting diarrhea, dysuria or hematuria    INTERVAL HPI  Pt s/e with family members at bedside in ED  Discussed same hx as above  Pt denies any abdominal pain, N/V/D, or further GI complaints recently  LFTs discussed with patient and family  CT discussed with pt and family    Allergies:  No Known Allergies      Medications:      PMHX/PSHX:  Hypertension    Diabetes    TIA (transient ischemic attack)    Hip fracture    HLD (hyperlipidemia)    No significant past surgical history    S/P ORIF (open reduction internal fixation) fracture        Family history:  FH: asthma (Father)      ROS:   General:  No fevers, chills, night sweats, fatigue  Eyes:  Good vision, no reported pain  ENT:  No sore throat, pain, runny nose, dysphagia  CV:  No pain, palpitations, hypo/hypertension  Resp:  No dyspnea, cough, tachypnea, wheezing  GI:  No pain, No nausea, No vomiting, No diarrhea, No constipation, No weight loss, No fever, No pruritis, No rectal bleeding, No tarry stools, No dysphagia  :  No pain, bleeding, incontinence, nocturia  Muscle:  No pain, weakness  Neuro:  No weakness, tingling, memory problems  Psych:  No fatigue, insomnia, mood problems, depression  Endocrine:  No polyuria, polydipsia, cold/heat intolerance  Heme:  No petechiae, ecchymosis, easy bruisability  Skin:  No rash, tattoos, scars, edema      PHYSICAL EXAM:   Vital Signs:  Vital Signs Last 24 Hrs  T(C): 36.7 (:32), Max: 36.7 (:32)  T(F): 98 (:32), Max: 98 (:32)  HR: 60 (2025 13:02) (58 - 60)  BP: 183/61 (2025 13:02) (165/66 - 183/61)  BP(mean): --  RR: 19 (:32) (19 - 19)  SpO2: 99% (:32) (99% - 99%)    Parameters below as of :32  Patient On (Oxygen Delivery Method): room air      Daily Height in cm: 154.94 (:32)    Daily     GENERAL:  Appears stated age,   HEENT:  NC/AT,    CHEST:  Full & symmetric excursion,   HEART:  Regular rhythm  ABDOMEN:  Soft, non-tender, non-distended,   EXTEREMITIES:  Bilateral leg swelling  SKIN:  No rash  NEURO:  Alert,    LABS:                        9.2    9.48  )-----------( 283      ( 2025 10:40 )             28.9         138  |  100  |  27[H]  ----------------------------<  131[H]  3.3[L]   |  28  |  2.00[H]    Ca    9.2      2025 10:40  Mg     1.9         TPro  7.7  /  Alb  2.1[L]  /  TBili  0.8  /  DBili  x   /  AST  139[H]  /  ALT  63  /  AlkPhos  959[H]  -    LIVER FUNCTIONS - ( 2025 10:40 )  Alb: 2.1 g/dL / Pro: 7.7 g/dL / ALK PHOS: 959 U/L / ALT: 63 U/L / AST: 139 U/L / GGT: x           PT/INR - ( 2025 10:40 )   PT: 13.7 sec;   INR: 1.16 ratio         PTT - ( 2025 10:40 )  PTT:31.3 sec  Urinalysis Basic - ( 2025 11:59 )    Color: Yellow / Appearance: Clear / S.011 / pH: x  Gluc: x / Ketone: Negative mg/dL  / Bili: Negative / Urobili: 0.2 mg/dL   Blood: x / Protein: 30 mg/dL / Nitrite: Negative   Leuk Esterase: Small / RBC: 0 /HPF / WBC 25 /HPF   Sq Epi: x / Non Sq Epi: x / Bacteria: Moderate /HPF    RADIOLOGY  < from: CT Abdomen and Pelvis No Cont (25 @ 12:22) >    ACC: [id number] EXAM:  CT ABDOMEN AND PELVIS   ORDERED BY: JUDIT MAYEN     PROCEDURE DATE:  2025          INTERPRETATION:  CLINICAL INFORMATION: Elevated LFTs    COMPARISON: CT lumbar spine performed on 3/9/2025.    CONTRAST/COMPLICATIONS:  IV Contrast: NONE  Oral Contrast: NONE  .    PROCEDURE:  CT of the Abdomen and Pelvis was performed.  Sagittal and coronal reformats were performed.    FINDINGS:  LOWER CHEST: Chronic changes at the lung bases.    LIVER: Few subcentimeter hepatic hypodensities, too small to characterize.  BILE DUCTS: Normal caliber.  GALLBLADDER: Diffuse gallbladder wall thickening/edema.  SPLEEN: Small splenules.  PANCREAS: Within normal limits.  ADRENALS: Within normal limits.  KIDNEYS/URETERS: Left renalcyst. No hydronephrosis.    Limited by streak artifact from the patient's hip hardware.  BLADDER: Minimally distended.  REPRODUCTIVE ORGANS: Uterus and adnexa within normal limits.    BOWEL: Small periampullary duodenal diverticulum. Colonic diverticulosis.   No bowel obstruction. Appendix is normal.  PERITONEUM/RETROPERITONEUM: Within normal limits.  VESSELS: Atherosclerotic changes.  LYMPH NODES: No lymphadenopathy.  ABDOMINAL WALL: Within normal limits.  BONES: Multilevel degenerative changes of the spine. Mild compression   deformity of L4, new compared to prior. Mild anterolisthesis of L5 on S1.   Status post ORIF of the right hip. Status post left hip hemiarthroplasty.   Chronic deformity of the left inferior and superior pubic rami. Chronic   S2 fracture.    IMPRESSION:  Diffuse gallbladder wall thickening/edema. Right upper quadrant   ultrasound is recommended to evaluate for cholecystitis.    Mild compression deformity of L4, new compared to prior.      --- End of Report ---      CRISTIN RAE MD; Attending Radiologist  This document has been electronically signed. 2025 12:57PM    < end of copied text >  
SURGERY PA CONSULT NOTE:    CHIEF COMPLAINT:  Patient is a 89y old  Female who presents with a chief complaint of bilateral leg swelling and UTI sx     HPI:  HPI:  89yo F w/ PMH of HTN, HLD, T2DM, TIA, CKD III, Hx of R hip CRPP in 2016 presents to the ED with her daughter for bilateral leg swelling times few days and frequent urination.  Patient has a history of frequent UTIs her daughter was concerned for UTI brought her in for evaluation.  Daughter reports today her leg swelling is improved, she resides at the assisted living and is compliant with her Lasix.  No reports of chest pain, shortness of breath, fever chills, leg pain, abdominal pain, nausea vomiting diarrhea, dysuria or hematuria    INTERVAL HPI  Pt s/e with family members at bedside in ED  Discussed same hx as above  Pt denies any abdominal pain, N/V/D, or further GI complaints recently  LFTs discussed with patient and family  CT discussed with pt and family      PAST MEDICAL HISTORY:  PAST MEDICAL & SURGICAL HISTORY:  Hypertension      Diabetes      TIA (transient ischemic attack)      Hip fracture      HLD (hyperlipidemia)      S/P ORIF (open reduction internal fixation) fracture  right hip          PAST SURGICAL HISTORY:    REVIEW OF SYSTEMS:  CONSTITUTIONAL: No weakness, fevers or chills, no weight loss; +confusion  EYES/ENT: No visual changes;  No vertigo or throat pain   NECK: No pain or stiffness  ENDOCRINE: No thyroid problems  RESPIRATORY: No cough, wheezing, hemoptysis; No shortness of breath  CARDIOVASCULAR: No chest pain or palpitations  GASTROINTESTINAL: No abdominal or epigastric pain. No nausea, vomiting, or hematemesis; No diarrhea or constipation. No melena or hematochezia.  GENITOURINARY: See HPI  MSK: No joint swelling  NEUROLOGICAL: No numbness or weakness  SKIN: No itching, burning, rashes, or lesions   All other review of systems is negative unless indicated above.    MEDICATIONS:  Home Medications:  aspirin 81 mg oral delayed release tablet: 1 tab(s) orally every other day (2025 09:36)  atorvastatin 40 mg oral tablet: 1 tab(s) orally once a day (at bedtime) (2025 09:36)  famotidine 20 mg oral tablet: 1 tab(s) orally once a day (2025 09:36)  ferrous sulfate 324 mg (65 mg elemental iron) oral tablet: 1 tab(s) orally once a day (2025 09:36)  furosemide 20 mg oral tablet: 1 tab(s) orally once a day (2025 09:36)  Januvia 25 mg oral tablet: 1 tab(s) orally once a day (09 Mar 2025 20:29)  losartan 50 mg oral tablet: 1 tab(s) orally once a day (09 Mar 2025 20:29)    MEDICATIONS  (STANDING):    MEDICATIONS  (PRN):      ALLERGIES:  Allergies    No Known Allergies    Intolerances        SOCIAL HISTORY:  Social History:    Smoking: Yes [ ]  No [x]   ______pk yrs  ETOH  Yes [ ]  No [x]  Social [ ]  DRUGS:  Yes [ ]  No [x]  if so what______________    FAMILY HISTORY:  FAMILY HISTORY:  FH: asthma (Father)        PHYSICAL EXAM:  Vital Signs Last 24 Hrs  T(C): 36.7 (2025 09:32), Max: 36.7 (2025 09:32)  T(F): 98 (2025 09:32), Max: 98 (2025 09:32)  HR: 60 (2025 13:02) (58 - 60)  BP: 183/61 (2025 13:02) (165/66 - 183/61)  BP(mean): --  RR: 19 (2025 09:32) (19 - 19)  SpO2: 99% (2025 09:32) (99% - 99%)    Parameters below as of 2025 09:32  Patient On (Oxygen Delivery Method): room air        GENERAL:  Appears stated age,   HEENT:  NC/AT,    CHEST:  Full & symmetric excursion,   HEART:  Regular rhythm  ABDOMEN:  Soft, non-tender, non-distended; +BS present  EXTEREMITIES:  Bilateral leg swelling  SKIN:  No rash  NEURO:  Alert and oriented x3 during encounter    LABS:                        9.2    9.48  )-----------( 283      ( 2025 10:40 )             28.9     04-16    138  |  100  |  27[H]  ----------------------------<  131[H]  3.3[L]   |  28  |  2.00[H]    Ca    9.2      2025 10:40  Mg     1.9     -    TPro  7.7  /  Alb  2.1[L]  /  TBili  0.8  /  DBili  x   /  AST  139[H]  /  ALT  63  /  AlkPhos  959[H]  -    PT/INR - ( 2025 10:40 )   PT: 13.7 sec;   INR: 1.16 ratio         PTT - ( 2025 10:40 )  PTT:31.3 sec    Urinalysis Basic - ( 2025 11:59 )    Color: Yellow / Appearance: Clear / S.011 / pH: x  Gluc: x / Ketone: Negative mg/dL  / Bili: Negative / Urobili: 0.2 mg/dL   Blood: x / Protein: 30 mg/dL / Nitrite: Negative   Leuk Esterase: Small / RBC: 0 /HPF / WBC 25 /HPF   Sq Epi: x / Non Sq Epi: x / Bacteria: Moderate /HPF      LIVER FUNCTIONS - ( 2025 10:40 )  Alb: 2.1 g/dL / Pro: 7.7 g/dL / ALK PHOS: 959 U/L / ALT: 63 U/L / AST: 139 U/L / GGT: x             Urinalysis with Rflx Culture (collected 2025 11:59)        RADIOLOGY & ADDITIONAL STUDIES:  < from: US Abdomen Upper Quadrant Right (25 @ 14:56) >  ACC: [id number] EXAM:  US ABDOMEN RT UPR QUADRANT   ORDERED BY: JUDIT MAYEN     PROCEDURE DATE:  2025          INTERPRETATION:  CLINICAL INFORMATION: 89 years  Female with Gallbladder   wall thickening on abd CT.    COMPARISON: Abdominal ultrasound 2024 and CT abdomen and pelvis   2025    TECHNIQUE: Sonography of the right upper quadrant.    FINDINGS:  Liver: Within normal limits.  Bile ducts: Normal caliber. Common bile duct measures 7 mm.  Gallbladder: Irregular gallbladder wall thickening to 1.2 cm, increased   from prior ultrasound. Cholelithiasis.  Pancreas: Prominent pancreas incompletely assessed.  Right kidney: 8.8 cm. No hydronephrosis.  Ascites: None.  IVC: Visualized portions are within normal limits.    IMPRESSION:  Irregular gallbladder wall thickening to 1.2 cm increased from prior   study. Cholelithiasis. Consider acute or chronic cholecystitis. Limited   evaluation for underlying gallbladder mass. Recommend surgical evaluation   and MRI abdomen/MRCP.    Prominent pancreas, incompletely assessed, which can also be   characterized with MRI.        --- End of Report ---    < end of copied text >    < from: CT Abdomen and Pelvis No Cont (25 @ 12:22) >    ACC: [id number] EXAM:  CT ABDOMEN AND PELVIS   ORDERED BY: JUDIT MAYEN     PROCEDURE DATE:  2025          INTERPRETATION:  CLINICAL INFORMATION: Elevated LFTs    COMPARISON: CT lumbar spine performed on 3/9/2025.    CONTRAST/COMPLICATIONS:  IV Contrast: NONE  Oral Contrast: NONE  .    PROCEDURE:  CT of the Abdomen and Pelvis was performed.  Sagittal and coronal reformats were performed.    FINDINGS:  LOWER CHEST: Chronic changes at the lung bases.    LIVER: Few subcentimeter hepatic hypodensities, too small to characterize.  BILE DUCTS: Normal caliber.  GALLBLADDER: Diffuse gallbladder wall thickening/edema.  SPLEEN: Small splenules.  PANCREAS: Within normal limits.  ADRENALS: Within normal limits.  KIDNEYS/URETERS: Left renalcyst. No hydronephrosis.    Limited by streak artifact from the patient's hip hardware.  BLADDER: Minimally distended.  REPRODUCTIVE ORGANS: Uterus and adnexa within normal limits.    BOWEL: Small periampullary duodenal diverticulum. Colonic diverticulosis.   No bowel obstruction. Appendix is normal.  PERITONEUM/RETROPERITONEUM: Within normal limits.  VESSELS: Atherosclerotic changes.  LYMPH NODES: No lymphadenopathy.  ABDOMINAL WALL: Within normal limits.  BONES: Multilevel degenerative changes of the spine. Mild compression   deformity of L4, new compared to prior. Mild anterolisthesis of L5 on S1.   Status post ORIF of the right hip. Status post left hip hemiarthroplasty.   Chronic deformity of the left inferior and superior pubic rami. Chronic   S2 fracture.    IMPRESSION:  Diffuse gallbladder wall thickening/edema. Right upper quadrant   ultrasound is recommended to evaluate for cholecystitis.    Mild compression deformity of L4, new compared to prior.        --- End of Report ---    < end of copied text >    ASSESSMENT:  89yo F w/ PMH of HTN, HLD, T2DM, TIA, CKD III, Hx of R hip CRPP in 2016 presents to the ED with her daughter for bilateral leg swelling times few days and frequent urination. Surgery service consulted for abnormal CT/lab findings. Abd CT resulted with findings of diffuse gallbladder wall thickening/edema. Subsequently, a RUQ ultrasound was done which was pertinent for findings of Irregular gallbladder wall thickening to 1.2 cm increased from prior study. Cholelithiasis. Consider acute or chronic cholecystitis. Limited evaluation for underlying gallbladder mass. Lab levels significant for transaminitis; Alkphos 959, ; Hypokalemia 3.3. Patient currently asymptomatic, daughter at bedside who also states patient has not complained of any N/V, abdominal tenderness while at home. Denies any previous history of gallbladder disease. Vitals remain hemodynamically stable. Will continue to follow.     PLAN:  - Admit to medicine  - f/u HIDA scan  - IVF  - NPO  - RANDI  - Pain regimen/supportive care  - VTE ppx as per primary team    Case discussed with Dr. Arreola
[facility]  Chuy Macias MD PhD, Rekha Gonzalez MD, Claudette Holguin MD, Jan Thacker MD, Camilo Arroyo MD  and providing coverage with Jonathan Grijalva MD  Providing Infectious Disease Consultations at [facility], [facility], [facility], [facility], [facility]'s    Office# [phone] to schedule follow up appointments  Answering Service for urgent calls or New Consults [phone]  Cell# to text for urgent issues Chuy Macias [phone]     infectious diseases consult note:    NELDA VERDIN is a 89y y. o. Female patient     HPI:  89yo F w HTN, HLD, T2DM, TIA, CKD III, Hx of R hip CRPP in 2016, compression Fx spine ,anemia  preseneds to the ED with her daughter for bilateral leg swelling times few days and fatigue for the past few days. Patient has a history of UTIs. Patient reports that she was having urinary frequency but is on lasix. Was recently treated for UTI with abx.   ED Vital Signs T(F):  HR:98F  BP: 165/66  RR: 19  SpO2: 99 % on R  CT a/p: Diffuse gallbladder wall thickening/edema  RUQ US: Irregular gallbladder wall thickening to 1.2 cm increased from prior   study. Cholelithiasis. Consider acute or chronic cholecystitis. Prominent pancreas, incompletely assessed, which can also be characterized with MRI.      PAST MEDICAL & SURGICAL HISTORY:  Hypertension      Diabetes      TIA (transient ischemic attack)      Hip fracture      HLD (hyperlipidemia)      Stage 3 chronic kidney disease      Anemia of chronic disease      Lumbar compression fracture      S/P ORIF (open reduction internal fixation) fracture  right hip          Allergies    No Known Allergies    Intolerances        ANTIBIOTICS/RELEVANT:  antimicrobials  piperacillin/tazobactam IVPB.- 3.375 Gram(s) IV Intermittent once  piperacillin/tazobactam IVPB.. 3.375 Gram(s) IV Intermittent every 8 hours    immunologic:    OTHER:  dextrose 5%. 1000 milliLiter(s) IV Continuous <Continuous>  dextrose 5%. 1000 milliLiter(s) IV Continuous <Continuous>  dextrose 50% Injectable 25 Gram(s) IV Push once  dextrose 50% Injectable 12.5 Gram(s) IV Push once  dextrose 50% Injectable 25 Gram(s) IV Push once  dextrose Oral Gel 15 Gram(s) Oral once PRN  famotidine    Tablet 20 milliGRAM(s) Oral daily  ferrous    sulfate 325 milliGRAM(s) Oral daily  furosemide    Tablet 20 milliGRAM(s) Oral daily  glucagon  Injectable 1 milliGRAM(s) IntraMuscular once  insulin lispro (ADMELOG) corrective regimen sliding scale   SubCutaneous three times a day before meals  insulin lispro (ADMELOG) corrective regimen sliding scale   SubCutaneous at bedtime  metoprolol succinate  milliGRAM(s) Oral daily  sodium chloride 0.9% with potassium chloride 20 mEq/L 1000 milliLiter(s) IV Continuous <Continuous>    Social History:  Tobacco: None  ETOH: None  Recreational/Illicit Drugs: None  Lives with: child, from [facility]  Ambulation: with walker   ADLs: Dependent (16 Apr 2025 18:04)      FAMILY HISTORY:  FH: asthma (Father)          ROS:    EYES:  Negative  blurry vision or double vision  GASTROINTESTINAL:  Negative for nausea, vomiting, diarrhea  -otherwise negative except for subjective    Objective:  Last 24-Vital Signs Last 24 Hrs  T(C): 36.7 (17 Apr 2025 12:45), Max: 37.1 (16 Apr 2025 17:00)  T(F): 98.1 (17 Apr 2025 12:45), Max: 98.8 (16 Apr 2025 17:00)  HR: 58 (17 Apr 2025 12:45) (58 - 65)  BP: 186/82 (17 Apr 2025 05:50) (165/70 - 186/82)  BP(mean): --  RR: 17 (17 Apr 2025 12:45) (16 - 18)  SpO2: 97% (17 Apr 2025 12:45) (95% - 100%)    Parameters below as of 17 Apr 2025 05:50  Patient On (Oxygen Delivery Method): room air        T(C): 36.7 (04-17-25 @ 12:45), Max: 37.1 (04-16-25 @ 17:00)  T(F): 98.1 (04-17-25 @ 12:45), Max: 98.8 (04-16-25 @ 17:00)  T(C): 36.7 (04-17-25 @ 12:45), Max: 37.1 (04-16-25 @ 17:00)  T(F): 98.1 (04-17-25 @ 12:45), Max: 98.8 (04-16-25 @ 17:00)  T(C): 36.7 (04-17-25 @ 12:45), Max: 37.1 (04-16-25 @ 17:00)  T(F): 98.1 (04-17-25 @ 12:45), Max: 98.8 (04-16-25 @ 17:00)    PHYSICAL EXAM:  Constitutional: NAD  Eyes: PERRLA, EOMI  Ear/Nose/Throat: oropharynx normal	  Neck: no JVD, no lymphadenopathy, supple  Respiratory: no accessory muscle use, lung fields bilaterally clear  Cardiovascular: distant  Gastrointestinal: soft, NT, negative saba's sign, no HSM, BS-normal  Extremities: no clubbing, no cyanosis, edema absent  Neuro: patient alert, oriented and appropriate  Skin: no sig lesions        LABS:                        9.0    10.26 )-----------( 292      ( 17 Apr 2025 06:26 )             27.4       WBC 10.26  04-17 @ 06:26  WBC 9.48  04-16 @ 10:40      04-17    138  |  104  |  27[H]  ----------------------------<  106[H]  3.7   |  27  |  1.90[H]    Ca    9.5      17 Apr 2025 06:26  Phos  3.3     04-17  Mg     1.9     04-17    TPro  7.4  /  Alb  2.0[L]  /  TBili  0.6  /  DBili  x   /  AST  106[H]  /  ALT  50  /  AlkPhos  969[H]  04-17      Creatinine: 1.90 mg/dL (04-17-25 @ 06:26)  Creatinine: 2.00 mg/dL (04-16-25 @ 10:40)      PT/INR - ( 16 Apr 2025 10:40 )   PT: 13.7 sec;   INR: 1.16 ratio         PTT - ( 16 Apr 2025 10:40 )  PTT:31.3 sec  Urinalysis Basic - ( 17 Apr 2025 06:26 )    Color: x / Appearance: x / SG: x / pH: x  Gluc: 106 mg/dL / Ketone: x  / Bili: x / Urobili: x   Blood: x / Protein: x / Nitrite: x   Leuk Esterase: x / RBC: x / WBC x   Sq Epi: x / Non Sq Epi: x / Bacteria: x            MICROBIOLOGY:        RADIOLOGY & ADDITIONAL STUDIES:  < from: NM Hepatobiliary Imaging (04.16.25 @ 19:36) >  ACC: [id number] EXAM:  NM HEPATOBILIARY IMG   ORDERED BY:  SENAIT MILLIGAN     AMINAH DATE:  04/16/2025          INTERPRETATION:  CLINICAL INFORMATION: Abnormal liver function tests.   Evaluate for acute cholecystitis. Diffuse gallbladder wall thickening and   edema on CT and ultrasound.    DURATION of DYNAMIC SERIES: 120 minutes  RADIOPHARMACEUTICAL: 3.2 mCi Tc-99m-Mebrofenin, I.V.  PHARMACOLOGIC INTERVENTION: None.    TECHNIQUE: Dynamic imaging of the anterior abdomen was performed   following radiopharmaceutical injection. Static images of the abdomen in   the anterior, right anterior oblique, and right lateral views were   obtained immediately thereafter.    COMPARISON: None    OTHER STUDIES USED FOR CORRELATION: Ultrasound abdomen 4/16/2025. CT   abdomen 4/16/2025.    FINDINGS: There is prompt, homogeneous uptake of radiopharmaceutical by   the hepatocytes. Activity is first seen in the bowel at about 15 minutes.   The gallbladder is not visualized at any time during the study. There is  good clearance of activity from the liver at the end of the study.    IMPRESSION: Abnormal hepatobiliary scan.    Findings are compatible with acute cholecystitis.    < end of copied text >  
Patient is a 89y old  Female who presents with a chief complaint of Acute pino (17 Apr 2025 09:53)      HPI:  89yo F w/ PMH of HTN, HLD, T2DM, TIA, CKD III, Hx of R hip CRPP in 2016, compression Fx spine ,anemia  presents to the ED with her daughter for bilateral leg swelling times few days and fatigue for the past few days. Spoke to daughter via phone who reports that patient has a history of UTIs. Second daughter at bedside with the patient reports that she was having urinary frequency. Wanted to bring her in for evaluation and states that the legs have been bothering her for the past few days as well. Patient is from [place] and is compliant with medications. Was recently treated for UTI with abx.     Of note, patient was admitted 3/9-3/13 for management of weakness likely 2/2 UTI and sacral pain. UA was neg, no leukocytosis and was monitored off abx. Course was complicated by hypertensive urgency and bradycardia which was treated with BP med changes.     Denies fever, chills, chest pain, palpitations, SOB, cough, abdominal pain, nausea, vomiting, diarrhea, constipation, hematochezia, melena, urgency, dysuria, hematuria, headaches, changes in vision, dizziness, numbness, tingling. No other complaints at this time.    ED course:  Vital Signs T(F):  HR:98F  BP: 165/66  RR: 19  SpO2: 99 % on RA  Labs significant for: K+ 3.3, BUN 27, Cr 2.00, AlkP 959, , eGFR 23, pro-BNP 6542  UA: 30 protein, small LE, 25 wbc, mod bacteria  EKG: Sinus bradycardia with 1st degree AV block with premature supraventricular complexes, 51 bpm, QT/QTc 494/455 ms  In ED given,  x1 Rocephin, x 1 Lasix,     Imaging  CXR: negative  CT a/p: Diffuse gallbladder wall thickening/edema  RUQ US: Irregular gallbladder wall thickening to 1.2 cm increased from prior   study. Cholelithiasis. Consider acute or chronic cholecystitis. Prominent pancreas, incompletely assessed, which can also be characterized with MRI.  US duplex: No evidence of deep venous thrombosis in either lower extremity.     (16 Apr 2025 18:04)       ROS:  Negative except for:    PAST MEDICAL & SURGICAL HISTORY:  Hypertension      Diabetes      TIA (transient ischemic attack)      Hip fracture      HLD (hyperlipidemia)      Stage 3 chronic kidney disease      Anemia of chronic disease      Lumbar compression fracture      S/P ORIF (open reduction internal fixation) fracture  right hip          SOCIAL HISTORY:    FAMILY HISTORY:  FH: asthma (Father)        MEDICATIONS  (STANDING):  dextrose 5%. 1000 milliLiter(s) (50 mL/Hr) IV Continuous <Continuous>  dextrose 5%. 1000 milliLiter(s) (100 mL/Hr) IV Continuous <Continuous>  dextrose 50% Injectable 25 Gram(s) IV Push once  dextrose 50% Injectable 12.5 Gram(s) IV Push once  dextrose 50% Injectable 25 Gram(s) IV Push once  famotidine    Tablet 20 milliGRAM(s) Oral daily  ferrous    sulfate 325 milliGRAM(s) Oral daily  furosemide    Tablet 20 milliGRAM(s) Oral daily  glucagon  Injectable 1 milliGRAM(s) IntraMuscular once  insulin lispro (ADMELOG) corrective regimen sliding scale   SubCutaneous three times a day before meals  insulin lispro (ADMELOG) corrective regimen sliding scale   SubCutaneous at bedtime  metoprolol succinate  milliGRAM(s) Oral daily  piperacillin/tazobactam IVPB.- 3.375 Gram(s) IV Intermittent once  piperacillin/tazobactam IVPB.. 3.375 Gram(s) IV Intermittent every 8 hours  sodium chloride 0.9% with potassium chloride 20 mEq/L 1000 milliLiter(s) (75 mL/Hr) IV Continuous <Continuous>    MEDICATIONS  (PRN):  dextrose Oral Gel 15 Gram(s) Oral once PRN Blood Glucose LESS THAN 70 milliGRAM(s)/deciliter      Allergies    No Known Allergies    Intolerances        Vital Signs Last 24 Hrs  T(C): 36.7 (17 Apr 2025 12:45), Max: 37.1 (16 Apr 2025 17:00)  T(F): 98.1 (17 Apr 2025 12:45), Max: 98.8 (16 Apr 2025 17:00)  HR: 58 (17 Apr 2025 12:45) (58 - 65)  BP: 186/82 (17 Apr 2025 05:50) (165/70 - 186/82)  BP(mean): --  RR: 17 (17 Apr 2025 12:45) (16 - 18)  SpO2: 97% (17 Apr 2025 12:45) (95% - 100%)    Parameters below as of 17 Apr 2025 05:50  Patient On (Oxygen Delivery Method): room air        PHYSICAL EXAM  General: adult in NAD  HEENT: clear oropharynx, anicteric sclera, pink conjunctivae  Neck: supple  CV: normal S1S2 with no murmur rubs or gallops  Lungs: clear to auscultation, no wheezes, no rhales  Abdomen: soft non-tender non-distended, no hepato/splenomegaly  Ext: no clubbing cyanosis or edema  Skin: no rashes and no petichiae  Neuro: alert and oriented X3 no focal deficits      LABS:    CBC Full  -  ( 17 Apr 2025 06:26 )  WBC Count : 10.26 K/uL  RBC Count : 3.42 M/uL  Hemoglobin : 9.0 g/dL  Hematocrit : 27.4 %  Platelet Count - Automated : 292 K/uL  Mean Cell Volume : 80.1 fl  Mean Cell Hemoglobin : 26.3 pg  Mean Cell Hemoglobin Concentration : 32.8 g/dL  Auto Neutrophil # : x  Auto Lymphocyte # : x  Auto Monocyte # : x  Auto Eosinophil # : x  Auto Basophil # : x  Auto Neutrophil % : x  Auto Lymphocyte % : x  Auto Monocyte % : x  Auto Eosinophil % : x  Auto Basophil % : x    04-17    138  |  104  |  27[H]  ----------------------------<  106[H]  3.7   |  27  |  1.90[H]    Ca    9.5      17 Apr 2025 06:26  Phos  3.3     04-17  Mg     1.9     04-17    TPro  7.4  /  Alb  2.0[L]  /  TBili  0.6  /  DBili  x   /  AST  106[H]  /  ALT  50  /  AlkPhos  969[H]  04-17    PT/INR - ( 16 Apr 2025 10:40 )   PT: 13.7 sec;   INR: 1.16 ratio         PTT - ( 16 Apr 2025 10:40 )  PTT:31.3 sec  Ferritin: 1492 ng/mL (04-17 @ 06:26)  Iron - Total Binding Capacity.: 184 ug/dL (04-17 @ 06:26)          BLOOD SMEAR INTERPRETATION:    RADIOLOGY & ADDITIONAL STUDIES:      < from: CT Abdomen and Pelvis No Cont (04.16.25 @ 12:22) >  ACC: [id number] EXAM:  CT ABDOMEN AND PELVIS   ORDERED BY: JUDIT MAYEN     PROCEDURE DATE:  04/16/2025          INTERPRETATION:  CLINICAL INFORMATION: Elevated LFTs    COMPARISON: CT lumbar spine performed on 3/9/2025.    CONTRAST/COMPLICATIONS:  IV Contrast: NONE  Oral Contrast: NONE  .    PROCEDURE:  CT of the Abdomen and Pelvis was performed.  Sagittal and coronal reformats were performed.    FINDINGS:  LOWER CHEST: Chronic changes at the lung bases.    LIVER: Few subcentimeter hepatic hypodensities, too small to characterize.  BILE DUCTS: Normal caliber.  GALLBLADDER: Diffuse gallbladder wall thickening/edema.  SPLEEN: Small splenules.  PANCREAS: Within normal limits.  ADRENALS: Within normal limits.  KIDNEYS/URETERS: Left renalcyst. No hydronephrosis.    Limited by streak artifact from the patient's hip hardware.  BLADDER: Minimally distended.  REPRODUCTIVE ORGANS: Uterus and adnexa within normal limits.    BOWEL: Small periampullary duodenal diverticulum. Colonic diverticulosis.   No bowel obstruction. Appendix is normal.  PERITONEUM/RETROPERITONEUM: Within normal limits.  VESSELS: Atherosclerotic changes.  LYMPH NODES: No lymphadenopathy.  ABDOMINAL WALL: Within normal limits.  BONES: Multilevel degenerative changes of the spine. Mild compression   deformity of L4, new compared to prior. Mild anterolisthesis of L5 on S1.   Status post ORIF of the right hip. Status post left hip hemiarthroplasty.   Chronic deformity of the left inferior and superior pubic rami. Chronic   S2 fracture.    IMPRESSION:  Diffuse gallbladder wall thickening/edema. Right upper quadrant   ultrasound is recommended to evaluate for cholecystitis.    Mild compression deformity of L4, new compared to prior.        --- End of Report ---            CRISTIN RAE MD; Attending Radiologist  This document has been electronically signed. Apr 16 2025 12:57PM    < end of copied text >  < from: CT Lumbar Spine No Cont (03.09.25 @ 16:41) >    ACC: [id number] EXAM:  CT LUMBAR SPINE   ORDERED BY: KELY DOTSON     PROCEDURE DATE:  03/09/2025          INTERPRETATION:  CT LUMBAR SPINE WITHOUT CONTRAST    TECHNIQUE: Multiple axial images of the lumbar spine were obtained   without intravenous contrast.  Multiplanar reformats were created   according to the standard protocol.    INDICATION: lower lumbar pain, neg outpatient xray  COMPARISON: None available at the time of interpretation.  ______________________  FINDINGS:    Osteopenia. Osteoarthritis of the sacroiliac joints. Severe right hip   osteoarthritis. Partially visualized postsurgical changes of the hips.    There are 5 lumbar type vertebral bodies. Moderate lumbar dextroscoliosis   with an apex near L1-L2. Mild right lateral subluxation of L2 on L3. Very   mild right lateral subluxation of T12 on L1. Grade 2 anterolisthesis of   L5 on S1. Mild retrolisthesis of L2 on L3. Mild retrolisthesis of L1 on   L2. Very mild retrolisthesis of T12 on L1. Lumbar alignment otherwise  maintained. Lumbar vertebral body heights maintained. No acute fracture   of the lumbar spine. Multilevel endplate degenerative changes.    There is angulation of the anterior cortex at S2, suspicious for sacral   insufficiency fracture.    Multilevel lumbar spondylosis with findings suspicious for severe spinal   canal stenosis at L1-L2, L2-L3, L3-L4, L4-L5, L5-S1. Severe left   neuroforaminal stenosis at L5-S1..    Paraspinal Tissues: Partially visualized small right pleural effusion.   Patchy partially visualized right lower lobe opacities. Atrophy of the   lower dorsal paraspinal musculature. Severe atherosclerotic disease.   Colonic diverticulosis. Incompletely evaluated but statistically likely   benign left renal cyst. Punctate nonobstructive renal collecting system   calculi versus atherosclerotic disease.  ______________________  IMPRESSION:  1.  Findings suspicious for an age-indeterminate sacral insufficiency   fracture at S2. Consider further evaluation with MR as clinically  indicated.  2.  No acute osseous abnormality of the lumbar spine.  3.  Findings suspicious for severe multilevel lumbar spinal canal   stenosis and severe left neuroforaminal stenosis at L5-S1.  4.  Partially visualized right pleural effusion and patchy right lower   lobe opacities.    --- End of Report ---            DEJAH ESCALANTE MD; Attending Radiologist  This document has been electronically signed. Mar  9 2025  6:11PM    < end of copied text >  < from: NM Hepatobiliary Imaging (04.16.25 @ 19:36) >  ACC: [id number] EXAM:  NM HEPATOBILIARY IMG   ORDERED BY:  SENAIT BURR DATE:  04/16/2025          INTERPRETATION:  CLINICAL INFORMATION: Abnormal liver function tests.   Evaluate for acute cholecystitis. Diffuse gallbladder wall thickening and   edema on CT and ultrasound.    DURATION of DYNAMIC SERIES: 120 minutes  RADIOPHARMACEUTICAL: 3.2 mCi Tc-99m-Mebrofenin, I.V.  PHARMACOLOGIC INTERVENTION: None.    TECHNIQUE: Dynamic imaging of the anterior abdomen was performed   following radiopharmaceutical injection. Static images of the abdomen in   the anterior, right anterior oblique, and right lateral views were   obtained immediately thereafter.    COMPARISON: None    OTHER STUDIES USED FOR CORRELATION: Ultrasound abdomen 4/16/2025. CT   abdomen 4/16/2025.    FINDINGS: There is prompt, homogeneous uptake of radiopharmaceutical by   the hepatocytes. Activity is first seen in the bowel at about 15 minutes.   The gallbladder is not visualized at any time during the study. There is  good clearance of activity from the liver at the end of the study.    IMPRESSION: Abnormal hepatobiliary scan.    Findings are compatible with acute cholecystitis.    --- End of Report ---            ISHAN GRANT MD; Attending Radiologist  This document has been electronically signed. Apr 16 2025  7:40PM    < end of copied text >

## 2025-04-17 NOTE — PROGRESS NOTE ADULT - PROBLEM SELECTOR PLAN 1
- On admission: AlkP 959,    - In ED given, x1 Rocephin, x 1 Lasix   - CT a/p: Diffuse gallbladder wall thickening/edema  - RUQ US: Irregular gallbladder wall thickening to 1.2 cm increased from prior   study. Cholelithiasis. Consider acute or chronic cholecystitis. Prominent pancreas, incompletely assessed, which can also be characterized with MRI  - Hida scan:  There is prompt, homogeneous uptake of radiopharmaceutical by the hepatocytes. Activity is first seen in the bowel at about 15 minutes. The gallbladder is not visualized at any time during the study. Findings are compatible with acute cholecystitis.  - started zosyn q8h IVPB   - Continue to monitor on daily CMP.  - GI-Dr Dupree -work up as above  - Surgery consulted, no acute surgical intervention at this time  -ID consulted (Dr. Macias) -- agrees with IV zosyn today, may transition to po as early as tmrw Friday - On admission: AlkP 959,    - In ED given, x1 Rocephin, x 1 Lasix   - CT a/p: Diffuse gallbladder wall thickening/edema  - RUQ US: Irregular gallbladder wall thickening to 1.2 cm increased from prior   study. Cholelithiasis. Consider acute or chronic cholecystitis. Prominent pancreas, incompletely assessed, which can also be characterized with MRI  - Hida scan:  There is prompt, homogeneous uptake of radiopharmaceutical by the hepatocytes. Activity is first seen in the bowel at about 15 minutes. The gallbladder is not visualized at any time during the study. Findings are compatible with acute cholecystitis.  - started zosyn q8h IVPB   - Continue to monitor on daily CMP.  - GI-Dr Dupree -Abx   - Surgery---> no acute surgical intervention at this time, start PO diet, No drainage needed   -ID consulted (Dr. Macias) -- agrees with IV zosyn today, may transition to po as early as tmrw Friday

## 2025-04-17 NOTE — GOALS OF CARE CONVERSATION - ADVANCED CARE PLANNING - CONVERSATION DETAILS
PC spoke to pt and her DIL about events leading to hospitalization and current diagnosis of UTI cholecystitis UTI DM2 . inquired about her wishes regarding cardiopulmonary resuscitation and intubation. Pt verbalized a good understanding and states she does want both attempted.

## 2025-04-17 NOTE — PROGRESS NOTE ADULT - PROBLEM SELECTOR PLAN 6
- chronic   - ELVIN on CKD 3  - Baseline Cr 1.7  -  BUN 27, Cr 2.00,  eGFR 23  - start gentle ivf @40cc   - continue to monitor on daily CMP  - Avoid nephrotoxic agents.  Renal -Dr TORRES  called   HOLD Losartan - chronic, stable  - ELVIN on CKD 3  - Baseline Cr 1.7-1.9  -  BUN 27, Cr 2.00,  eGFR 23  - gentle ivf @40cc   - continue to monitor on daily CMP  - Avoid nephrotoxic agents.  Renal -Dr Ramos consulted - chronic, stable  - ELVIN on CKD 3, ELVIN improved   - Baseline Cr 1.7-1.9  -  BUN 27, Cr 2.00,  eGFR 23  - gentle ivf @40cc   - continue to monitor on daily CMP  - Avoid nephrotoxic agents.  Renal -Dr Ramos d/w   Restart Losartan

## 2025-04-17 NOTE — PROGRESS NOTE ADULT - SUBJECTIVE AND OBJECTIVE BOX
[facility]    Cabrera Michel NP    [address] [place] [id number]  [phone]      Chief Complaint:  Patient is a 89y old  Female who presents with a chief complaint of Acute pino (17 Apr 2025 08:43)      HPI/ 24 hr events:   Patient seen and examined at bedside  Reports feeling well this morning   No fever, chills, nausea, vomiting, or abdominal pain       REVIEW OF SYSTEMS:   General: Negative  HEENT: Negative  CV: Negative  Respiratory: Negative  GI: See HPI  : Negative  MSK: Negative  Hematologic: Negative  Skin: Negative    MEDICATIONS:   MEDICATIONS  (STANDING):  atorvastatin 40 milliGRAM(s) Oral at bedtime  dextrose 5%. 1000 milliLiter(s) (50 mL/Hr) IV Continuous <Continuous>  dextrose 5%. 1000 milliLiter(s) (100 mL/Hr) IV Continuous <Continuous>  dextrose 50% Injectable 25 Gram(s) IV Push once  dextrose 50% Injectable 12.5 Gram(s) IV Push once  dextrose 50% Injectable 25 Gram(s) IV Push once  famotidine    Tablet 20 milliGRAM(s) Oral daily  ferrous    sulfate 325 milliGRAM(s) Oral daily  furosemide    Tablet 20 milliGRAM(s) Oral daily  glucagon  Injectable 1 milliGRAM(s) IntraMuscular once  insulin lispro (ADMELOG) corrective regimen sliding scale   SubCutaneous every 6 hours  metoprolol succinate  milliGRAM(s) Oral daily  sodium chloride 0.9% with potassium chloride 20 mEq/L 1000 milliLiter(s) (75 mL/Hr) IV Continuous <Continuous>    MEDICATIONS  (PRN):  dextrose Oral Gel 15 Gram(s) Oral once PRN Blood Glucose LESS THAN 70 milliGRAM(s)/deciliter      ALLERGIES:   Allergies    No Known Allergies    Intolerances        VITAL SIGNS:   Vital Signs Last 24 Hrs  T(C): 36.6 (17 Apr 2025 05:50), Max: 37.1 (16 Apr 2025 17:00)  T(F): 97.9 (17 Apr 2025 05:50), Max: 98.8 (16 Apr 2025 17:00)  HR: 64 (17 Apr 2025 05:50) (60 - 65)  BP: 186/82 (17 Apr 2025 05:50) (165/70 - 186/82)  BP(mean): --  RR: 17 (17 Apr 2025 05:50) (16 - 18)  SpO2: 95% (17 Apr 2025 05:50) (95% - 100%)    Parameters below as of 17 Apr 2025 05:50  Patient On (Oxygen Delivery Method): room air      I&O's Summary    16 Apr 2025 07:01  -  17 Apr 2025 07:00  --------------------------------------------------------  IN: 0 mL / OUT: 450 mL / NET: -450 mL        PHYSICAL EXAM:   GENERAL:  No acute distress  HEENT:  NC/AT  CHEST:  No increased effort  HEART:  Regular rate  ABDOMEN:  Soft, non-tender, non-distended  EXTREMITIES: No cyanosis  SKIN:  Warm, dry  NEURO:  Calm, cooperative    LABS:                        9.0    10.26 )-----------( 292      ( 17 Apr 2025 06:26 )             27.4     04-17    138  |  104  |  27[H]  ----------------------------<  106[H]  3.7   |  27  |  1.90[H]    Ca    9.5      17 Apr 2025 06:26  Phos  3.3     04-17  Mg     1.9     04-17    TPro  7.4  /  Alb  2.0[L]  /  TBili  0.6  /  DBili  x   /  AST  106[H]  /  ALT  50  /  AlkPhos  969[H]  04-17    LIVER FUNCTIONS - ( 17 Apr 2025 06:26 )  Alb: 2.0 g/dL / Pro: 7.4 g/dL / ALK PHOS: 969 U/L / ALT: 50 U/L / AST: 106 U/L / GGT: x           PT/INR - ( 16 Apr 2025 10:40 )   PT: 13.7 sec;   INR: 1.16 ratio         PTT - ( 16 Apr 2025 10:40 )  PTT:31.3 sec    Urinalysis with Rflx Culture (collected 16 Apr 2025 11:59)                              Urinalysis with Rflx Culture (collected 04-16-25 @ 11:59)      Triglycerides, Serum: 184 mg/dL (04-17-25 @ 06:26)        RADIOLOGY & ADDITIONAL STUDIES:

## 2025-04-17 NOTE — PROGRESS NOTE ADULT - ASSESSMENT
89yo F w/ PMH of HTN, HLD, T2DM, TIA, CKD III, Hx of R hip CRPP in 2016, Anemia, Compression Fx  presents to the ED with her daughter for bilateral leg swelling times few days and frequent urination currently admitted for acute pino. R/O UTI.

## 2025-04-17 NOTE — PROGRESS NOTE ADULT - PROBLEM SELECTOR PLAN 7
- As per chart review, patient has had hx of TIA around 2017 per son  - c/w home asa, statin  - lipid pane wnl - As per chart review, patient has had hx of TIA around 2017 per son  - c/w home asa, statin  - lipid panel wnl

## 2025-04-17 NOTE — PROGRESS NOTE ADULT - ASSESSMENT
Transaminitis  Bilateral leg swelling    4/16 CT AP: Diffuse gallbladder wall thickening/edema.          RUQ US: Irregular gallbladder wall thickening to 1.2 cm increased from prior study. Cholelithiasis. Consider acute or chronic cholecystitis. Limited evaluation for underlying gallbladder mass.           HIDA: There is prompt, homogeneous uptake of radiopharmaceutical by the hepatocytes. Activity is first seen in the bowel at about 15 minutes. The gallbladder is not visualized at any time during the study. Findings are compatible with acute cholecystitis.    Plan:  - CT AP & RUQ US noted and discussed with patient   - Suspect transaminitis likely congestive  - LFTs noted, continue to trend CMP daily  - Avoid hepatoxic agents  - Surgery eval noted, per their note no acute surgical intervention/IR drainage at this time   - To follow

## 2025-04-17 NOTE — PROGRESS NOTE ADULT - PROBLEM SELECTOR PLAN 5
Chronic, on admission 165/66   - Continue home medications Toprol,, Lasix with hold parameters  - holding home losartan in the setting of cheri/ CKD 3  - will add Norvasc 10mg qd today in setting of elevated bp and cheri  - Monitor routine hemodynamics  Hypokalemia-K Dur 40 meq x 2 dose   BMP in AM Chronic, on admission 165/66   - Continue home medications Toprol,, Lasix with hold parameters  - holding home losartan in the setting of cheri/ CKD 3  - will add Norvasc 5mg qd today in setting of elevated bp and cheri  - Monitor routine hemodynamics  Hypokalemia-K Dur 40 meq x 2 dose   BMP in AM Chronic, on admission 165/66   - Continue home medications Toprol,, Lasix with hold parameters  - Resume home losartan today  - Monitor routine hemodynamics  Hypokalemia-K Dur 40 meq x 2 dose   BMP in AM

## 2025-04-17 NOTE — CARE COORDINATION ASSESSMENT. - PATIENT WAS INVOLVED WITH A HOMECARE AGENCY PRIOR TO ADMISSION?
Pt received home care services from [facility] (7 hrs x 7 days), pt gets additional private hire services (4hrs x 7 days)./Yes

## 2025-04-17 NOTE — CARE COORDINATION ASSESSMENT. - FACILITY LEVEL OF CARE:
Use Efudex (5-fluorouracil) 5% cream 2 times daily on the face marked with X's for approx 2 weeks (until it is not getting more red and scabby), then discontinue.  You can use Vaseline or Aquaphor ointment on it if needed.      If the spots on the right arm and left forearm grow larger in the meantime, let me know.       Pt known to the [facility] for about one year/assisted/supportive

## 2025-04-17 NOTE — PHYSICAL THERAPY INITIAL EVALUATION ADULT - ADDITIONAL COMMENTS
Pt resides at [facility] and ambulated w/ RW and assist and had assist for ADLs.

## 2025-04-17 NOTE — PROGRESS NOTE ADULT - ASSESSMENT
ELVIN on CKD 3  Renal Cyst  Hypokalemia  HTN    -Baseline Creatinine 1.4  -ELVIN Likely 2/2 Decreased EABV  -Check Urine lytes  -Check UA  -CT abd - Left renal cyst. No hydronephrosis.  -IVF NS + KCL  -Mag phos controlled  -Renal function stabilizing    d/w family

## 2025-04-17 NOTE — CONSULT NOTE ADULT - ASSESSMENT
Anemia multifactorial in etiology related to acute illness, renal insufficiency  Elevated globulin ?polyclonal from acute illness but with compression fractures to rule out monoclonal gammapathy  Scans suggestive of acute gallbladder disease  is being seen by GI and surgery    Recommendations:  1.  follow CBC and transfuse as indicated  2.  continue GI and surgical evaluation   3.  abx as per ID  4.  to check SPEP, AMADEO and quant Ig  5.  further heme onc recommendations pending above

## 2025-04-17 NOTE — PROGRESS NOTE ADULT - PROBLEM SELECTOR PLAN 2
- Patient has a history of frequent UTIs and has been having urinary frequency of the past few days. Was treated recently for UTI with abx.   Less likely   -S/P 1 dose of IV Rocephin in ER  - UA: 30 protein, small LE, 25 wbc, mod bacteria  - start zosyn q8h  - f/u urine cx   - continue to monitor for worsening symptoms

## 2025-04-17 NOTE — CARE COORDINATION ASSESSMENT. - NSPASTMEDSURGHISTORY_GEN_ALL_CORE_FT
PAST MEDICAL & SURGICAL HISTORY:  Diabetes      Hypertension      Hip fracture      TIA (transient ischemic attack)      S/P ORIF (open reduction internal fixation) fracture  right hip      HLD (hyperlipidemia)      Lumbar compression fracture      Anemia of chronic disease      Stage 3 chronic kidney disease

## 2025-04-17 NOTE — PROGRESS NOTE ADULT - ASSESSMENT
89F presented with lower extremity swelling and elevated BNP found to have imaging concerning for possible cholecystitis. However, the complete absence of abdominal pain and tenderness suggest against clinical acute cholecystitis. Gallbladder wall thickening could be edema 2/2 volume overload. No indication for surgical intervention. Does not need IR drainage.     - No indication for acute general surgery intervention nor IR drainage  - May resume renal/diabetic diet  - Consider GGT  - Encourage ambulation and IS  - Remainder of care per primary team  - Surgery will sign off. Please re-consult should any new concerns arise    Discussed with attending Dr. Arreola

## 2025-04-17 NOTE — CONSULT NOTE ADULT - ASSESSMENT
89yo F w HTN, HLD, T2DM, TIA, CKD III, Hx of R hip CRPP in 2016, compression Fx spine ,anemia  preseneds to the ED with her daughter for bilateral leg swelling times few days and fatigue for the past few days. Patient has a history of UTIs. Patient reports that she was having urinary frequency but is on lasix. Was recently treated for UTI with abx.   ED Vital Signs T(F):  HR:98F  BP: 165/66  RR: 19  SpO2: 99 % on R  CT a/p: Diffuse gallbladder wall thickening/edema  RUQ US: Irregular gallbladder wall thickening to 1.2 cm increased from prior   study. Cholelithiasis. Consider acute or chronic cholecystitis. Prominent pancreas, incompletely assessed, which can also be characterized with MRI.  HIDA Abnormal hepatobiliary scan.    Acute Cholecystitis  Presentation without any localizing pain, some weakness, afebrile, minimal elevation of wbcs, ALT/AST/Alph phos elevation  some pyuria and some urinary symptoms (?UTI)  Urine culture collected 4/16-NGTD  Ceftriaxone given 4/16-->Zosyn started 4/17-continue for now but potential to transition to PO abx as early as 4/18-FFriday with dc back to the [place]    discussed with daughter, pt and Dr Holguin the potential to manage without surgical intervention    Thank you for consulting us and involving us in the management of this most interesting and challenging case.  In addition to reviewing history, imaging, documents, labs, microbiology, took into account antibiotic stewardship, local antibiogram and infection control strategies and potential transmission issues.    We will follow along in the care of this patient. Please contact me by texting me directly on my cell# at [phone] using TEAMS or call our answering service at [phone] with any concerns.

## 2025-04-17 NOTE — PROGRESS NOTE ADULT - SUBJECTIVE AND OBJECTIVE BOX
89yo F w/ PMH of HTN, HLD, T2DM, TIA, CKD III, Hx of R hip CRPP in 2016 presents to the ED with her daughter for bilateral leg swelling times few days and frequent urination.  Patient has a history of frequent UTIs her daughter was concerned for UTI brought her in for evaluation.                               [facility]                             Nephrology and Hypertension                             Marcin Kaplan                                          [phone]     Patient is a 89y old  Female who presents with a chief complaint of Acute pino (2025 18:04)       HPI:  89yo F w/ PMH of HTN, HLD, T2DM, TIA, CKD III, Hx of R hip CRPP in 2016 presents to the ED with her daughter for bilateral leg swelling times few days and fatigue for the past few days. Spoke to daughter via phone who reports that patient has a history of UTIs. Second daughter at bedside with the patient reports that she was having urinary frequency. Wanted to bring her in for evaluation and states that the legs have been bothering her for the past few days as well. Patient is from [place] and is compliant with medications. Was recently treated for UTI with abx.   Of note, patient was admitted 3/9-3/13 for management of weakness likely 2/2 UTI and sacral pain. UA was neg, no leukocytosis and was monitored off abx. Course was complicated by hypertensive urgency and bradycardia which was treated with BP med changes.   Denies fever, chills, chest pain, palpitations, SOB, cough, abdominal pain, nausea, vomiting, diarrhea, constipation, hematochezia, melena, urgency, dysuria, hematuria, headaches, changes in vision, dizziness, numbness, tingling. No other complaints at this time.  Renal Consulted for ELVIN on CKD.  She is urinating well.  No N/V/SOB.  Has not seen nephrologist prior.     NO N/V/SOB.  Feeling better    PAST MEDICAL & SURGICAL HISTORY:  Hypertension      Diabetes      TIA (transient ischemic attack)      Hip fracture      HLD (hyperlipidemia)      Stage 3 chronic kidney disease      Anemia of chronic disease      Lumbar compression fracture      S/P ORIF (open reduction internal fixation) fracture  right hip           FAMILY HISTORY:  FH: asthma (Father)    [place]    Social History:Non smoker    MEDICATIONS  (STANDING):  aspirin enteric coated 81 milliGRAM(s) Oral daily  atorvastatin 40 milliGRAM(s) Oral at bedtime  dextrose 5%. 1000 milliLiter(s) (50 mL/Hr) IV Continuous <Continuous>  dextrose 5%. 1000 milliLiter(s) (100 mL/Hr) IV Continuous <Continuous>  dextrose 50% Injectable 25 Gram(s) IV Push once  dextrose 50% Injectable 12.5 Gram(s) IV Push once  dextrose 50% Injectable 25 Gram(s) IV Push once  famotidine    Tablet 20 milliGRAM(s) Oral daily  ferrous    sulfate 325 milliGRAM(s) Oral daily  furosemide    Tablet 20 milliGRAM(s) Oral daily  glucagon  Injectable 1 milliGRAM(s) IntraMuscular once  heparin   Injectable 5000 Unit(s) SubCutaneous once  insulin lispro (ADMELOG) corrective regimen sliding scale   SubCutaneous every 6 hours  metoprolol succinate  milliGRAM(s) Oral daily  potassium chloride    Tablet ER 40 milliEquivalent(s) Oral every 4 hours  sodium chloride 0.9% with potassium chloride 20 mEq/L 1000 milliLiter(s) (75 mL/Hr) IV Continuous <Continuous>    MEDICATIONS  (PRN):  dextrose Oral Gel 15 Gram(s) Oral once PRN Blood Glucose LESS THAN 70 milliGRAM(s)/deciliter   Meds reviewed    Allergies    No Known Allergies    Intolerances         REVIEW OF SYSTEMS:    Review of Systems:   Constitutional: Denies fatigue  HEENT: Denies headaches and dizziness  Respiratory: denies SOB, cough, or wheezing  Cardiovascular: denies CP, palpitations  Gastrointestinal: Denies nausea, denies vomiting, diarrhea, constipation, abdominal pain, or bloody stools  Genitourinary: denies painful urination, increased frequency, urgency, or bloody urine  Skin: denies rashes or itching  Musculoskeletal: denies muscle aches, joint swelling  Neurologic: Denies generalized weakness, denies loss of sensation, numbness, or tingling      Vital Signs Last 24 Hrs  T(C): 37.1 (2025 17:00), Max: 37.1 (2025 17:00)  T(F): 98.8 (2025 17:00), Max: 98.8 (2025 17:00)  HR: 65 (2025 17:00) (58 - 65)  BP: 165/70 (2025 17:00) (165/66 - 183/61)  BP(mean): --  RR: 18 (2025 17:00) (18 - 19)  SpO2: 100% (2025 17:00) (99% - 100%)    Parameters below as of 2025 17:00  Patient On (Oxygen Delivery Method): room air      Daily Height in cm: 154.94 (2025 09:32)    Daily     PHYSICAL EXAM:    GENERAL: NAD  HEAD:  Atraumatic, Normocephalic  EYES: EOMI, conjunctiva and sclera clear  ENMT: No Drainage from nares, No drainage from ears  NERVOUS SYSTEM:  Awake and Alert  CHEST/LUNG: Clear to percussion bilaterally  EXTREMITIES:  trace Edema  SKIN: No rashes No obvious ecchymosis      LABS:                        9.2    9.48  )-----------( 283      ( 2025 10:40 )             28.9         138  |  100  |  27[H]  ----------------------------<  131[H]  3.3[L]   |  28  |  2.00[H]    Ca    9.2      2025 10:40  Mg     1.9         TPro  7.7  /  Alb  2.1[L]  /  TBili  0.8  /  DBili  x   /  AST  139[H]  /  ALT  63  /  AlkPhos  959[H]      PT/INR - ( 2025 10:40 )   PT: 13.7 sec;   INR: 1.16 ratio         PTT - ( 2025 10:40 )  PTT:31.3 sec  Urinalysis Basic - ( 2025 11:59 )    Color: Yellow / Appearance: Clear / S.011 / pH: x  Gluc: x / Ketone: Negative mg/dL  / Bili: Negative / Urobili: 0.2 mg/dL   Blood: x / Protein: 30 mg/dL / Nitrite: Negative   Leuk Esterase: Small / RBC: 0 /HPF / WBC 25 /HPF   Sq Epi: x / Non Sq Epi: x / Bacteria: Moderate /HPF      Magnesium: 1.9 mg/dL ( @ 10:40)          RADIOLOGY & ADDITIONAL TESTS:   Detail Level: Generalized Detail Level: Simple

## 2025-04-17 NOTE — CONSULT NOTE ADULT - CONSULT REQUESTED DATE/TIME
16-Apr-2025 13:17
16-Apr-2025
17-Apr-2025 12:53
17-Apr-2025 12:52
16-Apr-2025 20:47
self-care/home management/community/leisure

## 2025-04-17 NOTE — PROGRESS NOTE ADULT - PROBLEM SELECTOR PLAN 3
- Patient presents with b/l leg swelling.   - pro-BNP 6542  - US duplex: No evidence of deep venous thrombosis in either lower extremity  - c/w home lasix   - of note, patient was previously started on amlodipine but cardio dc'd due to adverse effects   - will continue to monitor hemodynamic status - Patient presents with b/l leg swelling. RESOLVED   - pro-BNP 6542  - US duplex: No evidence of deep venous thrombosis in either lower extremity  - c/w home lasix daily  - of note, patient was previously started on amlodipine but cardio dc'd due to adverse effects   - will continue to monitor hemodynamic status

## 2025-04-17 NOTE — PHYSICAL THERAPY INITIAL EVALUATION ADULT - PERTINENT HX OF CURRENT PROBLEM, REHAB EVAL
89yo F adm 4/16 w/ PMH of HTN, HLD, T2DM, TIA, CKD III, Hx of R hip CRPP in 2016, Anemia, Compression Fx  presents to the ED with her daughter for bilateral leg swelling times few days and frequent urination currently admitted for acute pino. R/O UTI. BLE dopplers: negative DVT. CT abd/pelvis: diffuse gall bladder wall thickening/edema. RUQ ultrasound: acute vs chronic cholecystitis.

## 2025-04-17 NOTE — PROGRESS NOTE ADULT - SUBJECTIVE AND OBJECTIVE BOX
Patient is a 89y old  Female who presents with a chief complaint of Acute pino (17 Apr 2025 12:53)    HPI:  89yo F w/ PMH of HTN, HLD, T2DM, TIA, CKD III, Hx of R hip CRPP in 2016, compression Fx spine ,anemia  presents to the ED with her daughter for bilateral leg swelling times few days and fatigue for the past few days. Spoke to daughter via phone who reports that patient has a history of UTIs. Second daughter at bedside with the patient reports that she was having urinary frequency. Wanted to bring her in for evaluation and states that the legs have been bothering her for the past few days as well. Patient is from [place] and is compliant with medications. Was recently treated for UTI with abx.     Of note, patient was admitted 3/9-3/13 for management of weakness likely 2/2 UTI and sacral pain. UA was neg, no leukocytosis and was monitored off abx. Course was complicated by hypertensive urgency and bradycardia which was treated with BP med changes.     Denies fever, chills, chest pain, palpitations, SOB, cough, abdominal pain, nausea, vomiting, diarrhea, constipation, hematochezia, melena, urgency, dysuria, hematuria, headaches, changes in vision, dizziness, numbness, tingling. No other complaints at this time.    ED course:  Vital Signs T(F):  HR:98F  BP: 165/66  RR: 19  SpO2: 99 % on RA  Labs significant for: K+ 3.3, BUN 27, Cr 2.00, AlkP 959, , eGFR 23, pro-BNP 6542  UA: 30 protein, small LE, 25 wbc, mod bacteria  EKG: Sinus bradycardia with 1st degree AV block with premature supraventricular complexes, 51 bpm, QT/QTc 494/455 ms  In ED given,  x1 Rocephin, x 1 Lasix,     Imaging  CXR: negative  CT a/p: Diffuse gallbladder wall thickening/edema  RUQ US: Irregular gallbladder wall thickening to 1.2 cm increased from prior   study. Cholelithiasis. Consider acute or chronic cholecystitis. Prominent pancreas, incompletely assessed, which can also be characterized with MRI.  US duplex: No evidence of deep venous thrombosis in either lower extremity.     (16 Apr 2025 18:04)    INTERVAL HPI:  4/17: Pt seen and examined at bedside. Pt feels well -- offers no complaints. Denies any abdominal pain, nausea, vomiting. Was seen and evaluated by surgery -- no acute surgical intervention indicated at this time. Started on IV zosyn this AM.       OVERNIGHT EVENTS: no overnight events    Home Medications:  aspirin 81 mg oral delayed release tablet: 1 tab(s) orally every other day (16 Apr 2025 19:38)  atorvastatin 40 mg oral tablet: 1 tab(s) orally once a day (at bedtime) (16 Apr 2025 19:38)  famotidine 20 mg oral tablet: 1 tab(s) orally once a day (16 Apr 2025 19:38)  ferrous sulfate 324 mg (65 mg elemental iron) oral tablet: 1 tab(s) orally once a day (16 Apr 2025 19:38)  furosemide 20 mg oral tablet: 1 tab(s) orally once a day (16 Apr 2025 19:38)  Januvia 25 mg oral tablet: 1 tab(s) orally once a day (16 Apr 2025 19:38)  losartan 50 mg oral tablet: 1 tab(s) orally once a day (16 Apr 2025 19:38)  metoprolol succinate 100 mg oral capsule, extended release: 1 cap(s) orally once a day (16 Apr 2025 19:37)      MEDICATIONS  (STANDING):  amLODIPine   Tablet 10 milliGRAM(s) Oral daily  dextrose 5%. 1000 milliLiter(s) (50 mL/Hr) IV Continuous <Continuous>  dextrose 5%. 1000 milliLiter(s) (100 mL/Hr) IV Continuous <Continuous>  dextrose 50% Injectable 25 Gram(s) IV Push once  dextrose 50% Injectable 12.5 Gram(s) IV Push once  dextrose 50% Injectable 25 Gram(s) IV Push once  famotidine    Tablet 20 milliGRAM(s) Oral daily  ferrous    sulfate 325 milliGRAM(s) Oral daily  furosemide    Tablet 20 milliGRAM(s) Oral daily  glucagon  Injectable 1 milliGRAM(s) IntraMuscular once  insulin lispro (ADMELOG) corrective regimen sliding scale   SubCutaneous three times a day before meals  insulin lispro (ADMELOG) corrective regimen sliding scale   SubCutaneous at bedtime  metoprolol succinate  milliGRAM(s) Oral daily  piperacillin/tazobactam IVPB.- 3.375 Gram(s) IV Intermittent once  piperacillin/tazobactam IVPB.. 3.375 Gram(s) IV Intermittent every 8 hours  sodium chloride 0.9% with potassium chloride 20 mEq/L 1000 milliLiter(s) (75 mL/Hr) IV Continuous <Continuous>    MEDICATIONS  (PRN):  dextrose Oral Gel 15 Gram(s) Oral once PRN Blood Glucose LESS THAN 70 milliGRAM(s)/deciliter      Allergies    No Known Allergies    Intolerances        Social History:  Tobacco: None  ETOH: None  Recreational/Illicit Drugs: None  Lives with: child, from [facility]  Ambulation: with walker   ADLs: Dependent (16 Apr 2025 18:04)      REVIEW OF SYSTEMS:  CONSTITUTIONAL: No fever, No chills, No fatigue, No myalgia, No Body ache, No Weakness  EYES: No eye pain,  No visual disturbances, No discharge, NO Redness  ENMT:  No ear pain, No nose bleed, No vertigo; No sinus pain, NO throat pain, No Congestion  NECK: No pain, No stiffness  RESPIRATORY: No cough, NO wheezing, No  hemoptysis, NO  shortness of breath  CARDIOVASCULAR: No chest pain, palpitations  GASTROINTESTINAL: No abdominal pain, NO epigastric pain. No nausea, No vomiting; No diarrhea, No constipation. [  ] BM  GENITOURINARY: No dysuria, No frequency, No urgency, No hematuria, NO incontinence  NEUROLOGICAL: No headaches, No dizziness, No numbness, No tingling, No tremors, No weakness  EXT: No Swelling, No Pain, No Edema  SKIN:  [  ] No itching, burning, rashes, or lesions   MUSCULOSKELETAL: No joint pain ,No Jt swelling; No muscle pain, No back pain, No extremity pain  PSYCHIATRIC: No depression,  No anxiety,  No mood swings ,No difficulty sleeping at night  PAIN SCALE: [ x ] None  [  ] Other-  ROS Unable to obtain due to - [  ] Dementia  [  ] Lethargy [  ] Drowsy [  ] Sedated [  ] non verbal  REST OF REVIEW Of SYSTEM - [x  ] Normal     Vital Signs Last 24 Hrs  T(C): 36.7 (17 Apr 2025 12:45), Max: 37.1 (16 Apr 2025 17:00)  T(F): 98.1 (17 Apr 2025 12:45), Max: 98.8 (16 Apr 2025 17:00)  HR: 58 (17 Apr 2025 12:45) (58 - 65)  BP: 162/78 (17 Apr 2025 13:53) (162/78 - 186/82)  BP(mean): --  RR: 17 (17 Apr 2025 12:45) (16 - 18)  SpO2: 97% (17 Apr 2025 12:45) (95% - 100%)    Parameters below as of 17 Apr 2025 05:50  Patient On (Oxygen Delivery Method): room air      Finger Stick        04-16 @ 07:01  -  04-17 @ 07:00  --------------------------------------------------------  IN: 0 mL / OUT: 450 mL / NET: -450 mL        PHYSICAL EXAM:  GENERAL:  [x  ] NAD , [ x ] well appearing, [  ] Agitated, [  ] Drowsy,  [  ] Lethargy, [  ] confused   HEAD:  [ x ] Normal, [  ] Other  EYES:  [ x ] EOMI, [ x ] PERRLA, [  ] conjunctiva and sclera clear normal, [  ] Other,  [  ] Pallor,[  ] Discharge  ENMT:  [  x] Normal, [ x ] Moist mucous membranes, [  ] Good dentition, [  ] No Thrush  NECK:  [  x] Supple, [  ] No JVD, [  ] Normal thyroid, [  ] Lymphadenopathy [  ] Other  CHEST/LUNG:  [x  ] Clear to auscultation bilaterally, [  ] Breath Sounds equal B/L / Decrease, [  ] poor effort  [  ] No rales, [  ] No rhonchi  [  ]  No wheezing,   HEART:  [x  ] Regular rate and rhythm, [  ] tachycardia, [  ] Bradycardia,  [  ] irregular  [  ] No murmurs, No rubs, No gallops, [  ] PPM in place (Mfr:  )  ABDOMEN:  [ x ] Soft, [ x ] Nontender, [ x ] Nondistended, [  ] No mass, [  ] Bowel sounds present, [  ] obese  NERVOUS SYSTEM:  [  x] Alert & Oriented X3, [  ] Nonfocal  [  ] Confusion  [  ] Encephalopathic [  ] Sedated [  ] Unable to assess, [  ] Dementia [  ] Other-  EXTREMITIES: x[  ] 2+ Peripheral Pulses, No clubbing, No cyanosis,  [  ] edema B/L lower EXT. [  ] PVD stasis skin changes B/L Lower EXT, [  ] wound  LYMPH: No lymphadenopathy noted  SKIN:  [x  ] No rashes or lesions, [  ] Pressure Ulcers, [  ] ecchymosis, [  ] Skin Tears, [  ] Other    DIET: Diet, Consistent Carbohydrate w/Evening Snack (04-17-25 @ 11:21)      LABS:                        9.0    10.26 )-----------( 292      ( 17 Apr 2025 06:26 )             27.4     17 Apr 2025 06:26    138    |  104    |  27     ----------------------------<  106    3.7     |  27     |  1.90     Ca    9.5        17 Apr 2025 06:26  Phos  3.3       17 Apr 2025 06:26  Mg     1.9       17 Apr 2025 06:26    TPro  7.4    /  Alb  2.0    /  TBili  0.6    /  DBili  x      /  AST  106    /  ALT  50     /  AlkPhos  969    17 Apr 2025 06:26    PT/INR - ( 16 Apr 2025 10:40 )   PT: 13.7 sec;   INR: 1.16 ratio         PTT - ( 16 Apr 2025 10:40 )  PTT:31.3 sec  Urinalysis Basic - ( 17 Apr 2025 06:26 )    Color: x / Appearance: x / SG: x / pH: x  Gluc: 106 mg/dL / Ketone: x  / Bili: x / Urobili: x   Blood: x / Protein: x / Nitrite: x   Leuk Esterase: x / RBC: x / WBC x   Sq Epi: x / Non Sq Epi: x / Bacteria: x                CARDIAC MARKERS ( 16 Apr 2025 10:40 )  x     / x     / x     / x     / <1.0 ng/mL          Urinalysis with Rflx Culture (collected 16 Apr 2025 11:59)         Anemia Panel:  Vitamin B12, Serum: 915 pg/mL (04-17-25 @ 06:26)  Folate, Serum: 10.6 ng/mL (04-17-25 @ 06:26)  Ferritin: 1492 ng/mL (04-17-25 @ 06:26)  Iron Total: 29 ug/dL (04-17-25 @ 06:26)  Iron - Total Binding Capacity.: 184 ug/dL (04-17-25 @ 06:26)      Thyroid Panel:  T4, Serum: 8.1 ug/dL (04-17-25 @ 06:26)  Thyroid Stimulating Hormone, Serum: 4.40 uIU/mL (04-17-25 @ 06:26)                RADIOLOGY & ADDITIONAL TESTS:      HEALTH ISSUES - PROBLEM Dx:  Acute cholecystitis    UTI (urinary tract infection)    Chronic kidney disease    History of TIAs    Hypertension    T2DM (type 2 diabetes mellitus)    HLD (hyperlipidemia)    Need for prophylactic measure    Leg edema    Anemia of chronic disease            Consultant(s) Notes Reviewed:  [x  ] YES     Care Discussed with [X] Consultants  [x  ] Patient  [  x] Family [  ] HCP [  ]   [  ] Social Service  [  ] RN, [  ] Physical Therapy,[  ] Palliative care team  DVT PPX: [  ] Lovenox, [ x ] S C Heparin, [  ] Coumadin, [  ] Xarelto, [  ] Eliquis, [  ] Pradaxa, [  ] IV Heparin drip, [  ] SCD [  ] Contraindication 2 to GI Bleed,[  ] Ambulation [  ] Contraindicated 2 to  bleed [  ] Contraindicated 2 to Brain Bleed  Advanced directive: [  ] None, [  ] DNR/DNI Patient is a 89y old  Female who presents with a chief complaint of Acute pino (17 Apr 2025 12:53)    HPI:  89yo F w/ PMH of HTN, HLD, T2DM, TIA, CKD III, Hx of R hip CRPP in 2016, compression Fx spine ,anemia  presents to the ED with her daughter for bilateral leg swelling times few days and fatigue for the past few days. Spoke to daughter via phone who reports that patient has a history of UTIs. Second daughter at bedside with the patient reports that she was having urinary frequency. Wanted to bring her in for evaluation and states that the legs have been bothering her for the past few days as well. Patient is from [place] and is compliant with medications. Was recently treated for UTI with abx.     Of note, patient was admitted 3/9-3/13 for management of weakness likely 2/2 UTI and sacral pain. UA was neg, no leukocytosis and was monitored off abx. Course was complicated by hypertensive urgency and bradycardia which was treated with BP med changes.     Denies fever, chills, chest pain, palpitations, SOB, cough, abdominal pain, nausea, vomiting, diarrhea, constipation, hematochezia, melena, urgency, dysuria, hematuria, headaches, changes in vision, dizziness, numbness, tingling. No other complaints at this time.    ED course:  Vital Signs T(F):  HR:98F  BP: 165/66  RR: 19  SpO2: 99 % on RA  Labs significant for: K+ 3.3, BUN 27, Cr 2.00, AlkP 959, , eGFR 23, pro-BNP 6542  UA: 30 protein, small LE, 25 wbc, mod bacteria  EKG: Sinus bradycardia with 1st degree AV block with premature supraventricular complexes, 51 bpm, QT/QTc 494/455 ms  In ED given,  x1 Rocephin, x 1 Lasix,     Imaging  CXR: negative  CT a/p: Diffuse gallbladder wall thickening/edema  RUQ US: Irregular gallbladder wall thickening to 1.2 cm increased from prior   study. Cholelithiasis. Consider acute or chronic cholecystitis. Prominent pancreas, incompletely assessed, which can also be characterized with MRI.  US duplex: No evidence of deep venous thrombosis in either lower extremity.     (16 Apr 2025 18:04)    INTERVAL HPI:  4/17: Pt seen and examined at bedside. Pt feels well -- offers no complaints. Denies any abdominal pain, nausea, vomiting. Was seen and evaluated by surgery -- no acute surgical intervention indicated at this time. Started on IV zosyn this AM. Mild anemia, Cr improved .      OVERNIGHT EVENTS: no overnight events    Home Medications:  aspirin 81 mg oral delayed release tablet: 1 tab(s) orally every other day (16 Apr 2025 19:38)  atorvastatin 40 mg oral tablet: 1 tab(s) orally once a day (at bedtime) (16 Apr 2025 19:38)  famotidine 20 mg oral tablet: 1 tab(s) orally once a day (16 Apr 2025 19:38)  ferrous sulfate 324 mg (65 mg elemental iron) oral tablet: 1 tab(s) orally once a day (16 Apr 2025 19:38)  furosemide 20 mg oral tablet: 1 tab(s) orally once a day (16 Apr 2025 19:38)  Januvia 25 mg oral tablet: 1 tab(s) orally once a day (16 Apr 2025 19:38)  losartan 50 mg oral tablet: 1 tab(s) orally once a day (16 Apr 2025 19:38)  metoprolol succinate 100 mg oral capsule, extended release: 1 cap(s) orally once a day (16 Apr 2025 19:37)      MEDICATIONS  (STANDING):  amLODIPine   Tablet 10 milliGRAM(s) Oral daily  dextrose 5%. 1000 milliLiter(s) (50 mL/Hr) IV Continuous <Continuous>  dextrose 5%. 1000 milliLiter(s) (100 mL/Hr) IV Continuous <Continuous>  dextrose 50% Injectable 25 Gram(s) IV Push once  dextrose 50% Injectable 12.5 Gram(s) IV Push once  dextrose 50% Injectable 25 Gram(s) IV Push once  famotidine    Tablet 20 milliGRAM(s) Oral daily  ferrous    sulfate 325 milliGRAM(s) Oral daily  furosemide    Tablet 20 milliGRAM(s) Oral daily  glucagon  Injectable 1 milliGRAM(s) IntraMuscular once  insulin lispro (ADMELOG) corrective regimen sliding scale   SubCutaneous three times a day before meals  insulin lispro (ADMELOG) corrective regimen sliding scale   SubCutaneous at bedtime  metoprolol succinate  milliGRAM(s) Oral daily  piperacillin/tazobactam IVPB.- 3.375 Gram(s) IV Intermittent once  piperacillin/tazobactam IVPB.. 3.375 Gram(s) IV Intermittent every 8 hours  sodium chloride 0.9% with potassium chloride 20 mEq/L 1000 milliLiter(s) (75 mL/Hr) IV Continuous <Continuous>    MEDICATIONS  (PRN):  dextrose Oral Gel 15 Gram(s) Oral once PRN Blood Glucose LESS THAN 70 milliGRAM(s)/deciliter      Allergies    No Known Allergies    Intolerances        Social History:  Tobacco: None  ETOH: None  Recreational/Illicit Drugs: None  Lives with: child, from [facility]  Ambulation: with walker   ADLs: Dependent (16 Apr 2025 18:04)      REVIEW OF SYSTEMS: i feel good   CONSTITUTIONAL: No fever, No chills, No fatigue, No myalgia, No Body ache, No Weakness  EYES: No eye pain,  No visual disturbances, No discharge, NO Redness  ENMT:  No ear pain, No nose bleed, No vertigo; No sinus pain, NO throat pain, No Congestion  NECK: No pain, No stiffness  RESPIRATORY: No cough, NO wheezing, No  hemoptysis, NO  shortness of breath  CARDIOVASCULAR: No chest pain, palpitations  GASTROINTESTINAL: No abdominal pain, NO epigastric pain. No nausea, No vomiting; No diarrhea, No constipation. [  ] BM  GENITOURINARY: No dysuria, No frequency, No urgency, No hematuria, NO incontinence  NEUROLOGICAL: No headaches, No dizziness, No numbness, No tingling, No tremors, No weakness  EXT: No Swelling, No Pain, No Edema  SKIN:  [ x ] No itching, burning, rashes, or lesions   MUSCULOSKELETAL: No joint pain ,No Jt swelling; No muscle pain, No back pain, No extremity pain  PSYCHIATRIC: No depression,  No anxiety,  No mood swings ,No difficulty sleeping at night  PAIN SCALE: [ x ] None  [  ] Other-  ROS Unable to obtain due to - [  ] Dementia  [  ] Lethargy [  ] Drowsy [  ] Sedated [  ] non verbal  REST OF REVIEW Of SYSTEM - [x  ] Normal     Vital Signs Last 24 Hrs  T(C): 36.7 (17 Apr 2025 12:45), Max: 37.1 (16 Apr 2025 17:00)  T(F): 98.1 (17 Apr 2025 12:45), Max: 98.8 (16 Apr 2025 17:00)  HR: 58 (17 Apr 2025 12:45) (58 - 65)  BP: 162/78 (17 Apr 2025 13:53) (162/78 - 186/82)  BP(mean): --  RR: 17 (17 Apr 2025 12:45) (16 - 18)  SpO2: 97% (17 Apr 2025 12:45) (95% - 100%)    Parameters below as of 17 Apr 2025 05:50  Patient On (Oxygen Delivery Method): room air      Finger Stick        04-16 @ 07:01  -  04-17 @ 07:00  --------------------------------------------------------  IN: 0 mL / OUT: 450 mL / NET: -450 mL        PHYSICAL EXAM:  GENERAL:  [x  ] NAD , [ x ] well appearing, [  ] Agitated, [  ] Drowsy,  [  ] Lethargy, [  ] confused   HEAD:  [ x ] Normal, [  ] Other  EYES:  [ x ] EOMI, [ x ] PERRLA, [x  ] conjunctiva and sclera clear normal, [  ] Other,  [  x] Pallor,[  ] Discharge  ENMT:  [  x] Normal, [ x ] Moist mucous membranes, [  ] Good dentition, [ x ] No Thrush  NECK:  [  x] Supple, [x  ] No JVD, [x  ] Normal thyroid, [  ] Lymphadenopathy [  ] Other  CHEST/LUNG:  [x  ] Clear to auscultation bilaterally, [ x ] Breath Sounds equal B/L / Decrease, [ x ] poor effort  [x  ] No rales, [ x ] No rhonchi  [x  ]  No wheezing,   HEART:  [x  ] Regular rate and rhythm, [  ] tachycardia, [  ] Bradycardia,  [  ] irregular  [x  ] No murmurs, No rubs, No gallops, [  ] PPM in place (Mfr:  )  ABDOMEN:  [ x ] Soft, [ x ] Nontender, [ x ] Nondistended, [ x ] No mass, [ x ] Bowel sounds present, [x  ] obese  NERVOUS SYSTEM:  [  x] Alert & Oriented X3, [ x ] Nonfocal  [  ] Confusion  [  ] Encephalopathic [  ] Sedated [  ] Unable to assess, [  ] Dementia [  ] Other-  EXTREMITIES: [ x ] 2+ Peripheral Pulses, No clubbing, No cyanosis,  [  ] edema B/L lower EXT. [  ] PVD stasis skin changes B/L Lower EXT, [  ] wound  LYMPH: No lymphadenopathy noted  SKIN:  [x  ] No rashes or lesions, [  ] Pressure Ulcers, [  ] ecchymosis, [  ] Skin Tears, [  ] Other    DIET: Diet, Consistent Carbohydrate w/Evening Snack (04-17-25 @ 11:21)      LABS:                        9.0    10.26 )-----------( 292      ( 17 Apr 2025 06:26 )             27.4     17 Apr 2025 06:26    138    |  104    |  27     ----------------------------<  106    3.7     |  27     |  1.90     Ca    9.5        17 Apr 2025 06:26  Phos  3.3       17 Apr 2025 06:26  Mg     1.9       17 Apr 2025 06:26    TPro  7.4    /  Alb  2.0    /  TBili  0.6    /  DBili  x      /  AST  106    /  ALT  50     /  AlkPhos  969    17 Apr 2025 06:26    PT/INR - ( 16 Apr 2025 10:40 )   PT: 13.7 sec;   INR: 1.16 ratio         PTT - ( 16 Apr 2025 10:40 )  PTT:31.3 sec  Urinalysis Basic - ( 17 Apr 2025 06:26 )    Color: x / Appearance: x / SG: x / pH: x  Gluc: 106 mg/dL / Ketone: x  / Bili: x / Urobili: x   Blood: x / Protein: x / Nitrite: x   Leuk Esterase: x / RBC: x / WBC x   Sq Epi: x / Non Sq Epi: x / Bacteria: x                CARDIAC MARKERS ( 16 Apr 2025 10:40 )  x     / x     / x     / x     / <1.0 ng/mL          Urinalysis with Rflx Culture (collected 16 Apr 2025 11:59)         Anemia Panel:  Vitamin B12, Serum: 915 pg/mL (04-17-25 @ 06:26)  Folate, Serum: 10.6 ng/mL (04-17-25 @ 06:26)  Ferritin: 1492 ng/mL (04-17-25 @ 06:26)  Iron Total: 29 ug/dL (04-17-25 @ 06:26)  Iron - Total Binding Capacity.: 184 ug/dL (04-17-25 @ 06:26)      Thyroid Panel:  T4, Serum: 8.1 ug/dL (04-17-25 @ 06:26)  Thyroid Stimulating Hormone, Serum: 4.40 uIU/mL (04-17-25 @ 06:26)    RADIOLOGY & ADDITIONAL TESTS:  < from: [facility] Hepatobiliary Imaging (04.16.25 @ 19:36) >  FINDINGS: There is prompt, homogeneous uptake of radiopharmaceutical by   the hepatocytes. Activity is first seen in the bowel at about 15 minutes.   The gallbladder is not visualized at any time during the study. There is  good clearance of activity from the liver at the end of the study.    IMPRESSION: Abnormal hepatobiliary scan.    Findings are compatible with acute cholecystitis.    < end of copied text >      HEALTH ISSUES - PROBLEM Dx:  Acute cholecystitis    UTI (urinary tract infection)    Chronic kidney disease    History of TIAs    Hypertension    T2DM (type 2 diabetes mellitus)    HLD (hyperlipidemia)    Need for prophylactic measure    Leg edema    Anemia of chronic disease            Consultant(s) Notes Reviewed:  [x  ] YES     Care Discussed with [X] Consultants  [x  ] Patient  [  x] Family [  ] HCP [  ]   [  ] Social Service  [ x ] RN, [  ] Physical Therapy,[  ] Palliative care team  DVT PPX: [  ] Lovenox, [ x ] S C Heparin, [  ] Coumadin, [  ] Xarelto, [  ] Eliquis, [  ] Pradaxa, [  ] IV Heparin drip, [  ] SCD [  ] Contraindication 2 to GI Bleed,[  ] Ambulation [  ] Contraindicated 2 to  bleed [  ] Contraindicated 2 to Brain Bleed  Advanced directive: [ x ] None, [  ] DNR/DNI

## 2025-04-18 LAB
-  AMPICILLIN/SULBACTAM: SIGNIFICANT CHANGE UP
-  AMPICILLIN: SIGNIFICANT CHANGE UP
-  AZTREONAM: SIGNIFICANT CHANGE UP
-  CEFAZOLIN: SIGNIFICANT CHANGE UP
-  CEFEPIME: SIGNIFICANT CHANGE UP
-  CEFTRIAXONE: SIGNIFICANT CHANGE UP
-  CEFUROXIME: SIGNIFICANT CHANGE UP
-  CIPROFLOXACIN: SIGNIFICANT CHANGE UP
-  ERTAPENEM: SIGNIFICANT CHANGE UP
-  GENTAMICIN: SIGNIFICANT CHANGE UP
-  IMIPENEM: SIGNIFICANT CHANGE UP
-  LEVOFLOXACIN: SIGNIFICANT CHANGE UP
-  MEROPENEM: SIGNIFICANT CHANGE UP
-  NITROFURANTOIN: SIGNIFICANT CHANGE UP
-  PIPERACILLIN/TAZOBACTAM: SIGNIFICANT CHANGE UP
-  TOBRAMYCIN: SIGNIFICANT CHANGE UP
-  TRIMETHOPRIM/SULFAMETHOXAZOLE: SIGNIFICANT CHANGE UP
ALBUMIN SERPL ELPH-MCNC: 2 G/DL — LOW (ref 3.3–5)
ALP SERPL-CCNC: 943 U/L — HIGH (ref 40–120)
ALT FLD-CCNC: 41 U/L — SIGNIFICANT CHANGE UP (ref 12–78)
ANION GAP SERPL CALC-SCNC: 8 MMOL/L — SIGNIFICANT CHANGE UP (ref 5–17)
AST SERPL-CCNC: 82 U/L — HIGH (ref 15–37)
BASOPHILS # BLD AUTO: 0.04 K/UL — SIGNIFICANT CHANGE UP (ref 0–0.2)
BASOPHILS NFR BLD AUTO: 0.5 % — SIGNIFICANT CHANGE UP (ref 0–2)
BILIRUB SERPL-MCNC: 0.6 MG/DL — SIGNIFICANT CHANGE UP (ref 0.2–1.2)
BUN SERPL-MCNC: 27 MG/DL — HIGH (ref 7–23)
CALCIUM SERPL-MCNC: 9.3 MG/DL — SIGNIFICANT CHANGE UP (ref 8.5–10.1)
CHLORIDE SERPL-SCNC: 104 MMOL/L — SIGNIFICANT CHANGE UP (ref 96–108)
CO2 SERPL-SCNC: 26 MMOL/L — SIGNIFICANT CHANGE UP (ref 22–31)
CREAT SERPL-MCNC: 2 MG/DL — HIGH (ref 0.5–1.3)
CULTURE RESULTS: ABNORMAL
EGFR: 23 ML/MIN/1.73M2 — LOW
EGFR: 23 ML/MIN/1.73M2 — LOW
EOSINOPHIL # BLD AUTO: 0.22 K/UL — SIGNIFICANT CHANGE UP (ref 0–0.5)
EOSINOPHIL NFR BLD AUTO: 2.8 % — SIGNIFICANT CHANGE UP (ref 0–6)
GLUCOSE BLDC GLUCOMTR-MCNC: 124 MG/DL — HIGH (ref 70–99)
GLUCOSE BLDC GLUCOMTR-MCNC: 128 MG/DL — HIGH (ref 70–99)
GLUCOSE BLDC GLUCOMTR-MCNC: 146 MG/DL — HIGH (ref 70–99)
GLUCOSE BLDC GLUCOMTR-MCNC: 159 MG/DL — HIGH (ref 70–99)
GLUCOSE SERPL-MCNC: 121 MG/DL — HIGH (ref 70–99)
HCT VFR BLD CALC: 28.4 % — LOW (ref 34.5–45)
HGB BLD-MCNC: 9.2 G/DL — LOW (ref 11.5–15.5)
IGA FLD-MCNC: 290 MG/DL — SIGNIFICANT CHANGE UP (ref 84–499)
IGG FLD-MCNC: 1177 MG/DL — SIGNIFICANT CHANGE UP (ref 610–1660)
IGM SERPL-MCNC: 124 MG/DL — SIGNIFICANT CHANGE UP (ref 35–242)
IMM GRANULOCYTES NFR BLD AUTO: 0.5 % — SIGNIFICANT CHANGE UP (ref 0–0.9)
KAPPA LC SER QL IFE: 6.46 MG/DL — HIGH (ref 0.33–1.94)
KAPPA/LAMBDA FREE LIGHT CHAIN RATIO, SERUM: 1.25 RATIO — SIGNIFICANT CHANGE UP (ref 0.26–1.65)
LAMBDA LC SER QL IFE: 5.16 MG/DL — HIGH (ref 0.57–2.63)
LYMPHOCYTES # BLD AUTO: 1.41 K/UL — SIGNIFICANT CHANGE UP (ref 1–3.3)
LYMPHOCYTES # BLD AUTO: 18.3 % — SIGNIFICANT CHANGE UP (ref 13–44)
MAGNESIUM SERPL-MCNC: 1.7 MG/DL — SIGNIFICANT CHANGE UP (ref 1.6–2.6)
MCHC RBC-ENTMCNC: 26.1 PG — LOW (ref 27–34)
MCHC RBC-ENTMCNC: 32.4 G/DL — SIGNIFICANT CHANGE UP (ref 32–36)
MCV RBC AUTO: 80.5 FL — SIGNIFICANT CHANGE UP (ref 80–100)
METHOD TYPE: SIGNIFICANT CHANGE UP
MONOCYTES # BLD AUTO: 1.07 K/UL — HIGH (ref 0–0.9)
MONOCYTES NFR BLD AUTO: 13.9 % — SIGNIFICANT CHANGE UP (ref 2–14)
NEUTROPHILS # BLD AUTO: 4.94 K/UL — SIGNIFICANT CHANGE UP (ref 1.8–7.4)
NEUTROPHILS NFR BLD AUTO: 64 % — SIGNIFICANT CHANGE UP (ref 43–77)
NRBC BLD AUTO-RTO: 0 /100 WBCS — SIGNIFICANT CHANGE UP (ref 0–0)
ORGANISM # SPEC MICROSCOPIC CNT: ABNORMAL
ORGANISM # SPEC MICROSCOPIC CNT: SIGNIFICANT CHANGE UP
PHOSPHATE SERPL-MCNC: 3.1 MG/DL — SIGNIFICANT CHANGE UP (ref 2.5–4.5)
PLATELET # BLD AUTO: 259 K/UL — SIGNIFICANT CHANGE UP (ref 150–400)
POTASSIUM SERPL-MCNC: 3.5 MMOL/L — SIGNIFICANT CHANGE UP (ref 3.5–5.3)
POTASSIUM SERPL-SCNC: 3.5 MMOL/L — SIGNIFICANT CHANGE UP (ref 3.5–5.3)
PROT SERPL-MCNC: 7.2 G/DL — SIGNIFICANT CHANGE UP (ref 6–8.3)
RBC # BLD: 3.53 M/UL — LOW (ref 3.8–5.2)
RBC # FLD: 15.8 % — HIGH (ref 10.3–14.5)
SODIUM SERPL-SCNC: 138 MMOL/L — SIGNIFICANT CHANGE UP (ref 135–145)
SPECIMEN SOURCE: SIGNIFICANT CHANGE UP
WBC # BLD: 7.72 K/UL — SIGNIFICANT CHANGE UP (ref 3.8–10.5)
WBC # FLD AUTO: 7.72 K/UL — SIGNIFICANT CHANGE UP (ref 3.8–10.5)

## 2025-04-18 RX ORDER — METOPROLOL SUCCINATE 50 MG/1
25 TABLET, EXTENDED RELEASE ORAL ONCE
Refills: 0 | Status: COMPLETED | OUTPATIENT
Start: 2025-04-18 | End: 2025-04-18

## 2025-04-18 RX ORDER — MAGNESIUM SULFATE 500 MG/ML
2 SYRINGE (ML) INJECTION ONCE
Refills: 0 | Status: COMPLETED | OUTPATIENT
Start: 2025-04-18 | End: 2025-04-18

## 2025-04-18 RX ORDER — CEFPODOXIME PROXETIL 200 MG/1
200 TABLET, FILM COATED ORAL EVERY 24 HOURS
Refills: 0 | Status: DISCONTINUED | OUTPATIENT
Start: 2025-04-18 | End: 2025-04-19

## 2025-04-18 RX ORDER — METRONIDAZOLE 250 MG
500 TABLET ORAL EVERY 12 HOURS
Refills: 0 | Status: DISCONTINUED | OUTPATIENT
Start: 2025-04-18 | End: 2025-04-19

## 2025-04-18 RX ORDER — CEFPODOXIME PROXETIL 200 MG/1
200 TABLET, FILM COATED ORAL EVERY 12 HOURS
Refills: 0 | Status: DISCONTINUED | OUTPATIENT
Start: 2025-04-18 | End: 2025-04-18

## 2025-04-18 RX ADMIN — HEPARIN SODIUM 5000 UNIT(S): 1000 INJECTION INTRAVENOUS; SUBCUTANEOUS at 17:28

## 2025-04-18 RX ADMIN — Medication 25 MILLIGRAM(S): at 21:01

## 2025-04-18 RX ADMIN — Medication 500 MILLIGRAM(S): at 17:27

## 2025-04-18 RX ADMIN — Medication 25 MILLIGRAM(S): at 17:27

## 2025-04-18 RX ADMIN — Medication 20 MILLIGRAM(S): at 11:35

## 2025-04-18 RX ADMIN — Medication 25 GRAM(S): at 11:35

## 2025-04-18 RX ADMIN — HEPARIN SODIUM 5000 UNIT(S): 1000 INJECTION INTRAVENOUS; SUBCUTANEOUS at 05:43

## 2025-04-18 RX ADMIN — Medication 500 MILLIGRAM(S): at 12:00

## 2025-04-18 RX ADMIN — METOPROLOL SUCCINATE 25 MILLIGRAM(S): 50 TABLET, EXTENDED RELEASE ORAL at 07:06

## 2025-04-18 RX ADMIN — LOSARTAN POTASSIUM 50 MILLIGRAM(S): 100 TABLET, FILM COATED ORAL at 05:43

## 2025-04-18 RX ADMIN — Medication 25 GRAM(S): at 05:41

## 2025-04-18 RX ADMIN — CEFPODOXIME PROXETIL 200 MILLIGRAM(S): 200 TABLET, FILM COATED ORAL at 11:35

## 2025-04-18 RX ADMIN — METOPROLOL SUCCINATE 100 MILLIGRAM(S): 50 TABLET, EXTENDED RELEASE ORAL at 05:43

## 2025-04-18 RX ADMIN — Medication 25 MILLIGRAM(S): at 11:36

## 2025-04-18 RX ADMIN — Medication 325 MILLIGRAM(S): at 11:35

## 2025-04-18 NOTE — PROGRESS NOTE ADULT - PROBLEM SELECTOR PLAN 6
- chronic, stable  - ELVIN on CKD 3, ELVIN improved   - Baseline Cr 1.7-1.9  -  BUN 27, Cr 2.00,  eGFR 23  - s/p gentle ivf @40cc   - continue to monitor on daily CMP  - Avoid nephrotoxic agents.  Renal -Dr Ramos d/w   Restart Losartan

## 2025-04-18 NOTE — PROGRESS NOTE ADULT - PROBLEM SELECTOR PLAN 5
Chronic, on admission 165/66   Elevated this morning, s/p additional Toprol 25mg   - Continue home medications Toprol, Lasix with hold parameters  - Continue losartan   - Monitor routine hemodynamics    Hypokalemia - resolved   BMP in AM, replete prn normal... Chronic, on admission 165/66   Elevated this morning, s/p additional Toprol 25mg   - Continue home medications Toprol, Lasix with hold parameters  - Continue losartan   -Start Hydralazine 25 mg q 8 hrs as per dtr pt's BP is satble at [facility] ~140/70 & so MD d/rosas new meds -pt has Cardio appointment next week Monday with [facility]   - Monitor routine hemodynamics    Hypokalemia - resolved   BMP in AM, replete prn

## 2025-04-18 NOTE — PROGRESS NOTE ADULT - ASSESSMENT
Transaminitis  Bilateral leg swelling    4/16 CT AP: Diffuse gallbladder wall thickening/edema.         RUQ US: Irregular gallbladder wall thickening to 1.2 cm increased from prior study. Cholelithiasis. Consider acute or chronic cholecystitis. Limited evaluation for underlying gallbladder mass.           HIDA: There is prompt, homogeneous uptake of radiopharmaceutical by the hepatocytes. Activity is first seen in the bowel at about 15 minutes. The gallbladder is not visualized at any time during the study. Findings are compatible with acute cholecystitis.    Plan:  - CT AP & RUQ US noted and discussed with patient   - Suspect transaminitis likely congestive   - LFTs noted, continue to trend CMP daily  - Avoid hepatoxic agents  - Surgery eval noted, per their note no acute surgical intervention/IR drainage at this time   - Antibiotics as per ID  - To follow    Transaminitis  Bilateral leg swelling    4/16 CT AP: Diffuse gallbladder wall thickening/edema.         RUQ US: Irregular gallbladder wall thickening to 1.2 cm increased from prior study. Cholelithiasis. Consider acute or chronic cholecystitis. Limited evaluation for underlying gallbladder mass.           HIDA: There is prompt, homogeneous uptake of radiopharmaceutical by the hepatocytes. Activity is first seen in the bowel at about 15 minutes. The gallbladder is not visualized at any time during the study. Findings are compatible with acute cholecystitis.    Plan:  - CT AP & RUQ US noted and discussed with patient   - Suspect transaminitis likely congestive   - LFTs noted, continue to trend CMP daily  - Avoid hepatoxic agents  - Surgery eval noted, per their note no acute surgical intervention/IR drainage at this time   - Antibiotics as per ID  - No contraindication from GI standpoint for discharge planning   - To follow

## 2025-04-18 NOTE — PROVIDER CONTACT NOTE (OTHER) - BACKGROUND
89yr female presented to the ED fro bilateral leg swelling. Admitted for UTI
89yr female presented to the ED fro bilateral leg swelling. Admitted for UTI

## 2025-04-18 NOTE — PROGRESS NOTE ADULT - ASSESSMENT
ELVIN on CKD 3  Renal Cyst  Hypokalemia  HTN  Proteinuria    -Baseline Creatinine 1.4  -ELVIN Likely 2/2 Decreased EABV  -Reviewed Urine lytes  -Reviewed UA  -CT abd - Left renal cyst. No hydronephrosis.  -IVF NS + KCL, decreased rate, likely DC tomorrow  -Mag phos controlled  -Renal function stabilizing    d/w family

## 2025-04-18 NOTE — PROGRESS NOTE ADULT - SUBJECTIVE AND OBJECTIVE BOX
[facility]  Chuy Macias MD PhD, Rekha Gonzalez MD, Claudette Holguin MD, Jan Thacker MD, Camilo Arroyo MD  and providing coverage with Jonathan Grijalva MD  Providing Infectious Disease Consultations at [facility], [facility], [facility]'s    Office# [phone] to schedule follow up appointments  Answering Service for urgent calls or New Consults [phone]  Cell# to text for urgent issues Chuy Macias [phone]     infectious diseases progress note:    NELDA VERDIN is a 89y y. o. Female patient    Overnight and events of the last 24hrs reviewed    Allergies    amlodipine (Flushing)    Intolerances        ANTIBIOTICS/RELEVANT:  antimicrobials    immunologic:    OTHER:  dextrose 5%. 1000 milliLiter(s) IV Continuous <Continuous>  dextrose 5%. 1000 milliLiter(s) IV Continuous <Continuous>  dextrose 50% Injectable 25 Gram(s) IV Push once  dextrose 50% Injectable 12.5 Gram(s) IV Push once  dextrose 50% Injectable 25 Gram(s) IV Push once  dextrose Oral Gel 15 Gram(s) Oral once PRN  famotidine    Tablet 20 milliGRAM(s) Oral daily  ferrous    sulfate 325 milliGRAM(s) Oral daily  glucagon  Injectable 1 milliGRAM(s) IntraMuscular once  heparin   Injectable 5000 Unit(s) SubCutaneous every 12 hours  insulin lispro (ADMELOG) corrective regimen sliding scale   SubCutaneous three times a day before meals  insulin lispro (ADMELOG) corrective regimen sliding scale   SubCutaneous at bedtime  losartan 50 milliGRAM(s) Oral daily  magnesium sulfate  IVPB 2 Gram(s) IV Intermittent once  metoprolol succinate  milliGRAM(s) Oral daily  sodium chloride 0.9% with potassium chloride 20 mEq/L 1000 milliLiter(s) IV Continuous <Continuous>      Objective:  Vital Signs Last 24 Hrs  T(C): 36.5 (18 Apr 2025 05:43), Max: 36.7 (17 Apr 2025 12:45)  T(F): 97.7 (18 Apr 2025 05:43), Max: 98.1 (17 Apr 2025 12:45)  HR: 79 (18 Apr 2025 07:01) (54 - 79)  BP: 189/68 (18 Apr 2025 07:01) (153/57 - 195/65)  BP(mean): --  RR: 16 (18 Apr 2025 05:43) (16 - 17)  SpO2: 96% (18 Apr 2025 05:43) (95% - 97%)    Parameters below as of 18 Apr 2025 05:43  Patient On (Oxygen Delivery Method): room air        T(C): 36.5 (04-18-25 @ 05:43), Max: 37.1 (04-16-25 @ 17:00)  T(C): 36.5 (04-18-25 @ 05:43), Max: 37.1 (04-16-25 @ 17:00)  T(C): 36.5 (04-18-25 @ 05:43), Max: 37.1 (04-16-25 @ 17:00)    PHYSICAL EXAM:  HEENT: NC atraumatic  Neck: supple  Respiratory: no accessory muscle use, breathing comfortably  Cardiovascular: distant  Gastrointestinal: normal appearing, nondistended  Extremities: no clubbing, no cyanosis,        LABS:                          9.2    7.72  )-----------( 259      ( 18 Apr 2025 06:28 )             28.4       WBC  7.72 04-18 @ 06:28  10.26 04-17 @ 06:26  9.48 04-16 @ 10:40      04-18    138  |  104  |  27[H]  ----------------------------<  121[H]  3.5   |  26  |  2.00[H]    Ca    9.3      18 Apr 2025 06:28  Phos  3.1     04-18  Mg     1.7     04-18    TPro  7.2  /  Alb  2.0[L]  /  TBili  0.6  /  DBili  x   /  AST  82[H]  /  ALT  41  /  AlkPhos  943[H]  04-18      Creatinine: 2.00 mg/dL (04-18-25 @ 06:28)  Creatinine: 1.90 mg/dL (04-17-25 @ 06:26)  Creatinine: 2.00 mg/dL (04-16-25 @ 10:40)      PT/INR - ( 16 Apr 2025 10:40 )   PT: 13.7 sec;   INR: 1.16 ratio         PTT - ( 16 Apr 2025 10:40 )  PTT:31.3 sec  Urinalysis Basic - ( 18 Apr 2025 06:28 )    Color: x / Appearance: x / SG: x / pH: x  Gluc: 121 mg/dL / Ketone: x  / Bili: x / Urobili: x   Blood: x / Protein: x / Nitrite: x   Leuk Esterase: x / RBC: x / WBC x   Sq Epi: x / Non Sq Epi: x / Bacteria: x            INFLAMMATORY MARKERS      MICROBIOLOGY:    Culture - Urine (04.16.25 @ 11:59)    Specimen Source: Clean Catch   Culture Results:   >100,000 CFU/ml Escherichia coli  <10,000 CFU/ml Normal Urogenital husam present        RADIOLOGY & ADDITIONAL STUDIES:

## 2025-04-18 NOTE — PROGRESS NOTE ADULT - PROBLEM SELECTOR PLAN 2
- Patient has a history of frequent UTIs and has been having urinary urgency of the past few days. Was treated recently for UTI with abx.   Less likely   - S/P 1 dose of IV Rocephin in ER  - UA: 30 protein, small LE, 25 wbc, mod bacteria  - start zosyn q8h  - f/u urine cx   - continue to monitor for worsening symptoms

## 2025-04-18 NOTE — PROGRESS NOTE ADULT - ASSESSMENT
IMPRESSION    Anemia multifactorial in etiology related to acute illness, renal insufficiency  Elevated globulin ?polyclonal from acute illness but with compression fractures to rule out monoclonal gammopathy  Scans suggestive of acute gallbladder disease  is being seen by GI and surgery    marked inflammation     ESR 94    Ferritin 1492    RECOMMENDATIONS    1.  follow CBC and transfuse as indicated  2.  continue GI and surgical evaluation     3.  abx as per ID  FOLLOWUP SPEP, AMADEO and quant IgG    5.  further heme onc recommendations pending above

## 2025-04-18 NOTE — PROGRESS NOTE ADULT - SUBJECTIVE AND OBJECTIVE BOX
[facility]    Cabrera Michel NP    [address] [place] [id number]  [phone]      Chief Complaint:  Patient is a 89y old  Female who presents with a chief complaint of Acute pino (18 Apr 2025 09:17)      HPI/ 24 hr events:   Patient seen and examined at bedside  Reports feeling well this morning   No acute GI complaints  Tolerating diet and having BMs  Per daughter, patient's bilateral leg swelling has significantly improved       REVIEW OF SYSTEMS:   General: Negative  HEENT: Negative  CV: Negative  Respiratory: Negative  GI: See HPI  : Negative  MSK: Negative  Hematologic: Negative  Skin: Negative    MEDICATIONS:   MEDICATIONS  (STANDING):  cefpodoxime 200 milliGRAM(s) Oral every 24 hours  dextrose 5%. 1000 milliLiter(s) (50 mL/Hr) IV Continuous <Continuous>  dextrose 5%. 1000 milliLiter(s) (100 mL/Hr) IV Continuous <Continuous>  dextrose 50% Injectable 25 Gram(s) IV Push once  dextrose 50% Injectable 12.5 Gram(s) IV Push once  dextrose 50% Injectable 25 Gram(s) IV Push once  famotidine    Tablet 20 milliGRAM(s) Oral daily  ferrous    sulfate 325 milliGRAM(s) Oral daily  glucagon  Injectable 1 milliGRAM(s) IntraMuscular once  heparin   Injectable 5000 Unit(s) SubCutaneous every 12 hours  insulin lispro (ADMELOG) corrective regimen sliding scale   SubCutaneous three times a day before meals  insulin lispro (ADMELOG) corrective regimen sliding scale   SubCutaneous at bedtime  losartan 50 milliGRAM(s) Oral daily  magnesium sulfate  IVPB 2 Gram(s) IV Intermittent once  metoprolol succinate  milliGRAM(s) Oral daily  metroNIDAZOLE    Tablet 500 milliGRAM(s) Oral every 12 hours  sodium chloride 0.9% with potassium chloride 20 mEq/L 1000 milliLiter(s) (50 mL/Hr) IV Continuous <Continuous>    MEDICATIONS  (PRN):  dextrose Oral Gel 15 Gram(s) Oral once PRN Blood Glucose LESS THAN 70 milliGRAM(s)/deciliter      ALLERGIES:   Allergies    amlodipine (Flushing)    Intolerances        VITAL SIGNS:   Vital Signs Last 24 Hrs  T(C): 36.5 (18 Apr 2025 05:43), Max: 36.7 (17 Apr 2025 12:45)  T(F): 97.7 (18 Apr 2025 05:43), Max: 98.1 (17 Apr 2025 12:45)  HR: 79 (18 Apr 2025 07:01) (54 - 79)  BP: 189/68 (18 Apr 2025 07:01) (153/57 - 195/65)  BP(mean): --  RR: 16 (18 Apr 2025 05:43) (16 - 17)  SpO2: 96% (18 Apr 2025 05:43) (95% - 97%)    Parameters below as of 18 Apr 2025 05:43  Patient On (Oxygen Delivery Method): room air      I&O's Summary    17 Apr 2025 07:01  -  18 Apr 2025 07:00  --------------------------------------------------------  IN: 875 mL / OUT: 900 mL / NET: -25 mL        PHYSICAL EXAM:   GENERAL:  No acute distress  HEENT:  NC/AT  CHEST:  No increased effort  HEART:  Regular rate  ABDOMEN:  Soft, non-tender, non-distended  EXTREMITIES: No cyanosis  SKIN:  Warm, dry  NEURO:  Calm, cooperative    LABS:                        9.2    7.72  )-----------( 259      ( 18 Apr 2025 06:28 )             28.4     04-18    138  |  104  |  27[H]  ----------------------------<  121[H]  3.5   |  26  |  2.00[H]    Ca    9.3      18 Apr 2025 06:28  Phos  3.1     04-18  Mg     1.7     04-18    TPro  7.2  /  Alb  2.0[L]  /  TBili  0.6  /  DBili  x   /  AST  82[H]  /  ALT  41  /  AlkPhos  943[H]  04-18    LIVER FUNCTIONS - ( 18 Apr 2025 06:28 )  Alb: 2.0 g/dL / Pro: 7.2 g/dL / ALK PHOS: 943 U/L / ALT: 41 U/L / AST: 82 U/L / GGT: x           PT/INR - ( 16 Apr 2025 10:40 )   PT: 13.7 sec;   INR: 1.16 ratio         PTT - ( 16 Apr 2025 10:40 )  PTT:31.3 sec    Urinalysis with Rflx Culture (collected 16 Apr 2025 11:59)    Culture - Urine (collected 16 Apr 2025 11:59)  Source: Clean Catch  Preliminary Report (17 Apr 2025 15:57):    >100,000 CFU/ml Escherichia coli    <10,000 CFU/ml Normal Urogenital husam present                              Triglycerides, Serum: 184 mg/dL (04-17-25 @ 06:26)        RADIOLOGY & ADDITIONAL STUDIES:

## 2025-04-18 NOTE — PROGRESS NOTE ADULT - SUBJECTIVE AND OBJECTIVE BOX
Patient is a 89y old  Female who presents with a chief complaint of Acute pino (17 Apr 2025 18:37)    HPI:  89yo F w/ PMH of HTN, HLD, T2DM, TIA, CKD III, Hx of R hip CRPP in 2016, compression Fx spine ,anemia  presents to the ED with her daughter for bilateral leg swelling times few days and fatigue for the past few days. Spoke to daughter via phone who reports that patient has a history of UTIs. Second daughter at bedside with the patient reports that she was having urinary frequency. Wanted to bring her in for evaluation and states that the legs have been bothering her for the past few days as well. Patient is from [place] and is compliant with medications. Was recently treated for UTI with abx.     Of note, patient was admitted 3/9-3/13 for management of weakness likely 2/2 UTI and sacral pain. UA was neg, no leukocytosis and was monitored off abx. Course was complicated by hypertensive urgency and bradycardia which was treated with BP med changes.     Denies fever, chills, chest pain, palpitations, SOB, cough, abdominal pain, nausea, vomiting, diarrhea, constipation, hematochezia, melena, urgency, dysuria, hematuria, headaches, changes in vision, dizziness, numbness, tingling. No other complaints at this time.    ED course:  Vital Signs T(F):  HR:98F  BP: 165/66  RR: 19  SpO2: 99 % on RA  Labs significant for: K+ 3.3, BUN 27, Cr 2.00, AlkP 959, , eGFR 23, pro-BNP 6542  UA: 30 protein, small LE, 25 wbc, mod bacteria  EKG: Sinus bradycardia with 1st degree AV block with premature supraventricular complexes, 51 bpm, QT/QTc 494/455 ms  In ED given,  x1 Rocephin, x 1 Lasix,     Imaging  CXR: negative  CT a/p: Diffuse gallbladder wall thickening/edema  RUQ US: Irregular gallbladder wall thickening to 1.2 cm increased from prior   study. Cholelithiasis. Consider acute or chronic cholecystitis. Prominent pancreas, incompletely assessed, which can also be characterized with MRI.  US duplex: No evidence of deep venous thrombosis in either lower extremity.     (16 Apr 2025 18:04)    INTERVAL HPI:  4/17: Pt seen and examined at bedside. Pt feels well -- offers no complaints. Denies any abdominal pain, nausea, vomiting. Was seen and evaluated by surgery -- no acute surgical intervention indicated at this time. Started on IV zosyn this AM. Mild anemia, Cr improved  4/18: Seen and examined at bedside. Patient states that her B/L LE edema has improved, with continued urinary urgency. Tolerating diet, BM x1 this morning. Denies fatigue, lightheadedness, dizziness, vision changes, chest pain, palpitations, abdominal pain, n/v/d/c.     OVERNIGHT EVENTS: Elevated /65 this morning s/p am anti-hypertensives - losartan 50mg and Toprol 100mg. Repeat manual /68, given additional Toprol 25mg x1     Home Medications:  aspirin 81 mg oral delayed release tablet: 1 tab(s) orally every other day (16 Apr 2025 19:38)  atorvastatin 40 mg oral tablet: 1 tab(s) orally once a day (at bedtime) (16 Apr 2025 19:38)  famotidine 20 mg oral tablet: 1 tab(s) orally once a day (16 Apr 2025 19:38)  ferrous sulfate 324 mg (65 mg elemental iron) oral tablet: 1 tab(s) orally once a day (16 Apr 2025 19:38)  furosemide 20 mg oral tablet: 1 tab(s) orally once a day (16 Apr 2025 19:38)  Januvia 25 mg oral tablet: 1 tab(s) orally once a day (16 Apr 2025 19:38)  losartan 50 mg oral tablet: 1 tab(s) orally once a day (16 Apr 2025 19:38)  metoprolol succinate 100 mg oral capsule, extended release: 1 cap(s) orally once a day (16 Apr 2025 19:37)      MEDICATIONS  (STANDING):  dextrose 5%. 1000 milliLiter(s) (50 mL/Hr) IV Continuous <Continuous>  dextrose 5%. 1000 milliLiter(s) (100 mL/Hr) IV Continuous <Continuous>  dextrose 50% Injectable 25 Gram(s) IV Push once  dextrose 50% Injectable 12.5 Gram(s) IV Push once  dextrose 50% Injectable 25 Gram(s) IV Push once  famotidine    Tablet 20 milliGRAM(s) Oral daily  ferrous    sulfate 325 milliGRAM(s) Oral daily  glucagon  Injectable 1 milliGRAM(s) IntraMuscular once  heparin   Injectable 5000 Unit(s) SubCutaneous every 12 hours  insulin lispro (ADMELOG) corrective regimen sliding scale   SubCutaneous three times a day before meals  insulin lispro (ADMELOG) corrective regimen sliding scale   SubCutaneous at bedtime  losartan 50 milliGRAM(s) Oral daily  magnesium sulfate  IVPB 2 Gram(s) IV Intermittent once  metoprolol succinate  milliGRAM(s) Oral daily  piperacillin/tazobactam IVPB.. 3.375 Gram(s) IV Intermittent every 8 hours  sodium chloride 0.9% with potassium chloride 20 mEq/L 1000 milliLiter(s) (50 mL/Hr) IV Continuous <Continuous>    MEDICATIONS  (PRN):  dextrose Oral Gel 15 Gram(s) Oral once PRN Blood Glucose LESS THAN 70 milliGRAM(s)/deciliter      Allergies    amlodipine (Flushing)    Intolerances        Social History:  Tobacco: None  ETOH: None  Recreational/Illicit Drugs: None  Lives with: child, from [facility]  Ambulation: with walker   ADLs: Dependent (16 Apr 2025 18:04)      REVIEW OF SYSTEMS:  CONSTITUTIONAL: No fever, No chills, No fatigue, No myalgia, No Body ache, No Weakness  EYES: No eye pain,  No visual disturbances, No discharge, NO Redness  ENMT:  No ear pain, No nose bleed, No vertigo; No sinus pain, NO throat pain, No Congestion  NECK: No pain, No stiffness  RESPIRATORY: No cough, NO wheezing, No  hemoptysis, NO  shortness of breath  CARDIOVASCULAR: No chest pain, palpitations  GASTROINTESTINAL: No abdominal pain, NO epigastric pain. No nausea, No vomiting; No diarrhea, No constipation. [ x ] BM x1 this AM   GENITOURINARY: No dysuria, No frequency, + urgency, No hematuria, NO incontinence  NEUROLOGICAL: No headaches, No dizziness, No numbness, No tingling, No tremors, No weakness  EXT: No Swelling, No Pain, No Edema  SKIN:  [ x ] No itching, burning, rashes, or lesions   MUSCULOSKELETAL: No joint pain ,No Jt swelling; No muscle pain, No back pain, No extremity pain  PSYCHIATRIC: No depression,  No anxiety,  No mood swings ,No difficulty sleeping at night  PAIN SCALE: [ x ] None  [  ] Other-  ROS Unable to obtain due to - [  ] Dementia  [  ] Lethargy [  ] Drowsy [  ] Sedated [  ] non verbal  REST OF REVIEW Of SYSTEM - [x  ] Normal     Vital Signs Last 24 Hrs  T(C): 36.5 (18 Apr 2025 05:43), Max: 36.7 (17 Apr 2025 12:45)  T(F): 97.7 (18 Apr 2025 05:43), Max: 98.1 (17 Apr 2025 12:45)  HR: 79 (18 Apr 2025 07:01) (54 - 79)  BP: 189/68 (18 Apr 2025 07:01) (153/57 - 195/65)  BP(mean): --  RR: 16 (18 Apr 2025 05:43) (16 - 17)  SpO2: 96% (18 Apr 2025 05:43) (95% - 97%)    Parameters below as of 18 Apr 2025 05:43  Patient On (Oxygen Delivery Method): room air      Finger Stick        04-17 @ 07:01  -  04-18 @ 07:00  --------------------------------------------------------  IN: 875 mL / OUT: 900 mL / NET: -25 mL        PHYSICAL EXAM:  GENERAL:  [x  ] NAD , [ x ] well appearing, [  ] Agitated, [  ] Drowsy,  [  ] Lethargy, [  ] confused   HEAD:  [ x ] Normal, [  ] Other  EYES:  [ x ] EOMI, [ x ] PERRLA, [x  ] conjunctiva and sclera clear normal, [  ] Other,  [  ] Pallor,[  ] Discharge  ENMT:  [  x] Normal, [ x ] Moist mucous membranes, [  ] Good dentition, [ x ] No Thrush  NECK:  [  x] Supple, [x  ] No JVD, [x  ] Normal thyroid, [  ] Lymphadenopathy [  ] Other  CHEST/LUNG:  [x  ] Clear to auscultation bilaterally, [ x ] Breath Sounds equal B/L / Decrease, [ x ] poor effort  [x  ] No rales, [ x ] No rhonchi  [x  ]  No wheezing,   HEART:  [x  ] Regular rate and rhythm, [  ] tachycardia, [  ] Bradycardia,  [  ] irregular  [x  ] No murmurs, No rubs, No gallops, [  ] PPM in place (Mfr:  )  ABDOMEN:  [ x ] Soft, [ x ] Nontender, [ x ] Nondistended, [ x ] No mass, [ x ] Bowel sounds present, [x  ] obese  NERVOUS SYSTEM:  [  x] Alert & Oriented X3, [ x ] Nonfocal  [  ] Confusion  [  ] Encephalopathic [  ] Sedated [  ] Unable to assess, [  ] Dementia [  ] Other-  EXTREMITIES: [ x ] 2+ Peripheral Pulses, No clubbing, No cyanosis,  [ x ] trace left pedal edema [  ] PVD stasis skin changes B/L Lower EXT, [  ] wound  LYMPH: No lymphadenopathy noted  SKIN:  [x  ] No rashes or lesions, [  ] Pressure Ulcers, [  ] ecchymosis, [  ] Skin Tears, [  ] Other    DIET: Diet, Consistent Carbohydrate w/Evening Snack (04-17-25 @ 11:21)      LABS:                        9.2    7.72  )-----------( 259      ( 18 Apr 2025 06:28 )             28.4     18 Apr 2025 06:28    138    |  104    |  27     ----------------------------<  121    3.5     |  26     |  2.00     Ca    9.3        18 Apr 2025 06:28  Phos  3.1       18 Apr 2025 06:28  Mg     1.7       18 Apr 2025 06:28    TPro  7.2    /  Alb  2.0    /  TBili  0.6    /  DBili  x      /  AST  82     /  ALT  41     /  AlkPhos  943    18 Apr 2025 06:28    PT/INR - ( 16 Apr 2025 10:40 )   PT: 13.7 sec;   INR: 1.16 ratio         PTT - ( 16 Apr 2025 10:40 )  PTT:31.3 sec  Urinalysis Basic - ( 18 Apr 2025 06:28 )    Color: x / Appearance: x / SG: x / pH: x  Gluc: 121 mg/dL / Ketone: x  / Bili: x / Urobili: x   Blood: x / Protein: x / Nitrite: x   Leuk Esterase: x / RBC: x / WBC x   Sq Epi: x / Non Sq Epi: x / Bacteria: x        Culture Results:   >100,000 CFU/ml Escherichia coli  <10,000 CFU/ml Normal Urogenital husam present (04-16 @ 11:59)      culture blood  -- Clean Catch 04-16 @ 11:59    culture urine  --  04-16 @ 11:59      CARDIAC MARKERS ( 16 Apr 2025 10:40 )  x     / x     / x     / x     / <1.0 ng/mL          Urinalysis with Rflx Culture (collected 16 Apr 2025 11:59)    Culture - Urine (collected 16 Apr 2025 11:59)  Source: Clean Catch  Preliminary Report (17 Apr 2025 15:57):    >100,000 CFU/ml Escherichia coli    <10,000 CFU/ml Normal Urogenital husam present         Anemia Panel:  Vitamin B12, Serum: 915 pg/mL (04-17-25 @ 06:26)  Folate, Serum: 10.6 ng/mL (04-17-25 @ 06:26)  Ferritin: 1492 ng/mL (04-17-25 @ 06:26)  Iron Total: 29 ug/dL (04-17-25 @ 06:26)  Iron - Total Binding Capacity.: 184 ug/dL (04-17-25 @ 06:26)      Thyroid Panel:  T4, Serum: 8.1 ug/dL (04-17-25 @ 06:26)  Thyroid Stimulating Hormone, Serum: 4.40 uIU/mL (04-17-25 @ 06:26)      RADIOLOGY & ADDITIONAL TESTS:      HEALTH ISSUES - PROBLEM Dx:  Acute cholecystitis    UTI (urinary tract infection)    Chronic kidney disease    History of TIAs    Hypertension    T2DM (type 2 diabetes mellitus)    HLD (hyperlipidemia)    Need for prophylactic measure    Leg edema    Anemia of chronic disease            Consultant(s) Notes Reviewed:  [x  ] YES     Care Discussed with [X] Consultants  [x  ] Patient  [  ] Family [  ] HCP [  ]   [  ] Social Service  [  ] RN, [  ] Physical Therapy,[  ] Palliative care team  DVT PPX: [  ] Lovenox, [ x ] S C Heparin, [  ] Coumadin, [  ] Xarelto, [  ] Eliquis, [  ] Pradaxa, [  ] IV Heparin drip, [  ] SCD [  ] Contraindication 2 to GI Bleed,[  ] Ambulation [  ] Contraindicated 2 to  bleed [  ] Contraindicated 2 to Brain Bleed  Advanced directive: [ x ] None, [  ] DNR/DNI Patient is a 89y old  Female who presents with a chief complaint of Acute pino (17 Apr 2025 18:37)    HPI:  89yo F w/ PMH of HTN, HLD, T2DM, TIA, CKD III, Hx of R hip CRPP in 2016, compression Fx spine ,anemia  presents to the ED with her daughter for bilateral leg swelling times few days and fatigue for the past few days. Spoke to daughter via phone who reports that patient has a history of UTIs. Second daughter at bedside with the patient reports that she was having urinary frequency. Wanted to bring her in for evaluation and states that the legs have been bothering her for the past few days as well. Patient is from [place] and is compliant with medications. Was recently treated for UTI with abx.     Of note, patient was admitted 3/9-3/13 for management of weakness likely 2/2 UTI and sacral pain. UA was neg, no leukocytosis and was monitored off abx. Course was complicated by hypertensive urgency and bradycardia which was treated with BP med changes.     Denies fever, chills, chest pain, palpitations, SOB, cough, abdominal pain, nausea, vomiting, diarrhea, constipation, hematochezia, melena, urgency, dysuria, hematuria, headaches, changes in vision, dizziness, numbness, tingling. No other complaints at this time.    ED course:  Vital Signs T(F):  HR:98F  BP: 165/66  RR: 19  SpO2: 99 % on RA  Labs significant for: K+ 3.3, BUN 27, Cr 2.00, AlkP 959, , eGFR 23, pro-BNP 6542  UA: 30 protein, small LE, 25 wbc, mod bacteria  EKG: Sinus bradycardia with 1st degree AV block with premature supraventricular complexes, 51 bpm, QT/QTc 494/455 ms  In ED given,  x1 Rocephin, x 1 Lasix,     Imaging  CXR: negative  CT a/p: Diffuse gallbladder wall thickening/edema  RUQ US: Irregular gallbladder wall thickening to 1.2 cm increased from prior   study. Cholelithiasis. Consider acute or chronic cholecystitis. Prominent pancreas, incompletely assessed, which can also be characterized with MRI.  US duplex: No evidence of deep venous thrombosis in either lower extremity.     (16 Apr 2025 18:04)    INTERVAL HPI:  4/17: Pt seen and examined at bedside. Pt feels well -- offers no complaints. Denies any abdominal pain, nausea, vomiting. Was seen and evaluated by surgery -- no acute surgical intervention indicated at this time. Started on IV zosyn this AM. Mild anemia, Cr improved  4/18: Seen and examined at bedside. Patient states that her B/L LE edema has improved, with continued urinary urgency. Tolerating diet, BM x1 this morning. Denies fatigue, lightheadedness, dizziness, vision changes, chest pain, palpitations, abdominal pain, n/v/d/c. add  BP meds added     OVERNIGHT EVENTS: Elevated /65 this morning s/p am anti-hypertensives - losartan 50mg and Toprol 100mg. Repeat manual /68, given additional Toprol 25mg x1     Home Medications:  aspirin 81 mg oral delayed release tablet: 1 tab(s) orally every other day (16 Apr 2025 19:38)  atorvastatin 40 mg oral tablet: 1 tab(s) orally once a day (at bedtime) (16 Apr 2025 19:38)  famotidine 20 mg oral tablet: 1 tab(s) orally once a day (16 Apr 2025 19:38)  ferrous sulfate 324 mg (65 mg elemental iron) oral tablet: 1 tab(s) orally once a day (16 Apr 2025 19:38)  furosemide 20 mg oral tablet: 1 tab(s) orally once a day (16 Apr 2025 19:38)  Januvia 25 mg oral tablet: 1 tab(s) orally once a day (16 Apr 2025 19:38)  losartan 50 mg oral tablet: 1 tab(s) orally once a day (16 Apr 2025 19:38)  metoprolol succinate 100 mg oral capsule, extended release: 1 cap(s) orally once a day (16 Apr 2025 19:37)      MEDICATIONS  (STANDING):  dextrose 5%. 1000 milliLiter(s) (50 mL/Hr) IV Continuous <Continuous>  dextrose 5%. 1000 milliLiter(s) (100 mL/Hr) IV Continuous <Continuous>  dextrose 50% Injectable 25 Gram(s) IV Push once  dextrose 50% Injectable 12.5 Gram(s) IV Push once  dextrose 50% Injectable 25 Gram(s) IV Push once  famotidine    Tablet 20 milliGRAM(s) Oral daily  ferrous    sulfate 325 milliGRAM(s) Oral daily  glucagon  Injectable 1 milliGRAM(s) IntraMuscular once  heparin   Injectable 5000 Unit(s) SubCutaneous every 12 hours  insulin lispro (ADMELOG) corrective regimen sliding scale   SubCutaneous three times a day before meals  insulin lispro (ADMELOG) corrective regimen sliding scale   SubCutaneous at bedtime  losartan 50 milliGRAM(s) Oral daily  magnesium sulfate  IVPB 2 Gram(s) IV Intermittent once  metoprolol succinate  milliGRAM(s) Oral daily  piperacillin/tazobactam IVPB.. 3.375 Gram(s) IV Intermittent every 8 hours  sodium chloride 0.9% with potassium chloride 20 mEq/L 1000 milliLiter(s) (50 mL/Hr) IV Continuous <Continuous>    MEDICATIONS  (PRN):  dextrose Oral Gel 15 Gram(s) Oral once PRN Blood Glucose LESS THAN 70 milliGRAM(s)/deciliter      Allergies    amlodipine (Flushing)    Intolerances        Social History:  Tobacco: None  ETOH: None  Recreational/Illicit Drugs: None  Lives with: child, from [facility]  Ambulation: with walker   ADLs: Dependent (16 Apr 2025 18:04)      REVIEW OF SYSTEMS: i am ok  CONSTITUTIONAL: No fever, No chills, No fatigue, No myalgia, No Body ache, No Weakness  EYES: No eye pain,  No visual disturbances, No discharge, NO Redness  ENMT:  No ear pain, No nose bleed, No vertigo; No sinus pain, NO throat pain, No Congestion  NECK: No pain, No stiffness  RESPIRATORY: No cough, NO wheezing, No  hemoptysis, NO  shortness of breath  CARDIOVASCULAR: No chest pain, palpitations  GASTROINTESTINAL: No abdominal pain, NO epigastric pain. No nausea, No vomiting; No diarrhea, No constipation. [ x ] BM x1 this AM   GENITOURINARY: No dysuria, No frequency, + urgency, No hematuria, NO incontinence  NEUROLOGICAL: No headaches, No dizziness, No numbness, No tingling, No tremors, No weakness  EXT: No Swelling, No Pain, No Edema  SKIN:  [ x ] No itching, burning, rashes, or lesions   MUSCULOSKELETAL: No joint pain ,No Jt swelling; No muscle pain, No back pain, No extremity pain  PSYCHIATRIC: No depression,  No anxiety,  No mood swings ,No difficulty sleeping at night  PAIN SCALE: [ x ] None  [  ] Other-  ROS Unable to obtain due to - [  ] Dementia  [  ] Lethargy [  ] Drowsy [  ] Sedated [  ] non verbal  REST OF REVIEW Of SYSTEM - [x  ] Normal     Vital Signs Last 24 Hrs  T(C): 36.5 (18 Apr 2025 05:43), Max: 36.7 (17 Apr 2025 12:45)  T(F): 97.7 (18 Apr 2025 05:43), Max: 98.1 (17 Apr 2025 12:45)  HR: 79 (18 Apr 2025 07:01) (54 - 79)  BP: 189/68 (18 Apr 2025 07:01) (153/57 - 195/65)  BP(mean): --  RR: 16 (18 Apr 2025 05:43) (16 - 17)  SpO2: 96% (18 Apr 2025 05:43) (95% - 97%)    Parameters below as of 18 Apr 2025 05:43  Patient On (Oxygen Delivery Method): room air      Finger Stick        04-17 @ 07:01  -  04-18 @ 07:00  --------------------------------------------------------  IN: 875 mL / OUT: 900 mL / NET: -25 mL        PHYSICAL EXAM:  GENERAL:  [x  ] NAD , [ x ] well appearing, [  ] Agitated, [  ] Drowsy,  [  ] Lethargy, [  ] confused   HEAD:  [ x ] Normal, [  ] Other  EYES:  [ x ] EOMI, [ x ] PERRLA, [x  ] conjunctiva and sclera clear normal, [  ] Other,  [  ] Pallor,[  ] Discharge  ENMT:  [  x] Normal, [ x ] Moist mucous membranes, [  ] Good dentition, [ x ] No Thrush  NECK:  [  x] Supple, [x  ] No JVD, [x  ] Normal thyroid, [  ] Lymphadenopathy [  ] Other  CHEST/LUNG:  [x  ] Clear to auscultation bilaterally, [ x ] Breath Sounds equal B/L / Decrease, [ x ] poor effort  [x  ] No rales, [ x ] No rhonchi  [x  ]  No wheezing,   HEART:  [x  ] Regular rate and rhythm, [  ] tachycardia, [  ] Bradycardia,  [  ] irregular  [x  ] No murmurs, No rubs, No gallops, [  ] PPM in place (Mfr:  )  ABDOMEN:  [ x ] Soft, [ x ] Nontender, [ x ] Nondistended, [ x ] No mass, [ x ] Bowel sounds present, [x  ] obese  NERVOUS SYSTEM:  [  x] Alert & Oriented X3, [ x ] Nonfocal  [  ] Confusion  [  ] Encephalopathic [  ] Sedated [  ] Unable to assess, [  ] Dementia [  ] Other-  EXTREMITIES: [ x ] 2+ Peripheral Pulses, No clubbing, No cyanosis,  [ x ] trace left pedal edema [  ] PVD stasis skin changes B/L Lower EXT, [  ] wound  LYMPH: No lymphadenopathy noted  SKIN:  [x  ] No rashes or lesions, [  ] Pressure Ulcers, [  ] ecchymosis, [  ] Skin Tears, [  ] Other    DIET: Diet, Consistent Carbohydrate w/Evening Snack (04-17-25 @ 11:21)      LABS:                        9.2    7.72  )-----------( 259      ( 18 Apr 2025 06:28 )             28.4     18 Apr 2025 06:28    138    |  104    |  27     ----------------------------<  121    3.5     |  26     |  2.00     Ca    9.3        18 Apr 2025 06:28  Phos  3.1       18 Apr 2025 06:28  Mg     1.7       18 Apr 2025 06:28    TPro  7.2    /  Alb  2.0    /  TBili  0.6    /  DBili  x      /  AST  82     /  ALT  41     /  AlkPhos  943    18 Apr 2025 06:28    PT/INR - ( 16 Apr 2025 10:40 )   PT: 13.7 sec;   INR: 1.16 ratio         PTT - ( 16 Apr 2025 10:40 )  PTT:31.3 sec  Urinalysis Basic - ( 18 Apr 2025 06:28 )    Color: x / Appearance: x / SG: x / pH: x  Gluc: 121 mg/dL / Ketone: x  / Bili: x / Urobili: x   Blood: x / Protein: x / Nitrite: x   Leuk Esterase: x / RBC: x / WBC x   Sq Epi: x / Non Sq Epi: x / Bacteria: x        Culture Results:   >100,000 CFU/ml Escherichia coli  <10,000 CFU/ml Normal Urogenital husam present (04-16 @ 11:59)      culture blood  -- Clean Catch 04-16 @ 11:59    culture urine  --  04-16 @ 11:59      CARDIAC MARKERS ( 16 Apr 2025 10:40 )  x     / x     / x     / x     / <1.0 ng/mL          Urinalysis with Rflx Culture (collected 16 Apr 2025 11:59)    Culture - Urine (collected 16 Apr 2025 11:59)  Source: Clean Catch  Preliminary Report (17 Apr 2025 15:57):    >100,000 CFU/ml Escherichia coli    <10,000 CFU/ml Normal Urogenital husam present         Anemia Panel:  Vitamin B12, Serum: 915 pg/mL (04-17-25 @ 06:26)  Folate, Serum: 10.6 ng/mL (04-17-25 @ 06:26)  Ferritin: 1492 ng/mL (04-17-25 @ 06:26)  Iron Total: 29 ug/dL (04-17-25 @ 06:26)  Iron - Total Binding Capacity.: 184 ug/dL (04-17-25 @ 06:26)      Thyroid Panel:  T4, Serum: 8.1 ug/dL (04-17-25 @ 06:26)  Thyroid Stimulating Hormone, Serum: 4.40 uIU/mL (04-17-25 @ 06:26)      RADIOLOGY & ADDITIONAL TESTS:      HEALTH ISSUES - PROBLEM Dx:  Acute cholecystitis    UTI (urinary tract infection)    Chronic kidney disease    History of TIAs    Hypertension    T2DM (type 2 diabetes mellitus)    HLD (hyperlipidemia)    Need for prophylactic measure    Leg edema    Anemia of chronic disease            Consultant(s) Notes Reviewed:  [x  ] YES     Care Discussed with [X] Consultants  [x  ] Patient  [ x ] Family [  ] HCP [x  ]   [  ] Social Service  [ x ] RN, [  ] Physical Therapy,[  ] Palliative care team  DVT PPX: [  ] Lovenox, [ x ] S C Heparin, [  ] Coumadin, [  ] Xarelto, [  ] Eliquis, [  ] Pradaxa, [  ] IV Heparin drip, [  ] SCD [  ] Contraindication 2 to GI Bleed,[  ] Ambulation [  ] Contraindicated 2 to  bleed [  ] Contraindicated 2 to Brain Bleed  Advanced directive: [ x ] None, [  ] DNR/DNI

## 2025-04-18 NOTE — PROGRESS NOTE ADULT - PROBLEM SELECTOR PLAN 3
- Patient presents with b/l leg swelling. RESOLVED   - pro-BNP 6542  - US duplex: No evidence of deep venous thrombosis in either lower extremity  - c/w home lasix daily  - of note, patient was previously started on amlodipine but cardio dc'd due to adverse effects   - will continue to monitor hemodynamic status

## 2025-04-18 NOTE — PROGRESS NOTE ADULT - ASSESSMENT
89yo F w HTN, HLD, T2DM, TIA, CKD III, Hx of R hip CRPP in 2016, compression Fx spine ,anemia  preseneds to the ED with her daughter for bilateral leg swelling times few days and fatigue for the past few days. Patient has a history of UTIs. Patient reports that she was having urinary frequency but is on lasix. Was recently treated for UTI with abx.   ED Vital Signs T(F):  HR:98F  BP: 165/66  RR: 19  SpO2: 99 % on R  CT a/p: Diffuse gallbladder wall thickening/edema  RUQ US: Irregular gallbladder wall thickening to 1.2 cm increased from prior   study. Cholelithiasis. Consider acute or chronic cholecystitis. Prominent pancreas, incompletely assessed, which can also be characterized with MRI.  HIDA Abnormal hepatobiliary scan.    Acute Cholecystitis  Presentation without any localizing pain, some weakness, afebrile, minimal elevation of wbcs, ALT/AST/Alph phos elevation  some pyuria and some urinary symptoms (?UTI)  Culture - Urine (04.16.25 @ 11:59)  >100,000 CFU/ml Escherichia coli  Ceftriaxone given 4/16-->Zosyn started 4/17-will dc and fine to complete therapy with  Vantin 200 mg PO BID and Metronidazole 500 mg PO BID with last day 4/21    discussed with daughter, pt and Dr Holguin management without surgical intervention    From an ID standpoint no further requirement for inpatient status for the management of ID issues. Fine with discharge from ID standpoint when other medical issues no longer require inpatient care and social issues allow for a safe discharge plan. To schedule an outpatient ID follow up appointment please call our office at [phone]      Thank you for consulting us and involving us in the management of this most interesting and challenging case.  In addition to reviewing history, imaging, documents, labs, microbiology, took into account antibiotic stewardship, local antibiogram and infection control strategies and potential transmission issues.    We will follow along in the care of this patient. Please contact me by texting me directly on my cell# at [phone] using TEAMS or call our answering service at [phone] with any concerns.   89yo F w HTN, HLD, T2DM, TIA, CKD III, Hx of R hip CRPP in 2016, compression Fx spine ,anemia  preseneds to the ED with her daughter for bilateral leg swelling times few days and fatigue for the past few days. Patient has a history of UTIs. Patient reports that she was having urinary frequency but is on lasix. Was recently treated for UTI with abx.   ED Vital Signs T(F):  HR:98F  BP: 165/66  RR: 19  SpO2: 99 % on R  CT a/p: Diffuse gallbladder wall thickening/edema  RUQ US: Irregular gallbladder wall thickening to 1.2 cm increased from prior   study. Cholelithiasis. Consider acute or chronic cholecystitis. Prominent pancreas, incompletely assessed, which can also be characterized with MRI.  HIDA Abnormal hepatobiliary scan.    Acute Cholecystitis  Presentation without any localizing pain, some weakness, afebrile, minimal elevation of wbcs, ALT/AST/Alph phos elevation  some pyuria and some urinary symptoms (?UTI)  Culture - Urine (04.16.25 @ 11:59)  >100,000 CFU/ml Escherichia coli  Ceftriaxone given 4/16-->Zosyn started 4/17-will dc and fine to complete therapy with  Vantin 200 mg PO daily and Metronidazole 500 mg PO BID with last day 4/21    discussed with daughter, pt and Dr Holguin management without surgical intervention    From an ID standpoint no further requirement for inpatient status for the management of ID issues. Fine with discharge from ID standpoint when other medical issues no longer require inpatient care and social issues allow for a safe discharge plan. To schedule an outpatient ID follow up appointment please call our office at [phone]      Thank you for consulting us and involving us in the management of this most interesting and challenging case.  In addition to reviewing history, imaging, documents, labs, microbiology, took into account antibiotic stewardship, local antibiogram and infection control strategies and potential transmission issues.    We will follow along in the care of this patient. Please contact me by texting me directly on my cell# at [phone] using TEAMS or call our answering service at [phone] with any concerns.

## 2025-04-18 NOTE — PROGRESS NOTE ADULT - PROBLEM SELECTOR PLAN 1
- On admission: AlkP 959,    - In ED given, x1 Rocephin, x 1 Lasix   - CT a/p: Diffuse gallbladder wall thickening/edema  - RUQ US: Irregular gallbladder wall thickening to 1.2 cm increased from prior   study. Cholelithiasis. Consider acute or chronic cholecystitis. Prominent pancreas, incompletely assessed, which can also be characterized with MRI  - Hida scan:  There is prompt, homogeneous uptake of radiopharmaceutical by the hepatocytes. Activity is first seen in the bowel at about 15 minutes. The gallbladder is not visualized at any time during the study. Findings are compatible with acute cholecystitis.  - started zosyn q8h IVPB   - Continue to monitor on daily CMP.  - GI-Dr Dupree -Abx   - Surgery---> no acute surgical intervention at this time, start PO diet, No drainage needed   - ID consulted (Dr. Macias) -- agrees with IV zosyn today, may transition to po today

## 2025-04-18 NOTE — PROGRESS NOTE ADULT - SUBJECTIVE AND OBJECTIVE BOX
89yo F w/ PMH of HTN, HLD, T2DM, TIA, CKD III, Hx of R hip CRPP in 2016 presents to the ED with her daughter for bilateral leg swelling times few days and frequent urination.  Patient has a history of frequent UTIs her daughter was concerned for UTI brought her in for evaluation.                               [facility]                             Nephrology and Hypertension                             Marcin Kaplan                                          [phone]     Patient is a 89y old  Female who presents with a chief complaint of Acute pino (2025 18:04)       HPI:  89yo F w/ PMH of HTN, HLD, T2DM, TIA, CKD III, Hx of R hip CRPP in 2016 presents to the ED with her daughter for bilateral leg swelling times few days and fatigue for the past few days. Spoke to daughter via phone who reports that patient has a history of UTIs. Second daughter at bedside with the patient reports that she was having urinary frequency. Wanted to bring her in for evaluation and states that the legs have been bothering her for the past few days as well. Patient is from [place] and is compliant with medications. Was recently treated for UTI with abx.   Of note, patient was admitted 3/9-3/13 for management of weakness likely 2/2 UTI and sacral pain. UA was neg, no leukocytosis and was monitored off abx. Course was complicated by hypertensive urgency and bradycardia which was treated with BP med changes.   Denies fever, chills, chest pain, palpitations, SOB, cough, abdominal pain, nausea, vomiting, diarrhea, constipation, hematochezia, melena, urgency, dysuria, hematuria, headaches, changes in vision, dizziness, numbness, tingling. No other complaints at this time.  Renal Consulted for ELVIN on CKD.  She is urinating well.  No N/V/SOB.  Has not seen nephrologist prior.     NO N/V/SOB.  Feeling better    PAST MEDICAL & SURGICAL HISTORY:  Hypertension      Diabetes      TIA (transient ischemic attack)      Hip fracture      HLD (hyperlipidemia)      Stage 3 chronic kidney disease      Anemia of chronic disease      Lumbar compression fracture      S/P ORIF (open reduction internal fixation) fracture  right hip           FAMILY HISTORY:  FH: asthma (Father)    [place]    Social History:Non smoker    MEDICATIONS  (STANDING):  aspirin enteric coated 81 milliGRAM(s) Oral daily  atorvastatin 40 milliGRAM(s) Oral at bedtime  dextrose 5%. 1000 milliLiter(s) (50 mL/Hr) IV Continuous <Continuous>  dextrose 5%. 1000 milliLiter(s) (100 mL/Hr) IV Continuous <Continuous>  dextrose 50% Injectable 25 Gram(s) IV Push once  dextrose 50% Injectable 12.5 Gram(s) IV Push once  dextrose 50% Injectable 25 Gram(s) IV Push once  famotidine    Tablet 20 milliGRAM(s) Oral daily  ferrous    sulfate 325 milliGRAM(s) Oral daily  furosemide    Tablet 20 milliGRAM(s) Oral daily  glucagon  Injectable 1 milliGRAM(s) IntraMuscular once  heparin   Injectable 5000 Unit(s) SubCutaneous once  insulin lispro (ADMELOG) corrective regimen sliding scale   SubCutaneous every 6 hours  metoprolol succinate  milliGRAM(s) Oral daily  potassium chloride    Tablet ER 40 milliEquivalent(s) Oral every 4 hours  sodium chloride 0.9% with potassium chloride 20 mEq/L 1000 milliLiter(s) (75 mL/Hr) IV Continuous <Continuous>    MEDICATIONS  (PRN):  dextrose Oral Gel 15 Gram(s) Oral once PRN Blood Glucose LESS THAN 70 milliGRAM(s)/deciliter   Meds reviewed    Allergies    No Known Allergies    Intolerances         REVIEW OF SYSTEMS:    as above      ICU Vital Signs Last 24 Hrs  T(C): 36.5 (2025 05:43), Max: 36.7 (2025 12:45)  T(F): 97.7 (2025 05:43), Max: 98.1 (2025 12:45)  HR: 79 (2025 07:01) (54 - 79)  BP: 189/68 (2025 07:01) (153/57 - 195/65)  BP(mean): --  ABP: --  ABP(mean): --  RR: 16 (2025 05:43) (16 - 17)  SpO2: 96% (2025 05:43) (95% - 97%)    O2 Parameters below as of 2025 05:43  Patient On (Oxygen Delivery Method): room air            PHYSICAL EXAM:    GENERAL: NAD  HEAD:  Atraumatic, Normocephalic  EYES: EOMI, conjunctiva and sclera clear  ENMT: No Drainage from nares, No drainage from ears  NERVOUS SYSTEM:  Awake and Alert  CHEST/LUNG: Clear to percussion bilaterally  EXTREMITIES:  trace Edema  SKIN: No rashes No obvious ecchymosis      LABS:                        9.2    9.48  )-----------( 283      ( 2025 10:40 )             28.9         138  |  100  |  27[H]  ----------------------------<  131[H]  3.3[L]   |  28  |  2.00[H]    Ca    9.2      2025 10:40  Mg     1.9         TPro  7.7  /  Alb  2.1[L]  /  TBili  0.8  /  DBili  x   /  AST  139[H]  /  ALT  63  /  AlkPhos  959[H]      PT/INR - ( 2025 10:40 )   PT: 13.7 sec;   INR: 1.16 ratio         PTT - ( 2025 10:40 )  PTT:31.3 sec  Urinalysis Basic - ( 2025 11:59 )    Color: Yellow / Appearance: Clear / S.011 / pH: x  Gluc: x / Ketone: Negative mg/dL  / Bili: Negative / Urobili: 0.2 mg/dL   Blood: x / Protein: 30 mg/dL / Nitrite: Negative   Leuk Esterase: Small / RBC: 0 /HPF / WBC 25 /HPF   Sq Epi: x / Non Sq Epi: x / Bacteria: Moderate /HPF      Magnesium: 1.9 mg/dL ( @ 10:40)          RADIOLOGY & ADDITIONAL TESTS:

## 2025-04-18 NOTE — PROGRESS NOTE ADULT - SUBJECTIVE AND OBJECTIVE BOX
[INTERVAL HX: ]  Patient seen and examined;  Chart reviewed and events noted;   no CP, no SOB  no bleeding  no fever    dtr at bedside    reviewed prior ferritin in Dec where levels 250    [MEDICATIONS]  MEDICATIONS  (STANDING):  cefpodoxime 200 milliGRAM(s) Oral every 24 hours  dextrose 5%. 1000 milliLiter(s) (50 mL/Hr) IV Continuous <Continuous>  dextrose 5%. 1000 milliLiter(s) (100 mL/Hr) IV Continuous <Continuous>  dextrose 50% Injectable 25 Gram(s) IV Push once  dextrose 50% Injectable 12.5 Gram(s) IV Push once  dextrose 50% Injectable 25 Gram(s) IV Push once  famotidine    Tablet 20 milliGRAM(s) Oral daily  ferrous    sulfate 325 milliGRAM(s) Oral daily  glucagon  Injectable 1 milliGRAM(s) IntraMuscular once  heparin   Injectable 5000 Unit(s) SubCutaneous every 12 hours  hydrALAZINE 25 milliGRAM(s) Oral three times a day  insulin lispro (ADMELOG) corrective regimen sliding scale   SubCutaneous three times a day before meals  insulin lispro (ADMELOG) corrective regimen sliding scale   SubCutaneous at bedtime  losartan 50 milliGRAM(s) Oral daily  metoprolol succinate  milliGRAM(s) Oral daily  metroNIDAZOLE    Tablet 500 milliGRAM(s) Oral every 12 hours  sodium chloride 0.9% with potassium chloride 20 mEq/L 1000 milliLiter(s) (50 mL/Hr) IV Continuous <Continuous>    MEDICATIONS  (PRN):  dextrose Oral Gel 15 Gram(s) Oral once PRN Blood Glucose LESS THAN 70 milliGRAM(s)/deciliter      [VITALS]  Vital Signs Last 24 Hrs  T(C): 36.5 (2025 05:43), Max: 36.7 (2025 12:45)  T(F): 97.7 (2025 05:43), Max: 98.1 (2025 12:45)  HR: 69 (2025 11:20) (54 - 79)  BP: 170/72 (2025 11:20) (153/57 - 195/65)  BP(mean): --  RR: 16 (2025 05:43) (16 - 17)  SpO2: 96% (2025 05:43) (95% - 97%)    Parameters below as of 2025 05:43  Patient On (Oxygen Delivery Method): room air      [WT/HT]  Daily     Daily Weight in k.3 (2025 04:55)  [VENT]      [PHYSICAL EXAM]  GEN: NAD  HEENT: normocephalic and atraumatic. EOMI. PERRL.    NECK: Supple.  No lymphadenopathy   LUNGS: Clear to auscultation.  HEART: Regular rate and rhythm,  no MRG  ABDOMEN: Soft, nontender, and nondistended.  Positive bowel sounds.    : No CVA tenderness  EXTREMITIES: Without edema.  NEUROLOGIC: grossly intact.  PSYCHIATRIC: Appropriate affect .  SKIN: No rash     [LABS:]                        9.2    7.72  )-----------( 259      ( 2025 06:28 )             28.4         138  |  104  |  27[H]  ----------------------------<  121[H]  3.5   |  26  |  2.00[H]    Ca    9.3      2025 06:28  Phos  3.1       Mg     1.7         TPro  7.2  /  Alb  2.0[L]  /  TBili  0.6  /  DBili  x   /  AST  82[H]  /  ALT  41  /  AlkPhos  943[H]            Vitamin B12, Serum: 915 pg/mL [232 - 1245] (25 @ 06:26)    Folate, Serum: 10.6 ng/mL (25 @ 06:26)    Ferritin: 1492 ng/mL *H* [13 - 330] (25 @ 06:26)    Iron - Total Binding Capacity.: 184 ug/dL *L* [220 - 430] (25 @ 06:26)    Sedimentation Rate, Erythrocyte: 94 mm/hr *H* [0 - 20] (25 @ 22:16)    Iron - Total Binding Capacity.: 225 ug/dL [220 - 430] (24 @ 07:45)    Ferritin: 250 ng/mL [13 - 330] (24 @ 07:45)      Urinalysis Basic - ( 2025 06:28 )    Color: x / Appearance: x / SG: x / pH: x  Gluc: 121 mg/dL / Ketone: x  / Bili: x / Urobili: x   Blood: x / Protein: x / Nitrite: x   Leuk Esterase: x / RBC: x / WBC x   Sq Epi: x / Non Sq Epi: x / Bacteria: x        Urinalysis with Rflx Culture (collected 2025 11:59)    Culture - Urine (collected 2025 11:59)  Source: Clean Catch  Preliminary Report (2025 15:57):    >100,000 CFU/ml Escherichia coli    <10,000 CFU/ml Normal Urogenital husam present      SARS-CoV-2: NotDetec (09 Mar 2025 21:00)        Urinalysis with Rflx Culture (collected 2025 11:59)    Culture - Urine (collected 2025 11:59)  Source: Clean Catch  Preliminary Report (2025 15:57):    >100,000 CFU/ml Escherichia coli    <10,000 CFU/ml Normal Urogenital husam present        [RADIOLOGY STUDIES:]

## 2025-04-18 NOTE — PROGRESS NOTE ADULT - PROBLEM SELECTOR PLAN 7
- As per chart review, patient has had hx of TIA around 2017 per son  - c/w home asa, statin  - lipid panel wnl

## 2025-04-18 NOTE — CHART NOTE - NSCHARTNOTEFT_GEN_A_CORE
Patient blood pressure 190/65 this AM 20 minutes after receiving AM BP meds losartan and metoprolol    Will re-check after time passes from AM blood pressure medications    Added 25mg metoprolol succinate 1x STAT

## 2025-04-18 NOTE — CASE MANAGEMENT PROGRESS NOTE - NSCMPROGRESSNOTE_GEN_ALL_CORE
Patient is for possible discharge tomorrow.  spoke to William director, [facility]  [phone]/ F [phone]. Patient uses [facility] because patient's daughter manages her medication. Patient has [facility] Medicaid aides 7hrs/7d w/[facility], coordinator is Miriam [phone] ext [phone], [facility] Plan [phone]  and private hire aide from the same agency  7hrs/7d w/[facility], coordinator is Miriam.  notified them of the possible discharge tomorrow. Patient's daughter will drive her back to the [facility].

## 2025-04-19 ENCOUNTER — TRANSCRIPTION ENCOUNTER (OUTPATIENT)
Age: 89
End: 2025-04-19

## 2025-04-19 LAB
ALBUMIN SERPL ELPH-MCNC: 2 G/DL — LOW (ref 3.3–5)
ALP SERPL-CCNC: 904 U/L — HIGH (ref 40–120)
ALT FLD-CCNC: 35 U/L — SIGNIFICANT CHANGE UP (ref 12–78)
ANION GAP SERPL CALC-SCNC: 7 MMOL/L — SIGNIFICANT CHANGE UP (ref 5–17)
AST SERPL-CCNC: 64 U/L — HIGH (ref 15–37)
BILIRUB SERPL-MCNC: 0.6 MG/DL — SIGNIFICANT CHANGE UP (ref 0.2–1.2)
BUN SERPL-MCNC: 22 MG/DL — SIGNIFICANT CHANGE UP (ref 7–23)
CALCIUM SERPL-MCNC: 9.4 MG/DL — SIGNIFICANT CHANGE UP (ref 8.5–10.1)
CHLORIDE SERPL-SCNC: 105 MMOL/L — SIGNIFICANT CHANGE UP (ref 96–108)
CO2 SERPL-SCNC: 25 MMOL/L — SIGNIFICANT CHANGE UP (ref 22–31)
CREAT SERPL-MCNC: 1.8 MG/DL — HIGH (ref 0.5–1.3)
EGFR: 26 ML/MIN/1.73M2 — LOW
EGFR: 26 ML/MIN/1.73M2 — LOW
GLUCOSE BLDC GLUCOMTR-MCNC: 137 MG/DL — HIGH (ref 70–99)
GLUCOSE BLDC GLUCOMTR-MCNC: 138 MG/DL — HIGH (ref 70–99)
GLUCOSE BLDC GLUCOMTR-MCNC: 146 MG/DL — HIGH (ref 70–99)
GLUCOSE BLDC GLUCOMTR-MCNC: 175 MG/DL — HIGH (ref 70–99)
GLUCOSE SERPL-MCNC: 128 MG/DL — HIGH (ref 70–99)
HCT VFR BLD CALC: 28.9 % — LOW (ref 34.5–45)
HGB BLD-MCNC: 9.1 G/DL — LOW (ref 11.5–15.5)
MAGNESIUM SERPL-MCNC: 2.2 MG/DL — SIGNIFICANT CHANGE UP (ref 1.6–2.6)
MCHC RBC-ENTMCNC: 25.5 PG — LOW (ref 27–34)
MCHC RBC-ENTMCNC: 31.5 G/DL — LOW (ref 32–36)
MCV RBC AUTO: 81 FL — SIGNIFICANT CHANGE UP (ref 80–100)
NRBC BLD AUTO-RTO: 0 /100 WBCS — SIGNIFICANT CHANGE UP (ref 0–0)
PHOSPHATE SERPL-MCNC: 2.4 MG/DL — LOW (ref 2.5–4.5)
PLATELET # BLD AUTO: 255 K/UL — SIGNIFICANT CHANGE UP (ref 150–400)
POTASSIUM SERPL-MCNC: 3.6 MMOL/L — SIGNIFICANT CHANGE UP (ref 3.5–5.3)
POTASSIUM SERPL-SCNC: 3.6 MMOL/L — SIGNIFICANT CHANGE UP (ref 3.5–5.3)
PROT SERPL-MCNC: 7.1 G/DL — SIGNIFICANT CHANGE UP (ref 6–8.3)
RBC # BLD: 3.57 M/UL — LOW (ref 3.8–5.2)
RBC # FLD: 15.9 % — HIGH (ref 10.3–14.5)
SODIUM SERPL-SCNC: 137 MMOL/L — SIGNIFICANT CHANGE UP (ref 135–145)
WBC # BLD: 7.19 K/UL — SIGNIFICANT CHANGE UP (ref 3.8–10.5)
WBC # FLD AUTO: 7.19 K/UL — SIGNIFICANT CHANGE UP (ref 3.8–10.5)

## 2025-04-19 RX ORDER — POTASSIUM PHOSPHATE, MONOBASIC POTASSIUM PHOSPHATE, DIBASIC INJECTION, 236; 224 MG/ML; MG/ML
15 SOLUTION, CONCENTRATE INTRAVENOUS ONCE
Refills: 0 | Status: COMPLETED | OUTPATIENT
Start: 2025-04-19 | End: 2025-04-19

## 2025-04-19 RX ORDER — POTASSIUM PHOSPHATE, MONOBASIC POTASSIUM PHOSPHATE, DIBASIC INJECTION, 236; 224 MG/ML; MG/ML
15 SOLUTION, CONCENTRATE INTRAVENOUS ONCE
Refills: 0 | Status: DISCONTINUED | OUTPATIENT
Start: 2025-04-19 | End: 2025-04-19

## 2025-04-19 RX ORDER — ERTAPENEM SODIUM 1 G/1
500 INJECTION, POWDER, LYOPHILIZED, FOR SOLUTION INTRAMUSCULAR; INTRAVENOUS EVERY 24 HOURS
Refills: 0 | Status: COMPLETED | OUTPATIENT
Start: 2025-04-20 | End: 2025-04-22

## 2025-04-19 RX ORDER — GABAPENTIN 400 MG/1
100 CAPSULE ORAL ONCE
Refills: 0 | Status: COMPLETED | OUTPATIENT
Start: 2025-04-19 | End: 2025-04-19

## 2025-04-19 RX ORDER — GABAPENTIN 400 MG/1
100 CAPSULE ORAL AT BEDTIME
Refills: 0 | Status: DISCONTINUED | OUTPATIENT
Start: 2025-04-19 | End: 2025-04-19

## 2025-04-19 RX ORDER — ERTAPENEM SODIUM 1 G/1
INJECTION, POWDER, LYOPHILIZED, FOR SOLUTION INTRAMUSCULAR; INTRAVENOUS
Refills: 0 | Status: COMPLETED | OUTPATIENT
Start: 2025-04-19 | End: 2025-04-23

## 2025-04-19 RX ORDER — ERTAPENEM SODIUM 1 G/1
500 INJECTION, POWDER, LYOPHILIZED, FOR SOLUTION INTRAMUSCULAR; INTRAVENOUS ONCE
Refills: 0 | Status: COMPLETED | OUTPATIENT
Start: 2025-04-19 | End: 2025-04-19

## 2025-04-19 RX ORDER — ERTAPENEM SODIUM 1 G/1
INJECTION, POWDER, LYOPHILIZED, FOR SOLUTION INTRAMUSCULAR; INTRAVENOUS
Refills: 0 | Status: DISCONTINUED | OUTPATIENT
Start: 2025-04-19 | End: 2025-04-19

## 2025-04-19 RX ORDER — GABAPENTIN 400 MG/1
300 CAPSULE ORAL
Refills: 0 | Status: DISCONTINUED | OUTPATIENT
Start: 2025-04-19 | End: 2025-04-19

## 2025-04-19 RX ADMIN — INSULIN LISPRO 1: 100 INJECTION, SOLUTION INTRAVENOUS; SUBCUTANEOUS at 12:02

## 2025-04-19 RX ADMIN — POTASSIUM PHOSPHATE, MONOBASIC POTASSIUM PHOSPHATE, DIBASIC INJECTION, 62.5 MILLIMOLE(S): 236; 224 SOLUTION, CONCENTRATE INTRAVENOUS at 11:04

## 2025-04-19 RX ADMIN — METOPROLOL SUCCINATE 100 MILLIGRAM(S): 50 TABLET, EXTENDED RELEASE ORAL at 04:59

## 2025-04-19 RX ADMIN — Medication 325 MILLIGRAM(S): at 11:04

## 2025-04-19 RX ADMIN — Medication 500 MILLIGRAM(S): at 04:59

## 2025-04-19 RX ADMIN — HEPARIN SODIUM 5000 UNIT(S): 1000 INJECTION INTRAVENOUS; SUBCUTANEOUS at 04:59

## 2025-04-19 RX ADMIN — Medication 20 MILLIGRAM(S): at 11:04

## 2025-04-19 RX ADMIN — HEPARIN SODIUM 5000 UNIT(S): 1000 INJECTION INTRAVENOUS; SUBCUTANEOUS at 18:02

## 2025-04-19 RX ADMIN — Medication 50 MILLIGRAM(S): at 12:02

## 2025-04-19 RX ADMIN — Medication 25 MILLIGRAM(S): at 06:51

## 2025-04-19 RX ADMIN — LOSARTAN POTASSIUM 50 MILLIGRAM(S): 100 TABLET, FILM COATED ORAL at 04:59

## 2025-04-19 RX ADMIN — GABAPENTIN 100 MILLIGRAM(S): 400 CAPSULE ORAL at 21:27

## 2025-04-19 RX ADMIN — Medication 50 MILLIGRAM(S): at 21:27

## 2025-04-19 RX ADMIN — Medication 40 MILLIEQUIVALENT(S): at 10:31

## 2025-04-19 RX ADMIN — ERTAPENEM SODIUM 100 MILLIGRAM(S): 1 INJECTION, POWDER, LYOPHILIZED, FOR SOLUTION INTRAMUSCULAR; INTRAVENOUS at 11:04

## 2025-04-19 RX ADMIN — CEFPODOXIME PROXETIL 200 MILLIGRAM(S): 200 TABLET, FILM COATED ORAL at 08:02

## 2025-04-19 RX ADMIN — Medication 25 MILLIGRAM(S): at 04:58

## 2025-04-19 NOTE — DISCHARGE NOTE PROVIDER - CARE PROVIDER_API CALL
Jean Munoz  Phone: [phone]  Fax: (   )    -  Follow Up Time: 1 week   Jean Munoz  Phone: [phone]  Fax: (   )    -  Follow Up Time: 1 week    Gary Hunter  Phone: [phone]  Fax: (   )    -  Follow Up Time: 1-3 days   Jean Munoz  Phone: [phone]  Fax: (   )    -  Follow Up Time: 1 week    Gary Hunter  Phone: [phone]  Fax: (   )    -  Follow Up Time: 1-3 days    Jonathan Dumont  Gastroenterology  [address] [place] [id number]  Phone: [phone]  Fax: [phone]  Follow Up Time: 1 month    Reagan Jackson  Medical Oncology  [address] [facility], [address] [address]  Phone: [phone]  Fax: [phone]  Follow Up Time: 1 month

## 2025-04-19 NOTE — PROGRESS NOTE ADULT - PROBLEM SELECTOR PLAN 2
- Patient has a history of frequent UTIs and has been having urinary urgency of the past few days. Was treated recently for UTI with abx.   - UA: 30 protein, small LE, 25 wbc, mod bacteria  - UCx with ESBL Ecoli   - S/P 1 dose of IV Rocephin in ER  - On Cefpodoxime and flagyl, awaiting ID recs for IV abx   - continue to monitor for worsening symptoms - Patient has a history of frequent UTIs and has been having urinary urgency of the past few days. Was treated recently for UTI with abx.   - UA: 30 protein, small LE, 25 wbc, mod bacteria  - UCx with ESBL Ecoli   - S/P 1 dose of IV Rocephin in ER  - On Cefpodoxime and flagyl, awaiting ID recs for IV abx -STOP   - cStart IV Invanz 1 gm daily

## 2025-04-19 NOTE — PROGRESS NOTE ADULT - PROBLEM SELECTOR PLAN 5
Chronic, on admission 165/66   Elevated this morning, s/p additional Toprol 25mg  Per dtr pt's BP is stable at KOKI ~140/70 & so MD d/rosas new meds - pt has Cardio appointment next week Monday with [facility]    - Continue home medications Toprol, Lasix with hold parameters  - Continue losartan   - Continued elevated BP readings overnight, uptitrated Hydralazine to 50mg TID   - Monitor routine hemodynamics    Hypokalemia - resolved   BMP in AM, replete prn

## 2025-04-19 NOTE — DISCHARGE NOTE PROVIDER - NSDCCPCAREPLAN_GEN_ALL_CORE_FT
PRINCIPAL DISCHARGE DIAGNOSIS  Diagnosis: Acute cholecystitis  Assessment and Plan of Treatment:       SECONDARY DISCHARGE DIAGNOSES  Diagnosis: Acute UTI  Assessment and Plan of Treatment:     Diagnosis: HTN (hypertension)  Assessment and Plan of Treatment:      PRINCIPAL DISCHARGE DIAGNOSIS  Diagnosis: Acute cholecystitis  Assessment and Plan of Treatment: You were diagnosed with an infection in your gallbladder. You were evaluated by surgery and determined benefit of withholding surgery. You were treated with IV antibiotics for the infection and improved      SECONDARY DISCHARGE DIAGNOSES  Diagnosis: Acute UTI  Assessment and Plan of Treatment: A urinary tract infection (UTI) is an infection of any part of the urinary tract, which includes the kidneys, ureters, bladder, and urethra. Risk factors include ignoring your need to urinate, wiping back to front if female, being an uncircumcised male, and having diabetes or a weak immune system. Symptoms include frequent urination, pain or burning with urination, foul smelling urine, cloudy urine, pain in the lower abdomen, blood in the urine, and fever. You completed a course of antibiotics in the hospital  SEEK IMMEDIATE MEDICAL CARE IF YOU HAVE ANY OF THE FOLLOWING SYMPTOMS: severe back or abdominal pain, fever, inability to keep fluids or medicine down, dizziness/lightheadedness, or a change in mental status.    Diagnosis: HTN (hypertension)  Assessment and Plan of Treatment: You have a known history of hypertension, the medical term for high blood pressure. Untreated high blood pressure increases the strain on the heart and arteries, eventually causing organ damage. High blood pressure also increases the risk of heart failure, heart attack (myocardial infarction), stroke, and kidney failure.   - Continue to take HYDRALAZINE 100MG THREE TIMES A DAY, METOPROLOL 100MG DAILY AND LOSARTAN 50MG TWICE A DAY to prevent the possible complications of uncontrolled hypertension.   - Please also follow up with your primary care physician on a regular basis to make sure your blood pressure continues to be well controlled.     PRINCIPAL DISCHARGE DIAGNOSIS  Diagnosis: Acute cholecystitis  Assessment and Plan of Treatment: You were diagnosed with an infection in your gallbladder.  - You were evaluated by surgery and determined benefit of withholding surgery. You were treated with IV antibiotics for the infection and improved  You were seen by ID,  GI & Surgery in hospital   No surgical intervention      SECONDARY DISCHARGE DIAGNOSES  Diagnosis: HTN (hypertension)  Assessment and Plan of Treatment: You have a known history of hypertension, the medical term for high blood pressure. Untreated high blood pressure increases the strain on the heart and arteries, eventually causing organ damage. High blood pressure also increases the risk of heart failure, heart attack (myocardial infarction), stroke, and kidney failure.   - Continue to take HYDRALAZINE 100MG THREE TIMES A DAY, -New - continue METOPROLOL 100MG DAILY AND LOSARTAN 50MG TWICE A DAY  - continue Lasix 20 mg daily   -Please take all meds  to prevent the possible complications of uncontrolled hypertension.   Follow up with cardiology in 1week to recheck Blood pressure   - Please also follow up with your primary care physician on a regular basis to make sure your blood pressure continues to be well controlled.    Diagnosis: Acute UTI  Assessment and Plan of Treatment: A urinary tract infection (UTI) is an infection of any part of the urinary tract, which includes the kidneys, ureters, bladder, and urethra. Risk factors include ignoring your need to urinate, wiping back to front if female, being an uncircumcised male, and having diabetes or a weak immune system. Symptoms include frequent urination, pain or burning with urination, foul smelling urine, cloudy urine, pain in the lower abdomen, blood in the urine, and fever.   -You completed a course of IV  antibiotics in the hospital  SEEK IMMEDIATE MEDICAL CARE IF YOU HAVE ANY OF THE FOLLOWING SYMPTOMS: severe back or abdominal pain, fever, inability to keep fluids or medicine down, dizziness/lightheadedness, or a change in mental status.    Diagnosis: T2DM (type 2 diabetes mellitus)  Assessment and Plan of Treatment: continue Januvia  daily    Diagnosis: History of TIAs  Assessment and Plan of Treatment: Continue ASA & Statin daily    Diagnosis: Chronic kidney disease  Assessment and Plan of Treatment: CKD 3    Diagnosis: Anemia of chronic disease  Assessment and Plan of Treatment: Anemia 2/2 CKD 3   -Stable H/H     PRINCIPAL DISCHARGE DIAGNOSIS  Diagnosis: Acute cholecystitis  Assessment and Plan of Treatment: You were diagnosed with an infection in your gallbladder.  - You were evaluated by surgery and determined benefit of withholding surgery. You were treated with IV antibiotics for the infection and improved  Elevated LFT;s   You were seen by ID,  GI & Surgery in hospital   Follow up with GI as out pt   No surgical intervention      SECONDARY DISCHARGE DIAGNOSES  Diagnosis: HTN (hypertension)  Assessment and Plan of Treatment: You have a known history of hypertension, the medical term for high blood pressure. Untreated high blood pressure increases the strain on the heart and arteries, eventually causing organ damage. High blood pressure also increases the risk of heart failure, heart attack (myocardial infarction), stroke, and kidney failure.   - Continue to take HYDRALAZINE 100MG THREE TIMES A DAY, -New - continue METOPROLOL 100MG DAILY AND LOSARTAN 50MG TWICE A DAY  - continue Lasix 20 mg daily   -Please take all meds  to prevent the possible complications of uncontrolled hypertension.   Follow up with cardiology in 1week to recheck Blood pressure   - Please also follow up with your primary care physician on a regular basis to make sure your blood pressure continues to be well controlled.    Diagnosis: Acute UTI  Assessment and Plan of Treatment: A urinary tract infection (UTI) is an infection of any part of the urinary tract, which includes the kidneys, ureters, bladder, and urethra. Risk factors include ignoring your need to urinate, wiping back to front if female, being an uncircumcised male, and having diabetes or a weak immune system. Symptoms include frequent urination, pain or burning with urination, foul smelling urine, cloudy urine, pain in the lower abdomen, blood in the urine, and fever.   -You completed a course of IV  antibiotics in the hospital  SEEK IMMEDIATE MEDICAL CARE IF YOU HAVE ANY OF THE FOLLOWING SYMPTOMS: severe back or abdominal pain, fever, inability to keep fluids or medicine down, dizziness/lightheadedness, or a change in mental status.    Diagnosis: Anemia of chronic disease  Assessment and Plan of Treatment: Anemia 2/2 CKD 3   -Stable H/H-follow CBC after 1 week   seen by Hematologist -Anemia work up done   SPET & IF done   + MGUS    Diagnosis: T2DM (type 2 diabetes mellitus)  Assessment and Plan of Treatment: continue Januvia  daily    Diagnosis: History of TIAs  Assessment and Plan of Treatment: Continue ASA & Statin daily    Diagnosis: Chronic kidney disease  Assessment and Plan of Treatment: CKD 3  Follow BMP after 1 week    Diagnosis: Leg edema  Assessment and Plan of Treatment: Lasix 20 mg 1 tab daily    Diagnosis: Lumbar compression fracture  Assessment and Plan of Treatment: MRI 3/10/25-last admission   IMPRESSION:  1.  Acute compression fracture of the L4 vertebra, with mild loss of   vertebral body height.  2.  Chronic healed compression deformity in the sacrum.  3.  Chronic L5-S1 anterolisthesis, with moderate to severe bilateral   foraminal narrowing.  4.  Moderate to severe L2-3 central canal stenosis, with moderate   narrowing at L1-2 and L5-S1.  -Elevated ALP likely 2/2 compression Fxs

## 2025-04-19 NOTE — PROGRESS NOTE ADULT - SUBJECTIVE AND OBJECTIVE BOX
[facility]  HERNESTO Ramirez Y. Patel, S. Shah, G. Casimir  [phone]  ([phone] - weekdays after 5pm and weekends)    Name: NELDA VERDIN  Age/Gender: 89y Female  MRN: [id number]    Interval History:  Notes reviewed.   No concerning overnight events.  Afebrile.   denies abd pain  family at bedside    Allergies: amlodipine (Flushing)      Objective:  Vitals:   T(F): 97.8 (04-19-25 @ 12:02), Max: 97.9 (04-19-25 @ 04:40)  HR: 64 (04-19-25 @ 12:21) (49 - 66)  BP: 174/62 (04-19-25 @ 12:21) (174/62 - 202/83)  RR: 17 (04-19-25 @ 12:02) (17 - 18)  SpO2: 97% (04-19-25 @ 12:02) (94% - 97%)  Physical Examination:  General: no acute distress  HEENT: anicteric  Cardio: S1, S2, normal rate  Resp: breathing comfortably on RA   Abd: soft, NT, ND  Ext: no LE edema  Skin: warm, dry    Laboratory Studies:  CBC:                       9.1    7.19  )-----------( 255      ( 19 Apr 2025 08:00 )             28.9     WBC Trend:  7.19 04-19-25 @ 08:00  7.72 04-18-25 @ 06:28  10.26 04-17-25 @ 06:26  9.48 04-16-25 @ 10:40    CMP: 04-19    137  |  105  |  22  ----------------------------<  128[H]  3.6   |  25  |  1.80[H]    Ca    9.4      19 Apr 2025 08:00  Phos  2.4     04-19  Mg     2.2     04-19    TPro  7.1  /  Alb  2.0[L]  /  TBili  0.6  /  DBili  x   /  AST  64[H]  /  ALT  35  /  AlkPhos  904[H]  04-19      LIVER FUNCTIONS - ( 19 Apr 2025 08:00 )  Alb: 2.0 g/dL / Pro: 7.1 g/dL / ALK PHOS: 904 U/L / ALT: 35 U/L / AST: 64 U/L / GGT: x             Urinalysis Basic - ( 19 Apr 2025 08:00 )    Color: x / Appearance: x / SG: x / pH: x  Gluc: 128 mg/dL / Ketone: x  / Bili: x / Urobili: x   Blood: x / Protein: x / Nitrite: x   Leuk Esterase: x / RBC: x / WBC x   Sq Epi: x / Non Sq Epi: x / Bacteria: x      Microbiology: reviewed     Culture - Urine (collected 04-16-25 @ 11:59)  Source: Clean Catch  Final Report (04-18-25 @ 22:21):    >100,000 CFU/ml Escherichia coli ESBL    <10,000 CFU/ml Normal Urogenital husam present  Organism: Escherichia coli ESBL (04-18-25 @ 22:21)  Organism: Escherichia coli ESBL (04-18-25 @ 22:21)      Method Type: ANNA MARIE      -  Ampicillin: R >16 These ampicillin results predict results for amoxicillin      -  Ampicillin/Sulbactam: S 8/4      -  Aztreonam: R >16      -  Cefazolin: R >16 For uncomplicated UTI with K. pneumoniae, E. coli, or P. mirablis: ANNA MARIE <=16 is sensitive and ANNA MARIE >=32 is resistant. This also predicts results for oral agents cefaclor, cefdinir, cefpodoxime, cefprozil, cefuroxime axetil, cephalexin and locarbef for uncomplicated UTI. Note that some isolates may be susceptible to these agents while testing resistant to cefazolin.      -  Cefepime: R >16      -  Ceftriaxone: R >32      -  Cefuroxime: R >16      -  Ciprofloxacin: R >2      -  Ertapenem: S <=0.5      -  Gentamicin: S <=2      -  Imipenem: S <=1      -  Levofloxacin: R >4      -  Meropenem: S <=1      -  Nitrofurantoin: S <=32 Should not be used to treat pyelonephritis      -  Piperacillin/Tazobactam: S <=8      -  Tobramycin: S <=2      -  Trimethoprim/Sulfamethoxazole: R >2/38    Urinalysis with Rflx Culture (collected 04-16-25 @ 11:59)        Radiology: reviewed     Medications:  dextrose 5%. 1000 milliLiter(s) IV Continuous <Continuous>  dextrose 5%. 1000 milliLiter(s) IV Continuous <Continuous>  dextrose 50% Injectable 25 Gram(s) IV Push once  dextrose 50% Injectable 12.5 Gram(s) IV Push once  dextrose 50% Injectable 25 Gram(s) IV Push once  dextrose Oral Gel 15 Gram(s) Oral once PRN  ertapenem  IVPB      famotidine    Tablet 20 milliGRAM(s) Oral daily  ferrous    sulfate 325 milliGRAM(s) Oral daily  glucagon  Injectable 1 milliGRAM(s) IntraMuscular once  heparin   Injectable 5000 Unit(s) SubCutaneous every 12 hours  hydrALAZINE 50 milliGRAM(s) Oral three times a day  insulin lispro (ADMELOG) corrective regimen sliding scale   SubCutaneous three times a day before meals  insulin lispro (ADMELOG) corrective regimen sliding scale   SubCutaneous at bedtime  losartan 50 milliGRAM(s) Oral daily  metoprolol succinate  milliGRAM(s) Oral daily    Antimicrobials:  ertapenem  IVPB

## 2025-04-19 NOTE — PROGRESS NOTE ADULT - SUBJECTIVE AND OBJECTIVE BOX
89yo F w/ PMH of HTN, HLD, T2DM, TIA, CKD III, Hx of R hip CRPP in 2016 presents to the ED with her daughter for bilateral leg swelling times few days and frequent urination.  Patient has a history of frequent UTIs her daughter was concerned for UTI brought her in for evaluation.                               [facility]                             Nephrology and Hypertension                             Marcin Kaplan                                          [phone]     Patient is a 89y old  Female who presents with a chief complaint of Acute pino (16 Apr 2025 18:04)       HPI:  89yo F w/ PMH of HTN, HLD, T2DM, TIA, CKD III, Hx of R hip CRPP in 2016 presents to the ED with her daughter for bilateral leg swelling times few days and fatigue for the past few days. Spoke to daughter via phone who reports that patient has a history of UTIs. Second daughter at bedside with the patient reports that she was having urinary frequency. Wanted to bring her in for evaluation and states that the legs have been bothering her for the past few days as well. Patient is from [place] and is compliant with medications. Was recently treated for UTI with abx.   Of note, patient was admitted 3/9-3/13 for management of weakness likely 2/2 UTI and sacral pain. UA was neg, no leukocytosis and was monitored off abx. Course was complicated by hypertensive urgency and bradycardia which was treated with BP med changes.   Denies fever, chills, chest pain, palpitations, SOB, cough, abdominal pain, nausea, vomiting, diarrhea, constipation, hematochezia, melena, urgency, dysuria, hematuria, headaches, changes in vision, dizziness, numbness, tingling. No other complaints at this time.  Renal Consulted for ELVIN on CKD.  She is urinating well.  No N/V/SOB.  Has not seen nephrologist prior.     Remains on IVF with KCL, bp is elevated, no pain, no sob.     PAST MEDICAL & SURGICAL HISTORY:  Hypertension      Diabetes      TIA (transient ischemic attack)      Hip fracture      HLD (hyperlipidemia)      Stage 3 chronic kidney disease      Anemia of chronic disease      Lumbar compression fracture      S/P ORIF (open reduction internal fixation) fracture  right hip           FAMILY HISTORY:  FH: asthma (Father)    NC    Social History:Non smoker    MEDICATIONS  (STANDING):  aspirin enteric coated 81 milliGRAM(s) Oral daily  atorvastatin 40 milliGRAM(s) Oral at bedtime  dextrose 5%. 1000 milliLiter(s) (50 mL/Hr) IV Continuous <Continuous>  dextrose 5%. 1000 milliLiter(s) (100 mL/Hr) IV Continuous <Continuous>  dextrose 50% Injectable 25 Gram(s) IV Push once  dextrose 50% Injectable 12.5 Gram(s) IV Push once  dextrose 50% Injectable 25 Gram(s) IV Push once  famotidine    Tablet 20 milliGRAM(s) Oral daily  ferrous    sulfate 325 milliGRAM(s) Oral daily  furosemide    Tablet 20 milliGRAM(s) Oral daily  glucagon  Injectable 1 milliGRAM(s) IntraMuscular once  heparin   Injectable 5000 Unit(s) SubCutaneous once  insulin lispro (ADMELOG) corrective regimen sliding scale   SubCutaneous every 6 hours  metoprolol succinate  milliGRAM(s) Oral daily  potassium chloride    Tablet ER 40 milliEquivalent(s) Oral every 4 hours  sodium chloride 0.9% with potassium chloride 20 mEq/L 1000 milliLiter(s) (75 mL/Hr) IV Continuous <Continuous>    MEDICATIONS  (PRN):  dextrose Oral Gel 15 Gram(s) Oral once PRN Blood Glucose LESS THAN 70 milliGRAM(s)/deciliter   Meds reviewed    Allergies    No Known Allergies    Intolerances         REVIEW OF SYSTEMS:    as above    Vital Signs Last 24 Hrs  T(C): 36.6 (19 Apr 2025 12:02), Max: 36.8 (18 Apr 2025 13:28)  T(F): 97.8 (19 Apr 2025 12:02), Max: 98.2 (18 Apr 2025 13:28)  HR: 64 (19 Apr 2025 12:21) (49 - 66)  BP: 174/62 (19 Apr 2025 12:21) (153/57 - 202/83)  BP(mean): --  RR: 17 (19 Apr 2025 12:02) (17 - 18)  SpO2: 97% (19 Apr 2025 12:02) (93% - 97%)    Parameters below as of 19 Apr 2025 12:02  Patient On (Oxygen Delivery Method): room air      PHYSICAL EXAM:    GENERAL: NAD  HEAD:  Atraumatic, Normocephalic  EYES: EOMI, conjunctiva and sclera clear  ENMT: No Drainage from nares, No drainage from ears  NERVOUS SYSTEM:  Awake and Alert  CHEST/LUNG: Clear to percussion bilaterally  EXTREMITIES:  trace Edema Le, ankle edema   SKIN: No rashes No obvious ecchymosis          LABS:                          9.1    7.19  )-----------( 255      ( 19 Apr 2025 08:00 )             28.9     04-19    137  |  105  |  22  ----------------------------<  128[H]  3.6   |  25  |  1.80[H]    Ca    9.4      19 Apr 2025 08:00  Phos  2.4     04-19  Mg     2.2     04-19    TPro  7.1  /  Alb  2.0[L]  /  TBili  0.6  /  DBili  x   /  AST  64[H]  /  ALT  35  /  AlkPhos  904[H]  04-19    LIVER FUNCTIONS - ( 19 Apr 2025 08:00 )  Alb: 2.0 g/dL / Pro: 7.1 g/dL / ALK PHOS: 904 U/L / ALT: 35 U/L / AST: 64 U/L / GGT: x             Urinalysis Basic - ( 19 Apr 2025 08:00 )    Color: x / Appearance: x / SG: x / pH: x  Gluc: 128 mg/dL / Ketone: x  / Bili: x / Urobili: x   Blood: x / Protein: x / Nitrite: x   Leuk Esterase: x / RBC: x / WBC x   Sq Epi: x / Non Sq Epi: x / Bacteria: x

## 2025-04-19 NOTE — DISCHARGE NOTE PROVIDER - NSDCFUSCHEDAPPT_GEN_ALL_CORE_FT
Ananda De Luna  [facility]  ORTHOSURG [address] [facility]  Scheduled Appointment: 06/27/2025

## 2025-04-19 NOTE — DISCHARGE NOTE PROVIDER - NSDCACTIVITY_GEN_ALL_CORE
Activity as tolerated Bathing allowed/Do not drive or operate machinery/Showering allowed/Walking - Indoors allowed/No heavy lifting/straining/Activity as tolerated

## 2025-04-19 NOTE — DISCHARGE NOTE PROVIDER - HOSPITAL COURSE
HPI:  89yo F w/ PMH of HTN, HLD, T2DM, TIA, CKD III, Hx of R hip CRPP in 2016, compression Fx spine ,anemia  presents to the ED with her daughter for bilateral leg swelling times few days and fatigue for the past few days. Spoke to daughter via phone who reports that patient has a history of UTIs. Second daughter at bedside with the patient reports that she was having urinary frequency. Wanted to bring her in for evaluation and states that the legs have been bothering her for the past few days as well. Patient is from [place] and is compliant with medications. Was recently treated for UTI with abx.     Of note, patient was admitted 3/9-3/13 for management of weakness likely 2/2 UTI and sacral pain. UA was neg, no leukocytosis and was monitored off abx. Course was complicated by hypertensive urgency and bradycardia which was treated with BP med changes.     Denies fever, chills, chest pain, palpitations, SOB, cough, abdominal pain, nausea, vomiting, diarrhea, constipation, hematochezia, melena, urgency, dysuria, hematuria, headaches, changes in vision, dizziness, numbness, tingling. No other complaints at this time.    ED course:  Vital Signs T(F):  HR:98F  BP: 165/66  RR: 19  SpO2: 99 % on RA  Labs significant for: K+ 3.3, BUN 27, Cr 2.00, AlkP 959, , eGFR 23, pro-BNP 6542  UA: 30 protein, small LE, 25 wbc, mod bacteria  EKG: Sinus bradycardia with 1st degree AV block with premature supraventricular complexes, 51 bpm, QT/QTc 494/455 ms  In ED given,  x1 Rocephin, x 1 Lasix,     Imaging  CXR: negative  CT a/p: Diffuse gallbladder wall thickening/edema  RUQ US: Irregular gallbladder wall thickening to 1.2 cm increased from prior   study. Cholelithiasis. Consider acute or chronic cholecystitis. Prominent pancreas, incompletely assessed, which can also be characterized with MRI.  US duplex: No evidence of deep venous thrombosis in either lower extremity.     (16 Apr 2025 18:04)      ---  HOSPITAL COURSE: Patient admitted to medicine floor for management of     Pt seen and examined on day of discharge. Patient is medically optimized for discharge to home with close outpatient followup.    PHYSICAL EXAM ON DAY OF DISCHARGE:  The patient was seen and examined on the day of discharge. Please see progress note from day of discharge for further information.         ---  CONSULTANTS:     ---  TIME SPENT:  I, the attending physician, was physically present for the key portions of the evaluation and management (E/M) service provided. The total amount of time spent reviewing the hospital notes, laboratory values, imaging findings, assessing/counseling the patient, discussing with consultant physicians, social work, nursing staff was -- minutes    ---  Primary care provider was made aware of plan for discharge:      [  ] NO     [  ] YES   HPI:  89yo F w/ PMH of HTN, HLD, T2DM, TIA, CKD III, Hx of R hip CRPP in 2016, compression Fx spine ,anemia  presents to the ED with her daughter for bilateral leg swelling times few days and fatigue for the past few days. Spoke to daughter via phone who reports that patient has a history of UTIs. Second daughter at bedside with the patient reports that she was having urinary frequency. Wanted to bring her in for evaluation and states that the legs have been bothering her for the past few days as well. Patient is from [place] and is compliant with medications. Was recently treated for UTI with abx.     Of note, patient was admitted 3/9-3/13 for management of weakness likely 2/2 UTI and sacral pain. UA was neg, no leukocytosis and was monitored off abx. Course was complicated by hypertensive urgency and bradycardia which was treated with BP med changes.     Denies fever, chills, chest pain, palpitations, SOB, cough, abdominal pain, nausea, vomiting, diarrhea, constipation, hematochezia, melena, urgency, dysuria, hematuria, headaches, changes in vision, dizziness, numbness, tingling. No other complaints at this time.    ED course:  Vital Signs T(F):  HR:98F  BP: 165/66  RR: 19  SpO2: 99 % on RA  Labs significant for: K+ 3.3, BUN 27, Cr 2.00, AlkP 959, , eGFR 23, pro-BNP 6542  UA: 30 protein, small LE, 25 wbc, mod bacteria  EKG: Sinus bradycardia with 1st degree AV block with premature supraventricular complexes, 51 bpm, QT/QTc 494/455 ms  In ED given,  x1 Rocephin, x 1 Lasix,     Imaging  CXR: negative  CT a/p: Diffuse gallbladder wall thickening/edema  RUQ US: Irregular gallbladder wall thickening to 1.2 cm increased from prior   study. Cholelithiasis. Consider acute or chronic cholecystitis. Prominent pancreas, incompletely assessed, which can also be characterized with MRI.  US duplex: No evidence of deep venous thrombosis in either lower extremity.  ---  HOSPITAL COURSE: Patient admitted to medicine floor for management of acute cholecystitis, UTI and LE edema. ID, GI and surgery were consulted. HIDA scan performed showed prompt, homogeneous uptake of radiopharmaceutical by the hepatocytes. Activity is first seen in the bowel at about 15 minutes. The gallbladder is not visualized at any time during the study. There is good clearance of activity from the liver at the end of the study. Findings are compatible with acute cholecystitis. Patient was started on IV Zosyn. Did not require acute surgical intervention or drainage.   Patient with hx of recurrent UTIs and recently s/p outpatient abx. UA small LE, 25 wbc, mod bacteria. Urine culture obtained showed ESBL E. Coli. Patient started on Invanz....   Patient has a hx of HTN, was found with elevated BP readings and LE edema. US Duplex of B/L LE showed no evidence of deep venous thrombosis in either lower extremity. Patient's home amlodipine was stopped due to peripheral edema. Patient continued on home losartan and metoprolol succinate. Started on Hydralazine .....   Patient with ELVIN on CKD on admission. Nephrology was consulted. With improvement in creatinine s/p gentle IVF .     Pt seen and examined on day of discharge. Patient is medically optimized for discharge to home with close outpatient followup.    PHYSICAL EXAM ON DAY OF DISCHARGE:  The patient was seen and examined on the day of discharge. Please see progress note from day of discharge for further information.     ---  CONSULTANTS:   GI Dr. Dupree  Surgery Dr. Arreola   ID Dr. Macias   Nephro Dr. Ramos       ---  TIME SPENT:  I, the attending physician, was physically present for the key portions of the evaluation and management (E/M) service provided. The total amount of time spent reviewing the hospital notes, laboratory values, imaging findings, assessing/counseling the patient, discussing with consultant physicians, social work, nursing staff was -- minutes    ---  Primary care provider was made aware of plan for discharge:      [  ] NO     [  ] YES   HPI:  89yo F w/ PMH of HTN, HLD, T2DM, TIA, CKD III, Hx of R hip CRPP in 2016, compression Fx spine ,anemia  presents to the ED with her daughter for bilateral leg swelling times few days and fatigue for the past few days. Spoke to daughter via phone who reports that patient has a history of UTIs. Second daughter at bedside with the patient reports that she was having urinary frequency. Wanted to bring her in for evaluation and states that the legs have been bothering her for the past few days as well. Patient is from [place] and is compliant with medications. Was recently treated for UTI with abx.     Of note, patient was admitted 3/9-3/13 for management of weakness likely 2/2 UTI and sacral pain. UA was neg, no leukocytosis and was monitored off abx. Course was complicated by hypertensive urgency and bradycardia which was treated with BP med changes.     Denies fever, chills, chest pain, palpitations, SOB, cough, abdominal pain, nausea, vomiting, diarrhea, constipation, hematochezia, melena, urgency, dysuria, hematuria, headaches, changes in vision, dizziness, numbness, tingling. No other complaints at this time.    ED course:  Vital Signs T(F):  HR:98F  BP: 165/66  RR: 19  SpO2: 99 % on RA  Labs significant for: K+ 3.3, BUN 27, Cr 2.00, AlkP 959, , eGFR 23, pro-BNP 6542  UA: 30 protein, small LE, 25 wbc, mod bacteria  EKG: Sinus bradycardia with 1st degree AV block with premature supraventricular complexes, 51 bpm, QT/QTc 494/455 ms  In ED given,  x1 Rocephin, x 1 Lasix,     Imaging  CXR: negative  CT a/p: Diffuse gallbladder wall thickening/edema  RUQ US: Irregular gallbladder wall thickening to 1.2 cm increased from prior   study. Cholelithiasis. Consider acute or chronic cholecystitis. Prominent pancreas, incompletely assessed, which can also be characterized with MRI.  US duplex: No evidence of deep venous thrombosis in either lower extremity.  ---  HOSPITAL COURSE: Patient admitted to medicine floor for management of acute cholecystitis, UTI and LE edema. ID, GI and surgery were consulted. HIDA scan performed showed prompt, homogeneous uptake of radiopharmaceutical by the hepatocytes. Activity is first seen in the bowel at about 15 minutes. The gallbladder is not visualized at any time during the study. There is good clearance of activity from the liver at the end of the study. Findings are compatible with acute cholecystitis. Patient was started on IV Zosyn. Did not require acute surgical intervention or drainage.   Patient with hx of recurrent UTIs and recently s/p outpatient abx. UA small LE, 25 wbc, mod bacteria. Urine culture obtained showed ESBL E. Coli. Patient started on Invanz and completed 4 day course.  Patient has a hx of HTN, was found with elevated BP readings and LE edema. US Duplex of B/L LE showed no evidence of deep venous thrombosis in either lower extremity. Patient's home amlodipine was stopped due to peripheral edema. Patient continued on home losartan and metoprolol succinate. Started on Hydralazine 100mg TID.  Patient with ELVIN on CKD on admission. Nephrology was consulted. With improvement in creatinine s/p gentle IVF . To be restarted on Lasix upon discharge    Pt seen and examined on day of discharge. Patient is medically optimized for discharge to home with close outpatient followup.    PHYSICAL EXAM ON DAY OF DISCHARGE:  The patient was seen and examined on the day of discharge. Please see progress note from day of discharge for further information.     ---  CONSULTANTS:   GI Dr. Dupree  Surgery Dr. Maxwell Macias   Nephro Dr. Ramos       ---  TIME SPENT:  I, the attending physician, was physically present for the key portions of the evaluation and management (E/M) service provided. The total amount of time spent reviewing the hospital notes, laboratory values, imaging findings, assessing/counseling the patient, discussing with consultant physicians, social work, nursing staff was -- minutes    ---  Primary care provider was made aware of plan for discharge:      [  ] NO     [  ] YES   HPI:  89yo F w/ PMH of HTN, HLD, T2DM, TIA, CKD III, Hx of R hip CRPP in 2016, compression Fx spine ,anemia  presents to the ED with her daughter for bilateral leg swelling times few days and fatigue for the past few days. Spoke to daughter via phone who reports that patient has a history of UTIs. Second daughter at bedside with the patient reports that she was having urinary frequency. Wanted to bring her in for evaluation and states that the legs have been bothering her for the past few days as well. Patient is from [place] and is compliant with medications. Was recently treated for UTI with abx.     Of note, patient was admitted 3/9-3/13 for management of weakness likely 2/2 UTI and sacral pain. UA was neg, no leukocytosis and was monitored off abx. Course was complicated by hypertensive urgency and bradycardia which was treated with BP med changes.     Denies fever, chills, chest pain, palpitations, SOB, cough, abdominal pain, nausea, vomiting, diarrhea, constipation, hematochezia, melena, urgency, dysuria, hematuria, headaches, changes in vision, dizziness, numbness, tingling. No other complaints at this time.    ED course:  Vital Signs T(F):  HR:98F  BP: 165/66  RR: 19  SpO2: 99 % on RA  Labs significant for: K+ 3.3, BUN 27, Cr 2.00, AlkP 959, , eGFR 23, pro-BNP 6542  UA: 30 protein, small LE, 25 wbc, mod bacteria  EKG: Sinus bradycardia with 1st degree AV block with premature supraventricular complexes, 51 bpm, QT/QTc 494/455 ms  In ED given,  x1 Rocephin, x 1 Lasix,     Imaging  CXR: negative  CT a/p: Diffuse gallbladder wall thickening/edema  RUQ US: Irregular gallbladder wall thickening to 1.2 cm increased from prior   study. Cholelithiasis. Consider acute or chronic cholecystitis. Prominent pancreas, incompletely assessed, which can also be characterized with MRI.  US duplex: No evidence of deep venous thrombosis in either lower extremity.  ---  HOSPITAL COURSE: Patient admitted to medicine floor for management of acute cholecystitis, UTI and LE edema. ID, GI and surgery were consulted. HIDA scan performed showed prompt, homogeneous uptake of radiopharmaceutical by the hepatocytes. Activity is first seen in the bowel at about 15 minutes. The gallbladder is not visualized at any time during the study. There is good clearance of activity from the liver at the end of the study. Findings are compatible with acute cholecystitis. Patient was started on IV Zosyn. Did not require acute surgical intervention or drainage.   Patient with hx of recurrent UTIs and recently s/p outpatient abx. UA small LE, 25 wbc, mod bacteria. Urine culture obtained showed ESBL E. Coli. Patient started on Invanz and completed 4 day course.  Patient has a hx of HTN, was found with elevated BP readings and LE edema. US Duplex of B/L LE showed no evidence of deep venous thrombosis in either lower extremity. Patient's home amlodipine was stopped due to peripheral edema. Patient continued on home losartan and metoprolol succinate. Started on Hydralazine 100mg TID.  Patient with ELVIN on CKD 3 on admission. Nephrology DR TORRES  - was consulted. With improvement in creatinine s/p gentle IVF . To be restarted on Lasix upon discharge.    Pt seen and examined on day of discharge. Patient is medically optimized for discharge to home with close outpatient followup.    PHYSICAL EXAM ON DAY OF DISCHARGE:  The patient was seen and examined on the day of discharge. Please see progress note from day of discharge for further information.     ---  CONSULTANTS:   GI Dr. Dupree  Surgery Dr. Maxwell Macias   Nephro Dr. Ramos       ---  TIME SPENT:  I, the attending physician, was physically present for the key portions of the evaluation and management (E/M) service provided. The total amount of time spent reviewing the hospital notes, laboratory values, imaging findings, assessing/counseling the patient, discussing with consultant physicians, social work, nursing staff was 60 minutes

## 2025-04-19 NOTE — DISCHARGE NOTE PROVIDER - NSDCMRMEDTOKEN_GEN_ALL_CORE_FT
aspirin 81 mg oral delayed release tablet: 1 tab(s) orally every other day  atorvastatin 40 mg oral tablet: 1 tab(s) orally once a day (at bedtime)  famotidine 20 mg oral tablet: 1 tab(s) orally once a day  ferrous sulfate 324 mg (65 mg elemental iron) oral tablet: 1 tab(s) orally once a day  furosemide 20 mg oral tablet: 1 tab(s) orally once a day  Januvia 25 mg oral tablet: 1 tab(s) orally once a day  losartan 50 mg oral tablet: 1 tab(s) orally once a day  metoprolol succinate 100 mg oral capsule, extended release: 1 cap(s) orally once a day   aspirin 81 mg oral delayed release tablet: 1 tab(s) orally every other day  atorvastatin 40 mg oral tablet: 1 tab(s) orally once a day (at bedtime)  famotidine 20 mg oral tablet: 1 tab(s) orally once a day  ferrous sulfate 324 mg (65 mg elemental iron) oral tablet: 1 tab(s) orally once a day  furosemide 20 mg oral tablet: 1 tab(s) orally once a day  hydrALAZINE 100 mg oral tablet: 1 tab(s) orally every 8 hours  Januvia 25 mg oral tablet: 1 tab(s) orally once a day  losartan 50 mg oral tablet: 1 tab(s) orally 2 times a day  metoprolol succinate 100 mg oral tablet, extended release: 1 tab(s) orally once a day

## 2025-04-19 NOTE — DISCHARGE NOTE PROVIDER - PROVIDER TOKENS
FREE:[LAST:[Alexander],FIRST:[Jean],PHONE:[phone]],FAX:[(   )    -],FOLLOWUP:[1 week]] FREE:[LAST:[Alexander],FIRST:[Jean],PHONE:[phone]],FAX:[(   )    -],FOLLOWUP:[1 week]],FREE:[LAST:[Dale],FIRST:[Gary],PHONE:[phone]],FAX:[(   )    -],FOLLOWUP:[1-3 days]] FREE:[LAST:[Alexander],FIRST:[Jean],PHONE:[phone]],FAX:[(   )    -],FOLLOWUP:[1 week]],FREE:[LAST:[Dale],FIRST:[Gary],PHONE:[phone]],FAX:[(   )    -],FOLLOWUP:[1-3 days]],PROVIDER:[TOKEN:[[id number]],FOLLOWUP:[1 month]],PROVIDER:[TOKEN:[[id number]],FOLLOWUP:[1 month]]

## 2025-04-19 NOTE — PROGRESS NOTE ADULT - SUBJECTIVE AND OBJECTIVE BOX
[INTERVAL HX: ]  Patient seen and examined;  Chart reviewed and events noted;     [MEDICATIONS]  MEDICATIONS  (STANDING):  dextrose 5%. 1000 milliLiter(s) (50 mL/Hr) IV Continuous <Continuous>  dextrose 5%. 1000 milliLiter(s) (100 mL/Hr) IV Continuous <Continuous>  dextrose 50% Injectable 25 Gram(s) IV Push once  dextrose 50% Injectable 12.5 Gram(s) IV Push once  dextrose 50% Injectable 25 Gram(s) IV Push once  ertapenem  IVPB      famotidine    Tablet 20 milliGRAM(s) Oral daily  ferrous    sulfate 325 milliGRAM(s) Oral daily  glucagon  Injectable 1 milliGRAM(s) IntraMuscular once  heparin   Injectable 5000 Unit(s) SubCutaneous every 12 hours  hydrALAZINE 50 milliGRAM(s) Oral three times a day  insulin lispro (ADMELOG) corrective regimen sliding scale   SubCutaneous three times a day before meals  insulin lispro (ADMELOG) corrective regimen sliding scale   SubCutaneous at bedtime  losartan 50 milliGRAM(s) Oral daily  metoprolol succinate  milliGRAM(s) Oral daily  sodium chloride 0.9% with potassium chloride 20 mEq/L 1000 milliLiter(s) (50 mL/Hr) IV Continuous <Continuous>    MEDICATIONS  (PRN):  dextrose Oral Gel 15 Gram(s) Oral once PRN Blood Glucose LESS THAN 70 milliGRAM(s)/deciliter      [VITALS]  Vital Signs Last 24 Hrs  T(C): 36.6 (2025 12:02), Max: 36.8 (2025 13:28)  T(F): 97.8 (2025 12:02), Max: 98.2 (2025 13:28)  HR: 63 (2025 12:02) (49 - 66)  BP: 194/73 (2025 12:02) (153/57 - 202/83)  BP(mean): --  RR: 17 (2025 12:02) (17 - 18)  SpO2: 97% (2025 12:02) (93% - 97%)    Parameters below as of 2025 12:02  Patient On (Oxygen Delivery Method): room air      [WT/HT]  Daily     Daily Weight in k.3 (2025 04:40)  [VENT]      [PHYSICAL EXAM]  GEN: NAD  HEENT: normocephalic and atraumatic. EOMI. PERRL.    NECK: Supple.  No lymphadenopathy   LUNGS: Clear to auscultation.  HEART: Regular rate and rhythm,  no MRG  ABDOMEN: Soft, nontender, and nondistended.  Positive bowel sounds.    : No CVA tenderness  EXTREMITIES: Without edema.  NEUROLOGIC: grossly intact.  PSYCHIATRIC: Appropriate affect .  SKIN: No rash     [LABS:]                        9.1    7.19  )-----------( 255      ( 2025 08:00 )             28.9         137  |  105  |  22  ----------------------------<  128[H]  3.6   |  25  |  1.80[H]    Ca    9.4      2025 08:00  Phos  2.4       Mg     2.2         TPro  7.1  /  Alb  2.0[L]  /  TBili  0.6  /  DBili  x   /  AST  64[H]  /  ALT  35  /  AlkPhos  904[H]            Vitamin B12, Serum: 915 pg/mL [232 - 1245] (25 @ 06:26)    Folate, Serum: 10.6 ng/mL (25 @ 06:26)    Ferritin: 1492 ng/mL *H* [13 - 330] (25 @ 06:26)    Iron - Total Binding Capacity.: 184 ug/dL *L* [220 - 430] (25 @ 06:26)    Sedimentation Rate, Erythrocyte: 94 mm/hr *H* [0 - 20] (25 @ 22:16)    Iron - Total Binding Capacity.: 225 ug/dL [220 - 430] (24 @ 07:45)    Ferritin: 250 ng/mL [13 - 330] (24 @ 07:45)      Urinalysis Basic - ( 2025 08:00 )    Color: x / Appearance: x / SG: x / pH: x  Gluc: 128 mg/dL / Ketone: x  / Bili: x / Urobili: x   Blood: x / Protein: x / Nitrite: x   Leuk Esterase: x / RBC: x / WBC x   Sq Epi: x / Non Sq Epi: x / Bacteria: x        SARS-CoV-2: Kady (09 Mar 2025 21:00)          [RADIOLOGY STUDIES:]     [INTERVAL HX: ]  Patient seen and examined;  Chart reviewed and events noted;     no CP, no SOB  MGUS studies pending    [MEDICATIONS]  MEDICATIONS  (STANDING):  dextrose 5%. 1000 milliLiter(s) (50 mL/Hr) IV Continuous <Continuous>  dextrose 5%. 1000 milliLiter(s) (100 mL/Hr) IV Continuous <Continuous>  dextrose 50% Injectable 25 Gram(s) IV Push once  dextrose 50% Injectable 12.5 Gram(s) IV Push once  dextrose 50% Injectable 25 Gram(s) IV Push once  ertapenem  IVPB      famotidine    Tablet 20 milliGRAM(s) Oral daily  ferrous    sulfate 325 milliGRAM(s) Oral daily  glucagon  Injectable 1 milliGRAM(s) IntraMuscular once  heparin   Injectable 5000 Unit(s) SubCutaneous every 12 hours  hydrALAZINE 50 milliGRAM(s) Oral three times a day  insulin lispro (ADMELOG) corrective regimen sliding scale   SubCutaneous three times a day before meals  insulin lispro (ADMELOG) corrective regimen sliding scale   SubCutaneous at bedtime  losartan 50 milliGRAM(s) Oral daily  metoprolol succinate  milliGRAM(s) Oral daily  sodium chloride 0.9% with potassium chloride 20 mEq/L 1000 milliLiter(s) (50 mL/Hr) IV Continuous <Continuous>    MEDICATIONS  (PRN):  dextrose Oral Gel 15 Gram(s) Oral once PRN Blood Glucose LESS THAN 70 milliGRAM(s)/deciliter      [VITALS]  Vital Signs Last 24 Hrs  T(C): 36.6 (2025 12:02), Max: 36.8 (2025 13:28)  T(F): 97.8 (2025 12:02), Max: 98.2 (2025 13:28)  HR: 63 (2025 12:02) (49 - 66)  BP: 194/73 (2025 12:02) (153/57 - 202/83)  BP(mean): --  RR: 17 (2025 12:02) (17 - 18)  SpO2: 97% (2025 12:02) (93% - 97%)    Parameters below as of 2025 12:02  Patient On (Oxygen Delivery Method): room air      [WT/HT]  Daily     Daily Weight in k.3 (2025 04:40)  [VENT]      [PHYSICAL EXAM]  GEN: NAD  HEENT: normocephalic and atraumatic. EOMI. PERRL.    NECK: Supple.  No lymphadenopathy   LUNGS: Clear to auscultation.  HEART: Regular rate and rhythm,  no MRG  ABDOMEN: Soft, nontender, and nondistended.  Positive bowel sounds.    : No CVA tenderness  EXTREMITIES: Without edema.  NEUROLOGIC: grossly intact.  PSYCHIATRIC: Appropriate affect .  SKIN: No rash     [LABS:]                        9.1    7.19  )-----------( 255      ( 2025 08:00 )             28.9         137  |  105  |  22  ----------------------------<  128[H]  3.6   |  25  |  1.80[H]    Ca    9.4      2025 08:00  Phos  2.4       Mg     2.2         TPro  7.1  /  Alb  2.0[L]  /  TBili  0.6  /  DBili  x   /  AST  64[H]  /  ALT  35  /  AlkPhos  904[H]            Vitamin B12, Serum: 915 pg/mL [232 - 1245] (25 @ 06:26)    Folate, Serum: 10.6 ng/mL (25 @ 06:26)    Ferritin: 1492 ng/mL *H* [13 - 330] (25 @ 06:26)    Iron - Total Binding Capacity.: 184 ug/dL *L* [220 - 430] (25 @ 06:26)    Sedimentation Rate, Erythrocyte: 94 mm/hr *H* [0 - 20] (25 @ 22:16)    Iron - Total Binding Capacity.: 225 ug/dL [220 - 430] (24 @ 07:45)    Ferritin: 250 ng/mL [13 - 330] (24 @ 07:45)      Urinalysis Basic - ( 2025 08:00 )    Color: x / Appearance: x / SG: x / pH: x  Gluc: 128 mg/dL / Ketone: x  / Bili: x / Urobili: x   Blood: x / Protein: x / Nitrite: x   Leuk Esterase: x / RBC: x / WBC x   Sq Epi: x / Non Sq Epi: x / Bacteria: x        SARS-CoV-2: Latonyateallen (09 Mar 2025 21:00)          [RADIOLOGY STUDIES:]

## 2025-04-19 NOTE — PROGRESS NOTE ADULT - ASSESSMENT
IMPRESSION    Anemia multifactorial in etiology related to acute illness, renal insufficiency  Elevated globulin ?polyclonal from acute illness but with compression fractures to rule out monoclonal gammopathy  Scans suggestive of acute gallbladder disease  is being seen by GI and surgery    marked inflammation     ESR 94    Ferritin 1492    RECOMMENDATIONS    1.  follow CBC and transfuse as indicated  2.  continue GI and surgical evaluation     3.  abx as per ID  FOLLOWUP SPEP, AMADEO and quant IgG    5.  further heme onc recommendations pending above   IMPRESSION    Anemia multifactorial in etiology related to acute illness, renal insufficiency  Elevated globulin ?polyclonal from acute illness but with compression fractures     SIFE with weak monoclonal gammopathy  likely not cause of compression fractures.   likely MGUS    Scans suggestive of acute gallbladder disease  is being seen by GI and surgery    marked inflammation     ESR 94    Ferritin 1492    SIFE with weak IgG-L band  SIFE-K 6.46 and SIFE-L 5.16 unimpressive  IgG 1177  SPEP m spike not quantifiable    RECOMMENDATIONS    Anemia  Follow CBC and transfuse as indicated    CKD  Continue GI and surgical evaluation     Abx as per ID    MGUS  follow in office post DC    Thank you for consulting us.   No additional recommendations at current time.   Will sign off on case for now.   Please call, or re-consult if needed.

## 2025-04-19 NOTE — CHART NOTE - NSCHARTNOTEFT_GEN_A_CORE
Called by RN, patient's /83 earlier --> 186/68 s/p AM BP meds; patient asymptomatic per RN.  - Repeat manual /54, HR 54  - Patient asymptomatic per RN  - Give hydralazine 25mg PO x1 STAT  - Rest of management per Primary Team   - Discussed with Attending Dr. Tamayo   - RN to notify with any changes.

## 2025-04-19 NOTE — PROGRESS NOTE ADULT - SUBJECTIVE AND OBJECTIVE BOX
Patient is a 89y old  Female who presents with a chief complaint of Acute pino (19 Apr 2025 08:11)    HPI:  89yo F w/ PMH of HTN, HLD, T2DM, TIA, CKD III, Hx of R hip CRPP in 2016, compression Fx spine ,anemia  presents to the ED with her daughter for bilateral leg swelling times few days and fatigue for the past few days. Spoke to daughter via phone who reports that patient has a history of UTIs. Second daughter at bedside with the patient reports that she was having urinary frequency. Wanted to bring her in for evaluation and states that the legs have been bothering her for the past few days as well. Patient is from [place] and is compliant with medications. Was recently treated for UTI with abx.     Of note, patient was admitted 3/9-3/13 for management of weakness likely 2/2 UTI and sacral pain. UA was neg, no leukocytosis and was monitored off abx. Course was complicated by hypertensive urgency and bradycardia which was treated with BP med changes.     Denies fever, chills, chest pain, palpitations, SOB, cough, abdominal pain, nausea, vomiting, diarrhea, constipation, hematochezia, melena, urgency, dysuria, hematuria, headaches, changes in vision, dizziness, numbness, tingling. No other complaints at this time.    ED course:  Vital Signs T(F):  HR:98F  BP: 165/66  RR: 19  SpO2: 99 % on RA  Labs significant for: K+ 3.3, BUN 27, Cr 2.00, AlkP 959, , eGFR 23, pro-BNP 6542  UA: 30 protein, small LE, 25 wbc, mod bacteria  EKG: Sinus bradycardia with 1st degree AV block with premature supraventricular complexes, 51 bpm, QT/QTc 494/455 ms  In ED given,  x1 Rocephin, x 1 Lasix,     Imaging  CXR: negative  CT a/p: Diffuse gallbladder wall thickening/edema  RUQ US: Irregular gallbladder wall thickening to 1.2 cm increased from prior   study. Cholelithiasis. Consider acute or chronic cholecystitis. Prominent pancreas, incompletely assessed, which can also be characterized with MRI.  US duplex: No evidence of deep venous thrombosis in either lower extremity.     (16 Apr 2025 18:04)    INTERVAL HPI:  4/17: Pt seen and examined at bedside. Pt feels well -- offers no complaints. Denies any abdominal pain, nausea, vomiting. Was seen and evaluated by surgery -- no acute surgical intervention indicated at this time. Started on IV zosyn this AM. Mild anemia, Cr improved  4/18: Seen and examined at bedside. Patient states that her B/L LE edema has improved, with continued urinary urgency. Tolerating diet, BM x1 this morning. Denies fatigue, lightheadedness, dizziness, vision changes, chest pain, palpitations, abdominal pain, n/v/d/c. add  BP meds added   4/18: Seen and examined at bedside. Patient states that she continues to experience urinary frequency. No other acute concerns or complains. UCx with ESBL Ecoli. Uptitrated Hydralazine to 50mg TID.       OVERNIGHT EVENTS: Elevated /83 received AM anti-hypertensives repeat manual /54. Given additional hydralazine 25mg PO x1. Patient asymptomatic.     Home Medications:  aspirin 81 mg oral delayed release tablet: 1 tab(s) orally every other day (16 Apr 2025 19:38)  atorvastatin 40 mg oral tablet: 1 tab(s) orally once a day (at bedtime) (16 Apr 2025 19:38)  famotidine 20 mg oral tablet: 1 tab(s) orally once a day (16 Apr 2025 19:38)  ferrous sulfate 324 mg (65 mg elemental iron) oral tablet: 1 tab(s) orally once a day (16 Apr 2025 19:38)  furosemide 20 mg oral tablet: 1 tab(s) orally once a day (16 Apr 2025 19:38)  Januvia 25 mg oral tablet: 1 tab(s) orally once a day (16 Apr 2025 19:38)  losartan 50 mg oral tablet: 1 tab(s) orally once a day (16 Apr 2025 19:38)  metoprolol succinate 100 mg oral capsule, extended release: 1 cap(s) orally once a day (16 Apr 2025 19:37)      MEDICATIONS  (STANDING):  cefpodoxime 200 milliGRAM(s) Oral every 24 hours  dextrose 5%. 1000 milliLiter(s) (50 mL/Hr) IV Continuous <Continuous>  dextrose 5%. 1000 milliLiter(s) (100 mL/Hr) IV Continuous <Continuous>  dextrose 50% Injectable 25 Gram(s) IV Push once  dextrose 50% Injectable 12.5 Gram(s) IV Push once  dextrose 50% Injectable 25 Gram(s) IV Push once  famotidine    Tablet 20 milliGRAM(s) Oral daily  ferrous    sulfate 325 milliGRAM(s) Oral daily  glucagon  Injectable 1 milliGRAM(s) IntraMuscular once  heparin   Injectable 5000 Unit(s) SubCutaneous every 12 hours  hydrALAZINE 50 milliGRAM(s) Oral three times a day  insulin lispro (ADMELOG) corrective regimen sliding scale   SubCutaneous three times a day before meals  insulin lispro (ADMELOG) corrective regimen sliding scale   SubCutaneous at bedtime  losartan 50 milliGRAM(s) Oral daily  metoprolol succinate  milliGRAM(s) Oral daily  metroNIDAZOLE    Tablet 500 milliGRAM(s) Oral every 12 hours  sodium chloride 0.9% with potassium chloride 20 mEq/L 1000 milliLiter(s) (50 mL/Hr) IV Continuous <Continuous>    MEDICATIONS  (PRN):  dextrose Oral Gel 15 Gram(s) Oral once PRN Blood Glucose LESS THAN 70 milliGRAM(s)/deciliter      Allergies    amlodipine (Flushing)    Intolerances        Social History:  Tobacco: None  ETOH: None  Recreational/Illicit Drugs: None  Lives with: child, from [facility]  Ambulation: with walker   ADLs: Dependent (16 Apr 2025 18:04)      REVIEW OF SYSTEMS:  CONSTITUTIONAL: No fever, No chills, No fatigue, No myalgia, No Body ache, No Weakness  EYES: No eye pain,  No visual disturbances, No discharge, NO Redness  ENMT:  No ear pain, No nose bleed, No vertigo; No sinus pain, NO throat pain, No Congestion  NECK: No pain, No stiffness  RESPIRATORY: No cough, NO wheezing, No  hemoptysis, NO  shortness of breath  CARDIOVASCULAR: No chest pain, palpitations  GASTROINTESTINAL: No abdominal pain, NO epigastric pain. No nausea, No vomiting; No diarrhea, No constipation. [ x ] BM x1 this AM   GENITOURINARY: No dysuria, No frequency, + urinary frequency, No hematuria, NO incontinence  NEUROLOGICAL: No headaches, No dizziness, No numbness, No tingling, No tremors, No weakness  EXT: No Swelling, No Pain, No Edema  SKIN:  [ x ] No itching, burning, rashes, or lesions   MUSCULOSKELETAL: No joint pain ,No Jt swelling; No muscle pain, No back pain, No extremity pain  PSYCHIATRIC: No depression,  No anxiety,  No mood swings ,No difficulty sleeping at night  PAIN SCALE: [ x ] None  [  ] Other-  ROS Unable to obtain due to - [  ] Dementia  [  ] Lethargy [  ] Drowsy [  ] Sedated [  ] non verbal  REST OF REVIEW Of SYSTEM - [x  ] Normal     Vital Signs Last 24 Hrs  T(C): 36.6 (19 Apr 2025 06:42), Max: 36.8 (18 Apr 2025 13:28)  T(F): 97.8 (19 Apr 2025 06:42), Max: 98.2 (18 Apr 2025 13:28)  HR: 62 (19 Apr 2025 08:13) (49 - 69)  BP: 186/64 (19 Apr 2025 08:13) (153/57 - 202/83)  BP(mean): --  RR: 18 (19 Apr 2025 06:42) (17 - 18)  SpO2: 95% (19 Apr 2025 06:42) (93% - 95%)    Parameters below as of 19 Apr 2025 06:42  Patient On (Oxygen Delivery Method): room air      Finger Stick        04-18 @ 07:01  -  04-19 @ 07:00  --------------------------------------------------------  IN: 0 mL / OUT: 500 mL / NET: -500 mL        PHYSICAL EXAM:  GENERAL:  [x  ] NAD , [ x ] well appearing, [  ] Agitated, [  ] Drowsy,  [  ] Lethargy, [  ] confused   HEAD:  [ x ] Normal, [  ] Other  EYES:  [ x ] EOMI, [ x ] PERRLA, [x  ] conjunctiva and sclera clear normal, [  ] Other,  [  ] Pallor,[  ] Discharge  ENMT:  [  x] Normal, [ x ] Moist mucous membranes, [  ] Good dentition, [ x ] No Thrush  NECK:  [  x] Supple, [x  ] No JVD, [x  ] Normal thyroid, [  ] Lymphadenopathy [  ] Other  CHEST/LUNG:  [x  ] Clear to auscultation bilaterally, [ x ] Breath Sounds equal B/L, [ x ] poor effort  [x  ] No rales, [ x ] No rhonchi  [x  ]  No wheezing,   HEART:  [x  ] Regular rate and rhythm, [  ] tachycardia, [  ] Bradycardia,  [  ] irregular  [x  ] No murmurs, No rubs, No gallops, [  ] PPM in place (Mfr:  )  ABDOMEN:  [ x ] Soft, [ x ] Nontender, [ x ] Nondistended, [ x ] No mass, [ x ] Bowel sounds present, [x  ] obese  NERVOUS SYSTEM:  [  x] Alert & Oriented X3, [ x ] Nonfocal  [  ] Confusion  [  ] Encephalopathic [  ] Sedated [  ] Unable to assess, [  ] Dementia [  ] Other-  EXTREMITIES: [ x ] 2+ Peripheral Pulses, No clubbing, No cyanosis,  [ x ] trace left pedal edema [  ] PVD stasis skin changes B/L Lower EXT, [  ] wound  LYMPH: No lymphadenopathy noted  SKIN:  [x  ] No rashes or lesions, [  ] Pressure Ulcers, [  ] ecchymosis, [  ] Skin Tears, [  ] Other    DIET: Diet, Consistent Carbohydrate w/Evening Snack (04-17-25 @ 11:21)      LABS:      Ca    9.3        18 Apr 2025 06:28        Urinalysis Basic - ( 18 Apr 2025 06:28 )    Color: x / Appearance: x / SG: x / pH: x  Gluc: 121 mg/dL / Ketone: x  / Bili: x / Urobili: x   Blood: x / Protein: x / Nitrite: x   Leuk Esterase: x / RBC: x / WBC x   Sq Epi: x / Non Sq Epi: x / Bacteria: x        Culture Results:   >100,000 CFU/ml Escherichia coli ESBL  <10,000 CFU/ml Normal Urogenital husam present (04-16 @ 11:59)          CARDIAC MARKERS ( 16 Apr 2025 10:40 )  x     / x     / x     / x     / <1.0 ng/mL          Culture - Urine (collected 16 Apr 2025 11:59)  Source: Clean Catch  Final Report (18 Apr 2025 22:21):    >100,000 CFU/ml Escherichia coli ESBL    <10,000 CFU/ml Normal Urogenital husam present  Organism: Escherichia coli ESBL (18 Apr 2025 22:21)  Organism: Escherichia coli ESBL (18 Apr 2025 22:21)    Urinalysis with Rflx Culture (collected 16 Apr 2025 11:59)         Anemia Panel:  Vitamin B12, Serum: 915 pg/mL (04-17-25 @ 06:26)  Folate, Serum: 10.6 ng/mL (04-17-25 @ 06:26)  Ferritin: 1492 ng/mL (04-17-25 @ 06:26)  Iron Total: 29 ug/dL (04-17-25 @ 06:26)  Iron - Total Binding Capacity.: 184 ug/dL (04-17-25 @ 06:26)      Thyroid Panel:  T4, Serum: 8.1 ug/dL (04-17-25 @ 06:26)  Thyroid Stimulating Hormone, Serum: 4.40 uIU/mL (04-17-25 @ 06:26)        RADIOLOGY & ADDITIONAL TESTS:      HEALTH ISSUES - PROBLEM Dx:  Acute cholecystitis    UTI (urinary tract infection)    Chronic kidney disease    History of TIAs    Hypertension    T2DM (type 2 diabetes mellitus)    HLD (hyperlipidemia)    Need for prophylactic measure    Leg edema    Anemia of chronic disease            Consultant(s) Notes Reviewed:  [x  ] YES     Care Discussed with [X] Consultants  [x  ] Patient  [  ] Family [  ] HCP [x  ]   [  ] Social Service  [  ] RN, [  ] Physical Therapy,[  ] Palliative care team  DVT PPX: [  ] Lovenox, [ x ] S C Heparin, [  ] Coumadin, [  ] Xarelto, [  ] Eliquis, [  ] Pradaxa, [  ] IV Heparin drip, [  ] SCD [  ] Contraindication 2 to GI Bleed,[  ] Ambulation [  ] Contraindicated 2 to  bleed [  ] Contraindicated 2 to Brain Bleed  Advanced directive: [ x ] None, [  ] DNR/DNI Patient is a 89y old  Female who presents with a chief complaint of Acute pino (19 Apr 2025 08:11)    HPI:  89yo F w/ PMH of HTN, HLD, T2DM, TIA, CKD III, Hx of R hip CRPP in 2016, compression Fx spine ,anemia  presents to the ED with her daughter for bilateral leg swelling times few days and fatigue for the past few days. Spoke to daughter via phone who reports that patient has a history of UTIs. Second daughter at bedside with the patient reports that she was having urinary frequency. Wanted to bring her in for evaluation and states that the legs have been bothering her for the past few days as well. Patient is from [place] and is compliant with medications. Was recently treated for UTI with abx.     Of note, patient was admitted 3/9-3/13 for management of weakness likely 2/2 UTI and sacral pain. UA was neg, no leukocytosis and was monitored off abx. Course was complicated by hypertensive urgency and bradycardia which was treated with BP med changes.     Denies fever, chills, chest pain, palpitations, SOB, cough, abdominal pain, nausea, vomiting, diarrhea, constipation, hematochezia, melena, urgency, dysuria, hematuria, headaches, changes in vision, dizziness, numbness, tingling. No other complaints at this time.    ED course:  Vital Signs T(F):  HR:98F  BP: 165/66  RR: 19  SpO2: 99 % on RA  Labs significant for: K+ 3.3, BUN 27, Cr 2.00, AlkP 959, , eGFR 23, pro-BNP 6542  UA: 30 protein, small LE, 25 wbc, mod bacteria  EKG: Sinus bradycardia with 1st degree AV block with premature supraventricular complexes, 51 bpm, QT/QTc 494/455 ms  In ED given,  x1 Rocephin, x 1 Lasix,     Imaging  CXR: negative  CT a/p: Diffuse gallbladder wall thickening/edema  RUQ US: Irregular gallbladder wall thickening to 1.2 cm increased from prior   study. Cholelithiasis. Consider acute or chronic cholecystitis. Prominent pancreas, incompletely assessed, which can also be characterized with MRI.  US duplex: No evidence of deep venous thrombosis in either lower extremity.     (16 Apr 2025 18:04)    INTERVAL HPI:  4/17: Pt seen and examined at bedside. Pt feels well -- offers no complaints. Denies any abdominal pain, nausea, vomiting. Was seen and evaluated by surgery -- no acute surgical intervention indicated at this time. Started on IV zosyn this AM. Mild anemia, Cr improved  4/18: Seen and examined at bedside. Patient states that her B/L LE edema has improved, with continued urinary urgency. Tolerating diet, BM x1 this morning. Denies fatigue, lightheadedness, dizziness, vision changes, chest pain, palpitations, abdominal pain, n/v/d/c. add  BP meds added   4/18: Seen and examined at bedside. Patient states that she continues to experience urinary frequency. No other acute concerns or complains. UCx with ESBL Ecoli. Up titrated Hydralazine to 50mg TID.Start  IV Invanz daily      OVERNIGHT EVENTS: Elevated /83 received AM anti-hypertensives repeat manual /54. Given additional hydralazine 25mg PO x1. Patient asymptomatic.     Home Medications:  aspirin 81 mg oral delayed release tablet: 1 tab(s) orally every other day (16 Apr 2025 19:38)  atorvastatin 40 mg oral tablet: 1 tab(s) orally once a day (at bedtime) (16 Apr 2025 19:38)  famotidine 20 mg oral tablet: 1 tab(s) orally once a day (16 Apr 2025 19:38)  ferrous sulfate 324 mg (65 mg elemental iron) oral tablet: 1 tab(s) orally once a day (16 Apr 2025 19:38)  furosemide 20 mg oral tablet: 1 tab(s) orally once a day (16 Apr 2025 19:38)  Januvia 25 mg oral tablet: 1 tab(s) orally once a day (16 Apr 2025 19:38)  losartan 50 mg oral tablet: 1 tab(s) orally once a day (16 Apr 2025 19:38)  metoprolol succinate 100 mg oral capsule, extended release: 1 cap(s) orally once a day (16 Apr 2025 19:37)      MEDICATIONS  (STANDING):  cefpodoxime 200 milliGRAM(s) Oral every 24 hours  dextrose 5%. 1000 milliLiter(s) (50 mL/Hr) IV Continuous <Continuous>  dextrose 5%. 1000 milliLiter(s) (100 mL/Hr) IV Continuous <Continuous>  dextrose 50% Injectable 25 Gram(s) IV Push once  dextrose 50% Injectable 12.5 Gram(s) IV Push once  dextrose 50% Injectable 25 Gram(s) IV Push once  famotidine    Tablet 20 milliGRAM(s) Oral daily  ferrous    sulfate 325 milliGRAM(s) Oral daily  glucagon  Injectable 1 milliGRAM(s) IntraMuscular once  heparin   Injectable 5000 Unit(s) SubCutaneous every 12 hours  hydrALAZINE 50 milliGRAM(s) Oral three times a day  insulin lispro (ADMELOG) corrective regimen sliding scale   SubCutaneous three times a day before meals  insulin lispro (ADMELOG) corrective regimen sliding scale   SubCutaneous at bedtime  losartan 50 milliGRAM(s) Oral daily  metoprolol succinate  milliGRAM(s) Oral daily  metroNIDAZOLE    Tablet 500 milliGRAM(s) Oral every 12 hours  sodium chloride 0.9% with potassium chloride 20 mEq/L 1000 milliLiter(s) (50 mL/Hr) IV Continuous <Continuous>    MEDICATIONS  (PRN):  dextrose Oral Gel 15 Gram(s) Oral once PRN Blood Glucose LESS THAN 70 milliGRAM(s)/deciliter      Allergies    amlodipine (Flushing)    Intolerances        Social History:  Tobacco: None  ETOH: None  Recreational/Illicit Drugs: None  Lives with: child, from [facility]  Ambulation: with walker   ADLs: Dependent (16 Apr 2025 18:04)      REVIEW OF SYSTEMS: i am OK, tired   CONSTITUTIONAL: No fever, No chills, No fatigue, No myalgia, No Body ache, No Weakness  EYES: No eye pain,  No visual disturbances, No discharge, NO Redness  ENMT:  No ear pain, No nose bleed, No vertigo; No sinus pain, NO throat pain, No Congestion  NECK: No pain, No stiffness  RESPIRATORY: No cough, NO wheezing, No  hemoptysis, NO  shortness of breath  CARDIOVASCULAR: No chest pain, palpitations  GASTROINTESTINAL: No abdominal pain, NO epigastric pain. No nausea, No vomiting; No diarrhea, No constipation. [ x ] BM x1 this AM   GENITOURINARY: No dysuria, No frequency, + urinary frequency, No hematuria, NO incontinence  NEUROLOGICAL: No headaches, No dizziness, No numbness, No tingling, No tremors, No weakness  EXT: No Swelling, No Pain, No Edema  SKIN:  [ x ] No itching, burning, rashes, or lesions   MUSCULOSKELETAL: No joint pain ,No Jt swelling; No muscle pain, No back pain, No extremity pain  PSYCHIATRIC: No depression,  No anxiety,  No mood swings ,No difficulty sleeping at night  PAIN SCALE: [ x ] None  [  ] Other-  ROS Unable to obtain due to - [  ] Dementia  [  ] Lethargy [  ] Drowsy [  ] Sedated [  ] non verbal  REST OF REVIEW Of SYSTEM - [x  ] Normal     Vital Signs Last 24 Hrs  T(C): 36.6 (19 Apr 2025 06:42), Max: 36.8 (18 Apr 2025 13:28)  T(F): 97.8 (19 Apr 2025 06:42), Max: 98.2 (18 Apr 2025 13:28)  HR: 62 (19 Apr 2025 08:13) (49 - 69)  BP: 186/64 (19 Apr 2025 08:13) (153/57 - 202/83)  BP(mean): --  RR: 18 (19 Apr 2025 06:42) (17 - 18)  SpO2: 95% (19 Apr 2025 06:42) (93% - 95%)    Parameters below as of 19 Apr 2025 06:42  Patient On (Oxygen Delivery Method): room air      Finger Stick        04-18 @ 07:01  -  04-19 @ 07:00  --------------------------------------------------------  IN: 0 mL / OUT: 500 mL / NET: -500 mL        PHYSICAL EXAM:  GENERAL:  [x  ] NAD , [ x ] well appearing, [  ] Agitated, [  ] Drowsy,  [  ] Lethargy, [  ] confused   HEAD:  [ x ] Normal, [  ] Other  EYES:  [ x ] EOMI, [ x ] PERRLA, [x  ] conjunctiva and sclera clear normal, [  ] Other,  [  ] Pallor,[  ] Discharge  ENMT:  [  x] Normal, [ x ] Moist mucous membranes, [  ] Good dentition, [ x ] No Thrush  NECK:  [  x] Supple, [x  ] No JVD, [x  ] Normal thyroid, [  ] Lymphadenopathy [  ] Other  CHEST/LUNG:  [x  ] Clear to auscultation bilaterally, [ x ] Breath Sounds equal B/L, [ x ] poor effort  [x  ] No rales, [ x ] No rhonchi  [x  ]  No wheezing,   HEART:  [x  ] Regular rate and rhythm, [  ] tachycardia, [  ] Bradycardia,  [  ] irregular  [x  ] No murmurs, No rubs, No gallops, [  ] PPM in place (Mfr:  )  ABDOMEN:  [ x ] Soft, [ x ] Nontender, [ x ] Nondistended, [ x ] No mass, [ x ] Bowel sounds present, [x  ] obese  NERVOUS SYSTEM:  [  x] Alert & Oriented X3, [ x ] Nonfocal  [  ] Confusion  [  ] Encephalopathic [  ] Sedated [  ] Unable to assess, [  ] Dementia [  ] Other-  EXTREMITIES: [ x ] 2+ Peripheral Pulses, No clubbing, No cyanosis,  [ x ] trace left pedal edema [  ] PVD stasis skin changes B/L Lower EXT, [  ] wound  LYMPH: No lymphadenopathy noted  SKIN:  [x  ] No rashes or lesions, [  ] Pressure Ulcers, [  ] ecchymosis, [  ] Skin Tears, [  ] Other    DIET: Diet, Consistent Carbohydrate w/Evening Snack (04-17-25 @ 11:21)      LABS:                        9.1    7.19  )-----------( 255      ( 19 Apr 2025 08:00 )             28.9     19 Apr 2025 08:00    137    |  105    |  22     ----------------------------<  128    3.6     |  25     |  1.80     Ca    9.4        19 Apr 2025 08:00  Phos  2.4       19 Apr 2025 08:00  Mg     2.2       19 Apr 2025 08:00    TPro  7.1    /  Alb  2.0    /  TBili  0.6    /  DBili  x      /  AST  64     /  ALT  35     /  AlkPhos  904    19 Apr 2025 08:00      Ca    9.3        18 Apr 2025 06:28        Urinalysis Basic - ( 18 Apr 2025 06:28 )    Color: x / Appearance: x / SG: x / pH: x  Gluc: 121 mg/dL / Ketone: x  / Bili: x / Urobili: x   Blood: x / Protein: x / Nitrite: x   Leuk Esterase: x / RBC: x / WBC x   Sq Epi: x / Non Sq Epi: x / Bacteria: x        Culture Results:   >100,000 CFU/ml Escherichia coli ESBL  <10,000 CFU/ml Normal Urogenital husam present (04-16 @ 11:59)          CARDIAC MARKERS ( 16 Apr 2025 10:40 )  x     / x     / x     / x     / <1.0 ng/mL          Culture - Urine (collected 16 Apr 2025 11:59)  Source: Clean Catch  Final Report (18 Apr 2025 22:21):    >100,000 CFU/ml Escherichia coli ESBL    <10,000 CFU/ml Normal Urogenital husam present  Organism: Escherichia coli ESBL (18 Apr 2025 22:21)  Organism: Escherichia coli ESBL (18 Apr 2025 22:21)    Urinalysis with Rflx Culture (collected 16 Apr 2025 11:59)         Anemia Panel:  Vitamin B12, Serum: 915 pg/mL (04-17-25 @ 06:26)  Folate, Serum: 10.6 ng/mL (04-17-25 @ 06:26)  Ferritin: 1492 ng/mL (04-17-25 @ 06:26)  Iron Total: 29 ug/dL (04-17-25 @ 06:26)  Iron - Total Binding Capacity.: 184 ug/dL (04-17-25 @ 06:26)      Thyroid Panel:  T4, Serum: 8.1 ug/dL (04-17-25 @ 06:26)  Thyroid Stimulating Hormone, Serum: 4.40 uIU/mL (04-17-25 @ 06:26)        RADIOLOGY & ADDITIONAL TESTS:      HEALTH ISSUES - PROBLEM Dx:  Acute cholecystitis    UTI (urinary tract infection)    Chronic kidney disease    History of TIAs    Hypertension    T2DM (type 2 diabetes mellitus)    HLD (hyperlipidemia)    Need for prophylactic measure    Leg edema    Anemia of chronic disease            Consultant(s) Notes Reviewed:  [x  ] YES     Care Discussed with [X] Consultants  [x  ] Patient  [ x ] Family [  ] HCP [x  ]   [  ] Social Service  [x  ] RN, [  ] Physical Therapy,[  ] Palliative care team  DVT PPX: [  ] Lovenox, [ x ] S C Heparin, [  ] Coumadin, [  ] Xarelto, [  ] Eliquis, [  ] Pradaxa, [  ] IV Heparin drip, [  ] SCD [  ] Contraindication 2 to GI Bleed,[  ] Ambulation [  ] Contraindicated 2 to  bleed [  ] Contraindicated 2 to Brain Bleed  Advanced directive: [ x ] None, [  ] DNR/DNI

## 2025-04-19 NOTE — PROGRESS NOTE ADULT - ASSESSMENT
Transaminitis  Bilateral leg swelling    4/16 CT AP: Diffuse gallbladder wall thickening/edema.         RUQ US: Irregular gallbladder wall thickening to 1.2 cm increased from prior study. Cholelithiasis. Consider acute or chronic cholecystitis. Limited evaluation for underlying gallbladder mass.           HIDA: There is prompt, homogeneous uptake of radiopharmaceutical by the hepatocytes. Activity is first seen in the bowel at about 15 minutes. The gallbladder is not visualized at any time during the study. Findings are compatible with acute cholecystitis.    Plan:  - CT AP & RUQ US noted and discussed with patient   - Suspect transaminitis likely congestive   - LFTs noted, continue to trend CMP daily  - Avoid hepatoxic agents  - Surgery eval noted, per their note no acute surgical intervention/IR drainage at this time   - Antibiotics as per ID  - No contraindication from GI standpoint for discharge planning   - To follow

## 2025-04-19 NOTE — PROVIDER CONTACT NOTE (OTHER) - REASON
189/68 BP Manual
195/65 BP Manual
Patient's BP is 196/54 even after ordered PO hypertension medications

## 2025-04-19 NOTE — CHART NOTE - NSCHARTNOTEFT_GEN_A_CORE
Patient complaining of neuropathic pain in fingers at this time, mild    No other symptoms at this time    Given 300mg gabapentin for 2 doses 12 hours apart    Patient has history of diabetes, [id number] 7.0 at this time    Likely 2/2 dbx    Day team to re-assess Patient complaining of neuropathic pain in fingers at this time, mild    No other symptoms at this time    Starting gabapentin 100mg qhs, low dose due to renal dosing egfr 23    Patient has history of diabetes, [id number] 7.0 at this time    Likely 2/2 dbx    Day team to re-assess and caution dosing due to gfr Patient complaining of neuropathic pain in fingers at this time, mild    No other symptoms at this time    Giving gabapentin 100mg 1x stat, low dose due to renal dosing egfr 23    Patient has history of diabetes, [id number] 7.0 at this time    Likely 2/2 dbx    Day team to re-assess and caution dosing due to gfr, consider gabapentin bedtime if pain continues and improvement tonight

## 2025-04-19 NOTE — PROGRESS NOTE ADULT - ASSESSMENT
89yo F w HTN, HLD, T2DM, TIA, CKD III, Hx of R hip CRPP in 2016, compression Fx spine ,anemia  preseneds to the ED with her daughter for bilateral leg swelling times few days and fatigue for the past few days. Patient has a history of UTIs. Patient reports that she was having urinary frequency but is on lasix. Was recently treated for UTI with abx.   ED Vital Signs T(F):  HR:98F  BP: 165/66  RR: 19  SpO2: 99 % on R  CT a/p: Diffuse gallbladder wall thickening/edema  RUQ US: Irregular gallbladder wall thickening to 1.2 cm increased from prior   study. Cholelithiasis. Consider acute or chronic cholecystitis. Prominent pancreas, incompletely assessed, which can also be characterized with MRI.  HIDA Abnormal hepatobiliary scan.    Acute Cholecystitis  Presentation without any localizing pain, some weakness, afebrile, minimal elevation of wbcs, ALT/AST/Alph phos elevation  some pyuria and some urinary symptoms (?UTI)  Culture - Urine (04.16.25 @ 11:59)  >100,000 CFU/ml ESBL Escherichia coli  ceftriaxone/ flagyl switched to ertapenem 500mg daily on 4/19    Recommendations  c/w ertapenem with last day 4/21  - will cover UTI and acute cholecystitis    Camilo Arroyo M.D.  [facility]  [phone]  After 5pm on weekdays and all day on weekends - please call [phone]  Available on [employer] teams    Thank you for consulting us and involving us in the management of this patients case. In addition to reviewing history, imaging, documents, labs, microbiology, took into account antibiotic stewardship, local antibiogram and infection control strategies and potential transmission issues at time of treatment decision making process.

## 2025-04-19 NOTE — PROGRESS NOTE ADULT - SUBJECTIVE AND OBJECTIVE BOX
[facility]  Owen Zarco PA-C  [address] [place] [id number]  [phone]      INTERVAL HPI/OVERNIGHT EVENTS:  Pt s/e with daughter at bedside  Pt denies any abdominal pain. No further nausea or vomiting today. Tolerated breakfast without complaints.    MEDICATIONS  (STANDING):  dextrose 5%. 1000 milliLiter(s) (50 mL/Hr) IV Continuous <Continuous>  dextrose 5%. 1000 milliLiter(s) (100 mL/Hr) IV Continuous <Continuous>  dextrose 50% Injectable 25 Gram(s) IV Push once  dextrose 50% Injectable 12.5 Gram(s) IV Push once  dextrose 50% Injectable 25 Gram(s) IV Push once  ertapenem  IVPB      famotidine    Tablet 20 milliGRAM(s) Oral daily  ferrous    sulfate 325 milliGRAM(s) Oral daily  glucagon  Injectable 1 milliGRAM(s) IntraMuscular once  heparin   Injectable 5000 Unit(s) SubCutaneous every 12 hours  hydrALAZINE 50 milliGRAM(s) Oral three times a day  insulin lispro (ADMELOG) corrective regimen sliding scale   SubCutaneous three times a day before meals  insulin lispro (ADMELOG) corrective regimen sliding scale   SubCutaneous at bedtime  losartan 50 milliGRAM(s) Oral daily  metoprolol succinate  milliGRAM(s) Oral daily  sodium chloride 0.9% with potassium chloride 20 mEq/L 1000 milliLiter(s) (50 mL/Hr) IV Continuous <Continuous>    MEDICATIONS  (PRN):  dextrose Oral Gel 15 Gram(s) Oral once PRN Blood Glucose LESS THAN 70 milliGRAM(s)/deciliter      Allergies    amlodipine (Flushing)        PHYSICAL EXAM:   Vital Signs:  Vital Signs Last 24 Hrs  T(C): 36.6 (2025 06:42), Max: 36.8 (2025 13:28)  T(F): 97.8 (2025 06:42), Max: 98.2 (2025 13:28)  HR: 62 (2025 08:13) (49 - 69)  BP: 186/64 (2025 08:13) (153/57 - 202/83)  BP(mean): --  RR: 18 (2025 06:42) (17 - 18)  SpO2: 95% (2025 06:42) (93% - 95%)    Parameters below as of 2025 06:42  Patient On (Oxygen Delivery Method): room air      Daily     Daily Weight in k.3 (2025 04:40)    GENERAL:  Appears stated age  HEENT:  NC/AT  CHEST:  Full & symmetric excursion  HEART:  Regular rhythm  ABDOMEN:  Soft, non-tender, non-distended  EXTEREMITIES:  no cyanosis  SKIN:  No rash  NEURO:  Alert      LABS:                        9.1    7.19  )-----------( 255      ( 2025 08:00 )             28.9     04-    137  |  105  |  22  ----------------------------<  128[H]  3.6   |  25  |  1.80[H]    Ca    9.4      2025 08:00  Phos  2.4     -  Mg     2.2         TPro  7.1  /  Alb  2.0[L]  /  TBili  0.6  /  DBili  x   /  AST  64[H]  /  ALT  35  /  AlkPhos  904[H]  04-19      Urinalysis Basic - ( 2025 08:00 )    Color: x / Appearance: x / SG: x / pH: x  Gluc: 128 mg/dL / Ketone: x  / Bili: x / Urobili: x   Blood: x / Protein: x / Nitrite: x   Leuk Esterase: x / RBC: x / WBC x   Sq Epi: x / Non Sq Epi: x / Bacteria: x

## 2025-04-19 NOTE — CASE MANAGEMENT PROGRESS NOTE - NSCMPROGRESSNOTE_GEN_ALL_CORE
W/E task update:  Per Dr Beard pt is not medically ready for transition to the [facility], +ESBL urine, pending ID recs, hypertensive overnight 202/83, CM spoke with Rashawn in Wellness at the [facility] [phone] to update on transition of care. Per Rashawn, if pt required IV abx long term this cannot be accommodated at the [facility] and pt would need [facility].

## 2025-04-19 NOTE — PROGRESS NOTE ADULT - ASSESSMENT
ELVIN on CKD 3  Renal Cyst  Hypokalemia  HTN  Proteinuria    -Baseline Creatinine 1.4  -ELVIN Likely 2/2 Decreased EABV, renal function continues to improve  -Reviewed Urine lytes  -Reviewed UA  -CT abd - Left renal cyst. No hydronephrosis.  -IVF NS + KCL, will dc now since has some edema, and bp is elevated  -Mag within normal range, phos is low, will give iv K phos once dose  -BP is elevated, should improve with dc ivf, will review bp meds ELVIN on CKD 3  Renal Cyst  Hypokalemia  HTN  Proteinuria    -Baseline Creatinine 1.4  -ELVIN Likely 2/2 Decreased EABV, renal function continues to improve  -Reviewed Urine lytes  -Reviewed UA  -CT abd - Left renal cyst. No hydronephrosis.  -IVF NS + KCL, will dc now since has some edema, and bp is elevated  -Mag within normal range, phos is low, will give iv K phos once dose  -BP is elevated, is on ARB, hydralazine, metoprolol, if bp remains elevated after ivf dc, rec increasing hydralazine. If SCr stable, can also increase losartan to 50 mg bid from daily

## 2025-04-19 NOTE — PROGRESS NOTE ADULT - PROBLEM SELECTOR PLAN 1
- On admission: AlkP 959,    - In ED given, x1 Rocephin, x 1 Lasix   - CT a/p: Diffuse gallbladder wall thickening/edema  - RUQ US: Irregular gallbladder wall thickening to 1.2 cm increased from prior   study. Cholelithiasis. Consider acute or chronic cholecystitis. Prominent pancreas, incompletely assessed, which can also be characterized with MRI  - Hida scan:  There is prompt, homogeneous uptake of radiopharmaceutical by the hepatocytes. Activity is first seen in the bowel at about 15 minutes. The gallbladder is not visualized at any time during the study. Findings are compatible with acute cholecystitis.   - Switched from Zosyn to Cefpodoxime and flagyl   - Continue to monitor on daily CMP.  - GI-Dr Dupree -Abx   - Surgery---> no acute surgical intervention at this time, start PO diet, No drainage needed   - ID consulted (Dr. Macias) -- agrees with IV zosyn today, may transition to po today

## 2025-04-19 NOTE — PROVIDER CONTACT NOTE (OTHER) - ACTION/TREATMENT ORDERED:
additional medications to be ordered by provider
Provider aware - Metoprolol ER ordered
Provider aware

## 2025-04-20 LAB
% ALBUMIN: 39.1 % — SIGNIFICANT CHANGE UP
% ALPHA 1: 9.8 % — SIGNIFICANT CHANGE UP
% ALPHA 2: 19 % — SIGNIFICANT CHANGE UP
% BETA: 13.6 % — SIGNIFICANT CHANGE UP
% GAMMA: 18.5 % — SIGNIFICANT CHANGE UP
% M SPIKE: SIGNIFICANT CHANGE UP
ALBUMIN SERPL ELPH-MCNC: 2 G/DL — LOW (ref 3.3–5)
ALBUMIN SERPL ELPH-MCNC: 2.5 G/DL — LOW (ref 3.6–5.5)
ALBUMIN/GLOB SERPL ELPH: 0.6 RATIO — SIGNIFICANT CHANGE UP
ALP SERPL-CCNC: 812 U/L — HIGH (ref 40–120)
ALPHA1 GLOB SERPL ELPH-MCNC: 0.6 G/DL — HIGH (ref 0.1–0.4)
ALPHA2 GLOB SERPL ELPH-MCNC: 1.2 G/DL — HIGH (ref 0.5–1)
ALT FLD-CCNC: 30 U/L — SIGNIFICANT CHANGE UP (ref 12–78)
ANION GAP SERPL CALC-SCNC: 6 MMOL/L — SIGNIFICANT CHANGE UP (ref 5–17)
AST SERPL-CCNC: 53 U/L — HIGH (ref 15–37)
B-GLOBULIN SERPL ELPH-MCNC: 0.9 G/DL — SIGNIFICANT CHANGE UP (ref 0.5–1)
BASOPHILS # BLD AUTO: 0.04 K/UL — SIGNIFICANT CHANGE UP (ref 0–0.2)
BASOPHILS NFR BLD AUTO: 0.6 % — SIGNIFICANT CHANGE UP (ref 0–2)
BILIRUB SERPL-MCNC: 0.5 MG/DL — SIGNIFICANT CHANGE UP (ref 0.2–1.2)
BUN SERPL-MCNC: 21 MG/DL — SIGNIFICANT CHANGE UP (ref 7–23)
CALCIUM SERPL-MCNC: 9.2 MG/DL — SIGNIFICANT CHANGE UP (ref 8.5–10.1)
CHLORIDE SERPL-SCNC: 106 MMOL/L — SIGNIFICANT CHANGE UP (ref 96–108)
CO2 SERPL-SCNC: 26 MMOL/L — SIGNIFICANT CHANGE UP (ref 22–31)
CREAT SERPL-MCNC: 1.8 MG/DL — HIGH (ref 0.5–1.3)
EGFR: 26 ML/MIN/1.73M2 — LOW
EGFR: 26 ML/MIN/1.73M2 — LOW
EOSINOPHIL # BLD AUTO: 0.28 K/UL — SIGNIFICANT CHANGE UP (ref 0–0.5)
EOSINOPHIL NFR BLD AUTO: 3.9 % — SIGNIFICANT CHANGE UP (ref 0–6)
GAMMA GLOBULIN: 1.2 G/DL — SIGNIFICANT CHANGE UP (ref 0.6–1.6)
GLUCOSE BLDC GLUCOMTR-MCNC: 123 MG/DL — HIGH (ref 70–99)
GLUCOSE BLDC GLUCOMTR-MCNC: 133 MG/DL — HIGH (ref 70–99)
GLUCOSE BLDC GLUCOMTR-MCNC: 141 MG/DL — HIGH (ref 70–99)
GLUCOSE BLDC GLUCOMTR-MCNC: 214 MG/DL — HIGH (ref 70–99)
GLUCOSE SERPL-MCNC: 116 MG/DL — HIGH (ref 70–99)
HCT VFR BLD CALC: 26.5 % — LOW (ref 34.5–45)
HGB BLD-MCNC: 8.4 G/DL — LOW (ref 11.5–15.5)
IMM GRANULOCYTES NFR BLD AUTO: 0.7 % — SIGNIFICANT CHANGE UP (ref 0–0.9)
INTERPRETATION SERPL IFE-IMP: SIGNIFICANT CHANGE UP
LYMPHOCYTES # BLD AUTO: 1.94 K/UL — SIGNIFICANT CHANGE UP (ref 1–3.3)
LYMPHOCYTES # BLD AUTO: 26.9 % — SIGNIFICANT CHANGE UP (ref 13–44)
M-SPIKE: SIGNIFICANT CHANGE UP (ref 0–0)
MAGNESIUM SERPL-MCNC: 2.1 MG/DL — SIGNIFICANT CHANGE UP (ref 1.6–2.6)
MCHC RBC-ENTMCNC: 25.8 PG — LOW (ref 27–34)
MCHC RBC-ENTMCNC: 31.7 G/DL — LOW (ref 32–36)
MCV RBC AUTO: 81.3 FL — SIGNIFICANT CHANGE UP (ref 80–100)
MONOCYTES # BLD AUTO: 1.14 K/UL — HIGH (ref 0–0.9)
MONOCYTES NFR BLD AUTO: 15.8 % — HIGH (ref 2–14)
NEUTROPHILS # BLD AUTO: 3.77 K/UL — SIGNIFICANT CHANGE UP (ref 1.8–7.4)
NEUTROPHILS NFR BLD AUTO: 52.1 % — SIGNIFICANT CHANGE UP (ref 43–77)
NRBC BLD AUTO-RTO: 0 /100 WBCS — SIGNIFICANT CHANGE UP (ref 0–0)
PHOSPHATE SERPL-MCNC: 3.3 MG/DL — SIGNIFICANT CHANGE UP (ref 2.5–4.5)
PLATELET # BLD AUTO: 232 K/UL — SIGNIFICANT CHANGE UP (ref 150–400)
POTASSIUM SERPL-MCNC: 4 MMOL/L — SIGNIFICANT CHANGE UP (ref 3.5–5.3)
POTASSIUM SERPL-SCNC: 4 MMOL/L — SIGNIFICANT CHANGE UP (ref 3.5–5.3)
PROT PATTERN SERPL ELPH-IMP: SIGNIFICANT CHANGE UP
PROT SERPL-MCNC: 6.5 G/DL — SIGNIFICANT CHANGE UP (ref 6–8.3)
PROT SERPL-MCNC: 6.8 G/DL — SIGNIFICANT CHANGE UP (ref 6–8.3)
RBC # BLD: 3.26 M/UL — LOW (ref 3.8–5.2)
RBC # FLD: 15.8 % — HIGH (ref 10.3–14.5)
SODIUM SERPL-SCNC: 138 MMOL/L — SIGNIFICANT CHANGE UP (ref 135–145)
WBC # BLD: 7.22 K/UL — SIGNIFICANT CHANGE UP (ref 3.8–10.5)
WBC # FLD AUTO: 7.22 K/UL — SIGNIFICANT CHANGE UP (ref 3.8–10.5)

## 2025-04-20 RX ORDER — LOSARTAN POTASSIUM 100 MG/1
50 TABLET, FILM COATED ORAL
Refills: 0 | Status: DISCONTINUED | OUTPATIENT
Start: 2025-04-20 | End: 2025-04-22

## 2025-04-20 RX ADMIN — Medication 50 MILLIGRAM(S): at 22:12

## 2025-04-20 RX ADMIN — ERTAPENEM SODIUM 100 MILLIGRAM(S): 1 INJECTION, POWDER, LYOPHILIZED, FOR SOLUTION INTRAMUSCULAR; INTRAVENOUS at 08:32

## 2025-04-20 RX ADMIN — Medication 325 MILLIGRAM(S): at 11:21

## 2025-04-20 RX ADMIN — Medication 50 MILLIGRAM(S): at 15:45

## 2025-04-20 RX ADMIN — INSULIN LISPRO 2: 100 INJECTION, SOLUTION INTRAVENOUS; SUBCUTANEOUS at 16:45

## 2025-04-20 RX ADMIN — LOSARTAN POTASSIUM 50 MILLIGRAM(S): 100 TABLET, FILM COATED ORAL at 05:13

## 2025-04-20 RX ADMIN — HEPARIN SODIUM 5000 UNIT(S): 1000 INJECTION INTRAVENOUS; SUBCUTANEOUS at 05:14

## 2025-04-20 RX ADMIN — LOSARTAN POTASSIUM 50 MILLIGRAM(S): 100 TABLET, FILM COATED ORAL at 17:52

## 2025-04-20 RX ADMIN — METOPROLOL SUCCINATE 100 MILLIGRAM(S): 50 TABLET, EXTENDED RELEASE ORAL at 05:13

## 2025-04-20 RX ADMIN — HEPARIN SODIUM 5000 UNIT(S): 1000 INJECTION INTRAVENOUS; SUBCUTANEOUS at 17:52

## 2025-04-20 RX ADMIN — Medication 20 MILLIGRAM(S): at 11:21

## 2025-04-20 RX ADMIN — Medication 50 MILLIGRAM(S): at 05:13

## 2025-04-20 NOTE — PROGRESS NOTE ADULT - ASSESSMENT
Transaminitis  Bilateral leg swelling  Acute cholecystitis    4/16 CT AP: Diffuse gallbladder wall thickening/edema.         RUQ US: Irregular gallbladder wall thickening to 1.2 cm increased from prior study. Cholelithiasis. Consider acute or chronic cholecystitis. Limited evaluation for underlying gallbladder mass.           HIDA: There is prompt, homogeneous uptake of radiopharmaceutical by the hepatocytes. Activity is first seen in the bowel at about 15 minutes. The gallbladder is not visualized at any time during the study. Findings are compatible with acute cholecystitis.    Plan:  - CT AP & RUQ US noted and discussed with patient   - Suspect transaminitis likely congestive vs reactive from cholecystitis  - LFTs noted, continue to trend CMP daily  - Avoid hepatoxic agents  - Surgery eval noted, per their note no acute surgical intervention/IR drainage at this time   - Antibiotics as per ID, to be completed on 4/21 per their note  - No contraindication from GI standpoint for discharge planning  - To follow

## 2025-04-20 NOTE — PROGRESS NOTE ADULT - SUBJECTIVE AND OBJECTIVE BOX
[facility]  Owen Zarco PA-C  [facility] [place] [address]  [phone]      INTERVAL HPI/OVERNIGHT EVENTS:  Pt s/e  LFTs trending down  Pt denies abdominal pain, N/V or further GI complaints  Still on IV abx for UTI and cholecystitis    MEDICATIONS  (STANDING):  dextrose 5%. 1000 milliLiter(s) (50 mL/Hr) IV Continuous <Continuous>  dextrose 5%. 1000 milliLiter(s) (100 mL/Hr) IV Continuous <Continuous>  dextrose 50% Injectable 25 Gram(s) IV Push once  dextrose 50% Injectable 12.5 Gram(s) IV Push once  dextrose 50% Injectable 25 Gram(s) IV Push once  ertapenem  IVPB      ertapenem  IVPB 500 milliGRAM(s) IV Intermittent every 24 hours  famotidine    Tablet 20 milliGRAM(s) Oral daily  ferrous    sulfate 325 milliGRAM(s) Oral daily  glucagon  Injectable 1 milliGRAM(s) IntraMuscular once  heparin   Injectable 5000 Unit(s) SubCutaneous every 12 hours  hydrALAZINE 50 milliGRAM(s) Oral three times a day  insulin lispro (ADMELOG) corrective regimen sliding scale   SubCutaneous three times a day before meals  insulin lispro (ADMELOG) corrective regimen sliding scale   SubCutaneous at bedtime  losartan 50 milliGRAM(s) Oral daily  metoprolol succinate  milliGRAM(s) Oral daily    MEDICATIONS  (PRN):  dextrose Oral Gel 15 Gram(s) Oral once PRN Blood Glucose LESS THAN 70 milliGRAM(s)/deciliter      Allergies    amlodipine (Flushing)        PHYSICAL EXAM:   Vital Signs:  Vital Signs Last 24 Hrs  T(C): 36.8 (2025 04:25), Max: 36.8 (2025 04:25)  T(F): 98.2 (2025 04:25), Max: 98.2 (2025 04:25)  HR: 63 (2025 04:25) (63 - 64)  BP: 176/74 (2025 04:25) (168/53 - 194/73)  BP(mean): --  RR: 18 (2025 04:25) (17 - 18)  SpO2: 93% (2025 04:25) (93% - 97%)    Parameters below as of 2025 04:25  Patient On (Oxygen Delivery Method): room air      Daily     Daily Weight in k.7 (2025 04:25)    GENERAL:  Appears stated age  HEENT:  NC/AT  CHEST:  Full & symmetric excursion  HEART:  Regular rhythm  ABDOMEN:  Soft, non-tender, non-distended  EXTEREMITIES:  no cyanosis  SKIN:  No rash  NEURO:  Alert      LABS:                        8.4    7.22  )-----------( 232      ( 2025 06:15 )             26.5     04-20    138  |  106  |  21  ----------------------------<  116[H]  4.0   |  26  |  1.80[H]    Ca    9.2      2025 06:15  Phos  3.3     20  Mg     2.1     20    TPro  6.8  /  Alb  2.0[L]  /  TBili  0.5  /  DBili  x   /  AST  53[H]  /  ALT  30  /  AlkPhos  812[H]  04-20      Urinalysis Basic - ( 2025 06:15 )    Color: x / Appearance: x / SG: x / pH: x  Gluc: 116 mg/dL / Ketone: x  / Bili: x / Urobili: x   Blood: x / Protein: x / Nitrite: x   Leuk Esterase: x / RBC: x / WBC x   Sq Epi: x / Non Sq Epi: x / Bacteria: x

## 2025-04-20 NOTE — PROGRESS NOTE ADULT - PROBLEM SELECTOR PLAN 5
Chronic, on admission 165/66   Elevated this morning, s/p additional Toprol 25mg  Per dtr pt's BP is stable at KOKI ~140/70 & so MD d/rosas new meds - pt has Cardio appointment next week Monday with [facility]    - Continue home medications Toprol, Lasix with hold parameters  - Continue losartan 2x day   -Hydralazine  50mg TID   - Monitor routine hemodynamics    Hypokalemia - resolved   BMP in AM, replete prn

## 2025-04-20 NOTE — PROGRESS NOTE ADULT - PROBLEM SELECTOR PLAN 2
- Patient has a history of frequent UTIs and has been having urinary urgency of the past few days. Was treated recently for UTI with abx.   - UA: 30 protein, small LE, 25 wbc, mod bacteria  - UCx with ESBL Ecoli   - S/P 1 dose of IV Rocephin in ER  - Start IV Invanz 1 gm daily x 3 days as d/w ID - Patient has a history of frequent UTIs and has been having urinary urgency of the past few days. Was treated recently for UTI with abx.   - UA: 30 protein, small LE, 25 wbc, mod bacteria  - UCx with ESBL Ecoli   - S/P 1 dose of IV Rocephin in ER  - Start IV Invanz 500 mg  daily x 3 days as d/w ID

## 2025-04-20 NOTE — PROGRESS NOTE ADULT - SUBJECTIVE AND OBJECTIVE BOX
89yo F w/ PMH of HTN, HLD, T2DM, TIA, CKD III, Hx of R hip CRPP in 2016 presents to the ED with her daughter for bilateral leg swelling times few days and frequent urination.  Patient has a history of frequent UTIs her daughter was concerned for UTI brought her in for evaluation.                               [facility]                             Nephrology and Hypertension                             Marcin Kaplan                                          [phone]     Patient is a 89y old  Female who presents with a chief complaint of Acute pino (16 Apr 2025 18:04)       HPI:  89yo F w/ PMH of HTN, HLD, T2DM, TIA, CKD III, Hx of R hip CRPP in 2016 presents to the ED with her daughter for bilateral leg swelling times few days and fatigue for the past few days. Spoke to daughter via phone who reports that patient has a history of UTIs. Second daughter at bedside with the patient reports that she was having urinary frequency. Wanted to bring her in for evaluation and states that the legs have been bothering her for the past few days as well. Patient is from [place] and is compliant with medications. Was recently treated for UTI with abx.   Of note, patient was admitted 3/9-3/13 for management of weakness likely 2/2 UTI and sacral pain. UA was neg, no leukocytosis and was monitored off abx. Course was complicated by hypertensive urgency and bradycardia which was treated with BP med changes.   Denies fever, chills, chest pain, palpitations, SOB, cough, abdominal pain, nausea, vomiting, diarrhea, constipation, hematochezia, melena, urgency, dysuria, hematuria, headaches, changes in vision, dizziness, numbness, tingling. No other complaints at this time.  Renal Consulted for ELVIN on CKD.  She is urinating well.  No N/V/SOB.  Has not seen nephrologist prior.     Is now off ivf, is without any new issues, no HA, no sob.     PAST MEDICAL & SURGICAL HISTORY:  Hypertension      Diabetes      TIA (transient ischemic attack)      Hip fracture      HLD (hyperlipidemia)      Stage 3 chronic kidney disease      Anemia of chronic disease      Lumbar compression fracture      S/P ORIF (open reduction internal fixation) fracture  right hip           FAMILY HISTORY:  FH: asthma (Father)    [place]    Social History:Non smoker    MEDICATIONS  (STANDING):  aspirin enteric coated 81 milliGRAM(s) Oral daily  atorvastatin 40 milliGRAM(s) Oral at bedtime  dextrose 5%. 1000 milliLiter(s) (50 mL/Hr) IV Continuous <Continuous>  dextrose 5%. 1000 milliLiter(s) (100 mL/Hr) IV Continuous <Continuous>  dextrose 50% Injectable 25 Gram(s) IV Push once  dextrose 50% Injectable 12.5 Gram(s) IV Push once  dextrose 50% Injectable 25 Gram(s) IV Push once  famotidine    Tablet 20 milliGRAM(s) Oral daily  ferrous    sulfate 325 milliGRAM(s) Oral daily  furosemide    Tablet 20 milliGRAM(s) Oral daily  glucagon  Injectable 1 milliGRAM(s) IntraMuscular once  heparin   Injectable 5000 Unit(s) SubCutaneous once  insulin lispro (ADMELOG) corrective regimen sliding scale   SubCutaneous every 6 hours  metoprolol succinate  milliGRAM(s) Oral daily  potassium chloride    Tablet ER 40 milliEquivalent(s) Oral every 4 hours  sodium chloride 0.9% with potassium chloride 20 mEq/L 1000 milliLiter(s) (75 mL/Hr) IV Continuous <Continuous>    MEDICATIONS  (PRN):  dextrose Oral Gel 15 Gram(s) Oral once PRN Blood Glucose LESS THAN 70 milliGRAM(s)/deciliter   Meds reviewed    Allergies    No Known Allergies    Intolerances         REVIEW OF SYSTEMS:    as above    Vital Signs Last 24 Hrs  T(C): 36.7 (20 Apr 2025 11:33), Max: 36.8 (20 Apr 2025 04:25)  T(F): 98.1 (20 Apr 2025 11:33), Max: 98.2 (20 Apr 2025 04:25)  HR: 61 (20 Apr 2025 11:33) (61 - 63)  BP: 163/75 (20 Apr 2025 11:33) (163/75 - 176/74)  BP(mean): --  RR: 18 (20 Apr 2025 11:33) (18 - 18)  SpO2: 97% (20 Apr 2025 11:33) (93% - 97%)    Parameters below as of 20 Apr 2025 11:33  Patient On (Oxygen Delivery Method): room air          PHYSICAL EXAM:    GENERAL: NAD  HEAD:  Atraumatic, Normocephalic  EYES: EOMI, conjunctiva and sclera clear  ENMT: No Drainage from nares, No drainage from ears  NERVOUS SYSTEM:  Awake and Alert  CHEST/LUNG: Clear to percussion bilaterally  EXTREMITIES:  trace Edema Le, ankle edema   SKIN: No rashes No obvious ecchymosis          LABS:                          8.4    7.22  )-----------( 232      ( 20 Apr 2025 06:15 )             26.5     04-20    138  |  106  |  21  ----------------------------<  116[H]  4.0   |  26  |  1.80[H]    Ca    9.2      20 Apr 2025 06:15  Phos  3.3     04-20  Mg     2.1     04-20    TPro  6.8  /  Alb  2.0[L]  /  TBili  0.5  /  DBili  x   /  AST  53[H]  /  ALT  30  /  AlkPhos  812[H]  04-20    LIVER FUNCTIONS - ( 20 Apr 2025 06:15 )  Alb: 2.0 g/dL / Pro: 6.8 g/dL / ALK PHOS: 812 U/L / ALT: 30 U/L / AST: 53 U/L / GGT: x             Urinalysis Basic - ( 20 Apr 2025 06:15 )    Color: x / Appearance: x / SG: x / pH: x  Gluc: 116 mg/dL / Ketone: x  / Bili: x / Urobili: x   Blood: x / Protein: x / Nitrite: x   Leuk Esterase: x / RBC: x / WBC x   Sq Epi: x / Non Sq Epi: x / Bacteria: x

## 2025-04-20 NOTE — PROGRESS NOTE ADULT - PROBLEM SELECTOR PLAN 1
- On admission: AlkP 959,    - In ED given, x1 Rocephin, x 1 Lasix   - CT a/p: Diffuse gallbladder wall thickening/edema  - RUQ US: Irregular gallbladder wall thickening to 1.2 cm increased from prior   study. Cholelithiasis. Consider acute or chronic cholecystitis. Prominent pancreas, incompletely assessed, which can also be characterized with MRI  - Hida scan:  There is prompt, homogeneous uptake of radiopharmaceutical by the hepatocytes. Activity is first seen in the bowel at about 15 minutes. The gallbladder is not visualized at any time during the study. Findings are compatible with acute cholecystitis.   - STOP  Zosyn to Cefpodoxime and flagyl   - Continue to monitor on daily CMP.  - GI-Dr Dupree -Abx   - Surgery---> no acute surgical intervention at this time, start PO diet, No drainage needed   - ID consulted (Dr. Macias) -- agrees with IV Invanz daily

## 2025-04-20 NOTE — PROGRESS NOTE ADULT - ASSESSMENT
89yo F w HTN, HLD, T2DM, TIA, CKD III, Hx of R hip CRPP in 2016, compression Fx spine ,anemia  preseneds to the ED with her daughter for bilateral leg swelling times few days and fatigue for the past few days. Patient has a history of UTIs. Patient reports that she was having urinary frequency but is on lasix. Was recently treated for UTI with abx.   ED Vital Signs T(F):  HR:98F  BP: 165/66  RR: 19  SpO2: 99 % on R  CT a/p: Diffuse gallbladder wall thickening/edema  RUQ US: Irregular gallbladder wall thickening to 1.2 cm increased from prior   study. Cholelithiasis. Consider acute or chronic cholecystitis. Prominent pancreas, incompletely assessed, which can also be characterized with MRI.  HIDA Abnormal hepatobiliary scan.    Acute Cholecystitis  Presentation without any localizing pain, some weakness, afebrile, minimal elevation of wbcs, ALT/AST/Alph phos elevation  some pyuria and some urinary symptoms (?UTI)  Culture - Urine (04.16.25 @ 11:59)  >100,000 CFU/ml ESBL Escherichia coli  ceftriaxone/ flagyl switched to ertapenem 500mg daily on 4/19    Recommendations  c/w ertapenem with last day tomorrow 4/21  - will cover UTI and acute cholecystitis    Camilo Arroyo M.D.  [facility]  [phone]  After 5pm on weekdays and all day on weekends - please call [phone]  Available on [employer] teams    Thank you for consulting us and involving us in the management of this patients case. In addition to reviewing history, imaging, documents, labs, microbiology, took into account antibiotic stewardship, local antibiogram and infection control strategies and potential transmission issues at time of treatment decision making process.

## 2025-04-20 NOTE — PROGRESS NOTE ADULT - ASSESSMENT
ELVIN on CKD 3  Renal Cyst  Hypokalemia  HTN  Proteinuria    -Baseline Creatinine 1.4  -ELVIN Likely 2/2 Decreased EABV, renal function continues to improve  -Reviewed Urine lytes, UA  -CT abd - Left renal cyst. No hydronephrosis.  -s/P IVF NS + KCL, now off  -Mg, phos is normal range now, no repletion today    -BP is still elevated, will increase hydralazine to 100 mg q8 and change losartan to 50 mg bid  -SCr is stable currently but above baseline  -If edema persistent, can resume low dose lasix over the next 24 hours.

## 2025-04-20 NOTE — PROGRESS NOTE ADULT - SUBJECTIVE AND OBJECTIVE BOX
[facility]  HERNESTO Ramirez Y. Patel, S. Shah, G. Casimir  [phone]  ([phone] - weekdays after 5pm and weekends)    Name: NELDA VERDIN  Age/Gender: 89y Female  MRN: [id number]    Interval History:  Notes reviewed.   No concerning overnight events.  Afebrile.   denies abd pain or dysuria    Allergies: amlodipine (Flushing)      Objective:  Vitals:   T(F): 98.1 (04-20-25 @ 11:33), Max: 98.2 (04-20-25 @ 04:25)  HR: 61 (04-20-25 @ 14:04) (61 - 63)  BP: 154/75 (04-20-25 @ 14:04) (154/75 - 176/74)  RR: 18 (04-20-25 @ 11:33) (18 - 18)  SpO2: 97% (04-20-25 @ 11:33) (93% - 97%)  Physical Examination:  General: no acute distress  HEENT: anicteric  Cardio: S1, S2, normal rate  Resp: breathing comfortably on RA   Abd: soft, NT, ND  Ext: no LE edema  Skin: warm, dry    Laboratory Studies:  CBC:                       8.4    7.22  )-----------( 232      ( 20 Apr 2025 06:15 )             26.5     WBC Trend:  7.22 04-20-25 @ 06:15  7.19 04-19-25 @ 08:00  7.72 04-18-25 @ 06:28  10.26 04-17-25 @ 06:26  9.48 04-16-25 @ 10:40    CMP: 04-20    138  |  106  |  21  ----------------------------<  116[H]  4.0   |  26  |  1.80[H]    Ca    9.2      20 Apr 2025 06:15  Phos  3.3     04-20  Mg     2.1     04-20    TPro  6.8  /  Alb  2.0[L]  /  TBili  0.5  /  DBili  x   /  AST  53[H]  /  ALT  30  /  AlkPhos  812[H]  04-20      LIVER FUNCTIONS - ( 20 Apr 2025 06:15 )  Alb: 2.0 g/dL / Pro: 6.8 g/dL / ALK PHOS: 812 U/L / ALT: 30 U/L / AST: 53 U/L / GGT: x             Urinalysis Basic - ( 20 Apr 2025 06:15 )    Color: x / Appearance: x / SG: x / pH: x  Gluc: 116 mg/dL / Ketone: x  / Bili: x / Urobili: x   Blood: x / Protein: x / Nitrite: x   Leuk Esterase: x / RBC: x / WBC x   Sq Epi: x / Non Sq Epi: x / Bacteria: x      Microbiology: reviewed     Urinalysis with Rflx Culture (collected 04-16-25 @ 11:59)    Culture - Urine (collected 04-16-25 @ 11:59)  Source: Clean Catch  Final Report (04-18-25 @ 22:21):    >100,000 CFU/ml Escherichia coli ESBL    <10,000 CFU/ml Normal Urogenital husam present  Organism: Escherichia coli ESBL (04-18-25 @ 22:21)  Organism: Escherichia coli ESBL (04-18-25 @ 22:21)      -  Levofloxacin: R >4      -  Tobramycin: S <=2      -  Nitrofurantoin: S <=32 Should not be used to treat pyelonephritis      -  Aztreonam: R >16      -  Gentamicin: S <=2      -  Cefazolin: R >16 For uncomplicated UTI with K. pneumoniae, E. coli, or P. mirablis: ANNA MARIE <=16 is sensitive and ANNA MARIE >=32 is resistant. This also predicts results for oral agents cefaclor, cefdinir, cefpodoxime, cefprozil, cefuroxime axetil, cephalexin and locarbef for uncomplicated UTI. Note that some isolates may be susceptible to these agents while testing resistant to cefazolin.      -  Cefepime: R >16      -  Piperacillin/Tazobactam: S <=8      -  Ciprofloxacin: R >2      -  Imipenem: S <=1      -  Ceftriaxone: R >32      -  Ampicillin: R >16 These ampicillin results predict results for amoxicillin      Method Type: ANNA MARIE      -  Meropenem: S <=1      -  Ampicillin/Sulbactam: S 8/4      -  Cefuroxime: R >16      -  Trimethoprim/Sulfamethoxazole: R >2/38      -  Ertapenem: S <=0.5        Radiology: reviewed     Medications:  dextrose 5%. 1000 milliLiter(s) IV Continuous <Continuous>  dextrose 5%. 1000 milliLiter(s) IV Continuous <Continuous>  dextrose 50% Injectable 25 Gram(s) IV Push once  dextrose 50% Injectable 12.5 Gram(s) IV Push once  dextrose 50% Injectable 25 Gram(s) IV Push once  dextrose Oral Gel 15 Gram(s) Oral once PRN  ertapenem  IVPB      ertapenem  IVPB 500 milliGRAM(s) IV Intermittent every 24 hours  famotidine    Tablet 20 milliGRAM(s) Oral daily  ferrous    sulfate 325 milliGRAM(s) Oral daily  glucagon  Injectable 1 milliGRAM(s) IntraMuscular once  heparin   Injectable 5000 Unit(s) SubCutaneous every 12 hours  hydrALAZINE 50 milliGRAM(s) Oral every 8 hours  insulin lispro (ADMELOG) corrective regimen sliding scale   SubCutaneous three times a day before meals  insulin lispro (ADMELOG) corrective regimen sliding scale   SubCutaneous at bedtime  losartan 50 milliGRAM(s) Oral two times a day  metoprolol succinate  milliGRAM(s) Oral daily    Antimicrobials:  ertapenem  IVPB      ertapenem  IVPB 500 milliGRAM(s) IV Intermittent every 24 hours

## 2025-04-20 NOTE — PROGRESS NOTE ADULT - PROBLEM SELECTOR PLAN 6
- chronic, stable  - ELVIN on CKD 3, ELVIN improved   - Baseline Cr 1.7-1.9  - BUN 27, Cr 2.00,  eGFR 23  - s/p gentle ivf @40cc   - daily CMP  - Avoid nephrotoxic agents.  Renal -Dr Kaplan d/w

## 2025-04-20 NOTE — PROGRESS NOTE ADULT - SUBJECTIVE AND OBJECTIVE BOX
Patient is a 89y old  Female who presents with a chief complaint of Acute pino (20 Apr 2025 15:33)    HPI:  89yo F w/ PMH of HTN, HLD, T2DM, TIA, CKD III, Hx of R hip CRPP in 2016, compression Fx spine ,anemia  presents to the ED with her daughter for bilateral leg swelling times few days and fatigue for the past few days. Spoke to daughter via phone who reports that patient has a history of UTIs. Second daughter at bedside with the patient reports that she was having urinary frequency. Wanted to bring her in for evaluation and states that the legs have been bothering her for the past few days as well. Patient is from [place] and is compliant with medications. Was recently treated for UTI with abx.     Of note, patient was admitted 3/9-3/13 for management of weakness likely 2/2 UTI and sacral pain. UA was neg, no leukocytosis and was monitored off abx. Course was complicated by hypertensive urgency and bradycardia which was treated with BP med changes.     Denies fever, chills, chest pain, palpitations, SOB, cough, abdominal pain, nausea, vomiting, diarrhea, constipation, hematochezia, melena, urgency, dysuria, hematuria, headaches, changes in vision, dizziness, numbness, tingling. No other complaints at this time.    ED course:  Vital Signs T(F):  HR:98F  BP: 165/66  RR: 19  SpO2: 99 % on RA  Labs significant for: K+ 3.3, BUN 27, Cr 2.00, AlkP 959, , eGFR 23, pro-BNP 6542  UA: 30 protein, small LE, 25 wbc, mod bacteria  EKG: Sinus bradycardia with 1st degree AV block with premature supraventricular complexes, 51 bpm, QT/QTc 494/455 ms  In ED given,  x1 Rocephin, x 1 Lasix,     Imaging  CXR: negative  CT a/p: Diffuse gallbladder wall thickening/edema  RUQ US: Irregular gallbladder wall thickening to 1.2 cm increased from prior   study. Cholelithiasis. Consider acute or chronic cholecystitis. Prominent pancreas, incompletely assessed, which can also be characterized with MRI.  US duplex: No evidence of deep venous thrombosis in either lower extremity.     (16 Apr 2025 18:04)    INTERVAL HPI:  4/17: Pt seen and examined at bedside. Pt feels well -- offers no complaints. Denies any abdominal pain, nausea, vomiting. Was seen and evaluated by surgery -- no acute surgical intervention indicated at this time. Started on IV zosyn this AM. Mild anemia, Cr improved  4/18: Seen and examined at bedside. Patient states that her B/L LE edema has improved, with continued urinary urgency. Tolerating diet, BM x1 this morning. Denies fatigue, lightheadedness, dizziness, vision changes, chest pain, palpitations, abdominal pain, n/v/d/c. add  BP meds added   4/19: Seen and examined at bedside. Patient states that she continues to experience urinary frequency. No other acute concerns or complains. UCx with ESBL Ecoli. Up titrated Hydralazine to 50mg TID.Start  IV Invanz daily  4/20-Pt seen, examined, BP improving, Low stable H/H, MGUS ,On IV Invanz , d/c plan in AM     OVERNIGHT EVENTS: NONE    Home Medications:  aspirin 81 mg oral delayed release tablet: 1 tab(s) orally every other day (16 Apr 2025 19:38)  atorvastatin 40 mg oral tablet: 1 tab(s) orally once a day (at bedtime) (16 Apr 2025 19:38)  famotidine 20 mg oral tablet: 1 tab(s) orally once a day (16 Apr 2025 19:38)  ferrous sulfate 324 mg (65 mg elemental iron) oral tablet: 1 tab(s) orally once a day (16 Apr 2025 19:38)  furosemide 20 mg oral tablet: 1 tab(s) orally once a day (16 Apr 2025 19:38)  Januvia 25 mg oral tablet: 1 tab(s) orally once a day (16 Apr 2025 19:38)  losartan 50 mg oral tablet: 1 tab(s) orally once a day (16 Apr 2025 19:38)  metoprolol succinate 100 mg oral capsule, extended release: 1 cap(s) orally once a day (16 Apr 2025 19:37)      MEDICATIONS  (STANDING):  dextrose 5%. 1000 milliLiter(s) (50 mL/Hr) IV Continuous <Continuous>  dextrose 5%. 1000 milliLiter(s) (100 mL/Hr) IV Continuous <Continuous>  dextrose 50% Injectable 25 Gram(s) IV Push once  dextrose 50% Injectable 12.5 Gram(s) IV Push once  dextrose 50% Injectable 25 Gram(s) IV Push once  ertapenem  IVPB      ertapenem  IVPB 500 milliGRAM(s) IV Intermittent every 24 hours  famotidine    Tablet 20 milliGRAM(s) Oral daily  ferrous    sulfate 325 milliGRAM(s) Oral daily  glucagon  Injectable 1 milliGRAM(s) IntraMuscular once  heparin   Injectable 5000 Unit(s) SubCutaneous every 12 hours  hydrALAZINE 50 milliGRAM(s) Oral every 8 hours  insulin lispro (ADMELOG) corrective regimen sliding scale   SubCutaneous three times a day before meals  insulin lispro (ADMELOG) corrective regimen sliding scale   SubCutaneous at bedtime  losartan 50 milliGRAM(s) Oral two times a day  metoprolol succinate  milliGRAM(s) Oral daily    MEDICATIONS  (PRN):  dextrose Oral Gel 15 Gram(s) Oral once PRN Blood Glucose LESS THAN 70 milliGRAM(s)/deciliter      Allergies    amlodipine (Flushing)    Intolerances        Social History:  Tobacco: None  ETOH: None  Recreational/Illicit Drugs: None  Lives with: child, from [facility]  Ambulation: with walker   ADLs: Dependent (16 Apr 2025 18:04)      REVIEW OF SYSTEMS:tired   CONSTITUTIONAL: No fever, No chills, No fatigue, No myalgia, No Body ache, No Weakness  EYES: No eye pain,  No visual disturbances, No discharge, NO Redness  ENMT:  No ear pain, No nose bleed, No vertigo; No sinus pain, NO throat pain, No Congestion  NECK: No pain, No stiffness  RESPIRATORY: No cough, NO wheezing, No  hemoptysis, NO  shortness of breath  CARDIOVASCULAR: No chest pain, palpitations  GASTROINTESTINAL: No abdominal pain, NO epigastric pain. No nausea, No vomiting; No diarrhea, No constipation. [  ] BM  GENITOURINARY: No dysuria, No frequency, No urgency, No hematuria, NO incontinence  NEUROLOGICAL: No headaches, No dizziness, No numbness, No tingling, No tremors, No weakness  EXT: No Swelling, No Pain, No Edema  SKIN:  [ x ] No itching, burning, rashes, or lesions   MUSCULOSKELETAL: No joint pain ,No Jt swelling; No muscle pain, No back pain, No extremity pain  PSYCHIATRIC: No depression,  No anxiety,  No mood swings ,No difficulty sleeping at night  PAIN SCALE: [ x ] None  [  ] Other-  ROS Unable to obtain due to - [  ] Dementia  [  ] Lethargy [  ] Drowsy [  ] Sedated [  ] non verbal  REST OF REVIEW Of SYSTEM - [x  ] Normal     Vital Signs Last 24 Hrs  T(C): 36.7 (20 Apr 2025 11:33), Max: 36.8 (20 Apr 2025 04:25)  T(F): 98.1 (20 Apr 2025 11:33), Max: 98.2 (20 Apr 2025 04:25)  HR: 68 (20 Apr 2025 15:48) (61 - 68)  BP: 179/63 (20 Apr 2025 15:48) (154/75 - 179/63)  BP(mean): --  RR: 18 (20 Apr 2025 11:33) (18 - 18)  SpO2: 97% (20 Apr 2025 11:33) (93% - 97%)    Parameters below as of 20 Apr 2025 11:33  Patient On (Oxygen Delivery Method): room air      Finger Stick        PHYSICAL EXAM:  GENERAL:  [x  ] NAD , [ x ] well appearing, [  ] Agitated, [  ] Drowsy,  [  ] Lethargy, [  ] confused   HEAD:  [ x ] Normal, [  ] Other  EYES:  [ x ] EOMI, [ x ] PERRLA, [x  ] conjunctiva and sclera clear normal, [  ] Other,  [  ] Pallor,[  ] Discharge  ENMT:  [  x] Normal, [ x ] Moist mucous membranes, [  ] Good dentition, [ x ] No Thrush  NECK:  [  x] Supple, [x  ] No JVD, [x  ] Normal thyroid, [  ] Lymphadenopathy [  ] Other  CHEST/LUNG:  [x  ] Clear to auscultation bilaterally, [ x ] Breath Sounds equal B/L, [ x ] poor effort  [x  ] No rales, [ x ] No rhonchi  [x  ]  No wheezing,   HEART:  [x  ] Regular rate and rhythm, [  ] tachycardia, [  ] Bradycardia,  [  ] irregular  [x  ] No murmurs, No rubs, No gallops, [  ] PPM in place (Mfr:  )  ABDOMEN:  [ x ] Soft, [ x ] Nontender, [ x ] Nondistended, [ x ] No mass, [ x ] Bowel sounds present, [x  ] obese  NERVOUS SYSTEM:  [  x] Alert & Oriented X3, [ x ] Nonfocal  [  ] Confusion  [  ] Encephalopathic [  ] Sedated [  ] Unable to assess, [  ] Dementia [  ] Other-  EXTREMITIES: [ x ] 2+ Peripheral Pulses, No clubbing, No cyanosis,  [ x ] trace left pedal edema [  ] PVD stasis skin changes B/L Lower EXT, [  ] wound  LYMPH: No lymphadenopathy noted  SKIN:  [x  ] No rashes or lesions, [  ] Pressure Ulcers, [  ] ecchymosis, [  ] Skin Tears, [  ] Other      DIET: Diet, Consistent Carbohydrate w/Evening Snack (04-17-25 @ 11:21)      LABS:                        8.4    7.22  )-----------( 232      ( 20 Apr 2025 06:15 )             26.5     20 Apr 2025 06:15    138    |  106    |  21     ----------------------------<  116    4.0     |  26     |  1.80     Ca    9.2        20 Apr 2025 06:15  Phos  3.3       20 Apr 2025 06:15  Mg     2.1       20 Apr 2025 06:15    TPro  6.8    /  Alb  2.0    /  TBili  0.5    /  DBili  x      /  AST  53     /  ALT  30     /  AlkPhos  812    20 Apr 2025 06:15      Urinalysis Basic - ( 20 Apr 2025 06:15 )    Color: x / Appearance: x / SG: x / pH: x  Gluc: 116 mg/dL / Ketone: x  / Bili: x / Urobili: x   Blood: x / Protein: x / Nitrite: x   Leuk Esterase: x / RBC: x / WBC x   Sq Epi: x / Non Sq Epi: x / Bacteria: x        Culture Results:   >100,000 CFU/ml Escherichia coli ESBL  <10,000 CFU/ml Normal Urogenital husam present (04-16 @ 11:59)          CARDIAC MARKERS ( 16 Apr 2025 10:40 )  x     / x     / x     / x     / <1.0 ng/mL          Urinalysis with Rflx Culture (collected 16 Apr 2025 11:59)    Culture - Urine (collected 16 Apr 2025 11:59)  Source: Clean Catch  Final Report (18 Apr 2025 22:21):    >100,000 CFU/ml Escherichia coli ESBL    <10,000 CFU/ml Normal Urogenital husam present  Organism: Escherichia coli ESBL (18 Apr 2025 22:21)  Organism: Escherichia coli ESBL (18 Apr 2025 22:21)         Anemia Panel:  Vitamin B12, Serum: 915 pg/mL (04-17-25 @ 06:26)  Folate, Serum: 10.6 ng/mL (04-17-25 @ 06:26)  Ferritin: 1492 ng/mL (04-17-25 @ 06:26)  Iron Total: 29 ug/dL (04-17-25 @ 06:26)  Iron - Total Binding Capacity.: 184 ug/dL (04-17-25 @ 06:26)      Thyroid Panel:  T4, Serum: 8.1 ug/dL (04-17-25 @ 06:26)  Thyroid Stimulating Hormone, Serum: 4.40 uIU/mL (04-17-25 @ 06:26)            Serum Protein Electrophoresis Interp: Weak Gamma-Migrating Paraprotein Identified (04-17-25 @ 13:57)  Immunofixation, Serum:   Weak IgG Lambda Band Identified      Reference Range: None Detected (04-17-25 @ 13:57)      RADIOLOGY & ADDITIONAL TESTS:      HEALTH ISSUES - PROBLEM Dx:  Acute cholecystitis    UTI (urinary tract infection)    Leg edema    Anemia of chronic disease    Hypertension    Chronic kidney disease    History of TIAs    T2DM (type 2 diabetes mellitus)    HLD (hyperlipidemia)    Need for prophylactic measure      Consultant(s) Notes Reviewed:  [x  ] YES     Care Discussed with [X] Consultants  [x ] Patient  [ x ] Family [  ] HCP [  x]   [  ] Social Service  [ x ] RN, [  ] Physical Therapy,[  ] Palliative care team  DVT PPX: [  ] Lovenox, [x  ] S C Heparin, [  ] Coumadin, [  ] Xarelto, [  ] Eliquis, [  ] Pradaxa, [  ] IV Heparin drip, [  ] SCD [  ] Contraindication 2 to GI Bleed,[  ] Ambulation [  ] Contraindicated 2 to  bleed [  ] Contraindicated 2 to Brain Bleed  Advanced directive: [ x ] None, [  ] DNR/DNI

## 2025-04-21 LAB
ALBUMIN SERPL ELPH-MCNC: 2.1 G/DL — LOW (ref 3.3–5)
ALP SERPL-CCNC: 825 U/L — HIGH (ref 40–120)
ALT FLD-CCNC: 28 U/L — SIGNIFICANT CHANGE UP (ref 12–78)
ANION GAP SERPL CALC-SCNC: 6 MMOL/L — SIGNIFICANT CHANGE UP (ref 5–17)
AST SERPL-CCNC: 50 U/L — HIGH (ref 15–37)
BILIRUB SERPL-MCNC: 0.5 MG/DL — SIGNIFICANT CHANGE UP (ref 0.2–1.2)
BUN SERPL-MCNC: 19 MG/DL — SIGNIFICANT CHANGE UP (ref 7–23)
CALCIUM SERPL-MCNC: 9.4 MG/DL — SIGNIFICANT CHANGE UP (ref 8.5–10.1)
CHLORIDE SERPL-SCNC: 104 MMOL/L — SIGNIFICANT CHANGE UP (ref 96–108)
CO2 SERPL-SCNC: 27 MMOL/L — SIGNIFICANT CHANGE UP (ref 22–31)
CREAT SERPL-MCNC: 1.8 MG/DL — HIGH (ref 0.5–1.3)
EGFR: 26 ML/MIN/1.73M2 — LOW
EGFR: 26 ML/MIN/1.73M2 — LOW
GLUCOSE BLDC GLUCOMTR-MCNC: 119 MG/DL — HIGH (ref 70–99)
GLUCOSE BLDC GLUCOMTR-MCNC: 128 MG/DL — HIGH (ref 70–99)
GLUCOSE BLDC GLUCOMTR-MCNC: 158 MG/DL — HIGH (ref 70–99)
GLUCOSE BLDC GLUCOMTR-MCNC: 169 MG/DL — HIGH (ref 70–99)
GLUCOSE SERPL-MCNC: 120 MG/DL — HIGH (ref 70–99)
HCT VFR BLD CALC: 25.7 % — LOW (ref 34.5–45)
HGB BLD-MCNC: 8.4 G/DL — LOW (ref 11.5–15.5)
MCHC RBC-ENTMCNC: 26.3 PG — LOW (ref 27–34)
MCHC RBC-ENTMCNC: 32.7 G/DL — SIGNIFICANT CHANGE UP (ref 32–36)
MCV RBC AUTO: 80.6 FL — SIGNIFICANT CHANGE UP (ref 80–100)
NRBC BLD AUTO-RTO: 0 /100 WBCS — SIGNIFICANT CHANGE UP (ref 0–0)
PLATELET # BLD AUTO: 221 K/UL — SIGNIFICANT CHANGE UP (ref 150–400)
POTASSIUM SERPL-MCNC: 3.9 MMOL/L — SIGNIFICANT CHANGE UP (ref 3.5–5.3)
POTASSIUM SERPL-SCNC: 3.9 MMOL/L — SIGNIFICANT CHANGE UP (ref 3.5–5.3)
PROT SERPL-MCNC: 6.7 G/DL — SIGNIFICANT CHANGE UP (ref 6–8.3)
RBC # BLD: 3.19 M/UL — LOW (ref 3.8–5.2)
RBC # FLD: 15.8 % — HIGH (ref 10.3–14.5)
SODIUM SERPL-SCNC: 137 MMOL/L — SIGNIFICANT CHANGE UP (ref 135–145)
WBC # BLD: 7.91 K/UL — SIGNIFICANT CHANGE UP (ref 3.8–10.5)
WBC # FLD AUTO: 7.91 K/UL — SIGNIFICANT CHANGE UP (ref 3.8–10.5)

## 2025-04-21 RX ADMIN — LOSARTAN POTASSIUM 50 MILLIGRAM(S): 100 TABLET, FILM COATED ORAL at 05:30

## 2025-04-21 RX ADMIN — HEPARIN SODIUM 5000 UNIT(S): 1000 INJECTION INTRAVENOUS; SUBCUTANEOUS at 05:31

## 2025-04-21 RX ADMIN — Medication 100 MILLIGRAM(S): at 21:06

## 2025-04-21 RX ADMIN — ERTAPENEM SODIUM 100 MILLIGRAM(S): 1 INJECTION, POWDER, LYOPHILIZED, FOR SOLUTION INTRAMUSCULAR; INTRAVENOUS at 11:00

## 2025-04-21 RX ADMIN — INSULIN LISPRO 1: 100 INJECTION, SOLUTION INTRAVENOUS; SUBCUTANEOUS at 17:14

## 2025-04-21 RX ADMIN — Medication 20 MILLIGRAM(S): at 12:16

## 2025-04-21 RX ADMIN — HEPARIN SODIUM 5000 UNIT(S): 1000 INJECTION INTRAVENOUS; SUBCUTANEOUS at 17:15

## 2025-04-21 RX ADMIN — Medication 50 MILLIGRAM(S): at 13:44

## 2025-04-21 RX ADMIN — LOSARTAN POTASSIUM 50 MILLIGRAM(S): 100 TABLET, FILM COATED ORAL at 17:16

## 2025-04-21 RX ADMIN — Medication 50 MILLIGRAM(S): at 05:31

## 2025-04-21 RX ADMIN — METOPROLOL SUCCINATE 100 MILLIGRAM(S): 50 TABLET, EXTENDED RELEASE ORAL at 05:31

## 2025-04-21 RX ADMIN — Medication 325 MILLIGRAM(S): at 12:16

## 2025-04-21 NOTE — PROGRESS NOTE ADULT - ASSESSMENT
89yo F w HTN, HLD, T2DM, TIA, CKD III, Hx of R hip CRPP in 2016, compression Fx spine ,anemia  preseneds to the ED with her daughter for bilateral leg swelling times few days and fatigue for the past few days. Patient has a history of UTIs. Patient reports that she was having urinary frequency but is on lasix. Was recently treated for UTI with abx.   ED Vital Signs T(F):  HR:98F  BP: 165/66  RR: 19  SpO2: 99 % on R  CT a/p: Diffuse gallbladder wall thickening/edema  RUQ US: Irregular gallbladder wall thickening to 1.2 cm increased from prior   study. Cholelithiasis. Consider acute or chronic cholecystitis. Prominent pancreas, incompletely assessed, which can also be characterized with MRI.  HIDA Abnormal hepatobiliary scan.    Acute Cholecystitis  Presentation without any localizing pain, some weakness, afebrile, minimal elevation of wbcs, ALT/AST/Alph phos elevation  some pyuria and some urinary symptoms (?UTI)  Culture - Urine (04.16.25 @ 11:59)  >100,000 CFU/ml ESBL Escherichia coli  ceftriaxone/ flagyl switched to ertapenem 500mg daily on 4/19 --> ertapenem completed 4/21    Recommendations  Ertapenem completed  Monitor off Abx  Trend temps/WBC  Additional care per primary team    Patient evaluated with face-to-face time in addition to reviewing history, labs, microbiology, and imaging.   Antibiotic stewardship, local antibiogram, infection control strategies and potential transmission issues taken into consideration at time of treatment decision making process.   Thank you for allowing us to participate in the care of your patient.  D/w daughter and patient at bedside  Infectious Diseases will follow. Please call with any questions.  Claudette Holguin M.D.  Available on [employer] TEAMS -- *[facility]*  [facility] [phone]  For after 5 P.M. and weekends, please call [phone] 89yo F w HTN, HLD, T2DM, TIA, CKD III, Hx of R hip CRPP in 2016, compression Fx spine ,anemia  preseneds to the ED with her daughter for bilateral leg swelling times few days and fatigue for the past few days. Patient has a history of UTIs. Patient reports that she was having urinary frequency but is on lasix. Was recently treated for UTI with abx.   ED Vital Signs T(F):  HR:98F  BP: 165/66  RR: 19  SpO2: 99 % on R  CT a/p: Diffuse gallbladder wall thickening/edema  RUQ US: Irregular gallbladder wall thickening to 1.2 cm increased from prior   study. Cholelithiasis. Consider acute or chronic cholecystitis. Prominent pancreas, incompletely assessed, which can also be characterized with MRI.  HIDA Abnormal hepatobiliary scan.    Acute Cholecystitis  Acute Cystitis  Presentation without any localizing pain, some weakness, afebrile, minimal elevation of wbcs, ALT/AST/Alph phos elevation  some pyuria and some urinary symptoms (?UTI)  Culture - Urine (04.16.25 @ 11:59)  >100,000 CFU/ml ESBL Escherichia coli  ceftriaxone/ flagyl switched to ertapenem 500mg daily on 4/19    Recommendations  Last day ertapenem today 4/21  Trend temps/WBC  Additional care per primary team    Patient evaluated with face-to-face time in addition to reviewing history, labs, microbiology, and imaging.   Antibiotic stewardship, local antibiogram, infection control strategies and potential transmission issues taken into consideration at time of treatment decision making process.   Thank you for allowing us to participate in the care of your patient.  D/w daughter and patient at bedside  Infectious Diseases will follow. Please call with any questions.  Claudette Holguin M.D.  Available on [employer] TEAMS -- *PREFERRED*  [facility] [phone]  For after 5 P.M. and weekends, please call [phone] 89yo F w HTN, HLD, T2DM, TIA, CKD III, Hx of R hip CRPP in 2016, compression Fx spine ,anemia  preseneds to the ED with her daughter for bilateral leg swelling times few days and fatigue for the past few days. Patient has a history of UTIs. Patient reports that she was having urinary frequency but is on lasix. Was recently treated for UTI with abx.   ED Vital Signs T(F):  HR:98F  BP: 165/66  RR: 19  SpO2: 99 % on R  CT a/p: Diffuse gallbladder wall thickening/edema  RUQ US: Irregular gallbladder wall thickening to 1.2 cm increased from prior   study. Cholelithiasis. Consider acute or chronic cholecystitis. Prominent pancreas, incompletely assessed, which can also be characterized with MRI.  HIDA Abnormal hepatobiliary scan.    Acute Cholecystitis  Acute Cystitis  Presentation without any localizing pain, some weakness, afebrile, minimal elevation of wbcs, ALT/AST/Alph phos elevation  some pyuria and some urinary symptoms (?UTI)  Culture - Urine (04.16.25 @ 11:59)  >100,000 CFU/ml ESBL Escherichia coli  ceftriaxone/ flagyl switched to ertapenem 500mg daily on 4/19    Recommendations  C/w ertapenem to complete total x5 days until 5/23  Trend temps/WBC  Additional care per primary team    Patient evaluated with face-to-face time in addition to reviewing history, labs, microbiology, and imaging.   Antibiotic stewardship, local antibiogram, infection control strategies and potential transmission issues taken into consideration at time of treatment decision making process.   Thank you for allowing us to participate in the care of your patient.  D/w daughter and patient at bedside  D/w Dr. Holguin  Infectious Diseases will follow. Please call with any questions.  Claudette Holguin M.D.  Available on [employer] TEAMS -- *[facility]*  [facility] [phone]  For after 5 P.M. and weekends, please call [phone] 89yo F w HTN, HLD, T2DM, TIA, CKD III, Hx of R hip CRPP in 2016, compression Fx spine ,anemia  preseneds to the ED with her daughter for bilateral leg swelling times few days and fatigue for the past few days. Patient has a history of UTIs. Patient reports that she was having urinary frequency but is on lasix. Was recently treated for UTI with abx.   ED Vital Signs T(F):  HR:98F  BP: 165/66  RR: 19  SpO2: 99 % on R  CT a/p: Diffuse gallbladder wall thickening/edema  RUQ US: Irregular gallbladder wall thickening to 1.2 cm increased from prior   study. Cholelithiasis. Consider acute or chronic cholecystitis. Prominent pancreas, incompletely assessed, which can also be characterized with MRI.  HIDA Abnormal hepatobiliary scan.    Acute Cholecystitis  Acute Cystitis  Presentation without any localizing pain, some weakness, afebrile, minimal elevation of wbcs, ALT/AST/Alph phos elevation  some pyuria and some urinary symptoms (?UTI)  Culture - Urine (04.16.25 @ 11:59)  >100,000 CFU/ml ESBL Escherichia coli  ceftriaxone/ flagyl switched to ertapenem 500mg daily on 4/19    Recommendations  C/w ertapenem to complete total x5 days until 4/23  Trend temps/WBC  Additional care per primary team    Patient evaluated with face-to-face time in addition to reviewing history, labs, microbiology, and imaging.   Antibiotic stewardship, local antibiogram, infection control strategies and potential transmission issues taken into consideration at time of treatment decision making process.   Thank you for allowing us to participate in the care of your patient.  D/w daughter and patient at bedside  D/w Dr. Holguin  Infectious Diseases will follow. Please call with any questions.  Claudette Holguin M.D.  Available on [employer] TEAMS -- *[facility]*  [facility] [phone]  For after 5 P.M. and weekends, please call [phone]

## 2025-04-21 NOTE — PROGRESS NOTE ADULT - SUBJECTIVE AND OBJECTIVE BOX
Patient is a 89y old  Female who presents with a chief complaint of Acute pino (21 Apr 2025 10:28)    HPI:  89yo F w/ PMH of HTN, HLD, T2DM, TIA, CKD III, Hx of R hip CRPP in 2016, compression Fx spine ,anemia  presents to the ED with her daughter for bilateral leg swelling times few days and fatigue for the past few days. Spoke to daughter via phone who reports that patient has a history of UTIs. Second daughter at bedside with the patient reports that she was having urinary frequency. Wanted to bring her in for evaluation and states that the legs have been bothering her for the past few days as well. Patient is from [place] and is compliant with medications. Was recently treated for UTI with abx.     Of note, patient was admitted 3/9-3/13 for management of weakness likely 2/2 UTI and sacral pain. UA was neg, no leukocytosis and was monitored off abx. Course was complicated by hypertensive urgency and bradycardia which was treated with BP med changes.     Denies fever, chills, chest pain, palpitations, SOB, cough, abdominal pain, nausea, vomiting, diarrhea, constipation, hematochezia, melena, urgency, dysuria, hematuria, headaches, changes in vision, dizziness, numbness, tingling. No other complaints at this time.    ED course:  Vital Signs T(F):  HR:98F  BP: 165/66  RR: 19  SpO2: 99 % on RA  Labs significant for: K+ 3.3, BUN 27, Cr 2.00, AlkP 959, , eGFR 23, pro-BNP 6542  UA: 30 protein, small LE, 25 wbc, mod bacteria  EKG: Sinus bradycardia with 1st degree AV block with premature supraventricular complexes, 51 bpm, QT/QTc 494/455 ms  In ED given,  x1 Rocephin, x 1 Lasix,     Imaging  CXR: negative  CT a/p: Diffuse gallbladder wall thickening/edema  RUQ US: Irregular gallbladder wall thickening to 1.2 cm increased from prior   study. Cholelithiasis. Consider acute or chronic cholecystitis. Prominent pancreas, incompletely assessed, which can also be characterized with MRI.  US duplex: No evidence of deep venous thrombosis in either lower extremity.     (16 Apr 2025 18:04)    INTERVAL HPI:  4/17: Pt seen and examined at bedside. Pt feels well -- offers no complaints. Denies any abdominal pain, nausea, vomiting. Was seen and evaluated by surgery -- no acute surgical intervention indicated at this time. Started on IV zosyn this AM. Mild anemia, Cr improved  4/18: Seen and examined at bedside. Patient states that her B/L LE edema has improved, with continued urinary urgency. Tolerating diet, BM x1 this morning. Denies fatigue, lightheadedness, dizziness, vision changes, chest pain, palpitations, abdominal pain, n/v/d/c. add  BP meds added   4/19: Seen and examined at bedside. Patient states that she continues to experience urinary frequency. No other acute concerns or complains. UCx with ESBL Ecoli. Up titrated Hydralazine to 50mg TID.Start  IV Invanz daily  4/20-Pt seen, examined, BP improving, Low stable H/H, MGUS ,On IV Invanz , d/c plan in AM   4/21: Pt seen and examined at bedside this morning, reports feeling well. Elevated BP, low stable H/H in setting on MGUS, last dose Invanz today    Home Medications:  aspirin 81 mg oral delayed release tablet: 1 tab(s) orally every other day (16 Apr 2025 19:38)  atorvastatin 40 mg oral tablet: 1 tab(s) orally once a day (at bedtime) (16 Apr 2025 19:38)  famotidine 20 mg oral tablet: 1 tab(s) orally once a day (16 Apr 2025 19:38)  ferrous sulfate 324 mg (65 mg elemental iron) oral tablet: 1 tab(s) orally once a day (16 Apr 2025 19:38)  furosemide 20 mg oral tablet: 1 tab(s) orally once a day (16 Apr 2025 19:38)  Januvia 25 mg oral tablet: 1 tab(s) orally once a day (16 Apr 2025 19:38)  losartan 50 mg oral tablet: 1 tab(s) orally once a day (16 Apr 2025 19:38)  metoprolol succinate 100 mg oral capsule, extended release: 1 cap(s) orally once a day (16 Apr 2025 19:37)      MEDICATIONS  (STANDING):  dextrose 5%. 1000 milliLiter(s) (50 mL/Hr) IV Continuous <Continuous>  dextrose 5%. 1000 milliLiter(s) (100 mL/Hr) IV Continuous <Continuous>  dextrose 50% Injectable 25 Gram(s) IV Push once  dextrose 50% Injectable 12.5 Gram(s) IV Push once  dextrose 50% Injectable 25 Gram(s) IV Push once  ertapenem  IVPB      ertapenem  IVPB 500 milliGRAM(s) IV Intermittent every 24 hours  famotidine    Tablet 20 milliGRAM(s) Oral daily  ferrous    sulfate 325 milliGRAM(s) Oral daily  glucagon  Injectable 1 milliGRAM(s) IntraMuscular once  heparin   Injectable 5000 Unit(s) SubCutaneous every 12 hours  hydrALAZINE 50 milliGRAM(s) Oral every 8 hours  insulin lispro (ADMELOG) corrective regimen sliding scale   SubCutaneous three times a day before meals  insulin lispro (ADMELOG) corrective regimen sliding scale   SubCutaneous at bedtime  losartan 50 milliGRAM(s) Oral two times a day  metoprolol succinate  milliGRAM(s) Oral daily    MEDICATIONS  (PRN):  dextrose Oral Gel 15 Gram(s) Oral once PRN Blood Glucose LESS THAN 70 milliGRAM(s)/deciliter      amlodipine (Fl[place])      Social History:  Tobacco: None  ETOH: None  Recreational/Illicit Drugs: None  Lives with: child, from [facility]  Ambulation: with walker   ADLs: Dependent (16 Apr 2025 18:04)      REVIEW OF SYSTEMS:  CONSTITUTIONAL: No fever, No chills, No fatigue, No myalgia, No Body ache, No Weakness  EYES: No eye pain,  No visual disturbances, No discharge, No Redness  ENMT: No ear pain, No nose bleed, No vertigo; No sinus pain, No throat pain, No Congestion  NECK: No pain, No stiffness  RESPIRATORY: No cough, No wheezing, No hemoptysis, No shortness of breath  CARDIOVASCULAR: No chest pain, No palpitations  GASTROINTESTINAL: No abdominal pain, No epigastric pain. No nausea, No vomiting, No diarrhea, No constipation; [  ] BM  GENITOURINARY: No dysuria, No frequency, No urgency, No hematuria, No incontinence  NEUROLOGICAL: No headaches, No dizziness, No numbness, No tingling, No tremors, No weakness  EXTREMITIES: No Swelling, No Pain, No Edema  SKIN: [x  ] No itching, burning, rashes, or lesions   MUSCULOSKELETAL: No joint pain, No joint swelling; No muscle pain, No back pain, No extremity pain  PSYCHIATRIC: No depression, No anxiety, No mood swings, No difficulty sleeping at night  PAIN SCALE: [ x ] None  [  ] Other-  ROS Unable to obtain due to: [  ] Dementia  [  ] Lethargy  [  ] Sedated  [  ] Non verbal  REST OF REVIEW OF SYSTEMS: [  ] Normal     Vital Signs Last 24 Hrs  T(C): 36.7 (21 Apr 2025 04:35), Max: 36.7 (20 Apr 2025 11:33)  T(F): 98.1 (21 Apr 2025 04:35), Max: 98.1 (20 Apr 2025 11:33)  HR: 60 (21 Apr 2025 04:35) (60 - 68)  BP: 185/73 (21 Apr 2025 04:35) (154/75 - 188/61)  BP(mean): --  RR: 18 (21 Apr 2025 04:35) (18 - 18)  SpO2: 97% (21 Apr 2025 04:35) (97% - 97%)    Parameters below as of 21 Apr 2025 04:35  Patient On (Oxygen Delivery Method): room air        CAPILLARY BLOOD GLUCOSE      POCT Blood Glucose.: 119 mg/dL (21 Apr 2025 07:49)  POCT Blood Glucose.: 133 mg/dL (20 Apr 2025 21:48)  POCT Blood Glucose.: 214 mg/dL (20 Apr 2025 16:40)  POCT Blood Glucose.: 141 mg/dL (20 Apr 2025 12:00)      I&O's Summary    PHYSICAL EXAM:  GENERAL:  [ x ] NAD, [ x ] Well appearing, [  ] Agitated, [  ] Drowsy, [  ] Lethargy, [  ] Confused   HEAD:  [ x ] Normal, [  ] Other  EYES:  [x  ] EOMI, [x  ] PERRLA, [ x ] Conjunctiva and sclera clear normal, [  ] Other, [  ] Pallor, [  ] Discharge  ENMT:  [ x ] Normal, [x  ] Moist mucous membranes, [  ] Good dentition, [  ] No thrush  NECK:  [x  ] Supple, [ x ] No JVD, [  ] Normal thyroid, [  ] Lymphadenopathy, [  ] Other  CHEST/LUNG:  [ x ] Clear to auscultation bilaterally, [  ] Breath Sounds equal B/L / decreased, [  ] Poor effort, [ x ] No rales, [ x ] No rhonchi, [ x ] No wheezing  HEART:  [ x ] Regular rate and rhythm, [  ] Tachycardia, [  ] Bradycardia, [  ] Irregular, [x  ] No murmurs, No rubs, No gallops, [  ] PPM in place (Mfr:  )  ABDOMEN:  [ x ] Soft, [ x ] Nontender, [x  ] Nondistended, [  ] No mass, [  ] Bowel sounds present, [  ] Obese  NERVOUS SYSTEM:  [ x ] Alert & Oriented x3, [ x ] Nonfocal, [  ] Confusion, [  ] Encephalopathic, [  ] Sedated, [  ] Unable to assess, [  ] Dementia, [  ] Other-  EXTREMITIES:  [ x ] 2+ Peripheral Pulses, No clubbing, No cyanosis,  [  ] 1+ Edema RLE, [  ] PVD stasis skin changes B/L lower EXT, [  ] Wound  LYMPH:  No lymphadenopathy noted  SKIN:  [ x ] No rashes or lesions, [  ] Pressure ulcers, [  ] Ecchymosis, [  ] Skin tears, [  ] Other    DIET: Diet, Consistent Carbohydrate w/Evening Snack (04-17-25 @ 11:21)      LABS:                        8.4    7.91  )-----------( 221      ( 21 Apr 2025 06:25 )             25.7     21 Apr 2025 06:25    137    |  104    |  19     ----------------------------<  120    3.9     |  27     |  1.80     Ca    9.4        21 Apr 2025 06:25    TPro  6.7    /  Alb  2.1    /  TBili  0.5    /  DBili  x      /  AST  50     /  ALT  28     /  AlkPhos  825    21 Apr 2025 06:25      Urinalysis Basic - ( 21 Apr 2025 06:25 )    Color: x / Appearance: x / SG: x / pH: x  Gluc: 120 mg/dL / Ketone: x  / Bili: x / Urobili: x   Blood: x / Protein: x / Nitrite: x   Leuk Esterase: x / RBC: x / WBC x   Sq Epi: x / Non Sq Epi: x / Bacteria: x      Culture Results:   >100,000 CFU/ml Escherichia coli ESBL  <10,000 CFU/ml Normal Urogenital husam present (04-16 @ 11:59)      CARDIAC MARKERS ( 16 Apr 2025 10:40 )  x     / x     / x     / x     / <1.0 ng/mL        Urinalysis with Rflx Culture (collected 16 Apr 2025 11:59)    Culture - Urine (collected 16 Apr 2025 11:59)  Source: Clean Catch  Final Report (18 Apr 2025 22:21):    >100,000 CFU/ml Escherichia coli ESBL    <10,000 CFU/ml Normal Urogenital husam present  Organism: Escherichia coli ESBL (18 Apr 2025 22:21)  Organism: Escherichia coli ESBL (18 Apr 2025 22:21)       Anemia Panel:  Vitamin B12, Serum: 915 pg/mL (04-17-25 @ 06:26)  Folate, Serum: 10.6 ng/mL (04-17-25 @ 06:26)  Ferritin: 1492 ng/mL (04-17-25 @ 06:26)  Iron Total: 29 ug/dL (04-17-25 @ 06:26)  Iron - Total Binding Capacity.: 184 ug/dL (04-17-25 @ 06:26)      Thyroid Panel:  T4, Serum: 8.1 ug/dL (04-17-25 @ 06:26)  Thyroid Stimulating Hormone, Serum: 4.40 uIU/mL (04-17-25 @ 06:26)            Serum Protein Electrophoresis Interp: Weak Gamma-Migrating Paraprotein Identified (04-17-25 @ 13:57)  Immunofixation, Serum:   Weak IgG Lambda Band Identified      Reference Range: None Detected (04-17-25 @ 13:57)      RADIOLOGY & ADDITIONAL TESTS:      HEALTH ISSUES - PROBLEM Dx:  Acute cholecystitis    UTI (urinary tract infection)    Chronic kidney disease    History of TIAs    Hypertension    T2DM (type 2 diabetes mellitus)    HLD (hyperlipidemia)    Need for prophylactic measure    Leg edema    Anemia of chronic disease          Consultant(s) Notes Reviewed:  [ x ] YES     Care Discussed with [ x ] Consultants, [ x ] Patient, [  ] Family, [  ] HCP, [  ] , [  ] Social Service, [  ] RN, [  ] Physical Therapy, [  ] Palliative Care Team  DVT PPX: [  ] Lovenox, [ x ] SC Heparin, [  ] Coumadin, [  ] Xarelto, [  ] Eliquis, [  ] Pradaxa, [  ] IV Heparin drip, [  ] SCD, [  ] Ambulation, [  ] Contraindicated 2/2 GI Bleed, [  ] Contraindicated 2/2  Bleed, [  ] Contraindicated 2/2 Brain Bleed  Advanced Directive: [  ] None, [  ] DNR/DNI Patient is a 89y old  Female who presents with a chief complaint of Acute pino (21 Apr 2025 10:28)    HPI:  89yo F w/ PMH of HTN, HLD, T2DM, TIA, CKD III, Hx of R hip CRPP in 2016, compression Fx spine ,anemia  presents to the ED with her daughter for bilateral leg swelling times few days and fatigue for the past few days. Spoke to daughter via phone who reports that patient has a history of UTIs. Second daughter at bedside with the patient reports that she was having urinary frequency. Wanted to bring her in for evaluation and states that the legs have been bothering her for the past few days as well. Patient is from [place] and is compliant with medications. Was recently treated for UTI with abx.     Of note, patient was admitted 3/9-3/13 for management of weakness likely 2/2 UTI and sacral pain. UA was neg, no leukocytosis and was monitored off abx. Course was complicated by hypertensive urgency and bradycardia which was treated with BP med changes.     Denies fever, chills, chest pain, palpitations, SOB, cough, abdominal pain, nausea, vomiting, diarrhea, constipation, hematochezia, melena, urgency, dysuria, hematuria, headaches, changes in vision, dizziness, numbness, tingling. No other complaints at this time.    ED course:  Vital Signs T(F):  HR:98F  BP: 165/66  RR: 19  SpO2: 99 % on RA  Labs significant for: K+ 3.3, BUN 27, Cr 2.00, AlkP 959, , eGFR 23, pro-BNP 6542  UA: 30 protein, small LE, 25 wbc, mod bacteria  EKG: Sinus bradycardia with 1st degree AV block with premature supraventricular complexes, 51 bpm, QT/QTc 494/455 ms  In ED given,  x1 Rocephin, x 1 Lasix,     Imaging  CXR: negative  CT a/p: Diffuse gallbladder wall thickening/edema  RUQ US: Irregular gallbladder wall thickening to 1.2 cm increased from prior   study. Cholelithiasis. Consider acute or chronic cholecystitis. Prominent pancreas, incompletely assessed, which can also be characterized with MRI.  US duplex: No evidence of deep venous thrombosis in either lower extremity.     (16 Apr 2025 18:04)    INTERVAL HPI:  4/17: Pt seen and examined at bedside. Pt feels well -- offers no complaints. Denies any abdominal pain, nausea, vomiting. Was seen and evaluated by surgery -- no acute surgical intervention indicated at this time. Started on IV zosyn this AM. Mild anemia, Cr improved  4/18: Seen and examined at bedside. Patient states that her B/L LE edema has improved, with continued urinary urgency. Tolerating diet, BM x1 this morning. Denies fatigue, lightheadedness, dizziness, vision changes, chest pain, palpitations, abdominal pain, n/v/d/c. add  BP meds added   4/19: Seen and examined at bedside. Patient states that she continues to experience urinary frequency. No other acute concerns or complains. UCx with ESBL Ecoli. Up titrated Hydralazine to 50mg TID.Start  IV Invanz daily  4/20-Pt seen, examined, BP improving, Low stable H/H, MGUS ,On IV Invanz , d/c plan in AM   4/21: Pt seen and examined at bedside this morning, reports feeling well. Elevated BP, low stable H/H in setting on MGUS, last dose Invanz today    Home Medications:  aspirin 81 mg oral delayed release tablet: 1 tab(s) orally every other day (16 Apr 2025 19:38)  atorvastatin 40 mg oral tablet: 1 tab(s) orally once a day (at bedtime) (16 Apr 2025 19:38)  famotidine 20 mg oral tablet: 1 tab(s) orally once a day (16 Apr 2025 19:38)  ferrous sulfate 324 mg (65 mg elemental iron) oral tablet: 1 tab(s) orally once a day (16 Apr 2025 19:38)  furosemide 20 mg oral tablet: 1 tab(s) orally once a day (16 Apr 2025 19:38)  Januvia 25 mg oral tablet: 1 tab(s) orally once a day (16 Apr 2025 19:38)  losartan 50 mg oral tablet: 1 tab(s) orally once a day (16 Apr 2025 19:38)  metoprolol succinate 100 mg oral capsule, extended release: 1 cap(s) orally once a day (16 Apr 2025 19:37)      MEDICATIONS  (STANDING):  dextrose 5%. 1000 milliLiter(s) (50 mL/Hr) IV Continuous <Continuous>  dextrose 5%. 1000 milliLiter(s) (100 mL/Hr) IV Continuous <Continuous>  dextrose 50% Injectable 25 Gram(s) IV Push once  dextrose 50% Injectable 12.5 Gram(s) IV Push once  dextrose 50% Injectable 25 Gram(s) IV Push once  ertapenem  IVPB      ertapenem  IVPB 500 milliGRAM(s) IV Intermittent every 24 hours  famotidine    Tablet 20 milliGRAM(s) Oral daily  ferrous    sulfate 325 milliGRAM(s) Oral daily  glucagon  Injectable 1 milliGRAM(s) IntraMuscular once  heparin   Injectable 5000 Unit(s) SubCutaneous every 12 hours  hydrALAZINE 50 milliGRAM(s) Oral every 8 hours  insulin lispro (ADMELOG) corrective regimen sliding scale   SubCutaneous three times a day before meals  insulin lispro (ADMELOG) corrective regimen sliding scale   SubCutaneous at bedtime  losartan 50 milliGRAM(s) Oral two times a day  metoprolol succinate  milliGRAM(s) Oral daily    MEDICATIONS  (PRN):  dextrose Oral Gel 15 Gram(s) Oral once PRN Blood Glucose LESS THAN 70 milliGRAM(s)/deciliter      amlodipine (Flushing)      Social History:  Tobacco: None  ETOH: None  Recreational/Illicit Drugs: None  Lives with: child, from [facility]  Ambulation: with walker   ADLs: Dependent (16 Apr 2025 18:04)      REVIEW OF SYSTEMS: i am OK  CONSTITUTIONAL: No fever, No chills, No fatigue, No myalgia, No Body ache, No Weakness  EYES: No eye pain,  No visual disturbances, No discharge, No Redness  ENMT: No ear pain, No nose bleed, No vertigo; No sinus pain, No throat pain, No Congestion  NECK: No pain, No stiffness  RESPIRATORY: No cough, No wheezing, No hemoptysis, No shortness of breath  CARDIOVASCULAR: No chest pain, No palpitations  GASTROINTESTINAL: No abdominal pain, No epigastric pain. No nausea, No vomiting, No diarrhea, No constipation; [  ] BM  GENITOURINARY: No dysuria, No frequency, No urgency, No hematuria, No incontinence  NEUROLOGICAL: No headaches, No dizziness, No numbness, No tingling, No tremors, No weakness  EXTREMITIES: No Swelling, No Pain, No Edema  SKIN: [x  ] No itching, burning, rashes, or lesions   MUSCULOSKELETAL: No joint pain, No joint swelling; No muscle pain, No back pain, No extremity pain  PSYCHIATRIC: No depression, No anxiety, No mood swings, No difficulty sleeping at night  PAIN SCALE: [ x ] None  [  ] Other-  ROS Unable to obtain due to: [  ] Dementia  [  ] Lethargy  [  ] Sedated  [  ] Non verbal  REST OF REVIEW OF SYSTEMS: [  ] Normal     Vital Signs Last 24 Hrs  T(C): 36.7 (21 Apr 2025 04:35), Max: 36.7 (20 Apr 2025 11:33)  T(F): 98.1 (21 Apr 2025 04:35), Max: 98.1 (20 Apr 2025 11:33)  HR: 60 (21 Apr 2025 04:35) (60 - 68)  BP: 185/73 (21 Apr 2025 04:35) (154/75 - 188/61)  BP(mean): --  RR: 18 (21 Apr 2025 04:35) (18 - 18)  SpO2: 97% (21 Apr 2025 04:35) (97% - 97%)    Parameters below as of 21 Apr 2025 04:35  Patient On (Oxygen Delivery Method): room air        CAPILLARY BLOOD GLUCOSE      POCT Blood Glucose.: 119 mg/dL (21 Apr 2025 07:49)  POCT Blood Glucose.: 133 mg/dL (20 Apr 2025 21:48)  POCT Blood Glucose.: 214 mg/dL (20 Apr 2025 16:40)  POCT Blood Glucose.: 141 mg/dL (20 Apr 2025 12:00)      I&O's Summary    PHYSICAL EXAM:  GENERAL:  [ x ] NAD, [ x ] Well appearing, [  ] Agitated, [  ] Drowsy, [  ] Lethargy, [  ] Confused   HEAD:  [ x ] Normal, [  ] Other  EYES:  [x  ] EOMI, [x  ] PERRLA, [ x ] Conjunctiva and sclera clear normal, [  ] Other, [ x ] Pallor, [  ] Discharge  ENMT:  [ x ] Normal, [x  ] Moist mucous membranes, [  ] Good dentition, [ x ] No thrush  NECK:  [x  ] Supple, [ x ] No JVD, [ x ] Normal thyroid, [  ] Lymphadenopathy, [  ] Other  CHEST/LUNG:  [ x ] Clear to auscultation bilaterally, [x ] Breath Sounds equal B/L / decreased, [x  ] Poor effort, [ x ] No rales, [ x ] No rhonchi, [ x ] No wheezing  HEART:  [ x ] Regular rate and rhythm, [  ] Tachycardia, [  ] Bradycardia, [  ] Irregular, [x  ] No murmurs, No rubs, No gallops, [  ] PPM in place (Mfr:  )  ABDOMEN:  [ x ] Soft, [ x ] Nontender, [x  ] Nondistended, [  x] No mass, [ x ] Bowel sounds present, [ x Obese  NERVOUS SYSTEM:  [ x ] Alert & Oriented x3, [ x ] Nonfocal, [  ] Confusion, [  ] Encephalopathic, [  ] Sedated, [  ] Unable to assess, [  ] Dementia, [  ] Other-  EXTREMITIES:  [ x ] 2+ Peripheral Pulses, No clubbing, No cyanosis,  [  ] 1+ Edema RLE, [  ] PVD stasis skin changes B/L lower EXT, [  ] Wound  LYMPH:  No lymphadenopathy noted  SKIN:  [ x ] No rashes or lesions, [  ] Pressure ulcers, [  ] Ecchymosis, [  ] Skin tears, [  ] Other    DIET: Diet, Consistent Carbohydrate w/Evening Snack (04-17-25 @ 11:21)      LABS:                        8.4    7.91  )-----------( 221      ( 21 Apr 2025 06:25 )             25.7     21 Apr 2025 06:25    137    |  104    |  19     ----------------------------<  120    3.9     |  27     |  1.80     Ca    9.4        21 Apr 2025 06:25    TPro  6.7    /  Alb  2.1    /  TBili  0.5    /  DBili  x      /  AST  50     /  ALT  28     /  AlkPhos  825    21 Apr 2025 06:25      Urinalysis Basic - ( 21 Apr 2025 06:25 )    Color: x / Appearance: x / SG: x / pH: x  Gluc: 120 mg/dL / Ketone: x  / Bili: x / Urobili: x   Blood: x / Protein: x / Nitrite: x   Leuk Esterase: x / RBC: x / WBC x   Sq Epi: x / Non Sq Epi: x / Bacteria: x      Culture Results:   >100,000 CFU/ml Escherichia coli ESBL  <10,000 CFU/ml Normal Urogenital husam present (04-16 @ 11:59)      CARDIAC MARKERS ( 16 Apr 2025 10:40 )  x     / x     / x     / x     / <1.0 ng/mL        Urinalysis with Rflx Culture (collected 16 Apr 2025 11:59)    Culture - Urine (collected 16 Apr 2025 11:59)  Source: Clean Catch  Final Report (18 Apr 2025 22:21):    >100,000 CFU/ml Escherichia coli ESBL    <10,000 CFU/ml Normal Urogenital husam present  Organism: Escherichia coli ESBL (18 Apr 2025 22:21)  Organism: Escherichia coli ESBL (18 Apr 2025 22:21)       Anemia Panel:  Vitamin B12, Serum: 915 pg/mL (04-17-25 @ 06:26)  Folate, Serum: 10.6 ng/mL (04-17-25 @ 06:26)  Ferritin: 1492 ng/mL (04-17-25 @ 06:26)  Iron Total: 29 ug/dL (04-17-25 @ 06:26)  Iron - Total Binding Capacity.: 184 ug/dL (04-17-25 @ 06:26)      Thyroid Panel:  T4, Serum: 8.1 ug/dL (04-17-25 @ 06:26)  Thyroid Stimulating Hormone, Serum: 4.40 uIU/mL (04-17-25 @ 06:26)            Serum Protein Electrophoresis Interp: Weak Gamma-Migrating Paraprotein Identified (04-17-25 @ 13:57)  Immunofixation, Serum:   Weak IgG Lambda Band Identified      Reference Range: None Detected (04-17-25 @ 13:57)      RADIOLOGY & ADDITIONAL TESTS:      HEALTH ISSUES - PROBLEM Dx:  Acute cholecystitis    UTI (urinary tract infection)    Chronic kidney disease    History of TIAs    Hypertension    T2DM (type 2 diabetes mellitus)    HLD (hyperlipidemia)    Need for prophylactic measure    Leg edema    Anemia of chronic disease          Consultant(s) Notes Reviewed:  [ x ] YES     Care Discussed with [ x ] Consultants, [ x ] Patient, [x  ] Family, [  ] HCP, [ x ] , [  ] Social Service, [x  ] RN, [  ] Physical Therapy, [  ] Palliative Care Team  DVT PPX: [  ] Lovenox, [ x ] SC Heparin, [  ] Coumadin, [  ] Xarelto, [  ] Eliquis, [  ] Pradaxa, [  ] IV Heparin drip, [  ] SCD, [  ] Ambulation, [  ] Contraindicated 2/2 GI Bleed, [  ] Contraindicated 2/2  Bleed, [  ] Contraindicated 2/2 Brain Bleed  Advanced Directive: [ x ] None, [  ] DNR/DNI

## 2025-04-21 NOTE — PROGRESS NOTE ADULT - SUBJECTIVE AND OBJECTIVE BOX
[facility]  HERNESTO Ramirez Y. Patel, S. Shah, G. [facility]  [phone]    Name: NELDA VERDIN  Age: 89y  Gender: Female  MRN: [id number]    Interval History:  No acute overnight events.   Notes reviewed    Antibiotics:  ertapenem  IVPB      ertapenem  IVPB 500 milliGRAM(s) IV Intermittent every 24 hours      Medications:  dextrose 5%. 1000 milliLiter(s) IV Continuous <Continuous>  dextrose 5%. 1000 milliLiter(s) IV Continuous <Continuous>  dextrose 50% Injectable 25 Gram(s) IV Push once  dextrose 50% Injectable 12.5 Gram(s) IV Push once  dextrose 50% Injectable 25 Gram(s) IV Push once  dextrose Oral Gel 15 Gram(s) Oral once PRN  ertapenem  IVPB      ertapenem  IVPB 500 milliGRAM(s) IV Intermittent every 24 hours  famotidine    Tablet 20 milliGRAM(s) Oral daily  ferrous    sulfate 325 milliGRAM(s) Oral daily  glucagon  Injectable 1 milliGRAM(s) IntraMuscular once  heparin   Injectable 5000 Unit(s) SubCutaneous every 12 hours  hydrALAZINE 50 milliGRAM(s) Oral every 8 hours  insulin lispro (ADMELOG) corrective regimen sliding scale   SubCutaneous three times a day before meals  insulin lispro (ADMELOG) corrective regimen sliding scale   SubCutaneous at bedtime  losartan 50 milliGRAM(s) Oral two times a day  metoprolol succinate  milliGRAM(s) Oral daily      Review of Systems:  A 10-point review of systems was obtained.   Review of systems otherwise negative except as previously noted.    Allergies: amlodipine (Flushing)    For details regarding the patient's past medical history, social history, family history, and other miscellaneous elements, please refer the initial infectious diseases consultation and/or the admitting history and physical examination for this admission.    Objective:  Vitals:   T(C): 36.7 (04-21-25 @ 04:35), Max: 36.7 (04-21-25 @ 04:35)  HR: 60 (04-21-25 @ 04:35) (60 - 68)  BP: 185/73 (04-21-25 @ 04:35) (154/75 - 188/61)  RR: 18 (04-21-25 @ 04:35) (18 - 18)  SpO2: 97% (04-21-25 @ 04:35) (97% - 97%)    Physical Examination:  General: no acute distress  HEENT: NC/AT, EOMI  Cardio: RRR  Resp: decreased breath sounds  Abd: soft, NT, ND  Ext: no edema or cyanosis  Skin: warm, dry, no visible rash      Laboratory Studies:  CBC:                       8.4    7.91  )-----------( 221      ( 21 Apr 2025 06:25 )             25.7     CMP: 04-21    137  |  104  |  19  ----------------------------<  120[H]  3.9   |  27  |  1.80[H]    Ca    9.4      21 Apr 2025 06:25  Phos  3.3     04-20  Mg     2.1     04-20    TPro  6.7  /  Alb  2.1[L]  /  TBili  0.5  /  DBili  x   /  AST  50[H]  /  ALT  28  /  AlkPhos  825[H]  04-21    LIVER FUNCTIONS - ( 21 Apr 2025 06:25 )  Alb: 2.1 g/dL / Pro: 6.7 g/dL / ALK PHOS: 825 U/L / ALT: 28 U/L / AST: 50 U/L / GGT: x           Urinalysis Basic - ( 21 Apr 2025 06:25 )    Color: x / Appearance: x / SG: x / pH: x  Gluc: 120 mg/dL / Ketone: x  / Bili: x / Urobili: x   Blood: x / Protein: x / Nitrite: x   Leuk Esterase: x / RBC: x / WBC x   Sq Epi: x / Non Sq Epi: x / Bacteria: x        Microbiology: reviewed    Urinalysis with Rflx Culture (collected 04-16-25 @ 11:59)    Culture - Urine (collected 04-16-25 @ 11:59)  Source: Clean Catch  Final Report (04-18-25 @ 22:21):    >100,000 CFU/ml Escherichia coli ESBL    <10,000 CFU/ml Normal Urogenital husam present  Organism: Escherichia coli ESBL (04-18-25 @ 22:21)  Organism: Escherichia coli ESBL (04-18-25 @ 22:21)      -  Levofloxacin: R >4      -  Tobramycin: S <=2      -  Nitrofurantoin: S <=32 Should not be used to treat pyelonephritis      -  Aztreonam: R >16      -  Gentamicin: S <=2      -  Cefazolin: R >16 For uncomplicated UTI with K. pneumoniae, E. coli, or P. mirablis: ANNA MARIE <=16 is sensitive and ANNA MARIE >=32 is resistant. This also predicts results for oral agents cefaclor, cefdinir, cefpodoxime, cefprozil, cefuroxime axetil, cephalexin and locarbef for uncomplicated UTI. Note that some isolates may be susceptible to these agents while testing resistant to cefazolin.      -  Cefepime: R >16      -  Piperacillin/Tazobactam: S <=8      -  Ciprofloxacin: R >2      -  Imipenem: S <=1      -  Ceftriaxone: R >32      -  Ampicillin: R >16 These ampicillin results predict results for amoxicillin      Method Type: ANNA MARIE      -  Meropenem: S <=1      -  Ampicillin/Sulbactam: S 8/4      -  Cefuroxime: R >16      -  Trimethoprim/Sulfamethoxazole: R >2/38      -  Ertapenem: S <=0.5          Radiology: reviewed

## 2025-04-21 NOTE — PROGRESS NOTE ADULT - ASSESSMENT
Transaminitis  Bilateral leg swelling  Acute cholecystitis    4/16 CT AP: Diffuse gallbladder wall thickening/edema.         RUQ US: Irregular gallbladder wall thickening to 1.2 cm increased from prior study. Cholelithiasis. Consider acute or chronic cholecystitis. Limited evaluation for underlying gallbladder mass.           HIDA: There is prompt, homogeneous uptake of radiopharmaceutical by the hepatocytes. Activity is first seen in the bowel at about 15 minutes. The gallbladder is not visualized at any time during the study. Findings are compatible with acute cholecystitis.    Plan:  - CT AP & RUQ US noted and discussed with patient   - Suspect transaminitis likely congestive vs reactive from cholecystitis  - LFTs overall downtrending, continue to trend CMP daily  - Avoid hepatoxic agents  - Surgery eval noted, per their note no acute surgical intervention/IR drainage at this time   - Antibiotics as per ID, to be completed on 4/21 per their note  - No contraindication from GI standpoint for discharge planning  - To follow

## 2025-04-21 NOTE — CASE MANAGEMENT PROGRESS NOTE - NSCMPROGRESSNOTE_GEN_ALL_CORE
Patient is for possible discharge tomorrow.  [facility] Miriam [phone] ext [phone] for resumption of care. She said the aide will be at [facility] by 3pm tomorrow. Patient's daughter at bedside informed. Attestation form faxed to the [facility].

## 2025-04-21 NOTE — PROGRESS NOTE ADULT - PROBLEM SELECTOR PLAN 2
- Patient has a history of frequent UTIs and has been having urinary urgency of the past few days. Was treated recently for UTI with abx.   - UA: 30 protein, small LE, 25 wbc, mod bacteria  - UCx with ESBL Ecoli   - Completed course of IV abx Invanz today

## 2025-04-21 NOTE — PROGRESS NOTE ADULT - SUBJECTIVE AND OBJECTIVE BOX
89yo F w/ PMH of HTN, HLD, T2DM, TIA, CKD III, Hx of R hip CRPP in 2016 presents to the ED with her daughter for bilateral leg swelling times few days and frequent urination.  Patient has a history of frequent UTIs her daughter was concerned for UTI brought her in for evaluation.                               [facility]                             Nephrology and Hypertension                             Marcin Kaplan                                          [phone]     Patient is a 89y old  Female who presents with a chief complaint of Acute pino (16 Apr 2025 18:04)       HPI:  89yo F w/ PMH of HTN, HLD, T2DM, TIA, CKD III, Hx of R hip CRPP in 2016 presents to the ED with her daughter for bilateral leg swelling times few days and fatigue for the past few days. Spoke to daughter via phone who reports that patient has a history of UTIs. Second daughter at bedside with the patient reports that she was having urinary frequency. Wanted to bring her in for evaluation and states that the legs have been bothering her for the past few days as well. Patient is from [place] and is compliant with medications. Was recently treated for UTI with abx.   Of note, patient was admitted 3/9-3/13 for management of weakness likely 2/2 UTI and sacral pain. UA was neg, no leukocytosis and was monitored off abx. Course was complicated by hypertensive urgency and bradycardia which was treated with BP med changes.   Denies fever, chills, chest pain, palpitations, SOB, cough, abdominal pain, nausea, vomiting, diarrhea, constipation, hematochezia, melena, urgency, dysuria, hematuria, headaches, changes in vision, dizziness, numbness, tingling. No other complaints at this time.  Renal Consulted for ELVIN on CKD.  She is urinating well.  No N/V/SOB.  Has not seen nephrologist prior.     Is now off ivf, is without any new issues, no HA, no sob.     PAST MEDICAL & SURGICAL HISTORY:  Hypertension      Diabetes      TIA (transient ischemic attack)      Hip fracture      HLD (hyperlipidemia)      Stage 3 chronic kidney disease      Anemia of chronic disease      Lumbar compression fracture      S/P ORIF (open reduction internal fixation) fracture  right hip           FAMILY HISTORY:  FH: asthma (Father)    NC    Social History:Non smoker    MEDICATIONS  (STANDING):  aspirin enteric coated 81 milliGRAM(s) Oral daily  atorvastatin 40 milliGRAM(s) Oral at bedtime  dextrose 5%. 1000 milliLiter(s) (50 mL/Hr) IV Continuous <Continuous>  dextrose 5%. 1000 milliLiter(s) (100 mL/Hr) IV Continuous <Continuous>  dextrose 50% Injectable 25 Gram(s) IV Push once  dextrose 50% Injectable 12.5 Gram(s) IV Push once  dextrose 50% Injectable 25 Gram(s) IV Push once  famotidine    Tablet 20 milliGRAM(s) Oral daily  ferrous    sulfate 325 milliGRAM(s) Oral daily  furosemide    Tablet 20 milliGRAM(s) Oral daily  glucagon  Injectable 1 milliGRAM(s) IntraMuscular once  heparin   Injectable 5000 Unit(s) SubCutaneous once  insulin lispro (ADMELOG) corrective regimen sliding scale   SubCutaneous every 6 hours  metoprolol succinate  milliGRAM(s) Oral daily  potassium chloride    Tablet ER 40 milliEquivalent(s) Oral every 4 hours  sodium chloride 0.9% with potassium chloride 20 mEq/L 1000 milliLiter(s) (75 mL/Hr) IV Continuous <Continuous>    MEDICATIONS  (PRN):  dextrose Oral Gel 15 Gram(s) Oral once PRN Blood Glucose LESS THAN 70 milliGRAM(s)/deciliter   Meds reviewed    Allergies    No Known Allergies    Intolerances         REVIEW OF SYSTEMS:    as above    ICU Vital Signs Last 24 Hrs  T(C): 36.7 (21 Apr 2025 04:35), Max: 36.7 (21 Apr 2025 04:35)  T(F): 98.1 (21 Apr 2025 04:35), Max: 98.1 (21 Apr 2025 04:35)  HR: 60 (21 Apr 2025 04:35) (60 - 68)  BP: 185/73 (21 Apr 2025 04:35) (154/75 - 188/61)  BP(mean): --  ABP: --  ABP(mean): --  RR: 18 (21 Apr 2025 04:35) (18 - 18)  SpO2: 97% (21 Apr 2025 04:35) (97% - 97%)    O2 Parameters below as of 21 Apr 2025 04:35  Patient On (Oxygen Delivery Method): room air              PHYSICAL EXAM:    GENERAL: NAD  HEAD:  Atraumatic, Normocephalic  EYES: EOMI, conjunctiva and sclera clear  ENMT: No Drainage from nares, No drainage from ears  NERVOUS SYSTEM:  Awake and Alert  EXTREMITIES:  trace Edema Le, ankle edema   SKIN: No rashes No obvious ecchymosis          LABS:                                     8.4    7.91  )-----------( 221      ( 21 Apr 2025 06:25 )             25.7     04-21    137  |  104  |  19  ----------------------------<  120[H]  3.9   |  27  |  1.80[H]    Ca    9.4      21 Apr 2025 06:25  Phos  3.3     04-20  Mg     2.1     04-20    TPro  6.7  /  Alb  2.1[L]  /  TBili  0.5  /  DBili  x   /  AST  50[H]  /  ALT  28  /  AlkPhos  825[H]  04-21      Urinalysis Basic - ( 21 Apr 2025 06:25 )    Color: x / Appearance: x / SG: x / pH: x  Gluc: 120 mg/dL / Ketone: x  / Bili: x / Urobili: x   Blood: x / Protein: x / Nitrite: x   Leuk Esterase: x / RBC: x / WBC x   Sq Epi: x / Non Sq Epi: x / Bacteria: x

## 2025-04-21 NOTE — PROGRESS NOTE ADULT - PROBLEM SELECTOR PLAN 1
- On admission: AlkP 959,    - CT a/p: Diffuse gallbladder wall thickening/edema  - RUQ US: Irregular gallbladder wall thickening to 1.2 cm increased from prior study. Cholelithiasis. Consider acute or chronic cholecystitis. Prominent pancreas, incompletely assessed, which can also be characterized with MRI  - Hida scan:  There is prompt, homogeneous uptake of radiopharmaceutical by the hepatocytes. Activity is first seen in the bowel at about 15 minutes. The gallbladder is not visualized at any time during the study. Findings are compatible with acute cholecystitis.   - Completed course of IV abx Invanz today  - Continue to monitor on daily CMP  - GI-Dr Dupree following  - Surgery---> no acute surgical intervention at this time, start PO diet, No drainage needed   - ID (Dr. Macias) following - On admission: AlkP 959,    - CT a/p: Diffuse gallbladder wall thickening/edema  - RUQ US: Irregular gallbladder wall thickening to 1.2 cm increased from prior study. Cholelithiasis. Consider acute or chronic cholecystitis. Prominent pancreas, incompletely assessed, which can also be characterized with MRI  - Hida scan:  There is prompt, homogeneous uptake of radiopharmaceutical by the hepatocytes. Activity is first seen in the bowel at about 15 minutes. The gallbladder is not visualized at any time during the study. Findings are compatible with acute cholecystitis.   - Completed course of IV abx Invanz I gm daily   - daily CMP  - GI-Dr Dumont follow up  - Surgery---> no acute surgical intervention at this time, start PO diet, No drainage needed   - ID (Dr. JENIFFER Holguin follow up---> IV Invanz daily

## 2025-04-21 NOTE — PROGRESS NOTE ADULT - ASSESSMENT
89yo F w/ PMH of HTN, HLD, T2DM, TIA, CKD III, Hx of R hip CRPP in 2016, Anemia, Compression Fx  presents to the ED with her daughter for bilateral leg swelling times few days and frequent urination currently admitted for acute pino. R/O UTI.

## 2025-04-21 NOTE — PROGRESS NOTE ADULT - PROBLEM SELECTOR PLAN 3
- Patient presents with b/l leg swelling. RESOLVED   - pro-BNP 6542  - US duplex: No evidence of deep venous thrombosis in either lower extremity  - c/w home lasix daily on dc  - of note, patient was previously started on amlodipine but cardio dc'd due to adverse effects   - will continue to monitor hemodynamic status

## 2025-04-21 NOTE — PROGRESS NOTE ADULT - ASSESSMENT
ELVIN on CKD 3  Renal Cyst  Hypokalemia  HTN  Proteinuria    -Baseline Creatinine 1.4  -ELVIN Likely 2/2 Decreased EABV, renal function continues to improve  -Reviewed Urine lytes, UA  -CT abd - Left renal cyst. No hydronephrosis.  -s/P IVF NS + KCL, now off  -Mg, phos is normal range now, no repletion today    -BP is still elevated, will increase hydralazine to 100 mg q8 and change losartan to 50 mg bid  -SCr is stable currently but above baseline  -If edema persistent, can resume low dose lasix    No renal objection to DC

## 2025-04-21 NOTE — PROGRESS NOTE ADULT - SUBJECTIVE AND OBJECTIVE BOX
[facility]    Cabrera Michel NP    [address] [place] [id number]  [phone]      Chief Complaint:  Patient is a 89y old  Female who presents with a chief complaint of Acute pino (20 Apr 2025 16:11)      HPI/ 24 hr events:   Patient seen and examined at bedside  Reports feeling well this morning   No acute GI complaints  Tolerating diet       REVIEW OF SYSTEMS:   General: Negative  HEENT: Negative  CV: Negative  Respiratory: Negative  GI: See HPI  : Negative  MSK: Negative  Hematologic: Negative  Skin: Negative    MEDICATIONS:   MEDICATIONS  (STANDING):  dextrose 5%. 1000 milliLiter(s) (50 mL/Hr) IV Continuous <Continuous>  dextrose 5%. 1000 milliLiter(s) (100 mL/Hr) IV Continuous <Continuous>  dextrose 50% Injectable 25 Gram(s) IV Push once  dextrose 50% Injectable 12.5 Gram(s) IV Push once  dextrose 50% Injectable 25 Gram(s) IV Push once  ertapenem  IVPB      ertapenem  IVPB 500 milliGRAM(s) IV Intermittent every 24 hours  famotidine    Tablet 20 milliGRAM(s) Oral daily  ferrous    sulfate 325 milliGRAM(s) Oral daily  glucagon  Injectable 1 milliGRAM(s) IntraMuscular once  heparin   Injectable 5000 Unit(s) SubCutaneous every 12 hours  hydrALAZINE 50 milliGRAM(s) Oral every 8 hours  insulin lispro (ADMELOG) corrective regimen sliding scale   SubCutaneous three times a day before meals  insulin lispro (ADMELOG) corrective regimen sliding scale   SubCutaneous at bedtime  losartan 50 milliGRAM(s) Oral two times a day  metoprolol succinate  milliGRAM(s) Oral daily    MEDICATIONS  (PRN):  dextrose Oral Gel 15 Gram(s) Oral once PRN Blood Glucose LESS THAN 70 milliGRAM(s)/deciliter      ALLERGIES:   Allergies    amlodipine (Flushing)    Intolerances        VITAL SIGNS:   Vital Signs Last 24 Hrs  T(C): 36.7 (21 Apr 2025 04:35), Max: 36.7 (20 Apr 2025 11:33)  T(F): 98.1 (21 Apr 2025 04:35), Max: 98.1 (20 Apr 2025 11:33)  HR: 60 (21 Apr 2025 04:35) (60 - 68)  BP: 185/73 (21 Apr 2025 04:35) (154/75 - 188/61)  BP(mean): --  RR: 18 (21 Apr 2025 04:35) (18 - 18)  SpO2: 97% (21 Apr 2025 04:35) (97% - 97%)    Parameters below as of 21 Apr 2025 04:35  Patient On (Oxygen Delivery Method): room air      I&O's Summary      PHYSICAL EXAM:   GENERAL:  No acute distress  HEENT:  NC/AT  CHEST:  No increased effort  HEART:  Regular rate  ABDOMEN:  Soft, non-tender, non-distended  EXTREMITIES: No cyanosis  SKIN:  Warm, dry  NEURO:  Calm, cooperative    LABS:                        8.4    7.91  )-----------( 221      ( 21 Apr 2025 06:25 )             25.7     04-21    137  |  104  |  19  ----------------------------<  120[H]  3.9   |  27  |  1.80[H]    Ca    9.4      21 Apr 2025 06:25  Phos  3.3     04-20  Mg     2.1     04-20    TPro  6.7  /  Alb  2.1[L]  /  TBili  0.5  /  DBili  x   /  AST  50[H]  /  ALT  28  /  AlkPhos  825[H]  04-21    LIVER FUNCTIONS - ( 21 Apr 2025 06:25 )  Alb: 2.1 g/dL / Pro: 6.7 g/dL / ALK PHOS: 825 U/L / ALT: 28 U/L / AST: 50 U/L / GGT: x                                             RADIOLOGY & ADDITIONAL STUDIES:

## 2025-04-21 NOTE — PROGRESS NOTE ADULT - PROBLEM SELECTOR PLAN 5
Chronic, on admission 165/66   Persistently elevated in 180s over past 24hrs  Per dtr pt's BP is stable at nursing home ~140/70 & so MD d/rosas new meds - pt has Cardio appointment next week Monday with [facility]   - Continue home medications Toprol, Lasix with hold parameters  - Continue losartan 2x day   -Hydralazine  50mg TID   - Monitor routine hemodynamics    Hypokalemia - resolved   BMP in AM, replete prn

## 2025-04-22 ENCOUNTER — TRANSCRIPTION ENCOUNTER (OUTPATIENT)
Age: 89
End: 2025-04-22

## 2025-04-22 VITALS
SYSTOLIC BLOOD PRESSURE: 150 MMHG | TEMPERATURE: 98 F | OXYGEN SATURATION: 96 % | HEART RATE: 72 BPM | RESPIRATION RATE: 18 BRPM | DIASTOLIC BLOOD PRESSURE: 85 MMHG

## 2025-04-22 LAB
GLUCOSE BLDC GLUCOMTR-MCNC: 142 MG/DL — HIGH (ref 70–99)
GLUCOSE BLDC GLUCOMTR-MCNC: 155 MG/DL — HIGH (ref 70–99)

## 2025-04-22 PROCEDURE — 83550 IRON BINDING TEST: CPT

## 2025-04-22 PROCEDURE — 82553 CREATINE MB FRACTION: CPT

## 2025-04-22 PROCEDURE — 85610 PROTHROMBIN TIME: CPT

## 2025-04-22 PROCEDURE — 82728 ASSAY OF FERRITIN: CPT

## 2025-04-22 PROCEDURE — 82784 ASSAY IGA/IGD/IGG/IGM EACH: CPT

## 2025-04-22 PROCEDURE — 86334 IMMUNOFIX E-PHORESIS SERUM: CPT

## 2025-04-22 PROCEDURE — 76705 ECHO EXAM OF ABDOMEN: CPT

## 2025-04-22 PROCEDURE — 80053 COMPREHEN METABOLIC PANEL: CPT

## 2025-04-22 PROCEDURE — 78226 HEPATOBILIARY SYSTEM IMAGING: CPT | Mod: MC

## 2025-04-22 PROCEDURE — 84156 ASSAY OF PROTEIN URINE: CPT

## 2025-04-22 PROCEDURE — 82570 ASSAY OF URINE CREATININE: CPT

## 2025-04-22 PROCEDURE — 84436 ASSAY OF TOTAL THYROXINE: CPT

## 2025-04-22 PROCEDURE — 83036 HEMOGLOBIN GLYCOSYLATED A1C: CPT

## 2025-04-22 PROCEDURE — 36415 COLL VENOUS BLD VENIPUNCTURE: CPT

## 2025-04-22 PROCEDURE — 96375 TX/PRO/DX INJ NEW DRUG ADDON: CPT

## 2025-04-22 PROCEDURE — 85025 COMPLETE CBC W/AUTO DIFF WBC: CPT

## 2025-04-22 PROCEDURE — 71045 X-RAY EXAM CHEST 1 VIEW: CPT

## 2025-04-22 PROCEDURE — 87186 SC STD MICRODIL/AGAR DIL: CPT

## 2025-04-22 PROCEDURE — 84100 ASSAY OF PHOSPHORUS: CPT

## 2025-04-22 PROCEDURE — 93970 EXTREMITY STUDY: CPT

## 2025-04-22 PROCEDURE — 82962 GLUCOSE BLOOD TEST: CPT

## 2025-04-22 PROCEDURE — 74176 CT ABD & PELVIS W/O CONTRAST: CPT | Mod: MC

## 2025-04-22 PROCEDURE — 84165 PROTEIN E-PHORESIS SERUM: CPT

## 2025-04-22 PROCEDURE — 97161 PT EVAL LOW COMPLEX 20 MIN: CPT

## 2025-04-22 PROCEDURE — 83735 ASSAY OF MAGNESIUM: CPT

## 2025-04-22 PROCEDURE — 83935 ASSAY OF URINE OSMOLALITY: CPT

## 2025-04-22 PROCEDURE — 86901 BLOOD TYPING SEROLOGIC RH(D): CPT

## 2025-04-22 PROCEDURE — 82746 ASSAY OF FOLIC ACID SERUM: CPT

## 2025-04-22 PROCEDURE — 84443 ASSAY THYROID STIM HORMONE: CPT

## 2025-04-22 PROCEDURE — 85027 COMPLETE CBC AUTOMATED: CPT

## 2025-04-22 PROCEDURE — 83880 ASSAY OF NATRIURETIC PEPTIDE: CPT

## 2025-04-22 PROCEDURE — 81001 URINALYSIS AUTO W/SCOPE: CPT

## 2025-04-22 PROCEDURE — 96372 THER/PROPH/DIAG INJ SC/IM: CPT | Mod: XU

## 2025-04-22 PROCEDURE — 97116 GAIT TRAINING THERAPY: CPT

## 2025-04-22 PROCEDURE — 83521 IG LIGHT CHAINS FREE EACH: CPT

## 2025-04-22 PROCEDURE — 84133 ASSAY OF URINE POTASSIUM: CPT

## 2025-04-22 PROCEDURE — 82607 VITAMIN B-12: CPT

## 2025-04-22 PROCEDURE — 86900 BLOOD TYPING SEROLOGIC ABO: CPT

## 2025-04-22 PROCEDURE — A9537: CPT

## 2025-04-22 PROCEDURE — 84480 ASSAY TRIIODOTHYRONINE (T3): CPT

## 2025-04-22 PROCEDURE — 84300 ASSAY OF URINE SODIUM: CPT

## 2025-04-22 PROCEDURE — 93005 ELECTROCARDIOGRAM TRACING: CPT

## 2025-04-22 PROCEDURE — 96365 THER/PROPH/DIAG IV INF INIT: CPT

## 2025-04-22 PROCEDURE — 84540 ASSAY OF URINE/UREA-N: CPT

## 2025-04-22 PROCEDURE — 85730 THROMBOPLASTIN TIME PARTIAL: CPT

## 2025-04-22 PROCEDURE — 99285 EMERGENCY DEPT VISIT HI MDM: CPT

## 2025-04-22 PROCEDURE — 83540 ASSAY OF IRON: CPT

## 2025-04-22 PROCEDURE — 80061 LIPID PANEL: CPT

## 2025-04-22 PROCEDURE — 84155 ASSAY OF PROTEIN SERUM: CPT

## 2025-04-22 PROCEDURE — 84484 ASSAY OF TROPONIN QUANT: CPT

## 2025-04-22 PROCEDURE — 86850 RBC ANTIBODY SCREEN: CPT

## 2025-04-22 PROCEDURE — 97530 THERAPEUTIC ACTIVITIES: CPT

## 2025-04-22 PROCEDURE — 87086 URINE CULTURE/COLONY COUNT: CPT

## 2025-04-22 RX ORDER — ACETAMINOPHEN 500 MG/5ML
650 LIQUID (ML) ORAL ONCE
Refills: 0 | Status: COMPLETED | OUTPATIENT
Start: 2025-04-22 | End: 2025-04-22

## 2025-04-22 RX ORDER — METOPROLOL SUCCINATE 50 MG/1
1 TABLET, EXTENDED RELEASE ORAL
Refills: 0 | DISCHARGE

## 2025-04-22 RX ORDER — METOPROLOL SUCCINATE 50 MG/1
1 TABLET, EXTENDED RELEASE ORAL
Qty: 30 | Refills: 0
Start: 2025-04-22 | End: 2025-05-21

## 2025-04-22 RX ORDER — LOSARTAN POTASSIUM 100 MG/1
1 TABLET, FILM COATED ORAL
Qty: 60 | Refills: 0
Start: 2025-04-22 | End: 2025-05-21

## 2025-04-22 RX ADMIN — ERTAPENEM SODIUM 100 MILLIGRAM(S): 1 INJECTION, POWDER, LYOPHILIZED, FOR SOLUTION INTRAMUSCULAR; INTRAVENOUS at 09:51

## 2025-04-22 RX ADMIN — Medication 650 MILLIGRAM(S): at 11:01

## 2025-04-22 RX ADMIN — METOPROLOL SUCCINATE 100 MILLIGRAM(S): 50 TABLET, EXTENDED RELEASE ORAL at 05:42

## 2025-04-22 RX ADMIN — Medication 100 MILLIGRAM(S): at 05:42

## 2025-04-22 RX ADMIN — LOSARTAN POTASSIUM 50 MILLIGRAM(S): 100 TABLET, FILM COATED ORAL at 05:42

## 2025-04-22 RX ADMIN — HEPARIN SODIUM 5000 UNIT(S): 1000 INJECTION INTRAVENOUS; SUBCUTANEOUS at 05:42

## 2025-04-22 RX ADMIN — Medication 650 MILLIGRAM(S): at 10:01

## 2025-04-22 RX ADMIN — Medication 100 MILLIGRAM(S): at 13:28

## 2025-04-22 RX ADMIN — Medication 325 MILLIGRAM(S): at 10:02

## 2025-04-22 RX ADMIN — Medication 20 MILLIGRAM(S): at 10:02

## 2025-04-22 NOTE — PROGRESS NOTE ADULT - NSPROGADDITIONALINFOA_GEN_ALL_CORE
I reviewed the overnight course of events on the unit, re-confirming the patient history. I discussed the care with the patient and their family  The plan of care was discussed with the nurse practitioner and modifications were made to the notation where appropriate.   Differential diagnosis and plan of care discussed with patient after the evaluation  35 minutes spent on total encounter of which more than fifty percent of the encounter was spent counseling and/or coordinating care by the attending physician.  Advanced care planning was discussed with patient and family.  Advanced care planning forms were reviewed and discussed.  Risks, benefits and alternatives of gastroenterologic procedures were discussed in detail and all questions were answered.
I reviewed the overnight course of events on the unit, re-confirming the patient history. I discussed the care with the patient and their family  The plan of care was discussed with the physician assistant and modifications were made to the notation where appropriate.   Differential diagnosis and plan of care discussed with patient after the evaluation  35 minutes spent on total encounter of which more than fifty percent of the encounter was spent counseling and/or coordinating care by the attending physician.  Advanced care planning was discussed with patient and family.  Advanced care planning forms were reviewed and discussed.  Risks, benefits and alternatives of gastroenterologic procedures were discussed in detail and all questions were answered.
I reviewed the overnight course of events on the unit, re-confirming the patient history. I discussed the care with the patient and their family  The plan of care was discussed with the nurse practitioner and modifications were made to the notation where appropriate.   Differential diagnosis and plan of care discussed with patient after the evaluation  35 minutes spent on total encounter of which more than fifty percent of the encounter was spent counseling and/or coordinating care by the attending physician.  Advanced care planning was discussed with patient and family.  Advanced care planning forms were reviewed and discussed.  Risks, benefits and alternatives of gastroenterologic procedures were discussed in detail and all questions were answered.
I reviewed the overnight course of events on the unit, re-confirming the patient history. I discussed the care with the patient and their family  The plan of care was discussed with the physician assistant and modifications were made to the notation where appropriate.   Differential diagnosis and plan of care discussed with patient after the evaluation  35 minutes spent on total encounter of which more than fifty percent of the encounter was spent counseling and/or coordinating care by the attending physician.  Advanced care planning was discussed with patient and family.  Advanced care planning forms were reviewed and discussed.  Risks, benefits and alternatives of gastroenterologic procedures were discussed in detail and all questions were answered.

## 2025-04-22 NOTE — DISCHARGE NOTE NURSING/CASE MANAGEMENT/SOCIAL WORK - NSDCPEFALRISK_GEN_ALL_CORE
For information on Fall & Injury Prevention, visit: [email or web address] OR  [email or web address] OR  https://www.cdc.gov/steadi/patient.html

## 2025-04-22 NOTE — PROGRESS NOTE ADULT - ASSESSMENT
89yo F w HTN, HLD, T2DM, TIA, CKD III, Hx of R hip CRPP in 2016, compression Fx spine ,anemia  preseneds to the ED with her daughter for bilateral leg swelling times few days and fatigue for the past few days. Patient has a history of UTIs. Patient reports that she was having urinary frequency but is on lasix. Was recently treated for UTI with abx.   ED Vital Signs T(F):  HR:98F  BP: 165/66  RR: 19  SpO2: 99 % on R  CT a/p: Diffuse gallbladder wall thickening/edema  RUQ US: Irregular gallbladder wall thickening to 1.2 cm increased from prior   study. Cholelithiasis. Consider acute or chronic cholecystitis. Prominent pancreas, incompletely assessed, which can also be characterized with MRI.  HIDA Abnormal hepatobiliary scan.    Acute Cholecystitis  Acute Cystitis  Presentation without any localizing pain, some weakness, afebrile, minimal elevation of wbcs, ALT/AST/Alph phos elevation  some pyuria and some urinary symptoms (?UTI)  Culture - Urine (04.16.25 @ 11:59)  >100,000 CFU/ml ESBL Escherichia coli  ceftriaxone/ flagyl switched to ertapenem 500mg daily on 4/19    Recommendations  C/w ertapenem to complete total x5 days until 4/23  Trend temps/WBC  Additional care per primary team    Patient evaluated with face-to-face time in addition to reviewing history, labs, microbiology, and imaging.   Antibiotic stewardship, local antibiogram, infection control strategies and potential transmission issues taken into consideration at time of treatment decision making process.   Thank you for allowing us to participate in the care of your patient.  Infectious Diseases will follow. Please call with any questions.  Claudette Holguin M.D.  Available on [employer] TEAMS -- *PREFERRED*  [facility] [phone]  For after 5 P.M. and weekends, please call [phone]

## 2025-04-22 NOTE — PROGRESS NOTE ADULT - ASSESSMENT
89yo F w/ PMH of HTN, HLD, T2DM, TIA, CKD III, Hx of R hip CRPP in 2016, Anemia, Compression Fx  presents to the ED with her daughter for bilateral leg swelling times few days and frequent urination currently admitted for acute pino and UTI 89yo F w/ PMH of HTN, HLD, T2DM, TIA, CKD III, Hx of R hip CRPP in 2016, Anemia, Compression Fx  presents to the ED with her daughter for bilateral leg swelling times few days and frequent urination currently admitted for acute pino and E Coli ESBL , UTI

## 2025-04-22 NOTE — PROGRESS NOTE ADULT - PROBLEM SELECTOR PLAN 5
Chronic, on admission 165/66   Persistently elevated over the past day, improved this morning  Per dtr pt's BP is stable at nursing home ~140/70 & so MD d/rosas new meds - pt has Cardio appointment next week Monday with [facility]   - Continue home medications Toprol, Lasix with hold parameters  - Continue losartan 50mg 2x day   -Hydralazine 100mg TID   - Monitor routine hemodynamics    Hypokalemia - resolved   BMP in AM, replete prn

## 2025-04-22 NOTE — PROGRESS NOTE ADULT - ASSESSMENT
ELVIN on CKD 3  Renal Cyst  Hypokalemia  HTN  Proteinuria  Acute cholecystitis  UTI    -Baseline Creatinine 1.4  -ELVIN Likely 2/2 Decreased EABV, renal function continues to improve  -Reviewed Urine lytes, UA  -CT abd - Left renal cyst. No hydronephrosis.  -s/P IVF NS + KCL, now off  -Mg, phos is normal range now, no repletion today    -BP is still elevated, BP medications adjusted. Will consider adding Chlorthalidone next   -SCr is stable currently but above baseline  -If edema persistent, can resume low dose lasix  -Awaiting today's lab result      ELVIN on CKD 3  Renal Cyst  Hypokalemia  HTN  Proteinuria  Acute cholecystitis  UTI    -Baseline Creatinine 1.4  -ELVIN Likely 2/2 Decreased EABV, renal function continues to improve  -Reviewed Urine lytes, UA  -CT abd - Left renal cyst. No hydronephrosis.  -s/P IVF NS + KCL, now off  -Mg, phos is normal range now, no repletion today    -BP is still elevated, BP medications adjusted. Will consider adding Chlorthalidone next   -Renal function now stable at baseline. Will need to restart oral Lasix @ 20 mg po daily in 24-48 hours  -Awaiting today's lab result

## 2025-04-22 NOTE — PROGRESS NOTE ADULT - SUBJECTIVE AND OBJECTIVE BOX
[facility]    Cabrera Michel NP    [address] [place] [id number] [phone]      Chief Complaint:  Patient is a 89y old  Female who presents with a chief complaint of Acute pino (22 Apr 2025 08:34)      HPI/ 24 hr events:   Patient seen and examined at bedside  LFTs overall downtrending, discussed with daughter   Reports feeling well this morning   No acute GI complaints      REVIEW OF SYSTEMS:   General: Negative  HEENT: Negative  CV: Negative  Respiratory: Negative  GI: See HPI  : Negative  MSK: Negative  Hematologic: Negative  Skin: Negative    MEDICATIONS:   MEDICATIONS  (STANDING):  dextrose 5%. 1000 milliLiter(s) (50 mL/Hr) IV Continuous <Continuous>  dextrose 5%. 1000 milliLiter(s) (100 mL/Hr) IV Continuous <Continuous>  dextrose 50% Injectable 25 Gram(s) IV Push once  dextrose 50% Injectable 12.5 Gram(s) IV Push once  dextrose 50% Injectable 25 Gram(s) IV Push once  ertapenem  IVPB      ertapenem  IVPB 500 milliGRAM(s) IV Intermittent every 24 hours  famotidine    Tablet 20 milliGRAM(s) Oral daily  ferrous    sulfate 325 milliGRAM(s) Oral daily  glucagon  Injectable 1 milliGRAM(s) IntraMuscular once  heparin   Injectable 5000 Unit(s) SubCutaneous every 12 hours  hydrALAZINE 100 milliGRAM(s) Oral every 8 hours  insulin lispro (ADMELOG) corrective regimen sliding scale   SubCutaneous three times a day before meals  insulin lispro (ADMELOG) corrective regimen sliding scale   SubCutaneous at bedtime  losartan 50 milliGRAM(s) Oral two times a day  metoprolol succinate  milliGRAM(s) Oral daily    MEDICATIONS  (PRN):  dextrose Oral Gel 15 Gram(s) Oral once PRN Blood Glucose LESS THAN 70 milliGRAM(s)/deciliter      ALLERGIES:   Allergies    amlodipine (Flushing)    Intolerances        VITAL SIGNS:   Vital Signs Last 24 Hrs  T(C): 36.3 (22 Apr 2025 06:42), Max: 36.4 (21 Apr 2025 13:59)  T(F): 97.4 (22 Apr 2025 06:42), Max: 97.6 (21 Apr 2025 13:59)  HR: 81 (22 Apr 2025 05:30) (62 - 81)  BP: 180/65 (22 Apr 2025 05:30) (159/61 - 180/65)  BP(mean): --  RR: 18 (22 Apr 2025 05:30) (18 - 18)  SpO2: 96% (22 Apr 2025 05:30) (96% - 96%)    Parameters below as of 22 Apr 2025 05:30  Patient On (Oxygen Delivery Method): room air      I&O's Summary    21 Apr 2025 07:01  -  22 Apr 2025 07:00  --------------------------------------------------------  IN: 50 mL / OUT: 0 mL / NET: 50 mL        PHYSICAL EXAM:   GENERAL:  No acute distress  HEENT:  NC/AT  CHEST:  No increased effort  HEART:  Regular rate  ABDOMEN:  Soft, non-tender, non-distended  EXTREMITIES: No cyanosis  SKIN:  Warm, dry  NEURO:  Calm, cooperative    LABS:                        8.4    7.91  )-----------( 221      ( 21 Apr 2025 06:25 )             25.7     04-21    137  |  104  |  19  ----------------------------<  120[H]  3.9   |  27  |  1.80[H]    Ca    9.4      21 Apr 2025 06:25    TPro  6.7  /  Alb  2.1[L]  /  TBili  0.5  /  DBili  x   /  AST  50[H]  /  ALT  28  /  AlkPhos  825[H]  04-21    LIVER FUNCTIONS - ( 21 Apr 2025 06:25 )  Alb: 2.1 g/dL / Pro: 6.7 g/dL / ALK PHOS: 825 U/L / ALT: 28 U/L / AST: 50 U/L / GGT: x                                             RADIOLOGY & ADDITIONAL STUDIES:

## 2025-04-22 NOTE — PROGRESS NOTE ADULT - PROBLEM SELECTOR PLAN 1
- On admission: AlkP 959,    - CT a/p: Diffuse gallbladder wall thickening/edema  - RUQ US: Irregular gallbladder wall thickening to 1.2 cm increased from prior study. Cholelithiasis. Consider acute or chronic cholecystitis. Prominent pancreas, incompletely assessed, which can also be characterized with MRI  - Hida scan:  There is prompt, homogeneous uptake of radiopharmaceutical by the hepatocytes. Activity is first seen in the bowel at about 15 minutes. The gallbladder is not visualized at any time during the study. Findings are compatible with acute cholecystitis.   - Completed course of IV abx Invanz  - daily CMP  - GI-Dr Dumont following  - Surgery---> no acute surgical intervention at this time, start PO diet, No drainage needed   - ID (Dr. JENIFFER Holguin follow up---> s/p Invanz

## 2025-04-22 NOTE — PROGRESS NOTE ADULT - PROVIDER SPECIALTY LIST ADULT
Gastroenterology
Gastroenterology
Infectious Disease
Infectious Disease
Internal Medicine
Nephrology
Nephrology
Infectious Disease
Infectious Disease
Nephrology
Nephrology
Surgery
Gastroenterology
Heme/Onc
Infectious Disease
Nephrology
Nephrology
Gastroenterology
Heme/Onc
Internal Medicine

## 2025-04-22 NOTE — PROGRESS NOTE ADULT - PROBLEM SELECTOR PLAN 10
- Heparin subq for dvt ppx  - PT consult - home PT at [facility]
- Heparin subq for dvt ppx  - PT consult - home PT at [facility]
- Heparin subq for dvt ppx  - PT consult - home PT at residential
- Heparin subq for dvt ppx  - PT consult
- Heparin subq for dvt ppx  - [facility]
- Heparin subq for dvt ppx  - PT consult - home PT at [facility]

## 2025-04-22 NOTE — PROGRESS NOTE ADULT - PROBLEM SELECTOR PROBLEM 1
Acute cholecystitis

## 2025-04-22 NOTE — PROGRESS NOTE ADULT - SUBJECTIVE AND OBJECTIVE BOX
[facility]                             Nephrology and Hypertension                             [address]                                          [phone]     Patient is a 89y old  Female who presents with a chief complaint of Acute pino (16 Apr 2025 18:04)       HPI:  89yo F w/ PMH of HTN, HLD, T2DM, TIA, CKD III, Hx of R hip CRPP in 2016 presents to the ED with her daughter for bilateral leg swelling times few days and fatigue for the past few days. Spoke to daughter via phone who reports that patient has a history of UTIs. Second daughter at bedside with the patient reports that she was having urinary frequency. Wanted to bring her in for evaluation and states that the legs have been bothering her for the past few days as well. Patient is from [place] and is compliant with medications. Was recently treated for UTI with abx.   Of note, patient was admitted 3/9-3/13 for management of weakness likely 2/2 UTI and sacral pain. UA was neg, no leukocytosis and was monitored off abx. Course was complicated by hypertensive urgency and bradycardia which was treated with BP med changes.   Denies fever, chills, chest pain, palpitations, SOB, cough, abdominal pain, nausea, vomiting, diarrhea, constipation, hematochezia, melena, urgency, dysuria, hematuria, headaches, changes in vision, dizziness, numbness, tingling. No other complaints at this time.  Renal Consulted for ELVIN on CKD.  She is urinating well.  No N/V/SOB.  Has not seen nephrologist prior.     No c/o     PAST MEDICAL & SURGICAL HISTORY:  Hypertension      Diabetes      TIA (transient ischemic attack)      Hip fracture      HLD (hyperlipidemia)      Stage 3 chronic kidney disease      Anemia of chronic disease      Lumbar compression fracture      S/P ORIF (open reduction internal fixation) fracture  right hip           FAMILY HISTORY:  FH: asthma (Father)    NC    Social History:Non smoker    MEDICATIONS  (STANDING):  aspirin enteric coated 81 milliGRAM(s) Oral daily  atorvastatin 40 milliGRAM(s) Oral at bedtime  dextrose 5%. 1000 milliLiter(s) (50 mL/Hr) IV Continuous <Continuous>  dextrose 5%. 1000 milliLiter(s) (100 mL/Hr) IV Continuous <Continuous>  dextrose 50% Injectable 25 Gram(s) IV Push once  dextrose 50% Injectable 12.5 Gram(s) IV Push once  dextrose 50% Injectable 25 Gram(s) IV Push once  famotidine    Tablet 20 milliGRAM(s) Oral daily  ferrous    sulfate 325 milliGRAM(s) Oral daily  furosemide    Tablet 20 milliGRAM(s) Oral daily  glucagon  Injectable 1 milliGRAM(s) IntraMuscular once  heparin   Injectable 5000 Unit(s) SubCutaneous once  insulin lispro (ADMELOG) corrective regimen sliding scale   SubCutaneous every 6 hours  metoprolol succinate  milliGRAM(s) Oral daily  potassium chloride    Tablet ER 40 milliEquivalent(s) Oral every 4 hours  sodium chloride 0.9% with potassium chloride 20 mEq/L 1000 milliLiter(s) (75 mL/Hr) IV Continuous <Continuous>    MEDICATIONS  (PRN):  dextrose Oral Gel 15 Gram(s) Oral once PRN Blood Glucose LESS THAN 70 milliGRAM(s)/deciliter   Meds reviewed    Allergies    No Known Allergies    Intolerances         REVIEW OF SYSTEMS:    as above    Vital Signs Last 24 Hrs  T(C): 36.3 (22 Apr 2025 06:42), Max: 36.4 (21 Apr 2025 13:59)  T(F): 97.4 (22 Apr 2025 06:42), Max: 97.6 (21 Apr 2025 13:59)  HR: 81 (22 Apr 2025 05:30) (62 - 81)  BP: 180/65 (22 Apr 2025 05:30) (159/61 - 180/65)  BP(mean): --  RR: 18 (22 Apr 2025 05:30) (18 - 18)  SpO2: 96% (22 Apr 2025 05:30) (96% - 96%)    Parameters below as of 22 Apr 2025 05:30  Patient On (Oxygen Delivery Method): room air      PHYSICAL EXAM:    GENERAL: NAD  HEAD:  Atraumatic, Normocephalic  EYES: EOMI, conjunctiva and sclera clear  ENMT: No Drainage from nares, No drainage from ears  NERVOUS SYSTEM:  Awake and Alert  EXTREMITIES:  trace Edema Le, ankle edema   SKIN: No rashes No obvious ecchymosis          LABS:  Pending

## 2025-04-22 NOTE — PROGRESS NOTE ADULT - PROBLEM SELECTOR PROBLEM 2
UTI (urinary tract infection)
Chronic kidney disease
Chronic kidney disease
UTI (urinary tract infection)

## 2025-04-22 NOTE — PROGRESS NOTE ADULT - SUBJECTIVE AND OBJECTIVE BOX
[facility]  HERNESTO Ramirez Y. Patel, S. Shah, G. [facility]  [phone]    Name: NELDA VERDIN  Age: 89y  Gender: Female  MRN: [id number]    Interval History:  No acute overnight events.   Notes reviewed    Antibiotics:      Medications:  dextrose 5%. 1000 milliLiter(s) IV Continuous <Continuous>  dextrose 5%. 1000 milliLiter(s) IV Continuous <Continuous>  dextrose 50% Injectable 25 Gram(s) IV Push once  dextrose 50% Injectable 12.5 Gram(s) IV Push once  dextrose 50% Injectable 25 Gram(s) IV Push once  dextrose Oral Gel 15 Gram(s) Oral once PRN  famotidine    Tablet 20 milliGRAM(s) Oral daily  ferrous    sulfate 325 milliGRAM(s) Oral daily  glucagon  Injectable 1 milliGRAM(s) IntraMuscular once  heparin   Injectable 5000 Unit(s) SubCutaneous every 12 hours  hydrALAZINE 100 milliGRAM(s) Oral every 8 hours  insulin lispro (ADMELOG) corrective regimen sliding scale   SubCutaneous three times a day before meals  insulin lispro (ADMELOG) corrective regimen sliding scale   SubCutaneous at bedtime  losartan 50 milliGRAM(s) Oral two times a day  metoprolol succinate  milliGRAM(s) Oral daily      Review of Systems:  A 10-point review of systems was obtained.   Review of systems otherwise negative except as previously noted.    Allergies: amlodipine (Flushing)    For details regarding the patient's past medical history, social history, family history, and other miscellaneous elements, please refer the initial infectious diseases consultation and/or the admitting history and physical examination for this admission.    Objective:  Vitals:   T(C): 36.3 (04-22-25 @ 06:42), Max: 36.4 (04-21-25 @ 13:59)  HR: 64 (04-22-25 @ 11:06) (62 - 81)  BP: 140/80 (04-22-25 @ 11:06) (140/80 - 180/65)  RR: 18 (04-22-25 @ 05:30) (18 - 18)  SpO2: 96% (04-22-25 @ 05:30) (96% - 96%)    Physical Examination:  General: no acute distress  HEENT: NC/AT, EOMI  Cardio: RRR  Resp: CTA  Abd: soft, NT, ND  Ext: no edema or cyanosis  Skin: warm, dry, no visible rash      Laboratory Studies:  CBC:                       8.4    7.91  )-----------( 221      ( 21 Apr 2025 06:25 )             25.7     CMP: 04-21    137  |  104  |  19  ----------------------------<  120[H]  3.9   |  27  |  1.80[H]    Ca    9.4      21 Apr 2025 06:25    TPro  6.7  /  Alb  2.1[L]  /  TBili  0.5  /  DBili  x   /  AST  50[H]  /  ALT  28  /  AlkPhos  825[H]  04-21    LIVER FUNCTIONS - ( 21 Apr 2025 06:25 )  Alb: 2.1 g/dL / Pro: 6.7 g/dL / ALK PHOS: 825 U/L / ALT: 28 U/L / AST: 50 U/L / GGT: x           Urinalysis Basic - ( 21 Apr 2025 06:25 )    Color: x / Appearance: x / SG: x / pH: x  Gluc: 120 mg/dL / Ketone: x  / Bili: x / Urobili: x   Blood: x / Protein: x / Nitrite: x   Leuk Esterase: x / RBC: x / WBC x   Sq Epi: x / Non Sq Epi: x / Bacteria: x        Microbiology: reviewed    Urinalysis with Rflx Culture (collected 04-16-25 @ 11:59)    Culture - Urine (collected 04-16-25 @ 11:59)  Source: Clean Catch  Final Report (04-18-25 @ 22:21):    >100,000 CFU/ml Escherichia coli ESBL    <10,000 CFU/ml Normal Urogenital husam present  Organism: Escherichia coli ESBL (04-18-25 @ 22:21)  Organism: Escherichia coli ESBL (04-18-25 @ 22:21)      -  Levofloxacin: R >4      -  Tobramycin: S <=2      -  Nitrofurantoin: S <=32 Should not be used to treat pyelonephritis      -  Aztreonam: R >16      -  Gentamicin: S <=2      -  Cefazolin: R >16 For uncomplicated UTI with K. pneumoniae, E. coli, or P. mirablis: ANNA MARIE <=16 is sensitive and ANNA MARIE >=32 is resistant. This also predicts results for oral agents cefaclor, cefdinir, cefpodoxime, cefprozil, cefuroxime axetil, cephalexin and locarbef for uncomplicated UTI. Note that some isolates may be susceptible to these agents while testing resistant to cefazolin.      -  Cefepime: R >16      -  Piperacillin/Tazobactam: S <=8      -  Ciprofloxacin: R >2      -  Imipenem: S <=1      -  Ceftriaxone: R >32      -  Ampicillin: R >16 These ampicillin results predict results for amoxicillin      Method Type: ANNA MARIE      -  Meropenem: S <=1      -  Ampicillin/Sulbactam: S 8/4      -  Cefuroxime: R >16      -  Trimethoprim/Sulfamethoxazole: R >2/38      -  Ertapenem: S <=0.5          Radiology: reviewed

## 2025-04-22 NOTE — PROGRESS NOTE ADULT - PROBLEM SELECTOR PROBLEM 6
Chronic kidney disease

## 2025-04-22 NOTE — DISCHARGE NOTE NURSING/CASE MANAGEMENT/SOCIAL WORK - PATIENT PORTAL LINK FT
You can access the FollowMyHealth Patient Portal offered by [facility] by registering at the following website: [email or web address]. By joining [employer]’s FollowMyHealth portal, you will also be able to view your health information using other applications (apps) compatible with our system.

## 2025-04-22 NOTE — PROGRESS NOTE ADULT - PROBLEM SELECTOR PROBLEM 3
Anemia of chronic disease
Leg edema
Anemia of chronic disease
Leg edema

## 2025-04-22 NOTE — PROGRESS NOTE ADULT - SUBJECTIVE AND OBJECTIVE BOX
Patient is a 89y old  Female who presents with a chief complaint of Acute pino (22 Apr 2025 08:48)    HPI:  89yo F w/ PMH of HTN, HLD, T2DM, TIA, CKD III, Hx of R hip CRPP in 2016, compression Fx spine ,anemia  presents to the ED with her daughter for bilateral leg swelling times few days and fatigue for the past few days. Spoke to daughter via phone who reports that patient has a history of UTIs. Second daughter at bedside with the patient reports that she was having urinary frequency. Wanted to bring her in for evaluation and states that the legs have been bothering her for the past few days as well. Patient is from [place] and is compliant with medications. Was recently treated for UTI with abx.     Of note, patient was admitted 3/9-3/13 for management of weakness likely 2/2 UTI and sacral pain. UA was neg, no leukocytosis and was monitored off abx. Course was complicated by hypertensive urgency and bradycardia which was treated with BP med changes.     Denies fever, chills, chest pain, palpitations, SOB, cough, abdominal pain, nausea, vomiting, diarrhea, constipation, hematochezia, melena, urgency, dysuria, hematuria, headaches, changes in vision, dizziness, numbness, tingling. No other complaints at this time.    ED course:  Vital Signs T(F):  HR:98F  BP: 165/66  RR: 19  SpO2: 99 % on RA  Labs significant for: K+ 3.3, BUN 27, Cr 2.00, AlkP 959, , eGFR 23, pro-BNP 6542  UA: 30 protein, small LE, 25 wbc, mod bacteria  EKG: Sinus bradycardia with 1st degree AV block with premature supraventricular complexes, 51 bpm, QT/QTc 494/455 ms  In ED given,  x1 Rocephin, x 1 Lasix,     Imaging  CXR: negative  CT a/p: Diffuse gallbladder wall thickening/edema  RUQ US: Irregular gallbladder wall thickening to 1.2 cm increased from prior   study. Cholelithiasis. Consider acute or chronic cholecystitis. Prominent pancreas, incompletely assessed, which can also be characterized with MRI.  US duplex: No evidence of deep venous thrombosis in either lower extremity.     (16 Apr 2025 18:04)    INTERVAL HPI:  4/17: Pt seen and examined at bedside. Pt feels well -- offers no complaints. Denies any abdominal pain, nausea, vomiting. Was seen and evaluated by surgery -- no acute surgical intervention indicated at this time. Started on IV zosyn this AM. Mild anemia, Cr improved  4/18: Seen and examined at bedside. Patient states that her B/L LE edema has improved, with continued urinary urgency. Tolerating diet, BM x1 this morning. Denies fatigue, lightheadedness, dizziness, vision changes, chest pain, palpitations, abdominal pain, n/v/d/c. add  BP meds added   4/19: Seen and examined at bedside. Patient states that she continues to experience urinary frequency. No other acute concerns or complains. UCx with ESBL Ecoli. Up titrated Hydralazine to 50mg TID.Start  IV Invanz daily  4/20-Pt seen, examined, BP improving, Low stable H/H, MGUS ,On IV Invanz , d/c plan in AM   4/21: Pt seen and examined at bedside this morning, reports feeling well. Elevated BP, low stable H/H in setting on MGUS, last dose Invanz today  4/22: Pt seen and examined at bedside this AM, feeling well. BP improved today, s/p Invanz    OVERNIGHT EVENTS: Daughter states patient slightly confused this morning, likely hospital acquired delirium. Mental status back to baseline this morning with daughter present at bedside    Home Medications:  aspirin 81 mg oral delayed release tablet: 1 tab(s) orally every other day (16 Apr 2025 19:38)  atorvastatin 40 mg oral tablet: 1 tab(s) orally once a day (at bedtime) (16 Apr 2025 19:38)  famotidine 20 mg oral tablet: 1 tab(s) orally once a day (16 Apr 2025 19:38)  ferrous sulfate 324 mg (65 mg elemental iron) oral tablet: 1 tab(s) orally once a day (16 Apr 2025 19:38)  furosemide 20 mg oral tablet: 1 tab(s) orally once a day (16 Apr 2025 19:38)  Januvia 25 mg oral tablet: 1 tab(s) orally once a day (16 Apr 2025 19:38)      MEDICATIONS  (STANDING):  dextrose 5%. 1000 milliLiter(s) (50 mL/Hr) IV Continuous <Continuous>  dextrose 5%. 1000 milliLiter(s) (100 mL/Hr) IV Continuous <Continuous>  dextrose 50% Injectable 25 Gram(s) IV Push once  dextrose 50% Injectable 12.5 Gram(s) IV Push once  dextrose 50% Injectable 25 Gram(s) IV Push once  famotidine    Tablet 20 milliGRAM(s) Oral daily  ferrous    sulfate 325 milliGRAM(s) Oral daily  glucagon  Injectable 1 milliGRAM(s) IntraMuscular once  heparin   Injectable 5000 Unit(s) SubCutaneous every 12 hours  hydrALAZINE 100 milliGRAM(s) Oral every 8 hours  insulin lispro (ADMELOG) corrective regimen sliding scale   SubCutaneous three times a day before meals  insulin lispro (ADMELOG) corrective regimen sliding scale   SubCutaneous at bedtime  losartan 50 milliGRAM(s) Oral two times a day  metoprolol succinate  milliGRAM(s) Oral daily    MEDICATIONS  (PRN):  dextrose Oral Gel 15 Gram(s) Oral once PRN Blood Glucose LESS THAN 70 milliGRAM(s)/deciliter      amlodipine ([place])      Social History:  Tobacco: None  ETOH: None  Recreational/Illicit Drugs: None  Lives with: child, from [facility]  Ambulation: with walker   ADLs: Dependent (16 Apr 2025 18:04)      REVIEW OF SYSTEMS:  CONSTITUTIONAL: No fever, No chills, No fatigue, No myalgia, No Body ache, No Weakness  EYES: No eye pain,  No visual disturbances, No discharge, No Redness  ENMT: No ear pain, No nose bleed, No vertigo; No sinus pain, No throat pain, No Congestion  NECK: No pain, No stiffness  RESPIRATORY: No cough, No wheezing, No hemoptysis, No shortness of breath  CARDIOVASCULAR: No chest pain, No palpitations  GASTROINTESTINAL: No abdominal pain, No epigastric pain. No nausea, No vomiting, No diarrhea, No constipation; [  ] BM  GENITOURINARY: No dysuria, No frequency, No urgency, No hematuria, No incontinence  NEUROLOGICAL: No headaches, No dizziness, No numbness, No tingling, No tremors, No weakness  EXTREMITIES: No Swelling, No Pain, No Edema  SKIN: [x  ] No itching, burning, rashes, or lesions   MUSCULOSKELETAL: No joint pain, No joint swelling; No muscle pain, No back pain, No extremity pain  PSYCHIATRIC: No depression, No anxiety, No mood swings, No difficulty sleeping at night  PAIN SCALE: [ x ] None  [  ] Other-  ROS Unable to obtain due to: [  ] Dementia  [  ] Lethargy  [  ] Sedated  [  ] Non verbal  REST OF REVIEW OF SYSTEMS: [  ] Normal     Vital Signs Last 24 Hrs  T(C): 36.3 (22 Apr 2025 06:42), Max: 36.4 (21 Apr 2025 13:59)  T(F): 97.4 (22 Apr 2025 06:42), Max: 97.6 (21 Apr 2025 13:59)  HR: 81 (22 Apr 2025 05:30) (62 - 81)  BP: 180/65 (22 Apr 2025 05:30) (159/61 - 180/65)  BP(mean): --  RR: 18 (22 Apr 2025 05:30) (18 - 18)  SpO2: 96% (22 Apr 2025 05:30) (96% - 96%)    Parameters below as of 22 Apr 2025 05:30  Patient On (Oxygen Delivery Method): room air        CAPILLARY BLOOD GLUCOSE      POCT Blood Glucose.: 155 mg/dL (22 Apr 2025 07:40)  POCT Blood Glucose.: 169 mg/dL (21 Apr 2025 21:40)  POCT Blood Glucose.: 158 mg/dL (21 Apr 2025 16:38)  POCT Blood Glucose.: 128 mg/dL (21 Apr 2025 11:36)      I&O's Summary    21 Apr 2025 07:01  -  22 Apr 2025 07:00  --------------------------------------------------------  IN: 50 mL / OUT: 0 mL / NET: 50 mL      PHYSICAL EXAM:  GENERAL:  [ x ] NAD, [ x ] Well appearing, [  ] Agitated, [  ] Drowsy, [  ] Lethargy, [  ] Confused   HEAD:  [ x ] Normal, [  ] Other  EYES:  [x  ] EOMI, [x  ] PERRLA, [x  ] Conjunctiva and sclera clear normal, [  ] Other, [  ] Pallor, [  ] Discharge  ENMT:  [ x ] Normal, [x  ] Moist mucous membranes, [  ] Good dentition, [  ] No thrush  NECK:  [ x ] Supple, [ x ] No JVD, [  ] Normal thyroid, [  ] Lymphadenopathy, [  ] Other  CHEST/LUNG:  [x  ] Clear to auscultation bilaterally, [  ] Breath Sounds equal B/L / decreased, [  ] Poor effort, [x  ] No rales, [x  ] No rhonchi, [ x ] No wheezing  HEART:  [ x ] Regular rate and rhythm, [  ] Tachycardia, [  ] Bradycardia, [  ] Irregular, [ x ] No murmurs, No rubs, No gallops, [  ] PPM in place (Mfr:  )  ABDOMEN:  [ x ] Soft, [ x ] Nontender, [ x ] Nondistended, [  ] No mass, [  ] Bowel sounds present, [  ] Obese  NERVOUS SYSTEM:  [x  ] Alert & Oriented x3, [  x] Nonfocal, [  ] Confusion, [  ] Encephalopathic, [  ] Sedated, [  ] Unable to assess, [  ] Dementia, [  ] Other-  EXTREMITIES:  [ x ] 2+ Peripheral Pulses, No clubbing, No cyanosis,  [  ] Edema B/L lower EXT, [  ] PVD stasis skin changes B/L lower EXT, [  ] Wound  LYMPH:  No lymphadenopathy noted  SKIN:  [ x ] No rashes or lesions, [  ] Pressure ulcers, [  ] Ecchymosis, [  ] Skin tears, [  ] Other    DIET: Diet, Consistent Carbohydrate w/Evening Snack (04-17-25 @ 11:21)      LABS:      Ca    9.4        21 Apr 2025 06:25        Urinalysis Basic - ( 21 Apr 2025 06:25 )    Color: x / Appearance: x / SG: x / pH: x  Gluc: 120 mg/dL / Ketone: x  / Bili: x / Urobili: x   Blood: x / Protein: x / Nitrite: x   Leuk Esterase: x / RBC: x / WBC x   Sq Epi: x / Non Sq Epi: x / Bacteria: x      Culture Results:   >100,000 CFU/ml Escherichia coli ESBL  <10,000 CFU/ml Normal Urogenital husam present (04-16 @ 11:59)      CARDIAC MARKERS ( 16 Apr 2025 10:40 )  x     / x     / x     / x     / <1.0 ng/mL        Urinalysis with Rflx Culture (collected 16 Apr 2025 11:59)    Culture - Urine (collected 16 Apr 2025 11:59)  Source: Clean Catch  Final Report (18 Apr 2025 22:21):    >100,000 CFU/ml Escherichia coli ESBL    <10,000 CFU/ml Normal Urogenital husam present  Organism: Escherichia coli ESBL (18 Apr 2025 22:21)  Organism: Escherichia coli ESBL (18 Apr 2025 22:21)       Anemia Panel:  Vitamin B12, Serum: 915 pg/mL (04-17-25 @ 06:26)  Folate, Serum: 10.6 ng/mL (04-17-25 @ 06:26)  Ferritin: 1492 ng/mL (04-17-25 @ 06:26)  Iron Total: 29 ug/dL (04-17-25 @ 06:26)  Iron - Total Binding Capacity.: 184 ug/dL (04-17-25 @ 06:26)      Thyroid Panel:  T4, Serum: 8.1 ug/dL (04-17-25 @ 06:26)  Thyroid Stimulating Hormone, Serum: 4.40 uIU/mL (04-17-25 @ 06:26)            Serum Protein Electrophoresis Interp: Weak Gamma-Migrating Paraprotein Identified (04-17-25 @ 13:57)  Immunofixation, Serum:   Weak IgG Lambda Band Identified      Reference Range: None Detected (04-17-25 @ 13:57)      RADIOLOGY & ADDITIONAL TESTS:      HEALTH ISSUES - PROBLEM Dx:  Acute cholecystitis    UTI (urinary tract infection)    Chronic kidney disease    History of TIAs    Hypertension    T2DM (type 2 diabetes mellitus)    HLD (hyperlipidemia)    Need for prophylactic measure    Leg edema    Anemia of chronic disease          Consultant(s) Notes Reviewed:  [  ] YES     Care Discussed with [ x ] Consultants, [ x ] Patient, [ x ] Family, [  ] HCP, [  ] , [  ] Social Service, [  ] RN, [  ] Physical Therapy, [  ] Palliative Care Team  DVT PPX: [  ] Lovenox, [x  ] SC Heparin, [  ] Coumadin, [  ] Xarelto, [  ] Eliquis, [  ] Pradaxa, [  ] IV Heparin drip, [  ] SCD, [  ] Ambulation, [  ] Contraindicated 2/2 GI Bleed, [  ] Contraindicated 2/2  Bleed, [  ] Contraindicated 2/2 Brain Bleed  Advanced Directive: [  ] None, [  ] DNR/DNI Patient is a 89y old  Female who presents with a chief complaint of Acute pino (22 Apr 2025 08:48)    HPI:  89yo F w/ PMH of HTN, HLD, T2DM, TIA, CKD III, Hx of R hip CRPP in 2016, compression Fx spine ,anemia  presents to the ED with her daughter for bilateral leg swelling times few days and fatigue for the past few days. Spoke to daughter via phone who reports that patient has a history of UTIs. Second daughter at bedside with the patient reports that she was having urinary frequency. Wanted to bring her in for evaluation and states that the legs have been bothering her for the past few days as well. Patient is from [place] and is compliant with medications. Was recently treated for UTI with abx.     Of note, patient was admitted 3/9-3/13 for management of weakness likely 2/2 UTI and sacral pain. UA was neg, no leukocytosis and was monitored off abx. Course was complicated by hypertensive urgency and bradycardia which was treated with BP med changes.     Denies fever, chills, chest pain, palpitations, SOB, cough, abdominal pain, nausea, vomiting, diarrhea, constipation, hematochezia, melena, urgency, dysuria, hematuria, headaches, changes in vision, dizziness, numbness, tingling. No other complaints at this time.    ED course:  Vital Signs T(F):  HR:98F  BP: 165/66  RR: 19  SpO2: 99 % on RA  Labs significant for: K+ 3.3, BUN 27, Cr 2.00, AlkP 959, , eGFR 23, pro-BNP 6542  UA: 30 protein, small LE, 25 wbc, mod bacteria  EKG: Sinus bradycardia with 1st degree AV block with premature supraventricular complexes, 51 bpm, QT/QTc 494/455 ms  In ED given,  x1 Rocephin, x 1 Lasix,     Imaging  CXR: negative  CT a/p: Diffuse gallbladder wall thickening/edema  RUQ US: Irregular gallbladder wall thickening to 1.2 cm increased from prior   study. Cholelithiasis. Consider acute or chronic cholecystitis. Prominent pancreas, incompletely assessed, which can also be characterized with MRI.  US duplex: No evidence of deep venous thrombosis in either lower extremity.     (16 Apr 2025 18:04)    INTERVAL HPI:  4/17: Pt seen and examined at bedside. Pt feels well -- offers no complaints. Denies any abdominal pain, nausea, vomiting. Was seen and evaluated by surgery -- no acute surgical intervention indicated at this time. Started on IV zosyn this AM. Mild anemia, Cr improved  4/18: Seen and examined at bedside. Patient states that her B/L LE edema has improved, with continued urinary urgency. Tolerating diet, BM x1 this morning. Denies fatigue, lightheadedness, dizziness, vision changes, chest pain, palpitations, abdominal pain, n/v/d/c. add  BP meds added   4/19: Seen and examined at bedside. Patient states that she continues to experience urinary frequency. No other acute concerns or complains. UCx with ESBL Ecoli. Up titrated Hydralazine to 50mg TID.Start  IV Invanz daily  4/20-Pt seen, examined, BP improving, Low stable H/H, MGUS ,On IV Invanz , d/c plan in AM   4/21: Pt seen and examined at bedside this morning, reports feeling well. Elevated BP, low stable H/H in setting on MGUS, last dose Invanz today  4/22: Pt seen and examined at bedside this AM, feeling well. BP improved today, s/p Invanz    OVERNIGHT EVENTS: Daughter states patient slightly confused this morning, likely hospital acquired delirium. Mental status back to baseline this morning with daughter present at bedside ,BP improved ,D/C today     Home Medications:  aspirin 81 mg oral delayed release tablet: 1 tab(s) orally every other day (16 Apr 2025 19:38)  atorvastatin 40 mg oral tablet: 1 tab(s) orally once a day (at bedtime) (16 Apr 2025 19:38)  famotidine 20 mg oral tablet: 1 tab(s) orally once a day (16 Apr 2025 19:38)  ferrous sulfate 324 mg (65 mg elemental iron) oral tablet: 1 tab(s) orally once a day (16 Apr 2025 19:38)  furosemide 20 mg oral tablet: 1 tab(s) orally once a day (16 Apr 2025 19:38)  Januvia 25 mg oral tablet: 1 tab(s) orally once a day (16 Apr 2025 19:38)      MEDICATIONS  (STANDING):  dextrose 5%. 1000 milliLiter(s) (50 mL/Hr) IV Continuous <Continuous>  dextrose 5%. 1000 milliLiter(s) (100 mL/Hr) IV Continuous <Continuous>  dextrose 50% Injectable 25 Gram(s) IV Push once  dextrose 50% Injectable 12.5 Gram(s) IV Push once  dextrose 50% Injectable 25 Gram(s) IV Push once  famotidine    Tablet 20 milliGRAM(s) Oral daily  ferrous    sulfate 325 milliGRAM(s) Oral daily  glucagon  Injectable 1 milliGRAM(s) IntraMuscular once  heparin   Injectable 5000 Unit(s) SubCutaneous every 12 hours  hydrALAZINE 100 milliGRAM(s) Oral every 8 hours  insulin lispro (ADMELOG) corrective regimen sliding scale   SubCutaneous three times a day before meals  insulin lispro (ADMELOG) corrective regimen sliding scale   SubCutaneous at bedtime  losartan 50 milliGRAM(s) Oral two times a day  metoprolol succinate  milliGRAM(s) Oral daily    MEDICATIONS  (PRN):  dextrose Oral Gel 15 Gram(s) Oral once PRN Blood Glucose LESS THAN 70 milliGRAM(s)/deciliter      amlodipine ([place])      Social History:  Tobacco: None  ETOH: None  Recreational/Illicit Drugs: None  Lives with: child, from [facility]  Ambulation: with walker   ADLs: Dependent (16 Apr 2025 18:04)      REVIEW OF SYSTEMS: i am OK  CONSTITUTIONAL: No fever, No chills, No fatigue, No myalgia, No Body ache, No Weakness  EYES: No eye pain,  No visual disturbances, No discharge, No Redness  ENMT: No ear pain, No nose bleed, No vertigo; No sinus pain, No throat pain, No Congestion  NECK: No pain, No stiffness  RESPIRATORY: No cough, No wheezing, No hemoptysis, No shortness of breath  CARDIOVASCULAR: No chest pain, No palpitations  GASTROINTESTINAL: No abdominal pain, No epigastric pain. No nausea, No vomiting, No diarrhea, No constipation; [  ] BM  GENITOURINARY: No dysuria, No frequency, No urgency, No hematuria, No incontinence  NEUROLOGICAL: No headaches, No dizziness, No numbness, No tingling, No tremors, No weakness  EXTREMITIES: No Swelling, No Pain, No Edema  SKIN: [x  ] No itching, burning, rashes, or lesions   MUSCULOSKELETAL: No joint pain, No joint swelling; No muscle pain, No back pain, No extremity pain  PSYCHIATRIC: No depression, No anxiety, No mood swings, No difficulty sleeping at night  PAIN SCALE: [ x ] None  [  ] Other-  ROS Unable to obtain due to: [  ] Dementia  [  ] Lethargy  [  ] Sedated  [  ] Non verbal  REST OF REVIEW OF SYSTEMS: [x  ] Normal     Vital Signs Last 24 Hrs  T(C): 36.3 (22 Apr 2025 06:42), Max: 36.4 (21 Apr 2025 13:59)  T(F): 97.4 (22 Apr 2025 06:42), Max: 97.6 (21 Apr 2025 13:59)  HR: 81 (22 Apr 2025 05:30) (62 - 81)  BP: 180/65 (22 Apr 2025 05:30) (159/61 - 180/65)  BP(mean): --  RR: 18 (22 Apr 2025 05:30) (18 - 18)  SpO2: 96% (22 Apr 2025 05:30) (96% - 96%)    Parameters below as of 22 Apr 2025 05:30  Patient On (Oxygen Delivery Method): room air        CAPILLARY BLOOD GLUCOSE      POCT Blood Glucose.: 155 mg/dL (22 Apr 2025 07:40)  POCT Blood Glucose.: 169 mg/dL (21 Apr 2025 21:40)  POCT Blood Glucose.: 158 mg/dL (21 Apr 2025 16:38)  POCT Blood Glucose.: 128 mg/dL (21 Apr 2025 11:36)      I&O's Summary    21 Apr 2025 07:01  -  22 Apr 2025 07:00  --------------------------------------------------------  IN: 50 mL / OUT: 0 mL / NET: 50 mL      PHYSICAL EXAM:  GENERAL:  [ x ] NAD, [ x ] Well appearing, [  ] Agitated, [  ] Drowsy, [  ] Lethargy, [  ] Confused   HEAD:  [ x ] Normal, [  ] Other  EYES:  [x  ] EOMI, [x  ] PERRLA, [x  ] Conjunctiva and sclera clear normal, [  ] Other, [x  ] Pallor, [  ] Discharge  ENMT:  [ x ] Normal, [x  ] Moist mucous membranes, [  ] Good dentition, [ x ] No thrush  NECK:  [ x ] Supple, [ x ] No JVD, [x  ] Normal thyroid, [  ] Lymphadenopathy, [  ] Other  CHEST/LUNG:  [x  ] Clear to auscultation bilaterally, [x  ] Breath Sounds equal B/L / decreased, [x  ] Poor effort, [x  ] No rales, [x  ] No rhonchi, [ x ] No wheezing  HEART:  [ x ] Regular rate and rhythm, [  ] Tachycardia, [  ] Bradycardia, [  ] Irregular, [ x ] No murmurs, No rubs, No gallops, [  ] PPM in place (Mfr:  )  ABDOMEN:  [ x ] Soft, [ x ] Nontender, [ x ] Nondistended, [ x ] No mass, [ x ] Bowel sounds present, [ x ] Obese  NERVOUS SYSTEM:  [x  ] Alert & Oriented x3, [  x] Nonfocal, [  ] Confusion, [  ] Encephalopathic, [  ] Sedated, [  ] Unable to assess, [  ] Dementia, [  ] Other-  EXTREMITIES:  [ x ] 2+ Peripheral Pulses, No clubbing, No cyanosis,  [  ] Edema B/L lower EXT, [  ] PVD stasis skin changes B/L lower EXT, [  ] Wound  LYMPH:  No lymphadenopathy noted  SKIN:  [ x ] No rashes or lesions, [  ] Pressure ulcers, [  ] Ecchymosis, [  ] Skin tears, [  ] Other    DIET: Diet, Consistent Carbohydrate w/Evening Snack (04-17-25 @ 11:21)      LABS:      Ca    9.4        21 Apr 2025 06:25        Urinalysis Basic - ( 21 Apr 2025 06:25 )    Color: x / Appearance: x / SG: x / pH: x  Gluc: 120 mg/dL / Ketone: x  / Bili: x / Urobili: x   Blood: x / Protein: x / Nitrite: x   Leuk Esterase: x / RBC: x / WBC x   Sq Epi: x / Non Sq Epi: x / Bacteria: x      Culture Results:   >100,000 CFU/ml Escherichia coli ESBL  <10,000 CFU/ml Normal Urogenital husam present (04-16 @ 11:59)      CARDIAC MARKERS ( 16 Apr 2025 10:40 )  x     / x     / x     / x     / <1.0 ng/mL        Urinalysis with Rflx Culture (collected 16 Apr 2025 11:59)    Culture - Urine (collected 16 Apr 2025 11:59)  Source: Clean Catch  Final Report (18 Apr 2025 22:21):    >100,000 CFU/ml Escherichia coli ESBL    <10,000 CFU/ml Normal Urogenital husam present  Organism: Escherichia coli ESBL (18 Apr 2025 22:21)  Organism: Escherichia coli ESBL (18 Apr 2025 22:21)       Anemia Panel:  Vitamin B12, Serum: 915 pg/mL (04-17-25 @ 06:26)  Folate, Serum: 10.6 ng/mL (04-17-25 @ 06:26)  Ferritin: 1492 ng/mL (04-17-25 @ 06:26)  Iron Total: 29 ug/dL (04-17-25 @ 06:26)  Iron - Total Binding Capacity.: 184 ug/dL (04-17-25 @ 06:26)      Thyroid Panel:  T4, Serum: 8.1 ug/dL (04-17-25 @ 06:26)  Thyroid Stimulating Hormone, Serum: 4.40 uIU/mL (04-17-25 @ 06:26)            Serum Protein Electrophoresis Interp: Weak Gamma-Migrating Paraprotein Identified (04-17-25 @ 13:57)  Immunofixation, Serum:   Weak IgG Lambda Band Identified      Reference Range: None Detected (04-17-25 @ 13:57)      RADIOLOGY & ADDITIONAL TESTS:      HEALTH ISSUES - PROBLEM Dx:  Acute cholecystitis    UTI (urinary tract infection)    Chronic kidney disease    History of TIAs    Hypertension    T2DM (type 2 diabetes mellitus)    HLD (hyperlipidemia)    Need for prophylactic measure    Leg edema    Anemia of chronic disease      Consultant(s) Notes Reviewed:  [ x ] YES     Care Discussed with [ x ] Consultants, [ x ] Patient, [ x ] Family, [  ] HCP, [ x ] , [  ] Social Service, [x  ] RN, [  ] Physical Therapy, [  ] Palliative Care Team  DVT PPX: [  ] Lovenox, [x  ] SC Heparin, [  ] Coumadin, [  ] Xarelto, [  ] Eliquis, [  ] Pradaxa, [  ] IV Heparin drip, [  ] SCD, [  ] Ambulation, [  ] Contraindicated 2/2 GI Bleed, [  ] Contraindicated 2/2  Bleed, [  ] Contraindicated 2/2 Brain Bleed  Advanced Directive: [x  ] None, [  ] DNR/DNI

## 2025-04-22 NOTE — PROGRESS NOTE ADULT - ASSESSMENT
Transaminitis  Bilateral leg swelling  Acute cholecystitis    4/16 CT AP: Diffuse gallbladder wall thickening/edema.         RUQ US: Irregular gallbladder wall thickening to 1.2 cm increased from prior study. Cholelithiasis. Consider acute or chronic cholecystitis. Limited evaluation for underlying gallbladder mass.           HIDA: There is prompt, homogeneous uptake of radiopharmaceutical by the hepatocytes. Activity is first seen in the bowel at about 15 minutes. The gallbladder is not visualized at any time during the study. Findings are compatible with acute cholecystitis.    Plan:  - CT AP & RUQ US noted and discussed with patient and daughter   - Suspect transaminitis likely congestive vs reactive from cholecystitis  - LFTs overall downtrending, continue to trend CMP daily  - Avoid hepatoxic agents  - Surgery eval noted, per their note no acute surgical intervention/IR drainage at this time   - Antibiotics as per ID, to be completed on 4/21 per their note  - No contraindication from GI standpoint for discharge planning  - To follow

## 2025-04-22 NOTE — PROGRESS NOTE ADULT - REASON FOR ADMISSION
Acute pino

## 2025-04-22 NOTE — CASE MANAGEMENT PROGRESS NOTE - NSCMPROGRESSNOTE_GEN_ALL_CORE
As per Dr ISABELL Holguin, patient is medically cleared for discharge home today.  met and her daughter Maday to discussed discharge disposition and home care acceptance. Patient is known to [facility]. IMM Discharge notice reviewed with daughter Maday, she verbalized understanding  signed and copy given. Attestation form was reviewed by William and wellness nurse, Patient is cleared to return. Patient's daughter will transport him home. Patient verbalized understanding of the transition plan and is in agreement.  CM remains available throughout hospital stay.

## 2025-04-22 NOTE — PROGRESS NOTE ADULT - PROBLEM SELECTOR PLAN 4
Chronic Anemia ACd with CKD 3   Anemia work up  Hematology - Dr Renetta alvarez  CBC in AM  SPEP, immunofixation and quant Ig ordered by Heme-  SIFE with weak IgG-L band  SIFE-K 6.46 and SIFE-L 5.16 unimpressive  IgG 1177  SPEP m spike not quantifiable  Results C/W MGUS, no intervention
Chronic Anemia ACd with CKD 3   Anemia work up  Hematology -Dr Renetta alvarez  CBC in AM  SPEP, immunofixation and quant Ig ordered by Heme
Chronic Anemia ACd with CKD 3   Anemia work up  Hematology - Dr Renetta alvarez  CBC in AM  SPEP, immunofixation and quant Ig ordered by Heme
Chronic Anemia ACd with CKD 3   Anemia work up  Hematology - Dr Renetta alvarez  CBC in AM  SPEP, immunofixation and quant Ig ordered by Heme-  SIFE with weak IgG-L band  SIFE-K 6.46 and SIFE-L 5.16 unimpressive  IgG 1177  SPEP m spike not quantifiable  Results C/W MGUS, no intervention
Chronic Anemia ACd with CKD 3   Anemia work up  Hematology - Dr Renetta alvarez  CBC in AM  SPEP, immunofixation and quant Ig ordered by Heme
Chronic Anemia ACd with CKD 3   Anemia work up  Hematology - Dr Renetta alvarez  CBC in AM  SPEP, immunofixation and quant Ig ordered by Heme-  SIFE with weak IgG-L band  SIFE-K 6.46 and SIFE-L 5.16 unimpressive  IgG 1177  SPEP m spike not quantifiable  Results C/W MGUS, no intervention

## 2025-04-22 NOTE — PROGRESS NOTE ADULT - PROBLEM SELECTOR PLAN 2
- Patient has a history of frequent UTIs and has been having urinary urgency of the past few days. Was treated recently for UTI with abx.   - UA: 30 protein, small LE, 25 wbc, mod bacteria  - UCx with ESBL Ecoli   - Completed course of IV abx Invanz

## 2025-04-22 NOTE — PROGRESS NOTE ADULT - PROBLEM SELECTOR PLAN 9
chronic  - c/w statin

## 2025-04-22 NOTE — PROGRESS NOTE ADULT - ATTENDING COMMENTS
89yo F w/ PMH of HTN, HLD, T2DM, TIA, CKD III, Hx of R hip CRPP in 2016, Anemia, Compression Fx  presents to the ED with her daughter for bilateral leg swelling times few days and frequent urination currently admitted for acute pino. R/O UTI.  pt seen, examined, case & care plan d/w pt ,residents at detail.  Consults-  -ID Dr birmingham- d/w -E Coli ESBL-Urine C/s-will start IV Invanz 1 gm daily  GI-Dr Long - Abx table for d/c   SX -Dr Arreola- No drainage ,No surgery  Renal-DR OLIVO  group-CKD 3, cr stable on ARB  Hematology-Dr Jackson group -ACD -stable  AM labs   PO diet as per SX & GI  DVT ppx  D/W Dtr at bed side. about BP meds & IV Invanz   D/W CM -No d/c   Total care time is 60 minutes.
89yo F w/ PMH of HTN, HLD, T2DM, TIA, CKD III, Hx of R hip CRPP in 2016, Anemia, Compression Fx  presents to the ED with her daughter for bilateral leg swelling times few days and frequent urination currently admitted for acute pino. R/O UTI.  pt seen, examined, case & care plan d/w pt ,residents at detail.  Consults-  -ID Dr birmingham-PO Abx   GI-Dr Long - Abx , LFT's -PO Abx -stable for d/c   SX -Dr Arreola- No drainage ,No surgery  Renal-DR OLIVO  group-CKD 3, cr stable on ARB  Hematology-Dr Jackson group -ACD   AM labs   PO diet as per SX & GI  DVT ppx  D/W Dtr at bed side. possible d/c plan in AM if BP stable   Total care time is 60 minutes.
89yo F w/ PMH of HTN, HLD, T2DM, TIA, CKD III, Hx of R hip CRPP in 2016, Anemia, Compression Fx  presents to the ED with her daughter for bilateral leg swelling times few days and frequent urination currently admitted for acute pino. R/O UTI.  pt seen, examined, case & care plan d/w pt ,residents at detail.  Consults-  -ID Dr birmingham- d/w -E Coli ESBL- Urine C/s-will start IV Invanz 1 gm daily  GI-Dr Long - Abx table for d/c   SX -Dr Arreola- No drainage ,No surgery  Renal-DR OLIVO  group-CKD 3, cr stable on ARB, can restart Lasix on d/c   Hematology-Dr Jackson group -ACD -stable  AM labs   PO diet as per SX & GI  DVT ppx  D/W Dtr at bed side. about BP meds & IV Invanz   D/W CM -d/c plan in AM   Total care time is 60 minutes.
89yo F w/ PMH of HTN, HLD, T2DM, TIA, CKD III, Hx of R hip CRPP in 2016, Anemia, Compression Fx  presents to the ED with her daughter for bilateral leg swelling times few days and frequent urination currently admitted for acute pino. + UTI.  pt seen, examined, case & care plan d/w pt ,residents at detail.  Consults-  -ID Dr JENIFFER Holguin  d/w -E Coli ESBL-Urine C/s- completed IV Invanz 1 gm daily as d/w ID Dr JENIFFER Holguin   GI-Dr Dumont - Abx table for d/c   SX -Dr Arreola- No drainage ,No surgery  Renal-DR OLIVO  group-CKD 3, cr stable on ARB & restart lasix   Hematology-Dr Jackson group -ACD -stable -MGUS-No intervention   PO diet   DVT ppx  D/W Dtr at bed side. about BP meds change & out pt Cardio follow up  D/W CM -D/C today to retirement  Total d/c  care time is 60 minutes.
89yo F w/ PMH of HTN, HLD, T2DM, TIA, CKD III, Hx of R hip CRPP in 2016, Anemia, Compression Fx  presents to the ED with her daughter for bilateral leg swelling times few days and frequent urination currently admitted for acute pino. R/O UTI.  pt seen, examined, case & care plan d/w pt ,residents at detail.  Consults-  -ID Dr birmingham- d/w -E Coli ESBL-Urine C/s-will start IV Invanz 1 gm daily  GI-Dr Long - Abx table for d/c   SX -Dr Arreola- No drainage ,No surgery  Renal-DR OLIVO  group-CKD 3, cr stable on ARB  Hematology-Dr Jackson group -ACD -stable  AM labs   PO diet as per SX & GI  DVT ppx  D/W Dtr at bed side. about BP meds & IV Invanz   D/W CM -No d/c   Total care time is 60 minutes.
89yo F w/ PMH of HTN, HLD, T2DM, TIA, CKD III, Hx of R hip CRPP in 2016, Anemia, Compression Fx  presents to the ED with her daughter for bilateral leg swelling times few days and frequent urination currently admitted for acute pino. R/O UTI.  pt seen, examined, case & care plan d/w pt ,residents at detail.  Consults-  GI-Dr Long - Abx , LFT's   SX -Dr Arreola- No drainage ,No surgery  Renal-DR OLIVO  group-CKD 3, Ashley improved   Hematology-Dr Jackson group -ACD   AM labs   PO diet as per SX & GI  DVT ppx  D/W Dtr at bed side.  Total care time is 60 minutes.

## 2025-04-22 NOTE — PROGRESS NOTE ADULT - PROBLEM SELECTOR PLAN 8
Chronic on Januvia  - Low ISS  - a1c 7.0  - regular finger sticks  - consistent carb diet  - hypoglycemic protocol

## 2025-04-23 ENCOUNTER — EMERGENCY (EMERGENCY)
Facility: HOSPITAL | Age: 89
LOS: 1 days | End: 2025-04-23
Attending: EMERGENCY MEDICINE | Admitting: EMERGENCY MEDICINE
Payer: MEDICARE

## 2025-04-23 VITALS
OXYGEN SATURATION: 96 % | HEIGHT: 61 IN | SYSTOLIC BLOOD PRESSURE: 162 MMHG | TEMPERATURE: 98 F | RESPIRATION RATE: 16 BRPM | HEART RATE: 65 BPM | WEIGHT: 138.01 LBS | DIASTOLIC BLOOD PRESSURE: 74 MMHG

## 2025-04-23 VITALS
SYSTOLIC BLOOD PRESSURE: 160 MMHG | RESPIRATION RATE: 18 BRPM | DIASTOLIC BLOOD PRESSURE: 65 MMHG | TEMPERATURE: 98 F | OXYGEN SATURATION: 97 % | HEART RATE: 66 BPM

## 2025-04-23 DIAGNOSIS — E78.5 HYPERLIPIDEMIA, UNSPECIFIED: ICD-10-CM

## 2025-04-23 DIAGNOSIS — I12.9 HYPERTENSIVE CHRONIC KIDNEY DISEASE WITH STAGE 1 THROUGH STAGE 4 CHRONIC KIDNEY DISEASE, OR UNSPECIFIED CHRONIC KIDNEY DISEASE: ICD-10-CM

## 2025-04-23 DIAGNOSIS — E11.22 TYPE 2 DIABETES MELLITUS WITH DIABETIC CHRONIC KIDNEY DISEASE: ICD-10-CM

## 2025-04-23 DIAGNOSIS — Z86.73 PERSONAL HISTORY OF TRANSIENT ISCHEMIC ATTACK (TIA), AND CEREBRAL INFARCTION WITHOUT RESIDUAL DEFICITS: ICD-10-CM

## 2025-04-23 DIAGNOSIS — R53.1 WEAKNESS: ICD-10-CM

## 2025-04-23 DIAGNOSIS — Z96.7 PRESENCE OF OTHER BONE AND TENDON IMPLANTS: Chronic | ICD-10-CM

## 2025-04-23 PROBLEM — D63.8 ANEMIA IN OTHER CHRONIC DISEASES CLASSIFIED ELSEWHERE: Chronic | Status: ACTIVE | Noted: 2025-04-16

## 2025-04-23 PROBLEM — N18.30 CHRONIC KIDNEY DISEASE, STAGE 3 UNSPECIFIED: Chronic | Status: ACTIVE | Noted: 2025-04-16

## 2025-04-23 PROBLEM — S32.000A WEDGE COMPRESSION FRACTURE OF UNSPECIFIED LUMBAR VERTEBRA, INITIAL ENCOUNTER FOR CLOSED FRACTURE: Chronic | Status: ACTIVE | Noted: 2025-04-16

## 2025-04-23 LAB
ALBUMIN SERPL ELPH-MCNC: 2.6 G/DL — LOW (ref 3.3–5)
ALP SERPL-CCNC: 973 U/L — HIGH (ref 40–120)
ALT FLD-CCNC: 87 U/L — HIGH (ref 12–78)
ANION GAP SERPL CALC-SCNC: 4 MMOL/L — LOW (ref 5–17)
APPEARANCE UR: CLEAR — SIGNIFICANT CHANGE UP
APTT BLD: 31.3 SEC — SIGNIFICANT CHANGE UP (ref 26.1–36.8)
AST SERPL-CCNC: 226 U/L — HIGH (ref 15–37)
BACTERIA # UR AUTO: ABNORMAL /HPF
BASOPHILS # BLD AUTO: 0.06 K/UL — SIGNIFICANT CHANGE UP (ref 0–0.2)
BASOPHILS NFR BLD AUTO: 0.5 % — SIGNIFICANT CHANGE UP (ref 0–2)
BILIRUB SERPL-MCNC: 0.9 MG/DL — SIGNIFICANT CHANGE UP (ref 0.2–1.2)
BILIRUB UR-MCNC: NEGATIVE — SIGNIFICANT CHANGE UP
BUN SERPL-MCNC: 26 MG/DL — HIGH (ref 7–23)
CALCIUM SERPL-MCNC: 10 MG/DL — SIGNIFICANT CHANGE UP (ref 8.5–10.1)
CHLORIDE SERPL-SCNC: 104 MMOL/L — SIGNIFICANT CHANGE UP (ref 96–108)
CO2 SERPL-SCNC: 30 MMOL/L — SIGNIFICANT CHANGE UP (ref 22–31)
COLOR SPEC: YELLOW — SIGNIFICANT CHANGE UP
CREAT SERPL-MCNC: 2 MG/DL — HIGH (ref 0.5–1.3)
DIFF PNL FLD: NEGATIVE — SIGNIFICANT CHANGE UP
EGFR: 23 ML/MIN/1.73M2 — LOW
EGFR: 23 ML/MIN/1.73M2 — LOW
EOSINOPHIL # BLD AUTO: 0.33 K/UL — SIGNIFICANT CHANGE UP (ref 0–0.5)
EOSINOPHIL NFR BLD AUTO: 2.9 % — SIGNIFICANT CHANGE UP (ref 0–6)
EPI CELLS # UR: PRESENT
GLUCOSE SERPL-MCNC: 153 MG/DL — HIGH (ref 70–99)
GLUCOSE UR QL: NEGATIVE MG/DL — SIGNIFICANT CHANGE UP
HCT VFR BLD CALC: 31.1 % — LOW (ref 34.5–45)
HGB BLD-MCNC: 9.8 G/DL — LOW (ref 11.5–15.5)
IMM GRANULOCYTES NFR BLD AUTO: 0.6 % — SIGNIFICANT CHANGE UP (ref 0–0.9)
INR BLD: 1.13 RATIO — SIGNIFICANT CHANGE UP (ref 0.85–1.16)
KETONES UR-MCNC: NEGATIVE MG/DL — SIGNIFICANT CHANGE UP
LEUKOCYTE ESTERASE UR-ACNC: NEGATIVE — SIGNIFICANT CHANGE UP
LYMPHOCYTES # BLD AUTO: 1.37 K/UL — SIGNIFICANT CHANGE UP (ref 1–3.3)
LYMPHOCYTES # BLD AUTO: 12 % — LOW (ref 13–44)
MCHC RBC-ENTMCNC: 26.1 PG — LOW (ref 27–34)
MCHC RBC-ENTMCNC: 31.5 G/DL — LOW (ref 32–36)
MCV RBC AUTO: 82.9 FL — SIGNIFICANT CHANGE UP (ref 80–100)
MONOCYTES # BLD AUTO: 1.08 K/UL — HIGH (ref 0–0.9)
MONOCYTES NFR BLD AUTO: 9.5 % — SIGNIFICANT CHANGE UP (ref 2–14)
NEUTROPHILS # BLD AUTO: 8.51 K/UL — HIGH (ref 1.8–7.4)
NEUTROPHILS NFR BLD AUTO: 74.5 % — SIGNIFICANT CHANGE UP (ref 43–77)
NITRITE UR-MCNC: NEGATIVE — SIGNIFICANT CHANGE UP
NRBC BLD AUTO-RTO: 0 /100 WBCS — SIGNIFICANT CHANGE UP (ref 0–0)
PH UR: 5.5 — SIGNIFICANT CHANGE UP (ref 5–8)
PLATELET # BLD AUTO: 288 K/UL — SIGNIFICANT CHANGE UP (ref 150–400)
POTASSIUM SERPL-MCNC: 3.6 MMOL/L — SIGNIFICANT CHANGE UP (ref 3.5–5.3)
POTASSIUM SERPL-SCNC: 3.6 MMOL/L — SIGNIFICANT CHANGE UP (ref 3.5–5.3)
PROT SERPL-MCNC: 7.9 G/DL — SIGNIFICANT CHANGE UP (ref 6–8.3)
PROT UR-MCNC: 30 MG/DL
PROTHROM AB SERPL-ACNC: 13.2 SEC — SIGNIFICANT CHANGE UP (ref 9.9–13.4)
RBC # BLD: 3.75 M/UL — LOW (ref 3.8–5.2)
RBC # FLD: 16.4 % — HIGH (ref 10.3–14.5)
RBC CASTS # UR COMP ASSIST: 0 /HPF — SIGNIFICANT CHANGE UP (ref 0–4)
SODIUM SERPL-SCNC: 138 MMOL/L — SIGNIFICANT CHANGE UP (ref 135–145)
SP GR SPEC: 1.01 — SIGNIFICANT CHANGE UP (ref 1–1.03)
UROBILINOGEN FLD QL: 0.2 MG/DL — SIGNIFICANT CHANGE UP (ref 0.2–1)
WBC # BLD: 11.42 K/UL — HIGH (ref 3.8–10.5)
WBC # FLD AUTO: 11.42 K/UL — HIGH (ref 3.8–10.5)
WBC UR QL: 2 /HPF — SIGNIFICANT CHANGE UP (ref 0–5)

## 2025-04-23 PROCEDURE — 99283 EMERGENCY DEPT VISIT LOW MDM: CPT | Mod: 25

## 2025-04-23 PROCEDURE — 93010 ELECTROCARDIOGRAM REPORT: CPT

## 2025-04-23 PROCEDURE — 80053 COMPREHEN METABOLIC PANEL: CPT

## 2025-04-23 PROCEDURE — 81001 URINALYSIS AUTO W/SCOPE: CPT

## 2025-04-23 PROCEDURE — 85730 THROMBOPLASTIN TIME PARTIAL: CPT

## 2025-04-23 PROCEDURE — 93005 ELECTROCARDIOGRAM TRACING: CPT

## 2025-04-23 PROCEDURE — 85610 PROTHROMBIN TIME: CPT

## 2025-04-23 PROCEDURE — 99284 EMERGENCY DEPT VISIT MOD MDM: CPT

## 2025-04-23 PROCEDURE — 97162 PT EVAL MOD COMPLEX 30 MIN: CPT

## 2025-04-23 PROCEDURE — 36415 COLL VENOUS BLD VENIPUNCTURE: CPT

## 2025-04-23 PROCEDURE — 85025 COMPLETE CBC W/AUTO DIFF WBC: CPT

## 2025-05-18 ENCOUNTER — INPATIENT (INPATIENT)
Facility: HOSPITAL | Age: 89
LOS: 4 days | Discharge: NURSING, REHAB OR LONG-TERM CARE | DRG: 690 | End: 2025-05-23
Attending: INTERNAL MEDICINE | Admitting: STUDENT IN AN ORGANIZED HEALTH CARE EDUCATION/TRAINING PROGRAM
Payer: MEDICARE

## 2025-05-18 VITALS
TEMPERATURE: 102 F | HEART RATE: 78 BPM | RESPIRATION RATE: 18 BRPM | SYSTOLIC BLOOD PRESSURE: 138 MMHG | HEIGHT: 61 IN | WEIGHT: 130.07 LBS | DIASTOLIC BLOOD PRESSURE: 64 MMHG | OXYGEN SATURATION: 90 %

## 2025-05-18 DIAGNOSIS — Z96.7 PRESENCE OF OTHER BONE AND TENDON IMPLANTS: Chronic | ICD-10-CM

## 2025-05-18 LAB
APTT BLD: 29.6 SEC — SIGNIFICANT CHANGE UP (ref 26.1–36.8)
BASOPHILS # BLD AUTO: 0.04 K/UL — SIGNIFICANT CHANGE UP (ref 0–0.2)
BASOPHILS NFR BLD AUTO: 0.2 % — SIGNIFICANT CHANGE UP (ref 0–2)
EOSINOPHIL # BLD AUTO: 0.11 K/UL — SIGNIFICANT CHANGE UP (ref 0–0.5)
EOSINOPHIL NFR BLD AUTO: 0.6 % — SIGNIFICANT CHANGE UP (ref 0–6)
HCT VFR BLD CALC: 26.1 % — LOW (ref 34.5–45)
HGB BLD-MCNC: 8.7 G/DL — LOW (ref 11.5–15.5)
IMM GRANULOCYTES NFR BLD AUTO: 0.4 % — SIGNIFICANT CHANGE UP (ref 0–0.9)
INR BLD: 1.36 RATIO — HIGH (ref 0.85–1.16)
LYMPHOCYTES # BLD AUTO: 0.75 K/UL — LOW (ref 1–3.3)
LYMPHOCYTES # BLD AUTO: 4.3 % — LOW (ref 13–44)
MCHC RBC-ENTMCNC: 26.4 PG — LOW (ref 27–34)
MCHC RBC-ENTMCNC: 33.3 G/DL — SIGNIFICANT CHANGE UP (ref 32–36)
MCV RBC AUTO: 79.3 FL — LOW (ref 80–100)
MONOCYTES # BLD AUTO: 1.09 K/UL — HIGH (ref 0–0.9)
MONOCYTES NFR BLD AUTO: 6.3 % — SIGNIFICANT CHANGE UP (ref 2–14)
NEUTROPHILS # BLD AUTO: 15.2 K/UL — HIGH (ref 1.8–7.4)
NEUTROPHILS NFR BLD AUTO: 88.2 % — HIGH (ref 43–77)
NRBC BLD AUTO-RTO: 0 /100 WBCS — SIGNIFICANT CHANGE UP (ref 0–0)
PLATELET # BLD AUTO: 183 K/UL — SIGNIFICANT CHANGE UP (ref 150–400)
PROTHROM AB SERPL-ACNC: 16 SEC — HIGH (ref 9.9–13.4)
RBC # BLD: 3.29 M/UL — LOW (ref 3.8–5.2)
RBC # FLD: 15.7 % — HIGH (ref 10.3–14.5)
WBC # BLD: 17.26 K/UL — HIGH (ref 3.8–10.5)
WBC # FLD AUTO: 17.26 K/UL — HIGH (ref 3.8–10.5)

## 2025-05-18 PROCEDURE — 99285 EMERGENCY DEPT VISIT HI MDM: CPT

## 2025-05-18 PROCEDURE — 71045 X-RAY EXAM CHEST 1 VIEW: CPT | Mod: 26

## 2025-05-18 RX ORDER — ONDANSETRON HCL/PF 4 MG/2 ML
4 VIAL (ML) INJECTION ONCE
Refills: 0 | Status: COMPLETED | OUTPATIENT
Start: 2025-05-18 | End: 2025-05-18

## 2025-05-18 RX ORDER — PIPERACILLIN-TAZO-DEXTROSE,ISO 3.375G/5
3.38 IV SOLUTION, PIGGYBACK PREMIX FROZEN(ML) INTRAVENOUS ONCE
Refills: 0 | Status: COMPLETED | OUTPATIENT
Start: 2025-05-18 | End: 2025-05-18

## 2025-05-18 RX ORDER — ACETAMINOPHEN 500 MG/5ML
1000 LIQUID (ML) ORAL ONCE
Refills: 0 | Status: COMPLETED | OUTPATIENT
Start: 2025-05-18 | End: 2025-05-18

## 2025-05-18 RX ADMIN — Medication 1850 MILLILITER(S): at 23:50

## 2025-05-18 RX ADMIN — Medication 400 MILLIGRAM(S): at 23:50

## 2025-05-18 RX ADMIN — Medication 4 MILLIGRAM(S): at 23:50

## 2025-05-18 NOTE — ED PROVIDER NOTE - OBJECTIVE STATEMENT
89-year-old female history of anemia, diabetes, hypertension, hyperlipidemia, CKD, recent admission for recurrent UTI as well as cholelithiasis presenting from [place] for decreased p.o. intake over the last couple of weeks, positive nausea, NBNB emesis today, low back pain today.  Patient scheduled to see her GI doctor tomorrow regarding her gallbladder.  Found to be febrile on arrival.  Last took 500 mg Tylenol at 1900.

## 2025-05-18 NOTE — ED ADULT TRIAGE NOTE - CHIEF COMPLAINT QUOTE
BIBEMS from [facility] c/o vomiting, fever, unable to eat, burping; pt to have follow up appt with Dr. Dupree tomorrow; recently diagnosed with gall bladder sludge

## 2025-05-18 NOTE — ED PROVIDER NOTE - NSICDXPASTMEDICALHX_GEN_ALL_CORE_FT
PAST MEDICAL HISTORY:  Anemia of chronic disease     Diabetes     Hip fracture     HLD (hyperlipidemia)     Hypertension     Lumbar compression fracture     Stage 3 chronic kidney disease     TIA (transient ischemic attack)

## 2025-05-18 NOTE — ED PROVIDER NOTE - CARE PLAN
1 Principal Discharge DX:	Acute UTI  Secondary Diagnosis:	Sepsis  Secondary Diagnosis:	Cholelithiases

## 2025-05-18 NOTE — ED PROVIDER NOTE - PHYSICAL EXAMINATION
Gen:  ill-appearing elderly female  Head: NC/AT  Neck: trachea midline  Resp:  No distress, CTAB  CV: RRR  GI: soft, NTND  Ext: no deformities, no calf ttp, no LE edema  Neuro:  A&O appears non focal  Skin:  Warm and dry as visualized  Psych:  Normal affect and mood

## 2025-05-18 NOTE — ED PROVIDER NOTE - CLINICAL SUMMARY MEDICAL DECISION MAKING FREE TEXT BOX
patient presenting with weakness and decrease p.o. intake found to be febrile.  Recent mission for recurrent UTIs and cholelithiasis/possible chronic cholecystitis.  Plan for sepsis labs, abx, CTAP, US RUQ, likely admission

## 2025-05-19 DIAGNOSIS — N39.0 URINARY TRACT INFECTION, SITE NOT SPECIFIED: ICD-10-CM

## 2025-05-19 DIAGNOSIS — E78.5 HYPERLIPIDEMIA, UNSPECIFIED: ICD-10-CM

## 2025-05-19 DIAGNOSIS — N17.9 ACUTE KIDNEY FAILURE, UNSPECIFIED: ICD-10-CM

## 2025-05-19 DIAGNOSIS — N18.30 CHRONIC KIDNEY DISEASE, STAGE 3 UNSPECIFIED: ICD-10-CM

## 2025-05-19 DIAGNOSIS — Z29.9 ENCOUNTER FOR PROPHYLACTIC MEASURES, UNSPECIFIED: ICD-10-CM

## 2025-05-19 DIAGNOSIS — A41.9 SEPSIS, UNSPECIFIED ORGANISM: ICD-10-CM

## 2025-05-19 DIAGNOSIS — E11.9 TYPE 2 DIABETES MELLITUS WITHOUT COMPLICATIONS: ICD-10-CM

## 2025-05-19 DIAGNOSIS — D63.8 ANEMIA IN OTHER CHRONIC DISEASES CLASSIFIED ELSEWHERE: ICD-10-CM

## 2025-05-19 DIAGNOSIS — I10 ESSENTIAL (PRIMARY) HYPERTENSION: ICD-10-CM

## 2025-05-19 LAB
-  CTX-M RESISTANCE MARKER: SIGNIFICANT CHANGE UP
-  ESBL: SIGNIFICANT CHANGE UP
ALBUMIN SERPL ELPH-MCNC: 2.1 G/DL — LOW (ref 3.3–5)
ALP SERPL-CCNC: 870 U/L — HIGH (ref 40–120)
ALT FLD-CCNC: 50 U/L — SIGNIFICANT CHANGE UP (ref 12–78)
ANION GAP SERPL CALC-SCNC: 14 MMOL/L — SIGNIFICANT CHANGE UP (ref 5–17)
APPEARANCE UR: ABNORMAL
AST SERPL-CCNC: 103 U/L — HIGH (ref 15–37)
BILIRUB SERPL-MCNC: 1 MG/DL — SIGNIFICANT CHANGE UP (ref 0.2–1.2)
BILIRUB UR-MCNC: NEGATIVE — SIGNIFICANT CHANGE UP
BUN SERPL-MCNC: 27 MG/DL — HIGH (ref 7–23)
CALCIUM SERPL-MCNC: 8.6 MG/DL — SIGNIFICANT CHANGE UP (ref 8.5–10.1)
CHLORIDE SERPL-SCNC: 95 MMOL/L — LOW (ref 96–108)
CO2 SERPL-SCNC: 24 MMOL/L — SIGNIFICANT CHANGE UP (ref 22–31)
COLOR SPEC: YELLOW — SIGNIFICANT CHANGE UP
CREAT SERPL-MCNC: 2.3 MG/DL — HIGH (ref 0.5–1.3)
DIFF PNL FLD: NEGATIVE — SIGNIFICANT CHANGE UP
E COLI DNA BLD POS QL NAA+NON-PROBE: SIGNIFICANT CHANGE UP
EGFR: 19 ML/MIN/1.73M2 — LOW
EGFR: 19 ML/MIN/1.73M2 — LOW
FLUAV AG NPH QL: SIGNIFICANT CHANGE UP
FLUBV AG NPH QL: SIGNIFICANT CHANGE UP
GLUCOSE BLDC GLUCOMTR-MCNC: 114 MG/DL — HIGH (ref 70–99)
GLUCOSE BLDC GLUCOMTR-MCNC: 121 MG/DL — HIGH (ref 70–99)
GLUCOSE BLDC GLUCOMTR-MCNC: 163 MG/DL — HIGH (ref 70–99)
GLUCOSE SERPL-MCNC: 132 MG/DL — HIGH (ref 70–99)
GLUCOSE UR QL: NEGATIVE MG/DL — SIGNIFICANT CHANGE UP
GRAM STN FLD: ABNORMAL
GRAM STN FLD: ABNORMAL
KETONES UR QL: NEGATIVE MG/DL — SIGNIFICANT CHANGE UP
LACTATE SERPL-SCNC: 1.4 MMOL/L — SIGNIFICANT CHANGE UP (ref 0.7–2)
LACTATE SERPL-SCNC: 2.3 MMOL/L — HIGH (ref 0.7–2)
LEUKOCYTE ESTERASE UR-ACNC: ABNORMAL
METHOD TYPE: SIGNIFICANT CHANGE UP
NITRITE UR-MCNC: POSITIVE
PH UR: 5.5 — SIGNIFICANT CHANGE UP (ref 5–8)
POTASSIUM SERPL-MCNC: 3.1 MMOL/L — LOW (ref 3.5–5.3)
POTASSIUM SERPL-SCNC: 3.1 MMOL/L — LOW (ref 3.5–5.3)
PROT SERPL-MCNC: 7.5 G/DL — SIGNIFICANT CHANGE UP (ref 6–8.3)
PROT UR-MCNC: 100 MG/DL
RSV RNA NPH QL NAA+NON-PROBE: SIGNIFICANT CHANGE UP
SARS-COV-2 RNA SPEC QL NAA+PROBE: SIGNIFICANT CHANGE UP
SODIUM SERPL-SCNC: 133 MMOL/L — LOW (ref 135–145)
SOURCE RESPIRATORY: SIGNIFICANT CHANGE UP
SP GR SPEC: 1.01 — SIGNIFICANT CHANGE UP (ref 1–1.03)
SPECIMEN SOURCE: SIGNIFICANT CHANGE UP
SPECIMEN SOURCE: SIGNIFICANT CHANGE UP
UROBILINOGEN FLD QL: 1 MG/DL — SIGNIFICANT CHANGE UP (ref 0.2–1)

## 2025-05-19 PROCEDURE — 99222 1ST HOSP IP/OBS MODERATE 55: CPT | Mod: GC

## 2025-05-19 PROCEDURE — 99223 1ST HOSP IP/OBS HIGH 75: CPT

## 2025-05-19 PROCEDURE — 78227 HEPATOBIL SYST IMAGE W/DRUG: CPT | Mod: 26

## 2025-05-19 PROCEDURE — 74181 MRI ABDOMEN W/O CONTRAST: CPT | Mod: 26

## 2025-05-19 PROCEDURE — 93010 ELECTROCARDIOGRAM REPORT: CPT

## 2025-05-19 PROCEDURE — 74176 CT ABD & PELVIS W/O CONTRAST: CPT | Mod: 26

## 2025-05-19 PROCEDURE — 76705 ECHO EXAM OF ABDOMEN: CPT | Mod: 26

## 2025-05-19 RX ORDER — ASPIRIN 325 MG
81 TABLET ORAL DAILY
Refills: 0 | Status: DISCONTINUED | OUTPATIENT
Start: 2025-05-19 | End: 2025-05-23

## 2025-05-19 RX ORDER — MEROPENEM 1 G/30ML
500 INJECTION INTRAVENOUS EVERY 12 HOURS
Refills: 0 | Status: DISCONTINUED | OUTPATIENT
Start: 2025-05-20 | End: 2025-05-23

## 2025-05-19 RX ORDER — GLUCAGON 3 MG/1
1 POWDER NASAL ONCE
Refills: 0 | Status: DISCONTINUED | OUTPATIENT
Start: 2025-05-19 | End: 2025-05-23

## 2025-05-19 RX ORDER — PIPERACILLIN-TAZO-DEXTROSE,ISO 3.375G/5
3.38 IV SOLUTION, PIGGYBACK PREMIX FROZEN(ML) INTRAVENOUS EVERY 12 HOURS
Refills: 0 | Status: DISCONTINUED | OUTPATIENT
Start: 2025-05-19 | End: 2025-05-19

## 2025-05-19 RX ORDER — DEXTROSE 50 % IN WATER 50 %
12.5 SYRINGE (ML) INTRAVENOUS ONCE
Refills: 0 | Status: DISCONTINUED | OUTPATIENT
Start: 2025-05-19 | End: 2025-05-23

## 2025-05-19 RX ORDER — DEXTROSE 50 % IN WATER 50 %
25 SYRINGE (ML) INTRAVENOUS ONCE
Refills: 0 | Status: DISCONTINUED | OUTPATIENT
Start: 2025-05-19 | End: 2025-05-23

## 2025-05-19 RX ORDER — DEXTROSE 50 % IN WATER 50 %
15 SYRINGE (ML) INTRAVENOUS ONCE
Refills: 0 | Status: DISCONTINUED | OUTPATIENT
Start: 2025-05-19 | End: 2025-05-23

## 2025-05-19 RX ORDER — ONDANSETRON HCL/PF 4 MG/2 ML
4 VIAL (ML) INJECTION EVERY 8 HOURS
Refills: 0 | Status: DISCONTINUED | OUTPATIENT
Start: 2025-05-19 | End: 2025-05-23

## 2025-05-19 RX ORDER — SODIUM CHLORIDE 9 G/1000ML
1000 INJECTION, SOLUTION INTRAVENOUS
Refills: 0 | Status: DISCONTINUED | OUTPATIENT
Start: 2025-05-19 | End: 2025-05-23

## 2025-05-19 RX ORDER — INSULIN LISPRO 100 U/ML
INJECTION, SOLUTION INTRAVENOUS; SUBCUTANEOUS
Refills: 0 | Status: DISCONTINUED | OUTPATIENT
Start: 2025-05-19 | End: 2025-05-23

## 2025-05-19 RX ORDER — MEROPENEM 1 G/30ML
INJECTION INTRAVENOUS
Refills: 0 | Status: DISCONTINUED | OUTPATIENT
Start: 2025-05-19 | End: 2025-05-23

## 2025-05-19 RX ORDER — INSULIN LISPRO 100 U/ML
INJECTION, SOLUTION INTRAVENOUS; SUBCUTANEOUS AT BEDTIME
Refills: 0 | Status: DISCONTINUED | OUTPATIENT
Start: 2025-05-19 | End: 2025-05-23

## 2025-05-19 RX ORDER — ATORVASTATIN CALCIUM 80 MG/1
40 TABLET, FILM COATED ORAL AT BEDTIME
Refills: 0 | Status: DISCONTINUED | OUTPATIENT
Start: 2025-05-19 | End: 2025-05-19

## 2025-05-19 RX ORDER — METOPROLOL SUCCINATE 50 MG/1
100 TABLET, EXTENDED RELEASE ORAL DAILY
Refills: 0 | Status: DISCONTINUED | OUTPATIENT
Start: 2025-05-19 | End: 2025-05-23

## 2025-05-19 RX ORDER — HEPARIN SODIUM 1000 [USP'U]/ML
5000 INJECTION INTRAVENOUS; SUBCUTANEOUS EVERY 8 HOURS
Refills: 0 | Status: DISCONTINUED | OUTPATIENT
Start: 2025-05-19 | End: 2025-05-23

## 2025-05-19 RX ORDER — ALBUMIN (HUMAN) 12.5 G/50ML
100 INJECTION, SOLUTION INTRAVENOUS ONCE
Refills: 0 | Status: DISCONTINUED | OUTPATIENT
Start: 2025-05-19 | End: 2025-05-19

## 2025-05-19 RX ORDER — MEROPENEM 1 G/30ML
500 INJECTION INTRAVENOUS ONCE
Refills: 0 | Status: COMPLETED | OUTPATIENT
Start: 2025-05-19 | End: 2025-05-19

## 2025-05-19 RX ADMIN — Medication 1000 MILLIGRAM(S): at 01:32

## 2025-05-19 RX ADMIN — Medication 25 GRAM(S): at 13:55

## 2025-05-19 RX ADMIN — Medication 1850 MILLILITER(S): at 01:32

## 2025-05-19 RX ADMIN — Medication 75 MILLILITER(S): at 22:31

## 2025-05-19 RX ADMIN — Medication 200 GRAM(S): at 00:45

## 2025-05-19 RX ADMIN — Medication 10 MILLIEQUIVALENT(S): at 03:15

## 2025-05-19 RX ADMIN — Medication 4 MILLIGRAM(S): at 22:30

## 2025-05-19 RX ADMIN — Medication 50 MILLILITER(S): at 13:55

## 2025-05-19 RX ADMIN — Medication 1000 MILLILITER(S): at 06:11

## 2025-05-19 RX ADMIN — Medication 2 MILLIGRAM(S): at 11:47

## 2025-05-19 RX ADMIN — INSULIN LISPRO 1: 100 INJECTION, SOLUTION INTRAVENOUS; SUBCUTANEOUS at 20:46

## 2025-05-19 RX ADMIN — Medication 1000 MILLILITER(S): at 03:45

## 2025-05-19 RX ADMIN — HEPARIN SODIUM 5000 UNIT(S): 1000 INJECTION INTRAVENOUS; SUBCUTANEOUS at 22:30

## 2025-05-19 RX ADMIN — Medication 100 MILLIGRAM(S): at 20:41

## 2025-05-19 RX ADMIN — Medication 75 MILLILITER(S): at 06:46

## 2025-05-19 RX ADMIN — Medication 100 MILLIEQUIVALENT(S): at 02:11

## 2025-05-19 RX ADMIN — MEROPENEM 100 MILLIGRAM(S): 1 INJECTION INTRAVENOUS at 20:41

## 2025-05-19 RX ADMIN — Medication 20 MILLIGRAM(S): at 22:30

## 2025-05-19 RX ADMIN — Medication 3.38 GRAM(S): at 01:32

## 2025-05-19 RX ADMIN — Medication 40 MILLIEQUIVALENT(S): at 03:44

## 2025-05-19 RX ADMIN — HEPARIN SODIUM 5000 UNIT(S): 1000 INJECTION INTRAVENOUS; SUBCUTANEOUS at 13:55

## 2025-05-19 NOTE — PROGRESS NOTE ADULT - ASSESSMENT
Pt is a 89-year-old female w/ PMHx of anemia, diabetes, hypertension, hyperlipidemia, CKD Stage 3 who presents w/ decreased PO intake over the past several weeks. Pt is admitted for sepsis workup d/t UTI and acute cholecystitis. Pt is a 89-year-old female w/ PMHx of anemia, diabetes, hypertension, hyperlipidemia, CKD Stage 3 who presents w/ decreased PO intake over the past several weeks. Pt is admitted for sepsis workup d/t UTI and r/o  acute cholecystitis.

## 2025-05-19 NOTE — PROGRESS NOTE ADULT - PROBLEM SELECTOR PLAN 2
Chronic  -c/w home metoprolol succinate, and hydralazine  -Hold home losartan and lasix in setting of ELVIN Chronic  -c/w home metoprolol succinate, and hydralazine  -Hold home losartan and Lasix in setting of ELVIN

## 2025-05-19 NOTE — ED ADULT NURSE NOTE - CHIEF COMPLAINT QUOTE
BIBEMS from [facility] c/o vomiting, fever, unable to eat, burping; pt to have follow up appt with Dr. Dupree tomorrow; recently diagnosed with gall bladder sludge

## 2025-05-19 NOTE — H&P ADULT - PROBLEM SELECTOR PLAN 1
-T: 102.4, WBC: 17.26, Lactate: 2.3->1.4  UA: Cloudy, LEC: Large, Nitrite (+), Bacteria: Many  -RUQ US: Thick-walled gallbladder with cholelithiasis. Negative sonographic Kunz's sign. Overall findings are equivocal for acute cholecystitis,   HIDA scan may be considered for further evaluation.  -Bcxs x2 and Urine cx x1 f/u  -CT Abdomen and Pelvis Non-Contrast, f/u  -s/p Zosyn in ED. c/w Rocephin T: 102.4, WBC: 17.26, Lactate: 2.3->1.4  UA: Cloudy, LEC: Large, Nitrite (+), Bacteria: Many  -RUQ US: Thick-walled gallbladder with cholelithiasis. Negative sonographic Kunz's sign. Overall findings are equivocal for acute cholecystitis, HIDA scan may be considered for further evaluation.  -Bcxs x2 and Urine cx x1 f/u  -CT Abdomen and Pelvis Non-Contrast, f/u  -s/p Zosyn in ED. Start on Rocephin  -ID consulted (Dr. Holguin), f/u recs  -GI consulted (Dr. Dumont), f/u recs T: 102.4, WBC: 17.26, Lactate: 2.3->1.4  UA: Cloudy, LEC: Large, Nitrite (+), Bacteria: Many  -RUQ US: Thick-walled gallbladder with cholelithiasis. Negative sonographic Kunz's sign. Overall findings are equivocal for acute cholecystitis, HIDA scan may be considered for further evaluation.  -HIDA scan ordered, f/u  -Bcxs x2 and Urine cx x1 f/u  -CT Abdomen and Pelvis Non-Contrast, f/u  -s/p Zosyn in ED. c/w zosyn empirically  -ID consulted (Dr. Holguin), f/u recs  -GI consulted (Dr. Dumont), f/u recs

## 2025-05-19 NOTE — PATIENT PROFILE ADULT - FUNCTIONAL ASSESSMENT - BASIC MOBILITY 4.
Pt called and  Would like to continue PT and needs auth. Pt has video visit 10/29/21 want to be seen earlier- Please call to advise. 3 = A little assistance

## 2025-05-19 NOTE — PROGRESS NOTE ADULT - SUBJECTIVE AND OBJECTIVE BOX
Patient is a 89y old  Female who presents with a chief complaint of Sepsis (19 May 2025 14:07)    HPI:  Pt is a 89-year-old female w/ PMHx of anemia, diabetes, hypertension, hyperlipidemia, CKD Stage 3 who presents w/ decreased PO intake over past several weeks. Pt was admitted from - for acute cholecystitis and UTI. Pt's daughter at bedside provided much of the history. Pt was initially at rehab after discharge of which she was eating less than usually. Pt has been at [facility] since last Saturday to now in which she has had decrease P.O. intake w/ nausea and several episodes of emesis of which the daughter has described as biliary.. Pt's daughter also states that pt was c/o of back pain where she had a fracture last month.     IN THE ED:  Temp 102.4F, HR 78, /64, RR 18, SpO2 90%  S/P zofran, zosyn, 2.85 NS bolus, 1g ofirmev, KCl,   Labs significant for WBC: 17.26, H/H: 8.7/26.1, Na: 133, K: 3.1, BUN/Cr: 27/2.3, Alk Phos: 870, AST: 103, Lactate: 2.3  Imaging:RUQ US: Thick-walled gallbladder with cholelithiasis. Negative sonographic Kunz's sign. Overall findings are equivocal for acute cholecystitis, HIDA scan may be considered for further evaluation.  UA: (+) UTI   (19 May 2025 05:27)    INTERVAL HPI:  : Patient seen after HIDA scan performed- very lethargic d/t morphine . HIDA scan negative however family expresses concern about patients GI complaints including nausea, vomitting, diarrhea, abdominal pain.     OVERNIGHT EVENTS:    Home Medications:  aspirin 81 mg oral delayed release tablet: 1 tab(s) orally every other day (19 May 2025 06:21)  atorvastatin 40 mg oral tablet: 1 tab(s) orally once a day (at bedtime) (19 May 2025 06:21)  famotidine 20 mg oral tablet: 1 tab(s) orally once a day (19 May 2025 06:21)  furosemide 20 mg oral tablet: 1 tab(s) orally once a day (19 May 2025 06:21)  hydrALAZINE 100 mg oral tablet: 1 tab(s) orally 2 times a day (19 May 2025 06:20)  Januvia 25 mg oral tablet: 1 tab(s) orally once a day (19 May 2025 06:21)  omeprazole 20 mg oral delayed release capsule: 1 cap(s) orally once a day (19 May 2025 06:21)      MEDICATIONS  (STANDING):  aspirin enteric coated 81 milliGRAM(s) Oral daily  dextrose 5%. 1000 milliLiter(s) (50 mL/Hr) IV Continuous <Continuous>  dextrose 5%. 1000 milliLiter(s) (100 mL/Hr) IV Continuous <Continuous>  dextrose 50% Injectable 25 Gram(s) IV Push once  dextrose 50% Injectable 12.5 Gram(s) IV Push once  dextrose 50% Injectable 25 Gram(s) IV Push once  famotidine    Tablet 20 milliGRAM(s) Oral every 48 hours  glucagon  Injectable 1 milliGRAM(s) IntraMuscular once  heparin   Injectable 5000 Unit(s) SubCutaneous every 8 hours  hydrALAZINE 100 milliGRAM(s) Oral two times a day  insulin lispro (ADMELOG) corrective regimen sliding scale   SubCutaneous three times a day before meals  insulin lispro (ADMELOG) corrective regimen sliding scale   SubCutaneous at bedtime  metoprolol succinate  milliGRAM(s) Oral daily  ondansetron Injectable 4 milliGRAM(s) IV Push every 8 hours  piperacillin/tazobactam IVPB.. 3.375 Gram(s) IV Intermittent every 12 hours  sodium chloride 0.9%. 1000 milliLiter(s) (50 mL/Hr) IV Continuous <Continuous>    MEDICATIONS  (PRN):  dextrose Oral Gel 15 Gram(s) Oral once PRN Blood Glucose LESS THAN 70 milliGRAM(s)/deciliter      Allergies    amlodipine (Flushing)    Intolerances        Social History:  Tobacco: Remote hx  Alcohol: Denies  Illicit Drugs: Denies  Lives at: [facility]  Ambulation: w/ walker  ADLs: Dependent (19 May 2025 05:27)      REVIEW OF SYSTEMS:  CONSTITUTIONAL: No fever, No chills, No fatigue, No myalgia, No Body ache, No Weakness  EYES: No eye pain,  No visual disturbances, No discharge, NO Redness  ENMT:  No ear pain, No nose bleed, No vertigo; No sinus pain, NO throat pain, No Congestion  NECK: No pain, No stiffness  RESPIRATORY: No cough, NO wheezing, No  hemoptysis, NO  shortness of breath  CARDIOVASCULAR: No chest pain, palpitations  GASTROINTESTINAL: No abdominal pain, NO epigastric pain. No nausea, No vomiting; No diarrhea, No constipation. [  ] BM  GENITOURINARY: No dysuria, No frequency, No urgency, No hematuria, NO incontinence  NEUROLOGICAL: No headaches, No dizziness, No numbness, No tingling, No tremors, No weakness  EXT: No Swelling, No Pain, No Edema  SKIN:  [  ] No itching, burning, rashes, or lesions   MUSCULOSKELETAL: No joint pain ,No Jt swelling; No muscle pain, No back pain, No extremity pain  PSYCHIATRIC: No depression,  No anxiety,  No mood swings ,No difficulty sleeping at night  PAIN SCALE: [  ] None  [  ] Other-  ROS Unable to obtain due to - [  ] Dementia  [x  ] Lethargy [ x ] Drowsy [  ] Sedated [  ] non verbal  REST OF REVIEW Of SYSTEM - [  ] Normal     Vital Signs Last 24 Hrs  T(C): 36.4 (19 May 2025 07:39), Max: 39.1 (18 May 2025 22:59)  T(F): 97.5 (19 May 2025 07:39), Max: 102.4 (18 May 2025 22:59)  HR: 64 (19 May 2025 14:30) (54 - 85)  BP: 148/53 (19 May 2025 14:30) (123/56 - 175/68)  BP(mean): 76 (19 May 2025 03:47) (76 - 76)  RR: 18 (19 May 2025 14:30) (18 - 22)  SpO2: 98% (19 May 2025 14:30) (90% - 100%)    Parameters below as of 19 May 2025 14:30  Patient On (Oxygen Delivery Method): room air      Finger Stick          PHYSICAL EXAM:  GENERAL:  [ ] NAD , [  ] well appearing, [  ] Agitated, [ x ] Drowsy,  [ x ] Lethargy, [  ] confused   HEAD:  [  ] Normal, [  ] Other  EYES:  [  ] EOMI, [  ] PERRLA, [  ] conjunctiva and sclera clear normal, [  ] Other,  [  ] Pallor,[  ] Discharge  ENMT:  [  ] Normal, [  ] Moist mucous membranes, [  ] Good dentition, [  ] No Thrush  NECK:  [x ] Supple, [  ] No JVD, [  ] Normal thyroid, [  ] Lymphadenopathy [  ] Other  CHEST/LUNG:  [ x ] Clear to auscultation bilaterally, [  ] Breath Sounds equal B/L / Decrease, [  ] poor effort  [  ] No rales, [  ] No rhonchi  [  ]  No wheezing,   HEART:  [x  ] Regular rate and rhythm, [  ] tachycardia, [  ] Bradycardia,  [  ] irregular  [  ] No murmurs, No rubs, No gallops, [  ] PPM in place (Mfr:  )  ABDOMEN:  [ x ] Soft, [ x ] Nontender, [x  ] Nondistended, [  ] No mass, [  ] Bowel sounds present, [  ] obese  NERVOUS SYSTEM:  [  ] Alert & Oriented X3, [  ] Nonfocal  [  ] Confusion  [  ] Encephalopathic [ x ] Sedated [  ] Unable to assess, [  ] Dementia [  ] Other-  EXTREMITIES: [  ] 2+ Peripheral Pulses, No clubbing, No cyanosis,  [  ] edema B/L lower EXT. [  ] PVD stasis skin changes B/L Lower EXT, [  ] wound  LYMPH: No lymphadenopathy noted  SKIN:  [  ] No rashes or lesions, [  ] Pressure Ulcers, [  ] ecchymosis, [  ] Skin Tears, [  ] Other    DIET: Diet, DASH/TLC:   Sodium & Cholesterol Restricted (25 @ 14:57)      LABS:                        8.7    17.26 )-----------( 183      ( 18 May 2025 23:30 )             26.1     18 May 2025 23:30    133    |  95     |  27     ----------------------------<  132    3.1     |  24     |  2.30     Ca    8.6        18 May 2025 23:30    TPro  7.5    /  Alb  2.1    /  TBili  1.0    /  DBili  x      /  AST  103    /  ALT  50     /  AlkPhos  870    18 May 2025 23:30    PT/INR - ( 18 May 2025 23:30 )   PT: 16.0 sec;   INR: 1.36 ratio         PTT - ( 18 May 2025 23:30 )  PTT:29.6 sec  Urinalysis Basic - ( 19 May 2025 05:06 )    Color: Yellow / Appearance: Cloudy / S.014 / pH: x  Gluc: x / Ketone: x  / Bili: Negative / Urobili: 1.0 mg/dL   Blood: x / Protein: 100 mg/dL / Nitrite: Positive   Leuk Esterase: Large / RBC: 3 /HPF /  /HPF   Sq Epi: x / Non Sq Epi: x / Bacteria: Many /HPF        Culture Results:   Growth in anaerobic bottle: Gram Negative Rods ( @ 23:25)  Culture Results:   Growth in aerobic bottle: Gram Negative Rods  Direct identification is available within approximately 3-5  hours either by Blood Panel Multiplexed PCR or Direct  MALDI-TOF. Details: [email or web address] ( @ 23:10)      culture blood  -- Blood Blood-Peripheral  @ 23:25    culture urine  --   @ 23:25  culture blood  -- Blood Blood-Peripheral  @ 23:10    culture urine  --   @ 23:10              Urinalysis with Rflx Culture (collected 19 May 2025 05:06)    Culture - Blood (collected 18 May 2025 23:25)  Source: Blood Blood-Peripheral  Gram Stain (19 May 2025 14:24):    Growth in anaerobic bottle: Gram Negative Rods  Preliminary Report (19 May 2025 14:24):    Growth in anaerobic bottle: Gram Negative Rods    Culture - Blood (collected 18 May 2025 23:10)  Source: Blood Blood-Peripheral  Gram Stain (19 May 2025 14:24):    Growth in aerobic bottle: Gram Negative Rods  Preliminary Report (19 May 2025 14:25):    Growth in aerobic bottle: Gram Negative Rods    Direct identification is available within approximately 3-5    hours either by Blood Panel Multiplexed PCR or Direct    MALDI-TOF. Details: [email or web address]         Anemia Panel:      Thyroid Panel:                RADIOLOGY & ADDITIONAL TESTS:      HEALTH ISSUES - PROBLEM Dx:  HTN (hypertension)    HLD (hyperlipidemia)    Diabetes    Anemia of chronic disease    Need for prophylactic measure    Sepsis    Acute on chronic renal failure            Consultant(s) Notes Reviewed:  [  ] YES     Care Discussed with [X] Consultants  [  ] Patient  [  ] Family [  ] HCP [  ]   [  ] Social Service  [  ] RN, [  ] Physical Therapy,[  ] Palliative care team  DVT PPX: [  ] Lovenox, [  ] S C Heparin, [  ] Coumadin, [  ] Xarelto, [  ] Eliquis, [  ] Pradaxa, [  ] IV Heparin drip, [  ] SCD [  ] Contraindication 2 to GI Bleed,[  ] Ambulation [  ] Contraindicated 2 to  bleed [  ] Contraindicated 2 to Brain Bleed  Advanced directive: [  ] None, [  ] DNR/DNI Patient is a 89y old  Female who presents with a chief complaint of Sepsis (19 May 2025 14:07)    HPI:  Pt is a 89-year-old female w/ PMHx of anemia, diabetes, hypertension, hyperlipidemia, CKD Stage 3 who presents w/ decreased PO intake over past several weeks. Pt was admitted from - for acute cholecystitis and UTI. Pt's daughter at bedside provided much of the history. Pt was initially at rehab after discharge of which she was eating less than usually. Pt has been at [facility] since last Saturday to now in which she has had decrease P.O. intake w/ nausea and several episodes of emesis of which the daughter has described as biliary.. Pt's daughter also states that pt was c/o of back pain where she had a fracture last month.     IN THE ED:  Temp 102.4F, HR 78, /64, RR 18, SpO2 90%  S/P zofran, zosyn, 2.85 NS bolus, 1g ofirmev, KCl,   Labs significant for WBC: 17.26, H/H: 8.7/26.1, Na: 133, K: 3.1, BUN/Cr: 27/2.3, Alk Phos: 870, AST: 103, Lactate: 2.3  Imaging:RUQ US: Thick-walled gallbladder with cholelithiasis. Negative sonographic Kunz's sign. Overall findings are equivocal for acute cholecystitis, HIDA scan may be considered for further evaluation.  UA: (+) UTI   (19 May 2025 05:27)    INTERVAL HPI:  : Patient seen after HIDA scan performed- very lethargic d/t morphine . HIDA scan negative however family expresses concern about patients GI complaints including nausea, vomitting, diarrhea, abdominal pain. MRCP ordered.    OVERNIGHT EVENTS:    Home Medications:  aspirin 81 mg oral delayed release tablet: 1 tab(s) orally every other day (19 May 2025 06:21)  atorvastatin 40 mg oral tablet: 1 tab(s) orally once a day (at bedtime) (19 May 2025 06:21)  famotidine 20 mg oral tablet: 1 tab(s) orally once a day (19 May 2025 06:21)  furosemide 20 mg oral tablet: 1 tab(s) orally once a day (19 May 2025 06:21)  hydrALAZINE 100 mg oral tablet: 1 tab(s) orally 2 times a day (19 May 2025 06:20)  Januvia 25 mg oral tablet: 1 tab(s) orally once a day (19 May 2025 06:21)  omeprazole 20 mg oral delayed release capsule: 1 cap(s) orally once a day (19 May 2025 06:21)      MEDICATIONS  (STANDING):  aspirin enteric coated 81 milliGRAM(s) Oral daily  dextrose 5%. 1000 milliLiter(s) (50 mL/Hr) IV Continuous <Continuous>  dextrose 5%. 1000 milliLiter(s) (100 mL/Hr) IV Continuous <Continuous>  dextrose 50% Injectable 25 Gram(s) IV Push once  dextrose 50% Injectable 12.5 Gram(s) IV Push once  dextrose 50% Injectable 25 Gram(s) IV Push once  famotidine    Tablet 20 milliGRAM(s) Oral every 48 hours  glucagon  Injectable 1 milliGRAM(s) IntraMuscular once  heparin   Injectable 5000 Unit(s) SubCutaneous every 8 hours  hydrALAZINE 100 milliGRAM(s) Oral two times a day  insulin lispro (ADMELOG) corrective regimen sliding scale   SubCutaneous three times a day before meals  insulin lispro (ADMELOG) corrective regimen sliding scale   SubCutaneous at bedtime  metoprolol succinate  milliGRAM(s) Oral daily  ondansetron Injectable 4 milliGRAM(s) IV Push every 8 hours  piperacillin/tazobactam IVPB.. 3.375 Gram(s) IV Intermittent every 12 hours  sodium chloride 0.9%. 1000 milliLiter(s) (50 mL/Hr) IV Continuous <Continuous>    MEDICATIONS  (PRN):  dextrose Oral Gel 15 Gram(s) Oral once PRN Blood Glucose LESS THAN 70 milliGRAM(s)/deciliter      Allergies    amlodipine (Flushing)    Intolerances        Social History:  Tobacco: Remote hx  Alcohol: Denies  Illicit Drugs: Denies  Lives at: [facility]  Ambulation: w/ walker  ADLs: Dependent (19 May 2025 05:27)      REVIEW OF SYSTEMS:  CONSTITUTIONAL: No fever, No chills, No fatigue, No myalgia, No Body ache, No Weakness  EYES: No eye pain,  No visual disturbances, No discharge, NO Redness  ENMT:  No ear pain, No nose bleed, No vertigo; No sinus pain, NO throat pain, No Congestion  NECK: No pain, No stiffness  RESPIRATORY: No cough, NO wheezing, No  hemoptysis, NO  shortness of breath  CARDIOVASCULAR: No chest pain, palpitations  GASTROINTESTINAL: No abdominal pain, NO epigastric pain. No nausea, No vomiting; No diarrhea, No constipation. [  ] BM  GENITOURINARY: No dysuria, No frequency, No urgency, No hematuria, NO incontinence  NEUROLOGICAL: No headaches, No dizziness, No numbness, No tingling, No tremors, No weakness  EXT: No Swelling, No Pain, No Edema  SKIN:  [  ] No itching, burning, rashes, or lesions   MUSCULOSKELETAL: No joint pain ,No Jt swelling; No muscle pain, No back pain, No extremity pain  PSYCHIATRIC: No depression,  No anxiety,  No mood swings ,No difficulty sleeping at night  PAIN SCALE: [  ] None  [  ] Other-  ROS Unable to obtain due to - [  ] Dementia  [x  ] Lethargy [ x ] Drowsy [  ] Sedated [  ] non verbal  REST OF REVIEW Of SYSTEM - [  ] Normal     Vital Signs Last 24 Hrs  T(C): 36.4 (19 May 2025 07:39), Max: 39.1 (18 May 2025 22:59)  T(F): 97.5 (19 May 2025 07:39), Max: 102.4 (18 May 2025 22:59)  HR: 64 (19 May 2025 14:30) (54 - 85)  BP: 148/53 (19 May 2025 14:30) (123/56 - 175/68)  BP(mean): 76 (19 May 2025 03:47) (76 - 76)  RR: 18 (19 May 2025 14:30) (18 - 22)  SpO2: 98% (19 May 2025 14:30) (90% - 100%)    Parameters below as of 19 May 2025 14:30  Patient On (Oxygen Delivery Method): room air      Finger Stick          PHYSICAL EXAM:  GENERAL:  [ ] NAD , [  ] well appearing, [  ] Agitated, [ x ] Drowsy,  [ x ] Lethargy, [  ] confused   HEAD:  [  ] Normal, [  ] Other  EYES:  [  ] EOMI, [  ] PERRLA, [  ] conjunctiva and sclera clear normal, [  ] Other,  [  ] Pallor,[  ] Discharge  ENMT:  [  ] Normal, [  ] Moist mucous membranes, [  ] Good dentition, [  ] No Thrush  NECK:  [x ] Supple, [  ] No JVD, [  ] Normal thyroid, [  ] Lymphadenopathy [  ] Other  CHEST/LUNG:  [ x ] Clear to auscultation bilaterally, [  ] Breath Sounds equal B/L / Decrease, [  ] poor effort  [  ] No rales, [  ] No rhonchi  [  ]  No wheezing,   HEART:  [x  ] Regular rate and rhythm, [  ] tachycardia, [  ] Bradycardia,  [  ] irregular  [  ] No murmurs, No rubs, No gallops, [  ] PPM in place (Mfr:  )  ABDOMEN:  [ x ] Soft, [ x ] Nontender, [x  ] Nondistended, [  ] No mass, [  ] Bowel sounds present, [  ] obese  NERVOUS SYSTEM:  [  ] Alert & Oriented X3, [  ] Nonfocal  [  ] Confusion  [  ] Encephalopathic [ x ] Sedated [  ] Unable to assess, [  ] Dementia [  ] Other-  EXTREMITIES: [  ] 2+ Peripheral Pulses, No clubbing, No cyanosis,  [  ] edema B/L lower EXT. [  ] PVD stasis skin changes B/L Lower EXT, [  ] wound  LYMPH: No lymphadenopathy noted  SKIN:  [  ] No rashes or lesions, [  ] Pressure Ulcers, [  ] ecchymosis, [  ] Skin Tears, [  ] Other    DIET: Diet, DASH/TLC:   Sodium & Cholesterol Restricted (25 @ 14:57)      LABS:                        8.7    17.26 )-----------( 183      ( 18 May 2025 23:30 )             26.1     18 May 2025 23:30    133    |  95     |  27     ----------------------------<  132    3.1     |  24     |  2.30     Ca    8.6        18 May 2025 23:30    TPro  7.5    /  Alb  2.1    /  TBili  1.0    /  DBili  x      /  AST  103    /  ALT  50     /  AlkPhos  870    18 May 2025 23:30    PT/INR - ( 18 May 2025 23:30 )   PT: 16.0 sec;   INR: 1.36 ratio         PTT - ( 18 May 2025 23:30 )  PTT:29.6 sec  Urinalysis Basic - ( 19 May 2025 05:06 )    Color: Yellow / Appearance: Cloudy / S.014 / pH: x  Gluc: x / Ketone: x  / Bili: Negative / Urobili: 1.0 mg/dL   Blood: x / Protein: 100 mg/dL / Nitrite: Positive   Leuk Esterase: Large / RBC: 3 /HPF /  /HPF   Sq Epi: x / Non Sq Epi: x / Bacteria: Many /HPF        Culture Results:   Growth in anaerobic bottle: Gram Negative Rods ( @ 23:25)  Culture Results:   Growth in aerobic bottle: Gram Negative Rods  Direct identification is available within approximately 3-5  hours either by Blood Panel Multiplexed PCR or Direct  MALDI-TOF. Details: [email or web address] ( @ 23:10)      culture blood  -- Blood Blood-Peripheral  @ 23:25    culture urine  --   @ 23:25  culture blood  -- Blood Blood-Peripheral  @ 23:10    culture urine  --   @ 23:10              Urinalysis with Rflx Culture (collected 19 May 2025 05:06)    Culture - Blood (collected 18 May 2025 23:25)  Source: Blood Blood-Peripheral  Gram Stain (19 May 2025 14:24):    Growth in anaerobic bottle: Gram Negative Rods  Preliminary Report (19 May 2025 14:24):    Growth in anaerobic bottle: Gram Negative Rods    Culture - Blood (collected 18 May 2025 23:10)  Source: Blood Blood-Peripheral  Gram Stain (19 May 2025 14:24):    Growth in aerobic bottle: Gram Negative Rods  Preliminary Report (19 May 2025 14:25):    Growth in aerobic bottle: Gram Negative Rods    Direct identification is available within approximately 3-5    hours either by Blood Panel Multiplexed PCR or Direct    MALDI-TOF. Details: [email or web address]         Anemia Panel:      Thyroid Panel:                RADIOLOGY & ADDITIONAL TESTS:      HEALTH ISSUES - PROBLEM Dx:  HTN (hypertension)    HLD (hyperlipidemia)    Diabetes    Anemia of chronic disease    Need for prophylactic measure    Sepsis    Acute on chronic renal failure            Consultant(s) Notes Reviewed:  [  ] YES     Care Discussed with [X] Consultants  [  ] Patient  [  ] Family [  ] HCP [  ]   [  ] Social Service  [  ] RN, [  ] Physical Therapy,[  ] Palliative care team  DVT PPX: [  ] Lovenox, [  ] S C Heparin, [  ] Coumadin, [  ] Xarelto, [  ] Eliquis, [  ] Pradaxa, [  ] IV Heparin drip, [  ] SCD [  ] Contraindication 2 to GI Bleed,[  ] Ambulation [  ] Contraindicated 2 to  bleed [  ] Contraindicated 2 to Brain Bleed  Advanced directive: [  ] None, [  ] DNR/DNI Patient is a 89y old  Female who presents with a chief complaint of Sepsis (19 May 2025 14:07)    HPI:  Pt is a 89-year-old female w/ PMHx of anemia, diabetes, hypertension, hyperlipidemia, CKD Stage 3 who presents w/ decreased PO intake over past several weeks. Pt was admitted from - for acute cholecystitis and UTI. Pt's daughter at bedside provided much of the history. Pt was initially at rehab after discharge of which she was eating less than usually. Pt has been at [facility] since last Saturday to now in which she has had decrease P.O. intake w/ nausea and several episodes of emesis of which the daughter has described as biliary.. Pt's daughter also states that pt was c/o of back pain where she had a fracture last month.     IN THE ED:  Temp 102.4F, HR 78, /64, RR 18, SpO2 90%  S/P zofran, zosyn, 2.85 NS bolus, 1g ofirmev, KCl,   Labs significant for WBC: 17.26, H/H: 8.7/26.1, Na: 133, K: 3.1, BUN/Cr: 27/2.3, Alk Phos: 870, AST: 103, Lactate: 2.3  Imaging:RUQ US: Thick-walled gallbladder with cholelithiasis. Negative sonographic Kunz's sign. Overall findings are equivocal for acute cholecystitis, HIDA scan may be considered for further evaluation.  UA: (+) UTI   (19 May 2025 05:27)    INTERVAL HPI:  : Patient seen after HIDA scan performed- very lethargic d/t morphine . HIDA scan negative however family expresses concern about patients GI complaints including nausea, vomitting, diarrhea, abdominal pain. HIDA scan -Neg,  MRCP ordered.    OVERNIGHT EVENTS: NONE    Home Medications:  aspirin 81 mg oral delayed release tablet: 1 tab(s) orally every other day (19 May 2025 06:21)  atorvastatin 40 mg oral tablet: 1 tab(s) orally once a day (at bedtime) (19 May 2025 06:21)  famotidine 20 mg oral tablet: 1 tab(s) orally once a day (19 May 2025 06:21)  furosemide 20 mg oral tablet: 1 tab(s) orally once a day (19 May 2025 06:21)  hydrALAZINE 100 mg oral tablet: 1 tab(s) orally 2 times a day (19 May 2025 06:20)  Januvia 25 mg oral tablet: 1 tab(s) orally once a day (19 May 2025 06:21)  omeprazole 20 mg oral delayed release capsule: 1 cap(s) orally once a day (19 May 2025 06:21)      MEDICATIONS  (STANDING):  aspirin enteric coated 81 milliGRAM(s) Oral daily  dextrose 5%. 1000 milliLiter(s) (50 mL/Hr) IV Continuous <Continuous>  dextrose 5%. 1000 milliLiter(s) (100 mL/Hr) IV Continuous <Continuous>  dextrose 50% Injectable 25 Gram(s) IV Push once  dextrose 50% Injectable 12.5 Gram(s) IV Push once  dextrose 50% Injectable 25 Gram(s) IV Push once  famotidine    Tablet 20 milliGRAM(s) Oral every 48 hours  glucagon  Injectable 1 milliGRAM(s) IntraMuscular once  heparin   Injectable 5000 Unit(s) SubCutaneous every 8 hours  hydrALAZINE 100 milliGRAM(s) Oral two times a day  insulin lispro (ADMELOG) corrective regimen sliding scale   SubCutaneous three times a day before meals  insulin lispro (ADMELOG) corrective regimen sliding scale   SubCutaneous at bedtime  metoprolol succinate  milliGRAM(s) Oral daily  ondansetron Injectable 4 milliGRAM(s) IV Push every 8 hours  piperacillin/tazobactam IVPB.. 3.375 Gram(s) IV Intermittent every 12 hours  sodium chloride 0.9%. 1000 milliLiter(s) (50 mL/Hr) IV Continuous <Continuous>    MEDICATIONS  (PRN):  dextrose Oral Gel 15 Gram(s) Oral once PRN Blood Glucose LESS THAN 70 milliGRAM(s)/deciliter      Allergies    amlodipine (Flushing)    Intolerances        Social History:  Tobacco: Remote hx  Alcohol: Denies  Illicit Drugs: Denies  Lives at: [facility]  Ambulation: w/ walker  ADLs: Dependent (19 May 2025 05:27)      REVIEW OF SYSTEMS:  CONSTITUTIONAL: No fever, No chills, No fatigue, No myalgia, No Body ache, No Weakness  EYES: No eye pain,  No visual disturbances, No discharge, NO Redness  ENMT:  No ear pain, No nose bleed, No vertigo; No sinus pain, NO throat pain, No Congestion  NECK: No pain, No stiffness  RESPIRATORY: No cough, NO wheezing, No  hemoptysis, NO  shortness of breath  CARDIOVASCULAR: No chest pain, palpitations  GASTROINTESTINAL: No abdominal pain, NO epigastric pain. No nausea, No vomiting; No diarrhea, No constipation. [  ] BM  GENITOURINARY: No dysuria, No frequency, No urgency, No hematuria, NO incontinence  NEUROLOGICAL: No headaches, No dizziness, No numbness, No tingling, No tremors, No weakness  EXT: No Swelling, No Pain, No Edema  SKIN:  [ x ] No itching, burning, rashes, or lesions   MUSCULOSKELETAL: No joint pain ,No Jt swelling; No muscle pain, No back pain, No extremity pain  PSYCHIATRIC: No depression,  No anxiety,  No mood swings ,No difficulty sleeping at night  PAIN SCALE: [x  ] None  [  ] Other-  ROS Unable to obtain due to - [  ] Dementia  [x  ] Lethargy [ x ] Drowsy [  ] Sedated [  ] non verbal  REST OF REVIEW Of SYSTEM - [x  ] Normal     Vital Signs Last 24 Hrs  T(C): 36.4 (19 May 2025 07:39), Max: 39.1 (18 May 2025 22:59)  T(F): 97.5 (19 May 2025 07:39), Max: 102.4 (18 May 2025 22:59)  HR: 64 (19 May 2025 14:30) (54 - 85)  BP: 148/53 (19 May 2025 14:30) (123/56 - 175/68)  BP(mean): 76 (19 May 2025 03:47) (76 - 76)  RR: 18 (19 May 2025 14:30) (18 - 22)  SpO2: 98% (19 May 2025 14:30) (90% - 100%)    Parameters below as of 19 May 2025 14:30  Patient On (Oxygen Delivery Method): room air      Finger Stick          PHYSICAL EXAM:  GENERAL:  [x ] NAD , [ x ] well appearing, [  ] Agitated, [ x ] Drowsy,  [ x ] Lethargy, [  ] confused   HEAD:  [  ] Normal, [  ] Other  EYES:  [ x ] EOMI, [x  ] PERRLA, [  ] conjunctiva and sclera clear normal, [  ] Other,  [  ] Pallor,[  ] Discharge  ENMT:  [x  ] Normal, [x  ] Moist mucous membranes, [ x ] Good dentition, [x  ] No Thrush  NECK:  [x ] Supple, [ x ] No JVD, [x  ] Normal thyroid, [  ] Lymphadenopathy [  ] Other  CHEST/LUNG:  [ x ] Clear to auscultation bilaterally, [  ] Breath Sounds equal B/L / Decrease, [x  ] poor effort  [x  ] No rales, [ x ] No rhonchi  [ x ]  No wheezing,   HEART:  [x  ] Regular rate and rhythm, [  ] tachycardia, [  ] Bradycardia,  [  ] irregular  [  ] No murmurs, No rubs, No gallops, [  ] PPM in place (Mfr:  )  ABDOMEN:  [ x ] Soft, [ x ]  mild tender,Rt U Q ,NO R/R/G  [x  ] Nondistended, [x  ] No mass, [ x ] Bowel sounds present, [x  ] obese  NERVOUS SYSTEM:  [  ] Alert & Oriented X 3, [ x ] Nonfocal  [  ] Confusion  [  ] Encephalopathic [ x ] Sedated [  ] Unable to assess, [  ] Dementia [  ] Other-  EXTREMITIES: [  ] 2+ Peripheral Pulses, No clubbing, No cyanosis,  [  ] edema B/L lower EXT. [  ] PVD stasis skin changes B/L Lower EXT, [  ] wound  LYMPH: No lymphadenopathy noted  SKIN:  [  x] No rashes or lesions, [  ] Pressure Ulcers, [  ] ecchymosis, [  ] Skin Tears, [  ] Other    DIET: Diet, DASH/TLC:   Sodium & Cholesterol Restricted (25 @ 14:57)      LABS:                        8.7    17.26 )-----------( 183      ( 18 May 2025 23:30 )             26.1     18 May 2025 23:30    133    |  95     |  27     ----------------------------<  132    3.1     |  24     |  2.30     Ca    8.6        18 May 2025 23:30    TPro  7.5    /  Alb  2.1    /  TBili  1.0    /  DBili  x      /  AST  103    /  ALT  50     /  AlkPhos  870    18 May 2025 23:30    PT/INR - ( 18 May 2025 23:30 )   PT: 16.0 sec;   INR: 1.36 ratio         PTT - ( 18 May 2025 23:30 )  PTT:29.6 sec  Urinalysis Basic - ( 19 May 2025 05:06 )    Color: Yellow / Appearance: Cloudy / S.014 / pH: x  Gluc: x / Ketone: x  / Bili: Negative / Urobili: 1.0 mg/dL   Blood: x / Protein: 100 mg/dL / Nitrite: Positive   Leuk Esterase: Large / RBC: 3 /HPF /  /HPF   Sq Epi: x / Non Sq Epi: x / Bacteria: Many /HPF        Culture Results:   Growth in anaerobic bottle: Gram Negative Rods ( @ 23:25)  Culture Results:   Growth in aerobic bottle: Gram Negative Rods  Direct identification is available within approximately 3-5  hours either by Blood Panel Multiplexed PCR or Direct  MALDI-TOF. Details: [email or web address] ( @ 23:10)      culture blood  -- Blood Blood-Peripheral 18 @ 23:25    culture urine  --   @ 23:25  culture blood  -- Blood Blood-Peripheral  @ 23:10    culture urine  --   @ 23:10        Urinalysis with Rflx Culture (collected 19 May 2025 05:06)    Culture - Blood (collected 18 May 2025 23:25)  Source: Blood Blood-Peripheral  Gram Stain (19 May 2025 14:24):    Growth in anaerobic bottle: Gram Negative Rods  Preliminary Report (19 May 2025 14:24):    Growth in anaerobic bottle: Gram Negative Rods    Culture - Blood (collected 18 May 2025 23:10)  Source: Blood Blood-Peripheral  Gram Stain (19 May 2025 14:24):    Growth in aerobic bottle: Gram Negative Rods  Preliminary Report (19 May 2025 14:25):    Growth in aerobic bottle: Gram Negative Rods    Direct identification is available within approximately 3-5    hours either by Blood Panel Multiplexed PCR or Direct    MALDI-TOF. Details: [email or web address]         RADIOLOGY & ADDITIONAL TESTS:    < from: NM Hepatobiliary Imaging (25 @ 15:04) >    OTHER STUDIES USED FOR CORRELATION: None    FINDINGS: There is prompt, homogeneous uptake of radiopharmaceutical by   the hepatocytes. Activity is first in the bowel at about 20 minutes and   in the gallbladder about 10 minutes after morphine administration. There   is good clearance of activity from the liver at the end of the study.    IMPRESSION: Normal morphine-augmented hepatobiliary scan. No evidence of   acute cholecystitis or biliary obstruction.      < end of copied text >    HEALTH ISSUES - PROBLEM Dx:  HTN (hypertension)    HLD (hyperlipidemia)    Diabetes    Anemia of chronic disease    Need for prophylactic measure    Sepsis    Acute on chronic renal failure    Consultant(s) Notes Reviewed:  [ x ] YES     Care Discussed with [X] Consultants  [x  ] Patient  [x  ] Family [  ] HCP [  ]   [  ] Social Service  [ x ] RN, [  ] Physical Therapy,[  ] Palliative care team  DVT PPX: [  ] Lovenox, [x  ] S C Heparin, [  ] Coumadin, [  ] Xarelto, [  ] Eliquis, [  ] Pradaxa, [  ] IV Heparin drip, [  ] SCD [  ] Contraindication 2 to GI Bleed,[  ] Ambulation [  ] Contraindicated 2 to  bleed [  ] Contraindicated 2 to Brain Bleed  Advanced directive: [ x ] None, [  ] DNR/DNI

## 2025-05-19 NOTE — ED ADULT NURSE REASSESSMENT NOTE - NS ED NURSE REASSESS COMMENT FT1
Received the patient in the ER at the time of change of shift from holding nurse Stanley. Patient is alert and oriented. Pending for bed availability.

## 2025-05-19 NOTE — PHARMACOTHERAPY INTERVENTION NOTE - COMMENTS
Recommended to change piperacillin/tazobactam to meropenem 500 mg IV q12h (CrCl = 12 mL/min) since the 5/18 blood culture grew ESBL E. coli.    Olegario Salmon, PharmD, [facility]  Clinical Pharmacy Specialist, Infectious Diseases  Tele-Antimicrobial Stewardship Program (Tele-ASP)  Available on [employer] Teams

## 2025-05-19 NOTE — H&P ADULT - NSHPSOCIALHISTORY_GEN_ALL_CORE
Tobacco: Remote hx  Alcohol: Denies  Illicit Drugs: Denies  Lives at: [facility]  Ambulation: w/ walker  ADLs: Dependent

## 2025-05-19 NOTE — PATIENT PROFILE ADULT - ARRIVAL FROM
[facility]/Assisted living facility [facility]/Assisted living facility

## 2025-05-19 NOTE — H&P ADULT - NSHPREVIEWOFSYSTEMS_GEN_ALL_CORE
REVIEW OF SYSTEMS:  CONSTITUTIONAL: No fever/chills or appetite changes.  HENMT: No HA, lightheadedness/dizziness  RESPIRATORY: No cough, wheezing, hemoptysis; No shortness of breath.  CARDIOVASCULAR: No chest pain, palpitations.  GASTROINTESTINAL: No abdominal or epigastric pain. No nausea or vomiting; No diarrhea or constipation.  GENITOURINARY: No dysuria, changes in frequency, hematuria, or incontinence  NEUROLOGICAL: Baseline strength. Sensation intact bilaterally.  SKIN: No itching, rashes  MUSCULOSKELETAL: No joint pain or swelling; No muscle, back, or extremity pain REVIEW OF SYSTEMS:  CONSTITUTIONAL: No fever/chills or appetite changes.  HENMT: No HA, lightheadedness/dizziness  RESPIRATORY:  No shortness of breath.  CARDIOVASCULAR: No chest pain, palpitations.  GASTROINTESTINAL: No abdominal or epigastric pain. No nausea or vomiting; No diarrhea or constipation.  GENITOURINARY: (+) urinary frequency. No dysuria,, hematuria, or incontinence REVIEW OF SYSTEMS:  CONSTITUTIONAL: No fever/chills or appetite changes.  HENMT: No lightheadedness/dizziness  RESPIRATORY:  No shortness of breath.  CARDIOVASCULAR: No chest pain, palpitations.  GASTROINTESTINAL: No abdominal or epigastric pain. No nausea or vomiting; No diarrhea or constipation.  GENITOURINARY: (+) urinary frequency. No dysuria,, hematuria, or incontinence

## 2025-05-19 NOTE — PROGRESS NOTE ADULT - PROBLEM SELECTOR PLAN 5
ELVIN on CKD stage 3   Cr: 2.3. Baseline of 1.8  -c/w IVF  - Dr. Ramos Nephro ordered ELVIN on CKD stage 3   Cr: 2.3. Baseline of 1.8  -c/w IVF  - Dr. Rachel Ugarte called

## 2025-05-19 NOTE — H&P ADULT - PROBLEM SELECTOR PLAN 2
Chronic  -c/w home metoprolol succinate, losartan, and hydralazine Chronic  -c/w home metoprolol succinate, and hydralazine  -Hold home losartan and lasix in setting of ELVIN

## 2025-05-19 NOTE — ED ADULT NURSE NOTE - OBJECTIVE STATEMENT
Pt is aaox4 and follows command. Pt c/o abdominal pain, nausea and fever. Temp of 102.4 F obtained in triage. IV line placed, sepsis labs drawn and fluid & med administration started as ordered. Pt placed on continuous cardiac, O2 sat and BP monitoring. Pt's O2 sat noted to be in high 80's in RA. 2L of O2 provided via nasal cannula and the O2 sat improved to 95%. Pt placed on puerwick. Plan of care ongoing.

## 2025-05-19 NOTE — PROGRESS NOTE ADULT - PROBLEM SELECTOR PLAN 3
Chronic  -Holding lipitor in setting of elevated liver enzymes Chronic  -Holding Lipitor in setting of elevated liver enzymes

## 2025-05-19 NOTE — ED ADULT NURSE NOTE - NSFALLHARMRISKINTERV_ED_ALL_ED

## 2025-05-19 NOTE — PATIENT PROFILE ADULT - NSPROIMPLANTSMEDDEV_GEN_A_NUR
b/l hip/Artificial joint Partial-Left hip replacement- Artifical Joint  Right femur- Metal edith/Artificial joint

## 2025-05-19 NOTE — H&P ADULT - ATTENDING COMMENTS
Agree with findings and a/p as above. UTI plus Acute Cholecystitis, dilated bile duct mild on uls, high ALP she could benefit from MRCP however she has a femoral edith on the right side however I recommend discussion with MRI tech for compatibility. HIDA scan could help evaluate biliary tree further.  IV zosyn to cover both UTI and cholecystitis. GI evaluation.

## 2025-05-19 NOTE — PROGRESS NOTE ADULT - PROBLEM SELECTOR PLAN 1
T: 102.4, WBC: 17.26, Lactate: 2.3->1.4  UA: Cloudy, LEC: Large, Nitrite (+), Bacteria: Many; sepsis likely due to UTI   -RUQ US: Thick-walled gallbladder with cholelithiasis. Negative sonographic Kunz's sign. Overall findings are equivocal for acute cholecystitis, HIDA scan may be considered for further evaluation.  -HIDA scan negative for acute pino   -Bcxs x2 and Urine cx x1 f/u; bcx postive for GNR   -s/p Zosyn in ED. c/w zosyn empirically  -ID consulted (Dr. Holguin), f/u recs  -GI consulted (Dr. Dumont), f/u recs  - Surgery consulted- low suspicion for acute pino more likely acute cystitis given bladder thickening noted on imaging T: 102.4, WBC: 17.26, Lactate: 2.3->1.4  UA: Cloudy, LEC: Large, Nitrite (+), Bacteria: Many  -RUQ US: Thick-walled gallbladder with cholelithiasis. Negative sonographic Kunz's sign. Overall findings are equivocal for acute cholecystitis, HIDA scan may be considered for further evaluation.  -HIDA scan negative for acute pino   -Bcxs x2 and Urine cx x1 f/u; bcx postive for GNR   -s/p Zosyn in ED. c/w zosyn empirically  - MRCP ordered, f/u results   -ID consulted (Dr. Holguin), f/u recs  -GI consulted (Dr. Dumont), f/u recs  - Surgery consulted T: 102.4, WBC: 17.26, Lactate: 2.3->1.4   UA: Cloudy, LEC: Large, Nitrite (+), Bacteria: Many  -RUQ US: Thick-walled gallbladder with cholelithiasis. Negative sonographic Kunz's sign. Overall findings are equivocal for acute cholecystitis, HIDA scan may be considered for further evaluation.  -HIDA scan negative for acute pino   -Bcxs x2 and Urine cx x1 f/u; bcx postive for GNR   -s/p Zosyn in ED. c/w zosyn empirically  - MRCP ordered, f/u results   -ID  (Dr. Macias ,- IV Zosyn   -GI D/W  (Dr. Dumont), f/u recs MRCP  - Surgery consulted d/w DR Esquivel - No surgical intervention

## 2025-05-19 NOTE — H&P ADULT - HISTORY OF PRESENT ILLNESS
Pt is a 89-year-old female w/ PMHx of anemia, diabetes, hypertension, hyperlipidemia, CKD Stage 3 who presents w/ decreased PO intake over the past several weeks. Pt also has several episodes of NBNB emesis.    IN THE ED:  Temp 102.4F, HR 78, /64, RR 18, SpO2 90%  S/P zofran, zosyn, 2.85 NS bolus, 1g ofirmev, KCl,   Labs significant for WBC: 17.26, H/H: 8.7/26.1, Na: 133, K: 3.1, BUN/Cr: 27/2.3, Alk Phos: 870, AST: 103, Lactate: 2.3  Imaging:RUQ US: Thick-walled gallbladder with cholelithiasis. Negative sonographic Kunz's sign. Overall findings are equivocal for acute cholecystitis, HIDA scan may be considered for further evaluation.  UA: (+) UTI   Pt is a 89-year-old female w/ PMHx of anemia, diabetes, hypertension, hyperlipidemia, CKD Stage 3 who presents w/ decreased PO intake over past several weeks. Pt was admitted from 4/16-4/22 for acute cholecystitis and UTI. Pt's daughter at bedside provided much of the history. Pt was initially at rehab after discharge of which she was eating less than usually. Pt has been at [facility] since last Saturday to now in which she has had decrease P.O. intake w/ nausea and several episodes of NBNB emesis. Pt's daughter also states that     IN THE ED:  Temp 102.4F, HR 78, /64, RR 18, SpO2 90%  S/P zofran, zosyn, 2.85 NS bolus, 1g ofirmev, KCl,   Labs significant for WBC: 17.26, H/H: 8.7/26.1, Na: 133, K: 3.1, BUN/Cr: 27/2.3, Alk Phos: 870, AST: 103, Lactate: 2.3  Imaging:RUQ US: Thick-walled gallbladder with cholelithiasis. Negative sonographic Kunz's sign. Overall findings are equivocal for acute cholecystitis, HIDA scan may be considered for further evaluation.  UA: (+) UTI   Pt is a 89-year-old female w/ PMHx of anemia, diabetes, hypertension, hyperlipidemia, CKD Stage 3 who presents w/ decreased PO intake over past several weeks. Pt was admitted from 4/16-4/22 for acute cholecystitis and UTI. Pt's daughter at bedside provided much of the history. Pt was initially at rehab after discharge of which she was eating less than usually. Pt has been at [facility] since last Saturday to now in which she has had decrease P.O. intake w/ nausea and several episodes of emesis of which the daughter has described as biliary.. Pt's daughter also states that pt was c/o of back pain where she had a fracture last month.     IN THE ED:  Temp 102.4F, HR 78, /64, RR 18, SpO2 90%  S/P zofran, zosyn, 2.85 NS bolus, 1g ofirmev, KCl,   Labs significant for WBC: 17.26, H/H: 8.7/26.1, Na: 133, K: 3.1, BUN/Cr: 27/2.3, Alk Phos: 870, AST: 103, Lactate: 2.3  Imaging:RUQ US: Thick-walled gallbladder with cholelithiasis. Negative sonographic Kunz's sign. Overall findings are equivocal for acute cholecystitis, HIDA scan may be considered for further evaluation.  UA: (+) UTI

## 2025-05-19 NOTE — H&P ADULT - NSHPPHYSICALEXAM_GEN_ALL_CORE
CONSTITUTIONAL: Well groomed, no apparent distress  EYES: No conjunctival or scleral injection, non-icteric  ENMT: Oral mucosa with moist membranes.   NECK: Supple, symmetric and without tracheal deviation   RESP: No respiratory distress, no use of accessory muscles; CTA b/l, no WRR  CV: RRR, +S1S2, no peripheral edema  GI: Soft, NT, ND  LYMPH: No cervical LAD or tenderness  MSK: Normal ROM without pain, no joint pain   SKIN: No rashes or ulcers noted  NEURO: Sensation intact in upper and lower extremities b/l to light touch   PSYCH: Appropriate insight/judgment; A+O x 3, mood and affect appropriate CONSTITUTIONAL: Well groomed, no apparent distress  HEENT: EOMI and PERRLA  RESP: No respiratory distress, no use of accessory muscles; CTA b/l, no WRR  CV: RRR, +S1S2, no peripheral edema  GI: Soft, NT, ND  NEURO: 5/5 muscle strength B/L  PSYCH: Appropriate insight/judgment; A+O x 3, mood and affect appropriate

## 2025-05-19 NOTE — H&P ADULT - NSICDXPASTMEDICALHX_GEN_ALL_CORE_FT
PAST MEDICAL HISTORY:  Anemia of chronic disease     Diabetes     HLD (hyperlipidemia)     HTN (hypertension)     Hypertension     Lumbar compression fracture     Stage 3 chronic kidney disease

## 2025-05-19 NOTE — ED ADULT NURSE NOTE - NSICDXFAMILYHX_GEN_ALL_CORE_FT
Patient/Caregiver provided printed discharge information. FAMILY HISTORY:  Father  Still living? Unknown  FH: asthma, Age at diagnosis: Age Unknown

## 2025-05-19 NOTE — H&P ADULT - ASSESSMENT
Pt is a 89-year-old female w/ PMHx of anemia, diabetes, hypertension, hyperlipidemia, CKD Stage 3 who presents w/ decreased PO intake over the past several weeks. Pt is admitted for sepsis workup d/t UTI. Pt is a 89-year-old female w/ PMHx of anemia, diabetes, hypertension, hyperlipidemia, CKD Stage 3 who presents w/ decreased PO intake over the past several weeks. Pt is admitted for sepsis workup d/t UTI and acute cholecystitis.

## 2025-05-19 NOTE — CONSULT NOTE ADULT - NS ATTEND AMEND GEN_ALL_CORE FT
Patient seen and examined in the emergency department.  Family present at the bedside.  Case discussed with surgical PA and later with admitting hospitalist Dr. Holguin.    Patient presents to the emergency room with complaints of malaise as well as decreased appetite.  Reports right abdominal pain radiating to the shoulder.  Recent hospital admission in April for cholelithiasis, cholecystitis and urinary tract infection.  Treated conservatively at that time with IV antibiotics.    Patient presents today with a leukocytosis of 17,000.  Elevated lactate at 2.3, with tachycardia consistent with early SIRS or sepsis.  Patient also noted to be hyponatremic and hypokalemic with an elevated creatinine of 2.3.  LFTs are also elevated with an alkaline phosphatase of 870 which is improved from previous admission.  Slightly elevated AST also improved since previous admission.  Total bilirubin remains normal at 1.0.  Urinalysis remains positive.  Cloudy with proteinuria large leukocyte Estrace, nitrate positive and 100 WBCs as well as many bacteria.    Right upper quadrant ultrasound demonstrates a thick-walled gallbladder with stones.  Technician describes no Kunz sign.  Patiently subsequently underwent HIDA scan which demonstrates prompt homogeneous uptake by the liver activity and the bowel within 20 minutes in the gallbladder after about 10 minutes following morphine administration.  There is also demonstrated good clearance from the liver at the end of the study.  Impression was that of a normal morphine augmented hepatobiliary scan.  No evidence of acute cholecystitis or biliary obstruction.    Given the above findings and benign abdominal examination no acute surgical intervention is indicated with regards to this patient's cholelithiasis.    Recommended continued IV antibiotics as per ID recommendations to cover for potential biliary pathogens but also to treat the urinary tract infection.    The above has been explained with the patient and her family who are pleased that at present she is not requiring surgical intervention.  All questions have been answered.

## 2025-05-20 LAB
ALBUMIN SERPL ELPH-MCNC: 1.7 G/DL — LOW (ref 3.3–5)
ALP SERPL-CCNC: 675 U/L — HIGH (ref 40–120)
ALT FLD-CCNC: 42 U/L — SIGNIFICANT CHANGE UP (ref 12–78)
ANION GAP SERPL CALC-SCNC: 9 MMOL/L — SIGNIFICANT CHANGE UP (ref 5–17)
AST SERPL-CCNC: 98 U/L — HIGH (ref 15–37)
BILIRUB SERPL-MCNC: 0.8 MG/DL — SIGNIFICANT CHANGE UP (ref 0.2–1.2)
BUN SERPL-MCNC: 22 MG/DL — SIGNIFICANT CHANGE UP (ref 7–23)
CALCIUM SERPL-MCNC: 8.6 MG/DL — SIGNIFICANT CHANGE UP (ref 8.5–10.1)
CHLORIDE SERPL-SCNC: 107 MMOL/L — SIGNIFICANT CHANGE UP (ref 96–108)
CO2 SERPL-SCNC: 21 MMOL/L — LOW (ref 22–31)
CREAT SERPL-MCNC: 2.1 MG/DL — HIGH (ref 0.5–1.3)
EGFR: 22 ML/MIN/1.73M2 — LOW
EGFR: 22 ML/MIN/1.73M2 — LOW
GLUCOSE BLDC GLUCOMTR-MCNC: 124 MG/DL — HIGH (ref 70–99)
GLUCOSE BLDC GLUCOMTR-MCNC: 145 MG/DL — HIGH (ref 70–99)
GLUCOSE BLDC GLUCOMTR-MCNC: 146 MG/DL — HIGH (ref 70–99)
GLUCOSE BLDC GLUCOMTR-MCNC: 200 MG/DL — HIGH (ref 70–99)
GLUCOSE SERPL-MCNC: 95 MG/DL — SIGNIFICANT CHANGE UP (ref 70–99)
HCT VFR BLD CALC: 25.4 % — LOW (ref 34.5–45)
HGB BLD-MCNC: 8.3 G/DL — LOW (ref 11.5–15.5)
MCHC RBC-ENTMCNC: 26.3 PG — LOW (ref 27–34)
MCHC RBC-ENTMCNC: 32.7 G/DL — SIGNIFICANT CHANGE UP (ref 32–36)
MCV RBC AUTO: 80.4 FL — SIGNIFICANT CHANGE UP (ref 80–100)
NRBC BLD AUTO-RTO: 0 /100 WBCS — SIGNIFICANT CHANGE UP (ref 0–0)
PLATELET # BLD AUTO: 185 K/UL — SIGNIFICANT CHANGE UP (ref 150–400)
POTASSIUM SERPL-MCNC: 3.8 MMOL/L — SIGNIFICANT CHANGE UP (ref 3.5–5.3)
POTASSIUM SERPL-SCNC: 3.8 MMOL/L — SIGNIFICANT CHANGE UP (ref 3.5–5.3)
PROT SERPL-MCNC: 6.5 G/DL — SIGNIFICANT CHANGE UP (ref 6–8.3)
RBC # BLD: 3.16 M/UL — LOW (ref 3.8–5.2)
RBC # FLD: 15.7 % — HIGH (ref 10.3–14.5)
SODIUM SERPL-SCNC: 137 MMOL/L — SIGNIFICANT CHANGE UP (ref 135–145)
WBC # BLD: 11.97 K/UL — HIGH (ref 3.8–10.5)
WBC # FLD AUTO: 11.97 K/UL — HIGH (ref 3.8–10.5)

## 2025-05-20 RX ORDER — SODIUM CHLORIDE 9 G/1000ML
1000 INJECTION, SOLUTION INTRAVENOUS
Refills: 0 | Status: DISCONTINUED | OUTPATIENT
Start: 2025-05-20 | End: 2025-05-23

## 2025-05-20 RX ORDER — FLUTICASONE PROPIONATE 50 UG/1
1 SPRAY, METERED NASAL
Refills: 0 | Status: DISCONTINUED | OUTPATIENT
Start: 2025-05-20 | End: 2025-05-23

## 2025-05-20 RX ORDER — POLYETHYLENE GLYCOL 3350 17 G/17G
17 POWDER, FOR SOLUTION ORAL DAILY
Refills: 0 | Status: DISCONTINUED | OUTPATIENT
Start: 2025-05-20 | End: 2025-05-21

## 2025-05-20 RX ORDER — ACETAMINOPHEN 500 MG/5ML
650 LIQUID (ML) ORAL ONCE
Refills: 0 | Status: COMPLETED | OUTPATIENT
Start: 2025-05-20 | End: 2025-05-20

## 2025-05-20 RX ADMIN — FLUTICASONE PROPIONATE 1 SPRAY(S): 50 SPRAY, METERED NASAL at 17:59

## 2025-05-20 RX ADMIN — Medication 4 MILLIGRAM(S): at 21:49

## 2025-05-20 RX ADMIN — Medication 100 MILLIGRAM(S): at 18:00

## 2025-05-20 RX ADMIN — Medication 1 DROP(S): at 12:21

## 2025-05-20 RX ADMIN — Medication 1 DROP(S): at 17:58

## 2025-05-20 RX ADMIN — HEPARIN SODIUM 5000 UNIT(S): 1000 INJECTION INTRAVENOUS; SUBCUTANEOUS at 05:35

## 2025-05-20 RX ADMIN — Medication 4 MILLIGRAM(S): at 05:35

## 2025-05-20 RX ADMIN — INSULIN LISPRO 1: 100 INJECTION, SOLUTION INTRAVENOUS; SUBCUTANEOUS at 12:17

## 2025-05-20 RX ADMIN — SODIUM CHLORIDE 50 MILLILITER(S): 9 INJECTION, SOLUTION INTRAVENOUS at 18:45

## 2025-05-20 RX ADMIN — Medication 650 MILLIGRAM(S): at 00:40

## 2025-05-20 RX ADMIN — METOPROLOL SUCCINATE 100 MILLIGRAM(S): 50 TABLET, EXTENDED RELEASE ORAL at 05:36

## 2025-05-20 RX ADMIN — Medication 81 MILLIGRAM(S): at 12:17

## 2025-05-20 RX ADMIN — MEROPENEM 100 MILLIGRAM(S): 1 INJECTION INTRAVENOUS at 05:43

## 2025-05-20 RX ADMIN — Medication 100 MILLIGRAM(S): at 05:35

## 2025-05-20 RX ADMIN — HEPARIN SODIUM 5000 UNIT(S): 1000 INJECTION INTRAVENOUS; SUBCUTANEOUS at 21:49

## 2025-05-20 RX ADMIN — POLYETHYLENE GLYCOL 3350 17 GRAM(S): 17 POWDER, FOR SOLUTION ORAL at 21:48

## 2025-05-20 RX ADMIN — HEPARIN SODIUM 5000 UNIT(S): 1000 INJECTION INTRAVENOUS; SUBCUTANEOUS at 15:00

## 2025-05-20 RX ADMIN — Medication 650 MILLIGRAM(S): at 01:40

## 2025-05-20 RX ADMIN — MEROPENEM 100 MILLIGRAM(S): 1 INJECTION INTRAVENOUS at 17:59

## 2025-05-20 NOTE — CARE COORDINATION ASSESSMENT. - OTHER PERTINENT DISCHARGE PLANNING INFORMATION:
CM met with the patient at the bedside and explained role of CM and transition planning. Patient verbalized understanding. Patient stated she lives in the [facility] in [place]. Ambulates with a Rollator and receives assistance with her ADLs by her aide. Patient stated she had home care PT at the facility (previous chart reviewed-[facility] home care). Patient has aides (7 hrs/7 days a week) through [facility] [phone] Fax # [phone]. CM spoke to [facility] [phone] Ext [phone] who confirmed aide services. Pharm: [facility]. CM provided direct contact/resource folder and remains available.    Per patient's daughter in addition to [facility] aides, patient has private hire 4 hours a day/7 days a week. CM at [facility] Plan: Tr[facility] [phone].

## 2025-05-20 NOTE — PROGRESS NOTE ADULT - PROBLEM SELECTOR PLAN 1
T: 102.4, WBC: 17.26, Lactate: 2.3->1.4   UA: Cloudy, LEC: Large, Nitrite (+), Bacteria: Many  -RUQ US: Thick-walled gallbladder with cholelithiasis. Negative sonographic Kunz's sign. Overall findings are equivocal for acute cholecystitis, HIDA scan may be considered for further evaluation.  -HIDA scan negative for acute pino   - BCx positive for ESBL  - transitioned from zosyn to meropenem   - MRCP ordered showed biliary sludge with gallbadder thickening   -ID  (Dr. Macias   -GI D/W  (Dr. Dumont),  - Surgery consulted d/w DR Esquivel - No surgical intervention T: 102.4, WBC: 17.26, Lactate: 2.3->1.4   UA: Cloudy, LEC: Large, Nitrite (+), Bacteria: Many  -RUQ US: Thick-walled gallbladder with cholelithiasis. Negative sonographic Kunz's sign. Overall findings are equivocal for acute cholecystitis, HIDA scan may be considered for further evaluation.  -HIDA scan negative for acute pino   - BCx positive for ESBL, Ucx with GNR- urine likely the source of sepsis   - transitioned from zosyn to meropenem   - MRCP ordered showed biliary sludge with gallbadder thickening   -ID  (Dr. Macias   -GI D/W  (Dr. Dumont),  - Surgery consulted d/w DR Esquivel - No surgical intervention T: 102.4, WBC: 17.26, Lactate: 2.3->1.4   UA: Cloudy, LEC: Large, Nitrite (+), Bacteria: Many  -RUQ US: Thick-walled gallbladder with cholelithiasis. Negative sonographic Kunz's sign. Overall findings are equivocal for acute cholecystitis, HIDA scan may be considered for further evaluation.  -HIDA scan negative for acute pino   - BCx positive for ESBL, Ucx with GNR- urine likely the source of sepsis   - transitioned from zosyn to meropenem   - MRCP ordered showed biliary sludge with gallbladder thickening   -ID Dr. Macias -IV Meropenem   -GI D/W  (Dr. Dumont- No concern for MRI findings  - Surgery d/w DR Esquivel - No surgical intervention

## 2025-05-20 NOTE — PROGRESS NOTE ADULT - SUBJECTIVE AND OBJECTIVE BOX
[facility]                             Nephrology and Hypertension                             Marcin Kaplan                                          [phone]     Patient is a 89y old  Female who presents with a chief complaint of Sepsis (19 May 2025 15:08)       HPI:  Pt is a 89-year-old female w/ PMHx of anemia, diabetes, hypertension, hyperlipidemia, CKD Stage 3 who presents w/ decreased PO intake over past several weeks. Pt was admitted from 4/16-4/22 for acute cholecystitis and UTI. Pt's daughter at bedside provided much of the history. Pt was initially at rehab after discharge of which she was eating less than usually. Pt has been at [facility] since last Saturday to now in which she has had decrease P.O. intake w/ nausea and several episodes of emesis of which the daughter has described as biliary.. Pt's daughter also states that pt was c/o of back pain where she had a fracture last month.   Found to have ELVIN, known to us from prior.  Denies NSAID usage.  Was having N/V and decreased PO intake.    No acute events noted       PAST MEDICAL & SURGICAL HISTORY:  Hypertension      Diabetes      HLD (hyperlipidemia)      Stage 3 chronic kidney disease      Anemia of chronic disease      Lumbar compression fracture      HTN (hypertension)      S/P ORIF (open reduction internal fixation) fracture  right hip           FAMILY HISTORY:  FH: asthma (Father)    NC    Social History:Non smoker    MEDICATIONS  (STANDING):  albumin human 25% IVPB 100 milliLiter(s) IV Intermittent once  aspirin enteric coated 81 milliGRAM(s) Oral daily  dextrose 5%. 1000 milliLiter(s) (50 mL/Hr) IV Continuous <Continuous>  dextrose 5%. 1000 milliLiter(s) (100 mL/Hr) IV Continuous <Continuous>  dextrose 50% Injectable 25 Gram(s) IV Push once  dextrose 50% Injectable 12.5 Gram(s) IV Push once  dextrose 50% Injectable 25 Gram(s) IV Push once  famotidine    Tablet 20 milliGRAM(s) Oral every 48 hours  glucagon  Injectable 1 milliGRAM(s) IntraMuscular once  heparin   Injectable 5000 Unit(s) SubCutaneous every 8 hours  hydrALAZINE 100 milliGRAM(s) Oral two times a day  insulin lispro (ADMELOG) corrective regimen sliding scale   SubCutaneous three times a day before meals  insulin lispro (ADMELOG) corrective regimen sliding scale   SubCutaneous at bedtime  meropenem  IVPB      meropenem  IVPB 500 milliGRAM(s) IV Intermittent once  metoprolol succinate  milliGRAM(s) Oral daily  ondansetron Injectable 4 milliGRAM(s) IV Push every 8 hours  sodium chloride 0.9%. 1000 milliLiter(s) (50 mL/Hr) IV Continuous <Continuous>    MEDICATIONS  (PRN):  dextrose Oral Gel 15 Gram(s) Oral once PRN Blood Glucose LESS THAN 70 milliGRAM(s)/deciliter   Meds reviewed    Allergies    amlodipine (Flushing)    Intolerances         REVIEW OF SYSTEMS:    Review of Systems:   Constitutional: Denies fatigue  HEENT: Denies headaches and dizziness  Respiratory: denies SOB, cough, or wheezing  Cardiovascular: denies CP, palpitations  Gastrointestinal:+ N/V/decreased appetite  Genitourinary: denies painful urination, increased frequency, urgency, or bloody urine  Skin: denies rashes or itching  Musculoskeletal: denies muscle aches, joint swelling  Neurologic: Denies generalized weakness, denies loss of sensation, numbness, or tingling      ICU Vital Signs Last 24 Hrs  T(C): 36.9 (20 May 2025 11:37), Max: 36.9 (19 May 2025 20:22)  T(F): 98.5 (20 May 2025 11:37), Max: 98.5 (20 May 2025 00:30)  HR: 71 (20 May 2025 11:37) (63 - 74)  BP: 148/56 (20 May 2025 11:37) (128/56 - 164/73)  BP(mean): --  ABP: --  ABP(mean): --  RR: 18 (20 May 2025 11:37) (16 - 18)  SpO2: 94% (20 May 2025 11:37) (93% - 98%)    O2 Parameters below as of 20 May 2025 11:37  Patient On (Oxygen Delivery Method): room air          PHYSICAL EXAM:    GENERAL: NAD  HEAD:  Atraumatic, Normocephalic  EYES: EOMI, conjunctiva and sclera clear  ENMT: No Drainage from nares, No drainage from ears  NERVOUS SYSTEM:  Awake and Alert  CHEST/LUNG: Clear to percussion bilaterally  EXTREMITIES:  No Edema  SKIN: No rashes No obvious ecchymosis      LABS:                                   8.3    11.97 )-----------( 185      ( 20 May 2025 06:20 )             25.4     05-20    137  |  107  |  22  ----------------------------<  95  3.8   |  21[L]  |  2.10[H]    Ca    8.6      20 May 2025 06:20    TPro  6.5  /  Alb  1.7[L]  /  TBili  0.8  /  DBili  x   /  AST  98[H]  /  ALT  42  /  AlkPhos  675[H]  05-20    PT/INR - ( 18 May 2025 23:30 )   PT: 16.0 sec;   INR: 1.36 ratio         PTT - ( 18 May 2025 23:30 )  PTT:29.6 sec  Urinalysis Basic - ( 20 May 2025 06:20 )    Color: x / Appearance: x / SG: x / pH: x  Gluc: 95 mg/dL / Ketone: x  / Bili: x / Urobili: x   Blood: x / Protein: x / Nitrite: x   Leuk Esterase: x / RBC: x / WBC x   Sq Epi: x / Non Sq Epi: x / Bacteria: x

## 2025-05-20 NOTE — PROGRESS NOTE ADULT - SUBJECTIVE AND OBJECTIVE BOX
[facility]  Owen Morrison NP  [address] [place] [address]  [phone]      INTERVAL HPI/OVERNIGHT EVENTS:  Pt s/e with family members at bedside  Pt still has poor appetite, she ate some of a bagel this am  Minimal belching  Urine culture positive  Otherwise no further new GI events    MEDICATIONS  (STANDING):  aspirin enteric coated 81 milliGRAM(s) Oral daily  dextrose 5%. 1000 milliLiter(s) (50 mL/Hr) IV Continuous <Continuous>  dextrose 5%. 1000 milliLiter(s) (100 mL/Hr) IV Continuous <Continuous>  dextrose 50% Injectable 25 Gram(s) IV Push once  dextrose 50% Injectable 12.5 Gram(s) IV Push once  dextrose 50% Injectable 25 Gram(s) IV Push once  famotidine    Tablet 20 milliGRAM(s) Oral every 48 hours  glucagon  Injectable 1 milliGRAM(s) IntraMuscular once  heparin   Injectable 5000 Unit(s) SubCutaneous every 8 hours  hydrALAZINE 100 milliGRAM(s) Oral two times a day  insulin lispro (ADMELOG) corrective regimen sliding scale   SubCutaneous three times a day before meals  insulin lispro (ADMELOG) corrective regimen sliding scale   SubCutaneous at bedtime  meropenem  IVPB      meropenem  IVPB 500 milliGRAM(s) IV Intermittent every 12 hours  metoprolol succinate  milliGRAM(s) Oral daily  ondansetron Injectable 4 milliGRAM(s) IV Push every 8 hours  polyethylene glycol 3350 17 Gram(s) Oral daily  sodium chloride 0.9%. 1000 milliLiter(s) (75 mL/Hr) IV Continuous <Continuous>    MEDICATIONS  (PRN):  dextrose Oral Gel 15 Gram(s) Oral once PRN Blood Glucose LESS THAN 70 milliGRAM(s)/deciliter      Allergies  amlodipine (Flushing)      PHYSICAL EXAM:   Vital Signs:  Vital Signs Last 24 Hrs  T(C): 36.9 (20 May 2025 11:37), Max: 36.9 (19 May 2025 20:22)  T(F): 98.5 (20 May 2025 11:37), Max: 98.5 (20 May 2025 00:30)  HR: 71 (20 May 2025 11:37) (63 - 74)  BP: 148/56 (20 May 2025 11:37) (128/56 - 164/73)  BP(mean): --  RR: 18 (20 May 2025 11:37) (16 - 18)  SpO2: 94% (20 May 2025 11:37) (93% - 98%)    Parameters below as of 20 May 2025 11:37  Patient On (Oxygen Delivery Method): room air      Daily     Daily Weight in k.2 (20 May 2025 04:52)    GENERAL:  Appears stated age  HEENT:  NC/AT  CHEST:  Full & symmetric excursion  HEART:  Regular rhythm  ABDOMEN:  Soft, non-tender, non-distended  EXTEREMITIES:  no cyanosis  SKIN:  No rash  NEURO:  Alert      LABS:                        8.3    11.97 )-----------( 185      ( 20 May 2025 06:20 )             25.4     05-20    137  |  107  |  22  ----------------------------<  95  3.8   |  21[L]  |  2.10[H]    Ca    8.6      20 May 2025 06:20    TPro  6.5  /  Alb  1.7[L]  /  TBili  0.8  /  DBili  x   /  AST  98[H]  /  ALT  42  /  AlkPhos  675[H]  05-20    PT/INR - ( 18 May 2025 23:30 )   PT: 16.0 sec;   INR: 1.36 ratio         PTT - ( 18 May 2025 23:30 )  PTT:29.6 sec  Urinalysis Basic - ( 20 May 2025 06:20 )    Color: x / Appearance: x / SG: x / pH: x  Gluc: 95 mg/dL / Ketone: x  / Bili: x / Urobili: x   Blood: x / Protein: x / Nitrite: x   Leuk Esterase: x / RBC: x / WBC x   Sq Epi: x / Non Sq Epi: x / Bacteria: x    RADIOLOGY  < from: CT Abdomen and Pelvis No Cont (25 @ 02:04) >    ACC: [id number] EXAM:  CT ABDOMEN AND PELVIS   ORDERED BY: ZAK QUILES     PROCEDURE DATE:  2025          INTERPRETATION:  CLINICAL INFORMATION: Sepsis, gallstones    COMPARISON: 2025    CONTRAST/COMPLICATIONS:  IV Contrast: NONE  Oral Contrast: NONE  .    PROCEDURE:  CT of the Abdomen and Pelvis was performed.  Sagittal and coronal reformats were performed.    FINDINGS:  LOWER CHEST: Patchy densities lung bases, atelectasis and/or infiltrate.    LIVER: Within normal limits.  BILEDUCTS: Normal caliber.  GALLBLADDER: Mildly distended gallbladder with gallbladder wall   thickening concerning for cholecystitis.  SPLEEN: Within normal limits.  PANCREAS: Within normal limits.  ADRENALS: Within normal limits.  KIDNEYS/URETERS: Leftrenal cyst. No hydronephrosis. Question a few   punctate nonobstructing renal stones versus vascular calcifications.    Evaluation of the pelvis is limited by streak artifact from the patient's   hip hardware.  BLADDER: Grossly unremarkable.  REPRODUCTIVE ORGANS: Uterus and adnexa within normal limits.    BOWEL: Colonic diverticulosis. No bowel obstruction. Diffuse few   fluid-filled small bowel loops. Small periampullary duodenal   diverticulum. Appendix is normal.  PERITONEUM/RETROPERITONEUM: Within normal limits.  VESSELS: Atherosclerotic changes.  LYMPH NODES: No lymphadenopathy.  ABDOMINAL WALL: Within normal limits.  BONES: Degenerative changes. Status post ORIF of the right hip and total   left hip replacement. Stable mild compression deformity of the superior   endplate of L4. Deformities of the left superior and inferior pubic rami.    IMPRESSION:  Mildly distended gallbladder with gallbladder wall thickening concerning   for cholecystitis. Gallbladder ultrasound or HIDA scan is recommended for   further evaluation. No biliary ductal dilation.    Urinary bladder wall thickening may be due to underdistention. Correlate   with urinalysis.    Patchy densities lung bases, atelectasis and/or infiltrate.    --- End of Report ---            CRISTIN RAE MD; Attending Radiologist  This document has been electronically signed. May 19 2025  9:03AM    < end of copied text >    < from: US Abdomen Upper Quadrant Right (25 @ 02:04) >    ACC: [id number] EXAM:  US ABDOMEN RT UPR QUADRANT   ORDERED BY: ZAK QUILES     PROCEDURE DATE:  2025          INTERPRETATION:  CLINICAL INFORMATION: Sepsis    COMPARISON: None available.    TECHNIQUE: Sonography of the right upper quadrant.    FINDINGS:  Liver: Within normal limits.  Bile ducts: Normal caliber. Common bile duct measures 7 mm.  Gallbladder: Thick-walled gallbladder with cholelithiasis. Negative   sonographic Kunz sign.  Pancreas: Visualized portions are within normal limits.  Right kidney: 9.8 cm. No hydronephrosis.  Ascites: None.  IVC: Visualized portions are within normal limits.    IMPRESSION:  Thick-walled gallbladder with cholelithiasis. Negative sonographic   Kunz's sign. Overall findings are equivocalfor acute cholecystitis,   HIDA scan may be considered for further evaluation.        --- End of Report ---            MARIZA DUBOSE MD; Attending Radiologist  This document has been electronically signed. May 19 2025  2:26AM    < end of copied text >    < from: MR MRCP No Cont (25 @ 19:49) >    ACC: [id number] EXAM:  MR MRCP   ORDERED BY: SHEREE LAND     PROCEDURE DATE:  2025          INTERPRETATION:  CLINICAL INFORMATION: Symptoms of acute cholecystitis.   Sepsis.    COMPARISON: 2025    CONTRAST/COMPLICATIONS:  IV Contrast: NONE  Oral Contrast: NONE  .    PROCEDURE:  MRI/MRCP was performed. Radial and 3D MRCP sequences were obtained.      FINDINGS:  LOWER CHEST: Within normal limits.    LIVER: Within normal limits.  BILE DUCTS: Normal caliber. No evidence of choledocholithiasis.  GALLBLADDER: Thickening of the gallbladder wall. Biliary sludge.  SPLEEN: Within normal limits.  PANCREAS: Numerous cystic lesions in the pancreas, largest measuring up   to 1.1 cm in the uncinate process, likely low-grade cystic pancreatic   neoplasm such as IPMN.  ADRENALS: Within normal limits.  KIDNEYS/URETERS: Left renal cyst measuring 3.1 x 1.5 cm. No   hydronephrosis.    VISUALIZED PORTIONS:  BOWEL: Within normal limits.  PERITONEUM: No ascites.  VESSELS: Within normal limits.  RETROPERITONEUM/LYMPH NODES: No lymphadenopathy.  ABDOMINAL WALL: Mild subcutaneous edema.  BONES: Degenerative changes.    IMPRESSION:  *  Biliary sludge in the gallbladder with nonspecific thickening of the   gallbladder wall.  *  No evidence of choledocholithiasis or biliary ductal dilatation.        --- End of Report ---            HAKEEM VILLA MD; Attending Radiologist  This document has been electronically signed. May 20 2025  9:05AM    < end of copied text >    < from: NM Hepatobiliary Imaging (25 @ 15:04) >    ACC: [id number] EXAM:  NM HEPATOBILIARY IMG   ORDERED BY: WESTON PRICE     PROCEDURE DATE:  2025          INTERPRETATION:  CLINICAL INFORMATION: Abdominal Pain RUQ    RADIOPHARMACEUTICAL: 3.2 mCi Tc-99m-Mebrofenin, I.V.,  PHARMACOLOGIC INTERVENTION: Morphine 2 mg IV push x 1    TECHNIQUE: Dynamic imaging of the anterior abdomen was performed   following radiopharmaceutical injection. Static images of the abdomen in   the anterior, right anterior oblique, and right lateral views were   obtained immediately thereafter.    COMPARISON: None    OTHER STUDIES USED FOR CORRELATION: None    FINDINGS: There is prompt, homogeneous uptake of radiopharmaceutical by   the hepatocytes. Activity is first in the bowel at about 20 minutes and   in the gallbladder about 10 minutes after morphine administration. There   is good clearance of activity from the liver at the end of the study.    IMPRESSION: Normal morphine-augmented hepatobiliary scan. No evidence of   acute cholecystitis or biliary obstruction.        --- End of Report ---             GIULIANO BENEDICT MD; Attending Radiologist  This document has been electronically signed. May 19 2025  1:22PM    < end of copied text >

## 2025-05-20 NOTE — PROGRESS NOTE ADULT - ASSESSMENT
89-year-old female w anemia, diabetes, hypertension, hyperlipidemia, CKD Stage 3 who presents w/ decreased PO intake over past several weeks. Pt was admitted from 4/16-4/22 for acute cholecystitis and UTI. Pt's daughter at bedside provided much of the history. Pt was initially at rehab after discharge of which she was eating less than usually. Pt has been at [facility] since last Saturday to now in which she has had decrease P.O. intake w/ nausea and several episodes of emesis of which the daughter has described as biliary. PT reporting right shoulder pain and right upper abdominal pain  Temp 102.4F, HR 78, /64, RR 18, SpO2 90%  Labs significant for WBC: 17.26, H/H: 8.7/26.1, Na: 133, K: 3.1, BUN/Cr: 27/2.3, Alk Phos: 870, AST: 103, Lactate: 2.3  Imaging:RUQ US: Thick-walled gallbladder with cholelithiasis. Negative sonographic Kunz's sign. Overall findings are equivocal for acute cholecystitis, HIDA scan may be considered for further evaluation.  Culture - Urine (04.16.25 @ 11:59) >100,000 CFU/ml Escherichia coli ESBL  -  Piperacillin/Tazobactam: S <=8    Acute Pyelonephritis  Clinically compelling with pt meeting SIRS criteria with fever and leukocytosis, clinically no urinary symptoms and pt with RUQ pain and imaging and biochemical abnormalities suggesting biliary localization. Reported recent issues with ertapenem 'wiping her out' and Alb  2.1[L]. Noted sensitivities of prior micro so  Culture - Blood (05.18.25 @ 23:10) -  ESBL: Detec -  Escherichia coli: Detec -  CTX-M Resistance Marker: Detec  repeated blood cultures-ordered  Culture - Urine (05.19.25 @ 05:06) >100,000 CFU/ml Gram Negative Rods  Zosyn started 5/19-->changed to Meropenem-continue for now  after multiple investigations suggested that this is due to urinary system source and note neg biliary investigations    Thank you for consulting us and involving us in the management of this most interesting and challenging case.  In addition to reviewing history, imaging, documents, labs, microbiology, took into account antibiotic stewardship, local antibiogram and infection control strategies and potential transmission issues.    We will follow along in the care of this patient. Please contact me by texting me directly on my cell# at [phone] using TEAMS or call our answering service at [phone] with any concerns.

## 2025-05-20 NOTE — CARE COORDINATION ASSESSMENT. - ASSESSMENT CONCERNS TO BE ADDRESSED
Per EMR: 89-year-old female w/ PMHx of Anemia, Diabetes, Hypertension, hyperlipidemia, CKD Stage 3 presented with decreased PO intake over the past several weeks. Admitted for sepsis workup d/t UTI and acute cholecystitis.  Patient is currently on IV Meropenem./discharge planning

## 2025-05-20 NOTE — CARE COORDINATION ASSESSMENT. - NSPASTMEDSURGHISTORY_GEN_ALL_CORE_FT
PAST MEDICAL & SURGICAL HISTORY:  Diabetes      Hypertension      S/P ORIF (open reduction internal fixation) fracture  right hip      HLD (hyperlipidemia)      Lumbar compression fracture      Anemia of chronic disease      Stage 3 chronic kidney disease      HTN (hypertension)

## 2025-05-20 NOTE — CARE COORDINATION ASSESSMENT. - NSCAREPROVIDERS_GEN_ALL_CORE_FT
CARE PROVIDERS:  Accepting Physician: Amadou Mueller  Access Services: Luma Best  Administration: Adiel Lira  Administration: Jeremiah Rodriguez  Admitting: Amadou Mueller  Attending: Everett Holguin  Case Management: Kaitlynn Chavira  Consultant: Hailee Kaur  Consultant: Jonathan Dumont  Consultant: Chuy Macias  Consultant: Janine Lopez  Consultant: Chet Esquivel  Consultant: Cristina Paris  Consultant: Lewis Ramos  Consultant: Negin Crockett  Covering Team: Dm Bolanos  ED Attending: Kelechi Borges  ED Nurse: Lissa Santos  Nurse: Edwin Alvarez  Nurse: Jackie Awan  Nurse: Asia Solis  Ordered: ADM, User  Ordered: Doctor, Unknown  Outpatient Provider: Owen Saavedra  Outpatient Provider: Jean Dupree  Outpatient Provider: Abby Terrell  Outpatient Provider: Ananda De Luna  Override: Jorden Amin  Override: Padmini Olivo  Override: Asia Solis  Override: Pam Sorensen  Override: Lorene Balbuena  PCA/Nursing Assistant: Melissa Wayne  Primary Team: Shankar Lea  Primary Team: Amadou Mueller  Primary Team: Kusum Gomez  Registered Dietitian: Oly Zhang  Research: Alexandrea Hines  : Luma Freeman  Team: [facility]  Hospitalists, Team

## 2025-05-20 NOTE — CARE COORDINATION ASSESSMENT. - PATIENT WAS INVOLVED WITH A HOMECARE AGENCY PRIOR TO ADMISSION?
Spoke with Marcelo, regarding lab , all labs are not back yet , but glucose was better 117, sodium is better at 127, continue not drinking free water, just juices, etc.Will call with remaining labs when back.   [facility] home care/Yes

## 2025-05-20 NOTE — PROGRESS NOTE ADULT - ASSESSMENT
ELVIN on CKD3-4  Lactic Acidosis/Metabolic Acidosis  Hyponatremia  Hypokalemia  Elevated LFTs  HTN  UTI/Pyelonephritis    -Baseline Creatinine approx 1.8  -ELVIN Likely 2/2 decreased EABV  -Check urine lytes; still pending  -Mild acidosis noted  -K improved  -Renal indices are improving to baseline  -Extend IVF for another 24hrs with LR  -GI eval noted  -Abx per ID  -Goal sodium correction 6-8 meq in 24 hours; corrected we;;    d/w daughter

## 2025-05-20 NOTE — CARE COORDINATION ASSESSMENT. - NSDCPLANSERVICES_GEN_ALL_CORE
PT eval pending  Per patient's daughter if LEONEL is recommended her first choice is [facility]./Anticipated Needs Unclear at Present

## 2025-05-20 NOTE — PROGRESS NOTE ADULT - ASSESSMENT
Pt is a 89-year-old female w/ PMHx of anemia, diabetes, hypertension, hyperlipidemia, CKD Stage 3 who presents w/ decreased PO intake over the past several weeks. Pt is admitted for sepsis workup d/t UTI and r/o  acute cholecystitis.

## 2025-05-20 NOTE — PROGRESS NOTE ADULT - SUBJECTIVE AND OBJECTIVE BOX
Patient is a 89y old  Female who presents with a chief complaint of Sepsis (19 May 2025 19:14)    HPI:  Pt is a 89-year-old female w/ PMHx of anemia, diabetes, hypertension, hyperlipidemia, CKD Stage 3 who presents w/ decreased PO intake over past several weeks. Pt was admitted from 4/16-4/22 for acute cholecystitis and UTI. Pt's daughter at bedside provided much of the history. Pt was initially at rehab after discharge of which she was eating less than usually. Pt has been at [facility] since last Saturday to now in which she has had decrease P.O. intake w/ nausea and several episodes of emesis of which the daughter has described as biliary.. Pt's daughter also states that pt was c/o of back pain where she had a fracture last month.     IN THE ED:  Temp 102.4F, HR 78, /64, RR 18, SpO2 90%  S/P zofran, zosyn, 2.85 NS bolus, 1g ofirmev, KCl,   Labs significant for WBC: 17.26, H/H: 8.7/26.1, Na: 133, K: 3.1, BUN/Cr: 27/2.3, Alk Phos: 870, AST: 103, Lactate: 2.3  Imaging:RUQ US: Thick-walled gallbladder with cholelithiasis. Negative sonographic Kunz's sign. Overall findings are equivocal for acute cholecystitis, HIDA scan may be considered for further evaluation.  UA: (+) UTI   (19 May 2025 05:27)    INTERVAL HPI:  5/19: Patient seen after HIDA scan performed- very lethargic d/t morphine . HIDA scan negative however family expresses concern about patients GI complaints including nausea, vomitting, diarrhea, abdominal pain. HIDA scan -Neg,  MRCP ordered.  5/20: patient seen and examined at bedside. States she currently has no symptoms no N/V ate breakfast with no issues. Denies an abd pain. MRCP shows biliary sludge. Pt exresses desire to go home.     OVERNIGHT EVENTS:    Home Medications:  aspirin 81 mg oral delayed release tablet: 1 tab(s) orally every other day (19 May 2025 06:21)  atorvastatin 40 mg oral tablet: 1 tab(s) orally once a day (at bedtime) (19 May 2025 06:21)  famotidine 20 mg oral tablet: 1 tab(s) orally once a day (19 May 2025 06:21)  furosemide 20 mg oral tablet: 1 tab(s) orally once a day (19 May 2025 06:21)  hydrALAZINE 100 mg oral tablet: 1 tab(s) orally 2 times a day (19 May 2025 06:20)  Januvia 25 mg oral tablet: 1 tab(s) orally once a day (19 May 2025 06:21)  omeprazole 20 mg oral delayed release capsule: 1 cap(s) orally once a day (19 May 2025 06:21)      MEDICATIONS  (STANDING):  aspirin enteric coated 81 milliGRAM(s) Oral daily  carbamide peroxide Otic Solution 1 Drop(s) Both Ears once  dextrose 5%. 1000 milliLiter(s) (50 mL/Hr) IV Continuous <Continuous>  dextrose 5%. 1000 milliLiter(s) (100 mL/Hr) IV Continuous <Continuous>  dextrose 50% Injectable 25 Gram(s) IV Push once  dextrose 50% Injectable 12.5 Gram(s) IV Push once  dextrose 50% Injectable 25 Gram(s) IV Push once  famotidine    Tablet 20 milliGRAM(s) Oral every 48 hours  glucagon  Injectable 1 milliGRAM(s) IntraMuscular once  heparin   Injectable 5000 Unit(s) SubCutaneous every 8 hours  hydrALAZINE 100 milliGRAM(s) Oral two times a day  insulin lispro (ADMELOG) corrective regimen sliding scale   SubCutaneous three times a day before meals  insulin lispro (ADMELOG) corrective regimen sliding scale   SubCutaneous at bedtime  meropenem  IVPB      meropenem  IVPB 500 milliGRAM(s) IV Intermittent every 12 hours  metoprolol succinate  milliGRAM(s) Oral daily  ondansetron Injectable 4 milliGRAM(s) IV Push every 8 hours  sodium chloride 0.9%. 1000 milliLiter(s) (75 mL/Hr) IV Continuous <Continuous>    MEDICATIONS  (PRN):  dextrose Oral Gel 15 Gram(s) Oral once PRN Blood Glucose LESS THAN 70 milliGRAM(s)/deciliter      Allergies    amlodipine ([place])    Intolerances        Social History:  Tobacco: Remote hx  Alcohol: Denies  Illicit Drugs: Denies  Lives at: [facility]  Ambulation: w/ walker  ADLs: Dependent (19 May 2025 05:27)      REVIEW OF SYSTEMS:  CONSTITUTIONAL: No fever, No chills, No fatigue, No myalgia, No Body ache, No Weakness  EYES: No eye pain,  No visual disturbances, No discharge, NO Redness  ENMT:  No ear pain, No nose bleed, No vertigo; No sinus pain, NO throat pain, No Congestion  NECK: No pain, No stiffness  RESPIRATORY: No cough, NO wheezing, No  hemoptysis, NO  shortness of breath  CARDIOVASCULAR: No chest pain, palpitations  GASTROINTESTINAL: No abdominal pain, NO epigastric pain. No nausea, No vomiting; No diarrhea, No constipation. [  ] BM  GENITOURINARY: No dysuria, No frequency, No urgency, No hematuria, NO incontinence  NEUROLOGICAL: No headaches, No dizziness, No numbness, No tingling, No tremors, No weakness  EXT: No Swelling, No Pain, No Edema  SKIN:  [  x] No itching, burning, rashes, or lesions   MUSCULOSKELETAL: No joint pain ,No Jt swelling; No muscle pain, No back pain, No extremity pain  PSYCHIATRIC: No depression,  No anxiety,  No mood swings ,No difficulty sleeping at night  PAIN SCALE: [  ] None  [  ] Other-  ROS Unable to obtain due to - [  ] Dementia  [  ] Lethargy [  ] Drowsy [  ] Sedated [  ] non verbal  REST OF REVIEW Of SYSTEM - [x  ] Normal     Vital Signs Last 24 Hrs  T(C): 36.7 (20 May 2025 05:30), Max: 36.9 (19 May 2025 20:22)  T(F): 98 (20 May 2025 05:30), Max: 98.5 (20 May 2025 00:30)  HR: 74 (20 May 2025 05:30) (62 - 74)  BP: 164/73 (20 May 2025 05:30) (128/56 - 175/68)  BP(mean): --  RR: 18 (20 May 2025 05:30) (16 - 18)  SpO2: 97% (20 May 2025 05:30) (93% - 98%)    Parameters below as of 20 May 2025 05:30  Patient On (Oxygen Delivery Method): room air      Finger Stick        05-19 @ 07:01  -  05-20 @ 07:00  --------------------------------------------------------  IN: 0 mL / OUT: 150 mL / NET: -150 mL        PHYSICAL EXAM:  GENERAL:  [x  ] NAD , [ x ] well appearing, [  ] Agitated, [  ] Drowsy,  [  ] Lethargy, [  ] confused   HEAD:  [  ] Normal, [  ] Other  EYES:  [  ] EOMI, [  ] PERRLA, [  ] conjunctiva and sclera clear normal, [  ] Other,  [  ] Pallor,[  ] Discharge  ENMT:  [  ] Normal, [  ] Moist mucous membranes, [  ] Good dentition, [  ] No Thrush  NECK:  [  ] Supple, [  ] No JVD, [  ] Normal thyroid, [  ] Lymphadenopathy [  ] Other  CHEST/LUNG:  [  ] Clear to auscultation bilaterally, [  ] Breath Sounds equal B/L / Decrease, [  ] poor effort  [  ] No rales, [  ] No rhonchi  [  ]  No wheezing,   HEART:  [  ] Regular rate and rhythm, [  ] tachycardia, [  ] Bradycardia,  [  ] irregular  [  ] No murmurs, No rubs, No gallops, [  ] PPM in place (Mfr:  )  ABDOMEN:  [  ] Soft, [  ] Nontender, [  ] Nondistended, [  ] No mass, [  ] Bowel sounds present, [  ] obese  NERVOUS SYSTEM:  [  ] Alert & Oriented X3, [  ] Nonfocal  [  ] Confusion  [  ] Encephalopathic [  ] Sedated [  ] Unable to assess, [  ] Dementia [  ] Other-  EXTREMITIES: [  ] 2+ Peripheral Pulses, No clubbing, No cyanosis,  [  ] edema B/L lower EXT. [  ] PVD stasis skin changes B/L Lower EXT, [  ] wound  LYMPH: No lymphadenopathy noted  SKIN:  [  ] No rashes or lesions, [  ] Pressure Ulcers, [  ] ecchymosis, [  ] Skin Tears, [  ] Other    DIET: Diet, DASH/TLC:   Sodium & Cholesterol Restricted (05-19-25 @ 14:57)      LABS:                        8.3    11.97 )-----------( 185      ( 20 May 2025 06:20 )             25.4     20 May 2025 06:20    137    |  107    |  22     ----------------------------<  95     3.8     |  21     |  2.10     Ca    8.6        20 May 2025 06:20    TPro  6.5    /  Alb  1.7    /  TBili  0.8    /  DBili  x      /  AST  98     /  ALT  42     /  AlkPhos  675    20 May 2025 06:20    PT/INR - ( 18 May 2025 23:30 )   PT: 16.0 sec;   INR: 1.36 ratio         PTT - ( 18 May 2025 23:30 )  PTT:29.6 sec  Urinalysis Basic - ( 20 May 2025 06:20 )    Color: x / Appearance: x / SG: x / pH: x  Gluc: 95 mg/dL / Ketone: x  / Bili: x / Urobili: x   Blood: x / Protein: x / Nitrite: x   Leuk Esterase: x / RBC: x / WBC x   Sq Epi: x / Non Sq Epi: x / Bacteria: x        Culture Results:   >100,000 CFU/ml Gram Negative Rods (05-19 @ 05:06)  Culture Results:   Growth in anaerobic bottle: Gram Negative Rods (05-18 @ 23:25)  Culture Results:   Growth in aerobic bottle: Gram Negative Rods  Direct identification is available within approximately 3-5  hours either by Blood Panel Multiplexed PCR or Direct  MALDI-TOF. Details: [email or web address] (05-18 @ 23:10)      culture blood  -- Clean Catch Clean Catch (Midstream) 05-19 @ 05:06    culture urine  --  05-19 @ 05:06  culture blood  -- Blood Blood-Peripheral 05-18 @ 23:25    culture urine  --  05-18 @ 23:25  culture blood  -- Blood Blood-Peripheral 05-18 @ 23:10    culture urine  --  05-18 @ 23:10              Urinalysis with Rflx Culture (collected 19 May 2025 05:06)    Culture - Urine (collected 19 May 2025 05:06)  Source: Clean Catch Clean Catch (Midstream)  Preliminary Report (20 May 2025 08:59):    >100,000 CFU/ml Gram Negative Rods    Culture - Blood (collected 18 May 2025 23:25)  Source: Blood Blood-Peripheral  Gram Stain (19 May 2025 14:24):    Growth in anaerobic bottle: Gram Negative Rods  Preliminary Report (19 May 2025 14:24):    Growth in anaerobic bottle: Gram Negative Rods    Culture - Blood (collected 18 May 2025 23:10)  Source: Blood Blood-Peripheral  Gram Stain (19 May 2025 14:24):    Growth in aerobic bottle: Gram Negative Rods  Preliminary Report (19 May 2025 14:25):    Growth in aerobic bottle: Gram Negative Rods    Direct identification is available within approximately 3-5    hours either by Blood Panel Multiplexed PCR or Direct    MALDI-TOF. Details: [email or web address]  Organism: Blood Culture PCR (19 May 2025 17:15)  Organism: Blood Culture PCR (19 May 2025 17:15)         Anemia Panel:      Thyroid Panel:                RADIOLOGY & ADDITIONAL TESTS:      HEALTH ISSUES - PROBLEM Dx:  HTN (hypertension)    HLD (hyperlipidemia)    Diabetes    Anemia of chronic disease    Need for prophylactic measure    Sepsis    Acute on chronic renal failure            Consultant(s) Notes Reviewed:  [  ] YES     Care Discussed with [X] Consultants  [  ] Patient  [  ] Family [  ] HCP [  ]   [  ] Social Service  [  ] RN, [  ] Physical Therapy,[  ] Palliative care team  DVT PPX: [  ] Lovenox, [  ] S C Heparin, [  ] Coumadin, [  ] Xarelto, [  ] Eliquis, [  ] Pradaxa, [  ] IV Heparin drip, [  ] SCD [  ] Contraindication 2 to GI Bleed,[  ] Ambulation [  ] Contraindicated 2 to  bleed [  ] Contraindicated 2 to Brain Bleed  Advanced directive: [  ] None, [  ] DNR/DNI Patient is a 89y old  Female who presents with a chief complaint of Sepsis (19 May 2025 19:14)    HPI:  Pt is a 89-year-old female w/ PMHx of anemia, diabetes, hypertension, hyperlipidemia, CKD Stage 3 who presents w/ decreased PO intake over past several weeks. Pt was admitted from 4/16-4/22 for acute cholecystitis and UTI. Pt's daughter at bedside provided much of the history. Pt was initially at rehab after discharge of which she was eating less than usually. Pt has been at [facility] since last Saturday to now in which she has had decrease P.O. intake w/ nausea and several episodes of emesis of which the daughter has described as biliary.. Pt's daughter also states that pt was c/o of back pain where she had a fracture last month.     IN THE ED:  Temp 102.4F, HR 78, /64, RR 18, SpO2 90%  S/P zofran, zosyn, 2.85 NS bolus, 1g ofirmev, KCl,   Labs significant for WBC: 17.26, H/H: 8.7/26.1, Na: 133, K: 3.1, BUN/Cr: 27/2.3, Alk Phos: 870, AST: 103, Lactate: 2.3  Imaging:RUQ US: Thick-walled gallbladder with cholelithiasis. Negative sonographic Kunz's sign. Overall findings are equivocal for acute cholecystitis, HIDA scan may be considered for further evaluation.  UA: (+) UTI   (19 May 2025 05:27)    INTERVAL HPI:  5/19: Patient seen after HIDA scan performed- very lethargic d/t morphine . HIDA scan negative however family expresses concern about patients GI complaints including nausea, vomitting, diarrhea, abdominal pain. HIDA scan -Neg,  MRCP ordered.  5/20: patient seen and examined at bedside. States she currently has no symptoms no N/V ate breakfast with no issues. Denies an abd pain. MRCP shows biliary sludge. BCx positive for ESBL abx switched to meropenem.     OVERNIGHT EVENTS:    Home Medications:  aspirin 81 mg oral delayed release tablet: 1 tab(s) orally every other day (19 May 2025 06:21)  atorvastatin 40 mg oral tablet: 1 tab(s) orally once a day (at bedtime) (19 May 2025 06:21)  famotidine 20 mg oral tablet: 1 tab(s) orally once a day (19 May 2025 06:21)  furosemide 20 mg oral tablet: 1 tab(s) orally once a day (19 May 2025 06:21)  hydrALAZINE 100 mg oral tablet: 1 tab(s) orally 2 times a day (19 May 2025 06:20)  Januvia 25 mg oral tablet: 1 tab(s) orally once a day (19 May 2025 06:21)  omeprazole 20 mg oral delayed release capsule: 1 cap(s) orally once a day (19 May 2025 06:21)      MEDICATIONS  (STANDING):  aspirin enteric coated 81 milliGRAM(s) Oral daily  carbamide peroxide Otic Solution 1 Drop(s) Both Ears once  dextrose 5%. 1000 milliLiter(s) (50 mL/Hr) IV Continuous <Continuous>  dextrose 5%. 1000 milliLiter(s) (100 mL/Hr) IV Continuous <Continuous>  dextrose 50% Injectable 25 Gram(s) IV Push once  dextrose 50% Injectable 12.5 Gram(s) IV Push once  dextrose 50% Injectable 25 Gram(s) IV Push once  famotidine    Tablet 20 milliGRAM(s) Oral every 48 hours  glucagon  Injectable 1 milliGRAM(s) IntraMuscular once  heparin   Injectable 5000 Unit(s) SubCutaneous every 8 hours  hydrALAZINE 100 milliGRAM(s) Oral two times a day  insulin lispro (ADMELOG) corrective regimen sliding scale   SubCutaneous three times a day before meals  insulin lispro (ADMELOG) corrective regimen sliding scale   SubCutaneous at bedtime  meropenem  IVPB      meropenem  IVPB 500 milliGRAM(s) IV Intermittent every 12 hours  metoprolol succinate  milliGRAM(s) Oral daily  ondansetron Injectable 4 milliGRAM(s) IV Push every 8 hours  sodium chloride 0.9%. 1000 milliLiter(s) (75 mL/Hr) IV Continuous <Continuous>    MEDICATIONS  (PRN):  dextrose Oral Gel 15 Gram(s) Oral once PRN Blood Glucose LESS THAN 70 milliGRAM(s)/deciliter      Allergies    amlodipine ([place])    Intolerances        Social History:  Tobacco: Remote hx  Alcohol: Denies  Illicit Drugs: Denies  Lives at: [facility]  Ambulation: w/ walker  ADLs: Dependent (19 May 2025 05:27)      REVIEW OF SYSTEMS:  CONSTITUTIONAL: No fever, No chills, No fatigue, No myalgia, No Body ache, No Weakness  EYES: No eye pain,  No visual disturbances, No discharge, NO Redness  ENMT:  No ear pain, No nose bleed, No vertigo; No sinus pain, NO throat pain, No Congestion  NECK: No pain, No stiffness  RESPIRATORY: No cough, NO wheezing, No  hemoptysis, NO  shortness of breath  CARDIOVASCULAR: No chest pain, palpitations  GASTROINTESTINAL: No abdominal pain, NO epigastric pain. No nausea, No vomiting; No diarrhea, No constipation. [  ] BM  GENITOURINARY: No dysuria, No frequency, No urgency, No hematuria, NO incontinence  NEUROLOGICAL: No headaches, No dizziness, No numbness, No tingling, No tremors, No weakness  EXT: No Swelling, No Pain, No Edema  SKIN:  [  x] No itching, burning, rashes, or lesions   MUSCULOSKELETAL: No joint pain ,No Jt swelling; No muscle pain, No back pain, No extremity pain  PSYCHIATRIC: No depression,  No anxiety,  No mood swings ,No difficulty sleeping at night  PAIN SCALE: [ x ] None  [  ] Other-  ROS Unable to obtain due to - [  ] Dementia  [  ] Lethargy [  ] Drowsy [  ] Sedated [  ] non verbal  REST OF REVIEW Of SYSTEM - [x  ] Normal     Vital Signs Last 24 Hrs  T(C): 36.7 (20 May 2025 05:30), Max: 36.9 (19 May 2025 20:22)  T(F): 98 (20 May 2025 05:30), Max: 98.5 (20 May 2025 00:30)  HR: 74 (20 May 2025 05:30) (62 - 74)  BP: 164/73 (20 May 2025 05:30) (128/56 - 175/68)  BP(mean): --  RR: 18 (20 May 2025 05:30) (16 - 18)  SpO2: 97% (20 May 2025 05:30) (93% - 98%)    Parameters below as of 20 May 2025 05:30  Patient On (Oxygen Delivery Method): room air      Finger Stick        05-19 @ 07:01  -  05-20 @ 07:00  --------------------------------------------------------  IN: 0 mL / OUT: 150 mL / NET: -150 mL        PHYSICAL EXAM:  GENERAL:  [x  ] NAD , [ x ] well appearing, [  ] Agitated, [  ] Drowsy,  [  ] Lethargy, [  ] confused   HEAD:  [x  ] Normal, [  ] Other  EYES:  [ x ] EOMI, [  ] PERRLA, [  ] conjunctiva and sclera clear normal, [  ] Other,  [  ] Pallor,[  ] Discharge  ENMT:  [x  ] Normal, [  ] Moist mucous membranes, [  ] Good dentition, [  ] No Thrush  NECK:  [ x ] Supple, [ x ] No JVD, [  ] Normal thyroid, [  ] Lymphadenopathy [  ] Other  CHEST/LUNG:  [  x] Clear to auscultation bilaterally, [  ] Breath Sounds equal B/L / Decrease, [  ] poor effort  [  ] No rales, [  ] No rhonchi  [  ]  No wheezing,   HEART:  [ x ] Regular rate and rhythm, [  ] tachycardia, [  ] Bradycardia,  [  ] irregular  [  ] No murmurs, No rubs, No gallops, [  ] PPM in place (Mfr:  )  ABDOMEN:  [x  ] Soft, [ x ] Nontender, [x  ] Nondistended, [  ] No mass, [  ] Bowel sounds present, [  ] obese  NERVOUS SYSTEM:  [ x ] Alert & Oriented X3, [  ] Nonfocal  [  ] Confusion  [  ] Encephalopathic [  ] Sedated [  ] Unable to assess, [  ] Dementia [  ] Other-  EXTREMITIES: [  ] 2+ Peripheral Pulses, No clubbing, No cyanosis,  [  ] edema B/L lower EXT. [  ] PVD stasis skin changes B/L Lower EXT, [  ] wound  LYMPH: No lymphadenopathy noted  SKIN:  [  ] No rashes or lesions, [  ] Pressure Ulcers, [  ] ecchymosis, [  ] Skin Tears, [  ] Other    DIET: Diet, DASH/TLC:   Sodium & Cholesterol Restricted (05-19-25 @ 14:57)      LABS:                        8.3    11.97 )-----------( 185      ( 20 May 2025 06:20 )             25.4     20 May 2025 06:20    137    |  107    |  22     ----------------------------<  95     3.8     |  21     |  2.10     Ca    8.6        20 May 2025 06:20    TPro  6.5    /  Alb  1.7    /  TBili  0.8    /  DBili  x      /  AST  98     /  ALT  42     /  AlkPhos  675    20 May 2025 06:20    PT/INR - ( 18 May 2025 23:30 )   PT: 16.0 sec;   INR: 1.36 ratio         PTT - ( 18 May 2025 23:30 )  PTT:29.6 sec  Urinalysis Basic - ( 20 May 2025 06:20 )    Color: x / Appearance: x / SG: x / pH: x  Gluc: 95 mg/dL / Ketone: x  / Bili: x / Urobili: x   Blood: x / Protein: x / Nitrite: x   Leuk Esterase: x / RBC: x / WBC x   Sq Epi: x / Non Sq Epi: x / Bacteria: x        Culture Results:   >100,000 CFU/ml Gram Negative Rods (05-19 @ 05:06)  Culture Results:   Growth in anaerobic bottle: Gram Negative Rods (05-18 @ 23:25)  Culture Results:   Growth in aerobic bottle: Gram Negative Rods  Direct identification is available within approximately 3-5  hours either by Blood Panel Multiplexed PCR or Direct  MALDI-TOF. Details: [email or web address] (05-18 @ 23:10)      culture blood  -- Clean Catch Clean Catch (Midstream) 05-19 @ 05:06    culture urine  --  05-19 @ 05:06  culture blood  -- Blood Blood-Peripheral 05-18 @ 23:25    culture urine  --  05-18 @ 23:25  culture blood  -- Blood Blood-Peripheral 05-18 @ 23:10    culture urine  --  05-18 @ 23:10              Urinalysis with Rflx Culture (collected 19 May 2025 05:06)    Culture - Urine (collected 19 May 2025 05:06)  Source: Clean Catch Clean Catch (Midstream)  Preliminary Report (20 May 2025 08:59):    >100,000 CFU/ml Gram Negative Rods    Culture - Blood (collected 18 May 2025 23:25)  Source: Blood Blood-Peripheral  Gram Stain (19 May 2025 14:24):    Growth in anaerobic bottle: Gram Negative Rods  Preliminary Report (19 May 2025 14:24):    Growth in anaerobic bottle: Gram Negative Rods    Culture - Blood (collected 18 May 2025 23:10)  Source: Blood Blood-Peripheral  Gram Stain (19 May 2025 14:24):    Growth in aerobic bottle: Gram Negative Rods  Preliminary Report (19 May 2025 14:25):    Growth in aerobic bottle: Gram Negative Rods    Direct identification is available within approximately 3-5    hours either by Blood Panel Multiplexed PCR or Direct    MALDI-TOF. Details: [email or web address]  Organism: Blood Culture PCR (19 May 2025 17:15)  Organism: Blood Culture PCR (19 May 2025 17:15)         Anemia Panel:      Thyroid Panel:                RADIOLOGY & ADDITIONAL TESTS:      HEALTH ISSUES - PROBLEM Dx:  HTN (hypertension)    HLD (hyperlipidemia)    Diabetes    Anemia of chronic disease    Need for prophylactic measure    Sepsis    Acute on chronic renal failure            Consultant(s) Notes Reviewed:  [  ] YES     Care Discussed with [X] Consultants  [  ] Patient  [  ] Family [  ] HCP [  ]   [  ] Social Service  [  ] RN, [  ] Physical Therapy,[  ] Palliative care team  DVT PPX: [  ] Lovenox, [  ] S C Heparin, [  ] Coumadin, [  ] Xarelto, [  ] Eliquis, [  ] Pradaxa, [  ] IV Heparin drip, [  ] SCD [  ] Contraindication 2 to GI Bleed,[  ] Ambulation [  ] Contraindicated 2 to  bleed [  ] Contraindicated 2 to Brain Bleed  Advanced directive: [  ] None, [  ] DNR/DNI Patient is a 89y old  Female who presents with a chief complaint of Sepsis (19 May 2025 19:14)    HPI:  Pt is a 89-year-old female w/ PMHx of anemia, diabetes, hypertension, hyperlipidemia, CKD Stage 3 who presents w/ decreased PO intake over past several weeks. Pt was admitted from 4/16-4/22 for acute cholecystitis and UTI. Pt's daughter at bedside provided much of the history. Pt was initially at rehab after discharge of which she was eating less than usually. Pt has been at [facility] since last Saturday to now in which she has had decrease P.O. intake w/ nausea and several episodes of emesis of which the daughter has described as biliary.. Pt's daughter also states that pt was c/o of back pain where she had a fracture last month.     IN THE ED:  Temp 102.4F, HR 78, /64, RR 18, SpO2 90%  S/P zofran, zosyn, 2.85 NS bolus, 1g ofirmev, KCl,   Labs significant for WBC: 17.26, H/H: 8.7/26.1, Na: 133, K: 3.1, BUN/Cr: 27/2.3, Alk Phos: 870, AST: 103, Lactate: 2.3  Imaging:RUQ US: Thick-walled gallbladder with cholelithiasis. Negative sonographic Kunz's sign. Overall findings are equivocal for acute cholecystitis, HIDA scan may be considered for further evaluation.  UA: (+) UTI   (19 May 2025 05:27)    INTERVAL HPI:  5/19: Patient seen after HIDA scan performed- very lethargic d/t morphine . HIDA scan negative however family expresses concern about patients GI complaints including nausea, vomitting, diarrhea, abdominal pain. HIDA scan -Neg,  MRCP ordered.  5/20: patient seen and examined at bedside. States she currently has no symptoms no N/V ate breakfast with no issues. Denies an abd pain. MRCP shows biliary sludge. BCx positive for ESBL  abx switched to meropenem. As per ID-Dr [place].     OVERNIGHT EVENTS: NONE     Home Medications:  aspirin 81 mg oral delayed release tablet: 1 tab(s) orally every other day (19 May 2025 06:21)  atorvastatin 40 mg oral tablet: 1 tab(s) orally once a day (at bedtime) (19 May 2025 06:21)  famotidine 20 mg oral tablet: 1 tab(s) orally once a day (19 May 2025 06:21)  furosemide 20 mg oral tablet: 1 tab(s) orally once a day (19 May 2025 06:21)  hydrALAZINE 100 mg oral tablet: 1 tab(s) orally 2 times a day (19 May 2025 06:20)  Januvia 25 mg oral tablet: 1 tab(s) orally once a day (19 May 2025 06:21)  omeprazole 20 mg oral delayed release capsule: 1 cap(s) orally once a day (19 May 2025 06:21)      MEDICATIONS  (STANDING):  aspirin enteric coated 81 milliGRAM(s) Oral daily  carbamide peroxide Otic Solution 1 Drop(s) Both Ears once  dextrose 5%. 1000 milliLiter(s) (50 mL/Hr) IV Continuous <Continuous>  dextrose 5%. 1000 milliLiter(s) (100 mL/Hr) IV Continuous <Continuous>  dextrose 50% Injectable 25 Gram(s) IV Push once  dextrose 50% Injectable 12.5 Gram(s) IV Push once  dextrose 50% Injectable 25 Gram(s) IV Push once  famotidine    Tablet 20 milliGRAM(s) Oral every 48 hours  glucagon  Injectable 1 milliGRAM(s) IntraMuscular once  heparin   Injectable 5000 Unit(s) SubCutaneous every 8 hours  hydrALAZINE 100 milliGRAM(s) Oral two times a day  insulin lispro (ADMELOG) corrective regimen sliding scale   SubCutaneous three times a day before meals  insulin lispro (ADMELOG) corrective regimen sliding scale   SubCutaneous at bedtime  meropenem  IVPB      meropenem  IVPB 500 milliGRAM(s) IV Intermittent every 12 hours  metoprolol succinate  milliGRAM(s) Oral daily  ondansetron Injectable 4 milliGRAM(s) IV Push every 8 hours  sodium chloride 0.9%. 1000 milliLiter(s) (75 mL/Hr) IV Continuous <Continuous>    MEDICATIONS  (PRN):  dextrose Oral Gel 15 Gram(s) Oral once PRN Blood Glucose LESS THAN 70 milliGRAM(s)/deciliter      Allergies    amlodipine (Flushing)    Intolerances        Social History:  Tobacco: Remote hx  Alcohol: Denies  Illicit Drugs: Denies  Lives at: [facility]  Ambulation: w/ walker  ADLs: Dependent (19 May 2025 05:27)      REVIEW OF SYSTEMS: i am ok  CONSTITUTIONAL: No fever, No chills, No fatigue, No myalgia, No Body ache, No Weakness  EYES: No eye pain,  No visual disturbances, No discharge, NO Redness  ENMT:  No ear pain, No nose bleed, No vertigo; No sinus pain, NO throat pain, No Congestion  NECK: No pain, No stiffness  RESPIRATORY: No cough, NO wheezing, No  hemoptysis, NO  shortness of breath  CARDIOVASCULAR: No chest pain, palpitations  GASTROINTESTINAL: No abdominal pain, NO epigastric pain. No nausea, No vomiting; No diarrhea, No constipation. [ x ] BM  GENITOURINARY: No dysuria, No frequency, No urgency, No hematuria, NO incontinence  NEUROLOGICAL: No headaches, No dizziness, No numbness, No tingling, No tremors, No weakness  EXT: No Swelling, No Pain, No Edema  SKIN:  [ x] No itching, burning, rashes, or lesions   MUSCULOSKELETAL: No joint pain ,No Jt swelling; No muscle pain, No back pain, No extremity pain  PSYCHIATRIC: No depression,  No anxiety,  No mood swings ,No difficulty sleeping at night  PAIN SCALE: [ x ] None  [  ] Other-  ROS Unable to obtain due to - [  ] Dementia  [  ] Lethargy [  ] Drowsy [  ] Sedated [  ] non verbal  REST OF REVIEW Of SYSTEM - [x  ] Normal     Vital Signs Last 24 Hrs  T(C): 36.7 (20 May 2025 05:30), Max: 36.9 (19 May 2025 20:22)  T(F): 98 (20 May 2025 05:30), Max: 98.5 (20 May 2025 00:30)  HR: 74 (20 May 2025 05:30) (62 - 74)  BP: 164/73 (20 May 2025 05:30) (128/56 - 175/68)  BP(mean): --  RR: 18 (20 May 2025 05:30) (16 - 18)  SpO2: 97% (20 May 2025 05:30) (93% - 98%)    Parameters below as of 20 May 2025 05:30  Patient On (Oxygen Delivery Method): room air      Finger Stick        05-19 @ 07:01  -  05-20 @ 07:00  --------------------------------------------------------  IN: 0 mL / OUT: 150 mL / NET: -150 mL        PHYSICAL EXAM: mild facial flush  GENERAL:  [x  ] NAD , [ x ] well appearing, [  ] Agitated, [  ] Drowsy,  [  ] Lethargy, [  ] confused   HEAD:  [x  ] Normal, [  ] Other  EYES:  [ x ] EOMI, [  ] PERRLA, [ x ] conjunctiva and sclera clear normal, [  ] Other,  [  ] Pallor,[  ] Discharge  ENMT:  [x  ] Normal, [x  ] Moist mucous membranes, [x  ] Good dentition, [ x ] No Thrush  NECK:  [ x ] Supple, [ x ] No JVD, [ x ] Normal thyroid, [  ] Lymphadenopathy [  ] Other  CHEST/LUNG:  [  x] Clear to auscultation bilaterally, [x  ] Breath Sounds equal B/L / Decrease, [x  ] poor effort  [ x ] No rales, [x  ] No rhonchi  [x  ]  No wheezing,   HEART:  [ x ] Regular rate and rhythm, [  ] tachycardia, [  ] Bradycardia,  [  ] irregular  [ x ] No murmurs, No rubs, No gallops, [  ] PPM in place (Mfr:  )  ABDOMEN:  [x  ] Soft, [ x ] Nontender, [x  ] Nondistended, [x  ] No mass, [ x ] Bowel sounds present, [ x ] obese  NERVOUS SYSTEM:  [ x ] Alert & Oriented X3, [  ] Nonfocal  [  ] Confusion  [  ] Encephalopathic [  ] Sedated [  ] Unable to assess, [  ] Dementia [  ] Other-  EXTREMITIES: [  ] 2+ Peripheral Pulses, No clubbing, No cyanosis,  [  ] edema B/L lower EXT. [  ] PVD stasis skin changes B/L Lower EXT, [  ] wound  LYMPH: No lymphadenopathy noted  SKIN:  [ x ] No rashes or lesions, [  ] Pressure Ulcers, [  ] ecchymosis, [  ] Skin Tears, [  ] Other    DIET: Diet, DASH/TLC:   Sodium & Cholesterol Restricted (05-19-25 @ 14:57)      LABS:                        8.3    11.97 )-----------( 185      ( 20 May 2025 06:20 )             25.4     20 May 2025 06:20    137    |  107    |  22     ----------------------------<  95     3.8     |  21     |  2.10     Ca    8.6        20 May 2025 06:20    TPro  6.5    /  Alb  1.7    /  TBili  0.8    /  DBili  x      /  AST  98     /  ALT  42     /  AlkPhos  675    20 May 2025 06:20    PT/INR - ( 18 May 2025 23:30 )   PT: 16.0 sec;   INR: 1.36 ratio         PTT - ( 18 May 2025 23:30 )  PTT:29.6 sec  Urinalysis Basic - ( 20 May 2025 06:20 )    Color: x / Appearance: x / SG: x / pH: x  Gluc: 95 mg/dL / Ketone: x  / Bili: x / Urobili: x   Blood: x / Protein: x / Nitrite: x   Leuk Esterase: x / RBC: x / WBC x   Sq Epi: x / Non Sq Epi: x / Bacteria: x        Culture Results:   >100,000 CFU/ml Gram Negative Rods (05-19 @ 05:06)  Culture Results:   Growth in anaerobic bottle: Gram Negative Rods (05-18 @ 23:25)  Culture Results:   Growth in aerobic bottle: Gram Negative Rods  Direct identification is available within approximately 3-5  hours either by Blood Panel Multiplexed PCR or Direct  MALDI-TOF. Details: [email or web address] (05-18 @ 23:10)      culture blood  -- Clean Catch Clean Catch (Midstream) 05-19 @ 05:06    culture urine  --  05-19 @ 05:06  culture blood  -- Blood Blood-Peripheral 05-18 @ 23:25    culture urine  --  05-18 @ 23:25  culture blood  -- Blood Blood-Peripheral 05-18 @ 23:10    culture urine  --  05-18 @ 23:10    Urinalysis with Rflx Culture (collected 19 May 2025 05:06)    Culture - Urine (collected 19 May 2025 05:06)  Source: Clean Catch Clean Catch (Midstream)  Preliminary Report (20 May 2025 08:59):    >100,000 CFU/ml Gram Negative Rods    Culture - Blood (collected 18 May 2025 23:25)  Source: Blood Blood-Peripheral  Gram Stain (19 May 2025 14:24):    Growth in anaerobic bottle: Gram Negative Rods  Preliminary Report (19 May 2025 14:24):    Growth in anaerobic bottle: Gram Negative Rods    Culture - Blood (collected 18 May 2025 23:10)  Source: Blood Blood-Peripheral  Gram Stain (19 May 2025 14:24):    Growth in aerobic bottle: Gram Negative Rods  Preliminary Report (19 May 2025 14:25):    Growth in aerobic bottle: Gram Negative Rods    Direct identification is available within approximately 3-5    hours either by Blood Panel Multiplexed PCR or Direct    MALDI-TOF. Details: [email or web address]  Organism: Blood Culture PCR (19 May 2025 17:15)  Organism: Blood Culture PCR (19 May 2025 17:15)     RADIOLOGY & ADDITIONAL TESTS:  < from:  MRCP No Cont (05.19.25 @ 19:49) >    IMPRESSION:  *  Biliary sludge in the gallbladder with nonspecific thickening of the   gallbladder wall.  *  No evidence of choledocholithiasis or biliary ductal dilatation.      < end of copied text >      HEALTH ISSUES - PROBLEM Dx:  HTN (hypertension)    HLD (hyperlipidemia)    Diabetes    Anemia of chronic disease    Need for prophylactic measure    Sepsis    Acute on chronic renal failure        Consultant(s) Notes Reviewed:  [ x ] YES     Care Discussed with [X] Consultants  [ x ] Patient  [ x ] Family x 2  [  ] HCP [  ]   [  ] Social Service  [ x ] RN, [  ] Physical Therapy,[  ] Palliative care team  DVT PPX: [  ] Lovenox, [x  ] S C Heparin, [  ] Coumadin, [  ] Xarelto, [  ] Eliquis, [  ] Pradaxa, [  ] IV Heparin drip, [  ] SCD [  ] Contraindication 2 to GI Bleed,[  ] Ambulation [  ] Contraindicated 2 to  bleed [  ] Contraindicated 2 to Brain Bleed  Advanced directive: [ x ] None, [  ] DNR/DNI

## 2025-05-20 NOTE — CHART NOTE - NSCHARTNOTEFT_GEN_A_CORE
Called by RN for patient complaining of ear pain. Patient seen and examined at bedside. Patient endorses the pain started yesterday, has not noticed any drainage, or hearing loss.   Likely irritation 2/2 to wax.   Ordered tylenol 650mg 1x dose. Noted elevated alk phos 2/2 to acute pino.       T(C): 36.9 (05-19-25 @ 21:30), Max: 36.9 (05-19-25 @ 20:22)  HR: 74 (05-19-25 @ 21:30) (54 - 85)  BP: 132/58 (05-19-25 @ 21:30) (123/56 - 175/68)  RR: 18 (05-19-25 @ 21:30) (16 - 22)  SpO2: 95% (05-19-25 @ 21:30) (95% - 100%)  Wt(kg): --    Physical Exam:  Gen: well appearing, NAD  HEENT: light reflex present in b/l ears, no bulging/erythematic tympanic membrane, wax present in both ears   Cardio: regular rate and rhythm  Pulm: CTA b/l  Abdomen: soft, nontender, nondistended  Extremities: no clubbing, cyanosis or edema, +2 pedal pulses  Neuro: AAOx3, moving all 4 extremities, sensation intact  Skin: warm and dry Called by RN for patient complaining of ear pain. Patient seen and examined at bedside. Patient endorses the pain started yesterday, has not noticed any drainage, or hearing loss.   Denies headache, or acute vision problems (noted linear structure of right pupil).   Likely irritation 2/2 to wax. Primary team to consider wax removal for better visualization of ear canal.   Ordered tylenol 650mg 1x dose. Noted elevated alk phos 2/2 to acute pino.       T(C): 36.9 (05-19-25 @ 21:30), Max: 36.9 (05-19-25 @ 20:22)  HR: 74 (05-19-25 @ 21:30) (54 - 85)  BP: 132/58 (05-19-25 @ 21:30) (123/56 - 175/68)  RR: 18 (05-19-25 @ 21:30) (16 - 22)  SpO2: 95% (05-19-25 @ 21:30) (95% - 100%)  Wt(kg): --    Physical Exam:  Gen: well appearing, NAD  HEENT: right pupil not circular, slightly linear-- unsure if baseline. light reflex present in b/l ears, no bulging/erythematic tympanic membrane, wax present in both ears   Cardio: regular rate and rhythm  Pulm: CTA b/l  Abdomen: soft, nontender, nondistended  Extremities: no clubbing, cyanosis or edema, +2 pedal pulses  Neuro: AAOx3, moving all 4 extremities, sensation intact  Skin: warm and dry

## 2025-05-20 NOTE — CARE COORDINATION ASSESSMENT. - PRO ARRIVE FROM
Admit from [facility] [phone] F#[phone] Left a message for the  requesting a return call./assisted living facility

## 2025-05-20 NOTE — PROGRESS NOTE ADULT - PROBLEM SELECTOR PLAN 5
ELVIN on CKD stage 3   Cr: 2.3. Baseline of 1.8  -c/w IVF  - Dr. Rachel Ugarte called ELVIN on CKD stage 3   Cr: 2.3. Baseline of 1.8  -c/w IVF done  - Dr. 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

## 2025-05-20 NOTE — PATIENT CHOICE NOTE. - NSPTCHOICESTATE_GEN_ALL_CORE

## 2025-05-20 NOTE — PROGRESS NOTE ADULT - PROBLEM SELECTOR PLAN 2
Chronic  -c/w home metoprolol succinate, and hydralazine  -Hold home losartan and Lasix in setting of ELVIN

## 2025-05-20 NOTE — PROGRESS NOTE ADULT - ASSESSMENT
Poor PO intake  Sepsis  Bacteremia  Transaminitis    5/19 CT a/p: Mildly distended gallbladder with gallbladder wall thickening concerning for cholecystitis. No biliary ductal dilation.  5/19 US RUQ: Thick-walled gallbladder with cholelithiasis. Common bile duct measures 7 mm.  5/19 HIDA: No evidence of acute cholecystitis or biliary obstruction.  5/20 MRCP: Biliary sludge in the gallbladder with nonspecific thickening of the gallbladder wall. No evidence of choledocholithiasis or biliary ductal dilatation. Pancreatic cysts.    Plan:  - CT, US, HIDA, and MRCP discussed with pt and family  - Given normal bile ducts on imaging without CBD stones and normal bilirubin, low suspicion of cholangitis  - Trend LFTs  - Cont abx as per ID recs  - Monitor PO intake  - May need to consider appetite stimulator such as marinol 2.5 qhs if continued poor appetite  - Anti-emetics prn  - Discussed all of the above with pt and family

## 2025-05-20 NOTE — PROGRESS NOTE ADULT - SUBJECTIVE AND OBJECTIVE BOX
[facility]  Chuy Macias MD PhD, Rekha Gonzalez MD, Claudette Holguin MD, Jan Thacker MD, Camilo Arroyo MD  and providing coverage with Jonathan Grijalva MD  Providing Infectious Disease Consultations at [facility], [facility], [facility], [facility]'s    Office# [phone] to schedule follow up appointments  Answering Service for urgent calls or New Consults [phone]  Cell# to text for urgent issues Chuy Macias [phone]     infectious diseases progress note:    NELDA VERDIN is a 89y y. o. Female patient    Overnight and events of the last 24hrs reviewed    Allergies    amlodipine (Flushing)    Intolerances        ANTIBIOTICS/RELEVANT:  antimicrobials  meropenem  IVPB      meropenem  IVPB 500 milliGRAM(s) IV Intermittent every 12 hours    immunologic:    OTHER:  aspirin enteric coated 81 milliGRAM(s) Oral daily  dextrose 5%. 1000 milliLiter(s) IV Continuous <Continuous>  dextrose 5%. 1000 milliLiter(s) IV Continuous <Continuous>  dextrose 50% Injectable 25 Gram(s) IV Push once  dextrose 50% Injectable 12.5 Gram(s) IV Push once  dextrose 50% Injectable 25 Gram(s) IV Push once  dextrose Oral Gel 15 Gram(s) Oral once PRN  famotidine    Tablet 20 milliGRAM(s) Oral every 48 hours  glucagon  Injectable 1 milliGRAM(s) IntraMuscular once  heparin   Injectable 5000 Unit(s) SubCutaneous every 8 hours  hydrALAZINE 100 milliGRAM(s) Oral two times a day  insulin lispro (ADMELOG) corrective regimen sliding scale   SubCutaneous three times a day before meals  insulin lispro (ADMELOG) corrective regimen sliding scale   SubCutaneous at bedtime  metoprolol succinate  milliGRAM(s) Oral daily  ondansetron Injectable 4 milliGRAM(s) IV Push every 8 hours  polyethylene glycol 3350 17 Gram(s) Oral daily  sodium chloride 0.9%. 1000 milliLiter(s) IV Continuous <Continuous>      Objective:  Vital Signs Last 24 Hrs  T(C): 36.9 (20 May 2025 11:37), Max: 36.9 (19 May 2025 20:22)  T(F): 98.5 (20 May 2025 11:37), Max: 98.5 (20 May 2025 00:30)  HR: 71 (20 May 2025 11:37) (63 - 74)  BP: 148/56 (20 May 2025 11:37) (128/56 - 164/73)  BP(mean): --  RR: 18 (20 May 2025 11:37) (16 - 18)  SpO2: 94% (20 May 2025 11:37) (93% - 98%)    Parameters below as of 20 May 2025 11:37  Patient On (Oxygen Delivery Method): room air        T(C): 36.9 (05-20-25 @ 11:37), Max: 39.1 (05-18-25 @ 22:59)  T(C): 36.9 (05-20-25 @ 11:37), Max: 39.1 (05-18-25 @ 22:59)  T(C): 36.9 (05-20-25 @ 11:37), Max: 39.1 (05-18-25 @ 22:59)    PHYSICAL EXAM:  HEENT: NC atraumatic  Neck: supple  Respiratory: no accessory muscle use, breathing comfortably  Cardiovascular: distant  Gastrointestinal: normal appearing, nondistended  Extremities: no clubbing, no cyanosis,        LABS:                          8.3    11.97 )-----------( 185      ( 20 May 2025 06:20 )             25.4       WBC  11.97 05-20 @ 06:20  17.26 05-18 @ 23:30      05-20    137  |  107  |  22  ----------------------------<  95  3.8   |  21[L]  |  2.10[H]    Ca    8.6      20 May 2025 06:20    TPro  6.5  /  Alb  1.7[L]  /  TBili  0.8  /  DBili  x   /  AST  98[H]  /  ALT  42  /  AlkPhos  675[H]  05-20      Creatinine: 2.10 mg/dL (05-20-25 @ 06:20)  Creatinine: 2.30 mg/dL (05-18-25 @ 23:30)      PT/INR - ( 18 May 2025 23:30 )   PT: 16.0 sec;   INR: 1.36 ratio         PTT - ( 18 May 2025 23:30 )  PTT:29.6 sec  Urinalysis Basic - ( 20 May 2025 06:20 )    Color: x / Appearance: x / SG: x / pH: x  Gluc: 95 mg/dL / Ketone: x  / Bili: x / Urobili: x   Blood: x / Protein: x / Nitrite: x   Leuk Esterase: x / RBC: x / WBC x   Sq Epi: x / Non Sq Epi: x / Bacteria: x            INFLAMMATORY MARKERS      MICROBIOLOGY:    Urine Microscopic-Add On (NC) (05.19.25 @ 05:06)    White Blood Cell - Urine: 100 /HPF   Red Blood Cell - Urine: 3 /HPF   Bacteria: Many /HPF   Squamous Epithelial Cells: Present    Culture - Urine (05.19.25 @ 05:06)    Specimen Source: Clean Catch Clean Catch (Midstream)   Culture Results:   >100,000 CFU/ml Gram Negative Rods    Culture - Blood (05.18.25 @ 23:10)    -  ESBL: Detec   -  Escherichia coli: Detec   -  CTX-M Resistance Marker: Detec   Gram Stain:   Growth in aerobic bottle: Gram Negative Rods   Specimen Source: Blood Blood-Peripheral   Organism: Blood Culture PCR   Culture Results:   Growth in aerobic bottle: Escherichia coli  Direct identification is available within approximately 3-5  hours either by Blood Panel Multiplexed PCR or Direct  MALDI-TOF. Details: [email or web address]   Organism Identification: Blood Culture PCR   Method Type: PCR        RADIOLOGY & ADDITIONAL STUDIES:

## 2025-05-21 LAB
-  AMIKACIN: SIGNIFICANT CHANGE UP
-  AMPICILLIN/SULBACTAM: SIGNIFICANT CHANGE UP
-  AMPICILLIN: SIGNIFICANT CHANGE UP
-  AMPICILLIN: SIGNIFICANT CHANGE UP
-  AZTREONAM: SIGNIFICANT CHANGE UP
-  AZTREONAM: SIGNIFICANT CHANGE UP
-  CEFAZOLIN: SIGNIFICANT CHANGE UP
-  CEFEPIME: SIGNIFICANT CHANGE UP
-  CEFEPIME: SIGNIFICANT CHANGE UP
-  CEFTAZIDIME: SIGNIFICANT CHANGE UP
-  CEFTRIAXONE: SIGNIFICANT CHANGE UP
-  CIPROFLOXACIN: SIGNIFICANT CHANGE UP
-  ERTAPENEM: SIGNIFICANT CHANGE UP
-  GENTAMICIN: SIGNIFICANT CHANGE UP
-  IMIPENEM: SIGNIFICANT CHANGE UP
-  IMIPENEM: SIGNIFICANT CHANGE UP
-  LEVOFLOXACIN: SIGNIFICANT CHANGE UP
-  MEROPENEM: SIGNIFICANT CHANGE UP
-  MEROPENEM: SIGNIFICANT CHANGE UP
-  NITROFURANTOIN: SIGNIFICANT CHANGE UP
-  PIPERACILLIN/TAZOBACTAM: SIGNIFICANT CHANGE UP
-  PIPERACILLIN/TAZOBACTAM: SIGNIFICANT CHANGE UP
-  TETRACYCLINE: SIGNIFICANT CHANGE UP
-  TIGECYCLINE: SIGNIFICANT CHANGE UP
-  TOBRAMYCIN: SIGNIFICANT CHANGE UP
-  TRIMETHOPRIM/SULFAMETHOXAZOLE: SIGNIFICANT CHANGE UP
-  VANCOMYCIN: SIGNIFICANT CHANGE UP
ALBUMIN SERPL ELPH-MCNC: 1.7 G/DL — LOW (ref 3.3–5)
ALP SERPL-CCNC: 735 U/L — HIGH (ref 40–120)
ALT FLD-CCNC: 46 U/L — SIGNIFICANT CHANGE UP (ref 12–78)
ANION GAP SERPL CALC-SCNC: 12 MMOL/L — SIGNIFICANT CHANGE UP (ref 5–17)
APPEARANCE UR: ABNORMAL
AST SERPL-CCNC: 92 U/L — HIGH (ref 15–37)
BILIRUB SERPL-MCNC: 0.7 MG/DL — SIGNIFICANT CHANGE UP (ref 0.2–1.2)
BILIRUB UR-MCNC: NEGATIVE — SIGNIFICANT CHANGE UP
BUN SERPL-MCNC: 25 MG/DL — HIGH (ref 7–23)
CALCIUM SERPL-MCNC: 8.7 MG/DL — SIGNIFICANT CHANGE UP (ref 8.5–10.1)
CHLORIDE SERPL-SCNC: 105 MMOL/L — SIGNIFICANT CHANGE UP (ref 96–108)
CO2 SERPL-SCNC: 21 MMOL/L — LOW (ref 22–31)
COLOR SPEC: YELLOW — SIGNIFICANT CHANGE UP
CREAT ?TM UR-MCNC: 77 MG/DL — SIGNIFICANT CHANGE UP
CREAT SERPL-MCNC: 1.9 MG/DL — HIGH (ref 0.5–1.3)
CULTURE RESULTS: ABNORMAL
DIFF PNL FLD: NEGATIVE — SIGNIFICANT CHANGE UP
EGFR: 24 ML/MIN/1.73M2 — LOW
EGFR: 24 ML/MIN/1.73M2 — LOW
GLUCOSE BLDC GLUCOMTR-MCNC: 133 MG/DL — HIGH (ref 70–99)
GLUCOSE BLDC GLUCOMTR-MCNC: 141 MG/DL — HIGH (ref 70–99)
GLUCOSE BLDC GLUCOMTR-MCNC: 146 MG/DL — HIGH (ref 70–99)
GLUCOSE BLDC GLUCOMTR-MCNC: 158 MG/DL — HIGH (ref 70–99)
GLUCOSE SERPL-MCNC: 115 MG/DL — HIGH (ref 70–99)
GLUCOSE UR QL: NEGATIVE MG/DL — SIGNIFICANT CHANGE UP
HCT VFR BLD CALC: 24.5 % — LOW (ref 34.5–45)
HGB BLD-MCNC: 7.8 G/DL — LOW (ref 11.5–15.5)
KETONES UR QL: NEGATIVE MG/DL — SIGNIFICANT CHANGE UP
LEUKOCYTE ESTERASE UR-ACNC: ABNORMAL
MCHC RBC-ENTMCNC: 25.7 PG — LOW (ref 27–34)
MCHC RBC-ENTMCNC: 31.8 G/DL — LOW (ref 32–36)
MCV RBC AUTO: 80.9 FL — SIGNIFICANT CHANGE UP (ref 80–100)
METHOD TYPE: SIGNIFICANT CHANGE UP
NITRITE UR-MCNC: NEGATIVE — SIGNIFICANT CHANGE UP
NRBC BLD AUTO-RTO: 0 /100 WBCS — SIGNIFICANT CHANGE UP (ref 0–0)
ORGANISM # SPEC MICROSCOPIC CNT: ABNORMAL
ORGANISM # SPEC MICROSCOPIC CNT: SIGNIFICANT CHANGE UP
ORGANISM # SPEC MICROSCOPIC CNT: SIGNIFICANT CHANGE UP
OSMOLALITY UR: 446 MOSM/KG — SIGNIFICANT CHANGE UP (ref 50–1200)
PH UR: 5.5 — SIGNIFICANT CHANGE UP (ref 5–8)
PLATELET # BLD AUTO: 191 K/UL — SIGNIFICANT CHANGE UP (ref 150–400)
POTASSIUM SERPL-MCNC: 3.5 MMOL/L — SIGNIFICANT CHANGE UP (ref 3.5–5.3)
POTASSIUM SERPL-SCNC: 3.5 MMOL/L — SIGNIFICANT CHANGE UP (ref 3.5–5.3)
POTASSIUM UR-SCNC: 35.4 MMOL/L — SIGNIFICANT CHANGE UP
PROT ?TM UR-MCNC: 62 MG/DL — HIGH (ref 0–12)
PROT SERPL-MCNC: 6.5 G/DL — SIGNIFICANT CHANGE UP (ref 6–8.3)
PROT UR-MCNC: 30 MG/DL
PROT/CREAT UR-RTO: 0.8 RATIO — HIGH (ref 0–0.2)
RBC # BLD: 3.03 M/UL — LOW (ref 3.8–5.2)
RBC # FLD: 16 % — HIGH (ref 10.3–14.5)
SODIUM SERPL-SCNC: 138 MMOL/L — SIGNIFICANT CHANGE UP (ref 135–145)
SODIUM UR-SCNC: 85 MMOL/L — SIGNIFICANT CHANGE UP
SP GR SPEC: 1.02 — SIGNIFICANT CHANGE UP (ref 1–1.03)
SPECIMEN SOURCE: SIGNIFICANT CHANGE UP
UROBILINOGEN FLD QL: 0.2 MG/DL — SIGNIFICANT CHANGE UP (ref 0.2–1)
UUN UR-MCNC: 475 MG/DL — SIGNIFICANT CHANGE UP
WBC # BLD: 9.73 K/UL — SIGNIFICANT CHANGE UP (ref 3.8–10.5)
WBC # FLD AUTO: 9.73 K/UL — SIGNIFICANT CHANGE UP (ref 3.8–10.5)

## 2025-05-21 RX ORDER — IPRATROPIUM BROMIDE AND ALBUTEROL SULFATE .5; 2.5 MG/3ML; MG/3ML
3 SOLUTION RESPIRATORY (INHALATION) ONCE
Refills: 0 | Status: COMPLETED | OUTPATIENT
Start: 2025-05-21 | End: 2025-05-21

## 2025-05-21 RX ORDER — SIMETHICONE 80 MG
80 TABLET,CHEWABLE ORAL EVERY 6 HOURS
Refills: 0 | Status: DISCONTINUED | OUTPATIENT
Start: 2025-05-21 | End: 2025-05-23

## 2025-05-21 RX ORDER — LINEZOLID 2 MG/ML
600 INJECTION, SOLUTION INTRAVENOUS EVERY 12 HOURS
Refills: 0 | Status: DISCONTINUED | OUTPATIENT
Start: 2025-05-21 | End: 2025-05-23

## 2025-05-21 RX ADMIN — FLUTICASONE PROPIONATE 1 SPRAY(S): 50 SPRAY, METERED NASAL at 05:47

## 2025-05-21 RX ADMIN — Medication 100 MILLIGRAM(S): at 18:35

## 2025-05-21 RX ADMIN — HEPARIN SODIUM 5000 UNIT(S): 1000 INJECTION INTRAVENOUS; SUBCUTANEOUS at 22:11

## 2025-05-21 RX ADMIN — HEPARIN SODIUM 5000 UNIT(S): 1000 INJECTION INTRAVENOUS; SUBCUTANEOUS at 12:52

## 2025-05-21 RX ADMIN — Medication 20 MILLIGRAM(S): at 22:10

## 2025-05-21 RX ADMIN — Medication 40 MILLIGRAM(S): at 18:39

## 2025-05-21 RX ADMIN — Medication 4 MILLIGRAM(S): at 05:46

## 2025-05-21 RX ADMIN — FLUTICASONE PROPIONATE 1 SPRAY(S): 50 SPRAY, METERED NASAL at 18:43

## 2025-05-21 RX ADMIN — MEROPENEM 100 MILLIGRAM(S): 1 INJECTION INTRAVENOUS at 18:36

## 2025-05-21 RX ADMIN — Medication 100 MILLIGRAM(S): at 05:46

## 2025-05-21 RX ADMIN — LINEZOLID 600 MILLIGRAM(S): 2 INJECTION, SOLUTION INTRAVENOUS at 12:52

## 2025-05-21 RX ADMIN — Medication 81 MILLIGRAM(S): at 12:52

## 2025-05-21 RX ADMIN — METOPROLOL SUCCINATE 100 MILLIGRAM(S): 50 TABLET, EXTENDED RELEASE ORAL at 05:46

## 2025-05-21 RX ADMIN — INSULIN LISPRO 1: 100 INJECTION, SOLUTION INTRAVENOUS; SUBCUTANEOUS at 12:58

## 2025-05-21 RX ADMIN — HEPARIN SODIUM 5000 UNIT(S): 1000 INJECTION INTRAVENOUS; SUBCUTANEOUS at 05:46

## 2025-05-21 RX ADMIN — Medication 20 MILLIEQUIVALENT(S): at 12:51

## 2025-05-21 RX ADMIN — MEROPENEM 100 MILLIGRAM(S): 1 INJECTION INTRAVENOUS at 05:45

## 2025-05-21 RX ADMIN — IPRATROPIUM BROMIDE AND ALBUTEROL SULFATE 3 MILLILITER(S): .5; 2.5 SOLUTION RESPIRATORY (INHALATION) at 11:35

## 2025-05-21 RX ADMIN — LINEZOLID 600 MILLIGRAM(S): 2 INJECTION, SOLUTION INTRAVENOUS at 18:35

## 2025-05-21 NOTE — PROGRESS NOTE ADULT - PROBLEM SELECTOR PLAN 4
Chronic  -Hold home Januvia  -LDISS Chronic  -c/w home metoprolol succinate, and hydralazine  -Hold home losartan and Lasix in setting of ELVIN

## 2025-05-21 NOTE — PROGRESS NOTE ADULT - SUBJECTIVE AND OBJECTIVE BOX
Patient is a 89y old  Female who presents with a chief complaint of Sepsis (21 May 2025 11:48)    HPI:  Pt is a 89-year-old female w/ PMHx of anemia, diabetes, hypertension, hyperlipidemia, CKD Stage 3 who presents w/ decreased PO intake over past several weeks. Pt was admitted from 4/16-4/22 for acute cholecystitis and UTI. Pt's daughter at bedside provided much of the history. Pt was initially at rehab after discharge of which she was eating less than usually. Pt has been at [facility] since last Saturday to now in which she has had decrease P.O. intake w/ nausea and several episodes of emesis of which the daughter has described as biliary.. Pt's daughter also states that pt was c/o of back pain where she had a fracture last month.     IN THE ED:  Temp 102.4F, HR 78, /64, RR 18, SpO2 90%  S/P zofran, zosyn, 2.85 NS bolus, 1g ofirmev, KCl,   Labs significant for WBC: 17.26, H/H: 8.7/26.1, Na: 133, K: 3.1, BUN/Cr: 27/2.3, Alk Phos: 870, AST: 103, Lactate: 2.3  Imaging:RUQ US: Thick-walled gallbladder with cholelithiasis. Negative sonographic Kunz's sign. Overall findings are equivocal for acute cholecystitis, HIDA scan may be considered for further evaluation.  UA: (+) UTI   (19 May 2025 05:27)    INTERVAL HPI:  5/19: Patient seen after HIDA scan performed- very lethargic d/t morphine . HIDA scan negative however family expresses concern about patients GI complaints including nausea, vomitting, diarrhea, abdominal pain. HIDA scan -Neg,  MRCP ordered.  5/20: patient seen and examined at bedside. States she currently has no symptoms no N/V ate breakfast with no issues. Denies an abd pain. MRCP shows biliary sludge. BCx positive for ESBL  abx switched to meropenem. As per ID-Dr birmingham.   5/21: Patient seen and examined at bedside. Had a BM prior to exam, daughter requests to dc miralax. Patient states she feels generally uncomfortable today. Ucx positive for pseudomonas Blood cx sensitivites + E. faceum. Currently on Meropenem and Linezolid.     OVERNIGHT EVENTS:    Home Medications:  aspirin 81 mg oral delayed release tablet: 1 tab(s) orally every other day (19 May 2025 06:21)  atorvastatin 40 mg oral tablet: 1 tab(s) orally once a day (at bedtime) (19 May 2025 06:21)  famotidine 20 mg oral tablet: 1 tab(s) orally once a day (19 May 2025 06:21)  furosemide 20 mg oral tablet: 1 tab(s) orally once a day (19 May 2025 06:21)  hydrALAZINE 100 mg oral tablet: 1 tab(s) orally 2 times a day (19 May 2025 06:20)  Januvia 25 mg oral tablet: 1 tab(s) orally once a day (19 May 2025 06:21)  omeprazole 20 mg oral delayed release capsule: 1 cap(s) orally once a day (19 May 2025 06:21)      MEDICATIONS  (STANDING):  aspirin enteric coated 81 milliGRAM(s) Oral daily  dextrose 5%. 1000 milliLiter(s) (50 mL/Hr) IV Continuous <Continuous>  dextrose 5%. 1000 milliLiter(s) (100 mL/Hr) IV Continuous <Continuous>  dextrose 50% Injectable 25 Gram(s) IV Push once  dextrose 50% Injectable 12.5 Gram(s) IV Push once  dextrose 50% Injectable 25 Gram(s) IV Push once  famotidine    Tablet 20 milliGRAM(s) Oral every 48 hours  fluticasone propionate 50 MICROgram(s)/spray Nasal Spray 1 Spray(s) Both Nostrils two times a day  glucagon  Injectable 1 milliGRAM(s) IntraMuscular once  heparin   Injectable 5000 Unit(s) SubCutaneous every 8 hours  hydrALAZINE 100 milliGRAM(s) Oral two times a day  insulin lispro (ADMELOG) corrective regimen sliding scale   SubCutaneous three times a day before meals  insulin lispro (ADMELOG) corrective regimen sliding scale   SubCutaneous at bedtime  lactated ringers. 1000 milliLiter(s) (50 mL/Hr) IV Continuous <Continuous>  linezolid    Tablet 600 milliGRAM(s) Oral every 12 hours  meropenem  IVPB      meropenem  IVPB 500 milliGRAM(s) IV Intermittent every 12 hours  metoprolol succinate  milliGRAM(s) Oral daily  potassium chloride    Tablet ER 20 milliEquivalent(s) Oral once    MEDICATIONS  (PRN):  dextrose Oral Gel 15 Gram(s) Oral once PRN Blood Glucose LESS THAN 70 milliGRAM(s)/deciliter  ondansetron Injectable 4 milliGRAM(s) IV Push every 8 hours PRN Nausea and/or Vomiting  simethicone 80 milliGRAM(s) Chew every 6 hours PRN Gas      Allergies    amlodipine ([place])    Intolerances        Social History:  Tobacco: Remote hx  Alcohol: Denies  Illicit Drugs: Denies  Lives at: [facility]  Ambulation: w/ walker  ADLs: Dependent (19 May 2025 05:27)      REVIEW OF SYSTEMS: CONSTITUTIONAL: No fever, No chills, No fatigue, No myalgia, [x] Body ache, [x] Weakness  EYES: No eye pain,  No visual disturbances, No discharge, NO Redness  ENMT:  No ear pain, No nose bleed, No vertigo; No sinus pain, NO throat pain, No Congestion  NECK: No pain, No stiffness  RESPIRATORY: No cough, NO wheezing, No  hemoptysis, NO  shortness of breath  CARDIOVASCULAR: No chest pain, palpitations  GASTROINTESTINAL: No abdominal pain, NO epigastric pain. No nausea, No vomiting; No diarrhea, No constipation. [  ] BM  GENITOURINARY: No dysuria, No frequency, No urgency, No hematuria, NO incontinence  NEUROLOGICAL: No headaches, No dizziness, No numbness, No tingling, No tremors, No weakness  EXT: No Swelling, No Pain, No Edema  SKIN:  [  ] No itching, burning, rashes, or lesions   MUSCULOSKELETAL: No joint pain ,No Jt swelling; No muscle pain, No back pain, No extremity pain  PSYCHIATRIC: No depression,  No anxiety,  No mood swings ,No difficulty sleeping at night  PAIN SCALE: [  ] None  [  ] Other-  ROS Unable to obtain due to - [  ] Dementia  [  ] Lethargy [  ] Drowsy [  ] Sedated [  ] non verbal  REST OF REVIEW Of SYSTEM - [  ] Normal     Vital Signs Last 24 Hrs  T(C): 36.4 (21 May 2025 11:10), Max: 36.9 (21 May 2025 05:31)  T(F): 97.5 (21 May 2025 11:10), Max: 98.5 (21 May 2025 05:31)  HR: 75 (21 May 2025 11:35) (61 - 77)  BP: 131/60 (21 May 2025 11:10) (131/60 - 159/87)  BP(mean): --  RR: 18 (21 May 2025 11:10) (18 - 18)  SpO2: 97% (21 May 2025 11:35) (92% - 97%)    Parameters below as of 21 May 2025 11:35  Patient On (Oxygen Delivery Method): room air      Finger Stick        05-20 @ 07:01  -  05-21 @ 07:00  --------------------------------------------------------  IN: 150 mL / OUT: 0 mL / NET: 150 mL        PHYSICAL EXAM:  GENERAL:  [  ] NAD , [  ] well appearing, [ x ] Agitated, [  ] Drowsy,  [  ] Lethargy, [  ] confused   HEAD:  [  ] Normal, [  ] Other  EYES:  [ x ] EOMI, [  ] PERRLA, [  ] conjunctiva and sclera clear normal, [  ] Other,  [  ] Pallor,[  ] Discharge  ENMT:  [x  ] Normal, [  ] Moist mucous membranes, [  ] Good dentition, [  ] No Thrush  NECK:  [ x ] Supple, [  ] No JVD, [  ] Normal thyroid, [  ] Lymphadenopathy [  ] Other  CHEST/LUNG:  [  x] Clear to auscultation bilaterally, [  ] Breath Sounds equal B/L / Decrease, [  ] poor effort  [  ] No rales, [  ] No rhonchi  [  ]  No wheezing,   HEART:  [  ] Regular rate and rhythm, [  ] tachycardia, [  ] Bradycardia,  [  ] irregular  [  ] No murmurs, No rubs, No gallops, [  ] PPM in place (Mfr:  )  ABDOMEN:  [ xx ] Soft, [ x ] Nontender, [ x ] Nondistended, [  ] No mass, [  ] Bowel sounds present, [  ] obese  NERVOUS SYSTEM:  [ x] Alert & Oriented X3, [  ] Nonfocal  [  ] Confusion  [  ] Encephalopathic [  ] Sedated [  ] Unable to assess, [  ] Dementia [  ] Other-  EXTREMITIES: [  ] 2+ Peripheral Pulses, No clubbing, No cyanosis,  [  ] edema B/L lower EXT. [  ] PVD stasis skin changes B/L Lower EXT, [  ] wound  LYMPH: No lymphadenopathy noted  SKIN:  [ x ] No rashes or lesions, [  ] Pressure Ulcers, [  ] ecchymosis, [  ] Skin Tears, [  ] Other    DIET: Diet, DASH/TLC:   Sodium & Cholesterol Restricted (05-19-25 @ 14:57)      LABS:                        7.8    9.73  )-----------( 191      ( 21 May 2025 07:30 )             24.5     21 May 2025 07:30    138    |  105    |  25     ----------------------------<  115    3.5     |  21     |  1.90     Ca    8.7        21 May 2025 07:30    TPro  6.5    /  Alb  1.7    /  TBili  0.7    /  DBili  x      /  AST  92     /  ALT  46     /  AlkPhos  735    21 May 2025 07:30      Urinalysis Basic - ( 21 May 2025 07:30 )    Color: x / Appearance: x / SG: x / pH: x  Gluc: 115 mg/dL / Ketone: x  / Bili: x / Urobili: x   Blood: x / Protein: x / Nitrite: x   Leuk Esterase: x / RBC: x / WBC x   Sq Epi: x / Non Sq Epi: x / Bacteria: x        Culture Results:   >100,000 CFU/ml Pseudomonas aeruginosa  50,000 - 99,000 CFU/mL Enterococcus faecium (05-19 @ 05:06)  Culture Results:   Growth in anaerobic bottle: Escherichia coli  See previous culture [id number] (05-18 @ 23:25)  Culture Results:   Growth in aerobic bottle: Escherichia coli ESBL  Direct identification is available within approximately 3-5  hours either by Blood Panel Multiplexed PCR or Direct  MALDI-TOF. Details: [email or web address] (05-18 @ 23:10)                  Urinalysis with Rflx Culture (collected 19 May 2025 05:06)    Culture - Urine (collected 19 May 2025 05:06)  Source: Clean Catch Clean Catch (Midstream)  Final Report (21 May 2025 12:34):    >100,000 CFU/ml Pseudomonas aeruginosa    50,000 - 99,000 CFU/mL Enterococcus faecium  Organism: Pseudomonas aeruginosa  Enterococcus faecium (21 May 2025 12:34)  Organism: Enterococcus faecium (21 May 2025 12:34)  Organism: Pseudomonas aeruginosa (21 May 2025 12:34)    Culture - Blood (collected 18 May 2025 23:25)  Source: Blood Blood-Peripheral  Gram Stain (19 May 2025 14:24):    Growth in anaerobic bottle: Gram Negative Rods  Preliminary Report (20 May 2025 11:51):    Growth in anaerobic bottle: Escherichia coli    See previous culture [id number]    Culture - Blood (collected 18 May 2025 23:10)  Source: Blood Blood-Peripheral  Gram Stain (19 May 2025 14:24):    Growth in aerobic bottle: Gram Negative Rods  Final Report (21 May 2025 07:07):    Growth in aerobic bottle: Escherichia coli ESBL    Direct identification is available within approximately 3-5    hours either by Blood Panel Multiplexed PCR or Direct    MALDI-TOF. Details: [email or web address]  Organism: Blood Culture PCR  Escherichia coli ESBL (21 May 2025 07:07)  Organism: Escherichia coli ESBL (21 May 2025 07:07)  Organism: Blood Culture PCR (21 May 2025 07:07)         Anemia Panel:      Thyroid Panel:                RADIOLOGY & ADDITIONAL TESTS:      HEALTH ISSUES - PROBLEM Dx:  HTN (hypertension)    HLD (hyperlipidemia)    Diabetes    Anemia of chronic disease    Need for prophylactic measure    Sepsis    Acute on chronic renal failure            Consultant(s) Notes Reviewed:  [  ] YES     Care Discussed with [X] Consultants  [  ] Patient  [  ] Family [  ] HCP [  ]   [  ] Social Service  [  ] RN, [  ] Physical Therapy,[  ] Palliative care team  DVT PPX: [  ] Lovenox, [  ] S C Heparin, [  ] Coumadin, [  ] Xarelto, [  ] Eliquis, [  ] Pradaxa, [  ] IV Heparin drip, [  ] SCD [  ] Contraindication 2 to GI Bleed,[  ] Ambulation [  ] Contraindicated 2 to  bleed [  ] Contraindicated 2 to Brain Bleed  Advanced directive: [  ] None, [  ] DNR/DNI Patient is a 89y old  Female who presents with a chief complaint of Sepsis (21 May 2025 11:48)    HPI:  Pt is a 89-year-old female w/ PMHx of anemia, diabetes, hypertension, hyperlipidemia, CKD Stage 3 who presents w/ decreased PO intake over past several weeks. Pt was admitted from 4/16-4/22 for acute cholecystitis and UTI. Pt's daughter at bedside provided much of the history. Pt was initially at rehab after discharge of which she was eating less than usually. Pt has been at [facility] since last Saturday to now in which she has had decrease P.O. intake w/ nausea and several episodes of emesis of which the daughter has described as biliary.. Pt's daughter also states that pt was c/o of back pain where she had a fracture last month.     IN THE ED:  Temp 102.4F, HR 78, /64, RR 18, SpO2 90%  S/P zofran, zosyn, 2.85 NS bolus, 1g ofirmev, KCl,   Labs significant for WBC: 17.26, H/H: 8.7/26.1, Na: 133, K: 3.1, BUN/Cr: 27/2.3, Alk Phos: 870, AST: 103, Lactate: 2.3  Imaging:RUQ US: Thick-walled gallbladder with cholelithiasis. Negative sonographic Kunz's sign. Overall findings are equivocal for acute cholecystitis, HIDA scan may be considered for further evaluation.  UA: (+) UTI   (19 May 2025 05:27)    INTERVAL HPI:  5/19: Patient seen after HIDA scan performed- very lethargic d/t morphine . HIDA scan negative however family expresses concern about patients GI complaints including nausea, vomitting, diarrhea, abdominal pain. HIDA scan -Neg,  MRCP ordered.  5/20: patient seen and examined at bedside. States she currently has no symptoms no N/V ate breakfast with no issues. Denies an abd pain. MRCP shows biliary sludge. BCx positive for ESBL  abx switched to meropenem. As per ID-Dr birmingham.   5/21: Patient seen and examined at bedside. Had a BM prior to exam, daughter requests to dc miralax. Patient states she feels generally uncomfortable today. Ucx positive for pseudomonas Blood cx sensitivites + E. faceum. Currently on Meropenem and Linezolid.     OVERNIGHT EVENTS: none    Home Medications:  aspirin 81 mg oral delayed release tablet: 1 tab(s) orally every other day (19 May 2025 06:21)  atorvastatin 40 mg oral tablet: 1 tab(s) orally once a day (at bedtime) (19 May 2025 06:21)  famotidine 20 mg oral tablet: 1 tab(s) orally once a day (19 May 2025 06:21)  furosemide 20 mg oral tablet: 1 tab(s) orally once a day (19 May 2025 06:21)  hydrALAZINE 100 mg oral tablet: 1 tab(s) orally 2 times a day (19 May 2025 06:20)  Januvia 25 mg oral tablet: 1 tab(s) orally once a day (19 May 2025 06:21)  omeprazole 20 mg oral delayed release capsule: 1 cap(s) orally once a day (19 May 2025 06:21)      MEDICATIONS  (STANDING):  aspirin enteric coated 81 milliGRAM(s) Oral daily  dextrose 5%. 1000 milliLiter(s) (50 mL/Hr) IV Continuous <Continuous>  dextrose 5%. 1000 milliLiter(s) (100 mL/Hr) IV Continuous <Continuous>  dextrose 50% Injectable 25 Gram(s) IV Push once  dextrose 50% Injectable 12.5 Gram(s) IV Push once  dextrose 50% Injectable 25 Gram(s) IV Push once  famotidine    Tablet 20 milliGRAM(s) Oral every 48 hours  fluticasone propionate 50 MICROgram(s)/spray Nasal Spray 1 Spray(s) Both Nostrils two times a day  glucagon  Injectable 1 milliGRAM(s) IntraMuscular once  heparin   Injectable 5000 Unit(s) SubCutaneous every 8 hours  hydrALAZINE 100 milliGRAM(s) Oral two times a day  insulin lispro (ADMELOG) corrective regimen sliding scale   SubCutaneous three times a day before meals  insulin lispro (ADMELOG) corrective regimen sliding scale   SubCutaneous at bedtime  lactated ringers. 1000 milliLiter(s) (50 mL/Hr) IV Continuous <Continuous>  linezolid    Tablet 600 milliGRAM(s) Oral every 12 hours  meropenem  IVPB      meropenem  IVPB 500 milliGRAM(s) IV Intermittent every 12 hours  metoprolol succinate  milliGRAM(s) Oral daily  potassium chloride    Tablet ER 20 milliEquivalent(s) Oral once    MEDICATIONS  (PRN):  dextrose Oral Gel 15 Gram(s) Oral once PRN Blood Glucose LESS THAN 70 milliGRAM(s)/deciliter  ondansetron Injectable 4 milliGRAM(s) IV Push every 8 hours PRN Nausea and/or Vomiting  simethicone 80 milliGRAM(s) Chew every 6 hours PRN Gas      Allergies    amlodipine (Flushing)    Intolerances        Social History:  Tobacco: Remote hx  Alcohol: Denies  Illicit Drugs: Denies  L[facility] at: [facility]  Ambulation: w/ walker  ADLs: Dependent (19 May 2025 05:27)      REVIEW OF SYSTEMS: CONSTITUTIONAL: No fever, No chills, No fatigue, No myalgia, [x] Body ache, [x] Weakness  EYES: No eye pain,  No visual disturbances, No discharge, NO Redness  ENMT:  No ear pain, No nose bleed, No vertigo; No sinus pain, NO throat pain, No Congestion  NECK: No pain, No stiffness  RESPIRATORY: No cough, NO wheezing, No  hemoptysis, NO  shortness of breath  CARDIOVASCULAR: No chest pain, palpitations  GASTROINTESTINAL: No abdominal pain, NO epigastric pain. No nausea, No vomiting; No diarrhea, No constipation. [  ] BM  GENITOURINARY: No dysuria, No frequency, No urgency, No hematuria, NO incontinence  NEUROLOGICAL: No headaches, No dizziness, No numbness, No tingling, No tremors, No weakness  EXT: No Swelling, No Pain, No Edema  SKIN:  [  ] No itching, burning, rashes, or lesions   MUSCULOSKELETAL: No joint pain ,No Jt swelling; No muscle pain, No back pain, No extremity pain  PSYCHIATRIC: No depression,  No anxiety,  No mood swings ,No difficulty sleeping at night  PAIN SCALE: [  ] None  [  ] Other-  ROS Unable to obtain due to - [  ] Dementia  [  ] Lethargy [  ] Drowsy [  ] Sedated [  ] non verbal  REST OF REVIEW Of SYSTEM - [  ] Normal     Vital Signs Last 24 Hrs  T(C): 36.4 (21 May 2025 11:10), Max: 36.9 (21 May 2025 05:31)  T(F): 97.5 (21 May 2025 11:10), Max: 98.5 (21 May 2025 05:31)  HR: 75 (21 May 2025 11:35) (61 - 77)  BP: 131/60 (21 May 2025 11:10) (131/60 - 159/87)  BP(mean): --  RR: 18 (21 May 2025 11:10) (18 - 18)  SpO2: 97% (21 May 2025 11:35) (92% - 97%)    Parameters below as of 21 May 2025 11:35  Patient On (Oxygen Delivery Method): room air      Finger Stick        05-20 @ 07:01  -  05-21 @ 07:00  --------------------------------------------------------  IN: 150 mL / OUT: 0 mL / NET: 150 mL        PHYSICAL EXAM:  GENERAL:  [  ] NAD , [  ] well appearing, [ x ] Agitated, [  ] Drowsy,  [  ] Lethargy, [  ] confused   HEAD:  [  ] Normal, [  ] Other  EYES:  [ x ] EOMI, [  ] PERRLA, [  ] conjunctiva and sclera clear normal, [  ] Other,  [  ] Pallor,[  ] Discharge  ENMT:  [x  ] Normal, [  ] Moist mucous membranes, [  ] Good dentition, [  ] No Thrush  NECK:  [ x ] Supple, [  ] No JVD, [  ] Normal thyroid, [  ] Lymphadenopathy [  ] Other  CHEST/LUNG:  [  x] Clear to auscultation bilaterally, [  ] Breath Sounds equal B/L / Decrease, [  ] poor effort  [  ] No rales, [  ] No rhonchi  [  ]  No wheezing,   HEART:  [  ] Regular rate and rhythm, [  ] tachycardia, [  ] Bradycardia,  [  ] irregular  [  ] No murmurs, No rubs, No gallops, [  ] PPM in place (Mfr:  )  ABDOMEN:  [ xx ] Soft, [ x ] Nontender, [ x ] Nondistended, [  ] No mass, [  ] Bowel sounds present, [  ] obese  NERVOUS SYSTEM:  [ x] Alert & Oriented X3, [  ] Nonfocal  [  ] Confusion  [  ] Encephalopathic [  ] Sedated [  ] Unable to assess, [  ] Dementia [  ] Other-  EXTREMITIES: [  ] 2+ Peripheral Pulses, No clubbing, No cyanosis,  [  ] edema B/L lower EXT. [  ] PVD stasis skin changes B/L Lower EXT, [  ] wound  LYMPH: No lymphadenopathy noted  SKIN:  [ x ] No rashes or lesions, [  ] Pressure Ulcers, [  ] ecchymosis, [  ] Skin Tears, [  ] Other    DIET: Diet, DASH/TLC:   Sodium & Cholesterol Restricted (05-19-25 @ 14:57)      LABS:                        7.8    9.73  )-----------( 191      ( 21 May 2025 07:30 )             24.5     21 May 2025 07:30    138    |  105    |  25     ----------------------------<  115    3.5     |  21     |  1.90     Ca    8.7        21 May 2025 07:30    TPro  6.5    /  Alb  1.7    /  TBili  0.7    /  DBili  x      /  AST  92     /  ALT  46     /  AlkPhos  735    21 May 2025 07:30      Urinalysis Basic - ( 21 May 2025 07:30 )    Color: x / Appearance: x / SG: x / pH: x  Gluc: 115 mg/dL / Ketone: x  / Bili: x / Urobili: x   Blood: x / Protein: x / Nitrite: x   Leuk Esterase: x / RBC: x / WBC x   Sq Epi: x / Non Sq Epi: x / Bacteria: x        Culture Results:   >100,000 CFU/ml Pseudomonas aeruginosa  50,000 - 99,000 CFU/mL Enterococcus faecium (05-19 @ 05:06)  Culture Results:   Growth in anaerobic bottle: Escherichia coli  See previous culture [id number] (05-18 @ 23:25)  Culture Results:   Growth in aerobic bottle: Escherichia coli ESBL  Direct identification is available within approximately 3-5  hours either by Blood Panel Multiplexed PCR or Direct  MALDI-TOF. Details: [email or web address] (05-18 @ 23:10)                  Urinalysis with Rflx Culture (collected 19 May 2025 05:06)    Culture - Urine (collected 19 May 2025 05:06)  Source: Clean Catch Clean Catch (Midstream)  Final Report (21 May 2025 12:34):    >100,000 CFU/ml Pseudomonas aeruginosa    50,000 - 99,000 CFU/mL Enterococcus faecium  Organism: Pseudomonas aeruginosa  Enterococcus faecium (21 May 2025 12:34)  Organism: Enterococcus faecium (21 May 2025 12:34)  Organism: Pseudomonas aeruginosa (21 May 2025 12:34)    Culture - Blood (collected 18 May 2025 23:25)  Source: Blood Blood-Peripheral  Gram Stain (19 May 2025 14:24):    Growth in anaerobic bottle: Gram Negative Rods  Preliminary Report (20 May 2025 11:51):    Growth in anaerobic bottle: Escherichia coli    See previous culture [id number]    Culture - Blood (collected 18 May 2025 23:10)  Source: Blood Blood-Peripheral  Gram Stain (19 May 2025 14:24):    Growth in aerobic bottle: Gram Negative Rods  Final Report (21 May 2025 07:07):    Growth in aerobic bottle: Escherichia coli ESBL    Direct identification is available within approximately 3-5    hours either by Blood Panel Multiplexed PCR or Direct    MALDI-TOF. Details: [email or web address]  Organism: Blood Culture PCR  Escherichia coli ESBL (21 May 2025 07:07)  Organism: Escherichia coli ESBL (21 May 2025 07:07)  Organism: Blood Culture PCR (21 May 2025 07:07)         Anemia Panel:      Thyroid Panel:                RADIOLOGY & ADDITIONAL TESTS:      HEALTH ISSUES - PROBLEM Dx:  HTN (hypertension)    HLD (hyperlipidemia)    Diabetes    Anemia of chronic disease    Need for prophylactic measure    Sepsis    Acute on chronic renal failure            Consultant(s) Notes Reviewed:  [  ] YES     Care Discussed with [X] Consultants  [  ] Patient  [  ] Family [  ] HCP [  ]   [  ] Social Service  [  ] RN, [  ] Physical Therapy,[  ] Palliative care team  DVT PPX: [  ] Lovenox, [  ] S C Heparin, [  ] Coumadin, [  ] Xarelto, [  ] Eliquis, [  ] Pradaxa, [  ] IV Heparin drip, [  ] SCD [  ] Contraindication 2 to GI Bleed,[  ] Ambulation [  ] Contraindicated 2 to  bleed [  ] Contraindicated 2 to Brain Bleed  Advanced directive: [  ] None, [  ] DNR/DNI

## 2025-05-21 NOTE — PROGRESS NOTE ADULT - PROBLEM SELECTOR PLAN 2
Chronic  -c/w home metoprolol succinate, and hydralazine  -Hold home losartan and Lasix in setting of ELVIN ELVIN on CKD stage 3   - Cr improving   Cr: 1.9 . Baseline of 1.8  - on IV LR   - Dr. 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

## 2025-05-21 NOTE — PROGRESS NOTE ADULT - PROBLEM SELECTOR PLAN 1
T: 102.4, WBC: 17.26, Lactate: 2.3->1.4   UA: Cloudy, LEC: Large, Nitrite (+), Bacteria: Many  -RUQ US: Thick-walled gallbladder with cholelithiasis. Negative sonographic Kunz's sign. Overall findings are equivocal for acute cholecystitis, HIDA scan may be considered for further evaluation.  -HIDA scan negative for acute pino   - BCx positive for ESBL and Enterococcus faceum, Ucx pseudomonas   - transitioned from zosyn to meropenem + linezolid   - MRCP ordered showed biliary sludge with gallbladder thickening   - Likely urinary source of infection   -ID Dr. Macias   -GI D/W  (Dr. Dumont- No concern for MRI findings  - Surgery d/w DR Esquivel - No surgical intervention

## 2025-05-21 NOTE — PROGRESS NOTE ADULT - PROBLEM SELECTOR PLAN 5
ELVIN on CKD stage 3   - Cr improving   Cr: 1.9 . Baseline of 1.8  - on IV LR   - Dr. Ramos Nephro cr improving  ELVIN on CKD3-4  Lactic Acidosis/Metabolic Acidosis  Hyponatremia  Hypokalemia Chronic  -Holding Lipitor in setting of elevated liver enzymes

## 2025-05-21 NOTE — PROGRESS NOTE ADULT - SUBJECTIVE AND OBJECTIVE BOX
[facility]  Owen Morrison NP  [address] [place] [address]  [phone]      INTERVAL HPI/OVERNIGHT EVENTS:  Pt s/e  Appetite is about the same  Otherwise no new GI events    MEDICATIONS  (STANDING):  aspirin enteric coated 81 milliGRAM(s) Oral daily  dextrose 5%. 1000 milliLiter(s) (50 mL/Hr) IV Continuous <Continuous>  dextrose 5%. 1000 milliLiter(s) (100 mL/Hr) IV Continuous <Continuous>  dextrose 50% Injectable 25 Gram(s) IV Push once  dextrose 50% Injectable 12.5 Gram(s) IV Push once  dextrose 50% Injectable 25 Gram(s) IV Push once  famotidine    Tablet 20 milliGRAM(s) Oral every 48 hours  fluticasone propionate 50 MICROgram(s)/spray Nasal Spray 1 Spray(s) Both Nostrils two times a day  glucagon  Injectable 1 milliGRAM(s) IntraMuscular once  heparin   Injectable 5000 Unit(s) SubCutaneous every 8 hours  hydrALAZINE 100 milliGRAM(s) Oral two times a day  insulin lispro (ADMELOG) corrective regimen sliding scale   SubCutaneous three times a day before meals  insulin lispro (ADMELOG) corrective regimen sliding scale   SubCutaneous at bedtime  lactated ringers. 1000 milliLiter(s) (50 mL/Hr) IV Continuous <Continuous>  linezolid    Tablet 600 milliGRAM(s) Oral every 12 hours  meropenem  IVPB      meropenem  IVPB 500 milliGRAM(s) IV Intermittent every 12 hours  metoprolol succinate  milliGRAM(s) Oral daily  ondansetron Injectable 4 milliGRAM(s) IV Push every 8 hours  polyethylene glycol 3350 17 Gram(s) Oral daily    MEDICATIONS  (PRN):  dextrose Oral Gel 15 Gram(s) Oral once PRN Blood Glucose LESS THAN 70 milliGRAM(s)/deciliter      Allergies    amlodipine (Flushing)      PHYSICAL EXAM:   Vital Signs:  Vital Signs Last 24 Hrs  T(C): 36.4 (21 May 2025 11:10), Max: 36.9 (21 May 2025 05:31)  T(F): 97.5 (21 May 2025 11:10), Max: 98.5 (21 May 2025 05:31)  HR: 75 (21 May 2025 11:35) (61 - 77)  BP: 131/60 (21 May 2025 11:10) (131/60 - 159/87)  BP(mean): --  RR: 18 (21 May 2025 11:10) (18 - 18)  SpO2: 97% (21 May 2025 11:35) (92% - 97%)    Parameters below as of 21 May 2025 11:35  Patient On (Oxygen Delivery Method): room air      Daily     Daily Weight in k.2 (21 May 2025 05:31)    GENERAL:  Appears stated age  HEENT:  NC/AT  CHEST:  Full & symmetric excursion  HEART:  Regular rhythm  ABDOMEN:  Soft, non-tender, non-distended  EXTEREMITIES:  no cyanosis  SKIN:  No rash    LABS:                        7.8    9.73  )-----------( 191      ( 21 May 2025 07:30 )             24.5     05-    138  |  105  |  25[H]  ----------------------------<  115[H]  3.5   |  21[L]  |  1.90[H]    Ca    8.7      21 May 2025 07:30    TPro  6.5  /  Alb  1.7[L]  /  TBili  0.7  /  DBili  x   /  AST  92[H]  /  ALT  46  /  AlkPhos  735[H]  05-21      Urinalysis Basic - ( 21 May 2025 07:30 )    Color: x / Appearance: x / SG: x / pH: x  Gluc: 115 mg/dL / Ketone: x  / Bili: x / Urobili: x   Blood: x / Protein: x / Nitrite: x   Leuk Esterase: x / RBC: x / WBC x   Sq Epi: x / Non Sq Epi: x / Bacteria: x

## 2025-05-21 NOTE — PROGRESS NOTE ADULT - ASSESSMENT
Poor PO intake  Sepsis  Bacteremia  Transaminitis    5/19 CT a/p: Mildly distended gallbladder with gallbladder wall thickening concerning for cholecystitis. No biliary ductal dilation.  5/19 US RUQ: Thick-walled gallbladder with cholelithiasis. Common bile duct measures 7 mm.  5/19 HIDA: No evidence of acute cholecystitis or biliary obstruction.  5/20 MRCP: Biliary sludge in the gallbladder with nonspecific thickening of the gallbladder wall. No evidence of choledocholithiasis or biliary ductal dilatation. Pancreatic cysts.    Plan:  - CT, US, HIDA, and MRCP discussed with pt and family  - Given normal bile ducts on imaging without CBD stones and normal bilirubin, low suspicion of cholangitis  - Trend LFTs  - ID eval appreciated, per ID note suspect urinary source of infection  - Monitor PO intake  - May need to consider appetite stimulator such as marinol 2.5 mg qhs if continued poor appetite  - Anti-emetics prn  - Will follow up

## 2025-05-21 NOTE — PROGRESS NOTE ADULT - PROBLEM SELECTOR PLAN 3
Chronic  -Holding Lipitor in setting of elevated liver enzymes -Acute on chronic  anemia 2/2 sepsis, ELVIN-CKD 3   -Hb decreased today, may be dilutional secondary to LR  - F/u AM labs, anemia work up  - type and screen ordered for AM  Dr Jackson -Hematology eval -Acute on chronic  anemia 2/2 sepsis, ELVIN-CKD 3   -Hb decreased today, may be dilutional secondary to LR  - F/u AM labs,  pt had anemia work up done in April  + Gamma migrating paraprotein + on SPEP  - type and screen ordered for AM  Dr Jackson -Hematology eval

## 2025-05-21 NOTE — CONSULT NOTE ADULT - ASSESSMENT
89-year-old female history of anemia, diabetes, hypertension, hyperlipidemia, CKD, recent admission for recurrent UTI as well as cholelithiasis presenting from [place] for decreased p.o. intake over the last couple of weeks, positive nausea, NBNB emesis today, low back pain today. Patient was seen by surgery previously on 4/16 for positive HIDA scan for acute cholecystitis and treated conservatively with antibiotics and with no IR intervention. Patient arrives today febrile, of 102.4 with noteable labs of 17.2, H/H 8.7/26, elevated creatinine 2.3, and ALK P 870,  and Lactate of 2.3. Positive UA. Radiology demonstrates RUQ US with thickened gallbladder with cholecystitis and CT A/P demonstrated Mildly distended gallbladder with gallbladder wall thickening concerning for cholecystitis. No biliary ductal dilation, both imaging recommending further evaluation with a HIDA. Patient s/p HIDA, which was negative. No further surgical interventions required at this time.     PLAN  -Antibitoics as per ID  -IVF  -Rec nephrology consult  -Rec GI consult  -Tight glycemic control due to DM  - DVT ppx as per primary  -Pain control PRN  -Replete lytes PRN  -Rest of care per primary team  -HIDA scan reviewed and discussed with the attending  -No surgical intervention required at this time, will sign off  -Patient seen and discussed with Dr. Esquivel
89-year-old female w anemia, diabetes, hypertension, hyperlipidemia, CKD Stage 3 who presents w/ decreased PO intake over past several weeks. Pt was admitted from 4/16-4/22 for acute cholecystitis and UTI. Pt's daughter at bedside provided much of the history. Pt was initially at rehab after discharge of which she was eating less than usually. Pt has been at [facility] since last Saturday to now in which she has had decrease P.O. intake w/ nausea and several episodes of emesis of which the daughter has described as biliary. PT reporting right shoulder pain and right upper abdominal pain  Temp 102.4F, HR 78, /64, RR 18, SpO2 90%  Labs significant for WBC: 17.26, H/H: 8.7/26.1, Na: 133, K: 3.1, BUN/Cr: 27/2.3, Alk Phos: 870, AST: 103, Lactate: 2.3  Imaging:RUQ US: Thick-walled gallbladder with cholelithiasis. Negative sonographic Kunz's sign. Overall findings are equivocal for acute cholecystitis, HIDA scan may be considered for further evaluation.  Culture - Urine (04.16.25 @ 11:59) >100,000 CFU/ml Escherichia coli ESBL  -  Piperacillin/Tazobactam: S <=8    Acute Cholecystitis  Clinically compelling with pt meeting SIRS criteria with fever and leukocytosis, clinically no urinary symptoms and pt with RUQ pain and imaging and biochemical abnormalities suggesting biliary localization. Reported recent issues with ertapenem 'wiping her out' and Alb  2.1[L]. Noted sensitivities of prior micro so  Blood cultures - collected  Urine culture - collected  Zosyn started 5/19-continue for now  coordination with GI    Thank you for consulting us and involving us in the management of this most interesting and challenging case.  In addition to reviewing history, imaging, documents, labs, microbiology, took into account antibiotic stewardship, local antibiogram and infection control strategies and potential transmission issues.    We will follow along in the care of this patient. Please contact me by texting me directly on my cell# at [phone] using TEAMS or call our answering service at [phone] with any concerns.      
acute ccy    ct and u/s noted  suspect acute ccy  cont iv antibiotics  await hida  npo  consider surgery evaluation 
ELVIN on CKD3-4  Lactic Acidosis  Hyponatermia  Hypokalemia  Elevated LFTs  HTN    -Baseline Creatinine approx 1.8  -ELVIN Likely 2/2 decreased EABV  -Check Urine lytes  -Check UA  -NS @ 75 ml x 13 hours  -KCL repletion  -GI consulted  -Goal sodium correction 6-8 meq in 24 hours     d/w daughter 
Anemia multifactorial in etiology related to renal insufficiency and sepsis of probable urinary source  Abnormal gallbladder on scans previously evaluated by GI    Recommendations:  1.  follow CBC and transfuse as indicated  2.  fe stores on previous admission c/w chronic disease  3.  continue ID evaluation and management  4.  will hold procrit with bacteremia  5.  further heme recommendations pending above    discussed with Dr. Holguin

## 2025-05-21 NOTE — PROGRESS NOTE ADULT - ASSESSMENT
89-year-old female w anemia, diabetes, hypertension, hyperlipidemia, CKD Stage 3 who presents w/ decreased PO intake over past several weeks. Pt was admitted from 4/16-4/22 for acute cholecystitis and UTI. Pt's daughter at bedside provided much of the history. Pt was initially at rehab after discharge of which she was eating less than usually. Pt has been at [facility] since last Saturday to now in which she has had decrease P.O. intake w/ nausea and several episodes of emesis of which the daughter has described as biliary. PT reporting right shoulder pain and right upper abdominal pain  Temp 102.4F, HR 78, /64, RR 18, SpO2 90%  Labs significant for WBC: 17.26, H/H: 8.7/26.1, Na: 133, K: 3.1, BUN/Cr: 27/2.3, Alk Phos: 870, AST: 103, Lactate: 2.3  Imaging:RUQ US: Thick-walled gallbladder with cholelithiasis. Negative sonographic Kunz's sign. Overall findings are equivocal for acute cholecystitis, HIDA scan may be considered for further evaluation.  Culture - Urine (04.16.25 @ 11:59) >100,000 CFU/ml Escherichia coli ESBL  -  Piperacillin/Tazobactam: S <=8    Acute Pyelonephritis  Clinically compelling with pt meeting SIRS criteria with fever and leukocytosis, clinically no urinary symptoms and pt with RUQ pain and imaging and biochemical abnormalities suggesting biliary localization. Reported recent issues with ertapenem 'wiping her out' and Alb  2.1[L]. Noted sensitivities of prior micro so  Culture - Blood (05.18.25 @ 23:10) -  Escherichia coli ESBL  -  Trimethoprim/Sulfamethoxazole: S 2/38,  -  Levofloxacin: S <=0.5 -  Meropenem: S <=1  repeated blood cultures-ordered -Culture - Blood (05.20.25 @ ~16:00) x2 in LAB  Culture - Urine (05.19.25 @ 05:06) >100,000 CFU/ml Pseudomonas aeruginosa, 50,000 - 99,000 CFU/mL Enterococcus faecium  Zosyn started 5/19-->changed to Meropenem-continue for now but with Enterococcus faecium and pt reporting she is not feeling great will   add linezolid 5/21 pending further data  after multiple investigations suggested that this is due to urinary system source and note neg biliary investigations    Thank you for consulting us and involving us in the management of this most interesting and challenging case.  In addition to reviewing history, imaging, documents, labs, microbiology, took into account antibiotic stewardship, local antibiogram and infection control strategies and potential transmission issues.    We will follow along in the care of this patient. Please contact me by texting me directly on my cell# at [phone] using TEAMS or call our answering service at [phone] with any concerns.

## 2025-05-21 NOTE — PROGRESS NOTE ADULT - ASSESSMENT
ELVIN on CKD3-4  Lactic Acidosis/Metabolic Acidosis  Hyponatremia  Hypokalemia  Elevated LFTs  HTN  UTI/Pyelonephritis    -Baseline Creatinine approx 1.8  -ELVIN Likely 2/2 decreased EABV  -Check urine lytes; still pending, but renal indices are improving so can hold off  -Mild acidosis noted; monitor  -K improved  -Renal indices are improving to baseline  -s/p IVF  -GI eval noted  -Abx per ID  -Goal sodium correction 6-8 meq in 24 hours; corrected well

## 2025-05-21 NOTE — PROGRESS NOTE ADULT - SUBJECTIVE AND OBJECTIVE BOX
[facility]  Chuy Macias MD PhD, Rekha Gonzalez MD, Claudette Holguin MD, Jan Thacker MD, Camilo Arroyo MD  and providing coverage with Jonathan Grijalva MD  Providing Infectious Disease Consultations at [facility], [facility], [facility], [facility]'s    Office# [phone] to schedule follow up appointments  Answering Service for urgent calls or New Consults [phone]  Cell# to text for urgent issues Chuy Macias [phone]     infectious diseases progress note:    NELDA VERDIN is a 89y y. o. Female patient    Overnight and events of the last 24hrs reviewed    Allergies    amlodipine (Flushing)    Intolerances        ANTIBIOTICS/RELEVANT:  antimicrobials  meropenem  IVPB      meropenem  IVPB 500 milliGRAM(s) IV Intermittent every 12 hours    immunologic:    OTHER:  aspirin enteric coated 81 milliGRAM(s) Oral daily  dextrose 5%. 1000 milliLiter(s) IV Continuous <Continuous>  dextrose 5%. 1000 milliLiter(s) IV Continuous <Continuous>  dextrose 50% Injectable 25 Gram(s) IV Push once  dextrose 50% Injectable 12.5 Gram(s) IV Push once  dextrose 50% Injectable 25 Gram(s) IV Push once  dextrose Oral Gel 15 Gram(s) Oral once PRN  famotidine    Tablet 20 milliGRAM(s) Oral every 48 hours  fluticasone propionate 50 MICROgram(s)/spray Nasal Spray 1 Spray(s) Both Nostrils two times a day  glucagon  Injectable 1 milliGRAM(s) IntraMuscular once  heparin   Injectable 5000 Unit(s) SubCutaneous every 8 hours  hydrALAZINE 100 milliGRAM(s) Oral two times a day  insulin lispro (ADMELOG) corrective regimen sliding scale   SubCutaneous three times a day before meals  insulin lispro (ADMELOG) corrective regimen sliding scale   SubCutaneous at bedtime  lactated ringers. 1000 milliLiter(s) IV Continuous <Continuous>  metoprolol succinate  milliGRAM(s) Oral daily  ondansetron Injectable 4 milliGRAM(s) IV Push every 8 hours  polyethylene glycol 3350 17 Gram(s) Oral daily      Objective:  Vital Signs Last 24 Hrs  T(C): 36.7 (21 May 2025 06:52), Max: 36.9 (20 May 2025 11:37)  T(F): 98 (21 May 2025 06:52), Max: 98.5 (20 May 2025 11:37)  HR: 62 (21 May 2025 06:52) (62 - 77)  BP: 157/61 (21 May 2025 06:52) (148/56 - 159/87)  BP(mean): --  RR: 18 (21 May 2025 06:52) (18 - 18)  SpO2: 95% (21 May 2025 06:52) (92% - 95%)    Parameters below as of 21 May 2025 06:52  Patient On (Oxygen Delivery Method): room air        T(C): 36.7 (25 @ 06:52), Max: 36.9 (25 @ 20:22)  T(C): 36.7 (25 @ 06:52), Max: 39.1 (25 @ 22:59)  T(C): 36.7 (25 @ 06:52), Max: 39.1 (25 @ 22:59)    PHYSICAL EXAM:  HEENT: NC atraumatic  Neck: supple  Respiratory: no accessory muscle use, breathing comfortably  Cardiovascular: distant  Gastrointestinal: normal appearing, nondistended  Extremities: no clubbing, no cyanosis,        LABS:                          7.8    9.73  )-----------( 191      ( 21 May 2025 07:30 )             24.5       WBC  9.73  @ 07:30  11.97  @ 06:20  17.26  @ 23:30          137  |  107  |  22  ----------------------------<  95  3.8   |  21[L]  |  2.10[H]    Ca    8.6      20 May 2025 06:20    TPro  6.5  /  Alb  1.7[L]  /  TBili  0.8  /  DBili  x   /  AST  98[H]  /  ALT  42  /  AlkPhos  675[H]        Creatinine: 2.10 mg/dL (25 @ 06:20)  Creatinine: 2.30 mg/dL (25 @ 23:30)        Urinalysis Basic - ( 21 May 2025 06:12 )    Color: Yellow / Appearance: Cloudy / S.018 / pH: x  Gluc: x / Ketone: x  / Bili: Negative / Urobili: 0.2 mg/dL   Blood: x / Protein: 30 mg/dL / Nitrite: Negative   Leuk Esterase: Large / RBC: 2 /HPF /  /HPF   Sq Epi: x / Non Sq Epi: x / Bacteria: Moderate /HPF            INFLAMMATORY MARKERS      MICROBIOLOGY:    Culture - Urine (25 @ 05:06)    Specimen Source: Clean Catch Clean Catch (Midstream)   Culture Results:   >100,000 CFU/ml Pseudomonas aeruginosa  50,000 - 99,000 CFU/mL Enterococcus faecium    Culture - Blood (25 @ 23:10)    -  ESBL: Detec   -  Escherichia coli: Detec   -  Ampicillin: R >16 These ampicillin results predict results for amoxicillin   -  Ampicillin/Sulbactam: R 8/4   -  Aztreonam: R >16   -  Cefazolin: R >16   -  Cefepime: R >16   -  Ceftriaxone: R >32   -  Ciprofloxacin: R 1   -  Ertapenem: S <=0.5   -  Gentamicin: S <=2   -  Imipenem: S <=1   -  Levofloxacin: S <=0.5   -  Meropenem: S <=1   -  Piperacillin/Tazobactam: R <=8   -  Tigecycline: S <=2 Interpretations based on FDA breakpoints   -  Tobramycin: S <=2   -  Trimethoprim/Sulfamethoxazole: S 2/38   -  CTX-M Resistance Marker: Detec   Gram Stain:   Growth in aerobic bottle: Gram Negative Rods   Specimen Source: Blood Blood-Peripheral   Organism: Blood Culture PCR   Organism: Escherichia coli ESBL   Culture Results:   Growth in aerobic bottle: Escherichia coli ESBL  Direct identification is available within approximately 3-5  hours either by Blood Panel Multiplexed PCR or Direct  MALDI-TOF. Details: [email or web address]   Organism Identification: Blood Culture PCR  Escherichia coli ESBL   Method Type: PCR   Method Type: ANNA MARIE        RADIOLOGY & ADDITIONAL STUDIES:

## 2025-05-21 NOTE — PROVIDER CONTACT NOTE (CRITICAL VALUE NOTIFICATION) - SITUATION
Dr. Gomez was at the bedside at the time of results came.
Blood cultures from 5/18 growth in aerobic and anerobic  positive for ECOLI and ESBL

## 2025-05-21 NOTE — PHYSICAL THERAPY INITIAL EVALUATION ADULT - ADDITIONAL COMMENTS
Patient reports that she lives in [facility], ambulates within apt with 1 person assist + RW, uses w/c for distances (including to dining room), has HHA 11 hrs x 7 days.

## 2025-05-21 NOTE — CONSULT NOTE ADULT - SUBJECTIVE AND OBJECTIVE BOX
[facility]  Chuy Macias MD PhD, Rekha Gonzalez MD, Claudette Holguin MD, Jan Thacker MD, Camilo Arroyo MD  and providing coverage with Jonathan Grijalva MD  Providing Infectious Disease Consultations at [facility], [facility], [facility], [facility]'s    Office# [phone] to schedule follow up appointments  Answering Service for urgent calls or New Consults [phone]  Cell# to text for urgent issues Chuy Macias [phone]     infectious diseases consult note:    NELDA VERDIN is a 89y y. o. Female patient     HPI:  89-year-old female w anemia, diabetes, hypertension, hyperlipidemia, CKD Stage 3 who presents w/ decreased PO intake over past several weeks. Pt was admitted from - for acute cholecystitis and UTI. Pt's daughter at bedside provided much of the history. Pt was initially at rehab after discharge of which she was eating less than usually. Pt has been at [facility] since last Saturday to now in which she has had decrease P.O. intake w/ nausea and several episodes of emesis of which the daughter has described as biliary. PT reporting right shoulder pain and right upper abdominal pain  Temp 102.4F, HR 78, /64, RR 18, SpO2 90%  Labs significant for WBC: 17.26, H/H: 8.7/26.1, Na: 133, K: 3.1, BUN/Cr: 27/2.3, Alk Phos: 870, AST: 103, Lactate: 2.3  Imaging:RUQ US: Thick-walled gallbladder with cholelithiasis. Negative sonographic Kunz's sign. Overall findings are equivocal for acute cholecystitis, HIDA scan may be considered for further evaluation.      PAST MEDICAL & SURGICAL HISTORY:  Hypertension      Diabetes      HLD (hyperlipidemia)      Stage 3 chronic kidney disease      Anemia of chronic disease      Lumbar compression fracture      HTN (hypertension)      S/P ORIF (open reduction internal fixation) fracture  right hip          Allergies    amlodipine (Flushing)    Intolerances        ANTIBIOTICS/RELEVANT:  antimicrobials  piperacillin/tazobactam IVPB.. 3.375 Gram(s) IV Intermittent every 12 hours    immunologic:    OTHER:  aspirin enteric coated 81 milliGRAM(s) Oral daily  dextrose 5%. 1000 milliLiter(s) IV Continuous <Continuous>  dextrose 5%. 1000 milliLiter(s) IV Continuous <Continuous>  dextrose 50% Injectable 25 Gram(s) IV Push once  dextrose 50% Injectable 12.5 Gram(s) IV Push once  dextrose 50% Injectable 25 Gram(s) IV Push once  dextrose Oral Gel 15 Gram(s) Oral once PRN  famotidine    Tablet 20 milliGRAM(s) Oral every 48 hours  glucagon  Injectable 1 milliGRAM(s) IntraMuscular once  heparin   Injectable 5000 Unit(s) SubCutaneous every 8 hours  hydrALAZINE 100 milliGRAM(s) Oral two times a day  insulin lispro (ADMELOG) corrective regimen sliding scale   SubCutaneous three times a day before meals  insulin lispro (ADMELOG) corrective regimen sliding scale   SubCutaneous at bedtime  metoprolol succinate  milliGRAM(s) Oral daily  sodium chloride 0.9%. 1000 milliLiter(s) IV Continuous <Continuous>    Social History:  Tobacco: Remote hx  Alcohol: Denies  Illicit Drugs: Denies  Lives at: [facility]  Ambulation: w/ walker  ADLs: Dependent (19 May 2025 05:27)      FAMILY HISTORY:  FH: asthma (Father)      ROS:    EYES:  Negative  blurry vision or double vision  GASTROINTESTINAL:  Negative for chest pain, no cough, no dysuria, no diarrhea  -otherwise negative except for subjective    Objective:  Last 24-Vital Signs Last 24 Hrs  T(C): 36.4 (19 May 2025 07:39), Max: 39.1 (18 May 2025 22:59)  T(F): 97.5 (19 May 2025 07:39), Max: 102.4 (18 May 2025 22:59)  HR: 85 (19 May 2025 07:39) (54 - 85)  BP: 131/54 (19 May 2025 07:39) (123/56 - 138/64)  BP(mean): 76 (19 May 2025 03:47) (76 - 76)  RR: 18 (19 May 2025 07:39) (18 - 22)  SpO2: 100% (19 May 2025 07:39) (90% - 100%)    Parameters below as of 19 May 2025 07:39  Patient On (Oxygen Delivery Method): nasal cannula  O2 Flow (L/min): 2      T(C): 36.4 (25 @ 07:39), Max: 39.1 (25 @ 22:59)  T(F): 97.5 (25 @ 07:39), Max: 102.4 (25 @ 22:59)  T(C): 36.4 (25 @ 07:39), Max: 39.1 (25 @ 22:59)  T(F): 97.5 (25 @ 07:39), Max: 102.4 (25 @ 22:59)  T(C): 36.4 (25 @ 07:39), Max: 39.1 (25 @ 22:59)  T(F): 97.5 (25 @ 07:39), Max: 102.4 (25 @ 22:59)    PHYSICAL EXAM:  Constitutional: NAD  Eyes: PERRLA, EOMI  Ear/Nose/Throat: oropharynx normal	  Neck: no JVD, no lymphadenopathy, supple  Respiratory: no accessory muscle use, lung fields bilaterally clear  Cardiovascular: distant  Gastrointestinal: soft, RUQ tender on deep palpation, no HSM, BS-normal, no suprapubic pain  Extremities: no clubbing, no cyanosis, edema absent  Neuro: patient alert, oriented and appropriate  Skin: no sig lesions        LABS:                        8.7    17.26 )-----------( 183      ( 18 May 2025 23:30 )             26.1       WBC 17.26   @ 23:30          133[L]  |  95[L]  |  27[H]  ----------------------------<  132[H]  3.1[L]   |  24  |  2.30[H]    Ca    8.6      18 May 2025 23:30    TPro  7.5  /  Alb  2.1[L]  /  TBili  1.0  /  DBili  x   /  AST  103[H]  /  ALT  50  /  AlkPhos  870[H]        Creatinine: 2.30 mg/dL (25 @ 23:30)      PT/INR - ( 18 May 2025 23:30 )   PT: 16.0 sec;   INR: 1.36 ratio         PTT - ( 18 May 2025 23:30 )  PTT:29.6 sec  Urinalysis Basic - ( 19 May 2025 05:06 )    Color: Yellow / Appearance: Cloudy / S.014 / pH: x  Gluc: x / Ketone: x  / Bili: Negative / Urobili: 1.0 mg/dL   Blood: x / Protein: 100 mg/dL / Nitrite: Positive   Leuk Esterase: Large / RBC: 3 /HPF /  /HPF   Sq Epi: x / Non Sq Epi: x / Bacteria: Many /HPF      MICROBIOLOGY:    Culture - Urine (25 @ 11:59)    -  Tobramycin: S <=2   -  Trimethoprim/Sulfamethoxazole: R >2/38   -  Nitrofurantoin: S <=32 Should not be used to treat pyelonephritis   -  Piperacillin/Tazobactam: S <=8   -  Ampicillin: R >16 These ampicillin results predict results for amoxicillin   -  Ampicillin/Sulbactam: S 8/4   -  Aztreonam: R >16   -  Cefazolin: R >16 For uncomplicated UTI with K. pneumoniae, E. coli, or P. mirablis: ANNA MARIE <=16 is sensitive and ANNA MARIE >=32 is resistant. This also predicts results for oral agents cefaclor, cefdinir, cefpodoxime, cefprozil, cefuroxime axetil, cephalexin and locarbef for uncomplicated UTI. Note that some isolates may be susceptible to these agents while testing resistant to cefazolin.   -  Cefepime: R >16   -  Ceftriaxone: R >32   -  Cefuroxime: R >16   -  Ciprofloxacin: R >2   -  Ertapenem: S <=0.5   -  Gentamicin: S <=2   -  Imipenem: S <=1   -  Levofloxacin: R >4   -  Meropenem: S <=1   Specimen Source: Clean Catch   Culture Results:   >100,000 CFU/ml Escherichia coli ESBL  <10,000 CFU/ml Normal Urogenital husam present   Organism Identification: Escherichia coli ESBL   Organism: Escherichia coli ESBL   Method Type: ANNA MARIE        RADIOLOGY & ADDITIONAL STUDIES:  
[facility]    Cabrera Марина Morrison NP    [address] [place] [address]  [phone]      Chief Complaint:  Patient is a 89y old  Female who presents with a chief complaint of Sepsis (19 May 2025 10:07)      HPI:Pt is a 89-year-old female w/ PMHx of anemia, diabetes, hypertension, hyperlipidemia, CKD Stage 3 who presents w/ decreased PO intake over past several weeks. Pt was admitted from - for acute cholecystitis and UTI.  Pt was initially at rehab after discharge of which she was eating less than usually. Pt has been at [facility] since last Saturday to now in which she has had decrease P.O. intake w/ nausea and several episodes of emesis of which the daughter has described as biliary.. Pt's daughter also states that pt was c/o of back pain where she had a fracture last month.    Allergies:  amlodipine (Flushing)      Medications:  aspirin enteric coated 81 milliGRAM(s) Oral daily  dextrose 5%. 1000 milliLiter(s) IV Continuous <Continuous>  dextrose 5%. 1000 milliLiter(s) IV Continuous <Continuous>  dextrose 50% Injectable 25 Gram(s) IV Push once  dextrose 50% Injectable 12.5 Gram(s) IV Push once  dextrose 50% Injectable 25 Gram(s) IV Push once  dextrose Oral Gel 15 Gram(s) Oral once PRN  famotidine    Tablet 20 milliGRAM(s) Oral every 48 hours  glucagon  Injectable 1 milliGRAM(s) IntraMuscular once  heparin   Injectable 5000 Unit(s) SubCutaneous every 8 hours  hydrALAZINE 100 milliGRAM(s) Oral two times a day  insulin lispro (ADMELOG) corrective regimen sliding scale   SubCutaneous three times a day before meals  insulin lispro (ADMELOG) corrective regimen sliding scale   SubCutaneous at bedtime  metoprolol succinate  milliGRAM(s) Oral daily  piperacillin/tazobactam IVPB.. 3.375 Gram(s) IV Intermittent every 12 hours  sodium chloride 0.9%. 1000 milliLiter(s) IV Continuous <Continuous>      PMHX/PSHX:  Hypertension    Diabetes    TIA (transient ischemic attack)    Hip fracture    HLD (hyperlipidemia)    Stage 3 chronic kidney disease    Anemia of chronic disease    Lumbar compression fracture    HTN (hypertension)    No significant past surgical history    S/P ORIF (open reduction internal fixation) fracture        Family history:  FH: asthma (Father)        Social History:     ROS:     General:  No wt loss, fevers, chills, night sweats, fatigue,   Eyes:  Good vision, no reported pain  ENT:  No sore throat, pain, runny nose, dysphagia  CV:  No pain, palpitations, hypo/hypertension  Resp:  No dyspnea, cough, tachypnea, wheezing  GI:  No pain, No nausea, No vomiting, No diarrhea, No constipation, No weight loss, No fever, No pruritis, No rectal bleeding, No tarry stools, No dysphagia,  :  No pain, bleeding, incontinence, nocturia  Muscle:  No pain, weakness  Neuro:  No weakness, tingling, memory problems  Psych:  No fatigue, insomnia, mood problems, depression  Endocrine:  No polyuria, polydipsia, cold/heat intolerance  Heme:  No petechiae, ecchymosis, easy bruisability  Skin:  No rash, tattoos, scars, edema      PHYSICAL EXAM:   Vital Signs:  Vital Signs Last 24 Hrs  T(C): 36.4 (19 May 2025 07:39), Max: 39.1 (18 May 2025 22:59)  T(F): 97.5 (19 May 2025 07:39), Max: 102.4 (18 May 2025 22:59)  HR: 62 (19 May 2025 12:10) (54 - 85)  BP: 158/50 (19 May 2025 12:10) (123/56 - 175/68)  BP(mean): 76 (19 May 2025 03:47) (76 - 76)  RR: 18 (19 May 2025 12:10) (18 - 22)  SpO2: 96% (19 May 2025 12:10) (90% - 100%)    Parameters below as of 19 May 2025 12:10  Patient On (Oxygen Delivery Method): room air      Daily Height in cm: 154.94 (18 May 2025 22:59)    Daily     GENERAL:  Appears stated age, well-groomed, well-nourished, no distress  HEENT:  NC/AT,  conjunctivae clear and pink, no thyromegaly, nodules, adenopathy, no JVD, sclera -anicteric  CHEST:  Full & symmetric excursion, no increased effort, breath sounds clear  HEART:  Regular rhythm, S1, S2, no murmur/rub/S3/S4, no abdominal bruit, no edema  ABDOMEN:  Soft, non-tender, non-distended, normoactive bowel sounds,  no masses ,no hepato-splenomegaly, no signs of chronic liver disease  EXTEREMITIES:  no cyanosis,clubbing or edema  SKIN:  No rash/erythema/ecchymoses/petechiae/wounds/abscess/warm/dry  NEURO:  Alert, oriented, no asterixis, no tremor, no encephalopathy    LABS:                        8.7    17.26 )-----------( 183      ( 18 May 2025 23:30 )             26.1     05-18    133[L]  |  95[L]  |  27[H]  ----------------------------<  132[H]  3.1[L]   |  24  |  2.30[H]    Ca    8.6      18 May 2025 23:30    TPro  7.5  /  Alb  2.1[L]  /  TBili  1.0  /  DBili  x   /  AST  103[H]  /  ALT  50  /  AlkPhos  870[H]  05-18    LIVER FUNCTIONS - ( 18 May 2025 23:30 )  Alb: 2.1 g/dL / Pro: 7.5 g/dL / ALK PHOS: 870 U/L / ALT: 50 U/L / AST: 103 U/L / GGT: x           PT/INR - ( 18 May 2025 23:30 )   PT: 16.0 sec;   INR: 1.36 ratio         PTT - ( 18 May 2025 23:30 )  PTT:29.6 sec  Urinalysis Basic - ( 19 May 2025 05:06 )    Color: Yellow / Appearance: Cloudy / S.014 / pH: x  Gluc: x / Ketone: x  / Bili: Negative / Urobili: 1.0 mg/dL   Blood: x / Protein: 100 mg/dL / Nitrite: Positive   Leuk Esterase: Large / RBC: 3 /HPF /  /HPF   Sq Epi: x / Non Sq Epi: x / Bacteria: Many /HPF          Imaging:              
                           [facility]                             Nephrology and Hypertension                             Marcin Kaplan                                          [phone]     Patient is a 89y old  Female who presents with a chief complaint of Sepsis (19 May 2025 15:08)       HPI:  Pt is a 89-year-old female w/ PMHx of anemia, diabetes, hypertension, hyperlipidemia, CKD Stage 3 who presents w/ decreased PO intake over past several weeks. Pt was admitted from - for acute cholecystitis and UTI. Pt's daughter at bedside provided much of the history. Pt was initially at rehab after discharge of which she was eating less than usually. Pt has been at [facility] since last Saturday to now in which she has had decrease P.O. intake w/ nausea and several episodes of emesis of which the daughter has described as biliary.. Pt's daughter also states that pt was c/o of back pain where she had a fracture last month.   Found to have ELVIN, known to us from prior.  Denies NSAID usage.  Was having N/V and decreased PO intake.       PAST MEDICAL & SURGICAL HISTORY:  Hypertension      Diabetes      HLD (hyperlipidemia)      Stage 3 chronic kidney disease      Anemia of chronic disease      Lumbar compression fracture      HTN (hypertension)      S/P ORIF (open reduction internal fixation) fracture  right hip           FAMILY HISTORY:  FH: asthma (Father)    NC    Social History:Non smoker    MEDICATIONS  (STANDING):  albumin human 25% IVPB 100 milliLiter(s) IV Intermittent once  aspirin enteric coated 81 milliGRAM(s) Oral daily  dextrose 5%. 1000 milliLiter(s) (50 mL/Hr) IV Continuous <Continuous>  dextrose 5%. 1000 milliLiter(s) (100 mL/Hr) IV Continuous <Continuous>  dextrose 50% Injectable 25 Gram(s) IV Push once  dextrose 50% Injectable 12.5 Gram(s) IV Push once  dextrose 50% Injectable 25 Gram(s) IV Push once  famotidine    Tablet 20 milliGRAM(s) Oral every 48 hours  glucagon  Injectable 1 milliGRAM(s) IntraMuscular once  heparin   Injectable 5000 Unit(s) SubCutaneous every 8 hours  hydrALAZINE 100 milliGRAM(s) Oral two times a day  insulin lispro (ADMELOG) corrective regimen sliding scale   SubCutaneous three times a day before meals  insulin lispro (ADMELOG) corrective regimen sliding scale   SubCutaneous at bedtime  meropenem  IVPB      meropenem  IVPB 500 milliGRAM(s) IV Intermittent once  metoprolol succinate  milliGRAM(s) Oral daily  ondansetron Injectable 4 milliGRAM(s) IV Push every 8 hours  sodium chloride 0.9%. 1000 milliLiter(s) (50 mL/Hr) IV Continuous <Continuous>    MEDICATIONS  (PRN):  dextrose Oral Gel 15 Gram(s) Oral once PRN Blood Glucose LESS THAN 70 milliGRAM(s)/deciliter   Meds reviewed    Allergies    amlodipine (Flushing)    Intolerances         REVIEW OF SYSTEMS:    Review of Systems:   Constitutional: Denies fatigue  HEENT: Denies headaches and dizziness  Respiratory: denies SOB, cough, or wheezing  Cardiovascular: denies CP, palpitations  Gastrointestinal:+ N/V/decreased appetite  Genitourinary: denies painful urination, increased frequency, urgency, or bloody urine  Skin: denies rashes or itching  Musculoskeletal: denies muscle aches, joint swelling  Neurologic: Denies generalized weakness, denies loss of sensation, numbness, or tingling      Vital Signs Last 24 Hrs  T(C): 36.4 (19 May 2025 07:39), Max: 39.1 (18 May 2025 22:59)  T(F): 97.5 (19 May 2025 07:39), Max: 102.4 (18 May 2025 22:59)  HR: 64 (19 May 2025 14:30) (54 - 85)  BP: 148/53 (19 May 2025 14:30) (123/56 - 175/68)  BP(mean): 76 (19 May 2025 03:47) (76 - 76)  RR: 18 (19 May 2025 14:30) (18 - 22)  SpO2: 98% (19 May 2025 14:30) (90% - 100%)    Parameters below as of 19 May 2025 14:30  Patient On (Oxygen Delivery Method): room air      Daily Height in cm: 154.94 (18 May 2025 22:59)    Daily     PHYSICAL EXAM:    GENERAL: NAD  HEAD:  Atraumatic, Normocephalic  EYES: EOMI, conjunctiva and sclera clear  ENMT: No Drainage from nares, No drainage from ears  NERVOUS SYSTEM:  Awake and Alert  CHEST/LUNG: Clear to percussion bilaterally  EXTREMITIES:  No Edema  SKIN: No rashes No obvious ecchymosis      LABS:                        8.7    17.26 )-----------( 183      ( 18 May 2025 23:30 )             26.1     05-18    133[L]  |  95[L]  |  27[H]  ----------------------------<  132[H]  3.1[L]   |  24  |  2.30[H]    Ca    8.6      18 May 2025 23:30    TPro  7.5  /  Alb  2.1[L]  /  TBili  1.0  /  DBili  x   /  AST  103[H]  /  ALT  50  /  AlkPhos  870[H]  05-18    PT/INR - ( 18 May 2025 23:30 )   PT: 16.0 sec;   INR: 1.36 ratio         PTT - ( 18 May 2025 23:30 )  PTT:29.6 sec  Urinalysis Basic - ( 19 May 2025 05:06 )    Color: Yellow / Appearance: Cloudy / S.014 / pH: x  Gluc: x / Ketone: x  / Bili: Negative / Urobili: 1.0 mg/dL   Blood: x / Protein: 100 mg/dL / Nitrite: Positive   Leuk Esterase: Large / RBC: 3 /HPF /  /HPF   Sq Epi: x / Non Sq Epi: x / Bacteria: Many /HPF              RADIOLOGY & ADDITIONAL TESTS:  
89-year-old female history of anemia, diabetes, hypertension, hyperlipidemia, CKD, recent admission for recurrent UTI as well as cholelithiasis presenting from [facility] for decreased p.o. intake over the last couple of weeks, positive nausea, NBNB emesis today, low back pain today. Patient was seen by surgery previously on  for positive HIDA scan for acute cholecystitis and treated conservatively with antibiotics and with no IR intervention.    Patient arrives today febrile, of 102.4 with noteable labs of 17.2, H/H 8.7/26, elevated creatinine 2.3, and ALK P 870,  and Lactate of 2.3. Positive UA. Radiology demonstrates RUQ US with thickened gallbladder with cholecystitis and CT A/P demonstrated Mildly distended gallbladder with gallbladder wall thickening concerning for cholecystitis. No biliary ductal dilation, both imaging recommending further evaluation with a HIDA. Patient s/p HIDA, negative.  Patient with family at bedside. Patient states she is feeling better compared to when she first arrived. As per daughter, mother had been losing a lot of weight due to decreased food intake, swelling has been reduced. However at the sight of food, patient feels nauseous. States her last meal was yesterday without vomiting until her arrival to the ED. As per daughter last BM was yesterday without hematochezia. Denies chest pain or shortness of breath. Denies fever or chills. Denies any abdominal pain.    Vital Signs Last 24 Hrs  T(C): 36.4 (19 May 2025 07:39), Max: 39.1 (18 May 2025 22:59)  T(F): 97.5 (19 May 2025 07:39), Max: 102.4 (18 May 2025 22:59)  HR: 62 (19 May 2025 12:10) (54 - 85)  BP: 158/50 (19 May 2025 12:10) (123/56 - 175/68)  BP(mean): 76 (19 May 2025 03:47) (76 - 76)  RR: 18 (19 May 2025 12:10) (18 - 22)  SpO2: 96% (19 May 2025 12:10) (90% - 100%)    Parameters below as of 19 May 2025 12:10  Patient On (Oxygen Delivery Method): room air      PAST MEDICAL & SURGICAL HISTORY:  Hypertension      Diabetes      HLD (hyperlipidemia)      Stage 3 chronic kidney disease      Anemia of chronic disease      Lumbar compression fracture      HTN (hypertension)      S/P ORIF (open reduction internal fixation) fracture  right hip      Allergies  -Amlodipine (Flushing)     Intolerances    Home Medications:  aspirin 81 mg oral delayed release tablet: 1 tab(s) orally every other day (19 May 2025 06:21)  atorvastatin 40 mg oral tablet: 1 tab(s) orally once a day (at bedtime) (19 May 2025 06:21)  famotidine 20 mg oral tablet: 1 tab(s) orally once a day (19 May 2025 06:21)  furosemide 20 mg oral tablet: 1 tab(s) orally once a day (19 May 2025 06:21)  hydrALAZINE 100 mg oral tablet: 1 tab(s) orally 2 times a day (19 May 2025 06:20)  Januvia 25 mg oral tablet: 1 tab(s) orally once a day (19 May 2025 06:21)  omeprazole 20 mg oral delayed release capsule: 1 cap(s) orally once a day (19 May 2025 06:21)    MEDICATIONS  (STANDING):  aspirin enteric coated 81 milliGRAM(s) Oral daily  dextrose 5%. 1000 milliLiter(s) (50 mL/Hr) IV Continuous <Continuous>  dextrose 5%. 1000 milliLiter(s) (100 mL/Hr) IV Continuous <Continuous>  dextrose 50% Injectable 25 Gram(s) IV Push once  dextrose 50% Injectable 12.5 Gram(s) IV Push once  dextrose 50% Injectable 25 Gram(s) IV Push once  famotidine    Tablet 20 milliGRAM(s) Oral every 48 hours  glucagon  Injectable 1 milliGRAM(s) IntraMuscular once  heparin   Injectable 5000 Unit(s) SubCutaneous every 8 hours  hydrALAZINE 100 milliGRAM(s) Oral two times a day  insulin lispro (ADMELOG) corrective regimen sliding scale   SubCutaneous three times a day before meals  insulin lispro (ADMELOG) corrective regimen sliding scale   SubCutaneous at bedtime  metoprolol succinate  milliGRAM(s) Oral daily  piperacillin/tazobactam IVPB.. 3.375 Gram(s) IV Intermittent every 12 hours  sodium chloride 0.9%. 1000 milliLiter(s) (50 mL/Hr) IV Continuous <Continuous>    MEDICATIONS  (PRN):  dextrose Oral Gel 15 Gram(s) Oral once PRN Blood Glucose LESS THAN 70 milliGRAM(s)/deciliter    LABS:                          8.7    17.26 )-----------( 183      ( 18 May 2025 23:30 )             26.1         133[L]  |  95[L]  |  27[H]  ----------------------------<  132[H]  3.1[L]   |  24  |  2.30[H]    Ca    8.6      18 May 2025 23:30    TPro  7.5  /  Alb  2.1[L]  /  TBili  1.0  /  DBili  x   /  AST  103[H]  /  ALT  50  /  AlkPhos  870[H]      LIVER FUNCTIONS - ( 18 May 2025 23:30 )  Alb: 2.1 g/dL / Pro: 7.5 g/dL / ALK PHOS: 870 U/L / ALT: 50 U/L / AST: 103 U/L / GGT: x           PT/INR - ( 18 May 2025 23:30 )   PT: 16.0 sec;   INR: 1.36 ratio         PTT - ( 18 May 2025 23:30 )  PTT:29.6 sec  Urinalysis Basic - ( 19 May 2025 05:06 )    Color: Yellow / Appearance: Cloudy / S.014 / pH: x  Gluc: x / Ketone: x  / Bili: Negative / Urobili: 1.0 mg/dL   Blood: x / Protein: 100 mg/dL / Nitrite: Positive   Leuk Esterase: Large / RBC: 3 /HPF /  /HPF   Sq Epi: x / Non Sq Epi: x / Bacteria: Many /HPF        REVIEW OF SYSTEMS  CONSTITUTIONAL:  No weakness, fevers or chills  EYES/ENT:  No visual changes;  No vertigo or throat pain   NECK:  No pain or stiffness  RESPIRATORY:  No cough, wheezing, hemoptysis; No shortness of breath  CARDIOVASCULAR:  No chest pain or palpitations  GASTROINTESTINAL:  No abdominal or epigastric pain. No nausea, vomiting, or hematemesis; No diarrhea or constipation. No melena or hematochezia.  GENITOURINARY: Recurrent UTI's, no hematuria  NEUROLOGICAL:  No numbness or weakness  SKIN:  No itching, rashes  EXTREMITIES: Swelling reduced in the legs BL    PHYSICAL EXAM:  GENERAL: NAD, lying in strecher comfortably in the ED, answering questions alongside with daughter  HEAD:  Atraumatic, Normocephalic  CHEST/LUNG: Unlabored respirations, breathing comfortably on RA  ABDOMEN: Soft, Nontender, Nondistended. No hepatomegally, notable LLQ ecchymosis ?SQH  EXTREMITIES:  Soft and compressible. warm and dry to touch, no edema  NERVOUS SYSTEM:  Alert & Oriented X3, speech clear. No deficits     < from: US Abdomen Upper Quadrant Right (25 @ 02:04) >    ACC: [id number] EXAM:  US ABDOMEN RT UPR QUADRANT   ORDERED BY: ZAK QUILES     PROCEDURE DATE:  2025          INTERPRETATION:  CLINICAL INFORMATION: Sepsis    COMPARISON: None available.    TECHNIQUE: Sonography of the right upper quadrant.    FINDINGS:  Liver: Within normal limits.  Bile ducts: Normal caliber. Common bile duct measures 7 mm.  Gallbladder: Thick-walled gallbladder with cholelithiasis. Negative   sonographic Kunz sign.  Pancreas: Visualized portions are within normal limits.  Right kidney: 9.8 cm. No hydronephrosis.  Ascites: None.  IVC: Visualized portions are within normal limits.    IMPRESSION:  Thick-walled gallbladder with cholelithiasis. Negative sonographic   Kunz's sign. Overall findings are equivocalfor acute cholecystitis,   HIDA scan may be considered for further evaluation.        --- End of Report ---            MARIZA DUBOSE MD; Attending Radiologist  This document has been electronically signed. May 19 2025  2:26AM    < end of copied text >      < from: CT Abdomen and Pelvis No Cont (25 @ 02:04) >    ACC: [id number] EXAM:  CT ABDOMEN AND PELVIS   ORDERED BY: ZAK QUILES     PROCEDURE DATE:  2025          INTERPRETATION:  CLINICAL INFORMATION: Sepsis, gallstones    COMPARISON: 2025    CONTRAST/COMPLICATIONS:  IV Contrast: NONE  Oral Contrast: NONE  .    PROCEDURE:  CT of the Abdomen and Pelvis was performed.  Sagittal and coronal reformats were performed.    FINDINGS:  LOWER CHEST: Patchy densities lung bases, atelectasis and/or infiltrate.    LIVER: Within normal limits.  BILEDUCTS: Normal caliber.  GALLBLADDER: Mildly distended gallbladder with gallbladder wall   thickening concerning for cholecystitis.  SPLEEN: Within normal limits.  PANCREAS: Within normal limits.  ADRENALS: Within normal limits.  KIDNEYS/URETERS: Leftrenal cyst. No hydronephrosis. Question a few   punctate nonobstructing renal stones versus vascular calcifications.    Evaluation of the pelvis is limited by streak artifact from the patient's   hip hardware.  BLADDER: Grossly unremarkable.  REPRODUCTIVE ORGANS: Uterus and adnexa within normal limits.    BOWEL: Colonic diverticulosis. No bowel obstruction. Diffuse few   fluid-filled small bowel loops. Small periampullary duodenal   diverticulum. Appendix is normal.  PERITONEUM/RETROPERITONEUM: Within normal limits.  VESSELS: Atherosclerotic changes.  LYMPH NODES: No lymphadenopathy.  ABDOMINAL WALL: Within normal limits.  BONES: Degenerative changes. Status post ORIF of the right hip and total   left hip replacement. Stable mild compression deformity of the superior   endplate of L4. Deformities of the left superior and inferior pubic rami.    IMPRESSION:  Mildly distended gallbladder with gallbladder wall thickening concerning   for cholecystitis. Gallbladder ultrasound or HIDA scan is recommended for   further evaluation. No biliary ductal dilation.    Urinary bladder wall thickening may be due to underdistention. Correlate   with urinalysis.    Patchy densities lung bases, atelectasis and/or infiltrate.    --- End of Report ---            CRISTIN RAE MD; Attending Radiologist  This document has been electronically signed. May 19 2025  9:03AM    < end of copied text >      < from: NM Hepatobiliary Imaging (25 @ 13:13) >    ACC: [id number] EXAM:  NM HEPATOBILIARY IMG   ORDERED BY: WESTON PRICE     PROCEDURE DATE:  2025          INTERPRETATION:  CLINICAL INFORMATION: Abdominal Pain RUQ    RADIOPHARMACEUTICAL: 3.2 mCi Tc-99m-Mebrofenin, I.V.,  PHARMACOLOGIC INTERVENTION: Morphine 2 mg IV push x 1    TECHNIQUE: Dynamic imaging of the anterior abdomen was performed   following radiopharmaceutical injection. Static images of the abdomen in   the anterior, right anterior oblique, and right lateral views were   obtained immediately thereafter.    COMPARISON: None    OTHER STUDIES USED FOR CORRELATION: None    FINDINGS: There is prompt, homogeneous uptake of radiopharmaceutical by   the hepatocytes. Activity is first in the bowel at about 20 minutes and   in the gallbladder about 10 minutes after morphine administration. There   is good clearance of activity from the liver at the end of the study.    IMPRESSION: Normal morphine-augmented hepatobiliary scan. No evidence of   acute cholecystitis or biliary obstruction.        --- End of Report ---             GIULIANO BENEDICT MD; Attending Radiologist  This document has been electronically signed. May 19 2025  1:22PM    < end of copied text >      
Patient is a 89y old  Female who presents with a chief complaint of Sepsis (21 May 2025 12:43)      HPI:  Pt is a 89-year-old female w/ PMHx of anemia, diabetes, hypertension, hyperlipidemia, CKD Stage 3 who presents w/ decreased PO intake over past several weeks. Pt was admitted from 4/16-4/22 for acute cholecystitis and UTI. Pt's daughter at bedside provided much of the history. Pt was initially at rehab after discharge of which she was eating less than usually. Pt has been at [facility] since last Saturday to now in which she has had decrease P.O. intake w/ nausea and several episodes of emesis of which the daughter has described as biliary.. Pt's daughter also states that pt was c/o of back pain where she had a fracture last month.     IN THE ED:  Temp 102.4F, HR 78, /64, RR 18, SpO2 90%  S/P zofran, zosyn, 2.85 NS bolus, 1g ofirmev, KCl,   Labs significant for WBC: 17.26, H/H: 8.7/26.1, Na: 133, K: 3.1, BUN/Cr: 27/2.3, Alk Phos: 870, AST: 103, Lactate: 2.3  Imaging:RUQ US: Thick-walled gallbladder with cholelithiasis. Negative sonographic Kunz's sign. Overall findings are equivocal for acute cholecystitis, HIDA scan may be considered for further evaluation.  UA: (+) UTI   (19 May 2025 05:27)       ROS:  Negative except for:    PAST MEDICAL & SURGICAL HISTORY:  Hypertension      Diabetes      HLD (hyperlipidemia)      Stage 3 chronic kidney disease      Anemia of chronic disease      Lumbar compression fracture      HTN (hypertension)      S/P ORIF (open reduction internal fixation) fracture  right hip          SOCIAL HISTORY:    FAMILY HISTORY:  FH: asthma (Father)        MEDICATIONS  (STANDING):  aspirin enteric coated 81 milliGRAM(s) Oral daily  dextrose 5%. 1000 milliLiter(s) (50 mL/Hr) IV Continuous <Continuous>  dextrose 5%. 1000 milliLiter(s) (100 mL/Hr) IV Continuous <Continuous>  dextrose 50% Injectable 25 Gram(s) IV Push once  dextrose 50% Injectable 12.5 Gram(s) IV Push once  dextrose 50% Injectable 25 Gram(s) IV Push once  famotidine    Tablet 20 milliGRAM(s) Oral every 48 hours  fluticasone propionate 50 MICROgram(s)/spray Nasal Spray 1 Spray(s) Both Nostrils two times a day  glucagon  Injectable 1 milliGRAM(s) IntraMuscular once  heparin   Injectable 5000 Unit(s) SubCutaneous every 8 hours  hydrALAZINE 100 milliGRAM(s) Oral two times a day  insulin lispro (ADMELOG) corrective regimen sliding scale   SubCutaneous three times a day before meals  insulin lispro (ADMELOG) corrective regimen sliding scale   SubCutaneous at bedtime  lactated ringers. 1000 milliLiter(s) (50 mL/Hr) IV Continuous <Continuous>  linezolid    Tablet 600 milliGRAM(s) Oral every 12 hours  meropenem  IVPB      meropenem  IVPB 500 milliGRAM(s) IV Intermittent every 12 hours  metoprolol succinate  milliGRAM(s) Oral daily  potassium chloride    Tablet ER 20 milliEquivalent(s) Oral once    MEDICATIONS  (PRN):  dextrose Oral Gel 15 Gram(s) Oral once PRN Blood Glucose LESS THAN 70 milliGRAM(s)/deciliter  ondansetron Injectable 4 milliGRAM(s) IV Push every 8 hours PRN Nausea and/or Vomiting  simethicone 80 milliGRAM(s) Chew every 6 hours PRN Gas      Allergies    amlodipine (Flushing)    Intolerances        Vital Signs Last 24 Hrs  T(C): 36.4 (21 May 2025 11:10), Max: 36.9 (21 May 2025 05:31)  T(F): 97.5 (21 May 2025 11:10), Max: 98.5 (21 May 2025 05:31)  HR: 75 (21 May 2025 11:35) (61 - 77)  BP: 131/60 (21 May 2025 11:10) (131/60 - 159/87)  BP(mean): --  RR: 18 (21 May 2025 11:10) (18 - 18)  SpO2: 97% (21 May 2025 11:35) (92% - 97%)    Parameters below as of 21 May 2025 11:35  Patient On (Oxygen Delivery Method): room air        PHYSICAL EXAM  General: adult in NAD  HEENT: clear oropharynx, anicteric sclera, pink conjunctivae  Neck: supple  CV: normal S1S2 with no murmur rubs or gallops  Lungs: clear to auscultation, no wheezes, no rhales  Abdomen: soft non-tender non-distended, no hepato/splenomegaly  Ext: no clubbing cyanosis or edema  Skin: no rashes and no petichiae  Neuro: alert and oriented X3 no focal deficits      LABS:    CBC Full  -  ( 21 May 2025 07:30 )  WBC Count : 9.73 K/uL  RBC Count : 3.03 M/uL  Hemoglobin : 7.8 g/dL  Hematocrit : 24.5 %  Platelet Count - Automated : 191 K/uL  Mean Cell Volume : 80.9 fl  Mean Cell Hemoglobin : 25.7 pg  Mean Cell Hemoglobin Concentration : 31.8 g/dL  Auto Neutrophil # : x  Auto Lymphocyte # : x  Auto Monocyte # : x  Auto Eosinophil # : x  Auto Basophil # : x  Auto Neutrophil % : x  Auto Lymphocyte % : x  Auto Monocyte % : x  Auto Eosinophil % : x  Auto Basophil % : x    05-21    138  |  105  |  25[H]  ----------------------------<  115[H]  3.5   |  21[L]  |  1.90[H]    Ca    8.7      21 May 2025 07:30    TPro  6.5  /  Alb  1.7[L]  /  TBili  0.7  /  DBili  x   /  AST  92[H]  /  ALT  46  /  AlkPhos  735[H]  05-21              BLOOD SMEAR INTERPRETATION:    RADIOLOGY & ADDITIONAL STUDIES:    < from: CT Abdomen and Pelvis No Cont (05.19.25 @ 02:04) >  ACC: [id number] EXAM:  CT ABDOMEN AND PELVIS   ORDERED BY: ZAK QUILES     PROCEDURE DATE:  05/19/2025          INTERPRETATION:  CLINICAL INFORMATION: Sepsis, gallstones    COMPARISON: 4/16/2025    CONTRAST/COMPLICATIONS:  IV Contrast: NONE  Oral Contrast: NONE  .    PROCEDURE:  CT of the Abdomen and Pelvis was performed.  Sagittal and coronal reformats were performed.    FINDINGS:  LOWER CHEST: Patchy densities lung bases, atelectasis and/or infiltrate.    LIVER: Within normal limits.  BILEDUCTS: Normal caliber.  GALLBLADDER: Mildly distended gallbladder with gallbladder wall   thickening concerning for cholecystitis.  SPLEEN: Within normal limits.  PANCREAS: Within normal limits.  ADRENALS: Within normal limits.  KIDNEYS/URETERS: Leftrenal cyst. No hydronephrosis. Question a few   punctate nonobstructing renal stones versus vascular calcifications.    Evaluation of the pelvis is limited by streak artifact from the patient's   hip hardware.  BLADDER: Grossly unremarkable.  REPRODUCTIVE ORGANS: Uterus and adnexa within normal limits.    BOWEL: Colonic diverticulosis. No bowel obstruction. Diffuse few   fluid-filled small bowel loops. Small periampullary duodenal   diverticulum. Appendix is normal.  PERITONEUM/RETROPERITONEUM: Within normal limits.  VESSELS: Atherosclerotic changes.  LYMPH NODES: No lymphadenopathy.  ABDOMINAL WALL: Within normal limits.  BONES: Degenerative changes. Status post ORIF of the right hip and total   left hip replacement. Stable mild compression deformity of the superior   endplate of L4. Deformities of the left superior and inferior pubic rami.    IMPRESSION:  Mildly distended gallbladder with gallbladder wall thickening concerning   for cholecystitis. Gallbladder ultrasound or HIDA scan is recommended for   further evaluation. No biliary ductal dilation.    Urinary bladder wall thickening may be due to underdistention. Correlate   with urinalysis.    Patchy densities lung bases, atelectasis and/or infiltrate.    --- End of Report ---            CRISTIN RAE MD; Attending Radiologist  This document has been electronically signed. May 19 2025  9:03AM    < end of copied text >

## 2025-05-21 NOTE — PROGRESS NOTE ADULT - SUBJECTIVE AND OBJECTIVE BOX
[facility]                             Nephrology and Hypertension                             Marcin Kaplan                                          [phone]     Patient is a 89y old  Female who presents with a chief complaint of Sepsis (19 May 2025 15:08)       HPI:  Pt is a 89-year-old female w/ PMHx of anemia, diabetes, hypertension, hyperlipidemia, CKD Stage 3 who presents w/ decreased PO intake over past several weeks. Pt was admitted from 4/16-4/22 for acute cholecystitis and UTI. Pt's daughter at bedside provided much of the history. Pt was initially at rehab after discharge of which she was eating less than usually. Pt has been at [facility] since last Saturday to now in which she has had decrease P.O. intake w/ nausea and several episodes of emesis of which the daughter has described as biliary.. Pt's daughter also states that pt was c/o of back pain where she had a fracture last month.   Found to have ELVIN, known to us from prior.  Denies NSAID usage.  Was having N/V and decreased PO intake.    No acute events noted       PAST MEDICAL & SURGICAL HISTORY:  Hypertension      Diabetes      HLD (hyperlipidemia)      Stage 3 chronic kidney disease      Anemia of chronic disease      Lumbar compression fracture      HTN (hypertension)      S/P ORIF (open reduction internal fixation) fracture  right hip           FAMILY HISTORY:  FH: asthma (Father)    NC    Social History:Non smoker    MEDICATIONS  (STANDING):  albumin human 25% IVPB 100 milliLiter(s) IV Intermittent once  aspirin enteric coated 81 milliGRAM(s) Oral daily  dextrose 5%. 1000 milliLiter(s) (50 mL/Hr) IV Continuous <Continuous>  dextrose 5%. 1000 milliLiter(s) (100 mL/Hr) IV Continuous <Continuous>  dextrose 50% Injectable 25 Gram(s) IV Push once  dextrose 50% Injectable 12.5 Gram(s) IV Push once  dextrose 50% Injectable 25 Gram(s) IV Push once  famotidine    Tablet 20 milliGRAM(s) Oral every 48 hours  glucagon  Injectable 1 milliGRAM(s) IntraMuscular once  heparin   Injectable 5000 Unit(s) SubCutaneous every 8 hours  hydrALAZINE 100 milliGRAM(s) Oral two times a day  insulin lispro (ADMELOG) corrective regimen sliding scale   SubCutaneous three times a day before meals  insulin lispro (ADMELOG) corrective regimen sliding scale   SubCutaneous at bedtime  meropenem  IVPB      meropenem  IVPB 500 milliGRAM(s) IV Intermittent once  metoprolol succinate  milliGRAM(s) Oral daily  ondansetron Injectable 4 milliGRAM(s) IV Push every 8 hours  sodium chloride 0.9%. 1000 milliLiter(s) (50 mL/Hr) IV Continuous <Continuous>    MEDICATIONS  (PRN):  dextrose Oral Gel 15 Gram(s) Oral once PRN Blood Glucose LESS THAN 70 milliGRAM(s)/deciliter   Meds reviewed    Allergies    amlodipine (Flushing)    Intolerances         REVIEW OF SYSTEMS:    Review of Systems:   Constitutional: Denies fatigue  HEENT: Denies headaches and dizziness  Respiratory: denies SOB, cough, or wheezing  Cardiovascular: denies CP, palpitations  Gastrointestinal:+ N/V/decreased appetite  Genitourinary: denies painful urination, increased frequency, urgency, or bloody urine  Skin: denies rashes or itching  Musculoskeletal: denies muscle aches, joint swelling  Neurologic: Denies generalized weakness, denies loss of sensation, numbness, or tingling      Vital Signs Last 24 Hrs  T(C): 36.4 (21 May 2025 11:10), Max: 36.9 (21 May 2025 05:31)  T(F): 97.5 (21 May 2025 11:10), Max: 98.5 (21 May 2025 05:31)  HR: 75 (21 May 2025 11:35) (61 - 77)  BP: 131/60 (21 May 2025 11:10) (131/60 - 159/87)  BP(mean): --  RR: 18 (21 May 2025 11:10) (18 - 18)  SpO2: 97% (21 May 2025 11:35) (92% - 97%)    Parameters below as of 21 May 2025 11:35  Patient On (Oxygen Delivery Method): room air          PHYSICAL EXAM:    GENERAL: NAD  HEAD:  Atraumatic, Normocephalic  EYES: EOMI, conjunctiva and sclera clear  ENMT: No Drainage from nares, No drainage from ears  NERVOUS SYSTEM:  Awake and Alert  CHEST/LUNG: Clear to percussion bilaterally  EXTREMITIES:  No Edema  SKIN: No rashes No obvious ecchymosis      LABS:                                       7.8    9.73  )-----------( 191      ( 21 May 2025 07:30 )             24.5     05-21    138  |  105  |  25[H]  ----------------------------<  115[H]  3.5   |  21[L]  |  1.90[H]    Ca    8.7      21 May 2025 07:30    TPro  6.5  /  Alb  1.7[L]  /  TBili  0.7  /  DBili  x   /  AST  92[H]  /  ALT  46  /  AlkPhos  735[H]  05-21      Urinalysis Basic - ( 21 May 2025 07:30 )    Color: x / Appearance: x / SG: x / pH: x  Gluc: 115 mg/dL / Ketone: x  / Bili: x / Urobili: x   Blood: x / Protein: x / Nitrite: x   Leuk Esterase: x / RBC: x / WBC x   Sq Epi: x / Non Sq Epi: x / Bacteria: x

## 2025-05-21 NOTE — PROGRESS NOTE ADULT - PROBLEM SELECTOR PLAN 6
Hb decreased today, may be dilutional secondary to LR  - F/u AM labs  - type and screen ordered for AM Chronic  -Hold home Januvia  -LDISS

## 2025-05-22 LAB
ALBUMIN SERPL ELPH-MCNC: 1.8 G/DL — LOW (ref 3.3–5)
ALP SERPL-CCNC: 846 U/L — HIGH (ref 40–120)
ALT FLD-CCNC: 46 U/L — SIGNIFICANT CHANGE UP (ref 12–78)
ANION GAP SERPL CALC-SCNC: 9 MMOL/L — SIGNIFICANT CHANGE UP (ref 5–17)
AST SERPL-CCNC: 92 U/L — HIGH (ref 15–37)
BILIRUB SERPL-MCNC: 0.7 MG/DL — SIGNIFICANT CHANGE UP (ref 0.2–1.2)
BUN SERPL-MCNC: 24 MG/DL — HIGH (ref 7–23)
CALCIUM SERPL-MCNC: 8.8 MG/DL — SIGNIFICANT CHANGE UP (ref 8.5–10.1)
CHLORIDE SERPL-SCNC: 106 MMOL/L — SIGNIFICANT CHANGE UP (ref 96–108)
CO2 SERPL-SCNC: 22 MMOL/L — SIGNIFICANT CHANGE UP (ref 22–31)
CREAT SERPL-MCNC: 1.8 MG/DL — HIGH (ref 0.5–1.3)
EGFR: 26 ML/MIN/1.73M2 — LOW
EGFR: 26 ML/MIN/1.73M2 — LOW
GLUCOSE BLDC GLUCOMTR-MCNC: 128 MG/DL — HIGH (ref 70–99)
GLUCOSE BLDC GLUCOMTR-MCNC: 148 MG/DL — HIGH (ref 70–99)
GLUCOSE BLDC GLUCOMTR-MCNC: 162 MG/DL — HIGH (ref 70–99)
GLUCOSE BLDC GLUCOMTR-MCNC: 188 MG/DL — HIGH (ref 70–99)
GLUCOSE SERPL-MCNC: 116 MG/DL — HIGH (ref 70–99)
HCT VFR BLD CALC: 24 % — LOW (ref 34.5–45)
HGB BLD-MCNC: 7.7 G/DL — LOW (ref 11.5–15.5)
MCHC RBC-ENTMCNC: 25.8 PG — LOW (ref 27–34)
MCHC RBC-ENTMCNC: 32.1 G/DL — SIGNIFICANT CHANGE UP (ref 32–36)
MCV RBC AUTO: 80.5 FL — SIGNIFICANT CHANGE UP (ref 80–100)
NRBC BLD AUTO-RTO: 0 /100 WBCS — SIGNIFICANT CHANGE UP (ref 0–0)
PLATELET # BLD AUTO: 188 K/UL — SIGNIFICANT CHANGE UP (ref 150–400)
POTASSIUM SERPL-MCNC: 3.9 MMOL/L — SIGNIFICANT CHANGE UP (ref 3.5–5.3)
POTASSIUM SERPL-SCNC: 3.9 MMOL/L — SIGNIFICANT CHANGE UP (ref 3.5–5.3)
PROT SERPL-MCNC: 6.6 G/DL — SIGNIFICANT CHANGE UP (ref 6–8.3)
RBC # BLD: 2.98 M/UL — LOW (ref 3.8–5.2)
RBC # FLD: 15.9 % — HIGH (ref 10.3–14.5)
SODIUM SERPL-SCNC: 137 MMOL/L — SIGNIFICANT CHANGE UP (ref 135–145)
WBC # BLD: 8.22 K/UL — SIGNIFICANT CHANGE UP (ref 3.8–10.5)
WBC # FLD AUTO: 8.22 K/UL — SIGNIFICANT CHANGE UP (ref 3.8–10.5)

## 2025-05-22 RX ORDER — CALCIUM CARBONATE 750 MG/1
1 TABLET ORAL ONCE
Refills: 0 | Status: COMPLETED | OUTPATIENT
Start: 2025-05-22 | End: 2025-05-22

## 2025-05-22 RX ORDER — DIPHENHYDRAMINE HCL 12.5MG/5ML
25 ELIXIR ORAL ONCE
Refills: 0 | Status: COMPLETED | OUTPATIENT
Start: 2025-05-22 | End: 2025-05-22

## 2025-05-22 RX ORDER — ACETAMINOPHEN 500 MG/5ML
650 LIQUID (ML) ORAL ONCE
Refills: 0 | Status: COMPLETED | OUTPATIENT
Start: 2025-05-22 | End: 2025-05-22

## 2025-05-22 RX ORDER — DIPHENHYDRAMINE HCL 12.5MG/5ML
25 ELIXIR ORAL EVERY 4 HOURS
Refills: 0 | Status: DISCONTINUED | OUTPATIENT
Start: 2025-05-22 | End: 2025-05-22

## 2025-05-22 RX ADMIN — Medication 100 MILLIGRAM(S): at 05:28

## 2025-05-22 RX ADMIN — HEPARIN SODIUM 5000 UNIT(S): 1000 INJECTION INTRAVENOUS; SUBCUTANEOUS at 13:59

## 2025-05-22 RX ADMIN — HEPARIN SODIUM 5000 UNIT(S): 1000 INJECTION INTRAVENOUS; SUBCUTANEOUS at 05:30

## 2025-05-22 RX ADMIN — Medication 40 MILLIGRAM(S): at 05:28

## 2025-05-22 RX ADMIN — Medication 81 MILLIGRAM(S): at 11:29

## 2025-05-22 RX ADMIN — FLUTICASONE PROPIONATE 1 SPRAY(S): 50 SPRAY, METERED NASAL at 17:24

## 2025-05-22 RX ADMIN — LINEZOLID 600 MILLIGRAM(S): 2 INJECTION, SOLUTION INTRAVENOUS at 05:28

## 2025-05-22 RX ADMIN — MEROPENEM 100 MILLIGRAM(S): 1 INJECTION INTRAVENOUS at 17:24

## 2025-05-22 RX ADMIN — Medication 650 MILLIGRAM(S): at 22:05

## 2025-05-22 RX ADMIN — Medication 25 MILLIGRAM(S): at 21:11

## 2025-05-22 RX ADMIN — Medication 100 MILLIGRAM(S): at 17:23

## 2025-05-22 RX ADMIN — METOPROLOL SUCCINATE 100 MILLIGRAM(S): 50 TABLET, EXTENDED RELEASE ORAL at 05:28

## 2025-05-22 RX ADMIN — FLUTICASONE PROPIONATE 1 SPRAY(S): 50 SPRAY, METERED NASAL at 05:29

## 2025-05-22 RX ADMIN — MEROPENEM 100 MILLIGRAM(S): 1 INJECTION INTRAVENOUS at 05:29

## 2025-05-22 RX ADMIN — INSULIN LISPRO 1: 100 INJECTION, SOLUTION INTRAVENOUS; SUBCUTANEOUS at 12:37

## 2025-05-22 RX ADMIN — LINEZOLID 600 MILLIGRAM(S): 2 INJECTION, SOLUTION INTRAVENOUS at 17:23

## 2025-05-22 RX ADMIN — HEPARIN SODIUM 5000 UNIT(S): 1000 INJECTION INTRAVENOUS; SUBCUTANEOUS at 21:11

## 2025-05-22 RX ADMIN — Medication 1 DROP(S): at 17:24

## 2025-05-22 RX ADMIN — Medication 650 MILLIGRAM(S): at 21:11

## 2025-05-22 NOTE — PROGRESS NOTE ADULT - ASSESSMENT
ELVIN on CKD3-4  Lactic Acidosis/Metabolic Acidosis  Hyponatremia  Hypokalemia  Elevated LFTs  HTN  UTI/Pyelonephritis    -Baseline Creatinine approx 1.8  -ELVIN Likely 2/2 decreased EABV  -Renal function is back to baseline currently, s/p IVF  -Urine lytes not done  -Lytes are within normal range   -Mild acidosis resolving  -K normal now  -GI eval noted  -Abx per ID  -Goal sodium correction 6-8 meq in 24 hours; Na corrected well, normal range now     d/w family at bedside

## 2025-05-22 NOTE — PROGRESS NOTE ADULT - PROBLEM SELECTOR PLAN 3
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

## 2025-05-22 NOTE — PROGRESS NOTE ADULT - SUBJECTIVE AND OBJECTIVE BOX
[facility]  Chuy Macias MD PhD, Rekha Gonzalez MD, Claudette Holguin MD, Jan Thacker MD, Camilo Arroyo MD  and providing coverage with Jonathan Grijalva MD  Providing Infectious Disease Consultations at [facility], [facility], [facility], [facility]'s    Office# [phone] to schedule follow up appointments  Answering Service for urgent calls or New Consults [phone]  Cell# to text for urgent issues Chuy Macias [phone]     infectious diseases progress note:    NELDA VEDRIN is a 89y y. o. Female patient    Overnight and events of the last 24hrs reviewed    Allergies    amlodipine (Flushing)    Intolerances        ANTIBIOTICS/RELEVANT:  antimicrobials  linezolid    Tablet 600 milliGRAM(s) Oral every 12 hours  meropenem  IVPB      meropenem  IVPB 500 milliGRAM(s) IV Intermittent every 12 hours    immunologic:    OTHER:  aspirin enteric coated 81 milliGRAM(s) Oral daily  dextrose 5%. 1000 milliLiter(s) IV Continuous <Continuous>  dextrose 5%. 1000 milliLiter(s) IV Continuous <Continuous>  dextrose 50% Injectable 25 Gram(s) IV Push once  dextrose 50% Injectable 12.5 Gram(s) IV Push once  dextrose 50% Injectable 25 Gram(s) IV Push once  dextrose Oral Gel 15 Gram(s) Oral once PRN  famotidine    Tablet 20 milliGRAM(s) Oral every 48 hours  fluticasone propionate 50 MICROgram(s)/spray Nasal Spray 1 Spray(s) Both Nostrils two times a day  glucagon  Injectable 1 milliGRAM(s) IntraMuscular once  heparin   Injectable 5000 Unit(s) SubCutaneous every 8 hours  hydrALAZINE 100 milliGRAM(s) Oral two times a day  insulin lispro (ADMELOG) corrective regimen sliding scale   SubCutaneous three times a day before meals  insulin lispro (ADMELOG) corrective regimen sliding scale   SubCutaneous at bedtime  lactated ringers. 1000 milliLiter(s) IV Continuous <Continuous>  metoprolol succinate  milliGRAM(s) Oral daily  ondansetron Injectable 4 milliGRAM(s) IV Push every 8 hours PRN  pantoprazole    Tablet 40 milliGRAM(s) Oral before breakfast  simethicone 80 milliGRAM(s) Chew every 6 hours PRN      Objective:  Vital Signs Last 24 Hrs  T(C): 36.5 (22 May 2025 05:22), Max: 36.5 (21 May 2025 22:00)  T(F): 97.7 (22 May 2025 05:22), Max: 97.7 (21 May 2025 22:00)  HR: 64 (22 May 2025 05:22) (61 - 75)  BP: 167/61 (22 May 2025 05:22) (131/60 - 170/60)  BP(mean): --  RR: 18 (22 May 2025 05:22) (18 - 18)  SpO2: 96% (22 May 2025 05:22) (96% - 97%)    Parameters below as of 22 May 2025 05:22  Patient On (Oxygen Delivery Method): room air        T(C): 36.5 (05-22-25 @ 05:22), Max: 36.9 (05-20-25 @ 11:37)  T(C): 36.5 (05-22-25 @ 05:22), Max: 36.9 (05-19-25 @ 20:22)  T(C): 36.5 (05-22-25 @ 05:22), Max: 39.1 (05-18-25 @ 22:59)    PHYSICAL EXAM:  HEENT: NC atraumatic  Neck: supple  Respiratory: no accessory muscle use, breathing comfortably  Cardiovascular: distant  Gastrointestinal: normal appearing, nondistended  Extremities: no clubbing, no cyanosis,        LABS:                          7.7    8.22  )-----------( 188      ( 22 May 2025 05:55 )             24.0       WBC  8.22 05-22 @ 05:55  9.73 05-21 @ 07:30  11.97 05-20 @ 06:20  17.26 05-18 @ 23:30      05-22    137  |  106  |  24[H]  ----------------------------<  116[H]  3.9   |  22  |  1.80[H]    Ca    8.8      22 May 2025 05:55    TPro  6.6  /  Alb  1.8[L]  /  TBili  0.7  /  DBili  x   /  AST  92[H]  /  ALT  46  /  AlkPhos  846[H]  05-22      Creatinine: 1.80 mg/dL (05-22-25 @ 05:55)  Creatinine: 1.90 mg/dL (05-21-25 @ 07:30)  Creatinine: 2.10 mg/dL (05-20-25 @ 06:20)  Creatinine: 2.30 mg/dL (05-18-25 @ 23:30)        Urinalysis Basic - ( 22 May 2025 05:55 )    Color: x / Appearance: x / SG: x / pH: x  Gluc: 116 mg/dL / Ketone: x  / Bili: x / Urobili: x   Blood: x / Protein: x / Nitrite: x   Leuk Esterase: x / RBC: x / WBC x   Sq Epi: x / Non Sq Epi: x / Bacteria: x            INFLAMMATORY MARKERS      MICROBIOLOGY:    Culture - Blood (05.20.25 @ 16:05)    Specimen Source: Blood Blood-Peripheral   Culture Results:   No growth at 24 hours    Culture - Urine (05.19.25 @ 05:06)    -  Vancomycin: S 0.5   -  Piperacillin/Tazobactam: S <=8   -  Meropenem: S <=1   -  Nitrofurantoin: I 64 Should not be used to treat pyelonephritis.   -  Tetracycline: R >8   -  Cefepime: S <=2   -  Ceftazidime: S <=1   -  Ciprofloxacin: S <=0.25   -  Ciprofloxacin: R >2   -  Imipenem: S <=1   -  Levofloxacin: S <=0.5   -  Levofloxacin: R >4   -  Amikacin: S <=16   -  Ampicillin: R >8 Predicts results to ampicillin/sulbactam, amoxacillin-clavulanate and  piperacillin-tazobactam.   -  Aztreonam: S <=4   Specimen Source: Clean Catch Clean Catch (Midstream)   Culture Results:   >100,000 CFU/ml Pseudomonas aeruginosa  50,000 - 99,000 CFU/mL Enterococcus faecium   Organism Identification: Pseudomonas aeruginosa  Enterococcus faecium   Organism: Pseudomonas aeruginosa   Organism: Enterococcus faecium   Method Type: ANNA MARIE   Method Type: ANNA MARIE        RADIOLOGY & ADDITIONAL STUDIES:

## 2025-05-22 NOTE — PROGRESS NOTE ADULT - ASSESSMENT
Anemia multifactorial in etiology related to renal insufficiency and sepsis of probable urinary source  Abnormal gallbladder on scans previously evaluated by GI    Recommendations:  1.  follow CBC and transfuse as indicated  2.  fe stores on previous admission c/w chronic disease  3.  continue ID evaluation and management  4.  will hold procrit with bacteremia  5.  to transfuse 1 unit PRBC  6.  otherwise hematologically stable for discharge  please call if additional heme evaluation required    discussed with Dr. Holguin

## 2025-05-22 NOTE — DIETITIAN INITIAL EVALUATION ADULT - FACTORS AFF FOOD INTAKE
Pta N/V/diarrhea; no further GI issues per pt and with improving appetite/po intake/persistent lack of appetite

## 2025-05-22 NOTE — PROGRESS NOTE ADULT - SUBJECTIVE AND OBJECTIVE BOX
Interval History:    Chart reviewed and events noted;   Overnight events:    MEDICATIONS  (STANDING):  aspirin enteric coated 81 milliGRAM(s) Oral daily  calcium carbonate    500 mG (Tums) Chewable 1 Tablet(s) Chew once  carbamide peroxide Otic Solution 1 Drop(s) Both Ears every 12 hours  cetirizine 5 milliGRAM(s) Oral daily  dextrose 5%. 1000 milliLiter(s) (100 mL/Hr) IV Continuous <Continuous>  dextrose 5%. 1000 milliLiter(s) (50 mL/Hr) IV Continuous <Continuous>  dextrose 50% Injectable 25 Gram(s) IV Push once  dextrose 50% Injectable 12.5 Gram(s) IV Push once  dextrose 50% Injectable 25 Gram(s) IV Push once  famotidine    Tablet 20 milliGRAM(s) Oral every 48 hours  fluticasone propionate 50 MICROgram(s)/spray Nasal Spray 1 Spray(s) Both Nostrils two times a day  glucagon  Injectable 1 milliGRAM(s) IntraMuscular once  heparin   Injectable 5000 Unit(s) SubCutaneous every 8 hours  hydrALAZINE 100 milliGRAM(s) Oral two times a day  insulin lispro (ADMELOG) corrective regimen sliding scale   SubCutaneous three times a day before meals  insulin lispro (ADMELOG) corrective regimen sliding scale   SubCutaneous at bedtime  lactated ringers. 1000 milliLiter(s) (50 mL/Hr) IV Continuous <Continuous>  linezolid    Tablet 600 milliGRAM(s) Oral every 12 hours  meropenem  IVPB      meropenem  IVPB 500 milliGRAM(s) IV Intermittent every 12 hours  metoprolol succinate  milliGRAM(s) Oral daily  pantoprazole    Tablet 40 milliGRAM(s) Oral before breakfast    MEDICATIONS  (PRN):  dextrose Oral Gel 15 Gram(s) Oral once PRN Blood Glucose LESS THAN 70 milliGRAM(s)/deciliter  diphenhydrAMINE 25 milliGRAM(s) Oral every 4 hours PRN Rash and/or Itching  ondansetron Injectable 4 milliGRAM(s) IV Push every 8 hours PRN Nausea and/or Vomiting  simethicone 80 milliGRAM(s) Chew every 6 hours PRN Gas      Vital Signs Last 24 Hrs  T(C): 36.4 (22 May 2025 11:40), Max: 36.5 (21 May 2025 22:00)  T(F): 97.6 (22 May 2025 11:40), Max: 97.7 (21 May 2025 22:00)  HR: 61 (22 May 2025 11:40) (61 - 75)  BP: 154/78 (22 May 2025 11:40) (154/78 - 170/60)  BP(mean): --  RR: 16 (22 May 2025 11:40) (16 - 18)  SpO2: 94% (22 May 2025 11:40) (94% - 97%)    Parameters below as of 22 May 2025 11:40  Patient On (Oxygen Delivery Method): room air        PHYSICAL EXAM  General: adult in NAD  HEENT: clear oropharynx, anicteric sclera, pink conjunctivae  Neck: supple  CV: normal S1S2 with no murmur rubs or gallops  Lungs: clear to auscultation, no wheezes, no rhales  Abdomen: soft non-tender non-distended, no hepato/splenomegaly  Ext: no clubbing cyanosis or edema  Skin: no rashes and no petichiae  Neuro: alert and oriented X3 no focal deficits      LABS:  CBC Full  -  ( 22 May 2025 05:55 )  WBC Count : 8.22 K/uL  RBC Count : 2.98 M/uL  Hemoglobin : 7.7 g/dL  Hematocrit : 24.0 %  Platelet Count - Automated : 188 K/uL  Mean Cell Volume : 80.5 fl  Mean Cell Hemoglobin : 25.8 pg  Mean Cell Hemoglobin Concentration : 32.1 g/dL  Auto Neutrophil # : x  Auto Lymphocyte # : x  Auto Monocyte # : x  Auto Eosinophil # : x  Auto Basophil # : x  Auto Neutrophil % : x  Auto Lymphocyte % : x  Auto Monocyte % : x  Auto Eosinophil % : x  Auto Basophil % : x    05-22    137  |  106  |  24[H]  ----------------------------<  116[H]  3.9   |  22  |  1.80[H]    Ca    8.8      22 May 2025 05:55    TPro  6.6  /  Alb  1.8[L]  /  TBili  0.7  /  DBili  x   /  AST  92[H]  /  ALT  46  /  AlkPhos  846[H]  05-22        fe studies      WBC trend  8.22 K/uL (05-22-25 @ 05:55)  9.73 K/uL (05-21-25 @ 07:30)  11.97 K/uL (05-20-25 @ 06:20)      Hgb trend  7.7 g/dL (05-22-25 @ 05:55)  7.8 g/dL (05-21-25 @ 07:30)  8.3 g/dL (05-20-25 @ 06:20)      plt trend  188 K/uL (05-22-25 @ 05:55)  191 K/uL (05-21-25 @ 07:30)  185 K/uL (05-20-25 @ 06:20)        RADIOLOGY & ADDITIONAL STUDIES:

## 2025-05-22 NOTE — DIETITIAN INITIAL EVALUATION ADULT - PROBLEM SELECTOR PLAN 1
T: 102.4, WBC: 17.26, Lactate: 2.3->1.4  UA: Cloudy, LEC: Large, Nitrite (+), Bacteria: Many  -RUQ US: Thick-walled gallbladder with cholelithiasis. Negative sonographic Kunz's sign. Overall findings are equivocal for acute cholecystitis, HIDA scan may be considered for further evaluation.  -HIDA scan ordered, f/u  -Bcxs x2 and Urine cx x1 f/u  -CT Abdomen and Pelvis Non-Contrast, f/u  -s/p Zosyn in ED. c/w zosyn empirically  -ID consulted (Dr. Holguin), f/u recs  -GI consulted (Dr. Dumont), f/u recs

## 2025-05-22 NOTE — PROVIDER CONTACT NOTE (OTHER) - BACKGROUND
Vital Signs Pre Blood Transfusion- Pt noted to be Hypertensive.   Vital Signs:  BP- 172/65 T- 97.6 HR- 69 RR-17 SpO2-95% Room Air
Pt is admitted for UTI and Sepsis
Pt admitted for UTI, sepsis
Pt here for N/V. Blood cultures + , pt on Merapenam . Dose just completed.
Pt is admitted for UTI, Sepsis, Cholelithiases

## 2025-05-22 NOTE — PROGRESS NOTE ADULT - PROBLEM SELECTOR PLAN 2
ELVIN on CKD stage 3   - Cr improving   Baseline of 1.8  - on IV LR   - Dr. Ramos Nephro cr improving  ELVIN on CKD3-4  Lactic Acidosis/Metabolic Acidosis  Hyponatremia  Hypokalemia ELVIN on CKD stage 3   - Cr improving   Baseline of 1.8  - S/P IV LR   - Dr. torres  Nephro -Cr  improving  ELVIN on CKD3-  Lactic Acidosis/Metabolic Acidosis  Hyponatremia-improved  Hypokalemia- Improved

## 2025-05-22 NOTE — SOCIAL WORK PROGRESS NOTE - NSSWPROGRESSNOTE_GEN_ALL_CORE
Per PT, dc recommendations are for LEONEL at this time. Pt's dtr Maday and pt requested for a referral to be sent to [facility]. BETZY requested. SW will continue to follow.

## 2025-05-22 NOTE — PROGRESS NOTE ADULT - SUBJECTIVE AND OBJECTIVE BOX
[facility]  Owen Morrison NP  [address] [place] [address]  [phone]      INTERVAL HPI/OVERNIGHT EVENTS:  Pt s/e with daughter at bedside  Pt still having some GERD symptoms and belching  Otherwise tolerating diet fairly well    MEDICATIONS  (STANDING):  aspirin enteric coated 81 milliGRAM(s) Oral daily  calcium carbonate    500 mG (Tums) Chewable 1 Tablet(s) Chew once  carbamide peroxide Otic Solution 1 Drop(s) Both Ears every 12 hours  cetirizine 5 milliGRAM(s) Oral daily  dextrose 5%. 1000 milliLiter(s) (100 mL/Hr) IV Continuous <Continuous>  dextrose 5%. 1000 milliLiter(s) (50 mL/Hr) IV Continuous <Continuous>  dextrose 50% Injectable 25 Gram(s) IV Push once  dextrose 50% Injectable 12.5 Gram(s) IV Push once  dextrose 50% Injectable 25 Gram(s) IV Push once  famotidine    Tablet 20 milliGRAM(s) Oral every 48 hours  fluticasone propionate 50 MICROgram(s)/spray Nasal Spray 1 Spray(s) Both Nostrils two times a day  glucagon  Injectable 1 milliGRAM(s) IntraMuscular once  heparin   Injectable 5000 Unit(s) SubCutaneous every 8 hours  hydrALAZINE 100 milliGRAM(s) Oral two times a day  insulin lispro (ADMELOG) corrective regimen sliding scale   SubCutaneous three times a day before meals  insulin lispro (ADMELOG) corrective regimen sliding scale   SubCutaneous at bedtime  lactated ringers. 1000 milliLiter(s) (50 mL/Hr) IV Continuous <Continuous>  linezolid    Tablet 600 milliGRAM(s) Oral every 12 hours  meropenem  IVPB      meropenem  IVPB 500 milliGRAM(s) IV Intermittent every 12 hours  metoprolol succinate  milliGRAM(s) Oral daily  pantoprazole    Tablet 40 milliGRAM(s) Oral before breakfast    MEDICATIONS  (PRN):  dextrose Oral Gel 15 Gram(s) Oral once PRN Blood Glucose LESS THAN 70 milliGRAM(s)/deciliter  diphenhydrAMINE 25 milliGRAM(s) Oral every 4 hours PRN Rash and/or Itching  ondansetron Injectable 4 milliGRAM(s) IV Push every 8 hours PRN Nausea and/or Vomiting  simethicone 80 milliGRAM(s) Chew every 6 hours PRN Gas      Allergies    amlodipine (Flushing)      PHYSICAL EXAM:   Vital Signs:  Vital Signs Last 24 Hrs  T(C): 36.4 (22 May 2025 11:40), Max: 36.5 (21 May 2025 22:00)  T(F): 97.6 (22 May 2025 11:40), Max: 97.7 (21 May 2025 22:00)  HR: 61 (22 May 2025 11:40) (61 - 75)  BP: 154/78 (22 May 2025 11:40) (154/78 - 170/60)  BP(mean): --  RR: 16 (22 May 2025 11:40) (16 - 18)  SpO2: 94% (22 May 2025 11:40) (94% - 97%)    Parameters below as of 22 May 2025 11:40  Patient On (Oxygen Delivery Method): room air      Daily     Daily Weight in k.1 (22 May 2025 05:22)    GENERAL:  Appears stated age  HEENT:  NC/AT  CHEST:  Full & symmetric excursion  HEART:  Regular rhythm  ABDOMEN:  Soft, non-tender, non-distended  EXTEREMITIES:  no cyanosis  SKIN:  No rash  NEURO:  Alert      LABS:                        7.7    8.22  )-----------( 188      ( 22 May 2025 05:55 )             24.0         137  |  106  |  24[H]  ----------------------------<  116[H]  3.9   |  22  |  1.80[H]    Ca    8.8      22 May 2025 05:55    TPro  6.6  /  Alb  1.8[L]  /  TBili  0.7  /  DBili  x   /  AST  92[H]  /  ALT  46  /  AlkPhos  846[H]  05-      Urinalysis Basic - ( 22 May 2025 05:55 )    Color: x / Appearance: x / SG: x / pH: x  Gluc: 116 mg/dL / Ketone: x  / Bili: x / Urobili: x   Blood: x / Protein: x / Nitrite: x   Leuk Esterase: x / RBC: x / WBC x   Sq Epi: x / Non Sq Epi: x / Bacteria: x

## 2025-05-22 NOTE — DIETITIAN INITIAL EVALUATION ADULT - ADD RECOMMEND
Recommend continued assistance/encouragement with meals/snacks with emphasis on lean protein sources at all meals. Declined oral nutritional supplements. Will provide greek yogurt BID to maximize protein intake. Will monitor BS close with further dietary restrictions as needed. Recommend MVI with minerals/Fe daily (centrum liquid).

## 2025-05-22 NOTE — PROVIDER CONTACT NOTE (OTHER) - SITUATION
Pt c/o of arm numbness, teary eyes. states she feels "bad"
MD Rubio made aware that pt describing bilateral numbness in her hands, generalized discomfort
Pt complaining of Ear Pain; Patient endorses the pain started yesterday
Pt is having wheezing upon respiration
Pt presents to the ED c/o Poor Intake and Abdominal Pain. Pt diagnosed with UTI and Cholelithiases.

## 2025-05-22 NOTE — PROGRESS NOTE ADULT - ASSESSMENT
Poor PO intake  Sepsis  Bacteremia  Transaminitis    5/19 CT a/p: Mildly distended gallbladder with gallbladder wall thickening concerning for cholecystitis. No biliary ductal dilation.  5/19 US RUQ: Thick-walled gallbladder with cholelithiasis. Common bile duct measures 7 mm.  5/19 HIDA: No evidence of acute cholecystitis or biliary obstruction.  5/20 MRCP: Biliary sludge in the gallbladder with nonspecific thickening of the gallbladder wall. No evidence of choledocholithiasis or biliary ductal dilatation. Pancreatic cysts.    Plan:  - CT, US, HIDA, and MRCP discussed with pt and family  - Given normal bile ducts on imaging without CBD stones and normal bilirubin, low suspicion of cholangitis  - Trend LFTs  - ID eval appreciated, per ID note suspect urinary source of infection  - Monitor PO intake  - May need to consider appetite stimulator such as marinol 2.5 mg qhs if continued poor appetite  - Anti-emetics prn  - No GI objection to d/c planning

## 2025-05-22 NOTE — PROGRESS NOTE ADULT - SUBJECTIVE AND OBJECTIVE BOX
[facility]                             Nephrology and Hypertension                             Marcin Kaplan                                          [phone]     Patient is a 89y old  Female who presents with a chief complaint of Sepsis (19 May 2025 15:08)       HPI:  Pt is a 89-year-old female w/ PMHx of anemia, diabetes, hypertension, hyperlipidemia, CKD Stage 3 who presents w/ decreased PO intake over past several weeks. Pt was admitted from 4/16-4/22 for acute cholecystitis and UTI. Pt's daughter at bedside provided much of the history. Pt was initially at rehab after discharge of which she was eating less than usually. Pt has been at [facility] since last Saturday to now in which she has had decrease P.O. intake w/ nausea and several episodes of emesis of which the daughter has described as biliary.. Pt's daughter also states that pt was c/o of back pain where she had a fracture last month.   Found to have ELVIN, known to us from prior.  Denies NSAID usage.  Was having N/V and decreased PO intake.    No acute events noted, no sob, no dizziness, eating okay.        PAST MEDICAL & SURGICAL HISTORY:  Hypertension      Diabetes      HLD (hyperlipidemia)      Stage 3 chronic kidney disease      Anemia of chronic disease      Lumbar compression fracture      HTN (hypertension)      S/P ORIF (open reduction internal fixation) fracture  right hip           FAMILY HISTORY:  FH: asthma (Father)    NC    Social History:Non smoker    MEDICATIONS  (STANDING):  albumin human 25% IVPB 100 milliLiter(s) IV Intermittent once  aspirin enteric coated 81 milliGRAM(s) Oral daily  dextrose 5%. 1000 milliLiter(s) (50 mL/Hr) IV Continuous <Continuous>  dextrose 5%. 1000 milliLiter(s) (100 mL/Hr) IV Continuous <Continuous>  dextrose 50% Injectable 25 Gram(s) IV Push once  dextrose 50% Injectable 12.5 Gram(s) IV Push once  dextrose 50% Injectable 25 Gram(s) IV Push once  famotidine    Tablet 20 milliGRAM(s) Oral every 48 hours  glucagon  Injectable 1 milliGRAM(s) IntraMuscular once  heparin   Injectable 5000 Unit(s) SubCutaneous every 8 hours  hydrALAZINE 100 milliGRAM(s) Oral two times a day  insulin lispro (ADMELOG) corrective regimen sliding scale   SubCutaneous three times a day before meals  insulin lispro (ADMELOG) corrective regimen sliding scale   SubCutaneous at bedtime  meropenem  IVPB      meropenem  IVPB 500 milliGRAM(s) IV Intermittent once  metoprolol succinate  milliGRAM(s) Oral daily  ondansetron Injectable 4 milliGRAM(s) IV Push every 8 hours  sodium chloride 0.9%. 1000 milliLiter(s) (50 mL/Hr) IV Continuous <Continuous>    MEDICATIONS  (PRN):  dextrose Oral Gel 15 Gram(s) Oral once PRN Blood Glucose LESS THAN 70 milliGRAM(s)/deciliter   Meds reviewed    Allergies    amlodipine (Flushing)    Intolerances         REVIEW OF SYSTEMS: neg other than above       Vital Signs Last 24 Hrs  T(C): 36.4 (22 May 2025 11:40), Max: 36.5 (21 May 2025 22:00)  T(F): 97.6 (22 May 2025 11:40), Max: 97.7 (21 May 2025 22:00)  HR: 61 (22 May 2025 11:40) (61 - 75)  BP: 154/78 (22 May 2025 11:40) (154/78 - 170/60)  BP(mean): --  RR: 16 (22 May 2025 11:40) (16 - 18)  SpO2: 94% (22 May 2025 11:40) (94% - 97%)    Parameters below as of 22 May 2025 11:40  Patient On (Oxygen Delivery Method): room air      PHYSICAL EXAM:    GENERAL: NAD  HEAD:  Atraumatic, Normocephalic  EYES: EOMI, conjunctiva and sclera clear  ENMT: No Drainage from nares, No drainage from ears  NERVOUS SYSTEM:  Awake and Alert  CHEST/LUNG: Clear to percussion bilaterally  EXTREMITIES:  No Edema  SKIN: No rashes No obvious ecchymosis        LABS:                          7.7    8.22  )-----------( 188      ( 22 May 2025 05:55 )             24.0     05-22    137  |  106  |  24[H]  ----------------------------<  116[H]  3.9   |  22  |  1.80[H]    Ca    8.8      22 May 2025 05:55    TPro  6.6  /  Alb  1.8[L]  /  TBili  0.7  /  DBili  x   /  AST  92[H]  /  ALT  46  /  AlkPhos  846[H]  05-22    LIVER FUNCTIONS - ( 22 May 2025 05:55 )  Alb: 1.8 g/dL / Pro: 6.6 g/dL / ALK PHOS: 846 U/L / ALT: 46 U/L / AST: 92 U/L / GGT: x             Urinalysis Basic - ( 22 May 2025 05:55 )    Color: x / Appearance: x / SG: x / pH: x  Gluc: 116 mg/dL / Ketone: x  / Bili: x / Urobili: x   Blood: x / Protein: x / Nitrite: x   Leuk Esterase: x / RBC: x / WBC x   Sq Epi: x / Non Sq Epi: x / Bacteria: x

## 2025-05-22 NOTE — PROGRESS NOTE ADULT - PROBLEM SELECTOR PLAN 1
T: 102.4, WBC: 17.26, Lactate: 2.3->1.4   UA: Cloudy, LEC: Large, Nitrite (+), Bacteria: Many  -RUQ US: Thick-walled gallbladder with cholelithiasis. Negative sonographic Kunz's sign. Overall findings are equivocal for acute cholecystitis, HIDA scan may be considered for further evaluation.  -HIDA scan negative for acute pino   - BCx positive for ESBL and Enterococcus faceum, Ucx pseudomonas   - Repeat Cx negative so far   - transitioned from zosyn to meropenem + linezolid   - MRCP ordered showed biliary sludge with gallbladder thickening   - plan to dc with midline to rehab to complete meropenem 5/28 and 5/20 for linezolid   -ID Dr. Macias   -GI D/W  (Dr. Dumont- No concern for MRI findings  - Surgery d/w DR Esquivel - No surgical intervention Severe sepsis 2/2 UTI-Pyelonephritis   T: 102.4, WBC: 17.26, Lactate: 2.3->1.4   UA: Cloudy, LEC: Large, Nitrite (+), Bacteria: Many  -RUQ US: Thick-walled gallbladder with cholelithiasis. Negative sonographic Kunz's sign. Overall findings are equivocal for acute cholecystitis, HIDA scan may be considered for further evaluation.  -HIDA scan negative for acute pino   - BCx positive for ESBL and Enterococcus faecium, Ucx pseudomonas   - Repeat Cx negative so far   - transitioned from zosyn to meropenem q 12 hrs + linezolid   - MRCP ordered showed biliary sludge with gallbladder thickening   - plan to dc with midline to rehab to complete meropenem 5/28 and 5/20 for linezolid   -ID Dr. Macias follow up  -GI D/W  (Dr. Dumont- No concern for MRI findings  - Surgery d/w DR Esquivel - No surgical intervention

## 2025-05-22 NOTE — DIETITIAN INITIAL EVALUATION ADULT - PERTINENT LABORATORY DATA
05-22    137  |  106  |  24[H]  ----------------------------<  116[H]  3.9   |  22  |  1.80[H]    Ca    8.8      22 May 2025 05:55    TPro  6.6  /  Alb  1.8[L]  /  TBili  0.7  /  DBili  x   /  AST  92[H]  /  ALT  46  /  AlkPhos  846[H]  05-22  POCT Blood Glucose.: 128 mg/dL (05-22-25 @ 08:20)  A1C with Estimated Average Glucose Result: 7.0 % (04-17-25 @ 06:26)  A1C with Estimated Average Glucose Result: 6.6 % (03-10-25 @ 07:20)  A1C with Estimated Average Glucose Result: 8.4 % (12-19-24 @ 04:30)

## 2025-05-22 NOTE — PROGRESS NOTE ADULT - SUBJECTIVE AND OBJECTIVE BOX
Patient is a 89y old  Female who presents with a chief complaint of Sepsis (22 May 2025 14:22)    HPI:  Pt is a 89-year-old female w/ PMHx of anemia, diabetes, hypertension, hyperlipidemia, CKD Stage 3 who presents w/ decreased PO intake over past several weeks. Pt was admitted from 4/16-4/22 for acute cholecystitis and UTI. Pt's daughter at bedside provided much of the history. Pt was initially at rehab after discharge of which she was eating less than usually. Pt has been at [facility] since last Saturday to now in which she has had decrease P.O. intake w/ nausea and several episodes of emesis of which the daughter has described as biliary.. Pt's daughter also states that pt was c/o of back pain where she had a fracture last month.     IN THE ED:  Temp 102.4F, HR 78, /64, RR 18, SpO2 90%  S/P zofran, zosyn, 2.85 NS bolus, 1g ofirmev, KCl,   Labs significant for WBC: 17.26, H/H: 8.7/26.1, Na: 133, K: 3.1, BUN/Cr: 27/2.3, Alk Phos: 870, AST: 103, Lactate: 2.3  Imaging:RUQ US: Thick-walled gallbladder with cholelithiasis. Negative sonographic Kunz's sign. Overall findings are equivocal for acute cholecystitis, HIDA scan may be considered for further evaluation.  UA: (+) UTI   (19 May 2025 05:27)    INTERVAL HPI:  5/19: Patient seen after HIDA scan performed- very lethargic d/t morphine . HIDA scan negative however family expresses concern about patients GI complaints including nausea, vomitting, diarrhea, abdominal pain. HIDA scan -Neg,  MRCP ordered.  5/20: patient seen and examined at bedside. States she currently has no symptoms no N/V ate breakfast with no issues. Denies an abd pain. MRCP shows biliary sludge. BCx positive for ESBL  abx switched to meropenem. As per ID-Dr birmingham.   5/21: Patient seen and examined at bedside. Had a BM prior to exam, daughter requests to dc miralax. Patient states she feels generally uncomfortable today. Ucx positive for pseudomonas Blood cx sensitivites + E. faceum. Currently on Meropenem and Linezolid.   5/22: Patient seen and examined at bedside. OVernight complains of feeling itchy and uncomfortable. Patient remains on Meropenem and Linezolid. Possble dc tomorrow with midline.     OVERNIGHT EVENTS:    Home Medications:  aspirin 81 mg oral delayed release tablet: 1 tab(s) orally every other day (19 May 2025 06:21)  atorvastatin 40 mg oral tablet: 1 tab(s) orally once a day (at bedtime) (19 May 2025 06:21)  famotidine 20 mg oral tablet: 1 tab(s) orally once a day (19 May 2025 06:21)  furosemide 20 mg oral tablet: 1 tab(s) orally once a day (19 May 2025 06:21)  hydrALAZINE 100 mg oral tablet: 1 tab(s) orally 2 times a day (19 May 2025 06:20)  Januvia 25 mg oral tablet: 1 tab(s) orally once a day (19 May 2025 06:21)  omeprazole 20 mg oral delayed release capsule: 1 cap(s) orally once a day (19 May 2025 06:21)      MEDICATIONS  (STANDING):  aspirin enteric coated 81 milliGRAM(s) Oral daily  calcium carbonate    500 mG (Tums) Chewable 1 Tablet(s) Chew once  carbamide peroxide Otic Solution 1 Drop(s) Both Ears every 12 hours  cetirizine 5 milliGRAM(s) Oral daily  dextrose 5%. 1000 milliLiter(s) (100 mL/Hr) IV Continuous <Continuous>  dextrose 5%. 1000 milliLiter(s) (50 mL/Hr) IV Continuous <Continuous>  dextrose 50% Injectable 25 Gram(s) IV Push once  dextrose 50% Injectable 12.5 Gram(s) IV Push once  dextrose 50% Injectable 25 Gram(s) IV Push once  famotidine    Tablet 20 milliGRAM(s) Oral every 48 hours  fluticasone propionate 50 MICROgram(s)/spray Nasal Spray 1 Spray(s) Both Nostrils two times a day  glucagon  Injectable 1 milliGRAM(s) IntraMuscular once  heparin   Injectable 5000 Unit(s) SubCutaneous every 8 hours  hydrALAZINE 100 milliGRAM(s) Oral two times a day  insulin lispro (ADMELOG) corrective regimen sliding scale   SubCutaneous three times a day before meals  insulin lispro (ADMELOG) corrective regimen sliding scale   SubCutaneous at bedtime  lactated ringers. 1000 milliLiter(s) (50 mL/Hr) IV Continuous <Continuous>  linezolid    Tablet 600 milliGRAM(s) Oral every 12 hours  meropenem  IVPB      meropenem  IVPB 500 milliGRAM(s) IV Intermittent every 12 hours  metoprolol succinate  milliGRAM(s) Oral daily  pantoprazole    Tablet 40 milliGRAM(s) Oral before breakfast    MEDICATIONS  (PRN):  dextrose Oral Gel 15 Gram(s) Oral once PRN Blood Glucose LESS THAN 70 milliGRAM(s)/deciliter  diphenhydrAMINE 25 milliGRAM(s) Oral every 4 hours PRN Rash and/or Itching  ondansetron Injectable 4 milliGRAM(s) IV Push every 8 hours PRN Nausea and/or Vomiting  simethicone 80 milliGRAM(s) Chew every 6 hours PRN Gas      Allergies    amlodipine (Flushing)    Intolerances        Social History:  Tobacco: Remote hx  Alcohol: Denies  Illicit Drugs: Denies  Lives at: [facility]  Ambulation: w/ walker  ADLs: Dependent (19 May 2025 05:27)      REVIEW OF SYSTEMS:  CONSTITUTIONAL: No fever, No chills, No fatigue, No myalgia, No Body ache, No Weakness  EYES: No eye pain,  No visual disturbances, No discharge, NO Redness  ENMT:  No ear pain, No nose bleed, No vertigo; No sinus pain, NO throat pain, No Congestion  NECK: No pain, No stiffness  RESPIRATORY: No cough, NO wheezing, No  hemoptysis, NO  shortness of breath  CARDIOVASCULAR: No chest pain, palpitations  GASTROINTESTINAL: No abdominal pain, NO epigastric pain. No nausea, No vomiting; No diarrhea, No constipation. [  ] BM  GENITOURINARY: No dysuria, No frequency, No urgency, No hematuria, NO incontinence  NEUROLOGICAL: No headaches, No dizziness, No numbness, No tingling, No tremors, No weakness  EXT: No Swelling, No Pain, No Edema  SKIN:  [ x ] itching, burning, rashes, or lesions   MUSCULOSKELETAL: No joint pain ,No Jt swelling; No muscle pain, No back pain, No extremity pain  PSYCHIATRIC: No depression,  No anxiety,  No mood swings ,No difficulty sleeping at night  PAIN SCALE: [  ] None  [  ] Other-  ROS Unable to obtain due to - [  ] Dementia  [  ] Lethargy [  ] Drowsy [  ] Sedated [  ] non verbal  REST OF REVIEW Of SYSTEM - [  ] Normal     Vital Signs Last 24 Hrs  T(C): 36.4 (22 May 2025 11:40), Max: 36.5 (21 May 2025 22:00)  T(F): 97.6 (22 May 2025 11:40), Max: 97.7 (21 May 2025 22:00)  HR: 61 (22 May 2025 11:40) (61 - 75)  BP: 154/78 (22 May 2025 11:40) (154/78 - 170/60)  BP(mean): --  RR: 16 (22 May 2025 11:40) (16 - 18)  SpO2: 94% (22 May 2025 11:40) (94% - 97%)    Parameters below as of 22 May 2025 11:40  Patient On (Oxygen Delivery Method): room air      Finger Stick        05-21 @ 07:01  -  05-22 @ 07:00  --------------------------------------------------------  IN: 0 mL / OUT: 1000 mL / NET: -1000 mL        PHYSICAL EXAM:  GENERAL:  [  ] NAD , [  ] well appearing, [ x ] Agitated, [  ] Drowsy,  [  ] Lethargy, [  ] confused   HEAD:  [  ] Normal, [  ] Other  EYES:  [  ] EOMI, [  ] PERRLA, [  ] conjunctiva and sclera clear normal, [  ] Other,  [  ] Pallor,[  ] Discharge  ENMT:  [ x ] Normal, [  ] Moist mucous membranes, [  ] Good dentition, [  ] No Thrush  NECK:  [ x ] Supple, [  ] No JVD, [  ] Normal thyroid, [  ] Lymphadenopathy [  ] Other  CHEST/LUNG:  [x  ] Clear to auscultation bilaterally, [  ] Breath Sounds equal B/L / Decrease, [  ] poor effort  [  ] No rales, [  ] No rhonchi  [  ]  No wheezing,   HEART:  [ x ] Regular rate and rhythm, [  ] tachycardia, [  ] Bradycardia,  [  ] irregular  [  ] No murmurs, No rubs, No gallops, [  ] PPM in place (Mfr:  )  ABDOMEN:  [ x ] Soft, [ x ] Nontender, [ x ] Nondistended, [  ] No mass, [  ] Bowel sounds present, [  ] obese  NERVOUS SYSTEM:  [  ] Alert & Oriented X3, [  ] Nonfocal  [ x ] Confusion  [  ] Encephalopathic [  ] Sedated [  ] Unable to assess, [  ] Dementia [  ] Other-  EXTREMITIES: [  ] 2+ Peripheral Pulses, No clubbing, No cyanosis,  [  ] edema B/L lower EXT. [  ] PVD stasis skin changes B/L Lower EXT, [  ] wound  LYMPH: No lymphadenopathy noted  SKIN:  [  ] No rashes or lesions, [  ] Pressure Ulcers, [  ] ecchymosis, [  ] Skin Tears, [  ] Other    DIET: Diet, DASH/TLC:   Sodium & Cholesterol Restricted (05-19-25 @ 14:57)      LABS:                        7.7    8.22  )-----------( 188      ( 22 May 2025 05:55 )             24.0     22 May 2025 05:55    137    |  106    |  24     ----------------------------<  116    3.9     |  22     |  1.80     Ca    8.8        22 May 2025 05:55    TPro  6.6    /  Alb  1.8    /  TBili  0.7    /  DBili  x      /  AST  92     /  ALT  46     /  AlkPhos  846    22 May 2025 05:55      Urinalysis Basic - ( 22 May 2025 05:55 )    Color: x / Appearance: x / SG: x / pH: x  Gluc: 116 mg/dL / Ketone: x  / Bili: x / Urobili: x   Blood: x / Protein: x / Nitrite: x   Leuk Esterase: x / RBC: x / WBC x   Sq Epi: x / Non Sq Epi: x / Bacteria: x        Culture Results:   No growth at 24 hours (05-20 @ 16:05)  Culture Results:   No growth at 24 hours (05-20 @ 15:58)  Culture Results:   >100,000 CFU/ml Pseudomonas aeruginosa  50,000 - 99,000 CFU/mL Enterococcus faecium (05-19 @ 05:06)  Culture Results:   Growth in anaerobic bottle: Escherichia coli ESBL  See previous culture [id number] (05-18 @ 23:25)  Culture Results:   Growth in aerobic bottle: Escherichia coli ESBL  Direct identification is available within approximately 3-5  hours either by Blood Panel Multiplexed PCR or Direct  MALDI-TOF. Details: [email or web address] (05-18 @ 23:10)                  Culture - Blood (collected 20 May 2025 16:05)  Source: Blood Blood-Peripheral  Preliminary Report (22 May 2025 02:02):    No growth at 24 hours    Culture - Blood (collected 20 May 2025 15:58)  Source: Blood Blood-Peripheral  Preliminary Report (22 May 2025 02:02):    No growth at 24 hours    Urinalysis with Rflx Culture (collected 19 May 2025 05:06)    Culture - Urine (collected 19 May 2025 05:06)  Source: Clean Catch Clean Catch (Midstream)  Final Report (21 May 2025 12:34):    >100,000 CFU/ml Pseudomonas aeruginosa    50,000 - 99,000 CFU/mL Enterococcus faecium  Organism: Pseudomonas aeruginosa  Enterococcus faecium (21 May 2025 12:34)  Organism: Enterococcus faecium (21 May 2025 12:34)  Organism: Pseudomonas aeruginosa (21 May 2025 12:34)    Culture - Blood (collected 18 May 2025 23:25)  Source: Blood Blood-Peripheral  Gram Stain (19 May 2025 14:24):    Growth in anaerobic bottle: Gram Negative Rods  Final Report (21 May 2025 20:47):    Growth in anaerobic bottle: Escherichia coli ESBL    See previous culture [id number]    Culture - Blood (collected 18 May 2025 23:10)  Source: Blood Blood-Peripheral  Gram Stain (19 May 2025 14:24):    Growth in aerobic bottle: Gram Negative Rods  Final Report (21 May 2025 07:07):    Growth in aerobic bottle: Escherichia coli ESBL    Direct identification is available within approximately 3-5    hours either by Blood Panel Multiplexed PCR or Direct    MALDI-TOF. Details: [email or web address]  Organism: Blood Culture PCR  Escherichia coli ESBL (21 May 2025 07:07)  Organism: Escherichia coli ESBL (21 May 2025 07:07)  Organism: Blood Culture PCR (21 May 2025 07:07)         Anemia Panel:      Thyroid Panel:                RADIOLOGY & ADDITIONAL TESTS:      HEALTH ISSUES - PROBLEM Dx:  HTN (hypertension)    HLD (hyperlipidemia)    Diabetes    Anemia of chronic disease    Need for prophylactic measure    Sepsis    Acute on chronic renal failure            Consultant(s) Notes Reviewed:  [  ] YES     Care Discussed with [X] Consultants  [  ] Patient  [  ] Family [  ] HCP [  ]   [  ] Social Service  [  ] RN, [  ] Physical Therapy,[  ] Palliative care team  DVT PPX: [  ] Lovenox, [  ] S C Heparin, [  ] Coumadin, [  ] Xarelto, [  ] Eliquis, [  ] Pradaxa, [  ] IV Heparin drip, [  ] SCD [  ] Contraindication 2 to GI Bleed,[  ] Ambulation [  ] Contraindicated 2 to  bleed [  ] Contraindicated 2 to Brain Bleed  Advanced directive: [  ] None, [  ] DNR/DNI Patient is a 89y old  Female who presents with a chief complaint of Sepsis (22 May 2025 14:22)    HPI:  Pt is a 89-year-old female w/ PMHx of anemia, diabetes, hypertension, hyperlipidemia, CKD Stage 3 who presents w/ decreased PO intake over past several weeks. Pt was admitted from 4/16-4/22 for acute cholecystitis and UTI. Pt's daughter at bedside provided much of the history. Pt was initially at rehab after discharge of which she was eating less than usually. Pt has been at [facility] since last Saturday to now in which she has had decrease P.O. intake w/ nausea and several episodes of emesis of which the daughter has described as biliary.. Pt's daughter also states that pt was c/o of back pain where she had a fracture last month.     IN THE ED:  Temp 102.4F, HR 78, /64, RR 18, SpO2 90%  S/P zofran, zosyn, 2.85 NS bolus, 1g ofirmev, KCl,   Labs significant for WBC: 17.26, H/H: 8.7/26.1, Na: 133, K: 3.1, BUN/Cr: 27/2.3, Alk Phos: 870, AST: 103, Lactate: 2.3  Imaging:RUQ US: Thick-walled gallbladder with cholelithiasis. Negative sonographic Kunz's sign. Overall findings are equivocal for acute cholecystitis, HIDA scan may be considered for further evaluation.  UA: (+) UTI   (19 May 2025 05:27)    INTERVAL HPI:  5/19: Patient seen after HIDA scan performed- very lethargic d/t morphine . HIDA scan negative however family expresses concern about patients GI complaints including nausea, vomitting, diarrhea, abdominal pain. HIDA scan -Neg,  MRCP ordered.  5/20: patient seen and examined at bedside. States she currently has no symptoms no N/V ate breakfast with no issues. Denies an abd pain. MRCP shows biliary sludge. BCx positive for ESBL  abx switched to meropenem. As per ID-Dr birmingham.   5/21: Patient seen and examined at bedside. Had a BM prior to exam, daughter requests to dc Miralax Patient states she feels generally uncomfortable today. Ucx positive for pseudomonas Blood cx sensitivities + E. faceum. Currently on Meropenem and Linezolid.   5/22: Patient seen and examined at bedside. Overnight complains of feeling itchy and uncomfortable. Patient remains on Meropenem and Linezolid. Possible dc tomorrow with midline.     OVERNIGHT EVENTS: none    Home Medications:  aspirin 81 mg oral delayed release tablet: 1 tab(s) orally every other day (19 May 2025 06:21)  atorvastatin 40 mg oral tablet: 1 tab(s) orally once a day (at bedtime) (19 May 2025 06:21)  famotidine 20 mg oral tablet: 1 tab(s) orally once a day (19 May 2025 06:21)  furosemide 20 mg oral tablet: 1 tab(s) orally once a day (19 May 2025 06:21)  hydrALAZINE 100 mg oral tablet: 1 tab(s) orally 2 times a day (19 May 2025 06:20)  Januvia 25 mg oral tablet: 1 tab(s) orally once a day (19 May 2025 06:21)  omeprazole 20 mg oral delayed release capsule: 1 cap(s) orally once a day (19 May 2025 06:21)      MEDICATIONS  (STANDING):  aspirin enteric coated 81 milliGRAM(s) Oral daily  calcium carbonate    500 mG (Tums) Chewable 1 Tablet(s) Chew once  carbamide peroxide Otic Solution 1 Drop(s) Both Ears every 12 hours  cetirizine 5 milliGRAM(s) Oral daily  dextrose 5%. 1000 milliLiter(s) (100 mL/Hr) IV Continuous <Continuous>  dextrose 5%. 1000 milliLiter(s) (50 mL/Hr) IV Continuous <Continuous>  dextrose 50% Injectable 25 Gram(s) IV Push once  dextrose 50% Injectable 12.5 Gram(s) IV Push once  dextrose 50% Injectable 25 Gram(s) IV Push once  famotidine    Tablet 20 milliGRAM(s) Oral every 48 hours  fluticasone propionate 50 MICROgram(s)/spray Nasal Spray 1 Spray(s) Both Nostrils two times a day  glucagon  Injectable 1 milliGRAM(s) IntraMuscular once  heparin   Injectable 5000 Unit(s) SubCutaneous every 8 hours  hydrALAZINE 100 milliGRAM(s) Oral two times a day  insulin lispro (ADMELOG) corrective regimen sliding scale   SubCutaneous three times a day before meals  insulin lispro (ADMELOG) corrective regimen sliding scale   SubCutaneous at bedtime  lactated ringers. 1000 milliLiter(s) (50 mL/Hr) IV Continuous <Continuous>  linezolid    Tablet 600 milliGRAM(s) Oral every 12 hours  meropenem  IVPB      meropenem  IVPB 500 milliGRAM(s) IV Intermittent every 12 hours  metoprolol succinate  milliGRAM(s) Oral daily  pantoprazole    Tablet 40 milliGRAM(s) Oral before breakfast    MEDICATIONS  (PRN):  dextrose Oral Gel 15 Gram(s) Oral once PRN Blood Glucose LESS THAN 70 milliGRAM(s)/deciliter  diphenhydrAMINE 25 milliGRAM(s) Oral every 4 hours PRN Rash and/or Itching  ondansetron Injectable 4 milliGRAM(s) IV Push every 8 hours PRN Nausea and/or Vomiting  simethicone 80 milliGRAM(s) Chew every 6 hours PRN Gas      Allergies    amlodipine (Flushing)    Intolerances        Social History:  Tobacco: Remote hx  Alcohol: Denies  Illicit Drugs: Denies  Lives at: [facility]  Ambulation: w/ walker  ADLs: Dependent (19 May 2025 05:27)      REVIEW OF SYSTEMS: i feel better  CONSTITUTIONAL: No fever, No chills, No fatigue, No myalgia, No Body ache, No Weakness  EYES: No eye pain,  No visual disturbances, No discharge, NO Redness  ENMT:  No ear pain, No nose bleed, No vertigo; No sinus pain, NO throat pain, No Congestion  NECK: No pain, No stiffness  RESPIRATORY: No cough, NO wheezing, No  hemoptysis, NO  shortness of breath  CARDIOVASCULAR: No chest pain, palpitations  GASTROINTESTINAL: No abdominal pain, NO epigastric pain. No nausea, No vomiting; No diarrhea, No constipation. [  ] BM  GENITOURINARY: No dysuria, No frequency, No urgency, No hematuria, NO incontinence  NEUROLOGICAL: No headaches, No dizziness, No numbness, No tingling, No tremors, No weakness  EXT: No Swelling, No Pain, No Edema  SKIN:  [ x ] itching, burning, rashes, or lesions   MUSCULOSKELETAL: No joint pain ,No Jt swelling; No muscle pain, No back pain, No extremity pain  PSYCHIATRIC: No depression,  No anxiety,  No mood swings ,No difficulty sleeping at night  PAIN SCALE: [ x ] None  [  ] Other-  ROS Unable to obtain due to - [  ] Dementia  [  ] Lethargy [  ] Drowsy [  ] Sedated [  ] non verbal  REST OF REVIEW Of SYSTEM - [x  ] Normal     Vital Signs Last 24 Hrs  T(C): 36.4 (22 May 2025 11:40), Max: 36.5 (21 May 2025 22:00)  T(F): 97.6 (22 May 2025 11:40), Max: 97.7 (21 May 2025 22:00)  HR: 61 (22 May 2025 11:40) (61 - 75)  BP: 154/78 (22 May 2025 11:40) (154/78 - 170/60)  BP(mean): --  RR: 16 (22 May 2025 11:40) (16 - 18)  SpO2: 94% (22 May 2025 11:40) (94% - 97%)    Parameters below as of 22 May 2025 11:40  Patient On (Oxygen Delivery Method): room air      Finger Stick        05-21 @ 07:01  -  05-22 @ 07:00  --------------------------------------------------------  IN: 0 mL / OUT: 1000 mL / NET: -1000 mL        PHYSICAL EXAM:  GENERAL:  [x  ] NAD , [x  ] well appearing, [ x ] Agitated, [  ] Drowsy,  [  ] Lethargy, [  ] confused   HEAD:  [ x ] Normal, [  ] Other  EYES:  [  x] EOMI, [ x] PERRLA, [ x ] conjunctiva and sclera clear normal, [  ] Other,  [x  ] Pallor,[  ] Discharge  ENMT:  [ x ] Normal, [ x ] Moist mucous membranes, [ x ] Good dentition, [ x ] No Thrush  NECK:  [ x ] Supple, [ x] No JVD, [ x ] Normal thyroid, [  ] Lymphadenopathy [  ] Other  CHEST/LUNG:  [x  ] Clear to auscultation bilaterally, [x  ] Breath Sounds equal B/L / Decrease, [ x ] poor effort  [x  ] No rales, [ x ] No rhonchi  [x ]  No wheezing,   HEART:  [ x ] Regular rate and rhythm, [  ] tachycardia, [  ] Bradycardia,  [  ] irregular  [ x ] No murmurs, No rubs, No gallops, [  ] PPM in place (Mfr:  )  ABDOMEN:  [ x ] Soft, [ x ] Nontender, [ x ] Nondistended, [x ] No mass, [x  ] Bowel sounds present, [ x ] obese  NERVOUS SYSTEM:  [x  ] Alert & Oriented X3, [ x ] Nonfocal  [  ] Confusion  [  ] Encephalopathic [  ] Sedated [  ] Unable to assess, [  ] Dementia [  ] Other-  EXTREMITIES: [x  ] 2+ Peripheral Pulses, No clubbing, No cyanosis,  [  ] edema B/L lower EXT. [x  ] PVD stasis skin changes B/L Lower EXT, [  ] wound  LYMPH: No lymphadenopathy noted  SKIN:  [ x ] No rashes or lesions, [  ] Pressure Ulcers, [  ] ecchymosis, [  ] Skin Tears, [  ] Other    DIET: Diet, DASH/TLC:   Sodium & Cholesterol Restricted (05-19-25 @ 14:57)      LABS:                        7.7    8.22  )-----------( 188      ( 22 May 2025 05:55 )             24.0     22 May 2025 05:55    137    |  106    |  24     ----------------------------<  116    3.9     |  22     |  1.80     Ca    8.8        22 May 2025 05:55    TPro  6.6    /  Alb  1.8    /  TBili  0.7    /  DBili  x      /  AST  92     /  ALT  46     /  AlkPhos  846    22 May 2025 05:55      Urinalysis Basic - ( 22 May 2025 05:55 )    Color: x / Appearance: x / SG: x / pH: x  Gluc: 116 mg/dL / Ketone: x  / Bili: x / Urobili: x   Blood: x / Protein: x / Nitrite: x   Leuk Esterase: x / RBC: x / WBC x   Sq Epi: x / Non Sq Epi: x / Bacteria: x        Culture Results:   No growth at 24 hours (05-20 @ 16:05)  Culture Results:   No growth at 24 hours (05-20 @ 15:58)  Culture Results:   >100,000 CFU/ml Pseudomonas aeruginosa  50,000 - 99,000 CFU/mL Enterococcus faecium (05-19 @ 05:06)  Culture Results:   Growth in anaerobic bottle: Escherichia coli ESBL  See previous culture [id number] (05-18 @ 23:25)  Culture Results:   Growth in aerobic bottle: Escherichia coli ESBL  Direct identification is available within approximately 3-5  hours either by Blood Panel Multiplexed PCR or Direct  MALDI-TOF. Details: [email or web address] (05-18 @ 23:10)      Culture - Blood (collected 20 May 2025 16:05)  Source: Blood Blood-Peripheral  Preliminary Report (22 May 2025 02:02):    No growth at 24 hours    Culture - Blood (collected 20 May 2025 15:58)  Source: Blood Blood-Peripheral  Preliminary Report (22 May 2025 02:02):    No growth at 24 hours    Urinalysis with Rflx Culture (collected 19 May 2025 05:06)    Culture - Urine (collected 19 May 2025 05:06)  Source: Clean Catch Clean Catch (Midstream)  Final Report (21 May 2025 12:34):    >100,000 CFU/ml Pseudomonas aeruginosa    50,000 - 99,000 CFU/mL Enterococcus faecium  Organism: Pseudomonas aeruginosa  Enterococcus faecium (21 May 2025 12:34)  Organism: Enterococcus faecium (21 May 2025 12:34)  Organism: Pseudomonas aeruginosa (21 May 2025 12:34)    Culture - Blood (collected 18 May 2025 23:25)  Source: Blood Blood-Peripheral  Gram Stain (19 May 2025 14:24):    Growth in anaerobic bottle: Gram Negative Rods  Final Report (21 May 2025 20:47):    Growth in anaerobic bottle: Escherichia coli ESBL    See previous culture [id number]    Culture - Blood (collected 18 May 2025 23:10)  Source: Blood Blood-Peripheral  Gram Stain (19 May 2025 14:24):    Growth in aerobic bottle: Gram Negative Rods  Final Report (21 May 2025 07:07):    Growth in aerobic bottle: Escherichia coli ESBL    Direct identification is available within approximately 3-5    hours either by Blood Panel Multiplexed PCR or Direct    MALDI-TOF. Details: [email or web address]  Organism: Blood Culture PCR  Escherichia coli ESBL (21 May 2025 07:07)  Organism: Escherichia coli ESBL (21 May 2025 07:07)  Organism: Blood Culture PCR (21 May 2025 07:07)         RADIOLOGY & ADDITIONAL TESTS:      HEALTH ISSUES - PROBLEM Dx:  HTN (hypertension)    HLD (hyperlipidemia)    Diabetes    Anemia of chronic disease    Need for prophylactic measure    Sepsis    Acute on chronic renal failure      Consultant(s) Notes Reviewed:  x  ] YES     Care Discussed with [X] Consultants  [x  ] Patient  [ x ] Family [  ] HCP [  ]   [  ] Social Service  [x  ] RN, [  ] Physical Therapy,[  ] Palliative care team  DVT PPX: [  ] Lovenox, [x] S C Heparin, [  ] Coumadin, [  ] Xarelto, [  ] Eliquis, [  ] Pradaxa, [  ] IV Heparin drip, [  ] SCD [  ] Contraindication 2 to GI Bleed,[  ] Ambulation [  ] Contraindicated 2 to  bleed [  ] Contraindicated 2 to Brain Bleed  Advanced directive: [x  ] None, [  ] DNR/DNI

## 2025-05-22 NOTE — PROGRESS NOTE ADULT - NSPROGADDITIONALINFOA_GEN_ALL_CORE
I reviewed the overnight course of events on the unit, re-confirming the patient history. I discussed the care with the patient and their family.  The plan of care was discussed with the physician assistant and modifications were made to the notation where appropriate.   Differential diagnosis and plan of care discussed with patient after the evaluation.  35 minutes spent on total encounter of which more than fifty percent of the encounter was spent counseling and/or coordinating care by the attending physician.  Advanced care planning was discussed with patient and family.  Advanced care planning forms were reviewed and discussed.  Risks, benefits and alternatives of gastroenterologic procedures were discussed in detail and all questions were answered.
I reviewed the overnight course of events on the unit, re-confirming the patient history. I discussed the care with the patient  The plan of care was discussed with the physician assistant and modifications were made to the notation where appropriate.   Differential diagnosis and plan of care discussed with patient after the evaluation  35 minutes spent on total encounter of which more than fifty percent of the encounter was spent counseling and/or coordinating care by the attending physician.  Advanced care planning was discussed with patient. Advanced care planning forms were reviewed and discussed.  Risks, benefits and alternatives of gastroenterologic procedures were discussed in detail and all questions were answered.
I reviewed the overnight course of events on the unit, re-confirming the patient history. I discussed the care with the patient and their family  The plan of care was discussed with the physician assistant and modifications were made to the notation where appropriate.   Differential diagnosis and plan of care discussed with patient after the evaluation  35 minutes spent on total encounter of which more than fifty percent of the encounter was spent counseling and/or coordinating care by the attending physician.  Advanced care planning was discussed with patient and family.  Advanced care planning forms were reviewed and discussed.  Risks, benefits and alternatives of gastroenterologic procedures were discussed in detail and all questions were answered.

## 2025-05-22 NOTE — PROGRESS NOTE ADULT - ASSESSMENT
89-year-old female w anemia, diabetes, hypertension, hyperlipidemia, CKD Stage 3 who presents w/ decreased PO intake over past several weeks. Pt was admitted from 4/16-4/22 for acute cholecystitis and UTI. Pt's daughter at bedside provided much of the history. Pt was initially at rehab after discharge of which she was eating less than usually. Pt has been at [place] since last Saturday to now in which she has had decrease P.O. intake w/ nausea and several episodes of emesis of which the daughter has described as biliary. PT reporting right shoulder pain and right upper abdominal pain  Temp 102.4F, HR 78, /64, RR 18, SpO2 90%  Labs significant for WBC: 17.26, H/H: 8.7/26.1, Na: 133, K: 3.1, BUN/Cr: 27/2.3, Alk Phos: 870, AST: 103, Lactate: 2.3  Imaging:RUQ US: Thick-walled gallbladder with cholelithiasis. Negative sonographic Kunz's sign. Overall findings are equivocal for acute cholecystitis, HIDA scan may be considered for further evaluation.  Culture - Urine (04.16.25 @ 11:59) >100,000 CFU/ml Escherichia coli ESBL  -  Piperacillin/Tazobactam: S <=8    Acute Pyelonephritis  Clinically compelling with pt meeting SIRS criteria with fever and leukocytosis, clinically no urinary symptoms and pt with RUQ pain and imaging and biochemical abnormalities suggesting biliary localization. Reported recent issues with ertapenem 'wiping her out' and Alb  2.1[L]. Noted sensitivities of prior micro so  Culture - Blood (05.18.25 @ 23:10) -  Escherichia coli ESBL  -  Trimethoprim/Sulfamethoxazole: S 2/38,  -  Levofloxacin: S <=0.5 -  Meropenem: S <=1  repeated blood cultures-ordered -Culture - Blood (05.20.25 @ ~16:00) x2 in LAB-NGTD  Culture - Urine (05.19.25 @ 05:06) >100,000 CFU/ml Pseudomonas aeruginosa, 50,000 - 99,000 CFU/mL Enterococcus faecium  Zosyn started 5/19 early am Zosyn S=8-->5/19 changed to Meropenem-with recommended last day 5/28 but can place midline (5/23 after blood cultures NGTD >48 hours on repeat) and complete outside hospital  added linezolid 5/21 for Enterococcus faecium with recommended last day 5/30  after multiple investigations suggested that this is due to urinary system source and noted neg biliary investigations    Thank you for consulting us and involving us in the management of this most interesting and challenging case.  In addition to reviewing history, imaging, documents, labs, microbiology, took into account antibiotic stewardship, local antibiogram and infection control strategies and potential transmission issues.    We will follow along in the care of this patient. Please contact me by texting me directly on my cell# at [phone] using TEAMS or call our answering service at [phone] with any concerns.

## 2025-05-22 NOTE — DIETITIAN INITIAL EVALUATION ADULT - OTHER INFO
89-year-old female w anemia, diabetes, hypertension, hyperlipidemia, CKD Stage 3 who presents w/ decreased PO intake over past several weeks. Pt was admitted from 4/16-4/22 for acute cholecystitis and UTI. Pt's daughter at bedside provided much of the history. Pt was initially at rehab after discharge of which she was eating less than usually. Pt has been at [place] since last Saturday to now in which she has had decrease P.O. intake w/ nausea and several episodes of emesis of which the daughter has described as biliary. PT reporting right shoulder pain and right upper abdominal pain.    Pt A+Ox3-4 with daughter at bedside. Dash/TLC diet rx. Pta lack of appetite with persistent N/V/diarrhea for past few weeks. Past recent hospitalization 4/16-4/22 for similar issues. Negative HIDA. MRCP biliary sludge. Pt now reports feeling much better. Denies further N/V. Some indigestion, reflux precautions encouraged. Loose BM x 2 today however improved. No abdominal pain/discomfort. Improvement in appetite/po intake over past few days conuming 50-75% meals. Encourage continued po intake with emphasis on lean protein sources at all meals. Hx DM, HgbA1c 7%. BS well controlled. Will keep diet liberalized at this time. Declined oral nutritional supplements as pt reports advance GI sides effects. Food preferences obtained/honored. Possible discharge to rehab tomorrow. Declined written diet education, verbal provided.

## 2025-05-22 NOTE — DIETITIAN INITIAL EVALUATION ADULT - PERTINENT MEDS FT
MEDICATIONS  (STANDING):  aspirin enteric coated 81 milliGRAM(s) Oral daily  dextrose 5%. 1000 milliLiter(s) (50 mL/Hr) IV Continuous <Continuous>  dextrose 5%. 1000 milliLiter(s) (100 mL/Hr) IV Continuous <Continuous>  dextrose 50% Injectable 25 Gram(s) IV Push once  dextrose 50% Injectable 12.5 Gram(s) IV Push once  dextrose 50% Injectable 25 Gram(s) IV Push once  famotidine    Tablet 20 milliGRAM(s) Oral every 48 hours  fluticasone propionate 50 MICROgram(s)/spray Nasal Spray 1 Spray(s) Both Nostrils two times a day  glucagon  Injectable 1 milliGRAM(s) IntraMuscular once  heparin   Injectable 5000 Unit(s) SubCutaneous every 8 hours  hydrALAZINE 100 milliGRAM(s) Oral two times a day  insulin lispro (ADMELOG) corrective regimen sliding scale   SubCutaneous three times a day before meals  insulin lispro (ADMELOG) corrective regimen sliding scale   SubCutaneous at bedtime  lactated ringers. 1000 milliLiter(s) (50 mL/Hr) IV Continuous <Continuous>  linezolid    Tablet 600 milliGRAM(s) Oral every 12 hours  meropenem  IVPB      meropenem  IVPB 500 milliGRAM(s) IV Intermittent every 12 hours  metoprolol succinate  milliGRAM(s) Oral daily  pantoprazole    Tablet 40 milliGRAM(s) Oral before breakfast    MEDICATIONS  (PRN):  dextrose Oral Gel 15 Gram(s) Oral once PRN Blood Glucose LESS THAN 70 milliGRAM(s)/deciliter  ondansetron Injectable 4 milliGRAM(s) IV Push every 8 hours PRN Nausea and/or Vomiting  simethicone 80 milliGRAM(s) Chew every 6 hours PRN Gas

## 2025-05-22 NOTE — PROVIDER CONTACT NOTE (OTHER) - ASSESSMENT
stable /80, 97.7F temp. Nuero check all WDL. face flushed for all of shift
Pt complaining of ear pain; VS wnl;
Pt describes bilateral numbness in her hands and a feeling of generalized discomfort. Pt also states that she has tearing in her eyes and discomfort in her right ear. Pt appears to be resting in bed comfortably at this time. Pt denies any chest pain, SOB, palpitations, dizziness or lightheadedness, no signs or symptoms noted by RN. Pt is in no acute distress at this time.
Upon Assessment, Pt is c/o right middle finger numbness- Pt has history of Carpal Tunnel. No other complaints or concerns at this time.
Pt VS wnl; O2 saturation above 90% on RA

## 2025-05-22 NOTE — PROVIDER CONTACT NOTE (OTHER) - REASON
Patient complaining of ear pain; Patient endorses the pain started yesterday;
Pt c/o of arm pain/tingling and teary eyes
Pt describing bilateral numbness in her hands, generalized discomfort
Pt is having wheezing upon respiration
Hypertension

## 2025-05-23 ENCOUNTER — TRANSCRIPTION ENCOUNTER (OUTPATIENT)
Age: 89
End: 2025-05-23

## 2025-05-23 VITALS
DIASTOLIC BLOOD PRESSURE: 52 MMHG | OXYGEN SATURATION: 95 % | SYSTOLIC BLOOD PRESSURE: 145 MMHG | TEMPERATURE: 98 F | HEART RATE: 65 BPM | RESPIRATION RATE: 18 BRPM

## 2025-05-23 LAB
ALBUMIN SERPL ELPH-MCNC: 1.8 G/DL — LOW (ref 3.3–5)
ALP SERPL-CCNC: 917 U/L — HIGH (ref 40–120)
ALT FLD-CCNC: 50 U/L — SIGNIFICANT CHANGE UP (ref 12–78)
ANION GAP SERPL CALC-SCNC: 9 MMOL/L — SIGNIFICANT CHANGE UP (ref 5–17)
AST SERPL-CCNC: 105 U/L — HIGH (ref 15–37)
BILIRUB SERPL-MCNC: 1 MG/DL — SIGNIFICANT CHANGE UP (ref 0.2–1.2)
BUN SERPL-MCNC: 24 MG/DL — HIGH (ref 7–23)
CALCIUM SERPL-MCNC: 9 MG/DL — SIGNIFICANT CHANGE UP (ref 8.5–10.1)
CHLORIDE SERPL-SCNC: 107 MMOL/L — SIGNIFICANT CHANGE UP (ref 96–108)
CO2 SERPL-SCNC: 19 MMOL/L — LOW (ref 22–31)
CREAT SERPL-MCNC: 1.7 MG/DL — HIGH (ref 0.5–1.3)
EGFR: 28 ML/MIN/1.73M2 — LOW
EGFR: 28 ML/MIN/1.73M2 — LOW
GLUCOSE BLDC GLUCOMTR-MCNC: 124 MG/DL — HIGH (ref 70–99)
GLUCOSE BLDC GLUCOMTR-MCNC: 184 MG/DL — HIGH (ref 70–99)
GLUCOSE SERPL-MCNC: 114 MG/DL — HIGH (ref 70–99)
HCT VFR BLD CALC: 33.7 % — LOW (ref 34.5–45)
HGB BLD-MCNC: 11 G/DL — LOW (ref 11.5–15.5)
MCHC RBC-ENTMCNC: 26.1 PG — LOW (ref 27–34)
MCHC RBC-ENTMCNC: 32.6 G/DL — SIGNIFICANT CHANGE UP (ref 32–36)
MCV RBC AUTO: 79.9 FL — LOW (ref 80–100)
NRBC BLD AUTO-RTO: 0 /100 WBCS — SIGNIFICANT CHANGE UP (ref 0–0)
PLATELET # BLD AUTO: 190 K/UL — SIGNIFICANT CHANGE UP (ref 150–400)
POTASSIUM SERPL-MCNC: 4.2 MMOL/L — SIGNIFICANT CHANGE UP (ref 3.5–5.3)
POTASSIUM SERPL-SCNC: 4.2 MMOL/L — SIGNIFICANT CHANGE UP (ref 3.5–5.3)
PROT SERPL-MCNC: 6.8 G/DL — SIGNIFICANT CHANGE UP (ref 6–8.3)
RBC # BLD: 4.22 M/UL — SIGNIFICANT CHANGE UP (ref 3.8–5.2)
RBC # FLD: 16.3 % — HIGH (ref 10.3–14.5)
SODIUM SERPL-SCNC: 135 MMOL/L — SIGNIFICANT CHANGE UP (ref 135–145)
WBC # BLD: 8.86 K/UL — SIGNIFICANT CHANGE UP (ref 3.8–10.5)
WBC # FLD AUTO: 8.86 K/UL — SIGNIFICANT CHANGE UP (ref 3.8–10.5)

## 2025-05-23 PROCEDURE — 76937 US GUIDE VASCULAR ACCESS: CPT | Mod: 26,59

## 2025-05-23 PROCEDURE — 36573 INSJ PICC RS&I 5 YR+: CPT | Mod: 53

## 2025-05-23 RX ORDER — SIMETHICONE 80 MG
1 TABLET,CHEWABLE ORAL
Qty: 0 | Refills: 0 | DISCHARGE
Start: 2025-05-23

## 2025-05-23 RX ORDER — MEROPENEM 1 G/30ML
500 INJECTION INTRAVENOUS
Qty: 0 | Refills: 0 | DISCHARGE
Start: 2025-05-23

## 2025-05-23 RX ORDER — FLUTICASONE PROPIONATE 50 UG/1
1 SPRAY, METERED NASAL
Qty: 0 | Refills: 0 | DISCHARGE
Start: 2025-05-23

## 2025-05-23 RX ORDER — MEROPENEM 1 G/30ML
0 INJECTION INTRAVENOUS
Qty: 0 | Refills: 0 | DISCHARGE
Start: 2025-05-23 | End: 2025-05-28

## 2025-05-23 RX ORDER — LINEZOLID 2 MG/ML
1 INJECTION, SOLUTION INTRAVENOUS
Qty: 0 | Refills: 0 | DISCHARGE
Start: 2025-05-23 | End: 2025-05-30

## 2025-05-23 RX ORDER — HEPARIN SODIUM 1000 [USP'U]/ML
5000 INJECTION INTRAVENOUS; SUBCUTANEOUS
Refills: 0 | DISCHARGE
Start: 2025-05-23

## 2025-05-23 RX ORDER — DEXTROMETHORPHAN HBR, GUAIFENESIN 200 MG/10ML
600 LIQUID ORAL ONCE
Refills: 0 | Status: COMPLETED | OUTPATIENT
Start: 2025-05-23 | End: 2025-05-23

## 2025-05-23 RX ADMIN — INSULIN LISPRO 1: 100 INJECTION, SOLUTION INTRAVENOUS; SUBCUTANEOUS at 12:34

## 2025-05-23 RX ADMIN — DEXTROMETHORPHAN HBR, GUAIFENESIN 600 MILLIGRAM(S): 200 LIQUID ORAL at 10:07

## 2025-05-23 RX ADMIN — HEPARIN SODIUM 5000 UNIT(S): 1000 INJECTION INTRAVENOUS; SUBCUTANEOUS at 13:07

## 2025-05-23 RX ADMIN — METOPROLOL SUCCINATE 100 MILLIGRAM(S): 50 TABLET, EXTENDED RELEASE ORAL at 05:13

## 2025-05-23 RX ADMIN — Medication 1 DROP(S): at 05:14

## 2025-05-23 RX ADMIN — Medication 40 MILLIGRAM(S): at 05:13

## 2025-05-23 RX ADMIN — FLUTICASONE PROPIONATE 1 SPRAY(S): 50 SPRAY, METERED NASAL at 05:14

## 2025-05-23 RX ADMIN — LINEZOLID 600 MILLIGRAM(S): 2 INJECTION, SOLUTION INTRAVENOUS at 05:13

## 2025-05-23 RX ADMIN — Medication 5 MILLIGRAM(S): at 13:08

## 2025-05-23 RX ADMIN — Medication 81 MILLIGRAM(S): at 13:08

## 2025-05-23 RX ADMIN — MEROPENEM 100 MILLIGRAM(S): 1 INJECTION INTRAVENOUS at 05:13

## 2025-05-23 RX ADMIN — HEPARIN SODIUM 5000 UNIT(S): 1000 INJECTION INTRAVENOUS; SUBCUTANEOUS at 05:13

## 2025-05-23 RX ADMIN — Medication 100 MILLIGRAM(S): at 05:13

## 2025-05-23 NOTE — PROCEDURE NOTE - PROCEDURE FINDINGS AND DETAILS
Patient referred to Interventional Radiology for the insertion of a Midline for long term antibiotics for the treatment of UTI.    20cm bard power midline inserted into patent right brachial vein under sterile conditions and ultrasound guidance.  Midline catheter can be accessed.

## 2025-05-23 NOTE — PROGRESS NOTE ADULT - SUBJECTIVE AND OBJECTIVE BOX
Patient is a 89y old  Female who presents with a chief complaint of Sepsis (23 May 2025 07:50)    HPI:  Pt is a 89-year-old female w/ PMHx of anemia, diabetes, hypertension, hyperlipidemia, CKD Stage 3 who presents w/ decreased PO intake over past several weeks. Pt was admitted from 4/16-4/22 for acute cholecystitis and UTI. Pt's daughter at bedside provided much of the history. Pt was initially at rehab after discharge of which she was eating less than usually. Pt has been at [facility] since last Saturday to now in which she has had decrease P.O. intake w/ nausea and several episodes of emesis of which the daughter has described as biliary.. Pt's daughter also states that pt was c/o of back pain where she had a fracture last month.     IN THE ED:  Temp 102.4F, HR 78, /64, RR 18, SpO2 90%  S/P zofran, zosyn, 2.85 NS bolus, 1g ofirmev, KCl,   Labs significant for WBC: 17.26, H/H: 8.7/26.1, Na: 133, K: 3.1, BUN/Cr: 27/2.3, Alk Phos: 870, AST: 103, Lactate: 2.3  Imaging:RUQ US: Thick-walled gallbladder with cholelithiasis. Negative sonographic Kunz's sign. Overall findings are equivocal for acute cholecystitis, HIDA scan may be considered for further evaluation.  UA: (+) UTI   (19 May 2025 05:27)    INTERVAL HPI:  5/19: Patient seen after HIDA scan performed- very lethargic d/t morphine . HIDA scan negative however family expresses concern about patients GI complaints including nausea, vomitting, diarrhea, abdominal pain. HIDA scan -Neg,  MRCP ordered.  5/20: patient seen and examined at bedside. States she currently has no symptoms no N/V ate breakfast with no issues. Denies an abd pain. MRCP shows biliary sludge. BCx positive for ESBL  abx switched to meropenem. As per ID-Dr birmingham.   5/21: Patient seen and examined at bedside. Had a BM prior to exam, daughter requests to dc Miralax Patient states she feels generally uncomfortable today. Ucx positive for pseudomonas Blood cx sensitivities + E. faceum. Currently on Meropenem and Linezolid.   5/22: Patient seen and examined at bedside. Overnight complains of feeling itchy and uncomfortable. Patient remains on Meropenem and Linezolid. Possible dc tomorrow with midline.   5/23: Patient seen and examined at bedside. States she feels good ready to go home. Mucinex given for phelgm in throat. Plan for midline today and dc to rehab     OVERNIGHT EVENTS:    Home Medications:  aspirin 81 mg oral delayed release tablet: 1 tab(s) orally every other day (19 May 2025 06:21)  atorvastatin 40 mg oral tablet: 1 tab(s) orally once a day (at bedtime) (19 May 2025 06:21)  famotidine 20 mg oral tablet: 1 tab(s) orally once a day (19 May 2025 06:21)  furosemide 20 mg oral tablet: 1 tab(s) orally once a day (19 May 2025 06:21)  hydrALAZINE 100 mg oral tablet: 1 tab(s) orally 2 times a day (19 May 2025 06:20)  Januvia 25 mg oral tablet: 1 tab(s) orally once a day (19 May 2025 06:21)  linezolid 600 mg oral tablet: 1 tab(s) orally every 12 hours (23 May 2025 08:07)  meropenem: 500 mg IV BID (23 May 2025 08:09)  omeprazole 20 mg oral delayed release capsule: 1 cap(s) orally once a day (19 May 2025 06:21)      MEDICATIONS  (STANDING):  aspirin enteric coated 81 milliGRAM(s) Oral daily  carbamide peroxide Otic Solution 1 Drop(s) Both Ears every 12 hours  cetirizine 5 milliGRAM(s) Oral daily  dextrose 5%. 1000 milliLiter(s) (100 mL/Hr) IV Continuous <Continuous>  dextrose 5%. 1000 milliLiter(s) (50 mL/Hr) IV Continuous <Continuous>  dextrose 50% Injectable 25 Gram(s) IV Push once  dextrose 50% Injectable 12.5 Gram(s) IV Push once  dextrose 50% Injectable 25 Gram(s) IV Push once  famotidine    Tablet 20 milliGRAM(s) Oral every 48 hours  fluticasone propionate 50 MICROgram(s)/spray Nasal Spray 1 Spray(s) Both Nostrils two times a day  glucagon  Injectable 1 milliGRAM(s) IntraMuscular once  heparin   Injectable 5000 Unit(s) SubCutaneous every 8 hours  hydrALAZINE 100 milliGRAM(s) Oral two times a day  insulin lispro (ADMELOG) corrective regimen sliding scale   SubCutaneous three times a day before meals  insulin lispro (ADMELOG) corrective regimen sliding scale   SubCutaneous at bedtime  lactated ringers. 1000 milliLiter(s) (50 mL/Hr) IV Continuous <Continuous>  linezolid    Tablet 600 milliGRAM(s) Oral every 12 hours  meropenem  IVPB      meropenem  IVPB 500 milliGRAM(s) IV Intermittent every 12 hours  metoprolol succinate  milliGRAM(s) Oral daily  pantoprazole    Tablet 40 milliGRAM(s) Oral before breakfast    MEDICATIONS  (PRN):  dextrose Oral Gel 15 Gram(s) Oral once PRN Blood Glucose LESS THAN 70 milliGRAM(s)/deciliter  ondansetron Injectable 4 milliGRAM(s) IV Push every 8 hours PRN Nausea and/or Vomiting  simethicone 80 milliGRAM(s) Chew every 6 hours PRN Gas      Allergies    amlodipine ([place]ushing)    Intolerances        Social History:  Tobacco: Remote hx  Alcohol: Denies  Illicit Drugs: Denies  Lives at: [facility]  Ambulation: w/ walker  ADLs: Dependent (19 May 2025 05:27)      REVIEW OF SYSTEMS:  CONSTITUTIONAL: No fever, No chills, No fatigue, No myalgia, No Body ache, No Weakness  EYES: No eye pain,  No visual disturbances, No discharge, NO Redness  ENMT:  No ear pain, No nose bleed, No vertigo; No sinus pain, NO throat pain, No Congestion  NECK: No pain, No stiffness  RESPIRATORY: No cough, NO wheezing, No  hemoptysis, NO  shortness of breath  CARDIOVASCULAR: No chest pain, palpitations  GASTROINTESTINAL: No abdominal pain, NO epigastric pain. No nausea, No vomiting; No diarrhea, No constipation. [  ] BM  GENITOURINARY: No dysuria, No frequency, No urgency, No hematuria, NO incontinence  NEUROLOGICAL: No headaches, No dizziness, No numbness, No tingling, No tremors, No weakness  EXT: No Swelling, No Pain, No Edema  SKIN:  [ x ] No itching, burning, rashes, or lesions   MUSCULOSKELETAL: No joint pain ,No Jt swelling; No muscle pain, No back pain, No extremity pain  PSYCHIATRIC: No depression,  No anxiety,  No mood swings ,No difficulty sleeping at night  PAIN SCALE: [ x ] None  [  ] Other-  ROS Unable to obtain due to - [  ] Dementia  [  ] Lethargy [  ] Drowsy [  ] Sedated [  ] non verbal  REST OF REVIEW Of SYSTEM - [  ] Normal     Vital Signs Last 24 Hrs  T(C): 36.5 (23 May 2025 05:04), Max: 36.7 (23 May 2025 01:35)  T(F): 97.7 (23 May 2025 05:04), Max: 98 (23 May 2025 01:35)  HR: 62 (23 May 2025 05:04) (56 - 69)  BP: 173/64 (23 May 2025 05:04) (154/78 - 173/64)  BP(mean): --  RR: 19 (23 May 2025 05:04) (16 - 19)  SpO2: 92% (23 May 2025 05:04) (92% - 95%)    Parameters below as of 23 May 2025 05:04  Patient On (Oxygen Delivery Method): room air      Finger Stick        05-22 @ 07:01  -  05-23 @ 07:00  --------------------------------------------------------  IN: 50 mL / OUT: 0 mL / NET: 50 mL        PHYSICAL EXAM:  GENERAL:  [ x ] NAD , [ x ] well appearing, [  ] Agitated, [  ] Drowsy,  [  ] Lethargy, [  ] confused   HEAD:  [ x ] Normal, [  ] Other  EYES:  [x  ] EOMI, [  ] PERRLA, [  ] conjunctiva and sclera clear normal, [  ] Other,  [  ] Pallor,[  ] Discharge  ENMT:  [x  ] Normal, [  ] Moist mucous membranes, [  ] Good dentition, [  ] No Thrush  NECK:  [ x ] Supple, [  ] No JVD, [  ] Normal thyroid, [  ] Lymphadenopathy [  ] Other  CHEST/LUNG:  [ x ] Clear to auscultation bilaterally, [  ] Breath Sounds equal B/L / Decrease, [  ] poor effort  [  ] No rales, [  ] No rhonchi  [  ]  No wheezing,   HEART:  [x  ] Regular rate and rhythm, [  ] tachycardia, [  ] Bradycardia,  [  ] irregular  [  ] No murmurs, No rubs, No gallops, [  ] PPM in place (Mfr:  )  ABDOMEN:  [x  ] Soft, x[  ] Nontender, [  x] Nondistended, [  ] No mass, [  ] Bowel sounds present, [  ] obese  NERVOUS SYSTEM:  [x  ] Alert & Oriented X3, [  ] Nonfocal  [  ] Confusion  [  ] Encephalopathic [  ] Sedated [  ] Unable to assess, [  ] Dementia [  ] Other-  EXTREMITIES: [  ] 2+ Peripheral Pulses, No clubbing, No cyanosis,  [  ] edema B/L lower EXT. [  ] PVD stasis skin changes B/L Lower EXT, [  ] wound  LYMPH: No lymphadenopathy noted  SKIN:  [  ] No rashes or lesions, [  ] Pressure Ulcers, [  ] ecchymosis, [  ] Skin Tears, [  ] Other    DIET: Diet, DASH/TLC:   Sodium & Cholesterol Restricted (05-19-25 @ 14:57)      LABS:                        11.0   8.86  )-----------( 190      ( 23 May 2025 07:40 )             33.7     23 May 2025 07:40    135    |  107    |  24     ----------------------------<  114    4.2     |  19     |  1.70     Ca    9.0        23 May 2025 07:40    TPro  6.8    /  Alb  1.8    /  TBili  1.0    /  DBili  x      /  AST  105    /  ALT  50     /  AlkPhos  917    23 May 2025 07:40      Urinalysis Basic - ( 23 May 2025 07:40 )    Color: x / Appearance: x / SG: x / pH: x  Gluc: 114 mg/dL / Ketone: x  / Bili: x / Urobili: x   Blood: x / Protein: x / Nitrite: x   Leuk Esterase: x / RBC: x / WBC x   Sq Epi: x / Non Sq Epi: x / Bacteria: x        Culture Results:   No growth at 48 Hours (05-20 @ 16:05)  Culture Results:   No growth at 48 Hours (05-20 @ 15:58)  Culture Results:   >100,000 CFU/ml Pseudomonas aeruginosa  50,000 - 99,000 CFU/mL Enterococcus faecium (05-19 @ 05:06)  Culture Results:   Growth in anaerobic bottle: Escherichia coli ESBL  See previous culture [id number] (05-18 @ 23:25)  Culture Results:   Growth in aerobic bottle: Escherichia coli ESBL  Direct identification is available within approximately 3-5  hours either by Blood Panel Multiplexed PCR or Direct  MALDI-TOF. Details: [email or web address] (05-18 @ 23:10)                  Culture - Blood (collected 20 May 2025 16:05)  Source: Blood Blood-Peripheral  Preliminary Report (23 May 2025 02:02):    No growth at 48 Hours    Culture - Blood (collected 20 May 2025 15:58)  Source: Blood Blood-Peripheral  Preliminary Report (23 May 2025 02:02):    No growth at 48 Hours    Urinalysis with Rflx Culture (collected 19 May 2025 05:06)    Culture - Urine (collected 19 May 2025 05:06)  Source: Clean Catch Clean Catch (Midstream)  Final Report (21 May 2025 12:34):    >100,000 CFU/ml Pseudomonas aeruginosa    50,000 - 99,000 CFU/mL Enterococcus faecium  Organism: Enterococcus faecium (21 May 2025 12:34)  Organism: Pseudomonas aeruginosa  Enterococcus faecium (21 May 2025 12:34)  Organism: Pseudomonas aeruginosa (21 May 2025 12:34)    Culture - Blood (collected 18 May 2025 23:25)  Source: Blood Blood-Peripheral  Gram Stain (19 May 2025 14:24):    Growth in anaerobic bottle: Gram Negative Rods  Final Report (21 May 2025 20:47):    Growth in anaerobic bottle: Escherichia coli ESBL    See previous culture [id number]    Culture - Blood (collected 18 May 2025 23:10)  Source: Blood Blood-Peripheral  Gram Stain (19 May 2025 14:24):    Growth in aerobic bottle: Gram Negative Rods  Final Report (21 May 2025 07:07):    Growth in aerobic bottle: Escherichia coli ESBL    Direct identification is available within approximately 3-5    hours either by Blood Panel Multiplexed PCR or Direct    MALDI-TOF. Details: [email or web address]  Organism: Blood Culture PCR  Escherichia coli ESBL (21 May 2025 07:07)  Organism: Escherichia coli ESBL (21 May 2025 07:07)  Organism: Blood Culture PCR (21 May 2025 07:07)         Anemia Panel:      Thyroid Panel:                RADIOLOGY & ADDITIONAL TESTS:      HEALTH ISSUES - PROBLEM Dx:  HTN (hypertension)    HLD (hyperlipidemia)    Diabetes    Anemia of chronic disease    Need for prophylactic measure    Sepsis    Acute on chronic renal failure            Consultant(s) Notes Reviewed:  [  ] YES     Care Discussed with [X] Consultants  [  ] Patient  [  ] Family [  ] HCP [  ]   [  ] Social Service  [  ] RN, [  ] Physical Therapy,[  ] Palliative care team  DVT PPX: [  ] Lovenox, [  ] S C Heparin, [  ] Coumadin, [  ] Xarelto, [  ] Eliquis, [  ] Pradaxa, [  ] IV Heparin drip, [  ] SCD [  ] Contraindication 2 to GI Bleed,[  ] Ambulation [  ] Contraindicated 2 to  bleed [  ] Contraindicated 2 to Brain Bleed  Advanced directive: [  ] None, [  ] DNR/DNI Patient is a 89y old  Female who presents with a chief complaint of Sepsis (23 May 2025 07:50)    HPI:  Pt is a 89-year-old female w/ PMHx of anemia, diabetes, hypertension, hyperlipidemia, CKD Stage 3 who presents w/ decreased PO intake over past several weeks. Pt was admitted from 4/16-4/22 for acute cholecystitis and UTI. Pt's daughter at bedside provided much of the history. Pt was initially at rehab after discharge of which she was eating less than usually. Pt has been at [facility] since last Saturday to now in which she has had decrease P.O. intake w/ nausea and several episodes of emesis of which the daughter has described as biliary.. Pt's daughter also states that pt was c/o of back pain where she had a fracture last month.     IN THE ED:  Temp 102.4F, HR 78, /64, RR 18, SpO2 90%  S/P zofran, zosyn, 2.85 NS bolus, 1g ofirmev, KCl,   Labs significant for WBC: 17.26, H/H: 8.7/26.1, Na: 133, K: 3.1, BUN/Cr: 27/2.3, Alk Phos: 870, AST: 103, Lactate: 2.3  Imaging:RUQ US: Thick-walled gallbladder with cholelithiasis. Negative sonographic Kunz's sign. Overall findings are equivocal for acute cholecystitis, HIDA scan may be considered for further evaluation.  UA: (+) UTI   (19 May 2025 05:27)    INTERVAL HPI:  5/19: Patient seen after HIDA scan performed- very lethargic d/t morphine . HIDA scan negative however family expresses concern about patients GI complaints including nausea, vomitting, diarrhea, abdominal pain. HIDA scan -Neg,  MRCP ordered.  5/20: patient seen and examined at bedside. States she currently has no symptoms no N/V ate breakfast with no issues. Denies an abd pain. MRCP shows biliary sludge. BCx positive for ESBL  abx switched to meropenem. As per ID-Dr birmingham.   5/21: Patient seen and examined at bedside. Had a BM prior to exam, daughter requests to dc Miralax Patient states she feels generally uncomfortable today. Ucx positive for pseudomonas Blood cx sensitivities + E. faceum. Currently on Meropenem and Linezolid.   5/22: Patient seen and examined at bedside. Overnight complains of feeling itchy and uncomfortable. Patient remains on Meropenem and Linezolid. Possible dc tomorrow with midline.   5/23: Patient seen and examined at bedside. States she feels good ready to go home. Mucinex given for phlegm in throat. Plan for midline today and dc to rehab after MID line placement.    OVERNIGHT EVENTS: none    Home Medications:  aspirin 81 mg oral delayed release tablet: 1 tab(s) orally every other day (19 May 2025 06:21)  atorvastatin 40 mg oral tablet: 1 tab(s) orally once a day (at bedtime) (19 May 2025 06:21)  famotidine 20 mg oral tablet: 1 tab(s) orally once a day (19 May 2025 06:21)  furosemide 20 mg oral tablet: 1 tab(s) orally once a day (19 May 2025 06:21)  hydrALAZINE 100 mg oral tablet: 1 tab(s) orally 2 times a day (19 May 2025 06:20)  Januvia 25 mg oral tablet: 1 tab(s) orally once a day (19 May 2025 06:21)  linezolid 600 mg oral tablet: 1 tab(s) orally every 12 hours (23 May 2025 08:07)  meropenem: 500 mg IV BID (23 May 2025 08:09)  omeprazole 20 mg oral delayed release capsule: 1 cap(s) orally once a day (19 May 2025 06:21)      MEDICATIONS  (STANDING):  aspirin enteric coated 81 milliGRAM(s) Oral daily  carbamide peroxide Otic Solution 1 Drop(s) Both Ears every 12 hours  cetirizine 5 milliGRAM(s) Oral daily  dextrose 5%. 1000 milliLiter(s) (100 mL/Hr) IV Continuous <Continuous>  dextrose 5%. 1000 milliLiter(s) (50 mL/Hr) IV Continuous <Continuous>  dextrose 50% Injectable 25 Gram(s) IV Push once  dextrose 50% Injectable 12.5 Gram(s) IV Push once  dextrose 50% Injectable 25 Gram(s) IV Push once  famotidine    Tablet 20 milliGRAM(s) Oral every 48 hours  fluticasone propionate 50 MICROgram(s)/spray Nasal Spray 1 Spray(s) Both Nostrils two times a day  glucagon  Injectable 1 milliGRAM(s) IntraMuscular once  heparin   Injectable 5000 Unit(s) SubCutaneous every 8 hours  hydrALAZINE 100 milliGRAM(s) Oral two times a day  insulin lispro (ADMELOG) corrective regimen sliding scale   SubCutaneous three times a day before meals  insulin lispro (ADMELOG) corrective regimen sliding scale   SubCutaneous at bedtime  lactated ringers. 1000 milliLiter(s) (50 mL/Hr) IV Continuous <Continuous>  linezolid    Tablet 600 milliGRAM(s) Oral every 12 hours  meropenem  IVPB      meropenem  IVPB 500 milliGRAM(s) IV Intermittent every 12 hours  metoprolol succinate  milliGRAM(s) Oral daily  pantoprazole    Tablet 40 milliGRAM(s) Oral before breakfast    MEDICATIONS  (PRN):  dextrose Oral Gel 15 Gram(s) Oral once PRN Blood Glucose LESS THAN 70 milliGRAM(s)/deciliter  ondansetron Injectable 4 milliGRAM(s) IV Push every 8 hours PRN Nausea and/or Vomiting  simethicone 80 milliGRAM(s) Chew every 6 hours PRN Gas      Allergies    amlodipine ([place])    Intolerances        Social History:  Tobacco: Remote hx  Alcohol: Denies  Illicit Drugs: Denies  Lives at: [facility]  Ambulation: w/ walker  ADLs: Dependent (19 May 2025 05:27)      REVIEW OF SYSTEMS:   CONSTITUTIONAL: No fever, No chills, No fatigue, No myalgia, No Body ache, No Weakness  EYES: No eye pain,  No visual disturbances, No discharge, NO Redness  ENMT:  No ear pain, No nose bleed, No vertigo; No sinus pain, NO throat pain, No Congestion  NECK: No pain, No stiffness  RESPIRATORY: No cough, NO wheezing, No  hemoptysis, NO  shortness of breath  CARDIOVASCULAR: No chest pain, palpitations  GASTROINTESTINAL: No abdominal pain, NO epigastric pain. No nausea, No vomiting; No diarrhea, No constipation. [  ] BM  GENITOURINARY: No dysuria, No frequency, No urgency, No hematuria, NO incontinence  NEUROLOGICAL: No headaches, No dizziness, No numbness, No tingling, No tremors, No weakness  EXT: No Swelling, No Pain, No Edema  SKIN:  [ x ] No itching, burning, rashes, or lesions   MUSCULOSKELETAL: No joint pain ,No Jt swelling; No muscle pain, No back pain, No extremity pain  PSYCHIATRIC: No depression,  No anxiety,  No mood swings ,No difficulty sleeping at night  PAIN SCALE: [ x ] None  [  ] Other-  ROS Unable to obtain due to - [  ] Dementia  [  ] Lethargy [  ] Drowsy [  ] Sedated [  ] non verbal  REST OF REVIEW Of SYSTEM - [ x ] Normal     Vital Signs Last 24 Hrs  T(C): 36.5 (23 May 2025 05:04), Max: 36.7 (23 May 2025 01:35)  T(F): 97.7 (23 May 2025 05:04), Max: 98 (23 May 2025 01:35)  HR: 62 (23 May 2025 05:04) (56 - 69)  BP: 173/64 (23 May 2025 05:04) (154/78 - 173/64)  BP(mean): --  RR: 19 (23 May 2025 05:04) (16 - 19)  SpO2: 92% (23 May 2025 05:04) (92% - 95%)    Parameters below as of 23 May 2025 05:04  Patient On (Oxygen Delivery Method): room air      Finger Stick        05-22 @ 07:01  -  05-23 @ 07:00  --------------------------------------------------------  IN: 50 mL / OUT: 0 mL / NET: 50 mL        PHYSICAL EXAM:  GENERAL:  [ x ] NAD , [ x ] well appearing, [  ] Agitated, [  ] Drowsy,  [  ] Lethargy, [  ] confused   HEAD:  [ x ] Normal, [  ] Other  EYES:  [x  ] EOMI, [x  ] PERRLA, [  ] conjunctiva and sclera clear normal, [  ] Other,  [ x ] Pallor,[  ] Discharge  ENMT:  [x  ] Normal, [  x] Moist mucous membranes, [  ] Good dentition, [x  ] No Thrush  NECK:  [ x ] Supple, [ x ] No JVD, [x  ] Normal thyroid, [  ] Lymphadenopathy [  ] Other  CHEST/LUNG:  [ x ] Clear to auscultation bilaterally, [ x ] Breath Sounds equal B/L / Decrease, [ x ] poor effort  [ x ] No rales, [ x ] No rhonchi  [ x ]  No wheezing,   HEART:  [x  ] Regular rate and rhythm, [  ] tachycardia, [  ] Bradycardia,  [  ] irregular  [ x ] No murmurs, No rubs, No gallops, [  ] PPM in place (Mfr:  )  ABDOMEN:  [x  ] Soft, x[  ] Nontender, [  x] Nondistended, [x  ] No mass, [ x ] Bowel sounds present, [x  ] obese  NERVOUS SYSTEM:  [x  ] Alert & Oriented X3, [  x] Nonfocal  [  ] Confusion  [  ] Encephalopathic [  ] Sedated [  ] Unable to assess, [  ] Dementia [  ] Other-  EXTREMITIES: [x  ] 2+ Peripheral Pulses, No clubbing, No cyanosis,  [  ] edema B/L lower EXT. [x  ] PVD stasis skin changes B/L Lower EXT, [  ] wound  LYMPH: No lymphadenopathy noted  SKIN:  [ x ] No rashes or lesions, [  ] Pressure Ulcers, [  ] ecchymosis, [  ] Skin Tears, [  ] Other    DIET: Diet, DASH/TLC:   Sodium & Cholesterol Restricted (05-19-25 @ 14:57)      LABS:                        11.0   8.86  )-----------( 190      ( 23 May 2025 07:40 )             33.7     23 May 2025 07:40    135    |  107    |  24     ----------------------------<  114    4.2     |  19     |  1.70     Ca    9.0        23 May 2025 07:40    TPro  6.8    /  Alb  1.8    /  TBili  1.0    /  DBili  x      /  AST  105    /  ALT  50     /  AlkPhos  917    23 May 2025 07:40      Urinalysis Basic - ( 23 May 2025 07:40 )    Color: x / Appearance: x / SG: x / pH: x  Gluc: 114 mg/dL / Ketone: x  / Bili: x / Urobili: x   Blood: x / Protein: x / Nitrite: x   Leuk Esterase: x / RBC: x / WBC x   Sq Epi: x / Non Sq Epi: x / Bacteria: x        Culture Results:   No growth at 48 Hours (05-20 @ 16:05)  Culture Results:   No growth at 48 Hours (05-20 @ 15:58)  Culture Results:   >100,000 CFU/ml Pseudomonas aeruginosa  50,000 - 99,000 CFU/mL Enterococcus faecium (05-19 @ 05:06)  Culture Results:   Growth in anaerobic bottle: Escherichia coli ESBL  See previous culture [id number] (05-18 @ 23:25)  Culture Results:   Growth in aerobic bottle: Escherichia coli ESBL  Direct identification is available within approximately 3-5  hours either by Blood Panel Multiplexed PCR or Direct  MALDI-TOF. Details: [email or web address] (05-18 @ 23:10)      Culture - Blood (collected 20 May 2025 16:05)  Source: Blood Blood-Peripheral  Preliminary Report (23 May 2025 02:02):    No growth at 48 Hours    Culture - Blood (collected 20 May 2025 15:58)  Source: Blood Blood-Peripheral  Preliminary Report (23 May 2025 02:02):    No growth at 48 Hours    Urinalysis with Rflx Culture (collected 19 May 2025 05:06)    Culture - Urine (collected 19 May 2025 05:06)  Source: Clean Catch Clean Catch (Midstream)  Final Report (21 May 2025 12:34):    >100,000 CFU/ml Pseudomonas aeruginosa    50,000 - 99,000 CFU/mL Enterococcus faecium  Organism: Enterococcus faecium (21 May 2025 12:34)  Organism: Pseudomonas aeruginosa  Enterococcus faecium (21 May 2025 12:34)  Organism: Pseudomonas aeruginosa (21 May 2025 12:34)    Culture - Blood (collected 18 May 2025 23:25)  Source: Blood Blood-Peripheral  Gram Stain (19 May 2025 14:24):    Growth in anaerobic bottle: Gram Negative Rods  Final Report (21 May 2025 20:47):    Growth in anaerobic bottle: Escherichia coli ESBL    See previous culture [id number]    Culture - Blood (collected 18 May 2025 23:10)  Source: Blood Blood-Peripheral  Gram Stain (19 May 2025 14:24):    Growth in aerobic bottle: Gram Negative Rods  Final Report (21 May 2025 07:07):    Growth in aerobic bottle: Escherichia coli ESBL    Direct identification is available within approximately 3-5    hours either by Blood Panel Multiplexed PCR or Direct    MALDI-TOF. Details: [email or web address]  Organism: Blood Culture PCR  Escherichia coli ESBL (21 May 2025 07:07)  Organism: Escherichia coli ESBL (21 May 2025 07:07)  Organism: Blood Culture PCR (21 May 2025 07:07)        RADIOLOGY & ADDITIONAL TESTS:      HEALTH ISSUES - PROBLEM Dx:  HTN (hypertension)    HLD (hyperlipidemia)    Diabetes    Anemia of chronic disease    Need for prophylactic measure    Sepsis    Acute on chronic renal failure    Consultant(s) Notes Reviewed:  [ x ] YES     Care Discussed with [X] Consultants  [ x ] Patient  [x  ] Family [  ] HCP [  ]   [ x ] Social Service  [ x ] RN, [  ] Physical Therapy,[  ] Palliative care team  DVT PPX: [  ] Lovenox, [ x ] S C Heparin, [  ] Coumadin, [  ] Xarelto, [  ] Eliquis, [  ] Pradaxa, [  ] IV Heparin drip, [  ] SCD [  ] Contraindication 2 to GI Bleed,[  ] Ambulation [  ] Contraindicated 2 to  bleed [  ] Contraindicated 2 to Brain Bleed  Advanced directive: [x  ] None, [  ] DNR/DNI

## 2025-05-23 NOTE — SOCIAL WORK PROGRESS NOTE - NSSWPROGRESSNOTE_GEN_ALL_CORE
Per MD, pt is cleared for dc after midline. Pt to be transferred to [facility] today at 5pm via [facility] ambulette. Pt and pt's dtr Maday aware of dc for today and both are in agreement. SW will continue to follow.

## 2025-05-23 NOTE — DISCHARGE NOTE PROVIDER - NSDCCPCAREPLAN_GEN_ALL_CORE_FT
PRINCIPAL DISCHARGE DIAGNOSIS  Diagnosis: Severe sepsis  Assessment and Plan of Treatment: You presented with severe sepsis. This was initially thought to be from the gallbladder however your HIDA scan and MRCP were negative other than for biliary sludge. Your urine cultures however were positive with a bacteria called Pseudomonoas. Your blood cultures were positive with bacteria called ESBP and Enterococcus facecium. These bacteria were treated with targeted and appropriate antibitoics. You will need to complete the course of these anitbiotics after discharge. You had a midline placed to continue IV antibiotics after discharge. Please continue Meropenem 500 mg twice a day with 5/28 being the last day of treatment. Please conitnue Linzeolid 600 mg twice a day with the last day of treatment being 5/30.   Please follow up with Infectious disease and your PCP upon discharge.      SECONDARY DISCHARGE DIAGNOSES  Diagnosis: Acute kidney injury superimposed on CKD  Assessment and Plan of Treatment: You had worsening Creatinine indicated and acute kidney injurt on admission but this improved throughout your clinical course.   Please follow up with your PCP for repeat blood work.    Diagnosis: HTN (hypertension)  Assessment and Plan of Treatment: Continue Metoprolol, Hydralazine, Losartan and Lasix your home medications.    Diagnosis: HLD (hyperlipidemia)  Assessment and Plan of Treatment: Continue Lipitor    Diagnosis: Type 2 diabetes mellitus  Assessment and Plan of Treatment: Continue Januvia     PRINCIPAL DISCHARGE DIAGNOSIS  Diagnosis: Severe sepsis  Assessment and Plan of Treatment: You presented with severe sepsis. UTI-Pyelonephritis   -This was initially thought to be from the gallbladder however your HIDA scan and MRCP were negative other than for biliary sludge.  - Your urine cultures however were positive with a bacteria called Pseudomonoas.   -Your blood cultures were positive with bacteria called E Coli  ESBL and Enterococcus facecium.  - These bacteria were treated with targeted and appropriate antibitoics. You will need to complete the course of these anitbiotics after discharge.  - You had a midline placed to continue IV antibiotics after discharge. Please continue IV Meropenem 500 mg q 12 hrs VIA MID LINE 5/28 being the last day of treatment.   -Please conitnue Linzeolid 600 mg twice a day with the last day of treatment being 5/30.   Please follow up with Infectious disease and your PCP upon discharge.      SECONDARY DISCHARGE DIAGNOSES  Diagnosis: Acute kidney injury superimposed on CKD  Assessment and Plan of Treatment: Ashley-CKD 3  Lactic Acidosis/Metabolic Acidosis -2/2 sepsis   -You had worsening Creatinine indicated and acute kidney injurt on admission but this improved throughout your clinical course.   Please follow up with your PCP for repeat blood work.  Seen by Renal -S/P IV fluids given  HOLD Lasix HOLD Losartan   Repeat BMP after 1 week    Diagnosis: Anemia of chronic disease  Assessment and Plan of Treatment: Ac on chronic Anemia 2/2 severe sepsis & Ashley-CKD 3  Pt had anemia work up done -  SPEP -Gamma Migrating paraprotein +  S/P 1 u pRBC given    Check CBC after 1 week      Diagnosis: HTN (hypertension)  Assessment and Plan of Treatment: Continue Metoprolol, Hydralazine continue   HOLD  Losartan and Lasix  as ASHLEY- CKD 3    Diagnosis: HLD (hyperlipidemia)  Assessment and Plan of Treatment: HOLD Lipitor as per elevated LFT    Diagnosis: Type 2 diabetes mellitus  Assessment and Plan of Treatment: Continue Januvia daily  FS q AC HS with Humalog coverage scale

## 2025-05-23 NOTE — DISCHARGE NOTE PROVIDER - CARE PROVIDERS DIRECT ADDRESSES
[email or web address]

## 2025-05-23 NOTE — DISCHARGE NOTE PROVIDER - CARE PROVIDER_API CALL
Chuy Macias  Infectious Disease  [address] [place] [address]  Phone: [phone]  Fax: [phone]  Follow Up Time: 2 weeks    Gary Nguyen  Urology  [address] [facility]  [place] [address]  Phone: [phone]  Fax: [phone]  Follow Up Time: Routine   Gary Nguyen  Urology  [address], [address]  [place] [address]  Phone: [phone]  Fax: [phone]  Follow Up Time: Maria Luisa Musa  Infectious Disease  [address], [address]  [place] [address]  Phone: [phone]  Fax: [phone]  Follow Up Time:     Ang Booker  Urology  [address]  [place] [address]  Phone: [phone]  Fax: [phone]  Follow Up Time:

## 2025-05-23 NOTE — PROGRESS NOTE ADULT - PROBLEM SELECTOR PLAN 3
-Acute on chronic  anemia 2/2 sepsis, ELVIN-CKD 3   - F/u AM labs,  pt had anemia work up done in April  + Gamma migrating paraprotein + on SPEP  - s/p 1 unit pRBC yesterday with imrpovement in Hb 7.7--> 11  Dr Jackson -Hematology eval  CBC in AM -Acute on chronic  anemia 2/2 sepsis, ELVIN-CKD 3   - F/u AM labs,  pt had anemia work up done in April  + Gamma migrating paraprotein + on SPEP  - s/p 1 unit pRBC yesterday with improvement in Hb 7.7--> 11  Dr Jackson -Hematology follow up  CBC  as out pt

## 2025-05-23 NOTE — PROGRESS NOTE ADULT - ASSESSMENT
89-year-old female w anemia, diabetes, hypertension, hyperlipidemia, CKD Stage 3 who presents w/ decreased PO intake over past several weeks. Pt was admitted from 4/16-4/22 for acute cholecystitis and UTI. Pt's daughter at bedside provided much of the history. Pt was initially at rehab after discharge of which she was eating less than usually. Pt has been at [facility] since last Saturday to now in which she has had decrease P.O. intake w/ nausea and several episodes of emesis of which the daughter has described as biliary. PT reporting right shoulder pain and right upper abdominal pain  Temp 102.4F, HR 78, /64, RR 18, SpO2 90%  Labs significant for WBC: 17.26, H/H: 8.7/26.1, Na: 133, K: 3.1, BUN/Cr: 27/2.3, Alk Phos: 870, AST: 103, Lactate: 2.3  Imaging:RUQ US: Thick-walled gallbladder with cholelithiasis. Negative sonographic Kunz's sign. Overall findings are equivocal for acute cholecystitis, HIDA scan may be considered for further evaluation.  Culture - Urine (04.16.25 @ 11:59) >100,000 CFU/ml Escherichia coli ESBL  -  Piperacillin/Tazobactam: S <=8    Acute Pyelonephritis  Clinically compelling with pt meeting SIRS criteria with fever and leukocytosis, clinically no urinary symptoms and pt with RUQ pain and imaging and biochemical abnormalities suggesting biliary localization. Reported recent issues with ertapenem 'wiping her out' and Alb  2.1[L]. Noted sensitivities of prior micro after multiple investigations suggested that this is due to urinary system source and noted neg biliary investigations  Culture - Blood (05.18.25 @ 23:10) -  Escherichia coli ESBL  -  Trimethoprim/Sulfamethoxazole: S 2/38,  -  Levofloxacin: S <=0.5 -  Meropenem: S <=1  repeated blood cultures-ordered -Culture - Blood (05.20.25 @ ~16:00) x2 in LAB-NGTD  Culture - Urine (05.19.25 @ 05:06) >100,000 CFU/ml Pseudomonas aeruginosa, 50,000 - 99,000 CFU/mL Enterococcus faecium  Zosyn started 5/19 early am Zosyn S=8-->5/19 changed to Meropenem-rec  linezolid 600 mg PO BID with last day 5/30  Meropenem 500 mg IB q12 with last day 5/28  Midline ordered  plan for dc to [facility] and will arrange for follow up afterwards by our office    From an ID standpoint no further requirement for inpatient status for the management of ID issues. Fine with discharge from ID standpoint when other medical issues no longer require inpatient care and social issues allow for a safe discharge plan. To schedule an outpatient ID follow up appointment please call our office at [phone]     Thank you for consulting us and involving us in the management of this most interesting and challenging case.   In addition to reviewing history, imaging, documents, labs, microbiology, took into account antibiotic stewardship, local antibiogram and infection control strategies and potential transmission issues.    Please call us at [phone] or text me directly on my cell#[phone] with any concerns or further questions.

## 2025-05-23 NOTE — DISCHARGE NOTE NURSING/CASE MANAGEMENT/SOCIAL WORK - PATIENT PORTAL LINK FT
You can access the FollowMyHealth Patient Portal offered by Health system by registering at the following website: [email or web address]. By joining [facility]’s FollowMyHealth portal, you will also be able to view your health information using other applications (apps) compatible with our system.

## 2025-05-23 NOTE — PROGRESS NOTE ADULT - SUBJECTIVE AND OBJECTIVE BOX
[facility]  Chuy Macias MD PhD, Rekha Gonzalez MD, Claudette Holguin MD, Jan Thacker MD, Camilo Arroyo MD  and providing coverage with Jonathan Grijalva MD  Providing Infectious Disease Consultations at [facility], [facility], [facility], [facility], [facility]'s    Office# [phone] to schedule follow up appointments  Answering Service for urgent calls or New Consults [phone]  Cell# to text for urgent issues Chuy Macias [phone]     infectious diseases progress note:    NELDA VERDIN is a 89y y. o. Female patient    Overnight and events of the last 24hrs reviewed    Allergies    amlodipine ([place])    Intolerances        ANTIBIOTICS/RELEVANT:  antimicrobials  linezolid    Tablet 600 milliGRAM(s) Oral every 12 hours  meropenem  IVPB      meropenem  IVPB 500 milliGRAM(s) IV Intermittent every 12 hours    immunologic:    OTHER:  aspirin enteric coated 81 milliGRAM(s) Oral daily  carbamide peroxide Otic Solution 1 Drop(s) Both Ears every 12 hours  cetirizine 5 milliGRAM(s) Oral daily  dextrose 5%. 1000 milliLiter(s) IV Continuous <Continuous>  dextrose 5%. 1000 milliLiter(s) IV Continuous <Continuous>  dextrose 50% Injectable 25 Gram(s) IV Push once  dextrose 50% Injectable 12.5 Gram(s) IV Push once  dextrose 50% Injectable 25 Gram(s) IV Push once  dextrose Oral Gel 15 Gram(s) Oral once PRN  famotidine    Tablet 20 milliGRAM(s) Oral every 48 hours  fluticasone propionate 50 MICROgram(s)/spray Nasal Spray 1 Spray(s) Both Nostrils two times a day  glucagon  Injectable 1 milliGRAM(s) IntraMuscular once  heparin   Injectable 5000 Unit(s) SubCutaneous every 8 hours  hydrALAZINE 100 milliGRAM(s) Oral two times a day  insulin lispro (ADMELOG) corrective regimen sliding scale   SubCutaneous three times a day before meals  insulin lispro (ADMELOG) corrective regimen sliding scale   SubCutaneous at bedtime  lactated ringers. 1000 milliLiter(s) IV Continuous <Continuous>  metoprolol succinate  milliGRAM(s) Oral daily  ondansetron Injectable 4 milliGRAM(s) IV Push every 8 hours PRN  pantoprazole    Tablet 40 milliGRAM(s) Oral before breakfast  simethicone 80 milliGRAM(s) Chew every 6 hours PRN      Objective:  Vital Signs Last 24 Hrs  T(C): 36.5 (23 May 2025 05:04), Max: 36.7 (23 May 2025 01:35)  T(F): 97.7 (23 May 2025 05:04), Max: 98 (23 May 2025 01:35)  HR: 62 (23 May 2025 05:04) (56 - 69)  BP: 173/64 (23 May 2025 05:04) (154/78 - 173/64)  BP(mean): --  RR: 19 (23 May 2025 05:04) (16 - 19)  SpO2: 92% (23 May 2025 05:04) (92% - 95%)    Parameters below as of 23 May 2025 05:04  Patient On (Oxygen Delivery Method): room air        T(C): 36.5 (05-23-25 @ 05:04), Max: 36.7 (05-23-25 @ 01:35)  T(C): 36.5 (05-23-25 @ 05:04), Max: 36.9 (05-20-25 @ 11:37)  T(C): 36.5 (05-23-25 @ 05:04), Max: 36.9 (05-19-25 @ 20:22)    PHYSICAL EXAM:  HEENT: NC atraumatic  Neck: supple  Respiratory: no accessory muscle use, breathing comfortably  Cardiovascular: distant  Gastrointestinal: normal appearing, nondistended  Extremities: no clubbing, no cyanosis,        LABS:                          11.0   8.86  )-----------( 190      ( 23 May 2025 07:40 )             33.7       WBC  8.86 05-23 @ 07:40  8.22 05-22 @ 05:55  9.73 05-21 @ 07:30  11.97 05-20 @ 06:20  17.26 05-18 @ 23:30      05-23    135  |  107  |  24[H]  ----------------------------<  114[H]  4.2   |  19[L]  |  1.70[H]    Ca    9.0      23 May 2025 07:40    TPro  6.8  /  Alb  1.8[L]  /  TBili  1.0  /  DBili  x   /  AST  105[H]  /  ALT  50  /  AlkPhos  917[H]  05-23      Creatinine: 1.70 mg/dL (05-23-25 @ 07:40)  Creatinine: 1.80 mg/dL (05-22-25 @ 05:55)  Creatinine: 1.90 mg/dL (05-21-25 @ 07:30)  Creatinine: 2.10 mg/dL (05-20-25 @ 06:20)  Creatinine: 2.30 mg/dL (05-18-25 @ 23:30)        Urinalysis Basic - ( 23 May 2025 07:40 )    Color: x / Appearance: x / SG: x / pH: x  Gluc: 114 mg/dL / Ketone: x  / Bili: x / Urobili: x   Blood: x / Protein: x / Nitrite: x   Leuk Esterase: x / RBC: x / WBC x   Sq Epi: x / Non Sq Epi: x / Bacteria: x            INFLAMMATORY MARKERS      MICROBIOLOGY:              RADIOLOGY & ADDITIONAL STUDIES:

## 2025-05-23 NOTE — PROGRESS NOTE ADULT - PROBLEM SELECTOR PLAN 2
ELVIN on CKD stage 3   - resolved  Baseline of 1.8  - S/P IV LR and 1 unit pRBBC  - Dr. torres  Nephro -Cr  improving  ELVIN on CKD3-  Lactic Acidosis/Metabolic Acidosis  Hyponatremia-improved  Hypokalemia- Improved ELVIN on CKD stage 3   - resolved  Baseline of 1.8 HOLD LASIX & LOSARTAN as d/w DR TORRES   - Dr. torres  Nephro -Cr  improving  ELVIN on CKD3-  Lactic Acidosis/Metabolic Acidosis  Hyponatremia-improved  Hypokalemia- Improved  BMP as out pt

## 2025-05-23 NOTE — DISCHARGE NOTE PROVIDER - NSDCFUSCHEDAPPT_GEN_ALL_CORE_FT
Ananda De Luna  [facility]  ORTHOSURG [address]  Scheduled Appointment: 06/27/2025

## 2025-05-23 NOTE — PROGRESS NOTE ADULT - PROBLEM SELECTOR PLAN 1
Severe sepsis 2/2 UTI-Pyelonephritis   T: 102.4, WBC: 17.26, Lactate: 2.3->1.4   UA: Cloudy, LEC: Large, Nitrite (+), Bacteria: Many  -RUQ US: Thick-walled gallbladder with cholelithiasis. Negative sonographic Kunz's sign. Overall findings are equivocal for acute cholecystitis, HIDA scan may be considered for further evaluation.  -HIDA scan negative for acute pino   - BCx positive for ESBL and Enterococcus faecium, Ucx pseudomonas   - Repeat Cx negative   - transitioned from zosyn to meropenem q 12 hrs + linezolid   - MRCP ordered showed biliary sludge with gallbladder thickening   - plan for Midline today for dc to rehab to complete meropenem 5/28 and 5/20 for linezolid   -ID Dr. Macias follow up  -GI D/W  (Dr. Dumont- No concern for MRI findings  - Surgery d/w DR Eqsuivel - No surgical intervention Severe sepsis 2/2 UTI-Pyelonephritis   T: 102.4, WBC: 17.26, Lactate: 2.3->1.4   UA: Cloudy, LEC: Large, Nitrite (+), Bacteria: Many  -RUQ US: Thick-walled gallbladder with cholelithiasis. Negative sonographic Kunz's sign. Overall findings are equivocal for acute cholecystitis, HIDA scan may be considered for further evaluation.  -HIDA scan negative for acute pino   - BCx positive for ESBL and Enterococcus faecium, Ucx pseudomonas   - Repeat Cx negative   - transitioned from zosyn to meropenem q 12 hrs VIA MID Line  + linezolid Po   - MRCP ordered showed biliary sludge with gallbladder thickening   - plan for Midline today for dc to rehab to complete meropenem 5/28 and 5/20 for linezolid   -ID Dr. Macias follow up d/w MID line today for d/c plan  -GI D/W  (Dr. Dumont- No concern for MRI findings  - Surgery d/w DR Esquivel - No surgical intervention

## 2025-05-23 NOTE — PROGRESS NOTE ADULT - ATTENDING COMMENTS
Pt is a 89-year-old female w/ PMHx of anemia, diabetes, hypertension, hyperlipidemia, CKD Stage 3 who presents w/ decreased PO intake over the past several weeks. Pt is admitted for sepsis workup d/t UTI and r/o  acute cholecystitis.  pt seen, examined, Case & care plan d/w pt ,residents at detail.  Consult-  ID-DR Macias-IV Abx Meropenem renal dose q 12 hrs ,, Repeat Blood c/s x 2 NGTD  ,  PO Linezolid,  D/W Dtr at detail.   GI-DR Dumont-Neg HIDA, MRCP-neg , no further GI work up  -Hematology -Dr Jackson for Anemia ACD with ac illness 2/2 sepsis. 1 u pRBC given -stable H/H  Sx House MD-No sx   GI-DVT ppx  PO diet   PT Eval to follow ---> [facility] D/W SW   -D/C to [facility] today  Total  d/c care time is 60 minutes.
Pt is a 89-year-old female w/ PMHx of anemia, diabetes, hypertension, hyperlipidemia, CKD Stage 3 who presents w/ decreased PO intake over the past several weeks. Pt is admitted for sepsis workup d/t UTI and r/o  acute cholecystitis.  pt seen, examined, Case & care plan d/w pt ,residents at detail.  Consult-  ID-DR Macias-IV Abx Meropenem renal dose,, Repeat Blood c/s x 2 NGTD  ,  PO Linezolid,  D/W Dtr at detail.  GI-DR Dumont-Neg YO, MRCP-neg   -Hemtology -Dr Jackson for Anemia ACD with ac illness 2/2 sepsis.  Sx House MD-No sx   GI-DVT ppx  AM labs   PO diet   PT Eval to follow ---> [facility]ON[facility], D/C to [facility]  Total care time is 60 minutes.
Pt is a 89-year-old female w/ PMHx of anemia, diabetes, hypertension, hyperlipidemia, CKD Stage 3 who presents w/ decreased PO intake over the past several weeks. Pt is admitted for sepsis workup d/t UTI and r/o  acute cholecystitis.  pt seen, examined, Case & care plan d/w pt ,residents at detail.  Consult-  ID-DR Macias-IV Abx Meropenem IV, Repeat Blood c/s x 2 , D/W Dtrs at detail.  GI-DR Dumont-Adrián RAM, MRCP-neg   Sx House MD-No sx   GI-DVT ppx  AM labs   PO diet   PT Eval to follow   Total care time is 60 minutes.
Pt is a 89-year-old female w/ PMHx of anemia, diabetes, hypertension, hyperlipidemia, CKD Stage 3 who presents w/ decreased PO intake over the past several weeks. Pt is admitted for sepsis workup d/t UTI and r/o  acute cholecystitis.  pt seen, examined, Case & care plan d/w pt ,residents at detail.  Consult-  ID-DR Macias-IV Abx   GI-DR Dumont-Adrián RAM,MRCP  Sx House MD-No sx   GI-DVT ppx  AM labs   PO diet   PT Eval  Total care time is 60 minutes.
Pt is a 89-year-old female w/ PMHx of anemia, diabetes, hypertension, hyperlipidemia, CKD Stage 3 who presents w/ decreased PO intake over the past several weeks. Pt is admitted for sepsis workup d/t UTI and r/o  acute cholecystitis.  pt seen, examined, Case & care plan d/w pt ,residents at detail.  Consult-  ID-DR Macias-IV Abx Meropenem renal dose,, Repeat Blood c/s x 2 ,  PO Linezolid,  D/W Dtr at detail.  GI-DR Dumont-Neg YO, MRCP-neg   -Hemtology -Dr Jackson for Anemia  Sx House MD-No sx   GI-DVT ppx  AM labs   PO diet   PT Eval to follow   Total care time is 60 minutes.

## 2025-05-23 NOTE — DISCHARGE NOTE PROVIDER - NSDCMRMEDTOKEN_GEN_ALL_CORE_FT
aspirin 81 mg oral delayed release tablet: 1 tab(s) orally every other day  atorvastatin 40 mg oral tablet: 1 tab(s) orally once a day (at bedtime)  famotidine 20 mg oral tablet: 1 tab(s) orally once a day  furosemide 20 mg oral tablet: 1 tab(s) orally once a day  hydrALAZINE 100 mg oral tablet: 1 tab(s) orally 2 times a day  Januvia 25 mg oral tablet: 1 tab(s) orally once a day  linezolid 600 mg oral tablet: 1 tab(s) orally every 12 hours  losartan 50 mg oral tablet: 1 tab(s) orally 2 times a day  meropenem: 500 mg IV BID  metoprolol succinate 100 mg oral tablet, extended release: 1 tab(s) orally once a day  omeprazole 20 mg oral delayed release capsule: 1 cap(s) orally once a day   aspirin 81 mg oral delayed release tablet: 1 tab(s) orally every other day  famotidine 20 mg oral tablet: 1 tab(s) orally once a day  fluticasone 50 mcg/inh nasal spray: 1 spray(s) nasal 2 times a day  heparin: 5,000 international unit(s) subcutaneous every 8 hours  hydrALAZINE 100 mg oral tablet: 1 tab(s) orally 2 times a day  Januvia 25 mg oral tablet: 1 tab(s) orally once a day  linezolid 600 mg oral tablet: 1 tab(s) orally every 12 hours  meropenem: 500 milligram(s) intravenous every 12 hours 500 mg IV BID  metoprolol succinate 100 mg oral tablet, extended release: 1 tab(s) orally once a day  omeprazole 20 mg oral delayed release capsule: 1 cap(s) orally once a day  simethicone 80 mg oral tablet, chewable: 1 tab(s) orally every 6 hours As needed Gas

## 2025-05-23 NOTE — DISCHARGE NOTE PROVIDER - NSDCACTIVITY_GEN_ALL_CORE
No heavy lifting/straining Bathing allowed/Walking - Indoors allowed/No heavy lifting/straining/Activity as tolerated

## 2025-05-23 NOTE — DISCHARGE NOTE PROVIDER - HOSPITAL COURSE
HPI:  Pt is a 89-year-old female w/ PMHx of anemia, diabetes, hypertension, hyperlipidemia, CKD Stage 3 who presents w/ decreased PO intake over past several weeks. Pt was admitted from 4/16-4/22 for acute cholecystitis and UTI. Pt's daughter at bedside provided much of the history. Pt was initially at rehab after discharge of which she was eating less than usually. Pt has been at [facility] since last Saturday to now in which she has had decrease P.O. intake w/ nausea and several episodes of emesis of which the daughter has described as biliary.. Pt's daughter also states that pt was c/o of back pain where she had a fracture last month.     IN THE ED:  Temp 102.4F, HR 78, /64, RR 18, SpO2 90%  S/P zofran, zosyn, 2.85 NS bolus, 1g ofirmev, KCl,   Labs significant for WBC: 17.26, H/H: 8.7/26.1, Na: 133, K: 3.1, BUN/Cr: 27/2.3, Alk Phos: 870, AST: 103, Lactate: 2.3  Imaging:RUQ US: Thick-walled gallbladder with cholelithiasis. Negative sonographic Kunz's sign. Overall findings are equivocal for acute cholecystitis, HIDA scan may be considered for further evaluation.  UA: (+) UTI   (19 May 2025 05:27)      ---  HOSPITAL COURSE:   Patient presented with severe sepsis initially thought to be d/t acute cholecystitis d/t thick wall gallbladder noted on RUQ US. HIDA was then ordered which was negative. MRCP was also performed which showed biliary sludge with gallbladder thickening. Source of infection was determined more likely from urine as urine culture was positive for pseudomonas and blood cultures were positive for ESBL and Enterococcus faecium. Patient was initially started on zosyn and later transitioned to meropenem and linezolid after blood culture results. ID was DR. Macias was following. Patient had negative repeat cultures. Midline was placed to complete course of meropenem in rehab upon discharge from hospital. Patient had ELVIN on CKD stage 3 and started on IV LR.   Patient had a slow drop in Hb to 7.7. Heme Onc Dr. Jackson was consulted recommended 1 unit pRBC.   Patient's clinical condition improved throughout hospital course and patient is stable for discharge to rehabwith close outpatient follow up.     ---  CONSULTANTS:   Dr. Renetta Fernandez Onc  Dr. Sheikh EAN Macias ID   ---  Physical Exam on the day of discharge:    ---  TIME SPENT:  I, the attending physician, was physically present for the key portions of the evaluation and management (E/M) service provided. The total amount of time spent reviewing the hospital notes, laboratory values, imaging findings, assessing/counseling the patient, discussing with consultant physicians, social work, nursing staff was -- minutes    ---  Primary care provider was made aware of plan for discharge:      [  ] NO     [  ] YES HPI:  Pt is a 89-year-old female w/ PMHx of anemia, diabetes, hypertension, hyperlipidemia, CKD Stage 3 who presents w/ decreased PO intake over past several weeks. Pt was admitted from 4/16-4/22 for acute cholecystitis and  possible UTI. Pt's daughter at bedside provided much of the history. Pt was initially at rehab after discharge of which she was eating less than usually. Pt has been at [facility] since last Saturday to now in which she has had decrease P.O. intake w/ nausea and several episodes of emesis of which the daughter has described as biliary.. Pt's daughter also states that pt was c/o of back pain where she had a fracture last month.     IN THE ED:  Temp 102.4F, HR 78, /64, RR 18, SpO2 90%  S/P zofran, zosyn, 2.85 NS bolus, 1g ofirmev, KCl,   Labs significant for WBC: 17.26, H/H: 8.7/26.1, Na: 133, K: 3.1, BUN/Cr: 27/2.3, Alk Phos: 870, AST: 103, Lactate: 2.3  Imaging:RUQ US: Thick-walled gallbladder with cholelithiasis. Negative sonographic Kunz's sign. Overall findings are equivocal for acute cholecystitis, HIDA scan may be considered for further evaluation.  UA: (+) UTI   (19 May 2025 05:27)      ---  HOSPITAL COURSE:   Patient presented with severe sepsis 2/2 UTI ,Pyelonephritis R/O  acute cholecystitis d/t , elevated LFT's ,Bili ,thick wall gallbladder noted on RUQ US. HIDA was then ordered which was negative. MRCP was also performed which showed biliary sludge with gallbladder thickening.  As per GI -No concern for AC Shira as per Surgery & GI. Source of infection was determined more likely from urine as urine culture was positive for pseudomonas and blood cultures were positive for ESBL E Coli, and Enterococcus faecium. Patient was initially started on zosyn and later transitioned to meropenem IV -Renal dose  Abx and linezolid after blood culture results. ID - DR. Macias was following. Patient had negative repeat cultures. Midline was placed to complete course of meropenem in rehab upon discharge from hospital. Patient had ELVIN on CKD stage 3 and started on IV LR. seen by  Nephrology. Elvin improved .AC on Chronic anemia 2/2 Sepsis, ELVIN-CKD 3.  Patient had a slow drop in Hb to 7.7. Heme Onc Dr. Jackson was consulted recommended 1 unit pRBC. H/H stable . Pt improved over all , Repeat Blood culture-NEG   Patient's clinical condition improved throughout hospital course and patient is stable for discharge to rehab with close outpatient follow up. PT---> LEONEL.    ---  CONSULTANTS: Repeat   Dr. Renetta Kaplan Nephjasmin Macias ID   ---  Physical Exam on the day of discharge:    ---  TIME SPENT:  I, the attending physician, was physically present for the key portions of the evaluation and management (E/M) service provided. The total amount of time spent reviewing the hospital notes, laboratory values, imaging findings, assessing/counseling the patient, discussing with consultant physicians, social work, nursing staff was 60  minutes

## 2025-05-23 NOTE — PROGRESS NOTE ADULT - PROBLEM SELECTOR PROBLEM 4
Form started and placed on Anahy's desk for Shy's signature   Last CPE was with JIGAR on 10/17/24 with labs the same day     Msg to non-triage to finish    HTN (hypertension)

## 2025-05-23 NOTE — DISCHARGE NOTE NURSING/CASE MANAGEMENT/SOCIAL WORK - HAS THE PATIENT USED TOBACCO IN THE PAST 30 DAYS?
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 She reports that she is getting tingling in her left arm and hand when she does the chin tuck. She notes numbness in the last three fingers. She does note that this can happen sporadically as well. She attempts to stretch it out and that does seem to help.   She does note improved mobility upon leaving the PT sessions.     Objective:     Pain/Symptom Presentation:   Pain at rest: 0/10  Pain at worst: 7/10     Assessment: Charlotte reports increased radicular symptoms when doing the chin tuck exercise, regardless of if she does it sitting versus supine so this exercise was removed from her routine. She also notes sporadic radicular symptoms affecting the last three digits. She was advise to monitor her symptoms to determine if a consistent aggravating factor can be determined. The patient felt that she was still feeling the positive effects of cupping performed last session at the time of this appt so we focused on additional STM and joint mobilizations for the cervical spine in MT interventions. The patient was taken through some additional postural strengthening exercises. The patient was issued an updated HEP to reflect recent progressions in exercise regimen and facilitate additional progression in mobility and strength. The patient would benefit from continued PT for further progression in postural mobility and strength for improved radicular symptoms and ADL tolerance.      Goals:   Short-Term Goals:  The patient will improve cervical flexion AROM to 50deg pain-free to  facilitate looking up for completing household chores that require looking down such as cooking, washing dishes, etc.. Timeframe: 4-6 visits.  The patient will improve cervical rotation AROM to 35deg (B) pain-free to facilitate mobility for checking his/her blindspot for safety while driving a vehicle}. Timeframe: 6 visits.  The patient will improve cervical extension AROM to 50 deg pain-free to facilitate looking up for overhead work with household chores. Timeframe: 6-8 visits.  Long-Term Goals:  Patient will improve cervical neck pain and headache symptoms to facilitate sleeping at least at least 6 hours without interruption due to neck pain for adequate rest. Timeframe: 10-12 visits.  Patient will improve postural strength and upper body strength and mobility to facilitate washing her hair in the shower. Timeframe: 12 visits.   Patient will improve thoracic spine mobility and postural endurance to maintain proper posture with neutral head position while sitting to facilitate 4 hours of pain-free desk work for occupational activities.. Timeframe: 12 visits.  Patient will improve Neck Disability Index (NDI) score by 10 points or 10% to indicate a true change in improved function for ADL's and restoring PLF. Timeframe: 12 visits.    Plan: Follow up on tolerance to updated HEP.   Date: 10/07/2024  TX#: 2/12 Date: 10/10/2024            TX#: 3/12 Date: 10/16/2024          TX#: 4/12 Date: 10/18/24            TX#: 5/12 Date:   Tx#: 6/   Ther Ex: 15'   Supine Chin Tucks x 10 5\" H   Seated Scapular Retraction 2 x 10 3-5\" H  90 deg Doorway Pec Stretch 2 x 20\" ea R/L  120 deg Doorway Pec Stretch 2 x 20\" ea R/L  Supine HA RTB x 10   Ther Ex: 25'   Seated Scapular Retraction 2 x 10 3-5\" H  Open Books x 10 5\" H ea R/L   Supine Chin Tucks x 20 5\" H   90 deg Doorway Pec Stretch 2 x 20\" ea R/L  120 deg Doorway Pec Stretch 2 x 20\" ea R/L Ther Ex: 25'   Supine Chin Tucks x 20 5\" H   Seated Scapular Retraction x 20 5\" H  Levator  Stretch 2 x 20\" ea R/L (L Rot caused tingling in L hand)  90 deg Doorway Pec Stretch 2 x 20\" ea R/L  120 deg Doorway Pec Stretch 2 x 20\" ea R/L  Standing HA RTB x 10 Therapeutic Exercise: x 25min   Manual upper trap stretch: x 30\" (B)  S/L thoracic rotation (open book): 1 x 10  x 3\"  Seated Scapular Retraction: 2 x 10 x 3\", arms crossed   Elastic band pull apart: 2 x 10 (B), red theraband   Elastic band low rows: 2 x 10 (B), red theraband   Elastic band (B) ext: 2 x 10 (B), red theraband  90 deg Doorway Pec Stretch: x 30\" (B)   120 deg Doorway Pec Stretch: x 30\" (B)     Manual: 23'   Cervical Distraction   STM: Anterior Neck, Scalenes, Cervical Paraspinals  Manual: 15'   Cervical Distraction   STM: Anterior Neck, Scalenes, Cervical Paraspinals   Cupping: Thoracic Paraspinals, Periscapular Muscles (B) 6' Manual: 15'   Cervical Distraction   STM: Anterior Neck, Scalenes, Cervical Paraspinals   Cupping: Thoracic Paraspinals, Periscapular Muscles (B) 6' Manual Therapy: x 20min   Soft tissue mobilization (supine): upper trap, levator, cervical paraspinals   Suboccipital release: x 3min   Cervical distraction: 3 x 30\"   Cervical upglide joint mobs - C3-C8: Grade 3, 2 x 10\" each (B)                   HEP:  Access Code: [email or web address]  URL: [email or web address]  Date: 10/18/2024  Prepared by: Natalie Flores    Exercises  - Sidelying Thoracic Rotation with Open Book  - 1 x daily - 7 x weekly - 1 sets - 10 reps - 3\" hold  - Standing Lumbar Spine Flexion Stretch Counter  - 1 x daily - 7 x weekly - 1 sets - 10 reps - 3\" hold  - Standing Shoulder Horizontal Abduction with Resistance  - 1 x daily - 7 x weekly - 2 sets - 10 reps  - Standing Shoulder Row with Anchored Resistance  - 1 x daily - 7 x weekly - 2 sets - 10 reps  - Shoulder extension with resistance - Neutral  - 1 x daily - 7 x weekly - 2 sets - 10 reps  - Doorway Pec Stretch at 90 Degrees Abduction  - 1 x daily - 7 x weekly - 1 sets - 2 reps - 30\"   hold  - Doorway Pec Stretch at 120 Elevation with Arm Straight  - 1 x daily - 7 x weekly - 1 sets - 2 reps - 30\" hold    Charges: MT x 1, Therex x 2         Total Timed Treatment: 40min    Total Treatment Time: 40min   No

## 2025-05-23 NOTE — PROGRESS NOTE ADULT - SUBJECTIVE AND OBJECTIVE BOX
[facility]                             Nephrology and Hypertension                             Marcin Kaplan                                          [phone]     Patient is a 89y old  Female who presents with a chief complaint of Sepsis (19 May 2025 15:08)       HPI:  Pt is a 89-year-old female w/ PMHx of anemia, diabetes, hypertension, hyperlipidemia, CKD Stage 3 who presents w/ decreased PO intake over past several weeks. Pt was admitted from 4/16-4/22 for acute cholecystitis and UTI. Pt's daughter at bedside provided much of the history. Pt was initially at rehab after discharge of which she was eating less than usually. Pt has been at [facility] since last Saturday to now in which she has had decrease P.O. intake w/ nausea and several episodes of emesis of which the daughter has described as biliary.. Pt's daughter also states that pt was c/o of back pain where she had a fracture last month.   Found to have ELVIN, known to us from prior.  Denies NSAID usage.  Was having N/V and decreased PO intake.    No acute events noted, no sob, no dizziness, eating okay.        PAST MEDICAL & SURGICAL HISTORY:  Hypertension      Diabetes      HLD (hyperlipidemia)      Stage 3 chronic kidney disease      Anemia of chronic disease      Lumbar compression fracture      HTN (hypertension)      S/P ORIF (open reduction internal fixation) fracture  right hip           FAMILY HISTORY:  FH: asthma (Father)    NC    Social History:Non smoker    MEDICATIONS  (STANDING):  albumin human 25% IVPB 100 milliLiter(s) IV Intermittent once  aspirin enteric coated 81 milliGRAM(s) Oral daily  dextrose 5%. 1000 milliLiter(s) (50 mL/Hr) IV Continuous <Continuous>  dextrose 5%. 1000 milliLiter(s) (100 mL/Hr) IV Continuous <Continuous>  dextrose 50% Injectable 25 Gram(s) IV Push once  dextrose 50% Injectable 12.5 Gram(s) IV Push once  dextrose 50% Injectable 25 Gram(s) IV Push once  famotidine    Tablet 20 milliGRAM(s) Oral every 48 hours  glucagon  Injectable 1 milliGRAM(s) IntraMuscular once  heparin   Injectable 5000 Unit(s) SubCutaneous every 8 hours  hydrALAZINE 100 milliGRAM(s) Oral two times a day  insulin lispro (ADMELOG) corrective regimen sliding scale   SubCutaneous three times a day before meals  insulin lispro (ADMELOG) corrective regimen sliding scale   SubCutaneous at bedtime  meropenem  IVPB      meropenem  IVPB 500 milliGRAM(s) IV Intermittent once  metoprolol succinate  milliGRAM(s) Oral daily  ondansetron Injectable 4 milliGRAM(s) IV Push every 8 hours  sodium chloride 0.9%. 1000 milliLiter(s) (50 mL/Hr) IV Continuous <Continuous>    MEDICATIONS  (PRN):  dextrose Oral Gel 15 Gram(s) Oral once PRN Blood Glucose LESS THAN 70 milliGRAM(s)/deciliter   Meds reviewed    Allergies    amlodipine ([place])    Intolerances         REVIEW OF SYSTEMS: neg other than above       ICU Vital Signs Last 24 Hrs  T(C): 36.4 (23 May 2025 11:51), Max: 36.7 (23 May 2025 01:35)  T(F): 97.5 (23 May 2025 11:51), Max: 98 (23 May 2025 01:35)  HR: 65 (23 May 2025 11:51) (56 - 69)  BP: 145/52 (23 May 2025 11:51) (145/52 - 173/64)  BP(mean): --  ABP: --  ABP(mean): --  RR: 18 (23 May 2025 11:51) (17 - 19)  SpO2: 95% (23 May 2025 11:51) (92% - 95%)    O2 Parameters below as of 23 May 2025 11:51  Patient On (Oxygen Delivery Method): room air            PHYSICAL EXAM:    GENERAL: NAD  HEAD:  Atraumatic, Normocephalic  EYES: EOMI, conjunctiva and sclera clear  ENMT: No Drainage from nares, No drainage from ears  NERVOUS SYSTEM:  Awake and Alert  CHEST/LUNG: Clear to percussion bilaterally  EXTREMITIES:  No Edema  SKIN: No rashes No obvious ecchymosis        LABS:                                     11.0   8.86  )-----------( 190      ( 23 May 2025 07:40 )             33.7     05-23    135  |  107  |  24[H]  ----------------------------<  114[H]  4.2   |  19[L]  |  1.70[H]    Ca    9.0      23 May 2025 07:40    TPro  6.8  /  Alb  1.8[L]  /  TBili  1.0  /  DBili  x   /  AST  105[H]  /  ALT  50  /  AlkPhos  917[H]  05-23      Urinalysis Basic - ( 23 May 2025 07:40 )    Color: x / Appearance: x / SG: x / pH: x  Gluc: 114 mg/dL / Ketone: x  / Bili: x / Urobili: x   Blood: x / Protein: x / Nitrite: x   Leuk Esterase: x / RBC: x / WBC x   Sq Epi: x / Non Sq Epi: x / Bacteria: x

## 2025-05-23 NOTE — PROGRESS NOTE ADULT - PROVIDER SPECIALTY LIST ADULT
Gastroenterology
Infectious Disease
Nephrology
Gastroenterology
Internal Medicine
Nephrology
Nephrology
Infectious Disease
Nephrology
Gastroenterology
Heme/Onc
Internal Medicine

## 2025-05-23 NOTE — PROGRESS NOTE ADULT - PROBLEM SELECTOR PLAN 5
06/05/2023  No upcoming   Pt to CT via wheelchair    Chronic  -Holding Lipitor in setting of elevated liver enzymes

## 2025-05-23 NOTE — DISCHARGE NOTE PROVIDER - PROVIDER TOKENS
PROVIDER:[TOKEN:[[id number]],FOLLOWUP:[2 weeks]],PROVIDER:[TOKEN:[[id number]],FOLLOWUP:[Routine]] PROVIDER:[TOKEN:[[id number]],FOLLOWUP:[Routine]],PROVIDER:[TOKEN:[[id number]]],PROVIDER:[TOKEN:[[id number]]]

## 2025-05-23 NOTE — DISCHARGE NOTE PROVIDER - NSDCQMCOGNITION_NEU_ALL_CORE
What Type Of Note Output Would You Prefer (Optional)?: Standard Output How Severe Is Your Skin Lesion?: mild Has Your Skin Lesion Been Treated?: not been treated Is This A New Presentation, Or A Follow-Up?: Skin Lesion Detail Level: Simple Size Of Lesion In Cm (Optional): 0 Difficulty remembering

## 2025-05-23 NOTE — PROGRESS NOTE ADULT - PROBLEM SELECTOR PLAN 4
Chronic  -c/w home metoprolol succinate, and hydralazine  -Hold home losartan and Lasix in setting of ELVIN Chronic  -c/w home metoprolol succinate, daily  and hydralazine 3x day  -Hold home losartan and Lasix in setting of ELVIN

## 2025-05-26 LAB
CULTURE RESULTS: SIGNIFICANT CHANGE UP
CULTURE RESULTS: SIGNIFICANT CHANGE UP
SPECIMEN SOURCE: SIGNIFICANT CHANGE UP
SPECIMEN SOURCE: SIGNIFICANT CHANGE UP

## 2025-06-02 PROCEDURE — 36415 COLL VENOUS BLD VENIPUNCTURE: CPT

## 2025-06-02 PROCEDURE — 85027 COMPLETE CBC AUTOMATED: CPT

## 2025-06-02 PROCEDURE — A9537: CPT

## 2025-06-02 PROCEDURE — 97162 PT EVAL MOD COMPLEX 30 MIN: CPT

## 2025-06-02 PROCEDURE — 85730 THROMBOPLASTIN TIME PARTIAL: CPT

## 2025-06-02 PROCEDURE — 84156 ASSAY OF PROTEIN URINE: CPT

## 2025-06-02 PROCEDURE — 96366 THER/PROPH/DIAG IV INF ADDON: CPT

## 2025-06-02 PROCEDURE — 96367 TX/PROPH/DG ADDL SEQ IV INF: CPT

## 2025-06-02 PROCEDURE — 36573 INSJ PICC RS&I 5 YR+: CPT

## 2025-06-02 PROCEDURE — 86850 RBC ANTIBODY SCREEN: CPT

## 2025-06-02 PROCEDURE — 85610 PROTHROMBIN TIME: CPT

## 2025-06-02 PROCEDURE — 86923 COMPATIBILITY TEST ELECTRIC: CPT

## 2025-06-02 PROCEDURE — 93005 ELECTROCARDIOGRAM TRACING: CPT

## 2025-06-02 PROCEDURE — 87186 SC STD MICRODIL/AGAR DIL: CPT

## 2025-06-02 PROCEDURE — 87040 BLOOD CULTURE FOR BACTERIA: CPT

## 2025-06-02 PROCEDURE — 83935 ASSAY OF URINE OSMOLALITY: CPT

## 2025-06-02 PROCEDURE — 74181 MRI ABDOMEN W/O CONTRAST: CPT

## 2025-06-02 PROCEDURE — 86900 BLOOD TYPING SEROLOGIC ABO: CPT

## 2025-06-02 PROCEDURE — 74176 CT ABD & PELVIS W/O CONTRAST: CPT

## 2025-06-02 PROCEDURE — 82962 GLUCOSE BLOOD TEST: CPT

## 2025-06-02 PROCEDURE — 84133 ASSAY OF URINE POTASSIUM: CPT

## 2025-06-02 PROCEDURE — 99285 EMERGENCY DEPT VISIT HI MDM: CPT | Mod: 25

## 2025-06-02 PROCEDURE — 78227 HEPATOBIL SYST IMAGE W/DRUG: CPT

## 2025-06-02 PROCEDURE — 71045 X-RAY EXAM CHEST 1 VIEW: CPT

## 2025-06-02 PROCEDURE — P9016: CPT

## 2025-06-02 PROCEDURE — 87637 SARSCOV2&INF A&B&RSV AMP PRB: CPT

## 2025-06-02 PROCEDURE — 82570 ASSAY OF URINE CREATININE: CPT

## 2025-06-02 PROCEDURE — 84540 ASSAY OF URINE/UREA-N: CPT

## 2025-06-02 PROCEDURE — 87077 CULTURE AEROBIC IDENTIFY: CPT

## 2025-06-02 PROCEDURE — 81001 URINALYSIS AUTO W/SCOPE: CPT

## 2025-06-02 PROCEDURE — 78226 HEPATOBILIARY SYSTEM IMAGING: CPT

## 2025-06-02 PROCEDURE — 85025 COMPLETE CBC W/AUTO DIFF WBC: CPT

## 2025-06-02 PROCEDURE — 80053 COMPREHEN METABOLIC PANEL: CPT

## 2025-06-02 PROCEDURE — 86901 BLOOD TYPING SEROLOGIC RH(D): CPT

## 2025-06-02 PROCEDURE — 76705 ECHO EXAM OF ABDOMEN: CPT

## 2025-06-02 PROCEDURE — 87086 URINE CULTURE/COLONY COUNT: CPT

## 2025-06-02 PROCEDURE — 97116 GAIT TRAINING THERAPY: CPT

## 2025-06-02 PROCEDURE — 87150 DNA/RNA AMPLIFIED PROBE: CPT

## 2025-06-02 PROCEDURE — 76937 US GUIDE VASCULAR ACCESS: CPT

## 2025-06-02 PROCEDURE — 96365 THER/PROPH/DIAG IV INF INIT: CPT

## 2025-06-02 PROCEDURE — 36430 TRANSFUSION BLD/BLD COMPNT: CPT

## 2025-06-02 PROCEDURE — 83605 ASSAY OF LACTIC ACID: CPT

## 2025-06-02 PROCEDURE — 84300 ASSAY OF URINE SODIUM: CPT

## 2025-06-02 PROCEDURE — 94640 AIRWAY INHALATION TREATMENT: CPT

## 2025-06-05 NOTE — OCCUPATIONAL THERAPY INITIAL EVALUATION ADULT - THERAPY FREQUENCY, OT EVAL
Chief Complaint   Patient presents with    Contraception     Patient is here to discuss birth control options. Pt was taking Depo injections, last admin 3/17/25. Seeing weight gain. Pt is sexually active, but not due for next Depo in the next week or so. Pt interested in IUD possibly.       History:  Gracie Smith is a 15 y.o. female who presents today for follow-up for evaluation of the above:    HPI    Patient is new to me today and presents today with her mother to discuss changing from Depo. She reports a 20 pound weight gain since starting.   She is sexually active.           ROS:  Review of Systems   Constitutional:  Positive for unexpected weight gain.   HENT: Negative.     Eyes: Negative.    Respiratory: Negative.     Cardiovascular: Negative.    Gastrointestinal: Negative.    Endocrine: Negative.    Genitourinary:  Positive for menstrual problem (not having a period).   Musculoskeletal: Negative.    Skin: Negative.    Neurological: Negative.    Psychiatric/Behavioral: Negative.         Ms. Smith  reports that she has never smoked. She has been exposed to tobacco smoke. She has never used smokeless tobacco. She reports that she does not drink alcohol and does not use drugs.      Current Outpatient Medications:     albuterol sulfate HFA (Ventolin HFA) 108 (90 Base) MCG/ACT inhaler, Inhale 2 puffs Every 4 (Four) Hours As Needed for Wheezing., Disp: 18 g, Rfl: 2    amitriptyline (ELAVIL) 25 MG tablet, Take 1 tablet by mouth Daily., Disp: , Rfl:     buPROPion XL (WELLBUTRIN XL) 150 MG 24 hr tablet, , Disp: , Rfl:     busPIRone (BUSPAR) 10 MG tablet, , Disp: , Rfl:     esomeprazole (nexIUM) 20 MG capsule, , Disp: , Rfl:     famotidine (PEPCID) 20 MG tablet, Take 1 tablet by mouth 2 (Two) Times a Day., Disp: 56 tablet, Rfl: 1    fluticasone (FLONASE) 50 MCG/ACT nasal spray, USE 1 SPRAY IN EACH NOSTRIL AS DIRECTED DAILY, Disp: 16 g, Rfl: 6    Fluticasone-Salmeterol (ADVAIR/WIXELA) 100-50 MCG/ACT DISKUS, Inhale 1 puff  "2 (Two) Times a Day., Disp: 60 each, Rfl: 2    hydrocortisone 2.5 % ointment, APPLY TO AFFECTED AREA(S) TWICE A DAY FOR 7 DAYS, Disp: 28.35 g, Rfl: 1    lamoTRIgine (LaMICtal) 25 MG tablet, , Disp: , Rfl:     Magic Mouthwash Oral Suspension (diphenhydrAMINE HCl - aluminum & magnesium hydroxide-simethicone - lidocaine - nystatin), Swish and spit 5 mL Every 6 (Six) Hours As Needed for Mucositis or Stomatitis., Disp: 50 mL, Rfl: 0    Magnesium Oxide -Mg Supplement 400 (240 Mg) MG tablet, , Disp: , Rfl:     medroxyPROGESTERone Acetate 150 MG/ML suspension prefilled syringe, , Disp: , Rfl:     melatonin 3 MG tablet, Take 1 tablet by mouth Every Night., Disp: 30 tablet, Rfl: 3    montelukast (SINGULAIR) 5 MG chewable tablet, CHEW 1 TAB BY MOUTH EVERY NIGHT AT BEDTIME, Disp: 30 tablet, Rfl: 6    naproxen (Naprosyn) 500 MG tablet, Take 1 tablet by mouth 2 (Two) Times a Day With Meals., Disp: 30 tablet, Rfl: 0    ondansetron ODT (ZOFRAN-ODT) 4 MG disintegrating tablet, Take 1 tablet by mouth Every 8 (Eight) Hours As Needed for Nausea., Disp: 10 tablet, Rfl: 0    rizatriptan (MAXALT) 10 MG tablet, Take 1 tablet by mouth., Disp: , Rfl:     triamcinolone (KENALOG) 0.1 % ointment, APPLY TO AFFECTED AREA(S) TWICE A DAY, Disp: 80 g, Rfl: 2    Levonorgestrel (Elizabeth) 13.5 MG intrauterine device IUD, To be inserted one time by prescriber. Route intrauterine., Disp: 1 each, Rfl: 0      OBJECTIVE:  /68   Ht 149.9 cm (59\")   Wt 73 kg (161 lb)   BMI 32.52 kg/m²    Physical Exam  Constitutional:       Appearance: She is obese. She is not ill-appearing.   Pulmonary:      Effort: No respiratory distress.   Neurological:      Mental Status: She is oriented to person, place, and time.   Psychiatric:         Behavior: Behavior normal.         Assessment/Plan    Diagnoses and all orders for this visit:    1. Missed menses (Primary)  -     POC Pregnancy, Urine    2. Encounter for initial prescription of intrauterine contraceptive device " "(IUD)  -     Levonorgestrel (Elizabeth) 13.5 MG intrauterine device IUD; To be inserted one time by prescriber. Route intrauterine.  Dispense: 1 each; Refill: 0    3. Obesity (BMI 30-39.9)    No birth control works 100 percent perfectly all the time.  Hormonal birth control is a safe and reliable way to prevent pregnancy for most people. But it does not protect you from infections that spread through sex. Condoms are the only form of birth control that can also protect against infections that you can get through sex. (These are called sexually transmitted infections, or \"STIs.\") They are a good choice if you don't know your partner's sexual history or if you or a partner already has an STI. Some people use condoms in addition to another type of birth control, such as the pill.     Education given regarding options for contraception, including barrier methods, injectable contraception, IUD placement, oral contraceptives, nexplanon.          An After Visit Summary was printed and given to the patient at discharge.  Return if symptoms worsen or fail to improve, for IUD insertion. Sooner if problems arise.          Sharron BORRERO. 6/5/2025   Electronically Signed  " 2-5x week

## 2025-06-07 ENCOUNTER — TRANSCRIPTION ENCOUNTER (OUTPATIENT)
Age: 89
End: 2025-06-07

## 2025-06-09 ENCOUNTER — INPATIENT (INPATIENT)
Facility: HOSPITAL | Age: 89
LOS: 3 days | Discharge: HOME | DRG: 446 | End: 2025-06-13
Attending: INTERNAL MEDICINE | Admitting: INTERNAL MEDICINE
Payer: MEDICARE

## 2025-06-09 ENCOUNTER — TRANSCRIPTION ENCOUNTER (OUTPATIENT)
Age: 89
End: 2025-06-09

## 2025-06-09 VITALS
WEIGHT: 130.07 LBS | TEMPERATURE: 97 F | SYSTOLIC BLOOD PRESSURE: 127 MMHG | OXYGEN SATURATION: 98 % | HEIGHT: 61 IN | HEART RATE: 66 BPM | RESPIRATION RATE: 16 BRPM | DIASTOLIC BLOOD PRESSURE: 76 MMHG

## 2025-06-09 DIAGNOSIS — Z96.7 PRESENCE OF OTHER BONE AND TENDON IMPLANTS: Chronic | ICD-10-CM

## 2025-06-09 DIAGNOSIS — E11.9 TYPE 2 DIABETES MELLITUS WITHOUT COMPLICATIONS: ICD-10-CM

## 2025-06-09 DIAGNOSIS — D63.8 ANEMIA IN OTHER CHRONIC DISEASES CLASSIFIED ELSEWHERE: ICD-10-CM

## 2025-06-09 DIAGNOSIS — N17.9 ACUTE KIDNEY FAILURE, UNSPECIFIED: ICD-10-CM

## 2025-06-09 DIAGNOSIS — I10 ESSENTIAL (PRIMARY) HYPERTENSION: ICD-10-CM

## 2025-06-09 DIAGNOSIS — K81.9 CHOLECYSTITIS, UNSPECIFIED: ICD-10-CM

## 2025-06-09 DIAGNOSIS — K80.50 CALCULUS OF BILE DUCT WITHOUT CHOLANGITIS OR CHOLECYSTITIS WITHOUT OBSTRUCTION: ICD-10-CM

## 2025-06-09 DIAGNOSIS — Z29.9 ENCOUNTER FOR PROPHYLACTIC MEASURES, UNSPECIFIED: ICD-10-CM

## 2025-06-09 DIAGNOSIS — E78.5 HYPERLIPIDEMIA, UNSPECIFIED: ICD-10-CM

## 2025-06-09 LAB
ALBUMIN SERPL ELPH-MCNC: 2.3 G/DL — LOW (ref 3.3–5)
ALP SERPL-CCNC: 970 U/L — HIGH (ref 40–120)
ALT FLD-CCNC: 156 U/L — HIGH (ref 12–78)
ANION GAP SERPL CALC-SCNC: 7 MMOL/L — SIGNIFICANT CHANGE UP (ref 5–17)
AST SERPL-CCNC: 172 U/L — HIGH (ref 15–37)
BASOPHILS # BLD AUTO: 0.06 K/UL — SIGNIFICANT CHANGE UP (ref 0–0.2)
BASOPHILS NFR BLD AUTO: 0.6 % — SIGNIFICANT CHANGE UP (ref 0–2)
BILIRUB DIRECT SERPL-MCNC: 0.7 MG/DL — HIGH (ref 0–0.3)
BILIRUB INDIRECT FLD-MCNC: 0.2 MG/DL — SIGNIFICANT CHANGE UP (ref 0.2–1)
BILIRUB SERPL-MCNC: 0.9 MG/DL — SIGNIFICANT CHANGE UP (ref 0.2–1.2)
BUN SERPL-MCNC: 37 MG/DL — HIGH (ref 7–23)
CALCIUM SERPL-MCNC: 9 MG/DL — SIGNIFICANT CHANGE UP (ref 8.5–10.1)
CHLORIDE SERPL-SCNC: 101 MMOL/L — SIGNIFICANT CHANGE UP (ref 96–108)
CO2 SERPL-SCNC: 28 MMOL/L — SIGNIFICANT CHANGE UP (ref 22–31)
CREAT SERPL-MCNC: 2.2 MG/DL — HIGH (ref 0.5–1.3)
EGFR: 20 ML/MIN/1.73M2 — LOW
EGFR: 20 ML/MIN/1.73M2 — LOW
EOSINOPHIL # BLD AUTO: 1.32 K/UL — HIGH (ref 0–0.5)
EOSINOPHIL NFR BLD AUTO: 14 % — HIGH (ref 0–6)
GLUCOSE SERPL-MCNC: 113 MG/DL — HIGH (ref 70–99)
HCT VFR BLD CALC: 25.2 % — LOW (ref 34.5–45)
HGB BLD-MCNC: 8.2 G/DL — LOW (ref 11.5–15.5)
IMM GRANULOCYTES NFR BLD AUTO: 0.8 % — SIGNIFICANT CHANGE UP (ref 0–0.9)
LACTATE SERPL-SCNC: 1.5 MMOL/L — SIGNIFICANT CHANGE UP (ref 0.7–2)
LIDOCAIN IGE QN: 693 U/L — HIGH (ref 13–75)
LYMPHOCYTES # BLD AUTO: 1.66 K/UL — SIGNIFICANT CHANGE UP (ref 1–3.3)
LYMPHOCYTES # BLD AUTO: 17.6 % — SIGNIFICANT CHANGE UP (ref 13–44)
MCHC RBC-ENTMCNC: 26.6 PG — LOW (ref 27–34)
MCHC RBC-ENTMCNC: 32.5 G/DL — SIGNIFICANT CHANGE UP (ref 32–36)
MCV RBC AUTO: 81.8 FL — SIGNIFICANT CHANGE UP (ref 80–100)
MONOCYTES # BLD AUTO: 1.13 K/UL — HIGH (ref 0–0.9)
MONOCYTES NFR BLD AUTO: 12 % — SIGNIFICANT CHANGE UP (ref 2–14)
NEUTROPHILS # BLD AUTO: 5.2 K/UL — SIGNIFICANT CHANGE UP (ref 1.8–7.4)
NEUTROPHILS NFR BLD AUTO: 55 % — SIGNIFICANT CHANGE UP (ref 43–77)
NRBC BLD AUTO-RTO: 0 /100 WBCS — SIGNIFICANT CHANGE UP (ref 0–0)
PLATELET # BLD AUTO: 339 K/UL — SIGNIFICANT CHANGE UP (ref 150–400)
POTASSIUM SERPL-MCNC: 3.2 MMOL/L — LOW (ref 3.5–5.3)
POTASSIUM SERPL-SCNC: 3.2 MMOL/L — LOW (ref 3.5–5.3)
PROT SERPL-MCNC: 7.4 G/DL — SIGNIFICANT CHANGE UP (ref 6–8.3)
RBC # BLD: 3.08 M/UL — LOW (ref 3.8–5.2)
RBC # FLD: 18.3 % — HIGH (ref 10.3–14.5)
SODIUM SERPL-SCNC: 136 MMOL/L — SIGNIFICANT CHANGE UP (ref 135–145)
WBC # BLD: 9.45 K/UL — SIGNIFICANT CHANGE UP (ref 3.8–10.5)
WBC # FLD AUTO: 9.45 K/UL — SIGNIFICANT CHANGE UP (ref 3.8–10.5)

## 2025-06-09 PROCEDURE — 76705 ECHO EXAM OF ABDOMEN: CPT | Mod: 26

## 2025-06-09 PROCEDURE — 99285 EMERGENCY DEPT VISIT HI MDM: CPT | Mod: FS

## 2025-06-09 RX ORDER — METOPROLOL SUCCINATE 50 MG/1
100 TABLET, EXTENDED RELEASE ORAL DAILY
Refills: 0 | Status: DISCONTINUED | OUTPATIENT
Start: 2025-06-09 | End: 2025-06-11

## 2025-06-09 RX ORDER — HYDROXYZINE HYDROCHLORIDE 25 MG/1
25 TABLET, FILM COATED ORAL ONCE
Refills: 0 | Status: COMPLETED | OUTPATIENT
Start: 2025-06-09 | End: 2025-06-09

## 2025-06-09 RX ORDER — FOLIC ACID 1 MG/1
1 TABLET ORAL
Refills: 0 | DISCHARGE

## 2025-06-09 RX ORDER — FOLIC ACID 1 MG/1
1 TABLET ORAL DAILY
Refills: 0 | Status: DISCONTINUED | OUTPATIENT
Start: 2025-06-09 | End: 2025-06-11

## 2025-06-09 RX ORDER — LANOLIN/MINERAL OIL/PETROLATUM
1 OINTMENT (GRAM) OPHTHALMIC (EYE) DAILY
Refills: 0 | Status: DISCONTINUED | OUTPATIENT
Start: 2025-06-09 | End: 2025-06-11

## 2025-06-09 RX ORDER — ALOGLIPTIN 6.25 MG/1
1 TABLET, FILM COATED ORAL
Refills: 0 | DISCHARGE

## 2025-06-09 RX ORDER — SODIUM BICARBONATE 1 MEQ/ML
650 SYRINGE (ML) INTRAVENOUS
Refills: 0 | DISCHARGE

## 2025-06-09 RX ORDER — HEPARIN SODIUM 1000 [USP'U]/ML
5000 INJECTION INTRAVENOUS; SUBCUTANEOUS EVERY 8 HOURS
Refills: 0 | Status: DISCONTINUED | OUTPATIENT
Start: 2025-06-09 | End: 2025-06-10

## 2025-06-09 RX ORDER — ERTAPENEM SODIUM 1 G/1
500 INJECTION, POWDER, LYOPHILIZED, FOR SOLUTION INTRAMUSCULAR; INTRAVENOUS EVERY 24 HOURS
Refills: 0 | Status: DISCONTINUED | OUTPATIENT
Start: 2025-06-10 | End: 2025-06-10

## 2025-06-09 RX ORDER — MELATONIN 5 MG
3 TABLET ORAL AT BEDTIME
Refills: 0 | Status: DISCONTINUED | OUTPATIENT
Start: 2025-06-09 | End: 2025-06-11

## 2025-06-09 RX ORDER — FLUTICASONE PROPIONATE 50 UG/1
1 SPRAY, METERED NASAL
Refills: 0 | Status: DISCONTINUED | OUTPATIENT
Start: 2025-06-09 | End: 2025-06-11

## 2025-06-09 RX ADMIN — HYDROXYZINE HYDROCHLORIDE 25 MILLIGRAM(S): 25 TABLET, FILM COATED ORAL at 22:59

## 2025-06-09 RX ADMIN — Medication 1000 MILLILITER(S): at 16:10

## 2025-06-09 RX ADMIN — Medication 75 MILLILITER(S): at 22:59

## 2025-06-09 RX ADMIN — Medication 3 MILLIGRAM(S): at 22:59

## 2025-06-09 NOTE — ED ADULT TRIAGE NOTE - CHIEF COMPLAINT QUOTE
brought in by ems- for abnormal liver values- patient has an IV #22 right hand- patient finished 1 dose of antibiotics for UTI , now on second dose of antibiotics

## 2025-06-09 NOTE — H&P ADULT - PROBLEM SELECTOR PLAN 3
Chronic  - takes hydralazine and metoprolol at home  - continue home medications with hold parameters  - monitor routine hemodynamics

## 2025-06-09 NOTE — H&P ADULT - NSHPSOCIALHISTORY_GEN_ALL_CORE
Lives: assisted living  ADLs: dependent  Diet:  Alcohol Use: denies  Tobacco Use: remote history  Recreational Drug Use: denies Lives: assisted living  ADLs: dependent  Diet: regular  Alcohol Use: denies  Tobacco Use: remote history  Recreational Drug Use: denies

## 2025-06-09 NOTE — H&P ADULT - HISTORY OF PRESENT ILLNESS
Pt is a 89 y/o F with PMHx of anemia, DM2, HTN, HLD, CKD stage 3, recent admission to [facility] 5/19-5/23 for severe sepsis 2/2 UTI and pyelonephritis with r/o acute pino however HIDA and MRCP negative at the time s/p IV abx completed at rehab who presents to the ED for fever, generalized itching, nausea and poor PO intake.    ED course:  VS: /76, HR 66, T 97.3F, RR 16, SpO2 98% on RA  Labs: H/H 8.2/25.2, K 3.2, BUN/Cr 37/2.20, GFR 20, Direct Bili 0.7, TBili 0.9, , AST//156, Lipase 693  RUQ US: Cholelithiasis and gallbladder wall thickening, similar to 5/19/2025. Findings are equivocal for acute cholecystitis. Consider further evaluation with nuclear medicine HIDA scan.  UA pending  Received in the ED: 1L NS bolus Pt is a 89 y/o F with PMHx of anemia, DM2, HTN, HLD, CKD stage 3, recent admission to [facility] 5/19-5/23 for severe sepsis 2/2 UTI and pyelonephritis with r/o acute pino however HIDA and MRCP negative at the time s/p IV abx completed at rehab who presents to the ED for fever, generalized itching, nausea and poor PO intake. History obtained from patient and her two daughters at bedside. Pt finished her antibiotics (meropenem and linezolid) on 5/28, then on 6/6 patient spiked a fever to 102F and began with symptoms of R shoulder pain, generalized itching, nausea, and poor PO intake. She was started on Invanz at her rehab center, and her fever and elevated WBCs improved. Pt was evaluated today at her rehab center ([facility]) and had labwork done and then recommended she present to the ED for further workup. Pt still admits to itching, R shoulder pain, and nausea, but denies any abdominal pain. Last fever 3 days ago. UA at rehab with small LE otherwise negative, UA here pending. No other concerns expressed.    ED course:  VS: /76, HR 66, T 97.3F, RR 16, SpO2 98% on RA  Labs: H/H 8.2/25.2, K 3.2, BUN/Cr 37/2.20, GFR 20, Direct Bili 0.7, TBili 0.9, , AST//156, Lipase 693  RUQ US: Cholelithiasis and gallbladder wall thickening, similar to 5/19/2025. Findings are equivocal for acute cholecystitis. Consider further evaluation with nuclear medicine HIDA scan.  UA pending  Received in the ED: 1L NS bolus

## 2025-06-09 NOTE — H&P ADULT - ASSESSMENT
Pt is a 89 y/o F with PMHx of anemia, DM2, HTN, HLD, CKD stage 3, recent admission to [facility] 5/19-5/23 for severe sepsis 2/2 UTI and pyelonephritis with r/o acute pino however HIDA and MRCP negative at the time s/p IV abx completed at rehab who presents to the ED for fever, generalized itching, nausea and poor PO intake, admitted with possible cholecystitis. Pt is a 89 y/o F with PMHx of anemia, DM2, HTN, HLD, CKD stage 3, recent admission to [facility] 5/19-5/[facility] for severe sepsis 2/2 UTI and pyelonephritis with r/o acute pino however HIDA and MRCP negative at the time s/p IV abx completed at rehab who presents to the ED for fever, generalized itching, nausea and poor PO intake, admitted with  abnormal LFT's Fever at [facility], r/o Ac  cholecystitis.

## 2025-06-09 NOTE — ED ADULT NURSE NOTE - NSFALLFACTORS_ED_ALL_ED
Follow up on 1/6/2026 at 9:00 am for echocardiogram; call sooner if concerns arise in the meantime    Follow up with Deanna Rankin and [facility] as scheduled    Reminder: antibiotic required prior to dental appointments    Call Sara at [phone] with blood pressure readings after your get your new blood pressure cuff  
Weakness

## 2025-06-09 NOTE — CONSULT NOTE ADULT - SUBJECTIVE AND OBJECTIVE BOX
SURGERY PA CONSULT NOTE:    CHIEF COMPLAINT:  Patient is a 89y old  Female who presents with a chief complaint of jaundice and generalized itchiness.    HPI FROM ED:  89-year-old female with a history of anemia, diabetes, hypertension, hyperlipidemia, chronic kidney disease stage III presents with recent admission in April for acute cholecystitis and possible UTI.  The patient was then discharged to rehab.  The patient then returned for decreased p.o. intake and nausea vomiting.  This was on May 18.  The patient was followed for biliary colic and UTI.  During the admission the patient had some minimal elevation of the LFTs with a normal bilirubin.  The patient was discharged back to rehab.  However at this time the patient is been having some fever at home, some generalized itchiness and possible yellowish coloring to her skin.  The patient came back to see GI and surgery for possible workup.    INTERVAL:  History verified as above. Pt seen multiple times in past, most recently admitted to [facility] from 05/19-23/2025 for acute cholecystitis vs UTI. Pt urine culture was positive for pseudomonas and blood cultures positive for ESBL E coli, E. faecium. Pt ultimately received midline IV for course of meropenem. Pt then went to rehab, when she developed generalized itchiness and jaundiced skin as per her family. Per pt, she had been experiencing a "burning sensation" throughout her abdomen as well as poor PO intake, especially with hot foods.  Denies change in urinary/bowel habits, N/V, BRBPR, melena, or LOC.     PAST MEDICAL HISTORY:  PAST MEDICAL & SURGICAL HISTORY:  Hypertension      Diabetes      HLD (hyperlipidemia)      Stage 3 chronic kidney disease      Anemia of chronic disease      Lumbar compression fracture      HTN (hypertension)      S/P ORIF (open reduction internal fixation) fracture  right hip          PAST SURGICAL HISTORY:    REVIEW OF SYSTEMS:  General/Constitutional: No acute distress, no headache, weakness, fevers, or chills    Lymphatic: Denies lumps or swelling in the axillae, groin, or neck bilaterally   Respiratory: Denies cough/hemoptysis, denies wheezing/SOB/dyspnea  Cardiac: Denies chest pain, palpitations  Abdomen:  SEE HPI  Extremities: Denies sores, swelling, discoloration bilat UE/LE  Genitourinary: Denies urinary issues or complaints, denies dysuria/hematuria  Neuro: Denies weakness, paraesthesias, paralysis, syncope, loss of vision  Skin: Denies pruritus, pain, rashes  Psych: Denies hallucinations, visual disturbances, or depression    MEDICATIONS:  Home Medications:  aspirin 81 mg oral delayed release tablet: 1 tab(s) orally every other day (19 May 2025 06:21)  famotidine 20 mg oral tablet: 1 tab(s) orally once a day (19 May 2025 06:21)  fluticasone 50 mcg/inh nasal spray: 1 spray(s) nasal 2 times a day (23 May 2025 14:11)  heparin: 5,000 international unit(s) subcutaneous every 8 hours (23 May 2025 14:11)  hydrALAZINE 100 mg oral tablet: 1 tab(s) orally 2 times a day (19 May 2025 06:20)  Januvia 25 mg oral tablet: 1 tab(s) orally once a day (19 May 2025 06:21)  linezolid 600 mg oral tablet: 1 tab(s) orally every 12 hours (23 May 2025 08:07)  meropenem: 500 milligram(s) intravenous every 12 hours 500 mg IV BID (23 May 2025 14:11)  omeprazole 20 mg oral delayed release capsule: 1 cap(s) orally once a day (19 May 2025 06:21)  simethicone 80 mg oral tablet, chewable: 1 tab(s) orally every 6 hours As needed Gas (23 May 2025 14:11)    MEDICATIONS  (STANDING):    MEDICATIONS  (PRN):      ALLERGIES:   amlodipine ([place])       SOCIAL HISTORY:   Tobacco: Remote hx, unable to recall  Alcohol: Denies  Illicit Drugs: Denies  Lives at: [facility]  Ambulation: w/ walker  ADLs: Dependent      FAMILY HISTORY:  FAMILY HISTORY:  FH: asthma (Father)        VITAL SIGNS:  Vital Signs Last 24 Hrs  T(C): 36.5 (09 Jun 2025 16:19), Max: 36.5 (09 Jun 2025 16:19)  T(F): 97.7 (09 Jun 2025 16:19), Max: 97.7 (09 Jun 2025 16:19)  HR: 64 (09 Jun 2025 16:19) (64 - 66)  BP: 160/86 (09 Jun 2025 16:19) (127/76 - 160/86)  BP(mean): --  RR: 16 (09 Jun 2025 16:19) (16 - 16)  SpO2: 95% (09 Jun 2025 16:19) (95% - 98%)    Parameters below as of 09 Jun 2025 16:19  Patient On (Oxygen Delivery Method): room air        PHYSICAL EXAM:  General: No acute distress, appears comfortable, well nourished, well-groomed, appears stated age, Slightly jaundiced appearance w/ mild scleral icterus.  Head, Eyes, Ears, Nose, Throat: Normal cephalic/atraumatic, anicteric, conjunctiva-non injected and moist    Abdomen: Soft, nondistended, nontender; Kunz's sign negative; no rebound TTP, no guarding.  Extremity: No swelling, or open sores, no gross deformities,  good range of motion   Neuro: Alert and oriented x3, motor and sensory intact   Skin: Good color, turgor, texture with no gross lesions, no eruptions, no rashes, no subcutaneous nodules and normal temperature.     LABS:                        8.2    9.45  )-----------( 339      ( 09 Jun 2025 15:51 )             25.2     06-09    136  |  101  |  37[H]  ----------------------------<  113[H]  3.2[L]   |  28  |  2.20[H]    Ca    9.0      09 Jun 2025 15:51    TPro  7.4  /  Alb  2.3[L]  /  TBili  0.9  /  DBili  0.7[H]  /  AST  172[H]  /  ALT  156[H]  /  AlkPhos  970[H]  06-09      Urinalysis Basic - ( 09 Jun 2025 15:51 )    Color: x / Appearance: x / SG: x / pH: x  Gluc: 113 mg/dL / Ketone: x  / Bili: x / Urobili: x   Blood: x / Protein: x / Nitrite: x   Leuk Esterase: x / RBC: x / WBC x   Sq Epi: x / Non Sq Epi: x / Bacteria: x      LIVER FUNCTIONS - ( 09 Jun 2025 15:51 )  Alb: 2.3 g/dL / Pro: 7.4 g/dL / ALK PHOS: 970 U/L / ALT: 156 U/L / AST: 172 U/L / GGT: x           RADIOLOGY & ADDITIONAL STUDIES:  ACC: [id number] EXAM:  US ABDOMEN RT UPR QUADRANT   ORDERED BY: PRISCILLA LINCOLN   PROCEDURE DATE:  06/09/2025    COMPARISON: MRCP 5/19/2025, CT abdomen/pelvis and right upper quadrant   ultrasound 5/19/2025     FINDINGS:  Liver: Nodular surface contour of the liver.  Bile ducts: Normal caliber. Common bile duct measures 8 mm.  Gallbladder: Tumefactive sludge and cholelithiasis. Gallbladder wall   thickening. Negative sonographic Kunz sign.  Pancreas: Visualized portions are within normal limits.  Right kidney: 8.0 cm. No hydronephrosis.  Ascites: None.  IVC: Visualized portions are within normal limits.    IMPRESSION:  Cholelithiasis and gallbladder wall thickening, similar to 5/19/2025.   Findings are equivocal for acute cholecystitis. Consider further   evaluation with nuclear medicine HIDA scan.    --- End of Report ---  ISHAN TORRES MD; Attending Radiologist    ASSESSMENT:  89-year-old female with a history of anemia, diabetes, hypertension, hyperlipidemia, chronic kidney disease stage III, s/p recent admission to [facility] from 05/19-23/2025 for acute cholecystitis vs UTI, now p/w jaundice and generalized itchiness.  AFVSS. Anemic on labwork also ELVIN likely due to ?poor oral intake. Direct hyperbilirubinemia noted.  Abdomen soft, NTND. Murhpy's sign negative.    PLAN:    - Admit to medicine given age, co-morbidities, and need to r/o choledocholithiasis  - GI consult appreciated, trend Transaminitis and T bili. +/- MRCP  - Given recurrence of symtpoms, would consider definitive tx with cholecystectomy during this admission after risk stratification/optimization by medicine/cardiology  - Hold home ASA in anticipation of OR.  - F/u AM labs  - Case d/w Dr. Portillo.

## 2025-06-09 NOTE — H&P ADULT - NSICDXPASTMEDICALHX_GEN_ALL_CORE_FT
PAST MEDICAL HISTORY:  Anemia of chronic disease     Diabetes     HLD (hyperlipidemia)     HTN (hypertension)     Hypertension     Lumbar compression fracture     Stage 3 chronic kidney disease      PAST MEDICAL HISTORY:  Abnormal LFTs     Anemia of chronic disease     Diabetes     History of sepsis     History of UTI     HLD (hyperlipidemia)     HTN (hypertension)     Hypertension     Lumbar compression fracture     Stage 3 chronic kidney disease

## 2025-06-09 NOTE — H&P ADULT - PROBLEM SELECTOR PLAN 4
Chronic  - continue home statin Chronic  - previously on statin however will keep on hold 2/2 transaminitis

## 2025-06-09 NOTE — CONSULT NOTE ADULT - NSCONSULTADDITIONALINFOA_GEN_ALL_CORE
agree with above unless contradicts below  89 yo with gallstones  multiple admissions  supposedly jaundice  passed stone?  f/u mrcp  poss cholecystectomy during this admission
I reviewed the overnight course of events on the unit, re-confirming the patient history. I discussed the care with the patient and their family  The plan of care was discussed with the nurse practitioner and modifications were made to the notation where appropriate.   Differential diagnosis and plan of care discussed with patient after the evaluation  35 minutes spent on total encounter of which more than fifty percent of the encounter was spent counseling and/or coordinating care by the attending physician.  Advanced care planning was discussed with patient and family.  Advanced care planning forms were reviewed and discussed.  Risks, benefits and alternatives of gastroenterologic procedures were discussed in detail and all questions were answered.

## 2025-06-09 NOTE — CONSULT NOTE ADULT - ASSESSMENT
89 year old female with PMH of HTN, HLD, CKD III, anemia, DM II and recent admission for acute cholecystitis with plans for conservative management who is presenting from rehab for fever, abnormal labs, and generalized pruritis.     Plan:  - Imaging from prior admission reviewed:  HDIA (5/19) with no evidence of acute cholecystitis or biliary obstruction.   MRCP (5/20) showed biliary sludge in the gallbladder with nonspecific thickening of the gallbladder wall. No evidence of choledocholithiasis or biliary ductal dilatation. Pancreatic cysts  - Obtain RUQ US & CMP, f/u results   - Outpatient labs from rehab today reviewed, noted with normal bilirubin and elevated AlkPhos and liver enzymes.   - Continue to trend CMP daily   - Avoid hepatotoxic agents   - NPO for now  - Admit to medicine   - Recommend surgical consultation   - Will follow

## 2025-06-09 NOTE — H&P ADULT - PROBLEM SELECTOR PLAN 1
Pt presenting with fever, itchiness, and elevated liver enzymes on outpatient labs  - VSS on arrival  - labs significant for K 3.2, Direct Bili 0.7, TBili 0.9, , AST//156, Lipase 693  - RUQ US: Cholelithiasis and gallbladder wall thickening, similar to 5/19/2025. Findings are equivocal for acute cholecystitis. Consider further evaluation with nuclear medicine HIDA scan.  - UA pending  - s/p 1L NS bolus in ED  - hold home aspirin for anticipation of OR  - seen by GI and surgery in ED - r/o choledocholithiasis, likely for HIDA in AM  - will start abx Pt presenting with fever, itchiness, and elevated liver enzymes on outpatient labs  - VSS on arrival  - labs significant for K 3.2, Direct Bili 0.7, TBili 0.9, , AST//156, Lipase 693  - RUQ US: Cholelithiasis and gallbladder wall thickening, similar to 5/19/2025. Findings are equivocal for acute cholecystitis. Consider further evaluation with nuclear medicine HIDA scan.  - UA pending, however reviewed UA from rehab earlier today which showed small LE otherwise negative  - s/p 1L NS bolus in ED  - seen by GI and surgery in ED - r/o choledocholithiasis, possible plan for lap pino later this week   - hold home aspirin for anticipation of OR as per surgery note  - will start soft and bite sized diet as some concern expressed for patient choking on food, will get speech & swallow eval in AM  - NPO after midnight for HIDA  - pt has been on invanz 500mg daily at rehab and had improvement in fever and WBC - will continue starting tomorrow as pt already received todays dose  - patient started on cholestyramine and hydroxizine for itching, will continue cholestyramine powder daily  - will give one time dose of hydroxyzine as pt c/o itching which is preventing her from sleeping  - GI following  - surgery following  - will get ID consult Dr. Blanco service Pt presenting with fever, itchiness, and elevated liver enzymes on outpatient labs  - VSS on arrival  - labs significant for K 3.2, Direct Bili 0.7, TBili 0.9, , AST//156, Lipase 693  - RUQ US: Cholelithiasis and gallbladder wall thickening, similar to 5/19/2025. Findings are equivocal for acute cholecystitis. Consider further evaluation with nuclear medicine HIDA scan.  - UA pending, however reviewed UA from rehab earlier today which showed small LE otherwise negative  - s/p 1L NS bolus in ED  - seen by GI and surgery in ED - r/o choledocholithiasis, possible plan for lap pino later this week   - hold home aspirin for anticipation of OR as per surgery note  - will start soft and bite sized diet as some concern expressed for patient choking on food, will get speech & swallow eval in AM  - NPO after midnight for HIDA scan in AM   - pt has been on Invanz 500mg daily at rehab and had improvement in fever and WBC - will continue starting tomorrow as pt already received today's dose  - patient started on cholestyramine and hydroxyzine for itching, will continue cholestyramine powder daily  - will give one time dose of hydroxyzine as pt c/o itching which is preventing her from sleeping  - GI following Dr Dumont   - surgery following  - ID consult Dr. Blanco service for IV Abx

## 2025-06-09 NOTE — H&P ADULT - NEUROLOGICAL
details… sensation intact AAOx 3/normal/cranial nerves II-XII intact/sensation intact/deep reflexes intact/superficial reflexes intact

## 2025-06-09 NOTE — ED ADULT NURSE NOTE - OBJECTIVE STATEMENT
Pt presents to the ED via ambulance s/p abnormal labs. Pt presents to the ED with #22 IV access noted son the right hand ( not dated), removed by RN.

## 2025-06-09 NOTE — ED ADULT NURSE NOTE - NSFALLHARMRISKINTERV_ED_ALL_ED

## 2025-06-09 NOTE — H&P ADULT - RESPIRATORY
clear to auscultation bilaterally/no wheezes/no rales/no rhonchi clear to auscultation bilaterally/no wheezes/no rales/no rhonchi/no respiratory distress/breath sounds equal/good air movement/respirations non-labored

## 2025-06-09 NOTE — H&P ADULT - GASTROINTESTINAL
details… soft/nontender/nondistended/normal active bowel sounds soft/nontender/nondistended/normal active bowel sounds/no guarding/no rigidity/no organomegaly/no palpable shiva/no masses palpable

## 2025-06-09 NOTE — ED PROVIDER NOTE - PROGRESS NOTE DETAILS
Patient seen by GI and surgery advised medical admission MRI possible cholecystectomy if cleared by medicine

## 2025-06-09 NOTE — H&P ADULT - PROBLEM SELECTOR PLAN 5
Chronic  - on januvia at home  - hold home medications  - start ANGELINA with FS QAC/QHS  - hypoglycemia protocol  - A1c 7.0% 4/2025 Chronic  - on alogliptan at home  - hold home medications  - start ANGELINA with FS QAC/QHS  - hypoglycemia protocol  - A1c 7.0% 4/2025

## 2025-06-09 NOTE — ED PROVIDER NOTE - CLINICAL SUMMARY MEDICAL DECISION MAKING FREE TEXT BOX
89-year-old female with a history of anemia, diabetes, hypertension, hyperlipidemia, chronic kidney disease stage III presents with recent admission in April for acute cholecystitis and possible UTI.  The patient was then discharged to rehab.  The patient then returned for decreased p.o. intake and nausea vomiting.  This was on May 18.  The patient was followed for biliary colic and UTI.  During the admission the patient had some minimal elevation of the LFTs with a normal bilirubin.  The patient was discharged back to rehab.  However at this time the patient is been having some fever at home, some generalized itchiness and possible yellowish coloring to her skin.  The patient came back to see GI and surgery for possible workup.  Exam: Nontoxic, well-appearing.  Slight yellowish color tone to her skin.  CTA BL, no W/R/R.  Normal cardiac exam.  Abdomen soft, nontender, nondistended.  No HSM.  No CVAT.  Normal nonfocal neurologic exam.  No C/C/E.  No other acute findings on exam.  Acute weakness, some possible jaundice.  Sent from nursing facility.  Will check labs, repeat ultrasound, GI and possible surgical consultation, reeval

## 2025-06-09 NOTE — H&P ADULT - PROBLEM SELECTOR PLAN 2
BUN/Cr 37/2.20, GFR 20 on arrival  - pt with CKD3 at baseline 1.8 Cr  - ELVIN likely in setting of poor PO intake  - s/p 1L NS in ED  - will start mIVF 75cc x12 hours  - K 3.2 on arrival  - will give K riders  - monitor daily renal function BUN/Cr 37/2.20, GFR 20 on arrival  - pt with CKD3 at baseline 1.8 Cr  - ELVIN likely in setting of poor PO intake  - s/p 1L NS in ED  - will start mIVF 75cc x12 hours  - K 3.2 on arrival  - will give 40meq x2  - monitor daily renal function  - nephro Dr. Ramos consulted

## 2025-06-09 NOTE — H&P ADULT - ATTENDING COMMENTS
Pt is a 89 y/o F with PMHx of anemia, DM2, HTN, HLD, CKD stage 3, recent admission to [facility] for severe sepsis 2/2 UTI and pyelonephritis with r/o acute pino however HIDA and MRCP negative at the time s/p IV abx completed at rehab who presents to the ED for fever, generalized itching, nausea and poor PO intake, admitted with  abnormal LFT's Fever at [facility], r/o Ac  cholecystitis.  Pt seen ,examined, case & care plan d/w pt, residents at [facility].  Consult-  Sx -Dr Dumont   -GI-DR Dumont  ID-DR [facility]  Renal-Dr TORRES group  DVT ppx   NPO, IVF  AM labs   PT Eval  Pt has MOLST in chart

## 2025-06-09 NOTE — CHART NOTE - NSCHARTNOTEFT_GEN_A_CORE
Consult called at 13:15 for jaundice and fevers, previously seen by surgery for rule out acute pino in which no surgical intervention was done. Per ED, abdominal exam normal, at this time no labs or imaging complete, consult signed out to surgical team, pending appropriate workup.

## 2025-06-09 NOTE — H&P ADULT - PROBLEM SELECTOR PLAN 6
H/H 8.2/25.2 on arrival  - this appears consistent with the patients baseline and is likely 2/2 anemia of chronic disease  - no signs/symptoms of active bleeding  - monitor daily cbc

## 2025-06-09 NOTE — CONSULT NOTE ADULT - SUBJECTIVE AND OBJECTIVE BOX
[facility]    Owen Morrison NP    [address] [place] [address]  [phone]      Chief Complaint:  Patient is a 89y old  Female who presents with a chief complaint of fever, abnormal labs, pruritis      HPI:  This is a 89 year old female with PMH of HTN, HLD, CKD III, anemia, DM II and recent admission for acute cholecystitis treated with conservative management who is presenting from rehab for fever, abnormal labs, and generalized pruritis.     Patient seen and examined in ED with daughter present. Patient is disoriented to time otherwise able to recall events from prior admissions. Most of HPI gathered daughter. Patient with recent admission from 4/16-4/22 for acute cholecystitis/UTI and again 5/18-5/23 for severe sepsis 2/2 pyelonephritis. During admission in May, patient underwent MRCP which showed no evidence of choledocholithiasis and HIDA negative for acute cholecystitis/biliary obstruction. Seen by surgery and no surgical intervention recommended at the time. She had PICC placement for IV antibiotics for pyelo and was discharged and sent to rehab. Per daughter, patient completed course of IV antibiotics last Thursday. She was doing well until last Friday (6/6) the patient spiked a fever (Tmax 103F). Antibiotics were re-started but unable to recall which. Also with generalized pruritis and decreased PO intake. Daughter also reports patient c/o nausea but unable to recall if she vomited. Patient denies abdominal pain. No recent travel or sick contacts.     Labs from rehab reviewed:  6/6: Total bili 1.0 / AlkPhos 1047/ /   6/9: Total bili 0.9 / AlkPhos 953 /  /         PAST MEDICAL & SURGICAL HISTORY:  Hypertension      Diabetes      HLD (hyperlipidemia)      Stage 3 chronic kidney disease      Anemia of chronic disease      Lumbar compression fracture      HTN (hypertension)      S/P ORIF (open reduction internal fixation) fracture  right hip          REVIEW OF SYSTEMS:   General: Negative  HEENT: Negative  CV: Negative  Respiratory: Negative  GI: See HPI  : Negative  MSK: Negative  Hematologic: Negative  Skin: Negative    MEDICATIONS:   MEDICATIONS  (STANDING):  sodium chloride 0.9% Bolus 1000 milliLiter(s) IV Bolus once    MEDICATIONS  (PRN):          DIET:          ALLERGIES:   Allergies    amlodipine (Flushing)    Intolerances        VITAL SIGNS:   Vital Signs Last 24 Hrs  T(C): 36.3 (09 Jun 2025 13:05), Max: 36.3 (09 Jun 2025 13:05)  T(F): 97.3 (09 Jun 2025 13:05), Max: 97.3 (09 Jun 2025 13:05)  HR: 66 (09 Jun 2025 13:05) (66 - 66)  BP: 127/76 (09 Jun 2025 13:05) (127/76 - 127/76)  BP(mean): --  RR: 16 (09 Jun 2025 13:05) (16 - 16)  SpO2: 98% (09 Jun 2025 13:05) (98% - 98%)    Parameters below as of 09 Jun 2025 13:05  Patient On (Oxygen Delivery Method): room air      I&O's Summary      PHYSICAL EXAM:   GENERAL:  No acute distress  HEENT:  NC/AT  CHEST:  No increased effort  HEART:  Regular rate  ABDOMEN:  Soft, non-tender, non-distended  EXTREMITIES: No cyanosis  SKIN:  Warm, dry  NEURO:  Calm, cooperative    LABS:                                                      RADIOLOGY & ADDITIONAL STUDIES:

## 2025-06-10 ENCOUNTER — RESULT REVIEW (OUTPATIENT)
Age: 89
End: 2025-06-10

## 2025-06-10 LAB
A1C WITH ESTIMATED AVERAGE GLUCOSE RESULT: 6.1 % — HIGH (ref 4–5.6)
ALBUMIN SERPL ELPH-MCNC: 2.1 G/DL — LOW (ref 3.3–5)
ALP SERPL-CCNC: 919 U/L — HIGH (ref 40–120)
ALT FLD-CCNC: 115 U/L — HIGH (ref 12–78)
ANION GAP SERPL CALC-SCNC: 5 MMOL/L — SIGNIFICANT CHANGE UP (ref 5–17)
ANION GAP SERPL CALC-SCNC: 7 MMOL/L — SIGNIFICANT CHANGE UP (ref 5–17)
APPEARANCE UR: CLEAR — SIGNIFICANT CHANGE UP
AST SERPL-CCNC: 113 U/L — HIGH (ref 15–37)
BACTERIA # UR AUTO: ABNORMAL /HPF
BASOPHILS # BLD AUTO: 0.06 K/UL — SIGNIFICANT CHANGE UP (ref 0–0.2)
BASOPHILS NFR BLD AUTO: 0.7 % — SIGNIFICANT CHANGE UP (ref 0–2)
BILIRUB SERPL-MCNC: 1.1 MG/DL — SIGNIFICANT CHANGE UP (ref 0.2–1.2)
BILIRUB UR-MCNC: NEGATIVE — SIGNIFICANT CHANGE UP
BUN SERPL-MCNC: 32 MG/DL — HIGH (ref 7–23)
BUN SERPL-MCNC: 33 MG/DL — HIGH (ref 7–23)
CALCIUM SERPL-MCNC: 8.7 MG/DL — SIGNIFICANT CHANGE UP (ref 8.5–10.1)
CALCIUM SERPL-MCNC: 9.2 MG/DL — SIGNIFICANT CHANGE UP (ref 8.5–10.1)
CHLORIDE SERPL-SCNC: 106 MMOL/L — SIGNIFICANT CHANGE UP (ref 96–108)
CHLORIDE SERPL-SCNC: 109 MMOL/L — HIGH (ref 96–108)
CHOLEST SERPL-MCNC: 173 MG/DL — SIGNIFICANT CHANGE UP
CO2 SERPL-SCNC: 27 MMOL/L — SIGNIFICANT CHANGE UP (ref 22–31)
CO2 SERPL-SCNC: 27 MMOL/L — SIGNIFICANT CHANGE UP (ref 22–31)
COLOR SPEC: YELLOW — SIGNIFICANT CHANGE UP
CREAT SERPL-MCNC: 2 MG/DL — HIGH (ref 0.5–1.3)
CREAT SERPL-MCNC: 2.1 MG/DL — HIGH (ref 0.5–1.3)
DIFF PNL FLD: NEGATIVE — SIGNIFICANT CHANGE UP
EGFR: 22 ML/MIN/1.73M2 — LOW
EGFR: 22 ML/MIN/1.73M2 — LOW
EGFR: 23 ML/MIN/1.73M2 — LOW
EGFR: 23 ML/MIN/1.73M2 — LOW
EOSINOPHIL # BLD AUTO: 1.37 K/UL — HIGH (ref 0–0.5)
EOSINOPHIL NFR BLD AUTO: 14.9 % — HIGH (ref 0–6)
EPI CELLS # UR: PRESENT
ESTIMATED AVERAGE GLUCOSE: 128 MG/DL — HIGH (ref 68–114)
GLUCOSE BLDC GLUCOMTR-MCNC: 115 MG/DL — HIGH (ref 70–99)
GLUCOSE BLDC GLUCOMTR-MCNC: 116 MG/DL — HIGH (ref 70–99)
GLUCOSE BLDC GLUCOMTR-MCNC: 124 MG/DL — HIGH (ref 70–99)
GLUCOSE BLDC GLUCOMTR-MCNC: 99 MG/DL — SIGNIFICANT CHANGE UP (ref 70–99)
GLUCOSE SERPL-MCNC: 97 MG/DL — SIGNIFICANT CHANGE UP (ref 70–99)
GLUCOSE SERPL-MCNC: 98 MG/DL — SIGNIFICANT CHANGE UP (ref 70–99)
GLUCOSE UR QL: NEGATIVE MG/DL — SIGNIFICANT CHANGE UP
HCT VFR BLD CALC: 25.1 % — LOW (ref 34.5–45)
HDLC SERPL-MCNC: 27 MG/DL — LOW
HGB BLD-MCNC: 8.1 G/DL — LOW (ref 11.5–15.5)
IMM GRANULOCYTES NFR BLD AUTO: 0.8 % — SIGNIFICANT CHANGE UP (ref 0–0.9)
KETONES UR QL: NEGATIVE MG/DL — SIGNIFICANT CHANGE UP
LDLC SERPL-MCNC: 102 MG/DL — HIGH
LEUKOCYTE ESTERASE UR-ACNC: ABNORMAL
LIPID PNL WITH DIRECT LDL SERPL: 102 MG/DL — HIGH
LYMPHOCYTES # BLD AUTO: 1.43 K/UL — SIGNIFICANT CHANGE UP (ref 1–3.3)
LYMPHOCYTES # BLD AUTO: 15.5 % — SIGNIFICANT CHANGE UP (ref 13–44)
MCHC RBC-ENTMCNC: 26.6 PG — LOW (ref 27–34)
MCHC RBC-ENTMCNC: 32.3 G/DL — SIGNIFICANT CHANGE UP (ref 32–36)
MCV RBC AUTO: 82.3 FL — SIGNIFICANT CHANGE UP (ref 80–100)
MONOCYTES # BLD AUTO: 1.04 K/UL — HIGH (ref 0–0.9)
MONOCYTES NFR BLD AUTO: 11.3 % — SIGNIFICANT CHANGE UP (ref 2–14)
NEUTROPHILS # BLD AUTO: 5.24 K/UL — SIGNIFICANT CHANGE UP (ref 1.8–7.4)
NEUTROPHILS NFR BLD AUTO: 56.8 % — SIGNIFICANT CHANGE UP (ref 43–77)
NITRITE UR-MCNC: NEGATIVE — SIGNIFICANT CHANGE UP
NONHDLC SERPL-MCNC: 145 MG/DL — HIGH
NRBC BLD AUTO-RTO: 0 /100 WBCS — SIGNIFICANT CHANGE UP (ref 0–0)
PH UR: 5.5 — SIGNIFICANT CHANGE UP (ref 5–8)
PLATELET # BLD AUTO: 342 K/UL — SIGNIFICANT CHANGE UP (ref 150–400)
POTASSIUM SERPL-MCNC: 3.3 MMOL/L — LOW (ref 3.5–5.3)
POTASSIUM SERPL-MCNC: 4.7 MMOL/L — SIGNIFICANT CHANGE UP (ref 3.5–5.3)
POTASSIUM SERPL-SCNC: 3.3 MMOL/L — LOW (ref 3.5–5.3)
POTASSIUM SERPL-SCNC: 4.7 MMOL/L — SIGNIFICANT CHANGE UP (ref 3.5–5.3)
PROT SERPL-MCNC: 6.8 G/DL — SIGNIFICANT CHANGE UP (ref 6–8.3)
PROT UR-MCNC: SIGNIFICANT CHANGE UP MG/DL
RBC # BLD: 3.05 M/UL — LOW (ref 3.8–5.2)
RBC # FLD: 18.6 % — HIGH (ref 10.3–14.5)
RBC CASTS # UR COMP ASSIST: 0 /HPF — SIGNIFICANT CHANGE UP (ref 0–4)
SODIUM SERPL-SCNC: 140 MMOL/L — SIGNIFICANT CHANGE UP (ref 135–145)
SODIUM SERPL-SCNC: 141 MMOL/L — SIGNIFICANT CHANGE UP (ref 135–145)
SP GR SPEC: 1.01 — SIGNIFICANT CHANGE UP (ref 1–1.03)
TRIGL SERPL-MCNC: 251 MG/DL — HIGH
UROBILINOGEN FLD QL: 0.2 MG/DL — SIGNIFICANT CHANGE UP (ref 0.2–1)
WBC # BLD: 9.21 K/UL — SIGNIFICANT CHANGE UP (ref 3.8–10.5)
WBC # FLD AUTO: 9.21 K/UL — SIGNIFICANT CHANGE UP (ref 3.8–10.5)
WBC UR QL: 25 /HPF — HIGH (ref 0–5)

## 2025-06-10 PROCEDURE — 99231 SBSQ HOSP IP/OBS SF/LOW 25: CPT

## 2025-06-10 PROCEDURE — 99222 1ST HOSP IP/OBS MODERATE 55: CPT

## 2025-06-10 PROCEDURE — 93010 ELECTROCARDIOGRAM REPORT: CPT

## 2025-06-10 PROCEDURE — 74176 CT ABD & PELVIS W/O CONTRAST: CPT | Mod: 26

## 2025-06-10 PROCEDURE — 93306 TTE W/DOPPLER COMPLETE: CPT | Mod: 26

## 2025-06-10 PROCEDURE — 78227 HEPATOBIL SYST IMAGE W/DRUG: CPT | Mod: 26

## 2025-06-10 RX ORDER — PIPERACILLIN-TAZO-DEXTROSE,ISO 3.375G/5
3.38 IV SOLUTION, PIGGYBACK PREMIX FROZEN(ML) INTRAVENOUS ONCE
Refills: 0 | Status: COMPLETED | OUTPATIENT
Start: 2025-06-10 | End: 2025-06-10

## 2025-06-10 RX ORDER — GLUCAGON 3 MG/1
1 POWDER NASAL ONCE
Refills: 0 | Status: DISCONTINUED | OUTPATIENT
Start: 2025-06-10 | End: 2025-06-11

## 2025-06-10 RX ORDER — DIPHENHYDRAMINE HCL 12.5MG/5ML
25 ELIXIR ORAL ONCE
Refills: 0 | Status: COMPLETED | OUTPATIENT
Start: 2025-06-10 | End: 2025-06-10

## 2025-06-10 RX ORDER — DEXTROSE 50 % IN WATER 50 %
25 SYRINGE (ML) INTRAVENOUS ONCE
Refills: 0 | Status: DISCONTINUED | OUTPATIENT
Start: 2025-06-10 | End: 2025-06-11

## 2025-06-10 RX ORDER — SODIUM CHLORIDE 9 G/1000ML
1000 INJECTION, SOLUTION INTRAVENOUS
Refills: 0 | Status: DISCONTINUED | OUTPATIENT
Start: 2025-06-10 | End: 2025-06-11

## 2025-06-10 RX ORDER — DIPHENHYDRAMINE HCL 12.5MG/5ML
25 ELIXIR ORAL EVERY 4 HOURS
Refills: 0 | Status: DISCONTINUED | OUTPATIENT
Start: 2025-06-10 | End: 2025-06-11

## 2025-06-10 RX ORDER — DEXTROSE 50 % IN WATER 50 %
15 SYRINGE (ML) INTRAVENOUS ONCE
Refills: 0 | Status: DISCONTINUED | OUTPATIENT
Start: 2025-06-10 | End: 2025-06-11

## 2025-06-10 RX ORDER — FERROUS SULFATE 137(45) MG
325 TABLET, EXTENDED RELEASE ORAL DAILY
Refills: 0 | Status: DISCONTINUED | OUTPATIENT
Start: 2025-06-10 | End: 2025-06-11

## 2025-06-10 RX ORDER — INDOCYANINE GREEN AND WATER 25 MG
5 KIT INJECTION ONCE
Refills: 0 | Status: COMPLETED | OUTPATIENT
Start: 2025-06-10 | End: 2025-06-11

## 2025-06-10 RX ORDER — DEXTROSE 50 % IN WATER 50 %
12.5 SYRINGE (ML) INTRAVENOUS ONCE
Refills: 0 | Status: DISCONTINUED | OUTPATIENT
Start: 2025-06-10 | End: 2025-06-11

## 2025-06-10 RX ORDER — INSULIN LISPRO 100 U/ML
INJECTION, SOLUTION INTRAVENOUS; SUBCUTANEOUS EVERY 6 HOURS
Refills: 0 | Status: DISCONTINUED | OUTPATIENT
Start: 2025-06-10 | End: 2025-06-11

## 2025-06-10 RX ORDER — PIPERACILLIN-TAZO-DEXTROSE,ISO 3.375G/5
3.38 IV SOLUTION, PIGGYBACK PREMIX FROZEN(ML) INTRAVENOUS EVERY 6 HOURS
Refills: 0 | Status: DISCONTINUED | OUTPATIENT
Start: 2025-06-11 | End: 2025-06-11

## 2025-06-10 RX ADMIN — FLUTICASONE PROPIONATE 1 SPRAY(S): 50 SPRAY, METERED NASAL at 05:49

## 2025-06-10 RX ADMIN — Medication 4 GRAM(S): at 23:11

## 2025-06-10 RX ADMIN — HEPARIN SODIUM 5000 UNIT(S): 1000 INJECTION INTRAVENOUS; SUBCUTANEOUS at 05:49

## 2025-06-10 RX ADMIN — Medication 10 MILLIGRAM(S): at 14:49

## 2025-06-10 RX ADMIN — FOLIC ACID 1 MILLIGRAM(S): 1 TABLET ORAL at 14:50

## 2025-06-10 RX ADMIN — Medication 40 MILLIEQUIVALENT(S): at 08:13

## 2025-06-10 RX ADMIN — Medication 100 MILLIGRAM(S): at 14:50

## 2025-06-10 RX ADMIN — Medication 40 MILLIEQUIVALENT(S): at 05:48

## 2025-06-10 RX ADMIN — HEPARIN SODIUM 5000 UNIT(S): 1000 INJECTION INTRAVENOUS; SUBCUTANEOUS at 14:48

## 2025-06-10 RX ADMIN — Medication 100 MILLIGRAM(S): at 05:48

## 2025-06-10 RX ADMIN — Medication 100 MILLIGRAM(S): at 23:16

## 2025-06-10 RX ADMIN — Medication 200 GRAM(S): at 14:48

## 2025-06-10 RX ADMIN — FLUTICASONE PROPIONATE 1 SPRAY(S): 50 SPRAY, METERED NASAL at 18:28

## 2025-06-10 RX ADMIN — Medication 2.4 MILLIGRAM(S): at 11:23

## 2025-06-10 RX ADMIN — Medication 25 MILLIGRAM(S): at 23:11

## 2025-06-10 RX ADMIN — Medication 40 MILLIGRAM(S): at 05:48

## 2025-06-10 RX ADMIN — Medication 20 MILLIGRAM(S): at 14:50

## 2025-06-10 RX ADMIN — Medication 25 GRAM(S): at 19:21

## 2025-06-10 RX ADMIN — METOPROLOL SUCCINATE 100 MILLIGRAM(S): 50 TABLET, EXTENDED RELEASE ORAL at 05:48

## 2025-06-10 RX ADMIN — Medication 25 MILLIGRAM(S): at 14:49

## 2025-06-10 RX ADMIN — Medication 325 MILLIGRAM(S): at 14:49

## 2025-06-10 RX ADMIN — Medication 2.4 MILLIGRAM(S): at 11:11

## 2025-06-10 NOTE — PROGRESS NOTE ADULT - ASSESSMENT
Pt is a 89 y/o F with PMHx of anemia, DM2, HTN, HLD, CKD stage 3, recent admission to [facility] 5/19-5/23 for severe sepsis 2/2 UTI and pyelonephritis with r/o acute pino however HIDA and MRCP negative at the time s/p IV abx completed at rehab who presents to the ED for fever, generalized itching, nausea and poor PO intake, admitted with possible cholecystitis. Pt is a 89 y/o F with PMHx of anemia, DM2, HTN, HLD, CKD stage 3, recent admission to [facility]"" 5/19-5/23 for severe sepsis 2/2 UTI and pyelonephritis with r/o acute pino however HIDA and MRCP negative at the time s/p IV abx completed at rehab who presents to the ED for fever, generalized itching, nausea and poor PO intake, admitted with abnormal LFT's  r/o Ac on chronic  cholecystitis.

## 2025-06-10 NOTE — PROGRESS NOTE ADULT - SUBJECTIVE AND OBJECTIVE BOX
Patient is a 89y old  Female who presents with a chief complaint of r/o cholecystitis/choledocholithiasis (2025 19:40)    HPI:  Pt is a 89 y/o F with PMHx of anemia, DM2, HTN, HLD, CKD stage 3, recent admission to [facility] - for severe sepsis 2/2 UTI and pyelonephritis with r/o acute ipno however HIDA and MRCP negative at the time s/p IV abx completed at rehab who presents to the ED for fever, generalized itching, nausea and poor PO intake. History obtained from patient and her two daughters at bedside. Pt finished her antibiotics (meropenem and linezolid) on , then on  patient spiked a fever to 102F and began with symptoms of R shoulder pain, generalized itching, nausea, and poor PO intake. She was started on Invanz at her rehab center, and her fever and elevated WBCs improved. Pt was evaluated today at her rehab center ([facility]) and had labwork done and then recommended she present to the ED for further workup. Pt still admits to itching, R shoulder pain, and nausea, but denies any abdominal pain. Last fever 3 days ago. UA at rehab with small LE otherwise negative, UA here pending. No other concerns expressed.    ED course:  VS: /76, HR 66, T 97.3F, RR 16, SpO2 98% on RA  Labs: H/H 8.2/25.2, K 3.2, BUN/Cr 37/2.20, GFR 20, Direct Bili 0.7, TBili 0.9, , AST//156, Lipase 693  RUQ US: Cholelithiasis and gallbladder wall thickening, similar to 2025. Findings are equivocal for acute cholecystitis. Consider further evaluation with nuclear medicine HIDA scan.  UA pending  Received in the ED: 1L NS bolus (2025 19:40)    INTERVAL HPI:  6/10: Pt seen and examined at bedside. Pt states that she has extreme generalized itching. Denies abdominal pain, fever, chills at present. On IV invanz. NPO after midnight for possible procedure    OVERNIGHT EVENTS: None    Home Medications:  alogliptin 6.25 mg oral tablet: 1 tab(s) orally once a day (2025 21:59)  Artificial Tears ophthalmic solution: 1 drop(s) in the right eye once a day (2025 22:05)  aspirin 81 mg oral delayed release tablet: 1 tab(s) orally every other day (:06)  Claritin 10 mg oral tablet: 1 tab(s) orally once a day (2025 22:05)  famotidine 20 mg oral tablet: 1 tab(s) orally once a day (:06)  fluticasone 50 mcg/inh nasal spray: 1 spray(s) nasal 2 times a day (:06)  folic acid 1 mg oral tablet: 1 tab(s) orally once a day (:05)  heparin: 5,000 international unit(s) subcutaneous every 12 hours (2025 21:56)  hydrALAZINE 100 mg oral tablet: 1 tab(s) orally 3 times a day (2025 21:57)  Invanz 1 g injection: 500 milligram(s) intravenously once a day (2025 22:06)  omeprazole 20 mg oral delayed release capsule: 1 cap(s) orally once a day (:06)  potassium chloride: 10 milliequivalent(s) once a day (:05)  Questran 4 g/9 g oral powder for reconstitution: 4 gram(s) orally once a day (2025 22:05)  sodium bicarbonate: 650 milligram(s) orally 3 times a day (2025 22:05)  torsemide 20 mg oral tablet: 1 tab(s) orally once a day (2025 22:05)  Vistaril 25 mg oral capsule: 1 cap(s) orally 2 times a day (:05)      MEDICATIONS  (STANDING):  artificial tears (preservative free) Ophthalmic Solution 1 Drop(s) Right EYE daily  cetirizine 10 milliGRAM(s) Oral daily  cholestyramine Powder (Sugar-Free) 4 Gram(s) Oral two times a day  ertapenem  IVPB 500 milliGRAM(s) IV Intermittent every 24 hours  famotidine    Tablet 20 milliGRAM(s) Oral daily  fluticasone propionate 50 MICROgram(s)/spray Nasal Spray 1 Spray(s) Both Nostrils two times a day  folic acid 1 milliGRAM(s) Oral daily  heparin   Injectable 5000 Unit(s) SubCutaneous every 8 hours  hydrALAZINE 100 milliGRAM(s) Oral every 8 hours  metoprolol succinate  milliGRAM(s) Oral daily  pantoprazole    Tablet 40 milliGRAM(s) Oral before breakfast  potassium chloride    Tablet ER 40 milliEquivalent(s) Oral every 4 hours  sodium chloride 0.9%. 1000 milliLiter(s) (75 mL/Hr) IV Continuous <Continuous>    MEDICATIONS  (PRN):  melatonin 3 milliGRAM(s) Oral at bedtime PRN Insomnia      Allergies    amlodipine (Flushing)    Intolerances        Social History:  Lives: assisted living  ADLs: dependent  Diet: regular  Alcohol Use: denies  Tobacco Use: remote history  Recreational Drug Use: denies (2025 19:40)      REVIEW OF SYSTEMS:  CONSTITUTIONAL: No fever, No chills, No fatigue, No myalgia, No Body ache, No Weakness  EYES: No eye pain,  No visual disturbances, No discharge, NO Redness  ENMT:  No ear pain, No nose bleed, No vertigo; No sinus pain, NO throat pain, No Congestion  NECK: No pain, No stiffness  RESPIRATORY: No cough, NO wheezing, No  hemoptysis, NO  shortness of breath  CARDIOVASCULAR: No chest pain, palpitations  GASTROINTESTINAL: No abdominal pain, NO epigastric pain. No nausea, No vomiting; No diarrhea, No constipation. [  ] BM  GENITOURINARY: No dysuria, No frequency, No urgency, No hematuria, NO incontinence  NEUROLOGICAL: No headaches, No dizziness, No numbness, No tingling, No tremors, No weakness  EXT: No Swelling, No Pain, No Edema  SKIN:  [ X ] itching, whole-body [ X ] no burning, rashes, or lesions   MUSCULOSKELETAL: No joint pain ,No Jt swelling; No muscle pain, No back pain, No extremity pain  PSYCHIATRIC: No depression,  No anxiety,  No mood swings ,No difficulty sleeping at night  PAIN SCALE: [ X ] None  [  ] Other-  ROS Unable to obtain due to - [  ] Dementia  [  ] Lethargy [  ] Drowsy [  ] Sedated [  ] non verbal  REST OF REVIEW Of SYSTEM - [ X ] Normal     Vital Signs Last 24 Hrs  T(C): 36.6 (10 Ghassan 2025 05:03), Max: 36.7 (2025 22:21)  T(F): 97.9 (10 Ghassan 2025 05:03), Max: 98 (2025 22:21)  HR: 77 (10 Ghasasn 2025 05:03) (64 - 77)  BP: 127/64 (10 Ghassan 2025 05:03) (127/64 - 168/69)  BP(mean): --  RR: 18 (10 Ghassan 2025 05:03) (16 - 18)  SpO2: 93% (10 Ghassan 2025 05:03) (93% - 98%)    Parameters below as of 10 Ghassan 2025 05:03  Patient On (Oxygen Delivery Method): room air      Finger Stick          PHYSICAL EXAM:  GENERAL:  [ X ] mild distress 2/2 itchiness , [ X ] well appearing, [  ] Agitated, [  ] Drowsy,  [  ] Lethargy, [  ] confused   HEAD:  [ X ] Normal, [  ] Other  EYES:  [ X ] EOMI, [ X ] PERRLA, [ X ] conjunctiva and sclera clear normal, [ X ] NO scleral iceterus  [  ] Pallor,[  ] Discharge  ENMT:  [ X ] Normal, [ X ] Moist mucous membranes, [ X ] Good dentition, [ X ] No Thrush  NECK: [ X ] Supple, [ X ] No JVD, [ X ] Normal thyroid, [  ] Lymphadenopathy [  ] Other  CHEST/LUNG:  [ X ] Clear to auscultation bilaterally, [ X ] Breath Sounds equal B/L / Decrease, [  ] poor effort  [ X ] No rales, [ X ] No rhonchi  [ X ]  No wheezing,   HEART:  [ X ] Regular rate and rhythm, [  ] tachycardia, [  ] Bradycardia,  [  ] irregular  [ X ] No murmurs, No rubs, No gallops, [  ] PPM in place (Mfr:  )  ABDOMEN:  [  ] Soft, [ X ] Nontender, [ X ] Nondistended, [ X ] No mass, [ X ] Bowel sounds present, [  ] obese  NERVOUS SYSTEM:  [ X ] Alert & Oriented X3, [ X ] Nonfocal  [  ] Confusion  [  ] Encephalopathic [  ] Sedated [  ] Unable to assess, [  ] Dementia [  ] Other-  EXTREMITIES: [ X ] 2+ Peripheral Pulses, No clubbing, No cyanosis,  [ X ] NO edema B/L lower EXT. [  ] PVD stasis skin changes B/L Lower EXT, [  ] wound  LYMPH: No lymphadenopathy noted  SKIN:  [ X ] No rashes or lesions, [  ] Pressure Ulcers, [  ] ecchymosis, [  ] Skin Tears, [  ] Other    DIET: Diet, NPO after Midnight:      NPO Start Date: 2025,   NPO Start Time: 23:59  Except Medications (25 @ 22:10)  Diet, Regular:   Consistent Carbohydrate Evening Snack  DASH/TLC Sodium & Cholesterol Restricted  Soft and Bite Sized (SOFTBTSZ) (25 @ 22:10)      LABS:                        8.1    9.21  )-----------( 342      ( 10 Ghassan 2025 05:35 )             25.1     2025 15:51    136    |  101    |  37     ----------------------------<  113    3.2     |  28     |  2.20     Ca    9.0        2025 15:51    TPro  7.4    /  Alb  2.3    /  TBili  0.9    /  DBili  0.7    /  AST  172    /  ALT  156    /  AlkPhos  970    2025 15:51      Urinalysis Basic - ( 10 Ghassan 2025 07:10 )    Color: Yellow / Appearance: Clear / S.013 / pH: x  Gluc: x / Ketone: x  / Bili: Negative / Urobili: 0.2 mg/dL   Blood: x / Protein: Trace mg/dL / Nitrite: Negative   Leuk Esterase: Moderate / RBC: 0 /HPF / WBC 25 /HPF   Sq Epi: x / Non Sq Epi: x / Bacteria: Moderate /HPF                        Urinalysis with Rflx Culture (collected 10 Ghassan 2025 07:10)         Anemia Panel:      Thyroid Panel:        Lipase: 693 U/L (25 @ 15:51)          RADIOLOGY & ADDITIONAL TESTS:  < from: US Abdomen Upper Quadrant Right (25 @ 14:55) >  IMPRESSION:  Cholelithiasis and gallbladder wall thickening, similar to 2025.   Findings are equivocal for acute cholecystitis. Consider further   evaluation with nuclear medicine HIDA scan.      HEALTH ISSUES - PROBLEM Dx:  Cholecystitis    HTN (hypertension)    Acute kidney injury superimposed on CKD    HLD (hyperlipidemia)    Diabetes type 2    Anemia of chronic disease    Need for prophylactic measure            Consultant(s) Notes Reviewed:  [ X ] YES     Care Discussed with [X] Consultants  [  ] Patient  [  ] Family [  ] HCP [  ]   [  ] Social Service  [ X ] RN, [  ] Physical Therapy,[  ] Palliative care team  DVT PPX: [  ] Lovenox, [ X ] S C Heparin, [  ] Coumadin, [  ] Xarelto, [  ] Eliquis, [  ] Pradaxa, [  ] IV Heparin drip, [  ] SCD [  ] Contraindication 2 to GI Bleed,[  ] Ambulation [  ] Contraindicated 2 to  bleed [  ] Contraindicated 2 to Brain Bleed  Advanced directive: [  ] None, [ X ] DNR/DNI Patient is a 89y old  Female who presents with a chief complaint of r/o cholecystitis/choledocholithiasis (2025 19:40)    HPI:  Pt is a 89 y/o F with PMHx of anemia, DM2, HTN, HLD, CKD stage 3, recent admission to [facility] - for severe sepsis 2/2 UTI and pyelonephritis with r/o acute pino however HIDA and MRCP negative at the time s/p IV abx completed at rehab who presents to the ED for fever, generalized itching, nausea and poor PO intake. History obtained from patient and her two daughters at bedside. Pt finished her antibiotics (meropenem and linezolid) on , then on  patient spiked a fever to 102F and began with symptoms of R shoulder pain, generalized itching, nausea, and poor PO intake. She was started on Invanz at her rehab center, and her fever and elevated WBCs improved. Pt was evaluated today at her rehab center ([facility]) and had labwork done and then recommended she present to the ED for further workup. Pt still admits to itching, R shoulder pain, and nausea, but denies any abdominal pain. Last fever 3 days ago. UA at rehab with small LE otherwise negative, UA here pending. No other concerns expressed.    ED course:  VS: /76, HR 66, T 97.3F, RR 16, SpO2 98% on RA  Labs: H/H 8.2/25.2, K 3.2, BUN/Cr 37/2.20, GFR 20, Direct Bili 0.7, TBili 0.9, , AST//156, Lipase 693  RUQ US: Cholelithiasis and gallbladder wall thickening, similar to 2025. Findings are equivocal for acute cholecystitis. Consider further evaluation with nuclear medicine HIDA scan.  UA pending  Received in the ED: 1L NS bolus (2025 19:40)    INTERVAL HPI:  6/10: Pt seen and examined at bedside. Pt states that she has extreme generalized itching. Denies abdominal pain, fever, chills at present. S/P  IV invanz.--> Change to IV Zosyn as pt got Rash on chest wall, +Itching  NPO after midnight for possible procedure, Low K, Cr elevated     OVERNIGHT EVENTS: None    Home Medications:  alogliptin 6.25 mg oral tablet: 1 tab(s) orally once a day (2025 21:59)  Artificial Tears ophthalmic solution: 1 drop(s) in the right eye once a day (2025 22:05)  aspirin 81 mg oral delayed release tablet: 1 tab(s) orally every other day (2025 22:06)  Claritin 10 mg oral tablet: 1 tab(s) orally once a day (2025 22:05)  famotidine 20 mg oral tablet: 1 tab(s) orally once a day (2025 22:06)  fluticasone 50 mcg/inh nasal spray: 1 spray(s) nasal 2 times a day (:06)  folic acid 1 mg oral tablet: 1 tab(s) orally once a day (2025 22:05)  heparin: 5,000 international unit(s) subcutaneous every 12 hours (2025 21:56)  hydrALAZINE 100 mg oral tablet: 1 tab(s) orally 3 times a day (2025 21:57)  Invanz 1 g injection: 500 milligram(s) intravenously once a day (2025 22:06)  omeprazole 20 mg oral delayed release capsule: 1 cap(s) orally once a day (2025 22:06)  potassium chloride: 10 milliequivalent(s) once a day (2025 22:05)  Questran 4 g/9 g oral powder for reconstitution: 4 gram(s) orally once a day (2025 22:05)  sodium bicarbonate: 650 milligram(s) orally 3 times a day (2025 22:05)  torsemide 20 mg oral tablet: 1 tab(s) orally once a day (2025 22:05)  Vistaril 25 mg oral capsule: 1 cap(s) orally 2 times a day (2025 22:05)      MEDICATIONS  (STANDING):  artificial tears (preservative free) Ophthalmic Solution 1 Drop(s) Right EYE daily  cetirizine 10 milliGRAM(s) Oral daily  cholestyramine Powder (Sugar-Free) 4 Gram(s) Oral two times a day  ertapenem  IVPB 500 milliGRAM(s) IV Intermittent every 24 hours  famotidine    Tablet 20 milliGRAM(s) Oral daily  fluticasone propionate 50 MICROgram(s)/spray Nasal Spray 1 Spray(s) Both Nostrils two times a day  folic acid 1 milliGRAM(s) Oral daily  heparin   Injectable 5000 Unit(s) SubCutaneous every 8 hours  hydrALAZINE 100 milliGRAM(s) Oral every 8 hours  metoprolol succinate  milliGRAM(s) Oral daily  pantoprazole    Tablet 40 milliGRAM(s) Oral before breakfast  potassium chloride    Tablet ER 40 milliEquivalent(s) Oral every 4 hours  sodium chloride 0.9%. 1000 milliLiter(s) (75 mL/Hr) IV Continuous <Continuous>    MEDICATIONS  (PRN):  melatonin 3 milliGRAM(s) Oral at bedtime PRN Insomnia      Allergies    amlodipine (Flushing)    Intolerances        Social History:  Lives: assisted living  ADLs: dependent  Diet: regular  Alcohol Use: denies  Tobacco Use: remote history  Recreational Drug Use: denies (2025 19:40)      REVIEW OF SYSTEMS:i have no pain , I am Hungry  CONSTITUTIONAL: No fever, No chills, No fatigue, No myalgia, No Body ache, No Weakness  EYES: No eye pain,  No visual disturbances, No discharge, NO Redness  ENMT:  No ear pain, No nose bleed, No vertigo; No sinus pain, NO throat pain, No Congestion  NECK: No pain, No stiffness  RESPIRATORY: No cough, NO wheezing, No  hemoptysis, NO  shortness of breath  CARDIOVASCULAR: No chest pain, palpitations  GASTROINTESTINAL: No abdominal pain, NO epigastric pain. No nausea, No vomiting; No diarrhea, No constipation. [  ] BM  GENITOURINARY: No dysuria, No frequency, No urgency, No hematuria, NO incontinence  NEUROLOGICAL: No headaches, No dizziness, No numbness, No tingling, No tremors, No weakness  EXT: No Swelling, No Pain, No Edema  SKIN:  [ X ] itching, whole-body [ X ] no burning, rashes, or lesions   MUSCULOSKELETAL: No joint pain ,No Jt swelling; No muscle pain, No back pain, No extremity pain  PSYCHIATRIC: No depression,  No anxiety,  No mood swings ,No difficulty sleeping at night  PAIN SCALE: [ X ] None  [  ] Other-  ROS Unable to obtain due to - [  ] Dementia  [  ] Lethargy [  ] Drowsy [  ] Sedated [  ] non verbal  REST OF REVIEW Of SYSTEM - [ X ] Normal     Vital Signs Last 24 Hrs  T(C): 36.6 (10 Ghassan 2025 05:03), Max: 36.7 (2025 22:21)  T(F): 97.9 (10 Ghassan 2025 05:03), Max: 98 (2025 22:21)  HR: 77 (10 Ghassan 2025 05:03) (64 - 77)  BP: 127/64 (10 Ghassan 2025 05:03) (127/64 - 168/69)  BP(mean): --  RR: 18 (10 Ghassan 2025 05:03) (16 - 18)  SpO2: 93% (10 Ghassan 2025 05:03) (93% - 98%)    Parameters below as of 10 Ghassan 2025 05:03  Patient On (Oxygen Delivery Method): room air      Finger Stick      PHYSICAL EXAM:  GENERAL:  [ X ] mild distress 2/2 itchiness , [ X ] well appearing, [  ] Agitated, [  ] Drowsy,  [  ] Lethargy, [  ] confused   HEAD:  [ X ] Normal, [  ] Other  EYES:  [ X ] EOMI, [ X ] PERRLA, [ X ] conjunctiva and sclera clear normal, [ X ] NO scleral iceterus  [  ] Pallor,[  ] Discharge  ENMT:  [ X ] Normal, [ X ] Moist mucous membranes, [ X ] Good dentition, [ X ] No Thrush  NECK: [ X ] Supple, [ X ] No JVD, [ X ] Normal thyroid, [  ] Lymphadenopathy [  ] Other  CHEST/LUNG:  [ X ] Clear to auscultation bilaterally, [ X ] Breath Sounds equal B/L / Decrease, [  ] poor effort  [ X ] No rales, [ X ] No rhonchi  [ X ]  No wheezing,   HEART:  [ X ] Regular rate and rhythm, [  ] tachycardia, [  ] Bradycardia,  [  ] irregular  [ X ] No murmurs, No rubs, No gallops, [  ] PPM in place (Mfr:  )  ABDOMEN:  [  ] Soft, [ X ] Nontender, [ X ] Nondistended, [ X ] No mass, [ X ] Bowel sounds present, [  ] obese  NERVOUS SYSTEM:  [ X ] Alert & Oriented X3, [ X ] Nonfocal  [  ] Confusion  [  ] Encephalopathic [  ] Sedated [  ] Unable to assess, [  ] Dementia [  ] Other-  EXTREMITIES: [ X ] 2+ Peripheral Pulses, No clubbing, No cyanosis,  [ X ] NO edema B/L lower EXT. [  ] PVD stasis skin changes B/L Lower EXT, [  ] wound  LYMPH: No lymphadenopathy noted  SKIN:  [ X ] No rashes or lesions, [  ] Pressure Ulcers, [  ] ecchymosis, [  ] Skin Tears, [  ] Other    DIET: Diet, NPO after Midnight:      NPO Start Date: 2025,   NPO Start Time: 23:59  Except Medications (25 @ 22:10)  Diet, Regular:   Consistent Carbohydrate Evening Snack  DASH/TLC Sodium & Cholesterol Restricted  Soft and Bite Sized (SOFTBTSZ) (25 @ 22:10)      LABS:                        8.1    9.21  )-----------( 342      ( 10 Ghassan 2025 05:35 )             25.1     2025 15:51    136    |  101    |  37     ----------------------------<  113    3.2     |  28     |  2.20     Ca    9.0        2025 15:51    TPro  7.4    /  Alb  2.3    /  TBili  0.9    /  DBili  0.7    /  AST  172    /  ALT  156    /  AlkPhos  970    2025 15:51      Urinalysis Basic - ( 10 Ghassan 2025 07:10 )    Color: Yellow / Appearance: Clear / S.013 / pH: x  Gluc: x / Ketone: x  / Bili: Negative / Urobili: 0.2 mg/dL   Blood: x / Protein: Trace mg/dL / Nitrite: Negative   Leuk Esterase: Moderate / RBC: 0 /HPF / WBC 25 /HPF   Sq Epi: x / Non Sq Epi: x / Bacteria: Moderate /HPF      Urinalysis with Rflx Culture (collected 10 Ghassan 2025 07:10)      Lipase: 693 U/L (25 @ 15:51)    RADIOLOGY & ADDITIONAL TESTS:  < from: NM Hepatobiliary Imaging w/ RX (06.10.25 @ 12:27) >    IMPRESSION: Normal morphine-augmented hepatobiliary scan. No radionuclide   evidence of acute cholecystitis.  No significant interval change since the previous hepatobiliary scan of   2025.    < end of copied text >  < from: CT Abdomen and Pelvis No Cont (06.10.25 @ 14:13) >  LOWER CHEST:  Bibasilar subpleural atelectasis and lower lobe bronchiectasis.  5 mm nodule left lower lobe (301:80).    Evaluation of the solid organ parenchyma is limited without intravenous   contrast.    LIVER: Within normal limits.  BILE DUCTS: Normal caliber.  GALLBLADDER: Gallbladder wall thickening/edema.  SPLEEN: Within normal limits.  PANCREAS: Within normal limits.  ADRENALS: Within normal limits.  KIDNEYS/URETERS:  Left renal cyst.  Intrarenal vascular calcifications.  No hydronephrosis.    Metallic streak artifact related to patient's bilateral orthopedic   hardware degrades image quality limiting evaluation in pelvis.    BLADDER: Limited.  REPRODUCTIVE ORGANS: Nonenlarged.    BOWEL:   High attenuation intraluminal content within the distal   esophagus/gastrointestinal junction.   No bowel obstruction.  Colonic diverticulosis.  Appendix is normal.  PERITONEUM/RETROPERITONEUM: Within normal limits.    VESSELS: Atherosclerotic changes.  LYMPH NODES:  Small periportal lymph nodes.    ABDOMINAL WALL: Within normallimits.    BONES:  Degenerative changes.  Anterolisthesis L5 on S1.  Mild stable compression deformity superior endplate L4.  Left hip hemiarthroplasty.  ORIF right intertrochanteric femur.  Chronic left pubic rami fractures.    IMPRESSION:    Gallbladder wall thickening/edema.  If there is a clinical suspicion for acute cholecystitis, recommend   further evaluation with nuclear medicine HIDA scan.    Other findings as discussed above.    < end of copied text >              < from: US Abdomen Upper Quadrant Right (25 @ 14:55) >  IMPRESSION:  Cholelithiasis and gallbladder wall thickening, similar to 2025.   Findings are equivocal for acute cholecystitis. Consider further   evaluation with nuclear medicine HIDA scan.      HEALTH ISSUES - PROBLEM Dx:  Cholecystitis    HTN (hypertension)    Acute kidney injury superimposed on CKD    HLD (hyperlipidemia)    Diabetes type 2    Anemia of chronic disease    Need for prophylactic measure            Consultant(s) Notes Reviewed:  [ X ] YES     Care Discussed with [X] Consultants  [  ] Patient  [x  ] Family - dtr [  ] HCP [  ]   [  ] Social Service  [ X ] RN, [  ] Physical Therapy,[  ] Palliative care team  DVT PPX: [  ] Lovenox, [ X ] S C Heparin, [  ] Coumadin, [  ] Xarelto, [  ] Eliquis, [  ] Pradaxa, [  ] IV Heparin drip, [  ] SCD [  ] Contraindication 2 to GI Bleed,[  ] Ambulation [  ] Contraindicated 2 to  bleed [  ] Contraindicated 2 to Brain Bleed  Advanced directive: [  ] None, [ X ] DNR/DNI

## 2025-06-10 NOTE — PATIENT PROFILE ADULT - NSTOBACCO TYPE_GEN_A_CORE_RD
80 y/o Male with PMH of CAD with multiple stents, Paroxysmal Afib (CHADVASC score of 5), combined systolic and diastolic heart failure, hearing loss, obesity, former smoker, HLD, HTN, Left eye blindness, PPM presents to the ED complaining of Shortness of Breath. Patient states that he has been short of breath for months but states that recently it has been becoming worse. Patient states that when he walks to the restroom at his home he becomes short of breath. Patient also endorses not being able to lay flat. Patient endorses occasional right sided chest pain also endorses having pain occasionally at PPM site. Patient also endorses right shoulder pain since today.  Patient was seen today by Dr. Fernandez and was sent in for admission for possible CHF exacerbation vs COPD. Patient denies fever, chills, abdominal pain. Patient admits urinary frequency.    pt with cardiac cath with Multiple vessel disease, IABP placed and pt transferred for CABG     11/21/19 s/p CABG, 1 arterial and 2 venous , LIMA to LAD, SVG to Diag, SVG to OM  ef 30, iabp    Post op inotropes/pressors    Extubated d 1    R eye vision disturbance, opthamology consulted, pt refusing mri.    Vision improving    L ct remains in place for air leak, no pneumo.    11/26   lt chest tube with ?  air leak     clamped chest tube   No PTX  ,   neurology cslt for rt eye vision loss
Cigarettes

## 2025-06-10 NOTE — PROGRESS NOTE ADULT - PROBLEM SELECTOR PLAN 6
H/H 8.2/25.2 on arrival  - this appears consistent with the patients baseline and is likely 2/2 anemia of chronic disease  - no signs/symptoms of active bleeding  - monitor daily cbc H/H 8.2/25.2 on arrival  - this appears consistent with the patients baseline and is likely 2/2 anemia of chronic disease  - Iron studies reviewed from 4/25, can also be component of PADMINI  - Start PO iron supplementation  - no signs/symptoms of active bleeding  - monitor daily cbc

## 2025-06-10 NOTE — PROGRESS NOTE ADULT - SUBJECTIVE AND OBJECTIVE BOX
Patient not in room, unable to evaluate. Patient well known to me from office with CKDIII-IV. Baseline Cr 1.8-2. Recent ELVIN with Cr 2.6 outpatient.

## 2025-06-10 NOTE — CONSULT NOTE ADULT - ASSESSMENT
89 y/o F with PMHx of anemia, DM2, HTN, HLD, CKD stage 3, recent admission to [facility] 5/19-5/23 for severe sepsis 2/2 UTI and pyelonephritis with r/o acute pino however HIDA and MRCP negative at the time s/p IV abx completed at rehab who presents to the ED for fever,    Cardiology consulted for cardiac optimization  check 12 lead ekg  last ekg 5/19/2025 SB 55      Echo 2023 lv ef 64%  89 y/o F with PMHx of anemia, DM2, HTN, HLD, CKD stage 3, recent admission to [facility] 5/19-5/23 for severe sepsis 2/2 UTI and pyelonephritis with r/o acute pino however HIDA and MRCP negative at the time s/p IV abx completed at rehab who presents to the ED for fever,    Cardiology consulted for cardiac optimization  6/9 RUQ US: Cholelithiasis and gallbladder wall thickening, similar to 5/19/2025. Findings are equivocal for acute cholecystitis.  Followed by Gi and Surgery   MRCP showed biliary sludge in GB with nonspecific thickening of GB wall but negative for choledocholithiasis  awaiting HIDA   ID following     check 12 lead EKG   last EKG 5/19/2025 SB 55  Statin held for transaminitis    Echo 2023 lv ef 64%    bp labile  on bb and hydralazine    Aemia as per primary , has hx PADMINI     Monitor and replete electrolytes. Keep K>4.0 and Mg>2.0.   89 y/o F with PMHx of anemia, DM2, HTN, HLD, CKD stage 3, recent admission to [facility] 5/19-5/23 for severe sepsis 2/2 UTI and pyelonephritis with r/o acute pino however HIDA and MRCP negative at the time s/p IV abx completed at rehab who presents to the ED for fever,    Cardiology consulted for cardiac optimization  6/9 RUQ US: Cholelithiasis and gallbladder wall thickening, similar to 5/19/2025. Findings are equivocal for acute cholecystitis.  Followed by Gi and Surgery for tenative OR 6/11   MRCP showed biliary sludge in GB with nonspecific thickening of GB wall but negative for choledocholithiasis  awaiting HIDA   ID following     check 12 lead EKG   last EKG 5/19/2025 SB 55  Statin held for transaminitis    Echo 2023 lv ef 64%  no sign fluid overload on exam on room air   Follows [facility]     bp labile  on bb and hydralazine  can change hydralazine to every 6 hours   home losartan and Lasix held for ELVIN     Aemia as per primary , has hx PADMINI     Monitor and replete electrolytes. Keep K>4.0 and Mg>2.0.   89 y/o F with PMHx of anemia, DM2, HTN, HLD, CKD stage 3, recent admission to [facility] 5/19-5/23 for severe sepsis 2/2 UTI and pyelonephritis with r/o acute pino however HIDA and MRCP negative at the time s/p IV abx completed at rehab who presents to the ED for fever,    Cardiology consulted for cardiac optimization  6/9 RUQ US: Cholelithiasis and gallbladder wall thickening, similar to 5/19/2025. Findings are equivocal for acute cholecystitis.  Followed by Gi and Surgery for tenative OR 6/11   MRCP showed biliary sludge in GB with nonspecific thickening of GB wall but negative for choledocholithiasis  awaiting HIDA   ID following     EKG Sr with PACS  Statin held for transaminitis  no anginal complaints     Echo 2023 lv ef 64%  no sign fluid overload on exam on room air   Follows [facility]     bp labile  on bb and hydralazine  can change hydralazine to every 6 hours   home losartan and Lasix held for ELVIN     Aemia as per primary , has hx PADMINI     Monitor and replete electrolytes. Keep K>4.0 and Mg>2.0.   89 y/o F with PMHx of anemia, DM2, HTN, HLD, CKD stage 3, recent admission to [facility] 5/19-5/23 for severe sepsis 2/2 UTI and pyelonephritis with r/o acute pino however HIDA and MRCP negative at the time s/p IV abx completed at rehab who presents to the ED for fever,    Cardiology consulted for cardiac optimization  6/9 RUQ US: Cholelithiasis and gallbladder wall thickening, similar to 5/19/2025. Findings are equivocal for acute cholecystitis.  Followed by Gi and Surgery for tenative OR 6/11   MRCP showed biliary sludge in GB with nonspecific thickening of GB wall but negative for choledocholithiasis  awaiting HIDA   ID following     EKG Sr with PACS  Statin held for transaminitis  no anginal complaints     Echo 2023 lv ef 64%  BNP elevated at outpt office, can check echo prior to OR, dw Dr Escoto   no sign fluid overload on exam on room air   Follows [facility]     bp labile  on bb and hydralazine  can change hydralazine to every 6 hours   home losartan and Lasix held for ELVIN     Aemia as per primary , has hx PADMINI     Monitor and replete electrolytes. Keep K>4.0 and Mg>2.0.

## 2025-06-10 NOTE — PROGRESS NOTE ADULT - PROBLEM SELECTOR PLAN 1
Pt presenting with fever, itchiness, and elevated liver enzymes on outpatient labs  - Direct Bili 0.7, TBili 0.9, , AST//156, Lipase 693  - RUQ US: Cholelithiasis and gallbladder wall thickening, similar to 5/19/2025. Findings are equivocal for acute cholecystitis. Consider further evaluation with nuclear medicine HIDA scan.  - UA: moderate LEC, 25 WBC, mdoerate bacteria  - s/p 1L NS bolus in ED  - seen by GI and surgery in ED - r/o choledocholithiasis, possible plan for lap pino later this week   - hold home aspirin for anticipation of OR as per surgery note  - NPO after midnight for HIDA  - c/w invanz 500mg daily (pt was on at rehab)  - c/w cholestyramine and hydroxizine for itching- will increase cholestyramine to BID given pt still symptomatic   - will start soft and bite sized diet as some concern expressed for patient choking on food, will get speech & swallow eval in AM  - GI following  - Surgery following  - ID (Dr. Macias) consulted, recs appreciated Pt presenting with fever, and elevated liver enzymes on outpatient labs-r/o  Ac on chronic Pino  - Direct Bili 0.7, TBili 0.9, , AST//156, Lipase 693  - RUQ US: Cholelithiasis and gallbladder wall thickening, similar to 5/19/2025. Findings are equivocal for acute cholecystitis. Consider further evaluation with nuclear medicine HIDA scan.  SP IV abx at [facility] VIA Mid line -completed for UTI-Pyelonephritis on last admission    - seen by GI and surgery follow up  - r/o choledocholithiasis,  HIDA scan today-NEG   -possible plan for lap pino as d/w Dr Fischer   - hold home aspirin for anticipation of OR as per surgery   - NPO after midnight   - S/P IV  INVANZ 500mg daily (pt was on at rehab) Now changed to IV Zosyn q 8 hrs as pt has Rash on chest wall   -Likely Reaction from Invanz-Meropenem -STOP  - c/w cholestyramine and PRN Benadryl  for itching- will increase cholestyramine to BID   - will start soft and bite sized diet as some concern expressed for patient choking on food, will get speech & swallow eval in AM  - GI Dr Dumont d/w -HIDA-Neg -Ok to feed pt, Sx follow up  - Surgery Dr fischer- d/w HIDA -NEG, Follow CT A/P , plan for OR on 6/11  - ID (Dr. JENIFFER Holguin d/w -Change to IV Zosyn q 8 hrs  as pt has Rash with Invaz-Meropenem

## 2025-06-10 NOTE — PROGRESS NOTE ADULT - PROBLEM SELECTOR PLAN 5
Chronic  - on alogliptan at home  - hold home medications  - start ANGELINA with FS QAC/QHS  - hypoglycemia protocol  - A1c 7.0% 4/2025 Statement Selected

## 2025-06-10 NOTE — PROGRESS NOTE ADULT - ASSESSMENT
89 year old female with PMH of HTN, HLD, CKD III, anemia, DM II and recent admission for acute cholecystitis with plans for conservative management who is presenting from rehab for fever, abnormal labs, and generalized pruritis.     6/9 RUQ US: Cholelithiasis and gallbladder wall thickening, similar to 5/19/2025. Findings are equivocal for acute cholecystitis.    Plan:  - Imaging from prior admission reviewed: HIDA negative for acute cholecystitis or biliary obstruction and MRCP showed biliary sludge in GB with nonspecific thickening of GB wall but negative for choledocholithiasis.   - RUQ US noted  - HIDA completed, awaiting results   - Normal bilirubin and elevated liver enzymes, continue to trend CMP daily    - Surgery team eval appreciated   - Antibiotics as per ID  - Avoid hepatotoxic agents   - Will follow

## 2025-06-10 NOTE — PROGRESS NOTE ADULT - SUBJECTIVE AND OBJECTIVE BOX
pt seen  doing well  getting [facility]  ICU Vital Signs Last 24 Hrs  T(C): 36.6 (10 Ghassan 2025 05:03), Max: 36.7 (09 Jun 2025 22:21)  T(F): 97.9 (10 Ghassan 2025 05:03), Max: 98 (09 Jun 2025 22:21)  HR: 77 (10 Ghassan 2025 05:03) (64 - 77)  BP: 127/64 (10 Ghassan 2025 05:03) (127/64 - 168/69)  BP(mean): --  ABP: --  ABP(mean): --  RR: 18 (10 Ghassan 2025 05:03) (16 - 18)  SpO2: 93% (10 Ghassan 2025 05:03) (93% - 98%)    O2 Parameters below as of 10 Ghassan 2025 05:03  Patient On (Oxygen Delivery Method): room air        gen-NAD  soft NT/ND                            8.1    9.21  )-----------( 342      ( 10 Ghassan 2025 05:35 )             25.1   06-10    140  |  106  |  33[H]  ----------------------------<  97  3.3[L]   |  27  |  2.00[H]    Ca    8.7      10 Ghassan 2025 05:35    TPro  6.8  /  Alb  2.1[L]  /  TBili  1.1  /  DBili  x   /  AST  113[H]  /  ALT  115[H]  /  AlkPhos  919[H]  06-10

## 2025-06-10 NOTE — PROGRESS NOTE ADULT - PROBLEM SELECTOR PLAN 2
CKD3, baseline Cr 1.8  BUN/Cr 37/2.20, GFR 20 on arrival  - ELVIN likely in setting of poor PO intake  - s/p 1L NS in ED  - c/w mIVF 75cc x12 hours  - K 3.2 on admission, s/p 40meq x2  - monitor daily renal function  - Nephro (Dr. Ramos) consulted Ashley- CKD3, baseline Cr 1.8  BUN/Cr 37/2.20, GFR 20 on arrival  - ASHLEY likely in setting of poor PO intake  - s/p 1L NS in ED  - c/w mIVF 75cc x12 hours  - K 3.2 on admission, s/p 40meq x2  - monitor daily renal function  - Nephro (Dr. 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

## 2025-06-10 NOTE — CONSULT NOTE ADULT - NS ATTEND AMEND GEN_ALL_CORE FT
89 y/o F with PMHx of anemia, DM2, HTN, HLD, CKD stage 3, recent admission to [facility] 5/19-5/23 for severe sepsis 2/2 UTI and pyelonephritis with r/o acute pino however HIDA and MRCP negative at the time s/p IV abx completed at rehab who presents to the ED for fever,    htn, dm, chol  no sig cad or structural heart disease  has had echo in the past reportedly normal  was planned for repeat tte given an elevated bnp  now planned for lap pino  will order repeat tte  no baseline sx of angina, hf or arrhythmia, limited exercise capacity  otherwise optimized for planned intermed risk surgery

## 2025-06-10 NOTE — CONSULT NOTE ADULT - SUBJECTIVE AND OBJECTIVE BOX
[facility]  HERNESTO Ramirez S. Shah, Y. Patel, G. [facility]  [phone]    NELDA VERDIN  89y, Female  [id number]    HPI--  HPI:  Pt is a 89 y/o F with PMHx of anemia, DM2, HTN, HLD, CKD stage 3, recent admission to [facility] - for severe sepsis 2/2 UTI and pyelonephritis with r/o acute pino however HIDA and MRCP negative at the time s/p IV abx completed at rehab who presents to the ED for fever, generalized itching, nausea and poor PO intake. History obtained from patient and her two daughters at bedside. Pt finished her antibiotics (meropenem and linezolid) on , then on  patient spiked a fever to 102F and began with symptoms of R shoulder pain, generalized itching, nausea, and poor PO intake. She was started on Invanz at her rehab center, and her fever and elevated WBCs improved. Pt was evaluated today at her rehab center ([facility]) and had labwork done and then recommended she present to the ED for further workup. Pt still admits to itching, R shoulder pain, and nausea, but denies any abdominal pain. Last fever 3 days ago. UA at rehab with small LE otherwise negative, UA here pending. No other concerns expressed.    ED course:  VS: /76, HR 66, T 97.3F, RR 16, SpO2 98% on RA  Labs: H/H 8.2/25.2, K 3.2, BUN/Cr 37/2.20, GFR 20, Direct Bili 0.7, TBili 0.9, , AST//156, Lipase 693  RUQ US: Cholelithiasis and gallbladder wall thickening, similar to 2025. Findings are equivocal for acute cholecystitis. Consider further evaluation with nuclear medicine HIDA scan.  UA pending  Received in the ED: 1L NS bolus (2025 19:40)    Pt seen at bedside      Active Medications--  artificial tears (preservative free) Ophthalmic Solution 1 Drop(s) Right EYE daily  cetirizine 10 milliGRAM(s) Oral daily  cholestyramine Powder (Sugar-Free) 4 Gram(s) Oral two times a day  dextrose 5%. 1000 milliLiter(s) IV Continuous <Continuous>  dextrose 5%. 1000 milliLiter(s) IV Continuous <Continuous>  dextrose 50% Injectable 25 Gram(s) IV Push once  dextrose 50% Injectable 12.5 Gram(s) IV Push once  dextrose 50% Injectable 25 Gram(s) IV Push once  dextrose Oral Gel 15 Gram(s) Oral once PRN  diphenhydrAMINE 25 milliGRAM(s) Oral every 4 hours PRN  diphenhydrAMINE 25 milliGRAM(s) Oral once  ertapenem  IVPB 500 milliGRAM(s) IV Intermittent every 24 hours  famotidine    Tablet 20 milliGRAM(s) Oral daily  ferrous    sulfate 325 milliGRAM(s) Oral daily  fluticasone propionate 50 MICROgram(s)/spray Nasal Spray 1 Spray(s) Both Nostrils two times a day  folic acid 1 milliGRAM(s) Oral daily  glucagon  Injectable 1 milliGRAM(s) IntraMuscular once  heparin   Injectable 5000 Unit(s) SubCutaneous every 8 hours  hydrALAZINE 100 milliGRAM(s) Oral every 8 hours  insulin lispro (ADMELOG) corrective regimen sliding scale   SubCutaneous every 6 hours  melatonin 3 milliGRAM(s) Oral at bedtime PRN  metoprolol succinate  milliGRAM(s) Oral daily  pantoprazole    Tablet 40 milliGRAM(s) Oral before breakfast  sodium chloride 0.9%. 1000 milliLiter(s) IV Continuous <Continuous>    Antimicrobials:   ertapenem  IVPB 500 milliGRAM(s) IV Intermittent every 24 hours    Immunologic:     ROS:  CONSTITUTIONAL: No fevers or chills. No weakness or headache. No weight changes.  EYES/ENT: No visual or hearing changes. No sore throat or throat pain .  NECK: No pain or stiffness  RESPIRATORY: No cough, wheezing, or hemoptysis. No shortness of breath  CARDIOVASCULAR: No chest pain or palpitations  GASTROINTESTINAL: No abdominal pain. No nausea or vomiting. No diarrhea or constipation.  GENITOURINARY: No dysuria, frequency or hematuria  NEUROLOGICAL: No numbness or weakness  SKIN: No itching or rashes  PSYCHIATRIC: Pleasant. Appropriate affect    Allergies: amlodipine (Flushing)    PMH -- Hypertension    Diabetes    TIA (transient ischemic attack)    Hip fracture    HLD (hyperlipidemia)    Stage 3 chronic kidney disease    Anemia of chronic disease    Lumbar compression fracture    HTN (hypertension)      PSH -- No significant past surgical history    S/P ORIF (open reduction internal fixation) fracture      FH -- FH: asthma (Father)      Social History --  EtOH: denies   Tobacco: denies   Drug Use: denies     Travel/Environmental/Occupational History:    Physical Exam--  Vital Signs Last 24 Hrs  T(F): 98.1 (10 Ghassan 2025 13:19), Max: 98.1 (10 Ghassan 2025 13:19)  HR: 65 (10 Ghassan 2025 13:19) (63 - 77)  BP: 169/61 (10 Ghassan 2025 13:19) (118/50 - 169/61)  RR: 18 (10 Ghassan 2025 13:19) (16 - 18)  SpO2: 96% (10 Ghassan 2025 13:19) (93% - 98%)  General: nontoxic-appearing, no acute distress  HEENT: NC/AT, EOMI  Lungs: CTA  Heart: Regular rate and rhythm.   Abdomen: Soft. Nondistended. Nontender.   Extremities: No cyanosis or clubbing. No edema.   Skin: Warm. Dry. Good turgor. No rash.     Laboratory & Imaging Data:  CBC:                       8.1    9.21  )-----------( 342      ( 10 Ghassan 2025 05:35 )             25.1     CMP: 06-10    140  |  106  |  33[H]  ----------------------------<  97  3.3[L]   |  27  |  2.00[H]    Ca    8.7      10 Ghassan 2025 05:35    TPro  6.8  /  Alb  2.1[L]  /  TBili  1.1  /  DBili  x   /  AST  113[H]  /  ALT  115[H]  /  AlkPhos  919[H]  06-10    LIVER FUNCTIONS - ( 10 Ghassan 2025 05:35 )  Alb: 2.1 g/dL / Pro: 6.8 g/dL / ALK PHOS: 919 U/L / ALT: 115 U/L / AST: 113 U/L / GGT: x           Urinalysis Basic - ( 10 Ghassan 2025 07:10 )    Color: Yellow / Appearance: Clear / S.013 / pH: x  Gluc: x / Ketone: x  / Bili: Negative / Urobili: 0.2 mg/dL   Blood: x / Protein: Trace mg/dL / Nitrite: Negative   Leuk Esterase: Moderate / RBC: 0 /HPF / WBC 25 /HPF   Sq Epi: x / Non Sq Epi: x / Bacteria: Moderate /HPF        Microbiology: reviewed    Urinalysis with Rflx Culture (collected 06-10-25 @ 07:10)          Radiology: reviewed    < from: US Abdomen Upper Quadrant Right (25 @ 14:55) >    ACC: [id number] EXAM:  US ABDOMEN RT UPR QUADRANT   ORDERED BY: PRISCILLA LINCOLN     PROCEDURE DATE:  2025          INTERPRETATION:  CLINICAL INFORMATION: Recent cholecystitis and jaundice.    COMPARISON: MRCP 2025, CT abdomen/pelvis and right upper quadrant   ultrasound 2025    TECHNIQUE: Sonography of the right upper quadrant.    FINDINGS:  Liver: Nodular surface contour of the liver.  Bile ducts: Normal caliber. Common bile duct measures 8 mm.  Gallbladder: Tumefactive sludge and cholelithiasis. Gallbladder wall   thickening. Negative sonographic Kunz sign.  Pancreas: Visualized portions are within normal limits.  Right kidney: 8.0 cm. No hydronephrosis.  Ascites: None.  IVC: Visualized portions are within normal limits.    IMPRESSION:  Cholelithiasis and gallbladder wall thickening, similar to 2025.   Findings are equivocal for acute cholecystitis. Consider further   evaluation with nuclear medicine HIDA scan.    --- End of Report ---             ISHAN TORRES MD; Attending Radiologist  This document has been electronically signed. 2025  3:41PM    < end of copied text >   [facility]  HERNESTO Ramirez S. Shah, Y. Patel, G. [facility]  [phone]    NELDA VERDIN  89y, Female  [id number]    HPI--  HPI:  Pt is a 89 y/o F with PMHx of anemia, DM2, HTN, HLD, CKD stage 3, recent admission to [facility] - for severe sepsis 2/2 UTI and pyelonephritis with r/o acute pino however HIDA and MRCP negative at the time s/p IV abx completed at rehab who presents to the ED for fever, generalized itching, nausea and poor PO intake. History obtained from patient and her two daughters at bedside. Pt finished her antibiotics (meropenem and linezolid) on , then on  patient spiked a fever to 102F and began with symptoms of R shoulder pain, generalized itching, nausea, and poor PO intake. She was started on Invanz at her rehab center, and her fever and elevated WBCs improved. Pt was evaluated today at her rehab center ([facility]) and had labwork done and then recommended she present to the ED for further workup. Pt still admits to itching, R shoulder pain, and nausea, but denies any abdominal pain. Last fever 3 days ago. UA at rehab with small LE otherwise negative, UA here pending. No other concerns expressed.    ED course:  VS: /76, HR 66, T 97.3F, RR 16, SpO2 98% on RA  Labs: H/H 8.2/25.2, K 3.2, BUN/Cr 37/2.20, GFR 20, Direct Bili 0.7, TBili 0.9, , AST//156, Lipase 693  RUQ US: Cholelithiasis and gallbladder wall thickening, similar to 2025. Findings are equivocal for acute cholecystitis. Consider further evaluation with nuclear medicine HIDA scan.  UA pending  Received in the ED: 1L NS bolus (2025 19:40)    HIDA negative    Pt seen at bedside  Daughter at bedside--reporting itching since Friday since ertapenem started  Now w/ rash across upper chest since yesterday  No other complaints       Active Medications--  artificial tears (preservative free) Ophthalmic Solution 1 Drop(s) Right EYE daily  cetirizine 10 milliGRAM(s) Oral daily  cholestyramine Powder (Sugar-Free) 4 Gram(s) Oral two times a day  dextrose 5%. 1000 milliLiter(s) IV Continuous <Continuous>  dextrose 5%. 1000 milliLiter(s) IV Continuous <Continuous>  dextrose 50% Injectable 25 Gram(s) IV Push once  dextrose 50% Injectable 12.5 Gram(s) IV Push once  dextrose 50% Injectable 25 Gram(s) IV Push once  dextrose Oral Gel 15 Gram(s) Oral once PRN  diphenhydrAMINE 25 milliGRAM(s) Oral every 4 hours PRN  diphenhydrAMINE 25 milliGRAM(s) Oral once  ertapenem  IVPB 500 milliGRAM(s) IV Intermittent every 24 hours  famotidine    Tablet 20 milliGRAM(s) Oral daily  ferrous    sulfate 325 milliGRAM(s) Oral daily  fluticasone propionate 50 MICROgram(s)/spray Nasal Spray 1 Spray(s) Both Nostrils two times a day  folic acid 1 milliGRAM(s) Oral daily  glucagon  Injectable 1 milliGRAM(s) IntraMuscular once  heparin   Injectable 5000 Unit(s) SubCutaneous every 8 hours  hydrALAZINE 100 milliGRAM(s) Oral every 8 hours  insulin lispro (ADMELOG) corrective regimen sliding scale   SubCutaneous every 6 hours  melatonin 3 milliGRAM(s) Oral at bedtime PRN  metoprolol succinate  milliGRAM(s) Oral daily  pantoprazole    Tablet 40 milliGRAM(s) Oral before breakfast  sodium chloride 0.9%. 1000 milliLiter(s) IV Continuous <Continuous>    Antimicrobials:   ertapenem  IVPB 500 milliGRAM(s) IV Intermittent every 24 hours    Immunologic:     ROS:  CONSTITUTIONAL: No fevers or chills. No weakness or headache. No weight changes.  EYES/ENT: No visual or hearing changes. No sore throat or throat pain .  NECK: No pain or stiffness  RESPIRATORY: No cough, wheezing, or hemoptysis. No shortness of breath  CARDIOVASCULAR: No chest pain or palpitations  GASTROINTESTINAL: No abdominal pain. No nausea or vomiting. No diarrhea or constipation.  GENITOURINARY: No dysuria, frequency or hematuria  NEUROLOGICAL: No numbness or weakness  SKIN: No itching or rashes  PSYCHIATRIC: Pleasant. Appropriate affect    Allergies: amlodipine (Flushing)    PMH -- Hypertension    Diabetes    TIA (transient ischemic attack)    Hip fracture    HLD (hyperlipidemia)    Stage 3 chronic kidney disease    Anemia of chronic disease    Lumbar compression fracture    HTN (hypertension)      PSH -- No significant past surgical history    S/P ORIF (open reduction internal fixation) fracture      FH -- FH: asthma (Father)      Social History --  EtOH: denies   Tobacco: denies   Drug Use: denies     Travel/Environmental/Occupational History:    Physical Exam--  Vital Signs Last 24 Hrs  T(F): 98.1 (10 Ghassan 2025 13:19), Max: 98.1 (10 Ghassan 2025 13:19)  HR: 65 (10 Ghassan 2025 13:19) (63 - 77)  BP: 169/61 (10 Ghassan 2025 13:19) (118/50 - 169/61)  RR: 18 (10 Ghassan 2025 13:19) (16 - 18)  SpO2: 96% (10 Ghassan 2025 13:19) (93% - 98%)  General: nontoxic-appearing, no acute distress  HEENT: NC/AT, EOMI  Lungs: CTA  Heart: Regular rate and rhythm.   Abdomen: Soft. Nondistended. Nontender.   Extremities: No cyanosis or clubbing. No edema.   Skin: Warm. Dry. Good turgor. No rash.     Laboratory & Imaging Data:  CBC:                       8.1    9.21  )-----------( 342      ( 10 Ghassan 2025 05:35 )             25.1     CMP: 06-10    140  |  106  |  33[H]  ----------------------------<  97  3.3[L]   |  27  |  2.00[H]    Ca    8.7      10 Ghassan 2025 05:35    TPro  6.8  /  Alb  2.1[L]  /  TBili  1.1  /  DBili  x   /  AST  113[H]  /  ALT  115[H]  /  AlkPhos  919[H]  06-10    LIVER FUNCTIONS - ( 10 Ghassan 2025 05:35 )  Alb: 2.1 g/dL / Pro: 6.8 g/dL / ALK PHOS: 919 U/L / ALT: 115 U/L / AST: 113 U/L / GGT: x           Urinalysis Basic - ( 10 Ghassan 2025 07:10 )    Color: Yellow / Appearance: Clear / S.013 / pH: x  Gluc: x / Ketone: x  / Bili: Negative / Urobili: 0.2 mg/dL   Blood: x / Protein: Trace mg/dL / Nitrite: Negative   Leuk Esterase: Moderate / RBC: 0 /HPF / WBC 25 /HPF   Sq Epi: x / Non Sq Epi: x / Bacteria: Moderate /HPF        Microbiology: reviewed    Urinalysis with Rflx Culture (collected 06-10-25 @ 07:10)          Radiology: reviewed    < from: US Abdomen Upper Quadrant Right (25 @ 14:55) >    ACC: [id number] EXAM:  US ABDOMEN RT UPR QUADRANT   ORDERED BY: PRISCILLA LINCOLN     PROCEDURE DATE:  2025          INTERPRETATION:  CLINICAL INFORMATION: Recent cholecystitis and jaundice.    COMPARISON: MRCP 2025, CT abdomen/pelvis and right upper quadrant   ultrasound 2025    TECHNIQUE: Sonography of the right upper quadrant.    FINDINGS:  Liver: Nodular surface contour of the liver.  Bile ducts: Normal caliber. Common bile duct measures 8 mm.  Gallbladder: Tumefactive sludge and cholelithiasis. Gallbladder wall   thickening. Negative sonographic Kunz sign.  Pancreas: Visualized portions are within normal limits.  Right kidney: 8.0 cm. No hydronephrosis.  Ascites: None.  IVC: Visualized portions are within normal limits.    IMPRESSION:  Cholelithiasis and gallbladder wall thickening, similar to 2025.   Findings are equivocal for acute cholecystitis. Consider further   evaluation with nuclear medicine HIDA scan.    --- End of Report ---             ISHAN TORRES MD; Attending Radiologist  This document has been electronically signed. 2025  3:41PM    < end of copied text >

## 2025-06-10 NOTE — CONSULT NOTE ADULT - SUBJECTIVE AND OBJECTIVE BOX
[facility] - Tigist Mari, Aydee, Kedar, Nadira, Natan Arias  Office Number: [phone]    Initial Consult Note    CHIEF COMPLAINT: Patient is a 89y old  Female who presents with a chief complaint of r/o cholecystitis/choledocholithiasis       HPI:  89 y/o F with PMHx of anemia, DM2, HTN, HLD, CKD stage 3, recent admission to [facility] 5/19-5/23 for severe sepsis 2/2 UTI and pyelonephritis with r/o acute pino however HIDA and MRCP negative at the time s/p IV abx completed at rehab who presents to the ED for fever, generalized itching, nausea and poor PO intake. History obtained from patient and her two daughters at bedside. Pt finished her antibiotics (meropenem and linezolid) on 5/28, then on 6/6 patient spiked a fever to 102F and began with symptoms of R shoulder pain, generalized itching, nausea, and poor PO intake. She was started on Invanz at her rehab center, and her fever and elevated WBCs improved. Pt was evaluated today at her rehab center ([facility]) and had labwork done and then recommended she present to the ED for further workup. Pt still admits to itching, R shoulder pain, and nausea, but denies any abdominal pain. Last fever 3 days ago. UA at rehab with small LE otherwise negative, UA here pending. No other concerns expressed.    ED course:  VS: /76, HR 66, T 97.3F, RR 16, SpO2 98% on RA  Labs: H/H 8.2/25.2, K 3.2, BUN/Cr 37/2.20, GFR 20, Direct Bili 0.7, TBili 0.9, , AST//156, Lipase 693  RUQ US: Cholelithiasis and gallbladder wall thickening, similar to 5/19/2025. Findings are equivocal for acute cholecystitis. Consider further evaluation with nuclear medicine HIDA scan.  UA pending  Received in the ED: 1L NS bolus (09 Jun 2025 19:40)      PAST MEDICAL & SURGICAL HISTORY:  Hypertension      Diabetes      HLD (hyperlipidemia)      Stage 3 chronic kidney disease      Anemia of chronic disease      Lumbar compression fracture      HTN (hypertension)      S/P ORIF (open reduction internal fixation) fracture  right hip          SOCIAL HISTORY:  No tobacco, ethanol, or drug abuse.    FAMILY HISTORY:  FH: asthma (Father)      No family history of acute MI or sudden cardiac death.    MEDICATIONS  (STANDING):  artificial tears (preservative free) Ophthalmic Solution 1 Drop(s) Right EYE daily  cetirizine 10 milliGRAM(s) Oral daily  cholestyramine Powder (Sugar-Free) 4 Gram(s) Oral two times a day  dextrose 5%. 1000 milliLiter(s) (50 mL/Hr) IV Continuous <Continuous>  dextrose 5%. 1000 milliLiter(s) (100 mL/Hr) IV Continuous <Continuous>  dextrose 50% Injectable 25 Gram(s) IV Push once  dextrose 50% Injectable 12.5 Gram(s) IV Push once  dextrose 50% Injectable 25 Gram(s) IV Push once  famotidine    Tablet 20 milliGRAM(s) Oral daily  ferrous    sulfate 325 milliGRAM(s) Oral daily  fluticasone propionate 50 MICROgram(s)/spray Nasal Spray 1 Spray(s) Both Nostrils two times a day  folic acid 1 milliGRAM(s) Oral daily  glucagon  Injectable 1 milliGRAM(s) IntraMuscular once  heparin   Injectable 5000 Unit(s) SubCutaneous every 8 hours  hydrALAZINE 100 milliGRAM(s) Oral every 8 hours  insulin lispro (ADMELOG) corrective regimen sliding scale   SubCutaneous every 6 hours  metoprolol succinate  milliGRAM(s) Oral daily  pantoprazole    Tablet 40 milliGRAM(s) Oral before breakfast  piperacillin/tazobactam IVPB.- 3.375 Gram(s) IV Intermittent once  sodium chloride 0.9%. 1000 milliLiter(s) (75 mL/Hr) IV Continuous <Continuous>    MEDICATIONS  (PRN):  dextrose Oral Gel 15 Gram(s) Oral once PRN Blood Glucose LESS THAN 70 milliGRAM(s)/deciliter  diphenhydrAMINE 25 milliGRAM(s) Oral every 4 hours PRN Rash and/or Itching  melatonin 3 milliGRAM(s) Oral at bedtime PRN Insomnia      Allergies    amlodipine (Flushing)    Intolerances        REVIEW OF SYSTEMS:  All other review of systems is negative unless indicated above    VITAL SIGNS:   Vital Signs Last 24 Hrs  T(C): 36.7 (10 Ghassan 2025 13:19), Max: 36.7 (09 Jun 2025 22:21)  T(F): 98.1 (10 Ghassan 2025 13:19), Max: 98.1 (10 Ghassan 2025 13:19)  HR: 65 (10 Ghassan 2025 13:19) (63 - 77)  BP: 169/61 (10 Ghassan 2025 13:19) (118/50 - 169/61)  BP(mean): --  RR: 18 (10 Ghassan 2025 13:19) (16 - 18)  SpO2: 96% (10 Ghassan 2025 13:19) (93% - 98%)    Parameters below as of 10 Ghassan 2025 13:19  Patient On (Oxygen Delivery Method): room air        I&O's Summary      On Exam:    Constitutional: NAD, alert and oriented x 3  Lungs:  Non-labored, breath sounds are clear bilaterally, No wheezing, rales or rhonchi  Cardiovascular: RRR.  S1 and S2 positive.  No murmurs, rubs, gallops or clicks  Gastrointestinal: Bowel Sounds present, soft, nontender.   Lymph: No peripheral edema. No cervical lymphadenopathy.  Neurological: Alert, no focal deficits  Skin: No rashes or ulcers   Psych:  Mood & affect appropriate.    LABS: All Labs Reviewed:                        8.1    9.21  )-----------( 342      ( 10 Ghassan 2025 05:35 )             25.1                         8.2    9.45  )-----------( 339      ( 09 Jun 2025 15:51 )             25.2     10 Ghassan 2025 05:35    140    |  106    |  33     ----------------------------<  97     3.3     |  27     |  2.00   09 Jun 2025 15:51    136    |  101    |  37     ----------------------------<  113    3.2     |  28     |  2.20     Ca    8.7        10 Ghassan 2025 05:35  Ca    9.0        09 Jun 2025 15:51    TPro  6.8    /  Alb  2.1    /  TBili  1.1    /  DBili  x      /  AST  113    /  ALT  115    /  AlkPhos  919    10 Ghassan 2025 05:35  TPro  7.4    /  Alb  2.3    /  TBili  0.9    /  DBili  0.7    /  AST  172    /  ALT  156    /  AlkPhos  970    09 Jun 2025 15:51          Blood Culture:         RADIOLOGY:    EKG: < from: 12 Lead ECG (05.19.25 @ 08:10) >  Ventricular Rate 55 BPM    Atrial Rate 55 BPM    P-R Interval 224 ms    QRS Duration 88 ms    Q-T Interval 504 ms    QTC Calculation(Bazett) 482 ms    P Axis 87 degrees    R Axis 3 degrees    T Axis 25 degrees    Diagnosis Line Sinus bradycardia with 1st degree AV block with premature atrial complexes  Otherwise normal ECG  When compared with ECG of 23-APR-2025 14:32,  premature atrial complexes are now present    < end of copied text >      TRANSTHORACIC ECHOCARDIOGRAM REPORT  ________________________________________________________________________________                                      _______       Pt. Name:       NELDA VERDIN Study Date:    11/24/2023  MRN:            [id number]       YOB: 1930  Accession #:    [id number]      Age:           89 years  Account#:       [id number]     Gender:        F  Heart Rate:                    Height:        62.99 in (160.00 cm)  Rhythm:                        Weight:   114.64 lb (52.00 kg)  Blood Pressure: 158/98 mmHg    BSA/BMI:       1.53 m² / 20.31 kg/m²  ________________________________________________________________________________________  Referring Physician:    [id number] Addy Mueller  Interpreting Physician: Chuy Escoto  Primary Sonographer:    Juanito Bahena    CPT:               ECHO TTE WO CON COMP W DOPP - 17133.m  Indication(s):     Abnormal electrocardiogram ECG/EKG - R94.31  Procedure:         Transthoracic echocardiogram with 2-D, M-modeand complete                     spectral and color flow Doppler.  Ordering Location: [facility]  Study Information: Image quality for this study is technically difficult.    _______________________________________________________________________________________     CONCLUSIONS:      1. Technically difficult image quality.   2. The patient is tachycardic throughout this examination.   3. Left ventricular systolic function is normal with an ejection fraction of 64 % by Swanson's method of disks.   4. The right ventricle is not well visualized.   5. Structurally normal mitral valve with normal leaflet excursion.   6. There is mild calcification of the mitral valve annulus.   7. The left atrium is normal.   8. Trace mitral regurgitation.   9. Aortic valve was not well visualized.  10. Moderate calcification of the aortic valve leaflets.  11. The inferior vena cava is normal in size measuring 1.30 cm in diameter, (normal <2.1cm) with normal inspiratory collapse (normal >50%) consistent with normal right atrial pressure (~3, range 0-5mmHg).    ________________________________________________________________________________________  FINDINGS:     Left Ventricle:  Left ventricular systolic function is normal with a calculated ejection fraction of 64 % by the Swanson's biplane method of disks. There is normal left ventricular diastolic function.     Right Ventricle:  The right ventricle is not well visualized. Tricuspid annular plane systolic excursion (TAPSE) is 1.7 cm (normal >=1.7 cm).     Left Atrium:  The left atrium is normal with an indexed volume of 21.82 ml/m².     Right Atrium:  The right atrium was not well visualized.     Aortic Valve:  The aortic valve was not well visualized. The aortic valve anatomy cannot be determined. There is moderate calcification of the aortic valve leaflets.     Mitral Valve:  Structurally normal mitral valve with normal leaflet excursion. There is mild calcification of the mitral valve annulus. There is trace mitral regurgitation.     Tricuspid Valve:  The tricuspid valve was not well visualized.     Pulmonic Valve:  The pulmonic valve was not well visualized.     Aorta:  The aortic root at the sinuses of Valsalva is normal in size, measuring 3.00 cm (indexed 1.97 cm/m²).     Systemic Veins:  The inferior vena cava is normal in size measuring 1.30 cm in diameter, (normal <2.1cm) with normal inspiratory collapse (normal >50%) consistent with normal right atrial pressure (~3, range 0-5mmHg).  ____________________________________________________________________  QUANTITATIVE DATA:  Left Ventricle Measurements: (Indexed to BSA)     IVSd (2D):   1.2 cm  LVPWd (2D):  1.0 cm  LVIDd (2D):  3.5 cm  LVIDs (2D):  2.5 cm  LV Mass:     121 g  79.2 g/m²  LV Vol d, MOD A2C: 33.7 ml 22.08 ml/m²  LV Vol d, MOD A4C: 18.8 ml 12.32 ml/m²  LV Vol d, MOD BP:  25.5 ml 16.69 ml/m²  LV Vol s, MOD A2C: 11.5 ml 7.53 ml/m²  LV Vol s, MOD A4C: 7.7 ml  5.06 ml/m²  LV Vol s, MOD BP:  9.2 ml  6.02 ml/m²  LVOT SV MOD BP:    16.3 ml  LV EF% MOD BP:     64 %     MV E Vmax:    1.33 m/s  MV A Vmax:    0.50 m/s  MV E/A:       2.67  e' lateral:   21.20 cm/s  e' medial:    12.80 cm/s  E/e' lateral: 6.27  E/e' medial:  10.39  E/e' Average: 7.82  MV DT:        143 msec    Aorta Measurements: (normal range) (Indexed to BSA)     Sinuses of Valsalva: 3.00 cm (2.7 - 3.3 cm)       Left Atrium Measurements: (Indexed to BSA)  LA Diam 2D: 3.40 cm    Right Ventricle Measurements:     TAPSE: 1.7 cm       LVOT / RVOT/ Qp/Qs Data: (Indexed to BSA)  LVOT Diameter: 1.80 cm    Mitral Valve Measurements:     MV E Vmax: 1.3 m/s  MV A Vmax: 0.5 m/s  MV E/A:    2.7       Tricuspid Valve Measurements:     RA Pressure: 3 mmHg    ________________________________________________________________________________________  Electronicallysigned on 11/25/2023 at 6:17:31 AM by Chuy Escoto         *** Final ***           [facility] - Tigist Mari, Kedar Bajwa, Nadira, Natan Arias  Office Number: [phone]    Initial Consult Note    CHIEF COMPLAINT: Patient is a 89y old  Female who presents with a chief complaint of r/o cholecystitis/choledocholithiasis       HPI:  89 y/o F with PMHx of anemia, DM2, HTN, HLD, CKD stage 3, recent admission to [facility] 5/19-5/23 for severe sepsis 2/2 UTI and pyelonephritis with r/o acute pino however HIDA and MRCP negative at the time s/p IV abx completed at rehab who presents to the ED for fever, generalized itching, nausea and poor PO intake. History obtained from patient and her two daughters at bedside. Pt finished her antibiotics (meropenem and linezolid) on 5/28, then on 6/6 patient spiked a fever to 102F and began with symptoms of R shoulder pain, generalized itching, nausea, and poor PO intake. She was started on Invanz at her rehab center, and her fever and elevated WBCs improved. Pt still admits to itching, R shoulder pain, and nausea,   Follows with cardio at [facility]. Denies chest pain dizziness palpitations or shortness of breath.   Cardiology consulted for pre op clearance evaluation.       PAST MEDICAL & SURGICAL HISTORY:  Hypertension      Diabetes      HLD (hyperlipidemia)      Stage 3 chronic kidney disease      Anemia of chronic disease      Lumbar compression fracture      HTN (hypertension)      S/P ORIF (open reduction internal fixation) fracture  right hip          SOCIAL HISTORY:  No tobacco, ethanol, or drug abuse.    FAMILY HISTORY:  FH: asthma (Father)      No family history of acute MI or sudden cardiac death.    MEDICATIONS  (STANDING):  artificial tears (preservative free) Ophthalmic Solution 1 Drop(s) Right EYE daily  cetirizine 10 milliGRAM(s) Oral daily  cholestyramine Powder (Sugar-Free) 4 Gram(s) Oral two times a day  dextrose 5%. 1000 milliLiter(s) (50 mL/Hr) IV Continuous <Continuous>  dextrose 5%. 1000 milliLiter(s) (100 mL/Hr) IV Continuous <Continuous>  dextrose 50% Injectable 25 Gram(s) IV Push once  dextrose 50% Injectable 12.5 Gram(s) IV Push once  dextrose 50% Injectable 25 Gram(s) IV Push once  famotidine    Tablet 20 milliGRAM(s) Oral daily  ferrous    sulfate 325 milliGRAM(s) Oral daily  fluticasone propionate 50 MICROgram(s)/spray Nasal Spray 1 Spray(s) Both Nostrils two times a day  folic acid 1 milliGRAM(s) Oral daily  glucagon  Injectable 1 milliGRAM(s) IntraMuscular once  heparin   Injectable 5000 Unit(s) SubCutaneous every 8 hours  hydrALAZINE 100 milliGRAM(s) Oral every 8 hours  insulin lispro (ADMELOG) corrective regimen sliding scale   SubCutaneous every 6 hours  metoprolol succinate  milliGRAM(s) Oral daily  pantoprazole    Tablet 40 milliGRAM(s) Oral before breakfast  piperacillin/tazobactam IVPB.- 3.375 Gram(s) IV Intermittent once  sodium chloride 0.9%. 1000 milliLiter(s) (75 mL/Hr) IV Continuous <Continuous>    MEDICATIONS  (PRN):  dextrose Oral Gel 15 Gram(s) Oral once PRN Blood Glucose LESS THAN 70 milliGRAM(s)/deciliter  diphenhydrAMINE 25 milliGRAM(s) Oral every 4 hours PRN Rash and/or Itching  melatonin 3 milliGRAM(s) Oral at bedtime PRN Insomnia      Allergies    amlodipine (Flushing)    Intolerances        REVIEW OF SYSTEMS:  All other review of systems is negative unless indicated above    VITAL SIGNS:   Vital Signs Last 24 Hrs  T(C): 36.7 (10 Ghassan 2025 13:19), Max: 36.7 (09 Jun 2025 22:21)  T(F): 98.1 (10 Ghassan 2025 13:19), Max: 98.1 (10 Ghassan 2025 13:19)  HR: 65 (10 Ghassan 2025 13:19) (63 - 77)  BP: 169/61 (10 Ghassan 2025 13:19) (118/50 - 169/61)  BP(mean): --  RR: 18 (10 Ghassan 2025 13:19) (16 - 18)  SpO2: 96% (10 Ghassan 2025 13:19) (93% - 98%)    Parameters below as of 10 Ghassan 2025 13:19  Patient On (Oxygen Delivery Method): room air        I&O's Summary      On Exam:    Constitutional: NAD, alert and oriented x 3  Lungs:  Non-labored, breath sounds are clear bilaterally, No wheezing, rales or rhonchi  Cardiovascular: RRR.  S1 and S2 positive.  No murmurs, rubs, gallops or clicks  Gastrointestinal: Bowel Sounds present, soft, nontender.   Lymph: No peripheral edema. No cervical lymphadenopathy.  Neurological: Alert, no focal deficits  Skin: No rashes or ulcers   Psych:  Mood & affect appropriate.    LABS: All Labs Reviewed:                        8.1    9.21  )-----------( 342      ( 10 Ghassan 2025 05:35 )             25.1                         8.2    9.45  )-----------( 339      ( 09 Jun 2025 15:51 )             25.2     10 Ghassan 2025 05:35    140    |  106    |  33     ----------------------------<  97     3.3     |  27     |  2.00   09 Jun 2025 15:51    136    |  101    |  37     ----------------------------<  113    3.2     |  28     |  2.20     Ca    8.7        10 Ghassan 2025 05:35  Ca    9.0        09 Jun 2025 15:51    TPro  6.8    /  Alb  2.1    /  TBili  1.1    /  DBili  x      /  AST  113    /  ALT  115    /  AlkPhos  919    10 Ghassan 2025 05:35  TPro  7.4    /  Alb  2.3    /  TBili  0.9    /  DBili  0.7    /  AST  172    /  ALT  156    /  AlkPhos  970    09 Jun 2025 15:51          Blood Culture:         RADIOLOGY:    EKG: < from: 12 Lead ECG (05.19.25 @ 08:10) >  Ventricular Rate 55 BPM    Atrial Rate 55 BPM    P-R Interval 224 ms    QRS Duration 88 ms    Q-T Interval 504 ms    QTC Calculation(Bazett) 482 ms    P Axis 87 degrees    R Axis 3 degrees    T Axis 25 degrees    Diagnosis Line Sinus bradycardia with 1st degree AV block with premature atrial complexes  Otherwise normal ECG  When compared with ECG of 23-APR-2025 14:32,  premature atrial complexes are now present    < end of copied text >      TRANSTHORACIC ECHOCARDIOGRAM REPORT  ________________________________________________________________________________                                      _______       Pt. Name:       NELDA VERDIN Study Date:    11/24/2023  MRN:            [id number]       YOB: 1930  Accession #:    [id number]      Age:           89 years  Account#:       [id number]     Gender:        F  Heart Rate:                    Height:        62.99 in (160.00 cm)  Rhythm:                        Weight:   114.64 lb (52.00 kg)  Blood Pressure: 158/98 mmHg    BSA/BMI:       1.53 m² / 20.31 kg/m²  ________________________________________________________________________________________  Referring Physician:    [id number] Addy Mueller  Interpreting Physician: Chuy Escoto  Primary Sonographer:    Juanito Bahena    CPT:               ECHO TTE WO CON COMP W DOPP - 99309.m  Indication(s):     Abnormal electrocardiogram ECG/EKG - R94.31  Procedure:         Transthoracic echocardiogram with 2-D, M-modeand complete                     spectral and color flow Doppler.  Ordering Location: [facility]  Study Information: Image quality for this study is technically difficult.    _______________________________________________________________________________________     CONCLUSIONS:      1. Technically difficult image quality.   2. The patient is tachycardic throughout this examination.   3. Left ventricular systolic function is normal with an ejection fraction of 64 % by Swanson's method of disks.   4. The right ventricle is not well visualized.   5. Structurally normal mitral valve with normal leaflet excursion.   6. There is mild calcification of the mitral valve annulus.   7. The left atrium is normal.   8. Trace mitral regurgitation.   9. Aortic valve was not well visualized.  10. Moderate calcification of the aortic valve leaflets.  11. The inferior vena cava is normal in size measuring 1.30 cm in diameter, (normal <2.1cm) with normal inspiratory collapse (normal >50%) consistent with normal right atrial pressure (~3, range 0-5mmHg).    ________________________________________________________________________________________  FINDINGS:     Left Ventricle:  Left ventricular systolic function is normal with a calculated ejection fraction of 64 % by the Swanson's biplane method of disks. There is normal left ventricular diastolic function.     Right Ventricle:  The right ventricle is not well visualized. Tricuspid annular plane systolic excursion (TAPSE) is 1.7 cm (normal >=1.7 cm).     Left Atrium:  The left atrium is normal with an indexed volume of 21.82 ml/m².     Right Atrium:  The right atrium was not well visualized.     Aortic Valve:  The aortic valve was not well visualized. The aortic valve anatomy cannot be determined. There is moderate calcification of the aortic valve leaflets.     Mitral Valve:  Structurally normal mitral valve with normal leaflet excursion. There is mild calcification of the mitral valve annulus. There is trace mitral regurgitation.     Tricuspid Valve:  The tricuspid valve was not well visualized.     Pulmonic Valve:  The pulmonic valve was not well visualized.     Aorta:  The aortic root at the sinuses of Valsalva is normal in size, measuring 3.00 cm (indexed 1.97 cm/m²).     Systemic Veins:  The inferior vena cava is normal in size measuring 1.30 cm in diameter, (normal <2.1cm) with normal inspiratory collapse (normal >50%) consistent with normal right atrial pressure (~3, range 0-5mmHg).  ____________________________________________________________________  QUANTITATIVE DATA:  Left Ventricle Measurements: (Indexed to BSA)     IVSd (2D):   1.2 cm  LVPWd (2D):  1.0 cm  LVIDd (2D):  3.5 cm  LVIDs (2D):  2.5 cm  LV Mass:     121 g  79.2 g/m²  LV Vol d, MOD A2C: 33.7 ml 22.08 ml/m²  LV Vol d, MOD A4C: 18.8 ml 12.32 ml/m²  LV Vol d, MOD BP:  25.5 ml 16.69 ml/m²  LV Vol s, MOD A2C: 11.5 ml 7.53 ml/m²  LV Vol s, MOD A4C: 7.7 ml  5.06 ml/m²  LV Vol s, MOD BP:  9.2 ml  6.02 ml/m²  LVOT SV MOD BP:    16.3 ml  LV EF% MOD BP:     64 %     MV E Vmax:    1.33 m/s  MV A Vmax:    0.50 m/s  MV E/A:       2.67  e' lateral:   21.20 cm/s  e' medial:    12.80 cm/s  E/e' lateral: 6.27  E/e' medial:  10.39  E/e' Average: 7.82  MV DT:        143 msec    Aorta Measurements: (normal range) (Indexed to BSA)     Sinuses of Valsalva: 3.00 cm (2.7 - 3.3 cm)       Left Atrium Measurements: (Indexed to BSA)  LA Diam 2D: 3.40 cm    Right Ventricle Measurements:     TAPSE: 1.7 cm       LVOT / RVOT/ Qp/Qs Data: (Indexed to BSA)  LVOT Diameter: 1.80 cm    Mitral Valve Measurements:     MV E Vmax: 1.3 m/s  MV A Vmax: 0.5 m/s  MV E/A:    2.7       Tricuspid Valve Measurements:     RA Pressure: 3 mmHg    ________________________________________________________________________________________  Electronicallysigned on 11/25/2023 at 6:17:31 AM by Chuy Escoto         *** Final ***

## 2025-06-10 NOTE — CONSULT NOTE ADULT - ASSESSMENT
Full note to follow Full note to follow    Patient evaluated with face-to-face time in addition to reviewing history, labs, microbiology, and imaging.   Antibiotic stewardship, local antibiogram, infection control strategies and potential transmission issues taken into consideration at time of treatment decision making process.   Thank you for allowing us to participate in the care of your patient.  D/w Dr. Holguin  Infectious Diseases will follow. Please call with any questions.  Claudette Holguin M.D.  Available on [employer] TEAMS -- *[facility]*  [facility] [phone]  For after 5 P.M. and weekends, please call [phone] Pt is a 89 y/o F with PMHx of anemia, DM2, HTN, HLD, CKD stage 3, recent admission to [facility] 5/19-5/23 for severe sepsis 2/2 UTI and pyelonephritis with r/o acute pino however HIDA and MRCP negative at the time s/p IV abx completed at rehab who presents to the ED for fever, generalized itching, nausea and poor PO intake. Pt finished her antibiotics (meropenem and linezolid) on 5/28, then on 6/6 patient spiked a fever to 102F and began with symptoms of R shoulder pain, generalized itching, nausea, and poor PO intake. She was started on Invanz at her rehab center, and her fever and elevated WBCs improved. Pt was evaluated today at her rehab center ([facility]) and had labwork done and then recommended she present to the ED for further workup. Pt still admits to itching, R shoulder pain, and nausea, but denies any abdominal pain. Last fever 3 days ago. UA at rehab with small LE otherwise negative, UA here pending. No other concerns expressed.    R/o acute cholecystitis  Cholelithiasis  Transaminitis  - RUQ US: Cholelithiasis and gallbladder wall thickening, similar to 5/19/2025. Findings are equivocal for acute cholecystitis.  - HIDA negative    Recurrent UTI  - no reported urinary sx  - UA negative, only mod leuk est, 25 WBCs    ?Drug Rash/Pruritis  - suspect drug rash in setting of ertapenem; daughter reporting that pt has tolerated meropenem previously w/o issues    Recommendations:   On ertapenem since 6/6, will stop  Switch to zosyn  Fu cx  GI following  Sgy following--lap pino tomorrow  Further recs to follow pending above    Patient evaluated with face-to-face time in addition to reviewing history, labs, microbiology, and imaging.   Antibiotic stewardship, local antibiogram, infection control strategies and potential transmission issues taken into consideration at time of treatment decision making process.   Thank you for allowing us to participate in the care of your patient.  D/w Dr. Holguin  D/w daughter and patient at bedside  Infectious Diseases will follow. Please call with any questions.  Claudette Holguin M.D.  Available on [employer] TEAMS -- *[facility] [phone]  For after 5 P.M. and weekends, please call [phone]

## 2025-06-10 NOTE — PROGRESS NOTE ADULT - ATTENDING COMMENTS
Pt is a 89 y/o F with PMHx of anemia, DM2, HTN, HLD, CKD stage 3, [facility] for severe sepsis 2/2 UTI and pyelonephritis with r/o acute pino however HIDA and MRCP negative at the time s/p IV abx completed at rehab who presents to the ED for fever, generalized itching, nausea and poor PO intake, admitted with  abnormal LFT's Fever at [facility], r/o Ac  cholecystitis.  Pt seen ,examined, case & care plan d/w pt, residents at [facility].  D/W Dtr at bed side.  Consult-  Sx -Dr fischer -YO scan -NEG, CT a/p-plan for OR for Cholecystectomy on 6/11  -GI-DR Dumont d/w IV Abx HIDA -Neg , Ok for feed pt, to d/w Sx   ID-DR JENIFFER Holguin d/w -STOP Meropenem, as Skin RASH , Change to Zosyn q 8 hrs   Renal-Dr TORRES group-ELVIN-CKD 3   Cardiology -Dr Escoto called for Pre op eval  TTE -to follow   DVT ppx   AM labs   Pt has MOLST in chart . DNR-DNI  Pt is medically optimized for schedule Cholecystectomy, pt has moderate  cardiac risks for schedule Cholecystectomy.  -Pre op IV antibiotics as per ID-/ Surgery   -Post op DVT prophylaxis as per Sx  Post op DVT prophylaxis  Post op Incentive spirometry   Total care time id 60 minutes.

## 2025-06-10 NOTE — CASE MANAGEMENT PROGRESS NOTE - NSCMPROGRESSNOTE_GEN_ALL_CORE
Patient discussed during morning round. Patient admitted for cholecystitis, on Zosyn. Patient for HIDA scan today. Case Management Will continue to follow.

## 2025-06-10 NOTE — PROGRESS NOTE ADULT - SUBJECTIVE AND OBJECTIVE BOX
[facility]    Owen Morrison NP    [address] [place] [address]  [phone]      Chief Complaint:  Patient is a 89y old  Female who presents with a chief complaint of r/o cholecystitis/choledocholithiasis (10 Ghassan 2025 11:06)      HPI/ 24 hr events:   Patient seen and examined at bedside  She denies abdominal pain, nausea, or vomiting  Also complains of itching of the chest  HIDA pending today   LFTs downtrending   Afebrile, VSS      REVIEW OF SYSTEMS:   General: Negative  HEENT: Negative  CV: Negative  Respiratory: Negative  GI: See HPI  : Negative  MSK: Negative  Hematologic: Negative  Skin: Negative    MEDICATIONS:   MEDICATIONS  (STANDING):  artificial tears (preservative free) Ophthalmic Solution 1 Drop(s) Right EYE daily  cetirizine 10 milliGRAM(s) Oral daily  cholestyramine Powder (Sugar-Free) 4 Gram(s) Oral two times a day  dextrose 5%. 1000 milliLiter(s) (50 mL/Hr) IV Continuous <Continuous>  dextrose 5%. 1000 milliLiter(s) (100 mL/Hr) IV Continuous <Continuous>  dextrose 50% Injectable 25 Gram(s) IV Push once  dextrose 50% Injectable 12.5 Gram(s) IV Push once  dextrose 50% Injectable 25 Gram(s) IV Push once  ertapenem  IVPB 500 milliGRAM(s) IV Intermittent every 24 hours  famotidine    Tablet 20 milliGRAM(s) Oral daily  ferrous    sulfate 325 milliGRAM(s) Oral daily  fluticasone propionate 50 MICROgram(s)/spray Nasal Spray 1 Spray(s) Both Nostrils two times a day  folic acid 1 milliGRAM(s) Oral daily  glucagon  Injectable 1 milliGRAM(s) IntraMuscular once  heparin   Injectable 5000 Unit(s) SubCutaneous every 8 hours  hydrALAZINE 100 milliGRAM(s) Oral every 8 hours  insulin lispro (ADMELOG) corrective regimen sliding scale   SubCutaneous every 6 hours  metoprolol succinate  milliGRAM(s) Oral daily  pantoprazole    Tablet 40 milliGRAM(s) Oral before breakfast  sodium chloride 0.9%. 1000 milliLiter(s) (75 mL/Hr) IV Continuous <Continuous>    MEDICATIONS  (PRN):  dextrose Oral Gel 15 Gram(s) Oral once PRN Blood Glucose LESS THAN 70 milliGRAM(s)/deciliter  melatonin 3 milliGRAM(s) Oral at bedtime PRN Insomnia      ALLERGIES:   Allergies    amlodipine (Flushing)    Intolerances        VITAL SIGNS:   Vital Signs Last 24 Hrs  T(C): 36.6 (10 Ghassan 2025 05:03), Max: 36.7 (09 Jun 2025 22:21)  T(F): 97.9 (10 Ghassan 2025 05:03), Max: 98 (09 Jun 2025 22:21)  HR: 71 (10 Ghassan 2025 11:13) (64 - 77)  BP: 138/101 (10 Ghassan 2025 11:13) (127/64 - 168/69)  BP(mean): --  RR: 16 (10 Ghassan 2025 11:13) (16 - 18)  SpO2: 96% (10 Ghassan 2025 11:13) (93% - 98%)    Parameters below as of 10 Ghassan 2025 11:13  Patient On (Oxygen Delivery Method): room air      I&O's Summary      PHYSICAL EXAM:   GENERAL:  No acute distress  HEENT:  NC/AT  CHEST:  No increased effort  HEART:  Regular rate  ABDOMEN:  Soft, non-tender, non-distended  EXTREMITIES: No cyanosis  SKIN:  Warm, dry  NEURO:  Calm, cooperative    LABS:                        8.1    9.21  )-----------( 342      ( 10 Ghassan 2025 05:35 )             25.1     06-10    140  |  106  |  33[H]  ----------------------------<  97  3.3[L]   |  27  |  2.00[H]    Ca    8.7      10 Ghassan 2025 05:35    TPro  6.8  /  Alb  2.1[L]  /  TBili  1.1  /  DBili  x   /  AST  113[H]  /  ALT  115[H]  /  AlkPhos  919[H]  06-10    LIVER FUNCTIONS - ( 10 Ghassan 2025 05:35 )  Alb: 2.1 g/dL / Pro: 6.8 g/dL / ALK PHOS: 919 U/L / ALT: 115 U/L / AST: 113 U/L / GGT: x               Urinalysis with Rflx Culture (collected 10 Ghassan 2025 07:10)      Lipase: 693 U/L (06-09-25 @ 15:51)                          Urinalysis with Rflx Culture (collected 06-10-25 @ 07:10)      Triglycerides, Serum: 251 mg/dL (06-10-25 @ 05:35)        RADIOLOGY & ADDITIONAL STUDIES:

## 2025-06-11 ENCOUNTER — TRANSCRIPTION ENCOUNTER (OUTPATIENT)
Age: 89
End: 2025-06-11

## 2025-06-11 LAB
ALBUMIN SERPL ELPH-MCNC: 2.3 G/DL — LOW (ref 3.3–5)
ALP SERPL-CCNC: 922 U/L — HIGH (ref 40–120)
ALT FLD-CCNC: 93 U/L — HIGH (ref 12–78)
ANION GAP SERPL CALC-SCNC: 6 MMOL/L — SIGNIFICANT CHANGE UP (ref 5–17)
APTT BLD: 30.3 SEC — SIGNIFICANT CHANGE UP (ref 26.1–36.8)
AST SERPL-CCNC: 84 U/L — HIGH (ref 15–37)
BASOPHILS # BLD AUTO: 0.09 K/UL — SIGNIFICANT CHANGE UP (ref 0–0.2)
BASOPHILS NFR BLD AUTO: 0.8 % — SIGNIFICANT CHANGE UP (ref 0–2)
BILIRUB DIRECT SERPL-MCNC: 0.7 MG/DL — HIGH (ref 0–0.3)
BILIRUB INDIRECT FLD-MCNC: 0.5 MG/DL — SIGNIFICANT CHANGE UP (ref 0.2–1)
BILIRUB SERPL-MCNC: 1 MG/DL — SIGNIFICANT CHANGE UP (ref 0.2–1.2)
BILIRUB SERPL-MCNC: 1.2 MG/DL — SIGNIFICANT CHANGE UP (ref 0.2–1.2)
BLD GP AB SCN SERPL QL: SIGNIFICANT CHANGE UP
BUN SERPL-MCNC: 29 MG/DL — HIGH (ref 7–23)
CALCIUM SERPL-MCNC: 9.3 MG/DL — SIGNIFICANT CHANGE UP (ref 8.5–10.1)
CHLORIDE SERPL-SCNC: 108 MMOL/L — SIGNIFICANT CHANGE UP (ref 96–108)
CO2 SERPL-SCNC: 25 MMOL/L — SIGNIFICANT CHANGE UP (ref 22–31)
CREAT SERPL-MCNC: 2.2 MG/DL — HIGH (ref 0.5–1.3)
EGFR: 20 ML/MIN/1.73M2 — LOW
EGFR: 20 ML/MIN/1.73M2 — LOW
EOSINOPHIL # BLD AUTO: 1.83 K/UL — HIGH (ref 0–0.5)
EOSINOPHIL NFR BLD AUTO: 15.7 % — HIGH (ref 0–6)
GLUCOSE BLDC GLUCOMTR-MCNC: 111 MG/DL — HIGH (ref 70–99)
GLUCOSE BLDC GLUCOMTR-MCNC: 116 MG/DL — HIGH (ref 70–99)
GLUCOSE BLDC GLUCOMTR-MCNC: 139 MG/DL — HIGH (ref 70–99)
GLUCOSE BLDC GLUCOMTR-MCNC: 179 MG/DL — HIGH (ref 70–99)
GLUCOSE BLDC GLUCOMTR-MCNC: 219 MG/DL — HIGH (ref 70–99)
GLUCOSE SERPL-MCNC: 101 MG/DL — HIGH (ref 70–99)
HCT VFR BLD CALC: 26.7 % — LOW (ref 34.5–45)
HGB BLD-MCNC: 8.5 G/DL — LOW (ref 11.5–15.5)
IMM GRANULOCYTES NFR BLD AUTO: 0.9 % — SIGNIFICANT CHANGE UP (ref 0–0.9)
INR BLD: 1.07 RATIO — SIGNIFICANT CHANGE UP (ref 0.85–1.16)
LYMPHOCYTES # BLD AUTO: 1.83 K/UL — SIGNIFICANT CHANGE UP (ref 1–3.3)
LYMPHOCYTES # BLD AUTO: 15.7 % — SIGNIFICANT CHANGE UP (ref 13–44)
MCHC RBC-ENTMCNC: 26.8 PG — LOW (ref 27–34)
MCHC RBC-ENTMCNC: 31.8 G/DL — LOW (ref 32–36)
MCV RBC AUTO: 84.2 FL — SIGNIFICANT CHANGE UP (ref 80–100)
MONOCYTES # BLD AUTO: 1.27 K/UL — HIGH (ref 0–0.9)
MONOCYTES NFR BLD AUTO: 10.9 % — SIGNIFICANT CHANGE UP (ref 2–14)
NEUTROPHILS # BLD AUTO: 6.54 K/UL — SIGNIFICANT CHANGE UP (ref 1.8–7.4)
NEUTROPHILS NFR BLD AUTO: 56 % — SIGNIFICANT CHANGE UP (ref 43–77)
NRBC BLD AUTO-RTO: 0 /100 WBCS — SIGNIFICANT CHANGE UP (ref 0–0)
PLATELET # BLD AUTO: 396 K/UL — SIGNIFICANT CHANGE UP (ref 150–400)
POTASSIUM SERPL-MCNC: 4.3 MMOL/L — SIGNIFICANT CHANGE UP (ref 3.5–5.3)
POTASSIUM SERPL-SCNC: 4.3 MMOL/L — SIGNIFICANT CHANGE UP (ref 3.5–5.3)
PROT SERPL-MCNC: 7.1 G/DL — SIGNIFICANT CHANGE UP (ref 6–8.3)
PROTHROM AB SERPL-ACNC: 12.6 SEC — SIGNIFICANT CHANGE UP (ref 9.9–13.4)
RBC # BLD: 3.17 M/UL — LOW (ref 3.8–5.2)
RBC # FLD: 19.2 % — HIGH (ref 10.3–14.5)
SODIUM SERPL-SCNC: 139 MMOL/L — SIGNIFICANT CHANGE UP (ref 135–145)
WBC # BLD: 11.66 K/UL — HIGH (ref 3.8–10.5)
WBC # FLD AUTO: 11.66 K/UL — HIGH (ref 3.8–10.5)

## 2025-06-11 PROCEDURE — 99232 SBSQ HOSP IP/OBS MODERATE 35: CPT

## 2025-06-11 PROCEDURE — 93306 TTE W/DOPPLER COMPLETE: CPT | Mod: 26

## 2025-06-11 PROCEDURE — 88304 TISSUE EXAM BY PATHOLOGIST: CPT | Mod: 26

## 2025-06-11 PROCEDURE — 47562 LAPAROSCOPIC CHOLECYSTECTOMY: CPT | Mod: AS

## 2025-06-11 DEVICE — LIGATING CLIPS WECK HEMOLOK POLYMER MEDIUM-LARGE (GREEN) 6: Type: IMPLANTABLE DEVICE | Status: FUNCTIONAL

## 2025-06-11 DEVICE — LIGATING CLIPS WECK HEMOLOK POLYMER LARGE (PURPLE) 6: Type: IMPLANTABLE DEVICE | Status: FUNCTIONAL

## 2025-06-11 RX ORDER — DEXTROSE 50 % IN WATER 50 %
15 SYRINGE (ML) INTRAVENOUS ONCE
Refills: 0 | Status: DISCONTINUED | OUTPATIENT
Start: 2025-06-11 | End: 2025-06-13

## 2025-06-11 RX ORDER — FLUTICASONE PROPIONATE 50 UG/1
1 SPRAY, METERED NASAL
Refills: 0 | Status: DISCONTINUED | OUTPATIENT
Start: 2025-06-11 | End: 2025-06-13

## 2025-06-11 RX ORDER — DEXTROSE 50 % IN WATER 50 %
25 SYRINGE (ML) INTRAVENOUS ONCE
Refills: 0 | Status: DISCONTINUED | OUTPATIENT
Start: 2025-06-11 | End: 2025-06-13

## 2025-06-11 RX ORDER — ONDANSETRON HCL/PF 4 MG/2 ML
4 VIAL (ML) INJECTION ONCE
Refills: 0 | Status: DISCONTINUED | OUTPATIENT
Start: 2025-06-11 | End: 2025-06-11

## 2025-06-11 RX ORDER — METOPROLOL SUCCINATE 50 MG/1
100 TABLET, EXTENDED RELEASE ORAL DAILY
Refills: 0 | Status: DISCONTINUED | OUTPATIENT
Start: 2025-06-11 | End: 2025-06-13

## 2025-06-11 RX ORDER — OXYCODONE HYDROCHLORIDE 30 MG/1
5 TABLET ORAL ONCE
Refills: 0 | Status: DISCONTINUED | OUTPATIENT
Start: 2025-06-11 | End: 2025-06-11

## 2025-06-11 RX ORDER — INSULIN LISPRO 100 U/ML
INJECTION, SOLUTION INTRAVENOUS; SUBCUTANEOUS AT BEDTIME
Refills: 0 | Status: DISCONTINUED | OUTPATIENT
Start: 2025-06-11 | End: 2025-06-13

## 2025-06-11 RX ORDER — HYDROMORPHONE/SOD CHLOR,ISO/PF 2 MG/10 ML
0.5 SYRINGE (ML) INJECTION
Refills: 0 | Status: DISCONTINUED | OUTPATIENT
Start: 2025-06-11 | End: 2025-06-11

## 2025-06-11 RX ORDER — MELATONIN 5 MG
3 TABLET ORAL AT BEDTIME
Refills: 0 | Status: DISCONTINUED | OUTPATIENT
Start: 2025-06-11 | End: 2025-06-13

## 2025-06-11 RX ORDER — SODIUM CHLORIDE 9 G/1000ML
1000 INJECTION, SOLUTION INTRAVENOUS
Refills: 0 | Status: DISCONTINUED | OUTPATIENT
Start: 2025-06-11 | End: 2025-06-13

## 2025-06-11 RX ORDER — DEXTROSE 50 % IN WATER 50 %
12.5 SYRINGE (ML) INTRAVENOUS ONCE
Refills: 0 | Status: DISCONTINUED | OUTPATIENT
Start: 2025-06-11 | End: 2025-06-13

## 2025-06-11 RX ORDER — DIPHENHYDRAMINE HCL 12.5MG/5ML
25 ELIXIR ORAL EVERY 4 HOURS
Refills: 0 | Status: DISCONTINUED | OUTPATIENT
Start: 2025-06-11 | End: 2025-06-12

## 2025-06-11 RX ORDER — ACETAMINOPHEN 500 MG/5ML
650 LIQUID (ML) ORAL EVERY 6 HOURS
Refills: 0 | Status: DISCONTINUED | OUTPATIENT
Start: 2025-06-11 | End: 2025-06-13

## 2025-06-11 RX ORDER — LANOLIN/MINERAL OIL/PETROLATUM
1 OINTMENT (GRAM) OPHTHALMIC (EYE) DAILY
Refills: 0 | Status: DISCONTINUED | OUTPATIENT
Start: 2025-06-11 | End: 2025-06-13

## 2025-06-11 RX ORDER — INSULIN LISPRO 100 U/ML
INJECTION, SOLUTION INTRAVENOUS; SUBCUTANEOUS
Refills: 0 | Status: DISCONTINUED | OUTPATIENT
Start: 2025-06-11 | End: 2025-06-13

## 2025-06-11 RX ORDER — HEPARIN SODIUM 1000 [USP'U]/ML
5000 INJECTION INTRAVENOUS; SUBCUTANEOUS EVERY 8 HOURS
Refills: 0 | Status: DISCONTINUED | OUTPATIENT
Start: 2025-06-12 | End: 2025-06-13

## 2025-06-11 RX ORDER — GLUCAGON 3 MG/1
1 POWDER NASAL ONCE
Refills: 0 | Status: DISCONTINUED | OUTPATIENT
Start: 2025-06-11 | End: 2025-06-13

## 2025-06-11 RX ORDER — FERROUS SULFATE 137(45) MG
325 TABLET, EXTENDED RELEASE ORAL DAILY
Refills: 0 | Status: DISCONTINUED | OUTPATIENT
Start: 2025-06-11 | End: 2025-06-13

## 2025-06-11 RX ORDER — PIPERACILLIN-TAZO-DEXTROSE,ISO 3.375G/5
3.38 IV SOLUTION, PIGGYBACK PREMIX FROZEN(ML) INTRAVENOUS EVERY 12 HOURS
Refills: 0 | Status: DISCONTINUED | OUTPATIENT
Start: 2025-06-11 | End: 2025-06-13

## 2025-06-11 RX ORDER — CALAMINE 8% AND ZINC OXIDE 8% 160 MG/ML
1 LOTION TOPICAL
Refills: 0 | Status: DISCONTINUED | OUTPATIENT
Start: 2025-06-11 | End: 2025-06-13

## 2025-06-11 RX ORDER — SODIUM CHLORIDE 9 G/1000ML
1000 INJECTION, SOLUTION INTRAVENOUS
Refills: 0 | Status: DISCONTINUED | OUTPATIENT
Start: 2025-06-11 | End: 2025-06-11

## 2025-06-11 RX ORDER — FOLIC ACID 1 MG/1
1 TABLET ORAL DAILY
Refills: 0 | Status: DISCONTINUED | OUTPATIENT
Start: 2025-06-11 | End: 2025-06-13

## 2025-06-11 RX ORDER — CALAMINE 8% AND ZINC OXIDE 8% 160 MG/ML
1 LOTION TOPICAL
Refills: 0 | Status: DISCONTINUED | OUTPATIENT
Start: 2025-06-11 | End: 2025-06-11

## 2025-06-11 RX ADMIN — Medication 25 MILLIGRAM(S): at 03:11

## 2025-06-11 RX ADMIN — Medication 4 GRAM(S): at 21:34

## 2025-06-11 RX ADMIN — Medication 25 GRAM(S): at 05:41

## 2025-06-11 RX ADMIN — FOLIC ACID 1 MILLIGRAM(S): 1 TABLET ORAL at 11:54

## 2025-06-11 RX ADMIN — FLUTICASONE PROPIONATE 1 SPRAY(S): 50 SPRAY, METERED NASAL at 05:42

## 2025-06-11 RX ADMIN — Medication 100 MILLIGRAM(S): at 21:33

## 2025-06-11 RX ADMIN — Medication 75 MILLILITER(S): at 09:28

## 2025-06-11 RX ADMIN — Medication 75 MILLILITER(S): at 15:31

## 2025-06-11 RX ADMIN — Medication 650 MILLIGRAM(S): at 22:50

## 2025-06-11 RX ADMIN — Medication 650 MILLIGRAM(S): at 16:25

## 2025-06-11 RX ADMIN — Medication 3 MILLIGRAM(S): at 21:32

## 2025-06-11 RX ADMIN — FLUTICASONE PROPIONATE 1 SPRAY(S): 50 SPRAY, METERED NASAL at 18:19

## 2025-06-11 RX ADMIN — Medication 650 MILLIGRAM(S): at 22:09

## 2025-06-11 RX ADMIN — Medication 20 MILLIGRAM(S): at 11:53

## 2025-06-11 RX ADMIN — INDOCYANINE GREEN AND WATER 5 MILLIGRAM(S): KIT at 10:46

## 2025-06-11 RX ADMIN — Medication 25 GRAM(S): at 22:14

## 2025-06-11 RX ADMIN — Medication 100 MILLIGRAM(S): at 05:41

## 2025-06-11 RX ADMIN — Medication 25 GRAM(S): at 11:53

## 2025-06-11 RX ADMIN — Medication 650 MILLIGRAM(S): at 15:55

## 2025-06-11 RX ADMIN — Medication 25 MILLIGRAM(S): at 09:32

## 2025-06-11 RX ADMIN — Medication 325 MILLIGRAM(S): at 11:54

## 2025-06-11 RX ADMIN — Medication 10 MILLIGRAM(S): at 11:53

## 2025-06-11 RX ADMIN — Medication 4 GRAM(S): at 09:32

## 2025-06-11 RX ADMIN — Medication 40 MILLIGRAM(S): at 07:02

## 2025-06-11 RX ADMIN — Medication 1 DROP(S): at 11:53

## 2025-06-11 NOTE — PROGRESS NOTE ADULT - SUBJECTIVE AND OBJECTIVE BOX
Patient is a 89y old  Female who presents with a chief complaint of r/o cholecystitis/choledocholithiasis (10 Ghassan 2025 15:00)    HPI:  Pt is a 89 y/o F with PMHx of anemia, DM2, HTN, HLD, CKD stage 3, recent admission to \[facility]\"" 5/19-5/23 for severe sepsis 2/2 UTI and pyelonephritis with r/o acute pino however HIDA and MRCP negative at the time s/p IV abx completed at rehab who presents to the ED for fever, generalized itching, nausea and poor PO intake. History obtained from patient and her two daughters at bedside. Pt finished her antibiotics (meropenem and linezolid) on 5/28, then on 6/6 patient spiked a fever to 102F and began with symptoms of R shoulder pain, generalized itching, nausea, and poor PO intake. She was started on Invanz at her rehab center, and her fever and elevated WBCs improved. Pt was evaluated today at her rehab center ([facility]) and had labwork done and then recommended she present to the ED for further workup. Pt still admits to itching, R shoulder pain, and nausea, but denies any abdominal pain. Last fever 3 days ago. UA at rehab with small LE otherwise negative, UA here pending. No other concerns expressed.    ED course:  VS: /76, HR 66, T 97.3F, RR 16, SpO2 98% on RA  Labs: H/H 8.2/25.2, K 3.2, BUN/Cr 37/2.20, GFR 20, Direct Bili 0.7, TBili 0.9, , AST//156, Lipase 693  RUQ US: Cholelithiasis and gallbladder wall thickening, similar to 5/19/2025. Findings are equivocal for acute cholecystitis. Consider further evaluation with nuclear medicine HIDA scan.  UA pending  Received in the ED: 1L NS bolus (09 Jun 2025 19:40)    INTERVAL HPI:  6/10: Pt seen and examined at bedside. Pt states that she has extreme generalized itching. Denies abdominal pain, fever, chills at present. S/P  IV invanz.--> Change to IV Zosyn as pt got Rash on chest wall, +Itching  NPO after midnight for possible procedure, Low K, Cr elevated   6/11: Pt seen and examined at bedside. Pt states itching is present but has improved, c/o bilateral leg itching. Denies abdominal pain or fever. On IV zosyn. NPO for cholecystectomy. leukocytosis and elevated Cr    OVERNIGHT EVENTS: None    Home Medications:  alogliptin 6.25 mg oral tablet: 1 tab(s) orally once a day (09 Jun 2025 21:59)  Artificial Tears ophthalmic solution: 1 drop(s) in the right eye once a day (09 Jun 2025 22:05)  aspirin 81 mg oral delayed release tablet: 1 tab(s) orally every other day (09 Jun 2025 22:06)  Claritin 10 mg oral tablet: 1 tab(s) orally once a day (09 Jun 2025 22:05)  famotidine 20 mg oral tablet: 1 tab(s) orally once a day (09 Jun 2025 22:06)  fluticasone 50 mcg/inh nasal spray: 1 spray(s) nasal 2 times a day (09 Jun 2025 22:06)  folic acid 1 mg oral tablet: 1 tab(s) orally once a day (09 Jun 2025 22:05)  heparin: 5,000 international unit(s) subcutaneous every 12 hours (09 Jun 2025 21:56)  hydrALAZINE 100 mg oral tablet: 1 tab(s) orally 3 times a day (09 Jun 2025 21:57)  Invanz 1 g injection: 500 milligram(s) intravenously once a day (09 Jun 2025 22:06)  omeprazole 20 mg oral delayed release capsule: 1 cap(s) orally once a day (09 Jun 2025 22:06)  potassium chloride: 10 milliequivalent(s) once a day (09 Jun 2025 22:05)  Questran 4 g/9 g oral powder for reconstitution: 4 gram(s) orally once a day (09 Jun 2025 22:05)  sodium bicarbonate: 650 milligram(s) orally 3 times a day (09 Jun 2025 22:05)  torsemide 20 mg oral tablet: 1 tab(s) orally once a day (09 Jun 2025 22:05)  Vistaril 25 mg oral capsule: 1 cap(s) orally 2 times a day (09 Jun 2025 22:05)      MEDICATIONS  (STANDING):  artificial tears (preservative free) Ophthalmic Solution 1 Drop(s) Right EYE daily  calamine/zinc oxide Lotion 1 Application(s) Topical two times a day  cetirizine 10 milliGRAM(s) Oral daily  cholestyramine Powder (Sugar-Free) 4 Gram(s) Oral two times a day  dextrose 5%. 1000 milliLiter(s) (50 mL/Hr) IV Continuous <Continuous>  dextrose 5%. 1000 milliLiter(s) (100 mL/Hr) IV Continuous <Continuous>  dextrose 50% Injectable 25 Gram(s) IV Push once  dextrose 50% Injectable 12.5 Gram(s) IV Push once  dextrose 50% Injectable 25 Gram(s) IV Push once  famotidine    Tablet 20 milliGRAM(s) Oral daily  ferrous    sulfate 325 milliGRAM(s) Oral daily  fluticasone propionate 50 MICROgram(s)/spray Nasal Spray 1 Spray(s) Both Nostrils two times a day  folic acid 1 milliGRAM(s) Oral daily  glucagon  Injectable 1 milliGRAM(s) IntraMuscular once  hydrALAZINE 100 milliGRAM(s) Oral every 8 hours  indocyanine green Injectable 5 milliGRAM(s) IV Push once  insulin lispro (ADMELOG) corrective regimen sliding scale   SubCutaneous every 6 hours  metoprolol succinate  milliGRAM(s) Oral daily  pantoprazole    Tablet 40 milliGRAM(s) Oral before breakfast  piperacillin/tazobactam IVPB.. 3.375 Gram(s) IV Intermittent every 6 hours  sodium chloride 0.9%. 1000 milliLiter(s) (75 mL/Hr) IV Continuous <Continuous>    MEDICATIONS  (PRN):  dextrose Oral Gel 15 Gram(s) Oral once PRN Blood Glucose LESS THAN 70 milliGRAM(s)/deciliter  diphenhydrAMINE 25 milliGRAM(s) Oral every 4 hours PRN Rash and/or Itching  melatonin 3 milliGRAM(s) Oral at bedtime PRN Insomnia      Allergies    amlodipine (Flushing)    Intolerances        Social History:  Lives: assisted living  ADLs: dependent  Diet: regular  Alcohol Use: denies  Tobacco Use: remote history  Recreational Drug Use: denies (09 Jun 2025 19:40)      REVIEW OF SYSTEMS:  CONSTITUTIONAL: No fever, No chills, No fatigue, No myalgia, No Body ache, No Weakness  EYES: No eye pain,  No visual disturbances, No discharge, NO Redness  ENMT:  No ear pain, No nose bleed, No vertigo; No sinus pain, NO throat pain, No Congestion  NECK: No pain, No stiffness  RESPIRATORY: No cough, NO wheezing, No  hemoptysis, NO  shortness of breath  CARDIOVASCULAR: No chest pain, palpitations  GASTROINTESTINAL: No abdominal pain, NO epigastric pain. No nausea, No vomiting; No diarrhea, No constipation. [  ] BM  GENITOURINARY: No dysuria, No frequency, No urgency, No hematuria, NO incontinence  NEUROLOGICAL: No headaches, No dizziness, No numbness, No tingling, No tremors, No weakness  EXT: No Swelling, No Pain, No Edema  SKIN:  [ X ] [  ] Bilateral leg pruritis. No burning, rashes, or lesions   MUSCULOSKELETAL: No joint pain ,No Jt swelling; No muscle pain, No back pain, No extremity pain  PSYCHIATRIC: No depression,  No anxiety,  No mood swings ,No difficulty sleeping at night  PAIN SCALE: [  ] None  [  ] Other-  ROS Unable to obtain due to - [  ] Dementia  [  ] Lethargy [  ] Drowsy [  ] Sedated [  ] non verbal  REST OF REVIEW Of SYSTEM - [ X ] Normal     Vital Signs Last 24 Hrs  T(C): 36.8 (11 Jun 2025 05:24), Max: 36.8 (11 Jun 2025 05:24)  T(F): 98.2 (11 Jun 2025 05:24), Max: 98.2 (11 Jun 2025 05:24)  HR: 59 (11 Jun 2025 05:24) (59 - 79)  BP: 162/61 (11 Jun 2025 05:24) (118/50 - 169/61)  BP(mean): --  RR: 19 (11 Jun 2025 05:24) (16 - 19)  SpO2: 95% (11 Jun 2025 05:24) (94% - 96%)    Parameters below as of 11 Jun 2025 05:24  Patient On (Oxygen Delivery Method): room air      Finger Stick        06-10 @ 07:01  -  06-11 @ 07:00  --------------------------------------------------------  IN: 200 mL / OUT: 0 mL / NET: 200 mL        PHYSICAL EXAM:  GENERAL:  [ X ] NAD , [ X ] well appearing, [  ] Agitated, [  ] Drowsy,  [  ] Lethargy, [  ] confused   HEAD:  [ X ] Normal, [  ] Other  EYES:  [ X ] EOMI, [ X ] PERRLA, [ X ] conjunctiva and sclera clear normal, [ X ] NO scleral iceterus  [  ] Pallor,[  ] Discharge  ENMT:  [ X ] Normal, [ X ] Moist mucous membranes, [ X ] Good dentition, [ X ] No Thrush  NECK: [ X ] Supple, [ X ] No JVD, [ X ] Normal thyroid, [  ] Lymphadenopathy [  ] Other  CHEST/LUNG:  [ X ] Clear to auscultation bilaterally, [ X ] Breath Sounds equal B/L / Decrease, [  ] poor effort  [ X ] No rales, [ X ] No rhonchi  [ X ]  No wheezing,   HEART:  [ X ] Regular rate and rhythm, [  ] tachycardia, [  ] Bradycardia,  [  ] irregular  [ X ] No murmurs, No rubs, No gallops, [  ] PPM in place (Mfr:  )  ABDOMEN:  [  ] Soft, [ X ] Nontender, [ X ] Nondistended, [ X ] No mass, [ X ] Bowel sounds present, [  ] obese  NERVOUS SYSTEM:  [ X ] Alert & Oriented X3, [ X ] Nonfocal  [  ] Confusion  [  ] Encephalopathic [  ] Sedated [  ] Unable to assess, [  ] Dementia [  ] Other-  EXTREMITIES: [ X ] 2+ Peripheral Pulses, No clubbing, No cyanosis,  [ X ] NO edema B/L lower EXT. [  ] PVD stasis skin changes B/L Lower EXT, [  ] wound  LYMPH: No lymphadenopathy noted  SKIN:  [ X ] Raise rash on bilateral shins, [  ] ecchymosis, [  ] Skin Tears, [  ] Other    DIET: Diet, NPO:   Except Medications (06-10-25 @ 18:20)      LABS:                        8.5    11.66 )-----------( 396      ( 11 Jun 2025 06:00 )             26.7     11 Jun 2025 06:00    139    |  108    |  29     ----------------------------<  101    4.3     |  25     |  2.20     Ca    9.3        11 Jun 2025 06:00    TPro  7.1    /  Alb  2.3    /  TBili  1.0    /  DBili  0.7    /  AST  84     /  ALT  93     /  AlkPhos  922    11 Jun 2025 06:00    PT/INR - ( 11 Jun 2025 06:00 )   PT: 12.6 sec;   INR: 1.07 ratio         PTT - ( 11 Jun 2025 06:00 )  PTT:30.3 sec  Urinalysis Basic - ( 11 Jun 2025 06:00 )    Color: x / Appearance: x / SG: x / pH: x  Gluc: 101 mg/dL / Ketone: x  / Bili: x / Urobili: x   Blood: x / Protein: x / Nitrite: x   Leuk Esterase: x / RBC: x / WBC x   Sq Epi: x / Non Sq Epi: x / Bacteria: x                        Urinalysis with Rflx Culture (collected 10 Ghassan 2025 07:10)         Anemia Panel:      Thyroid Panel:        Lipase: 693 U/L (06-09-25 @ 15:51)          RADIOLOGY & ADDITIONAL TESTS:  NM Hepatobiliary Imaging w/ RX (06.10.25 @ 12:27) >  IMPRESSION: Normal morphine-augmented hepatobiliary scan. No radionuclide   evidence of acute cholecystitis.  No significant interval change since the previous hepatobiliary scan of   5/19/2025.        HEALTH ISSUES - PROBLEM Dx:  Cholecystitis    HTN (hypertension)    Acute kidney injury superimposed on CKD    HLD (hyperlipidemia)    Diabetes type 2    Anemia of chronic disease    Need for prophylactic measure            Consultant(s) Notes Reviewed:  [ X ] YES     Care Discussed with [X] Consultants  [  ] Patient  [x  ] Family - dtr [  ] HCP [  ]   [  ] Social Service  [ X ] RN, [  ] Physical Therapy,[  ] Palliative care team  DVT PPX: [  ] Lovenox, [ X ] S C Heparin, [  ] Coumadin, [  ] Xarelto, [  ] Eliquis, [  ] Pradaxa, [  ] IV Heparin drip, [  ] SCD [  ] Contraindication 2 to GI Bleed,[  ] Ambulation [  ] Contraindicated 2 to  bleed [  ] Contraindicated 2 to Brain Bleed  Advanced directive: [  ] None, [ X ] DNR/DNI Patient is a 89y old  Female who presents with a chief complaint of r/o cholecystitis/choledocholithiasis (10 Ghassan 2025 15:00)    HPI:  Pt is a 89 y/o F with PMHx of anemia, DM2, HTN, HLD, CKD stage 3, recent admission to [facility] 5/19-5/23 for severe sepsis 2/2 UTI and pyelonephritis with r/o acute pino however HIDA and MRCP negative at the time s/p IV abx completed at rehab who presents to the ED for fever, generalized itching, nausea and poor PO intake. History obtained from patient and her two daughters at bedside. Pt finished her antibiotics (meropenem and linezolid) on 5/28, then on 6/6 patient spiked a fever to 102F and began with symptoms of R shoulder pain, generalized itching, nausea, and poor PO intake. She was started on Invanz at her rehab center, and her fever and elevated WBCs improved. Pt was evaluated today at her rehab center ([facility]) and had labwork done and then recommended she present to the ED for further workup. Pt still admits to itching, R shoulder pain, and nausea, but denies any abdominal pain. Last fever 3 days ago. UA at rehab with small LE otherwise negative, UA here pending. No other concerns expressed.    ED course:  VS: /76, HR 66, T 97.3F, RR 16, SpO2 98% on RA  Labs: H/H 8.2/25.2, K 3.2, BUN/Cr 37/2.20, GFR 20, Direct Bili 0.7, TBili 0.9, , AST//156, Lipase 693  RUQ US: Cholelithiasis and gallbladder wall thickening, similar to 5/19/2025. Findings are equivocal for acute cholecystitis. Consider further evaluation with nuclear medicine HIDA scan.  UA pending  Received in the ED: 1L NS bolus (09 Jun 2025 19:40)    INTERVAL HPI:  6/10: Pt seen and examined at bedside. Pt states that she has extreme generalized itching. Denies abdominal pain, fever, chills at present. S/P  IV invanz.--> Change to IV Zosyn as pt got Rash on chest wall, +Itching  NPO after midnight for possible procedure, Low K, Cr elevated   6/11: Pt seen and examined at bedside. Pt states itching is present but has improved, c/o bilateral leg itching. Denies abdominal pain or fever. On IV zosyn. NPO for cholecystectomy. leukocytosis and elevated Cr    OVERNIGHT EVENTS: None    Home Medications:  alogliptin 6.25 mg oral tablet: 1 tab(s) orally once a day (09 Jun 2025 21:59)  Artificial Tears ophthalmic solution: 1 drop(s) in the right eye once a day (09 Jun 2025 22:05)  aspirin 81 mg oral delayed release tablet: 1 tab(s) orally every other day (09 Jun 2025 22:06)  Claritin 10 mg oral tablet: 1 tab(s) orally once a day (09 Jun 2025 22:05)  famotidine 20 mg oral tablet: 1 tab(s) orally once a day (09 Jun 2025 22:06)  fluticasone 50 mcg/inh nasal spray: 1 spray(s) nasal 2 times a day (09 Jun 2025 22:06)  folic acid 1 mg oral tablet: 1 tab(s) orally once a day (09 Jun 2025 22:05)  heparin: 5,000 international unit(s) subcutaneous every 12 hours (09 Jun 2025 21:56)  hydrALAZINE 100 mg oral tablet: 1 tab(s) orally 3 times a day (09 Jun 2025 21:57)  Invanz 1 g injection: 500 milligram(s) intravenously once a day (09 Jun 2025 22:06)  omeprazole 20 mg oral delayed release capsule: 1 cap(s) orally once a day (09 Jun 2025 22:06)  potassium chloride: 10 milliequivalent(s) once a day (09 Jun 2025 22:05)  Questran 4 g/9 g oral powder for reconstitution: 4 gram(s) orally once a day (09 Jun 2025 22:05)  sodium bicarbonate: 650 milligram(s) orally 3 times a day (09 Jun 2025 22:05)  torsemide 20 mg oral tablet: 1 tab(s) orally once a day (09 Jun 2025 22:05)  Vistaril 25 mg oral capsule: 1 cap(s) orally 2 times a day (09 Jun 2025 22:05)      MEDICATIONS  (STANDING):  artificial tears (preservative free) Ophthalmic Solution 1 Drop(s) Right EYE daily  calamine/zinc oxide Lotion 1 Application(s) Topical two times a day  cetirizine 10 milliGRAM(s) Oral daily  cholestyramine Powder (Sugar-Free) 4 Gram(s) Oral two times a day  dextrose 5%. 1000 milliLiter(s) (50 mL/Hr) IV Continuous <Continuous>  dextrose 5%. 1000 milliLiter(s) (100 mL/Hr) IV Continuous <Continuous>  dextrose 50% Injectable 25 Gram(s) IV Push once  dextrose 50% Injectable 12.5 Gram(s) IV Push once  dextrose 50% Injectable 25 Gram(s) IV Push once  famotidine    Tablet 20 milliGRAM(s) Oral daily  ferrous    sulfate 325 milliGRAM(s) Oral daily  fluticasone propionate 50 MICROgram(s)/spray Nasal Spray 1 Spray(s) Both Nostrils two times a day  folic acid 1 milliGRAM(s) Oral daily  glucagon  Injectable 1 milliGRAM(s) IntraMuscular once  hydrALAZINE 100 milliGRAM(s) Oral every 8 hours  indocyanine green Injectable 5 milliGRAM(s) IV Push once  insulin lispro (ADMELOG) corrective regimen sliding scale   SubCutaneous every 6 hours  metoprolol succinate  milliGRAM(s) Oral daily  pantoprazole    Tablet 40 milliGRAM(s) Oral before breakfast  piperacillin/tazobactam IVPB.. 3.375 Gram(s) IV Intermittent every 6 hours  sodium chloride 0.9%. 1000 milliLiter(s) (75 mL/Hr) IV Continuous <Continuous>    MEDICATIONS  (PRN):  dextrose Oral Gel 15 Gram(s) Oral once PRN Blood Glucose LESS THAN 70 milliGRAM(s)/deciliter  diphenhydrAMINE 25 milliGRAM(s) Oral every 4 hours PRN Rash and/or Itching  melatonin 3 milliGRAM(s) Oral at bedtime PRN Insomnia      Allergies    amlodipine (Flushing)    Intolerances        Social History:  Lives: assisted living  ADLs: dependent  Diet: regular  Alcohol Use: denies  Tobacco Use: remote history  Recreational Drug Use: denies (09 Jun 2025 19:40)      REVIEW OF SYSTEMS:  CONSTITUTIONAL: No fever, No chills, No fatigue, No myalgia, No Body ache, No Weakness  EYES: No eye pain,  No visual disturbances, No discharge, NO Redness  ENMT:  No ear pain, No nose bleed, No vertigo; No sinus pain, NO throat pain, No Congestion  NECK: No pain, No stiffness  RESPIRATORY: No cough, NO wheezing, No  hemoptysis, NO  shortness of breath  CARDIOVASCULAR: No chest pain, palpitations  GASTROINTESTINAL: No abdominal pain, NO epigastric pain. No nausea, No vomiting; No diarrhea, No constipation. [  ] BM  GENITOURINARY: No dysuria, No frequency, No urgency, No hematuria, NO incontinence  NEUROLOGICAL: No headaches, No dizziness, No numbness, No tingling, No tremors, No weakness  EXT: No Swelling, No Pain, No Edema  SKIN:  { x } No rash . [  ] Bilateral leg pruritis. No burning, rashes, or lesions   MUSCULOSKELETAL: No joint pain ,No Jt swelling; No muscle pain, No back pain, No extremity pain  PSYCHIATRIC: No depression,  No anxiety,  No mood swings ,No difficulty sleeping at night  PAIN SCALE: [ x ] None  [  ] Other-  ROS Unable to obtain due to - [  ] Dementia  [  ] Lethargy [  ] Drowsy [  ] Sedated [  ] non verbal  REST OF REVIEW Of SYSTEM - [ X ] Normal     Vital Signs Last 24 Hrs  T(C): 36.8 (11 Jun 2025 05:24), Max: 36.8 (11 Jun 2025 05:24)  T(F): 98.2 (11 Jun 2025 05:24), Max: 98.2 (11 Jun 2025 05:24)  HR: 59 (11 Jun 2025 05:24) (59 - 79)  BP: 162/61 (11 Jun 2025 05:24) (118/50 - 169/61)  BP(mean): --  RR: 19 (11 Jun 2025 05:24) (16 - 19)  SpO2: 95% (11 Jun 2025 05:24) (94% - 96%)    Parameters below as of 11 Jun 2025 05:24  Patient On (Oxygen Delivery Method): room air      Finger Stick        06-10 @ 07:01  -  06-11 @ 07:00  --------------------------------------------------------  IN: 200 mL / OUT: 0 mL / NET: 200 mL        PHYSICAL EXAM:  GENERAL:  [ X ] NAD , [ X ] well appearing, [  ] Agitated, [  ] Drowsy,  [  ] Lethargy, [  ] confused   HEAD:  [ X ] Normal, [  ] Other  EYES:  [ X ] EOMI, [ X ] PERRLA, [ X ] conjunctiva and sclera clear normal, [ X ] NO scleral iceterus  [  ] Pallor,[  ] Discharge  ENMT:  [ X ] Normal, [ X ] Moist mucous membranes, [ X ] Good dentition, [ X ] No Thrush  NECK: [ X ] Supple, [ X ] No JVD, [ X ] Normal thyroid, [  ] Lymphadenopathy [  ] Other  CHEST/LUNG:  [ X ] Clear to auscultation bilaterally, [ X ] Breath Sounds equal B/L / Decrease, [  ] poor effort  [ X ] No rales, [ X ] No rhonchi  [ X ]  No wheezing,   HEART:  [ X ] Regular rate and rhythm, [  ] tachycardia, [  ] Bradycardia,  [  ] irregular  [ X ] No murmurs, No rubs, No gallops, [  ] PPM in place (Mfr:  )  ABDOMEN:  [  ] Soft, [ X ] Nontender, [ X ] Nondistended, [ X ] No mass, [ X ] Bowel sounds present, [x  ] obese  NERVOUS SYSTEM:  [ X ] Alert & Oriented X3, [ X ] Nonfocal  [  ] Confusion  [  ] Encephalopathic [  ] Sedated [  ] Unable to assess, [  ] Dementia [  ] Other-  EXTREMITIES: [ X ] 2+ Peripheral Pulses, No clubbing, No cyanosis,  [ X ] NO edema B/L lower EXT. [  ] PVD stasis skin changes B/L Lower EXT, [  ] wound  LYMPH: No lymphadenopathy noted  SKIN:  [ X ] Raise rash on bilateral shins, [  ] ecchymosis, [  ] Skin Tears, [  ] Other    DIET: Diet, NPO:   Except Medications (06-10-25 @ 18:20)      LABS:                        8.5    11.66 )-----------( 396      ( 11 Jun 2025 06:00 )             26.7     11 Jun 2025 06:00    139    |  108    |  29     ----------------------------<  101    4.3     |  25     |  2.20     Ca    9.3        11 Jun 2025 06:00    TPro  7.1    /  Alb  2.3    /  TBili  1.0    /  DBili  0.7    /  AST  84     /  ALT  93     /  AlkPhos  922    11 Jun 2025 06:00    PT/INR - ( 11 Jun 2025 06:00 )   PT: 12.6 sec;   INR: 1.07 ratio         PTT - ( 11 Jun 2025 06:00 )  PTT:30.3 sec  Urinalysis Basic - ( 11 Jun 2025 06:00 )    Color: x / Appearance: x / SG: x / pH: x  Gluc: 101 mg/dL / Ketone: x  / Bili: x / Urobili: x   Blood: x / Protein: x / Nitrite: x   Leuk Esterase: x / RBC: x / WBC x   Sq Epi: x / Non Sq Epi: x / Bacteria: x        Urinalysis with Rflx Culture (collected 10 Ghassan 2025 07:10)         Lipase: 693 U/L (06-09-25 @ 15:51)          RADIOLOGY & ADDITIONAL TESTS:  NM Hepatobiliary Imaging w/ RX (06.10.25 @ 12:27) >  IMPRESSION: Normal morphine-augmented hepatobiliary scan. No radionuclide   evidence of acute cholecystitis.  No significant interval change since the previous hepatobiliary scan of   5/19/2025.        HEALTH ISSUES - PROBLEM Dx:  Cholecystitis    HTN (hypertension)    Acute kidney injury superimposed on CKD    HLD (hyperlipidemia)    Diabetes type 2    Anemia of chronic disease    Need for prophylactic measure            Consultant(s) Notes Reviewed:  [ X ] YES     Care Discussed with [X] Consultants  [  ] Patient  [x  ] Family - dtr [  ] HCP [  ]   [  ] Social Service  [ X ] RN, [  ] Physical Therapy,[  ] Palliative care team  DVT PPX: [  ] Lovenox, [ X ] S C Heparin, [  ] Coumadin, [  ] Xarelto, [  ] Eliquis, [  ] Pradaxa, [  ] IV Heparin drip, [  ] SCD [  ] Contraindication 2 to GI Bleed,[  ] Ambulation [  ] Contraindicated 2 to  bleed [  ] Contraindicated 2 to Brain Bleed  Advanced directive: [  ] None, [ X ] DNR/DNI

## 2025-06-11 NOTE — PROGRESS NOTE ADULT - PROBLEM SELECTOR PLAN 1
Pt presenting with fever, and elevated liver enzymes on outpatient labs-r/o  Ac on chronic Pino  SP IV abx at [facility] VIA Mid line -completed for UTI-Pyelonephritis on last admission  - Direct Bili 0.7, TBili 0.9, , AST//156, Lipase 693  - RUQ US: Cholelithiasis and gallbladder wall thickening, similar to 5/19/2025. Findings are equivocal for acute cholecystitis.   - HIDA negative for acute pino  - Possible plan for lap pino as d/w Dr Fischer   - hold home aspirin for anticipation of OR as per surgery   - NPO after midnight   - S/P IV  INVANZ 500mg daily--> c/w IV Zosyn q 8 hrs as pt has Rash on chest wall, possible reaction from invanz  - c/w cholestyramine and PRN Benadryl for itching  - Add calamine lotion to legs  - Once pt with diet, will start soft and bite sized diet as some concern expressed for patient choking on food, will get speech & swallow eval in AM  - GI Dr Dumont d/w -HIDA-Neg   - Surgery Dr fischer- d/w HIDA -NEG, Follow CT A/P , plan for OR on 6/11  - ID (Dr. JENIFFER Holguin d/w -Change to IV Zosyn q 8 hrs  as pt has Rash with Invaz-Meropenem Pt presenting with fever, and elevated liver enzymes on outpatient labs-r/o  Ac on chronic Pino  SP IV abx at [facility] VIA Mid line -completed for UTI-Pyelonephritis on last admission  - Direct Bili 0.7, TBili 0.9, , AST//156, Lipase 693  - RUQ US: Cholelithiasis and gallbladder wall thickening, similar to 5/19/2025. Findings are equivocal for acute cholecystitis.   - HIDA negative for acute pino  - Possible plan for lap pino as d/w Dr Fischer   - hold home aspirin for anticipation of OR as per surgery   - NPO after midnight   - S/P IV  INVANZ 500mg daily--> c/w IV Zosyn q 8 hrs as pt has Rash on chest wall, possible reaction from invanz  - c/w cholestyramine and PRN Benadryl for itching  - Add calamine lotion to legs  - Once pt with diet, will start soft and bite sized diet as some concern expressed for patient choking on food, will get speech & swallow eval in AM  - GI Dr Dumont d/w -HIDA-Neg   - Surgery Dr fischer- d/w HIDA -NEG, Follow CT A/P , plan for OR on 6/11  - ID (Dr. JENIFFER Holguin d/w -Change to IV Zosyn q 8 hrs  as pt has Rash with Invaz-Meropenem  - EKG reviewed, METS >4, RCRI:1. 6% risk of major cardiac event. Pt is a low risk candidate for low risk procedure. Medically optimized for proposed lap cholecystectomy today Pt presenting with fever, and elevated liver enzymes on outpatient labs-r/o  Ac on chronic Pino  SP IV abx at [facility] VIA Mid line -completed for UTI-Pyelonephritis on last admission  - Direct Bili 0.7, TBili 0.9, , AST//156, Lipase 693  - RUQ US: Cholelithiasis and gallbladder wall thickening, similar to 5/19/2025. Findings are equivocal for acute cholecystitis.   - HIDA negative for acute pino  - Possible plan for lap pino as d/w Dr Fischer   - hold home aspirin for anticipation of OR as per surgery   - NPO after midnight   - S/P IV  INVANZ 500mg daily--> c/w IV Zosyn q 8 hrs as pt has Rash on chest wall, possible reaction from invanz  - c/w cholestyramine and PRN Benadryl for itching  - Add calamine lotion to legs  - Once pt with diet, will start soft and bite sized diet as some concern expressed for patient choking on food, will get speech & swallow eval in AM  - GI Dr Dumont d/w -HIDA-Neg   - Surgery Dr fischer- d/w HIDA -NEG, Follow CT A/P , plan for OR on 6/11  - ID (Dr. JENIFFER Holguin d/w -Change to IV Zosyn q 8 hrs  as pt has Rash with Invaz-Meropenem  - EKG reviewed, METS >4, RCRI:1. 6% risk of major cardiac event. Pt is a low risk candidate for low risk procedure. Medically optimized for proposed lap cholecystectomy today. Pt presenting with fever, and elevated liver enzymes on outpatient labs-r/o  Ac on chronic Pino  SP IV abx at [facility] VIA Mid line -completed for UTI-Pyelonephritis on last admission  - Direct Bili 0.7, TBili 0.9, , AST//156, Lipase 693  - RUQ US: Cholelithiasis and gallbladder wall thickening, similar to 5/19/2025. Findings are equivocal for acute cholecystitis.   - HIDA negative for acute pino  - Possible plan for lap pino as d/w Dr Fischer   - hold home aspirin for anticipation of OR as per surgery   - NPO after midnight   - S/P IV  INVANZ 500mg daily--> c/w IV Zosyn q 8 hrs as pt has Rash on chest wall, possible reaction from invanz  - c/w cholestyramine and PRN Benadryl for itching  - Add calamine lotion to legs  - Once pt with diet, will start soft and bite sized diet as some concern expressed for patient choking on food, will get speech & swallow eval in AM  - GI Dr Dumont d/w -HIDA-Neg   - Surgery Dr fischer- d/w HIDA -NEG, CT A/P Gallbladder wall thickening/edema., plan for OR on 6/11  - ID (Dr. JENIFFER Holguin d/w -Change to IV Zosyn q 8 hrs  as pt has Rash with Invaz-Meropenem  - EKG reviewed, METS >4, RCRI:1. 6% risk of major cardiac event. Pt is a low risk candidate for low risk procedure. Medically optimized for proposed lap cholecystectomy today, TTE: LVEF 60-65%. Pending cardio recs Pt presenting with fever, and elevated liver enzymes on outpatient labs-r/o  Ac on chronic Pino  SP IV abx at [facility] VIA Mid line -completed for UTI-Pyelonephritis on last admission  - Direct Bili 0.7, TBili 0.9, , AST//156, Lipase 693  - RUQ US: Cholelithiasis and gallbladder wall thickening, similar to 5/19/2025. Findings are equivocal for acute cholecystitis.  Mild leukocytosis   - HIDA negative for acute pino  - plan for lap pino Today as d/w Dr Fischer   - hold home aspirin for  OR as per surgery   - NPO after midnight   - S/P IV  INVANZ 500mg daily--> c/w IV Zosyn q 8 hrs as pt has Rash on chest wall, possible reaction from invanz  - c/w cholestyramine and PRN Benadryl for itching  - Add calamine lotion to legs  - Once pt with diet, will start soft and bite sized diet as some concern expressed for patient choking on food, will get speech & swallow eval in AM  - GI Dr Dumont d/w -HIDA-Neg   - Surgery Dr fischer- d/w HIDA -NEG, CT A/P Gallbladder wall thickening/edema., plan for OR today 6/11  - ID (Dr. JENIFFER Holguin d/w -Change to IV Zosyn q 8 hrs  as pt has Rash with Invaz-Meropenem  - EKG reviewed, METS >4, RCRI:1. 6% risk of major cardiac event. Pt is a low risk candidate for low risk procedure. Medically optimized for proposed lap cholecystectomy today, TTE: LVEF 60-65%. Pending cardio recs

## 2025-06-11 NOTE — PROGRESS NOTE ADULT - ATTENDING COMMENTS
Pt is a 89 y/o F with PMHx of anemia, DM2, HTN, HLD, CKD stage 3, recent admission to [facility] 5/19-5/23 for severe sepsis 2/2 UTI and pyelonephritis with r/o acute pino however HIDA and MRCP negative at the time s/p IV abx completed at rehab who presents to the ED for fever, generalized itching, nausea and poor PO intake, admitted with  abnormal LFT's Fever at [facility], r/o Ac  cholecystitis.  Pt seen ,examined, case & care plan d/w pt, residents at [facility].  D/W Dtr at bed side.  Consult-  Sx -Dr fischer -HIDCHRISTIANO scan -NEG, CT a/p-plan for OR for Cholecystectomy today   -GI-DR Dumont d/w IV Abx HIDA -Neg , CT A/P done -IV ABx   ID-DR JENIFFER Holguin d/w -STOP Meropenem, as Skin RASH , On Zosyn q 8 hrs   Renal-Dr TORRES group-ELVIN-CKD 3   Cardiology -Dr Escoto called for Pre op eval  TTE -to follow   DVT ppx   AM labs   Pt has MOLST in chart . DNR-DNI  Pt is medically optimized for schedule Cholecystectomy, pt has moderate  cardiac risks for schedule Cholecystectomy.  -Pre op IV antibiotics as per ID-/ Surgery   -Post op DVT prophylaxis as per Sx  Post op DVT prophylaxis  Post op Incentive spirometry   Total care time id 60 minutes.

## 2025-06-11 NOTE — PROGRESS NOTE ADULT - SUBJECTIVE AND OBJECTIVE BOX
[facility]  HERNESTO Ramirez Y. Patel, S. Shah, G. [facility]  [phone]    Name: NELDA VERDIN  Age: 89y  Gender: Female  MRN: [id number]    Interval History:  Patient seen and examined at bedside  No acute overnight events. Afebrile  No complaints  Rash is better per daughter at bedside  Notes reviewed    Antibiotics:  piperacillin/tazobactam IVPB.. 3.375 Gram(s) IV Intermittent every 6 hours      Medications:  artificial tears (preservative free) Ophthalmic Solution 1 Drop(s) Right EYE daily  calamine/zinc oxide Lotion 1 Application(s) Topical two times a day  cetirizine 10 milliGRAM(s) Oral daily  cholestyramine Powder (Sugar-Free) 4 Gram(s) Oral two times a day  dextrose 5%. 1000 milliLiter(s) IV Continuous <Continuous>  dextrose 5%. 1000 milliLiter(s) IV Continuous <Continuous>  dextrose 50% Injectable 25 Gram(s) IV Push once  dextrose 50% Injectable 12.5 Gram(s) IV Push once  dextrose 50% Injectable 25 Gram(s) IV Push once  dextrose Oral Gel 15 Gram(s) Oral once PRN  diphenhydrAMINE 25 milliGRAM(s) Oral every 4 hours PRN  famotidine    Tablet 20 milliGRAM(s) Oral daily  ferrous    sulfate 325 milliGRAM(s) Oral daily  fluticasone propionate 50 MICROgram(s)/spray Nasal Spray 1 Spray(s) Both Nostrils two times a day  folic acid 1 milliGRAM(s) Oral daily  glucagon  Injectable 1 milliGRAM(s) IntraMuscular once  hydrALAZINE 100 milliGRAM(s) Oral every 8 hours  insulin lispro (ADMELOG) corrective regimen sliding scale   SubCutaneous every 6 hours  melatonin 3 milliGRAM(s) Oral at bedtime PRN  metoprolol succinate  milliGRAM(s) Oral daily  pantoprazole    Tablet 40 milliGRAM(s) Oral before breakfast  piperacillin/tazobactam IVPB.. 3.375 Gram(s) IV Intermittent every 6 hours  sodium chloride 0.9%. 1000 milliLiter(s) IV Continuous <Continuous>  sodium chloride 0.9%. 1000 milliLiter(s) IV Continuous <Continuous>      Review of Systems:  A 10-point review of systems was obtained.   Review of systems otherwise negative except as previously noted.    Allergies: amlodipine (Flushing)    For details regarding the patient's past medical history, social history, family history, and other miscellaneous elements, please refer the initial infectious diseases consultation and/or the admitting history and physical examination for this admission.    Objective:  Vitals:   T(C): 36.5 (06-11-25 @ 12:17), Max: 36.8 (06-11-25 @ 05:24)  HR: 68 (06-11-25 @ 12:17) (59 - 79)  BP: 151/68 (06-11-25 @ 12:17) (133/67 - 169/61)  RR: 17 (06-11-25 @ 12:17) (17 - 19)  SpO2: 94% (06-11-25 @ 12:17) (94% - 96%)    Physical Examination:  General: no acute distress  HEENT: NC/AT, EOMI  Cardio: RRR  Resp: breath sounds heard bilaterally  Abd: soft, NT, ND  Ext: no edema or cyanosis  Skin: faint rash across upper chest      Laboratory Studies:  CBC:                       8.5    11.66 )-----------( 396      ( 11 Jun 2025 06:00 )             26.7     CMP: 06-11    139  |  108  |  29[H]  ----------------------------<  101[H]  4.3   |  25  |  2.20[H]    Ca    9.3      11 Jun 2025 06:00    TPro  7.1  /  Alb  2.3[L]  /  TBili  1.0  /  DBili  0.7[H]  /  AST  84[H]  /  ALT  93[H]  /  AlkPhos  922[H]  06-11    LIVER FUNCTIONS - ( 11 Jun 2025 06:00 )  Alb: 2.3 g/dL / Pro: 7.1 g/dL / ALK PHOS: 922 U/L / ALT: 93 U/L / AST: 84 U/L / GGT: x           Urinalysis Basic - ( 11 Jun 2025 06:00 )    Color: x / Appearance: x / SG: x / pH: x  Gluc: 101 mg/dL / Ketone: x  / Bili: x / Urobili: x   Blood: x / Protein: x / Nitrite: x   Leuk Esterase: x / RBC: x / WBC x   Sq Epi: x / Non Sq Epi: x / Bacteria: x        Microbiology: reviewed    Urinalysis with Rflx Culture (collected 06-10-25 @ 07:10)    Culture - Urine (collected 06-10-25 @ 07:10)  Source: Clean Catch  Preliminary Report (06-11-25 @ 11:40):    Culture positive, 50,000 - 99,000 CFU/mL . Identification to follow.          Radiology: reviewed

## 2025-06-11 NOTE — PROGRESS NOTE ADULT - ASSESSMENT
89 y/o F with PMHx of anemia, DM2, HTN, HLD, CKD stage 3, recent admission to [facility] 5/19-5/23 for severe sepsis 2/2 UTI and pyelonephritis with r/o acute pino however HIDA and MRCP negative at the time s/p IV abx completed at rehab who presents to the ED for fever. Cardiology consulted for cardiac optimization. Follows [place] [facility]     - Pt presenting from rehab for fever, abnormal labs, and generalized pruritis.   - 6/9 RUQ US: Cholelithiasis and gallbladder wall thickening, similar to 5/19/2025. Findings are equivocal for acute cholecystitis.  - 6/10 HIDA: Normal morphine-augmented hepatobiliary scan. No radionuclide evidence of acute cholecystitis.  - Pending OR today for robotic cholecystectomy  - GI and Surgery following   - ID following   - Continue antibiotics   - Pt has no active ischemia, decompensated heart failure, unstable arrythmia, or severe stenotic valvular disease. Patient is optimized from cardiovascular standpoint to proceed with planned procedure with routine hemodynamic monitoring.     - EKG SR with PACS  - No evidence of any active ischemia   - Statin held for transaminitis    - Technically difficult ECHO showed normal LV & RV size and function EF 60-65%, mil dil LA, mod MR  - No evidence of any meaningful volume overload     - BP stable and controlled with -160s   - Continue Toprol 100 and hydralazine 100 q8h  - Can change hydralazine to every 6 hours   - Home losartan and Lasix held for ELVIN     - Aemia as per primary , has hx PADMINI   - Monitor and replete lytes, keep K>4, Mg>2.  - Will continue to follow.    Colette Brar, MS FNP, AGACNP  Nurse Practitioner- Cardiology   Please call on TEAMS

## 2025-06-11 NOTE — PROGRESS NOTE ADULT - PROBLEM SELECTOR PLAN 2
Ashley- CKD3, baseline Cr 1.8  - ASHLEY likely in setting of poor PO intake  - Hold home losartan  - Cr 2.2 this AM  - Start maintenance IVF x12h  - K 3.2 on admission, s/p 40meq x2, IMPROVED  - monitor daily renal function  - Nephro (Dr. 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

## 2025-06-11 NOTE — PROGRESS NOTE ADULT - PROBLEM SELECTOR PLAN 3
Chronic  - c/w home hydralazine and metoprolol with hold parameters  - Per pharmacy, cannot dose hydralazine as 100 q6h, MDD 300mg. Can consider lowering to 75mg q6h. f/u cardio recs  - monitor routine hemodynamics

## 2025-06-11 NOTE — PROGRESS NOTE ADULT - SUBJECTIVE AND OBJECTIVE BOX
S: Patient seen and examined at bedside.  No acute overnight events.  Patient reports no new complaints at this time. States itching has improved but is still present, c/o b/l leg itching. NPO, planned for OR today for robotic cholecystectomy. Patient denies any fever, chills, chest pain, shortness of breath, nausea, vomiting, or urinary complaints.    MEDICATIONS:  artificial tears (preservative free) Ophthalmic Solution 1 Drop(s) Right EYE daily  calamine/zinc oxide Lotion 1 Application(s) Topical two times a day  cetirizine 10 milliGRAM(s) Oral daily  cholestyramine Powder (Sugar-Free) 4 Gram(s) Oral two times a day  dextrose 5%. 1000 milliLiter(s) IV Continuous <Continuous>  dextrose 5%. 1000 milliLiter(s) IV Continuous <Continuous>  dextrose 50% Injectable 25 Gram(s) IV Push once  dextrose 50% Injectable 12.5 Gram(s) IV Push once  dextrose 50% Injectable 25 Gram(s) IV Push once  dextrose Oral Gel 15 Gram(s) Oral once PRN  diphenhydrAMINE 25 milliGRAM(s) Oral every 4 hours PRN  famotidine    Tablet 20 milliGRAM(s) Oral daily  ferrous    sulfate 325 milliGRAM(s) Oral daily  fluticasone propionate 50 MICROgram(s)/spray Nasal Spray 1 Spray(s) Both Nostrils two times a day  folic acid 1 milliGRAM(s) Oral daily  glucagon  Injectable 1 milliGRAM(s) IntraMuscular once  hydrALAZINE 100 milliGRAM(s) Oral every 8 hours  indocyanine green Injectable 5 milliGRAM(s) IV Push once  insulin lispro (ADMELOG) corrective regimen sliding scale   SubCutaneous every 6 hours  melatonin 3 milliGRAM(s) Oral at bedtime PRN  metoprolol succinate  milliGRAM(s) Oral daily  pantoprazole    Tablet 40 milliGRAM(s) Oral before breakfast  piperacillin/tazobactam IVPB.. 3.375 Gram(s) IV Intermittent every 6 hours  sodium chloride 0.9%. 1000 milliLiter(s) IV Continuous <Continuous>  sodium chloride 0.9%. 1000 milliLiter(s) IV Continuous <Continuous>      O:  Vital Signs Last 24 Hrs  T(C): 36.8 (11 Jun 2025 05:24), Max: 36.8 (11 Jun 2025 05:24)  T(F): 98.2 (11 Jun 2025 05:24), Max: 98.2 (11 Jun 2025 05:24)  HR: 59 (11 Jun 2025 05:24) (59 - 79)  BP: 162/61 (11 Jun 2025 05:24) (118/50 - 169/61)  BP(mean): --  RR: 19 (11 Jun 2025 05:24) (16 - 19)  SpO2: 95% (11 Jun 2025 05:24) (94% - 96%)    Parameters below as of 11 Jun 2025 05:24  Patient On (Oxygen Delivery Method): room air        PHYSICAL EXAM:  GENERAL: No acute distress, lying comfortably in bed  HEAD:  Atraumatic, Normocephalic  CHEST/LUNG: Non labored respirations, no accessory muscle use. CTAB; No wheezes, rales, or rhonchi  HEART: Regular rate and rhythm; No murmurs, rubs, or gallops  ABDOMEN: Soft, non-tender, non-distended; bowel sounds+  EXT: calves non-tender b/l, no edema  NEUROLOGY: A&O x 3, no focal deficits    I&O SUMMARY:    06-10-25 @ 07:01  -  06-11-25 @ 07:00  --------------------------------------------------------  IN:    IV PiggyBack: 200 mL  Total IN: 200 mL    OUT:  Total OUT: 0 mL    Total NET: 200 mL          LABS:                        8.5    11.66 )-----------( 396      ( 11 Jun 2025 06:00 )             26.7     06-11    139  |  108  |  29[H]  ----------------------------<  101[H]  4.3   |  25  |  2.20[H]    Ca    9.3      11 Jun 2025 06:00    TPro  7.1  /  Alb  2.3[L]  /  TBili  1.0  /  DBili  0.7[H]  /  AST  84[H]  /  ALT  93[H]  /  AlkPhos  922[H]  06-11    PT/INR - ( 11 Jun 2025 06:00 )   PT: 12.6 sec;   INR: 1.07 ratio         PTT - ( 11 Jun 2025 06:00 )  PTT:30.3 sec      Lactate, Blood: 1.5 mmol/L (06-09 @ 15:51)        RADIOLOGY:    ASSESSMENT: Pt is a 89 y/o F with PMHx of anemia, DM2, HTN, HLD, CKD stage 3, recent admission to [facility] 5/19-5/23 for severe sepsis 2/2 UTI and pyelonephritis with r/o acute pino however HIDA and MRCP negative at the time s/p IV abx completed at rehab who presents to the ED for fever, generalized itching, nausea and poor PO intake, admitted with abnormal LFT's  r/o Ac on chronic cholecystitis.    PLAN:  - OR today for robotic cholecystectomy  - VSSAF  - AM labs with leukocytosis 11.6, Cr 2.2, AST/ALT downtrending  - Continue NPO, IVF  - Continue IV antibiotics  - Pain control as needed  - Encourage OOB/ambulation   - DVT ppx with SCDs    To be discussed with Dr. Portillo    Surgical Team Spectralink: [id number]

## 2025-06-11 NOTE — PROGRESS NOTE ADULT - ASSESSMENT
Pt is a 89 y/o F with PMHx of anemia, DM2, HTN, HLD, CKD stage 3, recent admission to [facility] 5/19-5/23 for severe sepsis 2/2 UTI and pyelonephritis with r/o acute pino however HIDA and MRCP negative at the time s/p IV abx completed at rehab who presents to the ED for fever, generalized itching, nausea and poor PO intake. Pt finished her antibiotics (meropenem and linezolid) on 5/28, then on 6/6 patient spiked a fever to 102F and began with symptoms of R shoulder pain, generalized itching, nausea, and poor PO intake. She was started on Invanz at her rehab center, and her fever and elevated WBCs improved. Pt was evaluated today at her rehab center ([facility]) and had labwork done and then recommended she present to the ED for further workup. Pt still admits to itching, R shoulder pain, and nausea, but denies any abdominal pain. Last fever 3 days ago. UA at rehab with small LE otherwise negative, UA here pending. No other concerns expressed.    R/o acute cholecystitis  Cholelithiasis  Transaminitis  - RUQ US: Cholelithiasis and gallbladder wall thickening, similar to 5/19/2025. Findings are equivocal for acute cholecystitis.  - HIDA negative  - OR 6/11 for lap pino    Recurrent UTI  - no reported urinary sx  - UA negative, only mod leuk est, 25 WBCs  - Urine Culture positive, 50,000 - 99,000 CFU/mL . Identification to follow.    ?Drug Rash/Pruritis  - suspect drug rash in setting of ertapenem; daughter reporting that pt has tolerated meropenem previously w/o issues  - ertapenem since 6/6, switched to zosyn 6/10  - rash improving    Recommendations:   C/w zosyn  F/u pending cx  GI following  Sgy following  Further recs to follow pending above    Patient evaluated with face-to-face time in addition to reviewing history, labs, microbiology, and imaging.   Antibiotic stewardship, local antibiogram, infection control strategies and potential transmission issues taken into consideration at time of treatment decision making process.   Thank you for allowing us to participate in the care of your patient.  D/w daughter and patient at bedside  Infectious Diseases will follow. Please call with any questions.  Claudette Holguin M.D.  Available on [employer] TEAMS -- *PREFERRED*  [facility] [phone]  For after 5 P.M. and weekends, please call [phone]

## 2025-06-11 NOTE — DISCHARGE NOTE PROVIDER - NSDCFUADDINST_GEN_ALL_CORE_FT
Follow up with Dr TATE Su in 1 week after d/c from [facility] to d/w about restarting Torsemide as out pt  Follow up with DR Portillo in 1 week   Follow up with PMD as out pt in 1week of d/c from [facility]  Repeat CBC,BMP in 1 week

## 2025-06-11 NOTE — PROGRESS NOTE ADULT - ASSESSMENT
Pt is a 89 y/o F with PMHx of anemia, DM2, HTN, HLD, CKD stage 3, recent admission to [facility] 5/19-5/23 for severe sepsis 2/2 UTI and pyelonephritis with r/o acute pino however HIDA and MRCP negative at the time s/p IV abx completed at rehab who presents to the ED for fever, generalized itching, nausea and poor PO intake, admitted with abnormal LFT's  r/o Ac on chronic  cholecystitis.

## 2025-06-11 NOTE — PROGRESS NOTE ADULT - SUBJECTIVE AND OBJECTIVE BOX
[facility]    Owen Morrison NP    [address] [place] [address]  [phone]      Chief Complaint:  Patient is a 89y old  Female who presents with a chief complaint of r/o cholecystitis/choledocholithiasis (11 Jun 2025 08:36)      HPI/ 24 hr events:   Patient seen and examined at bedside  No acute GI complaints  Pending OR today for robotic cholecystectomy       REVIEW OF SYSTEMS:   General: Negative  HEENT: Negative  CV: Negative  Respiratory: Negative  GI: See HPI  : Negative  MSK: Negative  Hematologic: Negative  Skin: Negative    MEDICATIONS:   MEDICATIONS  (STANDING):  artificial tears (preservative free) Ophthalmic Solution 1 Drop(s) Right EYE daily  calamine/zinc oxide Lotion 1 Application(s) Topical two times a day  cetirizine 10 milliGRAM(s) Oral daily  cholestyramine Powder (Sugar-Free) 4 Gram(s) Oral two times a day  dextrose 5%. 1000 milliLiter(s) (50 mL/Hr) IV Continuous <Continuous>  dextrose 5%. 1000 milliLiter(s) (100 mL/Hr) IV Continuous <Continuous>  dextrose 50% Injectable 25 Gram(s) IV Push once  dextrose 50% Injectable 12.5 Gram(s) IV Push once  dextrose 50% Injectable 25 Gram(s) IV Push once  famotidine    Tablet 20 milliGRAM(s) Oral daily  ferrous    sulfate 325 milliGRAM(s) Oral daily  fluticasone propionate 50 MICROgram(s)/spray Nasal Spray 1 Spray(s) Both Nostrils two times a day  folic acid 1 milliGRAM(s) Oral daily  glucagon  Injectable 1 milliGRAM(s) IntraMuscular once  hydrALAZINE 100 milliGRAM(s) Oral every 8 hours  insulin lispro (ADMELOG) corrective regimen sliding scale   SubCutaneous every 6 hours  metoprolol succinate  milliGRAM(s) Oral daily  pantoprazole    Tablet 40 milliGRAM(s) Oral before breakfast  piperacillin/tazobactam IVPB.. 3.375 Gram(s) IV Intermittent every 6 hours  sodium chloride 0.9%. 1000 milliLiter(s) (75 mL/Hr) IV Continuous <Continuous>  sodium chloride 0.9%. 1000 milliLiter(s) (75 mL/Hr) IV Continuous <Continuous>    MEDICATIONS  (PRN):  dextrose Oral Gel 15 Gram(s) Oral once PRN Blood Glucose LESS THAN 70 milliGRAM(s)/deciliter  diphenhydrAMINE 25 milliGRAM(s) Oral every 4 hours PRN Rash and/or Itching  melatonin 3 milliGRAM(s) Oral at bedtime PRN Insomnia      ALLERGIES:   Allergies    amlodipine (Flushing)    Intolerances        VITAL SIGNS:   Vital Signs Last 24 Hrs  T(C): 36.8 (11 Jun 2025 05:24), Max: 36.8 (11 Jun 2025 05:24)  T(F): 98.2 (11 Jun 2025 05:24), Max: 98.2 (11 Jun 2025 05:24)  HR: 59 (11 Jun 2025 05:24) (59 - 79)  BP: 162/61 (11 Jun 2025 05:24) (118/50 - 169/61)  BP(mean): --  RR: 19 (11 Jun 2025 05:24) (16 - 19)  SpO2: 95% (11 Jun 2025 05:24) (94% - 96%)    Parameters below as of 11 Jun 2025 05:24  Patient On (Oxygen Delivery Method): room air      I&O's Summary    10 Ghassan 2025 07:01  -  11 Jun 2025 07:00  --------------------------------------------------------  IN: 200 mL / OUT: 0 mL / NET: 200 mL        PHYSICAL EXAM:   GENERAL:  No acute distress  HEENT:  NC/AT  CHEST:  No increased effort  HEART:  Regular rate  ABDOMEN:  Soft, non-tender, non-distended  EXTREMITIES: No cyanosis  SKIN:  Warm, dry  NEURO:  Calm, cooperative    LABS:                        8.5    11.66 )-----------( 396      ( 11 Jun 2025 06:00 )             26.7     06-11    139  |  108  |  29[H]  ----------------------------<  101[H]  4.3   |  25  |  2.20[H]    Ca    9.3      11 Jun 2025 06:00    TPro  7.1  /  Alb  2.3[L]  /  TBili  1.0  /  DBili  0.7[H]  /  AST  84[H]  /  ALT  93[H]  /  AlkPhos  922[H]  06-11    LIVER FUNCTIONS - ( 11 Jun 2025 06:00 )  Alb: 2.3 g/dL / Pro: 7.1 g/dL / ALK PHOS: 922 U/L / ALT: 93 U/L / AST: 84 U/L / GGT: x           PT/INR - ( 11 Jun 2025 06:00 )   PT: 12.6 sec;   INR: 1.07 ratio         PTT - ( 11 Jun 2025 06:00 )  PTT:30.3 sec    Urinalysis with Rflx Culture (collected 10 Ghassan 2025 07:10)                              Triglycerides, Serum: 251 mg/dL (06-10-25 @ 05:35)        RADIOLOGY & ADDITIONAL STUDIES:

## 2025-06-11 NOTE — PROGRESS NOTE ADULT - SUBJECTIVE AND OBJECTIVE BOX
Patient in OR and unable to be seen at this time  Labs reviewed; stable renal function near baseline  Repeat labs in AM  Full consult not to follow, thank you

## 2025-06-11 NOTE — SOCIAL WORK PROGRESS NOTE - NSSWPROGRESSNOTE_GEN_ALL_CORE
Per interdisciplinary rounds, pt for OR today for cholecystectomy. Awaiting PT eval/recommendation. Pt from [facility], plan for her to return to [facility]. SW will continue to follow.

## 2025-06-11 NOTE — PROGRESS NOTE ADULT - PROBLEM SELECTOR PLAN 6
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

## 2025-06-11 NOTE — DISCHARGE NOTE PROVIDER - NSDCQMCOGNITION_NEU_ALL_CORE
Difficulty concentrating Difficulty concentrating/Difficulty making decisions/Difficulty remembering

## 2025-06-11 NOTE — BRIEF OPERATIVE NOTE - NSICDXBRIEFPOSTOP_GEN_ALL_CORE_FT
POST-OP DIAGNOSIS:  Cholelithiasis with acute on chronic cholecystitis 11-Jun-2025 14:56:02  Arturo Portillo

## 2025-06-11 NOTE — PROGRESS NOTE ADULT - PROBLEM SELECTOR PLAN 5
Chronic  - on alogliptin at home  - hold home medications  - ANGELINA with FS QAC/QHS  - hypoglycemia protocol  - A1c 6.1%

## 2025-06-11 NOTE — PROGRESS NOTE ADULT - ASSESSMENT
89 year old female with PMH of HTN, HLD, CKD III, anemia, DM II and recent admission for acute cholecystitis with plans for conservative management who is presenting from rehab for fever, abnormal labs, and generalized pruritis.     6/9 RUQ US: Cholelithiasis and gallbladder wall thickening, similar to 5/19/2025. Findings are equivocal for acute cholecystitis.  6/10 HIDA: Normal morphine-augmented hepatobiliary scan. No radionuclide evidence of acute cholecystitis.    Plan:  - Imaging from prior admission reviewed: HIDA negative for acute cholecystitis or biliary obstruction and MRCP showed biliary sludge in GB with nonspecific thickening of GB wall but negative for choledocholithiasis.   - RUQ US & HIDA noted   - Normal bilirubin and elevated liver enzymes downtrending, continue to trend CMP daily    - Surgery team eval appreciated, pending OR today for robotic cholecystectomy  - Antibiotics as per ID, on Zosyn   - Avoid hepatotoxic agents   - Will follow

## 2025-06-11 NOTE — CASE MANAGEMENT PROGRESS NOTE - NSCMPROGRESSNOTE_GEN_ALL_CORE
Patient is planned for OR today for robotic cholecystectomy. Patients resides in [facility]; however, was in [facility] PTA. Anticipated DC plan is for patient to return to [facility] when medically stable; SW following. CM will remain available.

## 2025-06-11 NOTE — BRIEF OPERATIVE NOTE - NSICDXBRIEFPROCEDURE_GEN_ALL_CORE_FT
PROCEDURES:  Robot-assisted cholecystectomy with cholangiography 11-Jun-2025 14:55:28  Arturo Portillo

## 2025-06-11 NOTE — DISCHARGE NOTE PROVIDER - NSDCACTIVITY_GEN_ALL_CORE
Activity as tolerated Bathing allowed/Do not drive or operate machinery/Walking - Indoors allowed/No heavy lifting/straining/Activity as tolerated

## 2025-06-11 NOTE — PROGRESS NOTE ADULT - SUBJECTIVE AND OBJECTIVE BOX
Post Operative Note    Procedure: S/P Robot-assisted cholecystectomy with cholangiography    Subjective:   Patient seen and examined at bedside.  No events post-operatively.  Patient with no new complaints at this time besides mild post operative pain, had not had much PO intake as of yet.  Patient denies any fevers, chills, chest pain, shortness of breath,  nausea, vomiting or diarrhea.    Medications: [Standing]  acetaminophen     Tablet .. 650 milliGRAM(s) Oral every 6 hours PRN  artificial tears (preservative free) Ophthalmic Solution 1 Drop(s) Right EYE daily  calamine/zinc oxide Lotion 1 Application(s) Topical two times a day  cetirizine 10 milliGRAM(s) Oral daily  cholestyramine Powder (Sugar-Free) 4 Gram(s) Oral two times a day  dextrose 5%. 1000 milliLiter(s) IV Continuous <Continuous>  dextrose 5%. 1000 milliLiter(s) IV Continuous <Continuous>  dextrose 50% Injectable 25 Gram(s) IV Push once  dextrose 50% Injectable 12.5 Gram(s) IV Push once  dextrose 50% Injectable 25 Gram(s) IV Push once  dextrose Oral Gel 15 Gram(s) Oral once PRN  diphenhydrAMINE 25 milliGRAM(s) Oral every 4 hours PRN  famotidine    Tablet 20 milliGRAM(s) Oral daily  ferrous    sulfate 325 milliGRAM(s) Oral daily  fluticasone propionate 50 MICROgram(s)/spray Nasal Spray 1 Spray(s) Both Nostrils two times a day  folic acid 1 milliGRAM(s) Oral daily  glucagon  Injectable 1 milliGRAM(s) IntraMuscular once  hydrALAZINE 100 milliGRAM(s) Oral every 8 hours  insulin lispro (ADMELOG) corrective regimen sliding scale   SubCutaneous three times a day before meals  insulin lispro (ADMELOG) corrective regimen sliding scale   SubCutaneous at bedtime  melatonin 3 milliGRAM(s) Oral at bedtime PRN  metoprolol succinate  milliGRAM(s) Oral daily  pantoprazole    Tablet 40 milliGRAM(s) Oral before breakfast  piperacillin/tazobactam IVPB.. 3.375 Gram(s) IV Intermittent every 12 hours    Medications: [PRN]  acetaminophen     Tablet .. 650 milliGRAM(s) Oral every 6 hours PRN  artificial tears (preservative free) Ophthalmic Solution 1 Drop(s) Right EYE daily  calamine/zinc oxide Lotion 1 Application(s) Topical two times a day  cetirizine 10 milliGRAM(s) Oral daily  cholestyramine Powder (Sugar-Free) 4 Gram(s) Oral two times a day  dextrose 5%. 1000 milliLiter(s) IV Continuous <Continuous>  dextrose 5%. 1000 milliLiter(s) IV Continuous <Continuous>  dextrose 50% Injectable 25 Gram(s) IV Push once  dextrose 50% Injectable 12.5 Gram(s) IV Push once  dextrose 50% Injectable 25 Gram(s) IV Push once  dextrose Oral Gel 15 Gram(s) Oral once PRN  diphenhydrAMINE 25 milliGRAM(s) Oral every 4 hours PRN  famotidine    Tablet 20 milliGRAM(s) Oral daily  ferrous    sulfate 325 milliGRAM(s) Oral daily  fluticasone propionate 50 MICROgram(s)/spray Nasal Spray 1 Spray(s) Both Nostrils two times a day  folic acid 1 milliGRAM(s) Oral daily  glucagon  Injectable 1 milliGRAM(s) IntraMuscular once  hydrALAZINE 100 milliGRAM(s) Oral every 8 hours  insulin lispro (ADMELOG) corrective regimen sliding scale   SubCutaneous three times a day before meals  insulin lispro (ADMELOG) corrective regimen sliding scale   SubCutaneous at bedtime  melatonin 3 milliGRAM(s) Oral at bedtime PRN  metoprolol succinate  milliGRAM(s) Oral daily  pantoprazole    Tablet 40 milliGRAM(s) Oral before breakfast  piperacillin/tazobactam IVPB.. 3.375 Gram(s) IV Intermittent every 12 hours      Objective:  Vitals: T(F): 97.9 (06-11-25 @ 20:26), Max: 98.2 (06-11-25 @ 05:24)  HR: 90 (06-11-25 @ 20:26)  BP: 128/73 (06-11-25 @ 20:26) (83/30 - 175/65)  RR: 18 (06-11-25 @ 20:26)  SpO2: 97% (06-11-25 @ 20:26)  Vent Settings:     In:   06-10-25 @ 07:01  -  06-11-25 @ 07:00  --------------------------------------------------------  IN: 200 mL    06-11-25 @ 07:01  -  06-11-25 @ 20:50  --------------------------------------------------------  IN: 350 mL      IV Fluids: dextrose 5%. 1000 milliLiter(s) (100 mL/Hr) IV Continuous <Continuous>  dextrose 5%. 1000 milliLiter(s) (50 mL/Hr) IV Continuous <Continuous>  ferrous    sulfate 325 milliGRAM(s) Oral daily  folic acid 1 milliGRAM(s) Oral daily    Out:   06-10-25 @ 07:01  -  06-11-25 @ 07:00  --------------------------------------------------------  OUT: 0 mL    06-11-25 @ 07:01  -  06-11-25 @ 20:50  --------------------------------------------------------  OUT: 400 mL      EBL:     Voided Urine:   06-10-25 @ 07:01  -  06-11-25 @ 07:00  --------------------------------------------------------  OUT: 0 mL    06-11-25 @ 07:01  -  06-11-25 @ 20:50  --------------------------------------------------------  OUT: 400 mL      PHYSICAL EXAM:  GENERAL: No acute distress, well-developed  HEAD:  Atraumatic, Normocephalic  CHEST/LUNG: Non-labored respirations, no accessory muscle use   HEART: Regular rate and rhythm  ABDOMEN: Soft, appropriately-tender, minimally-distended; incisions clean dry and intact.  NEUROLOGY: answering questions appropriately    Labs:                        8.5    11.66 )-----------( 396      ( 11 Jun 2025 06:00 )             26.7     06-11    139  |  108  |  29[H]  ----------------------------<  101[H]  4.3   |  25  |  2.20[H]    Ca    9.3      11 Jun 2025 06:00    TPro  7.1  /  Alb  2.3[L]  /  TBili  1.0  /  DBili  0.7[H]  /  AST  84[H]  /  ALT  93[H]  /  AlkPhos  922[H]  06-11    PT/INR - ( 11 Jun 2025 06:00 )   PT: 12.6 sec;   INR: 1.07 ratio         PTT - ( 11 Jun 2025 06:00 )  PTT:30.3 sec      Imaging:  No post-op imaging studies    Assessment:  89yFemale patient S/P robotic assisted laparoscopic cholecystectomy for acute on chronic cholecystitis.    Plan:  - incentive spirometer  - pain control  - OOB  - CLD, will adv to reg  - labs in am  - Once tolerates diet, stable for d/c from surgical perspective    Surgical Team Spectralink: [id number]

## 2025-06-11 NOTE — DISCHARGE NOTE PROVIDER - NSDCFUSCHEDAPPT_GEN_ALL_CORE_FT
Ananda De Luna  [facility]  ORTHOSURG [address] [facility]  Scheduled Appointment: 06/27/2025

## 2025-06-11 NOTE — DISCHARGE NOTE PROVIDER - HOSPITAL COURSE
FROM ADMISSION H+P:   HPI:  Pt is a 89 y/o F with PMHx of anemia, DM2, HTN, HLD, CKD stage 3, recent admission to [facility] 5/19-5/23 for severe sepsis 2/2 UTI and pyelonephritis with r/o acute pino however HIDA and MRCP negative at the time s/p IV abx completed at rehab who presents to the ED for fever, generalized itching, nausea and poor PO intake. History obtained from patient and her two daughters at bedside. Pt finished her antibiotics (meropenem and linezolid) on 5/28, then on 6/6 patient spiked a fever to 102F and began with symptoms of R shoulder pain, generalized itching, nausea, and poor PO intake. She was started on Invanz at her rehab center, and her fever and elevated WBCs improved. Pt was evaluated today at her rehab center ([facility]) and had labwork done and then recommended she present to the ED for further workup. Pt still admits to itching, R shoulder pain, and nausea, but denies any abdominal pain. Last fever 3 days ago. UA at rehab with small LE otherwise negative, UA here pending. No other concerns expressed.    ED course:  VS: /76, HR 66, T 97.3F, RR 16, SpO2 98% on RA  Labs: H/H 8.2/25.2, K 3.2, BUN/Cr 37/2.20, GFR 20, Direct Bili 0.7, TBili 0.9, , AST//156, Lipase 693  RUQ US: Cholelithiasis and gallbladder wall thickening, similar to 5/19/2025. Findings are equivocal for acute cholecystitis. Consider further evaluation with nuclear medicine HIDA scan.  UA pending  Received in the ED: 1L NS bolus (09 Jun 2025 19:40)      ---  HOSPITAL COURSE: Patient was admitted on 6/9/2025. Surgery (Dr. Portillo), GI (Dr. Dumont), ID (Dr. Holguin) and Cardiology (Dr. Escoto) were consulted.  GI evaluated the patient, recommended HIDA which was negative for acute cholecystitis. Subsequent CT A/P with gallbladder wall thickening/edema.  Pt with extreme pruritis, treated with cholestyramine, benadryl and calamine lotion. ID recommended transition from invanz to zosyn, given likely medication reaction. Symptoms subsided throughout admission. Surgery evaluated the patient. Given recurrence of symptoms on previous admissions, recommended definitive tx with cholecystectomy. Cardiology consulted for pre-op clearance.  Technically difficult ECHO showed normal LV & RV size and function EF 60-65%, mil dil LA, mod MR. Pt was optimized for robotic pino on 6/11 with surgery. Pt tolerated the procedure well. Liver enzymes downtrended throughout admission.     Pt also with elevated Cr on admission, home losartan and lasix were held. Cr subsequently improved and home medications were reintroduced ******    The patient was examined at the bedside on the day of discharge. On day of discharge, patient is stable for discharge to ______ with close outpatient follow up:    VS reviewed and within normal limits. No concerning findings on exam. Care plan reviewed with caregivers. Caregivers in agreement and endorse understanding.  Pt deemed stable for d/c home w/ anticipatory guidance and strict indications for return. No outstanding issues or concerns noted.     Vitals on day of discharge:    Physical exam on day of discharge:    ---  CONSULTANTS:   Surgery (Dr. Portillo)  GI (Dr. Dumont)  ID (Dr. Holguin)  Cardiology (Dr. Escoto)   ---  TIME SPENT:  I, the attending physician, was physically present for the key portions of the evaluation and management (E/M) service provided. The total amount of time spent reviewing the hospital notes, laboratory values, imaging findings, assessing/counseling the patient, discussing with consultant physicians, social work, nursing staff was -- minutes    ---  Primary care provider was made aware of plan for discharge:      [  ] NO     [  ] YES   FROM ADMISSION H+P:   HPI:  Pt is a 89 y/o F with PMHx of anemia, DM2, HTN, HLD, CKD stage 3, recent admission to [facility] 5/19-5/23 for severe sepsis 2/2 UTI and pyelonephritis with r/o acute pino however HIDA and MRCP negative at the time s/p IV abx completed at rehab who presents to the ED for fever, generalized itching, nausea and poor PO intake. History obtained from patient and her two daughters at bedside. Pt finished her antibiotics (meropenem and linezolid) on 5/28, then on 6/6 patient spiked a fever to 102F and began with symptoms of R shoulder pain, generalized itching, nausea, and poor PO intake. She was started on Invanz at her rehab center, and her fever and elevated WBCs improved. Pt was evaluated today at her rehab center ([facility]) and had labwork done and then recommended she present to the ED for further workup. Pt still admits to itching, R shoulder pain, and nausea, but denies any abdominal pain. Last fever 3 days ago. UA at rehab with small LE otherwise negative, UA here pending. No other concerns expressed.    ED course:  VS: /76, HR 66, T 97.3F, RR 16, SpO2 98% on RA  Labs: H/H 8.2/25.2, K 3.2, BUN/Cr 37/2.20, GFR 20, Direct Bili 0.7, TBili 0.9, , AST//156, Lipase 693  RUQ US: Cholelithiasis and gallbladder wall thickening, similar to 5/19/2025. Findings are equivocal for acute cholecystitis. Consider further evaluation with nuclear medicine HIDA scan.  UA pending  Received in the ED: 1L NS bolus (09 Jun 2025 19:40)      ---  HOSPITAL COURSE: Patient was admitted on 6/9/2025. Surgery (Dr. Portillo), GI (Dr. Dumont), ID (Dr. Holguin) and Cardiology (Dr. Escoto) were consulted. GI evaluated the patient, recommended HIDA which was negative for acute cholecystitis. Subsequent CT A/P with gallbladder wall thickening/edema. Pt with extreme pruritis, treated with cholestyramine, benadryl and calamine lotion. ID recommended transition from invanz to zosyn, given likely medication reaction. Symptoms subsided throughout admission. Surgery evaluated the patient. Given recurrence of symptoms on previous admissions, recommended definitive tx with cholecystectomy. Cardiology consulted for pre-op clearance.  Technically difficult ECHO showed normal LV & RV size and function EF 60-65%, mil dil LA, mod MR. Pt was optimized for robotic pino on 6/11 with surgery. Pt tolerated the procedure well. Liver enzymes downtrended throughout admission. Pt also with elevated Cr on admission, home losartan and torsemide were held. Cr remained stable.    The patient was examined at the bedside on the day of discharge. On day of discharge, patient is stable for discharge to rehab with close outpatient follow up:    VS reviewed and within normal limits. No concerning findings on exam. Care plan reviewed with caregivers. Caregivers in agreement and endorse understanding.  Pt deemed stable for d/c home w/ anticipatory guidance and strict indications for return. No outstanding issues or concerns noted.     Vitals on day of discharge:    Physical exam on day of discharge:    ---  CONSULTANTS:   Surgery (Dr. Portillo)  GI (Dr. Dumont)  ID (Dr. Holguin)  Cardiology (Dr. Escoto)   ---  TIME SPENT:  I, the attending physician, was physically present for the key portions of the evaluation and management (E/M) service provided. The total amount of time spent reviewing the hospital notes, laboratory values, imaging findings, assessing/counseling the patient, discussing with consultant physicians, social work, nursing staff was -- minutes    ---  Primary care provider was made aware of plan for discharge:      [  ] NO     [  ] YES   FROM ADMISSION H+P:   HPI:  Pt is a 89 y/o F with PMHx of anemia, DM2, HTN, HLD, CKD stage 3, recent admission to [facility] 5/19-5/23 for severe sepsis 2/2 UTI and pyelonephritis with r/o acute pino however HIDA and MRCP negative at the time s/p IV abx completed at rehab who presents to the ED for fever, generalized itching, nausea and poor PO intake. History obtained from patient and her two daughters at bedside. Pt finished her antibiotics (meropenem and linezolid) on 5/28, then on 6/6 patient spiked a fever to 102F and began with symptoms of R shoulder pain, generalized itching, nausea, and poor PO intake. She was started on Invanz at her rehab center, and her fever and elevated WBCs improved. Pt was evaluated today at her rehab center ([facility]) and had labwork done and then recommended she present to the ED for further workup. Pt still admits to itching, R shoulder pain, and nausea, but denies any abdominal pain. Last fever 3 days ago. UA at rehab with small LE otherwise negative, UA here pending. No other concerns expressed.    ED course:  VS: /76, HR 66, T 97.3F, RR 16, SpO2 98% on RA  Labs: H/H 8.2/25.2, K 3.2, BUN/Cr 37/2.20, GFR 20, Direct Bili 0.7, TBili 0.9, , AST//156, Lipase 693  RUQ US: Cholelithiasis and gallbladder wall thickening, similar to 5/19/2025. Findings are equivocal for acute cholecystitis. Consider further evaluation with nuclear medicine HIDA scan.  UA pending  Received in the ED: 1L NS bolus (09 Jun 2025 19:40)      ---  HOSPITAL COURSE: Patient was admitted on 6/9/2025. Surgery (Dr. Portillo), GI (Dr. Dumont), ID (Dr. Holguin) and Cardiology (Dr. Escoto) were consulted. GI evaluated the patient, recommended HIDA which was negative for acute cholecystitis. Subsequent CT A/P with gallbladder wall thickening/edema. Pt with extreme pruritis, treated with cholestyramine, benadryl and calamine lotion. ID recommended transition from invanz to zosyn, given likely medication reaction. Symptoms subsided throughout admission. Surgery evaluated the patient. Given recurrence of symptoms on previous admissions, recommended definitive tx with cholecystectomy. Cardiology consulted for pre-op clearance.  Technically difficult ECHO showed normal LV & RV size and function EF 60-65%, mil dil LA, mod MR. Pt was optimized for robotic pino on 6/11 with surgery. Pt tolerated the procedure well. Liver enzymes downtrended throughout admission. Pt also with elevated Cr on admission, home losartan and torsemide were held. Cr remained stable.    The patient was examined at the bedside on the day of discharge. On day of discharge, patient is stable for discharge to rehab with close outpatient follow up:    VS reviewed and within normal limits. No concerning findings on exam. Care plan reviewed with caregivers. Caregivers in agreement and endorse understanding.  Pt deemed stable for d/c home w/ anticipatory guidance and strict indications for return. No outstanding issues or concerns noted.     Vitals on day of discharge:  T(C): 37 (06-13-25 @ 05:18), Max: 37 (06-13-25 @ 05:18)  HR: 78 (06-13-25 @ 05:18) (78 - 90)  BP: 135/52 (06-13-25 @ 05:18) (118/60 - 140/65)  RR: 19 (06-13-25 @ 05:18) (17 - 19)  SpO2: 92% (06-13-25 @ 05:18) (92% - 94%)    Physical exam on day of discharge:  GENERAL: NAD, lying in bed comfortably  HEAD:  Atraumatic, normocephalic  EYES: EOMI, PERRLA, conjunctiva and sclera clear  ENT: Moist mucous membranes  NECK: Supple, no JVD  HEART: Regular rate and rhythm, no murmurs, rubs, or gallops  LUNGS: Unlabored respirations.  Clear to auscultation bilaterally, no crackles, wheezing, or rhonchi  ABDOMEN: Soft, +surgical incisions C/D/I, +BS  EXTREMITIES: 2+ peripheral pulses bilaterally. No clubbing, cyanosis, or edema  NERVOUS SYSTEM:  A&Ox3, no focal deficits   SKIN: Warm and dry  ---  CONSULTANTS:   Surgery (Dr. Portillo)  GI (Dr. Dumont)  ID (Dr. Holguin)  Cardiology (Dr. Escoto)   ---  TIME SPENT:  I, the attending physician, was physically present for the key portions of the evaluation and management (E/M) service provided. The total amount of time spent reviewing the hospital notes, laboratory values, imaging findings, assessing/counseling the patient, discussing with consultant physicians, social work, nursing staff was -- minutes    ---  Primary care provider was made aware of plan for discharge:      [  ] NO     [  ] YES   FROM ADMISSION H+P:   HPI:  Pt is a 89 y/o F with PMHx of anemia, DM2, HTN, HLD, CKD stage 3, recent admission to [facility] 5/19-5/23 for severe sepsis 2/2 UTI and pyelonephritis with r/o acute pino however HIDA and MRCP negative at the time s/p IV abx completed at rehab who presents to the ED for fever, generalized itching, nausea and poor PO intake. History obtained from patient and her two daughters at bedside. Pt finished her antibiotics (meropenem and linezolid) on 5/28, then on 6/6 patient spiked a fever to 102F and began with symptoms of R shoulder pain, generalized itching, nausea, and poor PO intake. She was started on Invanz at her rehab center, and her fever and elevated WBCs improved. Pt was evaluated today at her rehab center ([facility]) and had labwork done and then recommended she present to the ED for further workup. Pt still admits to itching, R shoulder pain, and nausea, but denies any abdominal pain. Last fever 3 days ago. UA at rehab with small LE otherwise negative, UA here pending. No other concerns expressed.    ED course:  VS: /76, HR 66, T 97.3F, RR 16, SpO2 98% on RA  Labs: H/H 8.2/25.2, K 3.2, BUN/Cr 37/2.20, GFR 20, Direct Bili 0.7, TBili 0.9, , AST//156, Lipase 693  RUQ US: Cholelithiasis and gallbladder wall thickening, similar to 5/19/2025. Findings are equivocal for acute cholecystitis. Consider further evaluation with nuclear medicine HIDA scan.  UA pending  Received in the ED: 1L NS bolus (09 Jun 2025 19:40)      ---  HOSPITAL COURSE: Patient was admitted on 6/9/2025. Surgery (Dr. Portillo), GI (Dr. Dumont), ID (Dr. Holguin) and Cardiology (Dr. Escoto) were consulted. GI evaluated the patient, recommended HIDA which was negative for acute cholecystitis. Subsequent CT A/P with gallbladder wall thickening/edema. Pt with extreme pruritis, treated with cholestyramine, benadryl and calamine lotion. ID recommended transition from invanz to zosyn, given likely medication reaction. Symptoms subsided throughout admission. Surgery evaluated the patient. Given recurrence of symptoms on previous admissions, recommended definitive tx with cholecystectomy. Cardiology consulted for pre-op clearance.  Technically difficult ECHO showed normal LV & RV size and function EF 60-65%, mil dil LA, mod MR. Pt was optimized for robotic pino on 6/11 with surgery. Pt tolerated the procedure well. Liver enzymes downtrended throughout admission. Pt also with elevated Cr on admission, home torsemide were held. Cr remained stable.    The patient was examined at the bedside on the day of discharge. On day of discharge, patient is stable for discharge to rehab with close outpatient follow up:    VS reviewed and within normal limits. No concerning findings on exam. Care plan reviewed with caregivers. Caregivers in agreement and endorse understanding.  Pt deemed stable for d/c home w/ anticipatory guidance and strict indications for return. No outstanding issues or concerns noted.     Vitals on day of discharge:  T(C): 37 (06-13-25 @ 05:18), Max: 37 (06-13-25 @ 05:18)  HR: 78 (06-13-25 @ 05:18) (78 - 90)  BP: 135/52 (06-13-25 @ 05:18) (118/60 - 140/65)  RR: 19 (06-13-25 @ 05:18) (17 - 19)  SpO2: 92% (06-13-25 @ 05:18) (92% - 94%)    Physical exam on day of discharge:  GENERAL: NAD, lying in bed comfortably  HEAD:  Atraumatic, normocephalic  EYES: EOMI, PERRLA, conjunctiva and sclera clear  ENT: Moist mucous membranes  NECK: Supple, no JVD  HEART: Regular rate and rhythm, no murmurs, rubs, or gallops  LUNGS: Unlabored respirations.  Clear to auscultation bilaterally, no crackles, wheezing, or rhonchi  ABDOMEN: Soft, +surgical incisions C/D/I, +BS  EXTREMITIES: 2+ peripheral pulses bilaterally. No clubbing, cyanosis, or edema  NERVOUS SYSTEM:  A&Ox3, no focal deficits   SKIN: Warm and dry  ---  CONSULTANTS:   Surgery (Dr. Portillo)  GI (Dr. Dumont)  ID (Dr. Holguin)  Cardiology (Dr. Escoto)   ---  TIME SPENT:  I, the attending physician, was physically present for the key portions of the evaluation and management (E/M) service provided. The total amount of time spent reviewing the hospital notes, laboratory values, imaging findings, assessing/counseling the patient, discussing with consultant physicians, social work, nursing staff was -- minutes    ---  Primary care provider was made aware of plan for discharge:      [  ] NO     [  ] YES   FROM ADMISSION H+P:   HPI:  Pt is a 89 y/o F with PMHx of anemia, DM2, HTN, HLD, CKD stage 3, recent admission to [facility] 5/19-5/23 for severe sepsis 2/2 UTI and pyelonephritis with r/o acute pino however HIDA and MRCP negative at the time s/p IV abx completed at rehab who presents to the ED for fever, generalized itching, nausea and poor PO intake. History obtained from patient and her two daughters at bedside. Pt finished her antibiotics (meropenem and linezolid) on 5/28, then on 6/6 patient spiked a fever to 102F and began with symptoms of R shoulder pain, generalized itching, nausea, and poor PO intake. She was started on Invanz at her rehab center, and her fever and elevated WBCs improved. Pt was evaluated today at her rehab center ([facility]) and had labwork done and then recommended she present to the ED for further workup. Pt still admits to itching, R shoulder pain, and nausea, but denies any abdominal pain. Last fever 3 days ago. UA at rehab with small LE otherwise negative, UA here pending. No other concerns expressed.    ED course:  VS: /76, HR 66, T 97.3F, RR 16, SpO2 98% on RA  Labs: H/H 8.2/25.2, K 3.2, BUN/Cr 37/2.20, GFR 20, Direct Bili 0.7, TBili 0.9, , AST//156, Lipase 693  RUQ US: Cholelithiasis and gallbladder wall thickening, similar to 5/19/2025. Findings are equivocal for acute cholecystitis. Consider further evaluation with nuclear medicine HIDA scan.  UA pending  Received in the ED: 1L NS bolus (09 Jun 2025 19:40)      ---  HOSPITAL COURSE: Patient was admitted on 6/9/2025. Surgery (Dr. Portillo), GI (Dr. Dumont), ID (Dr. Holguin) and Cardiology (Dr. Escoto) were consulted. GI evaluated the patient, recommended HIDA which was negative for acute cholecystitis. Subsequent CT A/P with gallbladder wall thickening/edema. Pt with extreme pruritis, treated with cholestyramine, benadryl and calamine lotion. ID recommended transition from invanz to zosyn, given likely medication reaction. Symptoms subsided throughout admission. Surgery evaluated the patient. Given recurrence of symptoms on previous admissions, recommended definitive tx with cholecystectomy. Cardiology consulted for pre-op clearance.  Technically difficult ECHO showed normal LV & RV size and function EF 60-65%, mil dil LA, mod MR. Pt was optimized for robotic pino on 6/11 with surgery. Pt tolerated the procedure well. Liver enzymes downtrended throughout admission. Pt also with elevated Cr on admission, home torsemide were held. Cr remained stable. STOP Torsemide as per Renal, S & S saw pt - regular diet with thin liquid     6/12 leukocytosis noted; likely reactive in setting of procedure; will monitor  6/13 leukocytosis improving    Can switch to PO Augmentin to complete x5 day course post-procedure until 6/16/25  Will add linezolid 600mg BID x3 day course to complete 6/15/25      The patient was examined at the bedside on the day of discharge. On day of discharge, PT saw pt stable for d/c , patient is stable for discharge to rehab with close outpatient follow up:    VS reviewed and within normal limits. No concerning findings on exam. Care plan reviewed with caregivers. Caregivers in agreement and endorse understanding.  Pt deemed stable for d/c home w/ anticipatory guidance and strict indications for return. No outstanding issues or concerns noted.     Vitals on day of discharge:  T(C): 37 (06-13-25 @ 05:18), Max: 37 (06-13-25 @ 05:18)  HR: 78 (06-13-25 @ 05:18) (78 - 90)  BP: 135/52 (06-13-25 @ 05:18) (118/60 - 140/65)  RR: 19 (06-13-25 @ 05:18) (17 - 19)  SpO2: 92% (06-13-25 @ 05:18) (92% - 94%)    Physical exam on day of discharge:  GENERAL: NAD, lying in bed comfortably  HEAD:  Atraumatic, normocephalic  EYES: EOMI, PERRLA, conjunctiva and sclera clear  ENT: Moist mucous membranes  NECK: Supple, no JVD  HEART: Regular rate and rhythm, no murmurs, rubs, or gallops  LUNGS: Unlabored respirations.  Clear to auscultation bilaterally, no crackles, wheezing, or rhonchi  ABDOMEN: Soft, +surgical incisions C/D/I, +BS  EXTREMITIES: 2+ peripheral pulses bilaterally. No clubbing, cyanosis, or edema  NERVOUS SYSTEM:  A&Ox3, no focal deficits   SKIN: Warm and dry  ---  CONSULTANTS:   Surgery (Dr. Portillo)  GI (Dr. Dumont)  ID (Dr. Holguin)  Cardiology (Dr. Escoto)   ---  TIME SPENT:  I, the attending physician, was physically present for the key portions of the evaluation and management (E/M) service provided. The total amount of time spent reviewing the hospital notes, laboratory values, imaging findings, assessing/counseling the patient, discussing with consultant physicians, social work, nursing staff was 60 minutes

## 2025-06-11 NOTE — DISCHARGE NOTE PROVIDER - NSDCCPCAREPLAN_GEN_ALL_CORE_FT
PRINCIPAL DISCHARGE DIAGNOSIS  Diagnosis: Cholecystitis  Assessment and Plan of Treatment:       SECONDARY DISCHARGE DIAGNOSES  Diagnosis: HTN (hypertension)  Assessment and Plan of Treatment:     Diagnosis: Acute kidney injury superimposed on CKD  Assessment and Plan of Treatment:      PRINCIPAL DISCHARGE DIAGNOSIS  Diagnosis: Cholecystitis  Assessment and Plan of Treatment: You were admitted to the hospital for cholecystitis, which is an infection of the gallbladder. You were evaluated by gastroenterology and surgery. You had a cholecystecomy on 6/9 with surgeon Dr. Portillo.      SECONDARY DISCHARGE DIAGNOSES  Diagnosis: Allergic drug reaction  Assessment and Plan of Treatment: You came to the hospital with extreme itching. You were evaluated by the infectious disease doctor, who recommended changing from invanz to zosyn, given that your itching can be due to a medication reaction. You were given benadryl and calamine lotion to help with the itching as well.    Diagnosis: Acute kidney injury superimposed on CKD  Assessment and Plan of Treatment: You were found to have a temporary kidney injury. You were evaluated by the neprhologists. You were given intravenous fluids and your home medications losartan and torsemide were held, as they can cause your kidney numbers to rise.    - STOP/RESUME losartan/torsemide  Please follow up with the nephrologist and your primary care doctor for continued medical management.     PRINCIPAL DISCHARGE DIAGNOSIS  Diagnosis: Cholecystitis  Assessment and Plan of Treatment: You were admitted to the hospital for cholecystitis, which is an infection of the gallbladder. You were evaluated by gastroenterology and surgery. You had a cholecystecomy on 6/9 with surgeon Dr. Portillo. Your labs were monitored and improved throughout admission.   - Please take augmentin twice a day until 6/16  - Please take linezolid 600mg twice a day until 6/15  Please follow up with your primary care provider for continued medical management.      SECONDARY DISCHARGE DIAGNOSES  Diagnosis: Allergic drug reaction  Assessment and Plan of Treatment: You came to the hospital with extreme itching. You were evaluated by the infectious disease doctor, who recommended changing from invanz to zosyn, given that your itching can be due to a medication reaction. You were given benadryl and calamine lotion to help with the itching as well.  Please follow up with your primary care provider for continued medical management.    Diagnosis: Urinary tract infection  Assessment and Plan of Treatment: You have a history of recurrent urinary tract infections. Your urine culture was positive. You were evaluated by the infectious disease doctors and treated with IV antibiotics.  Please follow up with your primary care provider for continued medical management.    Diagnosis: Acute kidney injury superimposed on CKD  Assessment and Plan of Treatment: You were found to have a temporary kidney injury. You were evaluated by the neprhologists. You were given intravenous fluids and your home medications losartan and torsemide were held, as they can cause your kidney numbers to rise.    - STOP/RESUME losartan/torsemide  Please follow up with the nephrologist and your primary care doctor for continued medical management.     PRINCIPAL DISCHARGE DIAGNOSIS  Diagnosis: Cholecystitis  Assessment and Plan of Treatment: You were admitted to the hospital for cholecystitis, which is an infection of the gallbladder. You were evaluated by gastroenterology and surgery. You had a cholecystecomy on 6/9 with surgeon Dr. Portillo. Your labs were monitored and improved throughout admission.   - Please take augmentin twice a day until 6/16  - Please take linezolid 600mg twice a day until 6/15  Please follow up with your primary care provider for continued medical management.      SECONDARY DISCHARGE DIAGNOSES  Diagnosis: Allergic drug reaction  Assessment and Plan of Treatment: You came to the hospital with extreme itching. You were evaluated by the infectious disease doctor, who recommended changing from invanz to zosyn, given that your itching can be due to a medication reaction. You were given benadryl and calamine lotion to help with the itching as well.  Please follow up with your primary care provider for continued medical management.    Diagnosis: Urinary tract infection  Assessment and Plan of Treatment: You have a history of recurrent urinary tract infections. Your urine culture was positive. You were evaluated by the infectious disease doctors and treated with IV antibiotics.  Please follow up with your primary care provider for continued medical management.    Diagnosis: Acute kidney injury superimposed on CKD  Assessment and Plan of Treatment: You were found to have a temporary kidney injury. You were evaluated by the neprhologists. You were given intravenous fluids and your home medication torsemide were held, as they can cause your kidney numbers to rise.    - STOP torsemide  Please follow up with the nephrologist and your primary care doctor for continued medical management.     PRINCIPAL DISCHARGE DIAGNOSIS  Diagnosis: Cholecystitis  Assessment and Plan of Treatment: You were admitted to the hospital for elevated LFT's & fever ar LEONEL   -Work up Radiology test showed likely  AC on chronic cholecystitis, which is an infection of the gallbladder. You were evaluated by gastroenterology and surgery. You had a cholecystecomy on 6/9 with surgeon Dr. Portillo. Robotic Sx   - Your labs were monitored and improved throughout admission.   You got IV Zosyn in hospital  LFT is improving   - Please take augmentin twice a day until 6/16  - Please take linezolid 600mg twice a day until 6/15   650 mg q 6 hrs PRN for pain   Incentive Spirometry   DVT ppx  Treat Constipation  Please follow up with your primary care provider for continued medical management.      SECONDARY DISCHARGE DIAGNOSES  Diagnosis: Urinary tract infection  Assessment and Plan of Treatment: You have a history of recurrent urinary tract infections. Your urine culture was positive.  You were evaluated by the infectious disease doctors and treated with IV antibiotics.  Linazolid 600 mg 2x day -Last dose 6/15  Please follow up with your primary care provider for continued medical management.    Diagnosis: Acute kidney injury superimposed on CKD  Assessment and Plan of Treatment: You were found to have a temporary kidney injury.   ELVIN-CKD 3 You were evaluated by the neprhologists. You were given intravenous fluids and your home medication torsemide were held, as they can cause your kidney numbers to rise.    Continue NAHCo3 as directed   - STOP torsemide  Please follow up with the nephrologist DR TATE Su in 1 week after d/c from [facility] and your primary care doctor for continued medical management.    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 You were evaluated by the infectious disease doctor, who recommended changing from invanz to zosyn, given that your itching can be due to a medication reaction. from INVANZ  You were given benadryl and calamine lotion to help with the itching as well.  Please follow up with your primary care provider for continued medical management.

## 2025-06-11 NOTE — DISCHARGE NOTE PROVIDER - NSDCHC_MEDRECSTATUS_GEN_ALL_CORE
Admission Reconciliation is Completed  Discharge Reconciliation is Not Complete 72 Admission Reconciliation is Completed  Discharge Reconciliation is Completed

## 2025-06-11 NOTE — DISCHARGE NOTE PROVIDER - NSDCMRMEDTOKEN_GEN_ALL_CORE_FT
alogliptin 6.25 mg oral tablet: 1 tab(s) orally once a day  Artificial Tears ophthalmic solution: 1 drop(s) in the right eye once a day  aspirin 81 mg oral delayed release tablet: 1 tab(s) orally every other day  Claritin 10 mg oral tablet: 1 tab(s) orally once a day  famotidine 20 mg oral tablet: 1 tab(s) orally once a day  fluticasone 50 mcg/inh nasal spray: 1 spray(s) nasal 2 times a day  folic acid 1 mg oral tablet: 1 tab(s) orally once a day  heparin: 5,000 international unit(s) subcutaneous every 12 hours  hydrALAZINE 100 mg oral tablet: 1 tab(s) orally 3 times a day  Invanz 1 g injection: 500 milligram(s) intravenously once a day  metoprolol succinate 100 mg oral tablet, extended release: 1 tab(s) orally once a day  omeprazole 20 mg oral delayed release capsule: 1 cap(s) orally once a day  potassium chloride: 10 milliequivalent(s) once a day  Questran 4 g/9 g oral powder for reconstitution: 4 gram(s) orally once a day  sodium bicarbonate: 650 milligram(s) orally 3 times a day  torsemide 20 mg oral tablet: 1 tab(s) orally once a day  Vistaril 25 mg oral capsule: 1 cap(s) orally 2 times a day   alogliptin 6.25 mg oral tablet: 1 tab(s) orally once a day  amoxicillin-clavulanate 875 mg-125 mg oral tablet: 1 tab(s) orally 2 times a day  aspirin 81 mg oral delayed release tablet: 1 tab(s) orally every other day  calamine topical lotion: 1 Apply topically to affected area 2 times a day  cetirizine 10 mg oral tablet: 1 tab(s) orally once a day  cholestyramine 4 g/9 g oral powder for reconstitution: 4 gram(s) orally 4 times a day  famotidine 20 mg oral tablet: 1 tab(s) orally once a day  folic acid 1 mg oral tablet: 1 tab(s) orally once a day  heparin: 5,000 international unit(s) subcutaneous every 12 hours  hydrALAZINE 100 mg oral tablet: 1 tab(s) orally 3 times a day  linezolid 600 mg oral tablet: 1 tab(s) orally every 12 hours  metoprolol succinate 100 mg oral tablet, extended release: 1 tab(s) orally once a day  polyethylene glycol 3350 oral powder for reconstitution: 17 gram(s) orally once a day  senna leaf extract oral tablet: 2 tab(s) orally once a day (at bedtime)  sodium bicarbonate: 650 milligram(s) orally 3 times a day  torsemide 20 mg oral tablet: 1 tab(s) orally once a day   acetaminophen 325 mg oral tablet: 2 tab(s) orally every 6 hours As needed Mild Pain (1 - 3), Moderate Pain (4 - 6)  alogliptin 6.25 mg oral tablet: 1 tab(s) orally once a day  amoxicillin-clavulanate 875 mg-125 mg oral tablet: 1 tab(s) orally 2 times a day  aspirin 81 mg oral delayed release tablet: 1 tab(s) orally every other day  calamine topical lotion: 1 Apply topically to affected area 2 times a day  cetirizine 10 mg oral tablet: 1 tab(s) orally once a day  famotidine 20 mg oral tablet: 1 tab(s) orally once a day  folic acid 1 mg oral tablet: 1 tab(s) orally once a day  heparin: 5,000 international unit(s) subcutaneous every 12 hours  hydrALAZINE 100 mg oral tablet: 1 tab(s) orally 3 times a day  linezolid 600 mg oral tablet: 1 tab(s) orally every 12 hours  metoprolol succinate 100 mg oral tablet, extended release: 1 tab(s) orally once a day  polyethylene glycol 3350 oral powder for reconstitution: 17 gram(s) orally once a day  Questran 4 g/9 g oral powder for reconstitution: 4 gram(s) orally 3 times a day  senna leaf extract oral tablet: 2 tab(s) orally once a day (at bedtime)  sodium bicarbonate: 650 milligram(s) orally 3 times a day   acetaminophen 325 mg oral tablet: 2 tab(s) orally every 6 hours As needed Mild Pain (1 - 3), Moderate Pain (4 - 6)  alogliptin 6.25 mg oral tablet: 1 tab(s) orally once a day  amoxicillin-clavulanate 875 mg-125 mg oral tablet: 1 tab(s) orally 2 times a day Please take twice a day until 6/16  aspirin 81 mg oral delayed release tablet: 1 tab(s) orally every other day  calamine topical lotion: 1 Apply topically to affected area 2 times a day  famotidine 20 mg oral tablet: 1 tab(s) orally once a day  folic acid 1 mg oral tablet: 1 tab(s) orally once a day  heparin: 5,000 international unit(s) subcutaneous every 12 hours  hydrALAZINE 100 mg oral tablet: 1 tab(s) orally 3 times a day  linezolid 600 mg oral tablet: 1 tab(s) orally every 12 hours Please take twice a day until 6/15  metoprolol succinate 100 mg oral tablet, extended release: 1 tab(s) orally once a day  polyethylene glycol 3350 oral powder for reconstitution: 17 gram(s) orally once a day  Questran 4 g/9 g oral powder for reconstitution: 4 gram(s) orally 2 times a day  senna leaf extract oral tablet: 2 tab(s) orally once a day (at bedtime)  sodium bicarbonate: 650 milligram(s) orally 3 times a day

## 2025-06-11 NOTE — BRIEF OPERATIVE NOTE - NSICDXBRIEFPREOP_GEN_ALL_CORE_FT
PRE-OP DIAGNOSIS:  Cholelithiasis with acute on chronic cholecystitis 11-Jun-2025 14:55:56  Arturo Portillo

## 2025-06-12 DIAGNOSIS — N39.0 URINARY TRACT INFECTION, SITE NOT SPECIFIED: ICD-10-CM

## 2025-06-12 LAB
ALBUMIN SERPL ELPH-MCNC: 2.1 G/DL — LOW (ref 3.3–5)
ALP SERPL-CCNC: 742 U/L — HIGH (ref 40–120)
ALT FLD-CCNC: 66 U/L — SIGNIFICANT CHANGE UP (ref 12–78)
ANION GAP SERPL CALC-SCNC: 8 MMOL/L — SIGNIFICANT CHANGE UP (ref 5–17)
ANION GAP SERPL CALC-SCNC: 9 MMOL/L — SIGNIFICANT CHANGE UP (ref 5–17)
AST SERPL-CCNC: 56 U/L — HIGH (ref 15–37)
BILIRUB SERPL-MCNC: 1 MG/DL — SIGNIFICANT CHANGE UP (ref 0.2–1.2)
BUN SERPL-MCNC: 25 MG/DL — HIGH (ref 7–23)
BUN SERPL-MCNC: 25 MG/DL — HIGH (ref 7–23)
CALCIUM SERPL-MCNC: 9 MG/DL — SIGNIFICANT CHANGE UP (ref 8.5–10.1)
CALCIUM SERPL-MCNC: 9 MG/DL — SIGNIFICANT CHANGE UP (ref 8.5–10.1)
CHLORIDE SERPL-SCNC: 110 MMOL/L — HIGH (ref 96–108)
CHLORIDE SERPL-SCNC: 110 MMOL/L — HIGH (ref 96–108)
CO2 SERPL-SCNC: 17 MMOL/L — LOW (ref 22–31)
CO2 SERPL-SCNC: 20 MMOL/L — LOW (ref 22–31)
CREAT SERPL-MCNC: 2 MG/DL — HIGH (ref 0.5–1.3)
CREAT SERPL-MCNC: 2.1 MG/DL — HIGH (ref 0.5–1.3)
EGFR: 21 ML/MIN/1.73M2 — LOW
EGFR: 21 ML/MIN/1.73M2 — LOW
EGFR: 23 ML/MIN/1.73M2 — LOW
EGFR: 23 ML/MIN/1.73M2 — LOW
GLUCOSE BLDC GLUCOMTR-MCNC: 105 MG/DL — HIGH (ref 70–99)
GLUCOSE BLDC GLUCOMTR-MCNC: 126 MG/DL — HIGH (ref 70–99)
GLUCOSE BLDC GLUCOMTR-MCNC: 165 MG/DL — HIGH (ref 70–99)
GLUCOSE BLDC GLUCOMTR-MCNC: 220 MG/DL — HIGH (ref 70–99)
GLUCOSE SERPL-MCNC: 104 MG/DL — HIGH (ref 70–99)
GLUCOSE SERPL-MCNC: 138 MG/DL — HIGH (ref 70–99)
HCT VFR BLD CALC: 24.2 % — LOW (ref 34.5–45)
HGB BLD-MCNC: 7.5 G/DL — LOW (ref 11.5–15.5)
MCHC RBC-ENTMCNC: 26.4 PG — LOW (ref 27–34)
MCHC RBC-ENTMCNC: 31 G/DL — LOW (ref 32–36)
MCV RBC AUTO: 85.2 FL — SIGNIFICANT CHANGE UP (ref 80–100)
NRBC BLD AUTO-RTO: 0 /100 WBCS — SIGNIFICANT CHANGE UP (ref 0–0)
PLATELET # BLD AUTO: 395 K/UL — SIGNIFICANT CHANGE UP (ref 150–400)
POTASSIUM SERPL-MCNC: 4.8 MMOL/L — SIGNIFICANT CHANGE UP (ref 3.5–5.3)
POTASSIUM SERPL-MCNC: 5.3 MMOL/L — SIGNIFICANT CHANGE UP (ref 3.5–5.3)
POTASSIUM SERPL-SCNC: 4.8 MMOL/L — SIGNIFICANT CHANGE UP (ref 3.5–5.3)
POTASSIUM SERPL-SCNC: 5.3 MMOL/L — SIGNIFICANT CHANGE UP (ref 3.5–5.3)
PROT SERPL-MCNC: 6.6 G/DL — SIGNIFICANT CHANGE UP (ref 6–8.3)
RBC # BLD: 2.84 M/UL — LOW (ref 3.8–5.2)
RBC # FLD: 19.3 % — HIGH (ref 10.3–14.5)
SODIUM SERPL-SCNC: 135 MMOL/L — SIGNIFICANT CHANGE UP (ref 135–145)
SODIUM SERPL-SCNC: 139 MMOL/L — SIGNIFICANT CHANGE UP (ref 135–145)
WBC # BLD: 18.54 K/UL — HIGH (ref 3.8–10.5)
WBC # FLD AUTO: 18.54 K/UL — HIGH (ref 3.8–10.5)

## 2025-06-12 PROCEDURE — 99232 SBSQ HOSP IP/OBS MODERATE 35: CPT

## 2025-06-12 RX ORDER — POLYETHYLENE GLYCOL 3350 17 G/17G
17 POWDER, FOR SOLUTION ORAL ONCE
Refills: 0 | Status: COMPLETED | OUTPATIENT
Start: 2025-06-12 | End: 2025-06-12

## 2025-06-12 RX ORDER — LANOLIN/MINERAL OIL/PETROLATUM
1 OINTMENT (GRAM) OPHTHALMIC (EYE)
Refills: 0 | DISCHARGE

## 2025-06-12 RX ORDER — HYDROXYZINE HYDROCHLORIDE 25 MG/1
1 TABLET, FILM COATED ORAL
Refills: 0 | DISCHARGE

## 2025-06-12 RX ORDER — LORATADINE 5 MG/5ML
1 SOLUTION ORAL
Refills: 0 | DISCHARGE

## 2025-06-12 RX ORDER — SODIUM BICARBONATE 1 MEQ/ML
325 SYRINGE (ML) INTRAVENOUS THREE TIMES A DAY
Refills: 0 | Status: DISCONTINUED | OUTPATIENT
Start: 2025-06-12 | End: 2025-06-13

## 2025-06-12 RX ORDER — SENNA 187 MG
2 TABLET ORAL AT BEDTIME
Refills: 0 | Status: DISCONTINUED | OUTPATIENT
Start: 2025-06-12 | End: 2025-06-13

## 2025-06-12 RX ADMIN — Medication 40 MILLIGRAM(S): at 05:28

## 2025-06-12 RX ADMIN — METOPROLOL SUCCINATE 100 MILLIGRAM(S): 50 TABLET, EXTENDED RELEASE ORAL at 05:30

## 2025-06-12 RX ADMIN — Medication 650 MILLIGRAM(S): at 12:29

## 2025-06-12 RX ADMIN — Medication 650 MILLIGRAM(S): at 13:15

## 2025-06-12 RX ADMIN — Medication 100 MILLIGRAM(S): at 05:28

## 2025-06-12 RX ADMIN — FOLIC ACID 1 MILLIGRAM(S): 1 TABLET ORAL at 12:29

## 2025-06-12 RX ADMIN — HEPARIN SODIUM 5000 UNIT(S): 1000 INJECTION INTRAVENOUS; SUBCUTANEOUS at 13:59

## 2025-06-12 RX ADMIN — FLUTICASONE PROPIONATE 1 SPRAY(S): 50 SPRAY, METERED NASAL at 05:29

## 2025-06-12 RX ADMIN — Medication 100 MILLIGRAM(S): at 13:59

## 2025-06-12 RX ADMIN — Medication 4 GRAM(S): at 08:39

## 2025-06-12 RX ADMIN — Medication 100 MILLIGRAM(S): at 22:02

## 2025-06-12 RX ADMIN — Medication 3 MILLIGRAM(S): at 22:02

## 2025-06-12 RX ADMIN — HEPARIN SODIUM 5000 UNIT(S): 1000 INJECTION INTRAVENOUS; SUBCUTANEOUS at 22:04

## 2025-06-12 RX ADMIN — Medication 2 TABLET(S): at 22:02

## 2025-06-12 RX ADMIN — Medication 325 MILLIGRAM(S): at 22:01

## 2025-06-12 RX ADMIN — POLYETHYLENE GLYCOL 3350 17 GRAM(S): 17 POWDER, FOR SOLUTION ORAL at 12:29

## 2025-06-12 RX ADMIN — Medication 25 GRAM(S): at 22:01

## 2025-06-12 RX ADMIN — HEPARIN SODIUM 5000 UNIT(S): 1000 INJECTION INTRAVENOUS; SUBCUTANEOUS at 05:29

## 2025-06-12 RX ADMIN — Medication 25 GRAM(S): at 12:30

## 2025-06-12 RX ADMIN — INSULIN LISPRO 1: 100 INJECTION, SOLUTION INTRAVENOUS; SUBCUTANEOUS at 17:19

## 2025-06-12 RX ADMIN — CALAMINE 8% AND ZINC OXIDE 8% 1 APPLICATION(S): 160 LOTION TOPICAL at 05:36

## 2025-06-12 RX ADMIN — Medication 10 MILLIGRAM(S): at 12:29

## 2025-06-12 RX ADMIN — FLUTICASONE PROPIONATE 1 SPRAY(S): 50 SPRAY, METERED NASAL at 17:19

## 2025-06-12 RX ADMIN — Medication 325 MILLIGRAM(S): at 12:30

## 2025-06-12 RX ADMIN — Medication 4 GRAM(S): at 22:01

## 2025-06-12 RX ADMIN — Medication 20 MILLIGRAM(S): at 12:29

## 2025-06-12 NOTE — CONSULT NOTE ADULT - CONSULT REASON
CKD management
Jaundice, sonographic findings equivocal for acute cholecystitis.
Acute Shira
transaminitis on outpt labs
Cardiac optmization

## 2025-06-12 NOTE — PROGRESS NOTE ADULT - SUBJECTIVE AND OBJECTIVE BOX
[facility]    Owen Morrison NP    [address] [place] [address]  [phone]      Chief Complaint:  Patient is a 89y old  Female who presents with a chief complaint of r/o cholecystitis/choledocholithiasis (12 Jun 2025 10:09)      HPI/ 24 hr events:   Patient seen and examined at bedside  Reports feeling well this morning   Denies pain at rest, c/o generalized abdominal pain when palpated  Tolerating clear liquid diet so far   No fevers, chills, nausea, or vomiting       REVIEW OF SYSTEMS:   General: Negative  HEENT: Negative  CV: Negative  Respiratory: Negative  GI: See HPI  : Negative  MSK: Negative  Hematologic: Negative  Skin: Negative    MEDICATIONS:   MEDICATIONS  (STANDING):  artificial tears (preservative free) Ophthalmic Solution 1 Drop(s) Right EYE daily  calamine/zinc oxide Lotion 1 Application(s) Topical two times a day  cetirizine 10 milliGRAM(s) Oral daily  cholestyramine Powder (Sugar-Free) 4 Gram(s) Oral two times a day  dextrose 5%. 1000 milliLiter(s) (100 mL/Hr) IV Continuous <Continuous>  dextrose 5%. 1000 milliLiter(s) (50 mL/Hr) IV Continuous <Continuous>  dextrose 50% Injectable 25 Gram(s) IV Push once  dextrose 50% Injectable 12.5 Gram(s) IV Push once  dextrose 50% Injectable 25 Gram(s) IV Push once  famotidine    Tablet 20 milliGRAM(s) Oral daily  ferrous    sulfate 325 milliGRAM(s) Oral daily  fluticasone propionate 50 MICROgram(s)/spray Nasal Spray 1 Spray(s) Both Nostrils two times a day  folic acid 1 milliGRAM(s) Oral daily  glucagon  Injectable 1 milliGRAM(s) IntraMuscular once  heparin   Injectable 5000 Unit(s) SubCutaneous every 8 hours  hydrALAZINE 100 milliGRAM(s) Oral every 8 hours  insulin lispro (ADMELOG) corrective regimen sliding scale   SubCutaneous three times a day before meals  insulin lispro (ADMELOG) corrective regimen sliding scale   SubCutaneous at bedtime  metoprolol succinate  milliGRAM(s) Oral daily  piperacillin/tazobactam IVPB.. 3.375 Gram(s) IV Intermittent every 12 hours    MEDICATIONS  (PRN):  acetaminophen     Tablet .. 650 milliGRAM(s) Oral every 6 hours PRN Mild Pain (1 - 3), Moderate Pain (4 - 6)  dextrose Oral Gel 15 Gram(s) Oral once PRN Blood Glucose LESS THAN 70 milliGRAM(s)/deciliter  diphenhydrAMINE 25 milliGRAM(s) Oral every 4 hours PRN Rash and/or Itching  melatonin 3 milliGRAM(s) Oral at bedtime PRN Insomnia      ALLERGIES:   Allergies    amlodipine (Flushing)    Intolerances        VITAL SIGNS:   Vital Signs Last 24 Hrs  T(C): 36.4 (12 Jun 2025 05:15), Max: 36.7 (11 Jun 2025 12:18)  T(F): 97.5 (12 Jun 2025 05:15), Max: 98.1 (11 Jun 2025 12:18)  HR: 82 (12 Jun 2025 05:15) (65 - 90)  BP: 125/64 (12 Jun 2025 05:15) (83/30 - 175/65)  BP(mean): --  RR: 18 (12 Jun 2025 05:15) (17 - 33)  SpO2: 95% (12 Jun 2025 05:15) (92% - 100%)    Parameters below as of 12 Jun 2025 05:15  Patient On (Oxygen Delivery Method): room air      I&O's Summary    11 Jun 2025 07:01  -  12 Jun 2025 07:00  --------------------------------------------------------  IN: 350 mL / OUT: 400 mL / NET: -50 mL        PHYSICAL EXAM:   GENERAL:  No acute distress  HEENT:  NC/AT  CHEST:  No increased effort  HEART:  Regular rate  ABDOMEN:  Soft, +diffusely TTP, non-distended, dermabonded incisions OPAL  EXTREMITIES: No cyanosis  SKIN:  Warm, dry  NEURO:  Calm, cooperative    LABS:                        7.5    18.54 )-----------( 395      ( 12 Jun 2025 08:15 )             24.2     06-11    139  |  108  |  29[H]  ----------------------------<  101[H]  4.3   |  25  |  2.20[H]    Ca    9.3      11 Jun 2025 06:00    TPro  7.1  /  Alb  2.3[L]  /  TBili  1.0  /  DBili  0.7[H]  /  AST  84[H]  /  ALT  93[H]  /  AlkPhos  922[H]  06-11    LIVER FUNCTIONS - ( 11 Jun 2025 06:00 )  Alb: 2.3 g/dL / Pro: 7.1 g/dL / ALK PHOS: 922 U/L / ALT: 93 U/L / AST: 84 U/L / GGT: x           PT/INR - ( 11 Jun 2025 06:00 )   PT: 12.6 sec;   INR: 1.07 ratio         PTT - ( 11 Jun 2025 06:00 )  PTT:30.3 sec    Urinalysis with Rflx Culture (collected 10 Ghassan 2025 07:10)    Culture - Urine (collected 10 Ghassan 2025 07:10)  Source: Clean Catch  Preliminary Report (11 Jun 2025 11:40):    Culture positive, 50,000 - 99,000 CFU/mL . Identification to follow.                              Triglycerides, Serum: 251 mg/dL (06-10-25 @ 05:35)        RADIOLOGY & ADDITIONAL STUDIES:

## 2025-06-12 NOTE — PROGRESS NOTE ADULT - PROBLEM SELECTOR PLAN 6
H/H 8.2/25.2 on arrival-Pt has Gammamigrating paraprotein + on SPEP  - this appears consistent with the patients baseline and is likely 2/2 anemia of chronic disease  - Iron studies reviewed from 4/25, can also be component of PADMINI  - c/w PO iron supplementation  - no signs/symptoms of active bleeding  - monitor daily cbc Chronic  - on alogliptin at home  - hold home medications  - ANGELINA with FS QAC/QHS  - hypoglycemia protocol  - A1c 6.1%

## 2025-06-12 NOTE — PHYSICAL THERAPY INITIAL EVALUATION ADULT - TRANSFER TRAINING, PT EVAL
Patient will transfer  from all surfaces with Chayo  x 1 in 2 weeks in order to increase mobility at home

## 2025-06-12 NOTE — PROGRESS NOTE ADULT - SUBJECTIVE AND OBJECTIVE BOX
[facility] -- Tigist Mari Pannella, Patel, Savella, Goodger, Cohen: Office # [phone]    Follow Up: Cardiac clearance      Subjective/Observations: Patient awake, alert, resting in bed. Denies chest pain, palpitations and dizziness. Denies any difficulty breathing. No orthopnea and PND. Tolerating room air. Family at bedside.     REVIEW OF SYSTEMS: All other review of systems are negative unless indicated above    PAST MEDICAL & SURGICAL HISTORY:  Hypertension      Hypertension      Diabetes      HLD (hyperlipidemia)      Stage 3 chronic kidney disease      Anemia of chronic disease      Lumbar compression fracture      HTN (hypertension)      History of sepsis      History of UTI      Abnormal LFTs      S/P ORIF (open reduction internal fixation) fracture  right hip          MEDICATIONS  (STANDING):  artificial tears (preservative free) Ophthalmic Solution 1 Drop(s) Right EYE daily  calamine/zinc oxide Lotion 1 Application(s) Topical two times a day  cetirizine 10 milliGRAM(s) Oral daily  cholestyramine Powder (Sugar-Free) 4 Gram(s) Oral two times a day  dextrose 5%. 1000 milliLiter(s) (100 mL/Hr) IV Continuous <Continuous>  dextrose 5%. 1000 milliLiter(s) (50 mL/Hr) IV Continuous <Continuous>  dextrose 50% Injectable 25 Gram(s) IV Push once  dextrose 50% Injectable 12.5 Gram(s) IV Push once  dextrose 50% Injectable 25 Gram(s) IV Push once  famotidine    Tablet 20 milliGRAM(s) Oral daily  ferrous    sulfate 325 milliGRAM(s) Oral daily  fluticasone propionate 50 MICROgram(s)/spray Nasal Spray 1 Spray(s) Both Nostrils two times a day  folic acid 1 milliGRAM(s) Oral daily  glucagon  Injectable 1 milliGRAM(s) IntraMuscular once  heparin   Injectable 5000 Unit(s) SubCutaneous every 8 hours  hydrALAZINE 100 milliGRAM(s) Oral every 8 hours  insulin lispro (ADMELOG) corrective regimen sliding scale   SubCutaneous three times a day before meals  insulin lispro (ADMELOG) corrective regimen sliding scale   SubCutaneous at bedtime  metoprolol succinate  milliGRAM(s) Oral daily  piperacillin/tazobactam IVPB.. 3.375 Gram(s) IV Intermittent every 12 hours  polyethylene glycol 3350 17 Gram(s) Oral once  senna 2 Tablet(s) Oral at bedtime    MEDICATIONS  (PRN):  acetaminophen     Tablet .. 650 milliGRAM(s) Oral every 6 hours PRN Mild Pain (1 - 3), Moderate Pain (4 - 6)  dextrose Oral Gel 15 Gram(s) Oral once PRN Blood Glucose LESS THAN 70 milliGRAM(s)/deciliter  diphenhydrAMINE 25 milliGRAM(s) Oral every 4 hours PRN Rash and/or Itching  melatonin 3 milliGRAM(s) Oral at bedtime PRN Insomnia    Allergies    amlodipine (Flushing)    Intolerances      Vital Signs Last 24 Hrs  T(C): 36.4 (12 Jun 2025 05:15), Max: 36.7 (11 Jun 2025 13:13)  T(F): 97.5 (12 Jun 2025 05:15), Max: 98.1 (11 Jun 2025 13:13)  HR: 82 (12 Jun 2025 05:15) (65 - 90)  BP: 125/64 (12 Jun 2025 05:15) (83/30 - 175/65)  BP(mean): --  RR: 18 (12 Jun 2025 05:15) (18 - 33)  SpO2: 95% (12 Jun 2025 05:15) (92% - 100%)    Parameters below as of 12 Jun 2025 05:15  Patient On (Oxygen Delivery Method): room air      I&O's Summary    11 Jun 2025 07:01  -  12 Jun 2025 07:00  --------------------------------------------------------  IN: 350 mL / OUT: 400 mL / NET: -50 mL    12 Jun 2025 07:01  -  12 Jun 2025 12:25  --------------------------------------------------------  IN: 240 mL / OUT: 0 mL / NET: 240 mL    TELE: Not on telemetry   PHYSICAL EXAM:  Constitutional: NAD, awake and alert  HEENT: Moist Mucous Membranes, Anicteric  Pulmonary: Non-labored, breath sounds are clear bilaterally, No wheezing, rales or rhonchi  Cardiovascular: Regular, S1 and S2, + murmurs, No rubs, gallops or clicks  Gastrointestinal:  soft, nontender, nondistended   Lymph: No peripheral edema. No lymphadenopathy.   Skin: No visible rashes or ulcers.  Psych:  Mood & affect appropriate    LABS: All Labs Reviewed:                        7.5    18.54 )-----------( 395      ( 12 Jun 2025 08:15 )             24.2                         8.5    11.66 )-----------( 396      ( 11 Jun 2025 06:00 )             26.7                         8.1    9.21  )-----------( 342      ( 10 Jamir 2025 05:35 )             25.1     12 Jun 2025 10:15    135    |  110    |  25     ----------------------------<  138    5.3     |  17     |  2.10   12 Jun 2025 08:15    139    |  110    |  25     ----------------------------<  104    4.8     |  20     |  2.00   11 Jun 2025 06:00    139    |  108    |  29     ----------------------------<  101    4.3     |  25     |  2.20     Ca    9.0        12 Jun 2025 10:15  Ca    9.0        12 Jun 2025 08:15  Ca    9.3        11 Jun 2025 06:00    TPro  6.6    /  Alb  2.1    /  TBili  1.0    /  DBili  x      /  AST  56     /  ALT  66     /  AlkPhos  742    12 Jun 2025 08:15  TPro  7.1    /  Alb  2.3    /  TBili  1.0    /  DBili  0.7    /  AST  84     /  ALT  93     /  AlkPhos  922    11 Jun 2025 06:00  TPro  6.8    /  Alb  2.1    /  TBili  1.1    /  DBili  x      /  AST  113    /  ALT  115    /  AlkPhos  919    10 Jamir 2025 05:35   LIVER FUNCTIONS - ( 12 Jun 2025 08:15 )  Alb: 2.1 g/dL / Pro: 6.6 g/dL / ALK PHOS: 742 U/L / ALT: 66 U/L / AST: 56 U/L / GGT: x           PT/INR - ( 11 Jun 2025 06:00 )   PT: 12.6 sec;   INR: 1.07 ratio         PTT - ( 11 Jun 2025 06:00 )  PTT:30.3 secCholesterol: 173 mg/dL (06-10-25 @ 05:35)  HDL Cholesterol: 27 mg/dL (06-10-25 @ 05:35)  Triglycerides, Serum: 251 mg/dL (06-10-25 @ 05:35)  Cholesterol: 132 mg/dL (04-17-25 @ 06:26)  HDL Cholesterol: 31 mg/dL (04-17-25 @ 06:26)  Triglycerides, Serum: 184 mg/dL (04-17-25 @ 06:26)  Lactate, Blood: 1.5 mmol/L (06-09-25 @ 15:51)    12 Lead ECG:   Ventricular Rate 67 BPM    Atrial Rate 67 BPM    P-R Interval 208 ms    QRS Duration 80 ms    Q-T Interval 438 ms    QTC Calculation(Bazett) 462 ms    P Axis 42 degrees    R Axis 5 degrees    T Axis 30 degrees    Diagnosis Line Sinus rhythm with premature atrial complexes  Otherwise normal ECG  When compared with ECG of 10-JAMIR-2025 14:18, (Unconfirmed)  Sinus rhythm has replaced Atrial fibrillation  Confirmed by Chuy Escoto MD ([id number]) on 6/10/2025 3:02:29 PM (06-10-25 @ 14:19)      TRANSTHORACIC ECHOCARDIOGRAM REPORT  ________________________________________________________________________________                                      _______       Pt. Name:       NELDA VERDIN Study Date:    6/10/2025  MRN:            [id number]       YOB: 1930  Accession #:    [id number]      Age:           89 years  Account#:       [id number]     Gender:        F  Heart Rate:                    Height:        61.02 in (155.00 cm)  Rhythm:                        Weight:  130.07 lb (59.00 kg)  Blood Pressure: 169/61 mmHg    BSA/BMI:       1.57 m² / 24.56 kg/m²  ________________________________________________________________________________________  Referring Physician:    [id number] Everett Holguin  Interpreting Physician: Maday Gama MD  Primary Sonographer:    Jorden Su    CPT:               ECHO TTE WO CON COMP W DOPP - 22073.m  Indication(s):     Edema, unspecified - R60.9  Procedure:         Transthoracic echocardiogram with 2-D, M-mode and complete                spectral and color flow Doppler.  Ordering Location: [facility]  Admission Status:  Inpatient  Study Information: Image quality for this study is technically difficult.    _______________________________________________________________________________________     CONCLUSIONS:      1. Technically difficult image quality.   2. Left ventricular cavity is normal in size. Left ventricular systolic function is normal with an ejection fraction visually estimated at 60 to 65 %.   3. Normal right ventricular cavity size and normal right ventricular systolic function.   4. Left atrium is mildly dilated.   5. Moderate mitral regurgitation.   6. Estimated pulmonary artery systolic pressure is 32 mmHg, consistent with normal pulmonary artery pressure.    ________________________________________________________________________________________  FINDINGS:     Left Ventricle:  The left ventricular cavity is normal in size. Left ventricular systolic function is normal with an ejection fraction visually estimated at 60 to 65%. There is poor visualization of the endocardial borders to determine the presence of wall motion abnormalities. There is mild (grade 1) left ventricular diastolic dysfunction.     Right Ventricle:  The right ventricle is not well visualized. The right ventricular cavity is normal in size and right ventricular systolic function is normal. Tricuspid annular plane systolic excursion (TAPSE) is 2.0 cm (normal >=1.7 cm).     Left Atrium:  The left atrium is mildly dilated with an indexed volume of 40.45 ml/m².     Right Atrium:  The right atrium is normal in size with an indexed volume of 20.71 ml/m².     Interatrial Septum:  The interatrial septum appears intact.     Aortic Valve:  The aortic valve was not well visualized. The aortic valve anatomy cannot be determined with normal systolic excursion. There is focal calcification of the aortic valve leaflets.     Mitral Valve:  The mitral valve was not well visualized. There is calcification of the mitral valve annulus. There is moderate mitral regurgitation.     Tricuspid Valve:  The tricuspid valve was not well visualized. The tricuspid valve is structurally normal with normal leaflet excursion. There is mild to moderate tricuspid regurgitation. Estimated pulmonary artery systolic pressure is 32 mmHg, consistent with normal pulmonary artery pressure.     Pulmonic Valve:  The pulmonic valve was not well visualized.     Aorta:  The aortic root at the sinuses of Valsalva is normal in size, measuring 3.08 cm (indexed 1.95 cm/m²). The ascending aorta is normal in size, measuring 2.90 cm (indexed 1.84 cm/m²). The aortic arch diameter is normal in size, measuring 2.5 cm (indexed 1.59 cm/m²).     Pericardium:  No pericardial effusion seen.     Systemic Veins:  The inferior vena cava is normal in size measuring 1.40 cm in diameter, (normal <2.1cm) with normal inspiratory collapse (normal >50%) consistent with normal right atrial pressure (~3, range 0-5mmHg).  ____________________________________________________________________  QUANTITATIVE DATA:  Left Ventricle Measurements: (Indexed to BSA)     IVSd (2D):   0.9 cm  LVPWd (2D):  0.9 cm  LVIDd (2D):  3.9 cm  LVIDs (2D):  2.7 cm  LV Mass:     103 g  65.2 g/m²  Visualized LV EF%: 60 to 65%     MV E Vmax:1.15 m/s  MV A Vmax:    1.29 m/s  MV E/A:       0.89  e' lateral:   9.80 cm/s  e' medial:    7.00 cm/s  E/e' lateral: 11.69  E/e' medial:  16.37  E/e' Average: 13.64  MV DT:        233 msec    Aorta Measurements: (Normal range) (Indexed to BSA)     Ao Root d    3.08 cm (2.7 - 3.3 cm) 1.95 cm/m²  Ao Asc prox: 2.90 cm                1.84 cm/m²  Ao Arch:     2.5 cm            Left Atrium Measurements: (Indexed to BSA)  LA Diam 2D: 2.89 cm         Right Ventricle Measurements: Right Atrial Measurements:     RV d, 2D: 2.9 cm              RA Vol:       32.60 ml  TAPSE:    2.0 cm              RA Vol Index: 20.71 ml/m²       LVOT / RVOT/ Qp/Qs Data: (Indexed to BSA)  LVOT Diameter,s: 1.90 cm  LVOT Area:       2.84 cm²  LVOT Vmax:       1.22 m/s  LVOTVmn:        0.610 m/s  LVOT VTI:        28.30 cm  LVOT peak grad:  6 mmHg  LVOT mean grad:  2.5 mmHg  LVOT SV:         80.4 ml   51.09 ml/m²    Aortic Valve Measurements:  AV Peak Gradient:  13.1 mmHg  AoV EOA, Contin i: 1.14 cm²/m²    Mitral Valve Measurements:     MV E Vmax: 1.1 m/s         MR Peak Gradient: 110.4 mmHg  MV A Vmax: 1.3 m/s  MV E/A:    0.9       Tricuspid Valve Measurements:     TR Vmax:          2.7 m/s  TR Peak Gradient: 29.4 mmHg  RA Pressure:      3 mmHg  PASP:             32mmHg    ________________________________________________________________________________________  Electronically signed on 6/11/2025 at 8:57:49 AM by Maday Gama MD         *** Final ***

## 2025-06-12 NOTE — PHYSICAL THERAPY INITIAL EVALUATION ADULT - GENERAL OBSERVATIONS, REHAB EVAL
SUBJECTIVE:  Randolph Mcnulty is a 35 y.o. female who is being evaluated for hypothyroidism.     1. Acquired hypothyroidism  This started in 2004. Patient was diagnosed with hyperthyroidism, changed to hypothyroidism 2012. The problem has been unchanged.  Patient has 3 children, 13, 10, 6 year old girls  Patient's weight used to be 125-150 lbs    Previous thyroid studies include: TSH and free thyroxine.   Patient started medication in 5924-9925. Currently patient is on: Levothyroxine. Misses 0 doses a month.    Current complaints: fatigue, brain fog, sleepiness, numbness and tingling, cold sensitivity, gained weight  Still tired, but slightly less.    2. Hashimoto's thyroiditis  Has fatigue    3.  Overweight/History of obesity  Patient eats healthy, exercises  Ideal weight 160 lbs  In 6782-0526 patient was on Adipex and lost weight.  She was getting medication in weight loss clinic in [place].  Tries to eat healthy, more vegetables, fish, meat  Never tried other weight loss medications   On phentermine in 111/2021. Was not able to come for appointment because of Covid.  On Qsymia 1/2022-6/2022.  Qsymia 4/2023-7/2023.     4. Oligomenorrhea, unspecified type  Menstrual cycles irregular, delayed by 12 days  Started menstrual cycle at 12 years old  On legs has thighs she has black hair  Was told may have PCOS  Dx at age 15.  At that time she lost weight.  In 3356-9819 she gained weight.  Fluctuated thyroid hormone levels.  Patient states that her weight is fluctuating, she gains and loses weight.  Eats healthy, exercises.    5.  Multinodular thyroid  History of obstructive symptoms: difficulty swallowing No, changes in voice/hoarseness Yes, raspier weight.  History of radiation to patient's neck: No  Resent iodine exposure: No  Family history includes hypothyroidism father, grandmother  Family history of thyroid cancer: No    6. Hyperlipidemia  No muscle pain    7. History of obesity  Lost weight on diet,  Patient received supine in bed, cooperative with physical therapy, +IV

## 2025-06-12 NOTE — PROGRESS NOTE ADULT - ATTENDING COMMENTS
Pt is a 89 y/o F with PMHx of anemia, DM2, HTN, HLD, CKD stage 3, recent admission to [facility] 5/19-5/23 for severe sepsis 2/2 UTI and pyelonephritis with r/o acute pino however HIDA and MRCP negative at the time s/p IV abx completed at rehab who presents to the ED for fever, generalized itching, nausea and poor PO intake, admitted with  abnormal LFT's Fever at [facility], r/o Ac  cholecystitis.  Pt seen ,examined, case & care plan d/w pt, residents at [facility].  D/W Dtr at bed side.  Consult-  Sx -Dr fischer -POD # 1 Cholecystectomy   -GI-DR Dumont follow up   ID-DR JENIFFER Holguin d/w - On Zosyn q 8 hrs   Renal-Dr TORRES group -ELVIN-CKD 3   Cardiology -Dr Thacker follow up   DVT ppx   AM labs   -Post op DVT prophylaxis  -Post op Incentive spirometry   -Total care time is 60 minutes. Pt is a 89 y/o F with PMHx of anemia, DM2, HTN, HLD, CKD stage 3, recent admission to [facility] 5/19-5/23 for severe sepsis 2/2 UTI and pyelonephritis with r/o acute pino however HIDA and MRCP negative at the time s/p IV abx completed at rehab who presents to the ED for fever, generalized itching, nausea and poor PO intake, admitted with  abnormal LFT's Fever at [facility], r/o Ac  cholecystitis.  Pt seen ,examined, case & care plan d/w pt, residents at [facility].  D/W Dtr at bed side.  Consult-  Sx -Dr fischer -POD # 1 Cholecystectomy   -GI-DR Dumont follow up   ID-DR JENIFFER Holguin d/w - On Zosyn q 8 hrs   Renal-Dr TORRES group -ELVIN-CKD 3   Cardiology -Dr Thacker follow up   DVT ppx   AM labs   D/C plan to [facility] d/w SW  -Post op DVT prophylaxis  -Post op Incentive spirometry   -Total care time is 60 minutes.

## 2025-06-12 NOTE — SOCIAL WORK PROGRESS NOTE - NSSWPROGRESSNOTE_GEN_ALL_CORE
elissa has forwarded briana and supporting documentation to [facility] in anticipation of pt ret to [facility] on dc. elissa to continue to follow w pt, family and medical team to facilitate ret to [facility] on dc.

## 2025-06-12 NOTE — PROGRESS NOTE ADULT - ASSESSMENT
89 year old female with PMH of HTN, HLD, CKD III, anemia, DM II and recent admission for acute cholecystitis with plans for conservative management who is presenting from rehab for fever, abnormal labs, and generalized pruritis.     6/9 RUQ US: Cholelithiasis and gallbladder wall thickening, similar to 5/19/2025. Findings are equivocal for acute cholecystitis.  6/10 HIDA: Normal morphine-augmented hepatobiliary scan. No radionuclide evidence of acute cholecystitis.  6/11 s/p robot-assisted cholecystectomy    Plan:  - RUQ US & HIDA noted, now s/p robot-assisted cholecystectomy  - Normal bilirubin and elevated liver enzymes downtrending, continue to trend CMP daily    - Surgery team eval appreciated  - Diet as per surgery   - Antibiotics as per ID, on Zosyn   - Avoid hepatotoxic agents   - Will follow

## 2025-06-12 NOTE — PROGRESS NOTE ADULT - SUBJECTIVE AND OBJECTIVE BOX
Patient is a 89y old  Female who presents with a chief complaint of r/o cholecystitis/choledocholithiasis (12 Jun 2025 10:17)    HPI:  Pt is a 89 y/o F with PMHx of anemia, DM2, HTN, HLD, CKD stage 3, recent admission to [facility] 5/19-5/23 for severe sepsis 2/2 UTI and pyelonephritis with r/o acute pino however HIDA and MRCP negative at the time s/p IV abx completed at rehab who presents to the ED for fever, generalized itching, nausea and poor PO intake. History obtained from patient and her two daughters at bedside. Pt finished her antibiotics (meropenem and linezolid) on 5/28, then on 6/6 patient spiked a fever to 102F and began with symptoms of R shoulder pain, generalized itching, nausea, and poor PO intake. She was started on Invanz at her rehab center, and her fever and elevated WBCs improved. Pt was evaluated today at her rehab center ([facility]) and had labwork done and then recommended she present to the ED for further workup. Pt still admits to itching, R shoulder pain, and nausea, but denies any abdominal pain. Last fever 3 days ago. UA at rehab with small LE otherwise negative, UA here pending. No other concerns expressed.    ED course:  VS: /76, HR 66, T 97.3F, RR 16, SpO2 98% on RA  Labs: H/H 8.2/25.2, K 3.2, BUN/Cr 37/2.20, GFR 20, Direct Bili 0.7, TBili 0.9, , AST//156, Lipase 693  RUQ US: Cholelithiasis and gallbladder wall thickening, similar to 5/19/2025. Findings are equivocal for acute cholecystitis. Consider further evaluation with nuclear medicine HIDA scan.  UA pending  Received in the ED: 1L NS bolus (09 Jun 2025 19:40)    INTERVAL HPI:  6/10: Pt seen and examined at bedside. Pt states that she has extreme generalized itching. Denies abdominal pain, fever, chills at present. S/P  IV invanz.--> Change to IV Zosyn as pt got Rash on chest wall, +Itching  NPO after midnight for possible procedure, Low K, Cr elevated   6/11: Pt seen and examined at bedside. Pt states itching is present but has improved, c/o bilateral leg itching. Denies abdominal pain or fever. On IV zosyn. NPO for cholecystectomy. leukocytosis and elevated Cr  6/12: Pt seen and examined at bedside. POD1 robotic pino. Itching has resolved. On IV sozyn.     OVERNIGHT EVENTS: None    Home Medications:  alogliptin 6.25 mg oral tablet: 1 tab(s) orally once a day (09 Jun 2025 21:59)  Artificial Tears ophthalmic solution: 1 drop(s) in the right eye once a day (09 Jun 2025 22:05)  aspirin 81 mg oral delayed release tablet: 1 tab(s) orally every other day (09 Jun 2025 22:06)  Claritin 10 mg oral tablet: 1 tab(s) orally once a day (09 Jun 2025 22:05)  famotidine 20 mg oral tablet: 1 tab(s) orally once a day (09 Jun 2025 22:06)  fluticasone 50 mcg/inh nasal spray: 1 spray(s) nasal 2 times a day (09 Jun 2025 22:06)  folic acid 1 mg oral tablet: 1 tab(s) orally once a day (09 Jun 2025 22:05)  heparin: 5,000 international unit(s) subcutaneous every 12 hours (09 Jun 2025 21:56)  hydrALAZINE 100 mg oral tablet: 1 tab(s) orally 3 times a day (09 Jun 2025 21:57)  Invanz 1 g injection: 500 milligram(s) intravenously once a day (09 Jun 2025 22:06)  omeprazole 20 mg oral delayed release capsule: 1 cap(s) orally once a day (09 Jun 2025 22:06)  potassium chloride: 10 milliequivalent(s) once a day (09 Jun 2025 22:05)  Questran 4 g/9 g oral powder for reconstitution: 4 gram(s) orally once a day (09 Jun 2025 22:05)  sodium bicarbonate: 650 milligram(s) orally 3 times a day (09 Jun 2025 22:05)  torsemide 20 mg oral tablet: 1 tab(s) orally once a day (09 Jun 2025 22:05)  Vistaril 25 mg oral capsule: 1 cap(s) orally 2 times a day (09 Jun 2025 22:05)      MEDICATIONS  (STANDING):  artificial tears (preservative free) Ophthalmic Solution 1 Drop(s) Right EYE daily  calamine/zinc oxide Lotion 1 Application(s) Topical two times a day  cetirizine 10 milliGRAM(s) Oral daily  cholestyramine Powder (Sugar-Free) 4 Gram(s) Oral two times a day  dextrose 5%. 1000 milliLiter(s) (100 mL/Hr) IV Continuous <Continuous>  dextrose 5%. 1000 milliLiter(s) (50 mL/Hr) IV Continuous <Continuous>  dextrose 50% Injectable 25 Gram(s) IV Push once  dextrose 50% Injectable 12.5 Gram(s) IV Push once  dextrose 50% Injectable 25 Gram(s) IV Push once  famotidine    Tablet 20 milliGRAM(s) Oral daily  ferrous    sulfate 325 milliGRAM(s) Oral daily  fluticasone propionate 50 MICROgram(s)/spray Nasal Spray 1 Spray(s) Both Nostrils two times a day  folic acid 1 milliGRAM(s) Oral daily  glucagon  Injectable 1 milliGRAM(s) IntraMuscular once  heparin   Injectable 5000 Unit(s) SubCutaneous every 8 hours  hydrALAZINE 100 milliGRAM(s) Oral every 8 hours  insulin lispro (ADMELOG) corrective regimen sliding scale   SubCutaneous three times a day before meals  insulin lispro (ADMELOG) corrective regimen sliding scale   SubCutaneous at bedtime  metoprolol succinate  milliGRAM(s) Oral daily  piperacillin/tazobactam IVPB.. 3.375 Gram(s) IV Intermittent every 12 hours  polyethylene glycol 3350 17 Gram(s) Oral once  senna 2 Tablet(s) Oral at bedtime    MEDICATIONS  (PRN):  acetaminophen     Tablet .. 650 milliGRAM(s) Oral every 6 hours PRN Mild Pain (1 - 3), Moderate Pain (4 - 6)  dextrose Oral Gel 15 Gram(s) Oral once PRN Blood Glucose LESS THAN 70 milliGRAM(s)/deciliter  diphenhydrAMINE 25 milliGRAM(s) Oral every 4 hours PRN Rash and/or Itching  melatonin 3 milliGRAM(s) Oral at bedtime PRN Insomnia      Allergies    amlodipine (Flushing)    Intolerances        Social History:  Lives: assisted living  ADLs: dependent  Diet: regular  Alcohol Use: denies  Tobacco Use: remote history  Recreational Drug Use: denies (09 Jun 2025 19:40)      REVIEW OF SYSTEMS:  CONSTITUTIONAL: No fever, No chills, No fatigue, No myalgia, No Body ache, No Weakness  EYES: No eye pain,  No visual disturbances, No discharge, NO Redness  ENMT:  No ear pain, No nose bleed, No vertigo; No sinus pain, NO throat pain, No Congestion  NECK: No pain, No stiffness  RESPIRATORY: No cough, NO wheezing, No  hemoptysis, NO  shortness of breath  CARDIOVASCULAR: No chest pain, palpitations  GASTROINTESTINAL: No abdominal pain, NO epigastric pain. No nausea, No vomiting; No diarrhea, No constipation. [  ] BM  GENITOURINARY: No dysuria, No frequency, No urgency, No hematuria, NO incontinence  NEUROLOGICAL: No headaches, No dizziness, No numbness, No tingling, No tremors, No weakness  EXT: No Swelling, No Pain, No Edema  SKIN:  [ X ] No itching, burning, rashes, or lesions   MUSCULOSKELETAL: No joint pain ,No Jt swelling; No muscle pain, No back pain, No extremity pain  PSYCHIATRIC: No depression,  No anxiety,  No mood swings ,No difficulty sleeping at night  PAIN SCALE: [  ] None  [ X ] Other-3/10 RUQ pain, post-op  ROS Unable to obtain due to - [  ] Dementia  [  ] Lethargy [  ] Drowsy [  ] Sedated [  ] non verbal  REST OF REVIEW Of SYSTEM - [ X ] Normal     Vital Signs Last 24 Hrs  T(C): 36.4 (12 Jun 2025 05:15), Max: 36.7 (11 Jun 2025 12:18)  T(F): 97.5 (12 Jun 2025 05:15), Max: 98.1 (11 Jun 2025 12:18)  HR: 82 (12 Jun 2025 05:15) (65 - 90)  BP: 125/64 (12 Jun 2025 05:15) (83/30 - 175/65)  BP(mean): --  RR: 18 (12 Jun 2025 05:15) (17 - 33)  SpO2: 95% (12 Jun 2025 05:15) (92% - 100%)    Parameters below as of 12 Jun 2025 05:15  Patient On (Oxygen Delivery Method): room air      Finger Stick        06-11 @ 07:01  -  06-12 @ 07:00  --------------------------------------------------------  IN: 350 mL / OUT: 400 mL / NET: -50 mL    06-12 @ 07:01  - 06-12 @ 12:09  --------------------------------------------------------  IN: 240 mL / OUT: 0 mL / NET: 240 mL        PHYSICAL EXAM:  GENERAL:  [ X ] NAD , [ X ] well appearing, [  ] Agitated, [  ] Drowsy,  [  ] Lethargy, [  ] confused   HEAD:  [ X ] Normal, [  ] Other  EYES:  [ X ] EOMI, [ X ] PERRLA, [ X ] conjunctiva and sclera clear normal, [ X ] NO scleral iceterus  [  ] Pallor,[  ] Discharge  ENMT:  [ X ] Normal, [ X ] Moist mucous membranes, [ X ] Good dentition, [ X ] No Thrush  NECK: [ X ] Supple, [ X ] No JVD, [ X ] Normal thyroid, [  ] Lymphadenopathy [  ] Other  CHEST/LUNG:  [ X ] Clear to auscultation bilaterally, [ X ] Breath Sounds equal B/L / Decrease, [  ] poor effort  [ X ] No rales, [ X ] No rhonchi  [ X ]  No wheezing,   HEART:  [ X ] Regular rate and rhythm, [  ] tachycardia, [  ] Bradycardia,  [  ] irregular  [ X ] No murmurs, No rubs, No gallops, [  ] PPM in place (Mfr:  )  ABDOMEN:  [  ] Soft, [ X ] Nontender, [ X ] Nondistended, [ X ] No mass, [ X ] Bowel sounds present, [x  ] obese  NERVOUS SYSTEM:  [ X ] Alert & Oriented X3, [ X ] Nonfocal  [  ] Confusion  [  ] Encephalopathic [  ] Sedated [  ] Unable to assess, [  ] Dementia [  ] Other-  EXTREMITIES: [ X ] 2+ Peripheral Pulses, No clubbing, No cyanosis,  [ X ] NO edema B/L lower EXT. [  ] PVD stasis skin changes B/L Lower EXT, [  ] wound  LYMPH: No lymphadenopathy noted  SKIN:  [ X ] Raise rash on bilateral shins, improved, [  ] ecchymosis, [  ] Skin Tears, [  ] Other  DIET: Diet, Regular:   Consistent Carbohydrate No Snacks  DASH/TLC Sodium & Cholesterol Restricted (06-12-25 @ 09:20)      LABS:                        7.5    18.54 )-----------( 395      ( 12 Jun 2025 08:15 )             24.2     12 Jun 2025 10:15    135    |  110    |  25     ----------------------------<  138    5.3     |  17     |  2.10     Ca    9.0        12 Jun 2025 10:15    TPro  6.6    /  Alb  2.1    /  TBili  1.0    /  DBili  x      /  AST  56     /  ALT  66     /  AlkPhos  742    12 Jun 2025 08:15    PT/INR - ( 11 Jun 2025 06:00 )   PT: 12.6 sec;   INR: 1.07 ratio         PTT - ( 11 Jun 2025 06:00 )  PTT:30.3 sec  Urinalysis Basic - ( 12 Jun 2025 10:15 )    Color: x / Appearance: x / SG: x / pH: x  Gluc: 138 mg/dL / Ketone: x  / Bili: x / Urobili: x   Blood: x / Protein: x / Nitrite: x   Leuk Esterase: x / RBC: x / WBC x   Sq Epi: x / Non Sq Epi: x / Bacteria: x        Culture Results:   Culture positive, 50,000 - 99,000 CFU/mL . Identification to follow. (06-10 @ 07:10)                  Urinalysis with Rflx Culture (collected 10 Ghassan 2025 07:10)    Culture - Urine (collected 10 Ghassan 2025 07:10)  Source: Clean Catch  Preliminary Report (11 Jun 2025 11:40):    Culture positive, 50,000 - 99,000 CFU/mL . Identification to follow.        Lipase: 693 U/L (06-09-25 @ 15:51)        HEALTH ISSUES - PROBLEM Dx:  HLD (hyperlipidemia)    Diabetes type 2    Anemia of chronic disease    Need for prophylactic measure    Cholecystitis    HTN (hypertension)    Acute kidney injury superimposed on CKD            Consultant(s) Notes Reviewed:  [ X ] YES     Care Discussed with [X] Consultants  [ X ] Patient  [X  ] Family [  ] HCP [  ]   [  ] Social Service  [ X ] RN, [  ] Physical Therapy,[  ] Palliative care team  DVT PPX: [  ] Lovenox, [ X ] S C Heparin, [  ] Coumadin, [  ] Xarelto, [  ] Eliquis, [  ] Pradaxa, [  ] IV Heparin drip, [  ] SCD [  ] Contraindication 2 to GI Bleed,[  ] Ambulation [  ] Contraindicated 2 to  bleed [  ] Contraindicated 2 to Brain Bleed  Advanced directive: [  ] None, [ X ] DNR/DNI Patient is a 89y old  Female who presents with a chief complaint of r/o cholecystitis/choledocholithiasis (12 Jun 2025 10:17)    HPI:  Pt is a 89 y/o F with PMHx of anemia, DM2, HTN, HLD, CKD stage 3, recent admission to [facility] 5/19-5/23 for severe sepsis 2/2 UTI and pyelonephritis with r/o acute pino however HIDA and MRCP negative at the time s/p IV abx completed at rehab who presents to the ED for fever, generalized itching, nausea and poor PO intake. History obtained from patient and her two daughters at bedside. Pt finished her antibiotics (meropenem and linezolid) on 5/28, then on 6/6 patient spiked a fever to 102F and began with symptoms of R shoulder pain, generalized itching, nausea, and poor PO intake. She was started on Invanz at her rehab center, and her fever and elevated WBCs improved. Pt was evaluated today at her rehab center ([facility]) and had labwork done and then recommended she present to the ED for further workup. Pt still admits to itching, R shoulder pain, and nausea, but denies any abdominal pain. Last fever 3 days ago. UA at rehab with small LE otherwise negative, UA here pending. No other concerns expressed.    ED course:  VS: /76, HR 66, T 97.3F, RR 16, SpO2 98% on RA  Labs: H/H 8.2/25.2, K 3.2, BUN/Cr 37/2.20, GFR 20, Direct Bili 0.7, TBili 0.9, , AST//156, Lipase 693  RUQ US: Cholelithiasis and gallbladder wall thickening, similar to 5/19/2025. Findings are equivocal for acute cholecystitis. Consider further evaluation with nuclear medicine HIDA scan.  UA pending  Received in the ED: 1L NS bolus (09 Jun 2025 19:40)    INTERVAL HPI:  6/10: Pt seen and examined at bedside. Pt states that she has extreme generalized itching. Denies abdominal pain, fever, chills at present. S/P  IV invanz.--> Change to IV Zosyn as pt got Rash on chest wall, +Itching  NPO after midnight for possible procedure, Low K, Cr elevated   6/11: Pt seen and examined at bedside. Pt states itching is present but has improved, c/o bilateral leg itching. Denies abdominal pain or fever. On IV zosyn. NPO for cholecystectomy. leukocytosis and elevated Cr  6/12: Pt seen and examined at bedside. POD1 robotic pino. Itching has resolved. On IV sozyn.     OVERNIGHT EVENTS: None    Home Medications:  alogliptin 6.25 mg oral tablet: 1 tab(s) orally once a day (09 Jun 2025 21:59)  Artificial Tears ophthalmic solution: 1 drop(s) in the right eye once a day (09 Jun 2025 22:05)  aspirin 81 mg oral delayed release tablet: 1 tab(s) orally every other day (09 Jun 2025 22:06)  Claritin 10 mg oral tablet: 1 tab(s) orally once a day (09 Jun 2025 22:05)  famotidine 20 mg oral tablet: 1 tab(s) orally once a day (09 Jun 2025 22:06)  fluticasone 50 mcg/inh nasal spray: 1 spray(s) nasal 2 times a day (09 Jun 2025 22:06)  folic acid 1 mg oral tablet: 1 tab(s) orally once a day (09 Jun 2025 22:05)  heparin: 5,000 international unit(s) subcutaneous every 12 hours (09 Jun 2025 21:56)  hydrALAZINE 100 mg oral tablet: 1 tab(s) orally 3 times a day (09 Jun 2025 21:57)  Invanz 1 g injection: 500 milligram(s) intravenously once a day (09 Jun 2025 22:06)  omeprazole 20 mg oral delayed release capsule: 1 cap(s) orally once a day (09 Jun 2025 22:06)  potassium chloride: 10 milliequivalent(s) once a day (09 Jun 2025 22:05)  Questran 4 g/9 g oral powder for reconstitution: 4 gram(s) orally once a day (09 Jun 2025 22:05)  sodium bicarbonate: 650 milligram(s) orally 3 times a day (09 Jun 2025 22:05)  torsemide 20 mg oral tablet: 1 tab(s) orally once a day (09 Jun 2025 22:05)  Vistaril 25 mg oral capsule: 1 cap(s) orally 2 times a day (09 Jun 2025 22:05)      MEDICATIONS  (STANDING):  artificial tears (preservative free) Ophthalmic Solution 1 Drop(s) Right EYE daily  calamine/zinc oxide Lotion 1 Application(s) Topical two times a day  cetirizine 10 milliGRAM(s) Oral daily  cholestyramine Powder (Sugar-Free) 4 Gram(s) Oral two times a day  dextrose 5%. 1000 milliLiter(s) (100 mL/Hr) IV Continuous <Continuous>  dextrose 5%. 1000 milliLiter(s) (50 mL/Hr) IV Continuous <Continuous>  dextrose 50% Injectable 25 Gram(s) IV Push once  dextrose 50% Injectable 12.5 Gram(s) IV Push once  dextrose 50% Injectable 25 Gram(s) IV Push once  famotidine    Tablet 20 milliGRAM(s) Oral daily  ferrous    sulfate 325 milliGRAM(s) Oral daily  fluticasone propionate 50 MICROgram(s)/spray Nasal Spray 1 Spray(s) Both Nostrils two times a day  folic acid 1 milliGRAM(s) Oral daily  glucagon  Injectable 1 milliGRAM(s) IntraMuscular once  heparin   Injectable 5000 Unit(s) SubCutaneous every 8 hours  hydrALAZINE 100 milliGRAM(s) Oral every 8 hours  insulin lispro (ADMELOG) corrective regimen sliding scale   SubCutaneous three times a day before meals  insulin lispro (ADMELOG) corrective regimen sliding scale   SubCutaneous at bedtime  metoprolol succinate  milliGRAM(s) Oral daily  piperacillin/tazobactam IVPB.. 3.375 Gram(s) IV Intermittent every 12 hours  polyethylene glycol 3350 17 Gram(s) Oral once  senna 2 Tablet(s) Oral at bedtime    MEDICATIONS  (PRN):  acetaminophen     Tablet .. 650 milliGRAM(s) Oral every 6 hours PRN Mild Pain (1 - 3), Moderate Pain (4 - 6)  dextrose Oral Gel 15 Gram(s) Oral once PRN Blood Glucose LESS THAN 70 milliGRAM(s)/deciliter  diphenhydrAMINE 25 milliGRAM(s) Oral every 4 hours PRN Rash and/or Itching  melatonin 3 milliGRAM(s) Oral at bedtime PRN Insomnia      Allergies    amlodipine (Flushing)    Intolerances        Social History:  Lives: assisted living  ADLs: dependent  Diet: regular  Alcohol Use: denies  Tobacco Use: remote history  Recreational Drug Use: denies (09 Jun 2025 19:40)      REVIEW OF SYSTEMS:  CONSTITUTIONAL: No fever, No chills, No fatigue, No myalgia, No Body ache, No Weakness  EYES: No eye pain,  No visual disturbances, No discharge, NO Redness  ENMT:  No ear pain, No nose bleed, No vertigo; No sinus pain, NO throat pain, No Congestion  NECK: No pain, No stiffness  RESPIRATORY: No cough, NO wheezing, No  hemoptysis, NO  shortness of breath  CARDIOVASCULAR: No chest pain, palpitations  GASTROINTESTINAL: No abdominal pain, NO epigastric pain. No nausea, No vomiting; No diarrhea, No constipation. [  ] BM  GENITOURINARY: No dysuria, No frequency, No urgency, No hematuria, NO incontinence  NEUROLOGICAL: No headaches, No dizziness, No numbness, No tingling, No tremors, No weakness  EXT: No Swelling, No Pain, No Edema  SKIN:  [ X ] No itching, burning, rashes, or lesions   MUSCULOSKELETAL: No joint pain ,No Jt swelling; No muscle pain, No back pain, No extremity pain  PSYCHIATRIC: No depression,  No anxiety,  No mood swings ,No difficulty sleeping at night  PAIN SCALE: [  ] None  [ X ] Other-3/10 RUQ pain, post-op  ROS Unable to obtain due to - [  ] Dementia  [  ] Lethargy [  ] Drowsy [  ] Sedated [  ] non verbal  REST OF REVIEW Of SYSTEM - [ X ] Normal     Vital Signs Last 24 Hrs  T(C): 36.4 (12 Jun 2025 05:15), Max: 36.7 (11 Jun 2025 12:18)  T(F): 97.5 (12 Jun 2025 05:15), Max: 98.1 (11 Jun 2025 12:18)  HR: 82 (12 Jun 2025 05:15) (65 - 90)  BP: 125/64 (12 Jun 2025 05:15) (83/30 - 175/65)  BP(mean): --  RR: 18 (12 Jun 2025 05:15) (17 - 33)  SpO2: 95% (12 Jun 2025 05:15) (92% - 100%)    Parameters below as of 12 Jun 2025 05:15  Patient On (Oxygen Delivery Method): room air      Finger Stick        06-11 @ 07:01  -  06-12 @ 07:00  --------------------------------------------------------  IN: 350 mL / OUT: 400 mL / NET: -50 mL    06-12 @ 07:01  - 06-12 @ 12:09  --------------------------------------------------------  IN: 240 mL / OUT: 0 mL / NET: 240 mL        PHYSICAL EXAM:  GENERAL:  [ X ] NAD , [ X ] well appearing, [  ] Agitated, [  ] Drowsy,  [  ] Lethargy, [  ] confused   HEAD:  [ X ] Normal, [  ] Other  EYES:  [ X ] EOMI, [ X ] PERRLA, [ X ] conjunctiva and sclera clear normal, [ X ] NO scleral iceterus  [  ] Pallor,[  ] Discharge  ENMT:  [ X ] Normal, [ X ] Moist mucous membranes, [ X ] Good dentition, [ X ] No Thrush  NECK: [ X ] Supple, [ X ] No JVD, [ X ] Normal thyroid, [  ] Lymphadenopathy [  ] Other  CHEST/LUNG:  [ X ] Clear to auscultation bilaterally, [ X ] Breath Sounds equal B/L / Decrease, [  ] poor effort  [ X ] No rales, [ X ] No rhonchi  [ X ]  No wheezing,   HEART:  [ X ] Regular rate and rhythm, [  ] tachycardia, [  ] Bradycardia,  [  ] irregular  [ X ] No murmurs, No rubs, No gallops, [  ] PPM in place (Mfr:  )  ABDOMEN:  [  ] Soft, [ X ] Nontender, [ X ] Nondistended, [ X ] No mass, [ X ] Bowel sounds present, [x  ] obese  NERVOUS SYSTEM:  [ X ] Alert & Oriented X3, [ X ] Nonfocal  [  ] Confusion  [  ] Encephalopathic [  ] Sedated [  ] Unable to assess, [  ] Dementia [  ] Other-  EXTREMITIES: [ X ] 2+ Peripheral Pulses, No clubbing, No cyanosis,  [ X ] NO edema B/L lower EXT. [  ] PVD stasis skin changes B/L Lower EXT, [  ] wound  LYMPH: No lymphadenopathy noted  SKIN:  [ X ] Raise rash on bilateral shins, improved, [  ] ecchymosis, [  ] Skin Tears, [  ] Other  DIET: Diet, Regular:   Consistent Carbohydrate No Snacks  DASH/TLC Sodium & Cholesterol Restricted (06-12-25 @ 09:20)      LABS:                        7.5    18.54 )-----------( 395      ( 12 Jun 2025 08:15 )             24.2     12 Jun 2025 10:15    135    |  110    |  25     ----------------------------<  138    5.3     |  17     |  2.10     Ca    9.0        12 Jun 2025 10:15    TPro  6.6    /  Alb  2.1    /  TBili  1.0    /  DBili  x      /  AST  56     /  ALT  66     /  AlkPhos  742    12 Jun 2025 08:15    PT/INR - ( 11 Jun 2025 06:00 )   PT: 12.6 sec;   INR: 1.07 ratio         PTT - ( 11 Jun 2025 06:00 )  PTT:30.3 sec  Urinalysis Basic - ( 12 Jun 2025 10:15 )    Color: x / Appearance: x / SG: x / pH: x  Gluc: 138 mg/dL / Ketone: x  / Bili: x / Urobili: x   Blood: x / Protein: x / Nitrite: x   Leuk Esterase: x / RBC: x / WBC x   Sq Epi: x / Non Sq Epi: x / Bacteria: x        Culture Results:   Culture positive, 50,000 - 99,000 CFU/mL . Identification to follow. (06-10 @ 07:10)                  Urinalysis with Rflx Culture (collected 10 Ghassan 2025 07:10)    Culture - Urine (collected 10 Ghassan 2025 07:10)  Source: Clean Catch  Preliminary Report (11 Jun 2025 11:40):    Culture positive, 50,000 - 99,000 CFU/mL . Identification to follow.        Lipase: 693 U/L (06-09-25 @ 15:51)        HEALTH ISSUES - PROBLEM Dx:  HLD (hyperlipidemia)    Diabetes type 2    Anemia of chronic disease    Need for prophylactic measure    Cholecystitis    HTN (hypertension)    Acute kidney injury superimposed on CKD      Consultant(s) Notes Reviewed:  [ X ] YES     Care Discussed with [X] Consultants  [ X ] Patient  [X  ] Family [  ] HCP [  ]   [ x ] Social Service  [ X ] RN, [  ] Physical Therapy,[  ] Palliative care team  DVT PPX: [  ] Lovenox, [ X ] S C Heparin, [  ] Coumadin, [  ] Xarelto, [  ] Eliquis, [  ] Pradaxa, [  ] IV Heparin drip, [  ] SCD [  ] Contraindication 2 to GI Bleed,[  ] Ambulation [  ] Contraindicated 2 to  bleed [  ] Contraindicated 2 to Brain Bleed  Advanced directive: [  ] None, [ X ] DNR/DNI Patient is a 89y old  Female who presents with a chief complaint of r/o cholecystitis/choledocholithiasis (12 Jun 2025 10:17)    HPI:  Pt is a 89 y/o F with PMHx of anemia, DM2, HTN, HLD, CKD stage 3, recent admission to [facility] 5/19-5/23 for severe sepsis 2/2 UTI and pyelonephritis with r/o acute pino however HIDA and MRCP negative at the time s/p IV abx completed at rehab who presents to the ED for fever, generalized itching, nausea and poor PO intake. History obtained from patient and her two daughters at bedside. Pt finished her antibiotics (meropenem and linezolid) on 5/28, then on 6/6 patient spiked a fever to 102F and began with symptoms of R shoulder pain, generalized itching, nausea, and poor PO intake. She was started on Invanz at her rehab center, and her fever and elevated WBCs improved. Pt was evaluated today at her rehab center ([facility], [place]) and had labwork done and then recommended she present to the ED for further workup. Pt still admits to itching, R shoulder pain, and nausea, but denies any abdominal pain. Last fever 3 days ago. UA at rehab with small LE otherwise negative, UA here pending. No other concerns expressed.    ED course:  VS: /76, HR 66, T 97.3F, RR 16, SpO2 98% on RA  Labs: H/H 8.2/25.2, K 3.2, BUN/Cr 37/2.20, GFR 20, Direct Bili 0.7, TBili 0.9, , AST//156, Lipase 693  RUQ US: Cholelithiasis and gallbladder wall thickening, similar to 5/19/2025. Findings are equivocal for acute cholecystitis. Consider further evaluation with nuclear medicine HIDA scan.  UA pending  Received in the ED: 1L NS bolus (09 Jun 2025 19:40)    INTERVAL HPI:  6/10: Pt seen and examined at bedside. Pt states that she has extreme generalized itching. Denies abdominal pain, fever, chills at present. S/P  IV invanz.--> Change to IV Zosyn as pt got Rash on chest wall, +Itching  NPO after midnight for possible procedure, Low K, Cr elevated   6/11: Pt seen and examined at bedside. Pt states itching is present but has improved, c/o bilateral leg itching. Denies abdominal pain or fever. On IV zosyn. NPO for cholecystectomy.  S/P leukocytosis  and elevated Cr  6/12: Pt seen and examined at bedside. POD # 1 robotic pino. Itching has resolved. On IV Zosyn     OVERNIGHT EVENTS: None    Home Medications:  alogliptin 6.25 mg oral tablet: 1 tab(s) orally once a day (09 Jun 2025 21:59)  Artificial Tears ophthalmic solution: 1 drop(s) in the right eye once a day (09 Jun 2025 22:05)  aspirin 81 mg oral delayed release tablet: 1 tab(s) orally every other day (09 Jun 2025 22:06)  Claritin 10 mg oral tablet: 1 tab(s) orally once a day (09 Jun 2025 22:05)  famotidine 20 mg oral tablet: 1 tab(s) orally once a day (09 Jun 2025 22:06)  fluticasone 50 mcg/inh nasal spray: 1 spray(s) nasal 2 times a day (09 Jun 2025 22:06)  folic acid 1 mg oral tablet: 1 tab(s) orally once a day (09 Jun 2025 22:05)  heparin: 5,000 international unit(s) subcutaneous every 12 hours (09 Jun 2025 21:56)  hydrALAZINE 100 mg oral tablet: 1 tab(s) orally 3 times a day (09 Jun 2025 21:57)  Invanz 1 g injection: 500 milligram(s) intravenously once a day (09 Jun 2025 22:06)  omeprazole 20 mg oral delayed release capsule: 1 cap(s) orally once a day (09 Jun 2025 22:06)  potassium chloride: 10 milliequivalent(s) once a day (09 Jun 2025 22:05)  Questran 4 g/9 g oral powder for reconstitution: 4 gram(s) orally once a day (09 Jun 2025 22:05)  sodium bicarbonate: 650 milligram(s) orally 3 times a day (09 Jun 2025 22:05)  torsemide 20 mg oral tablet: 1 tab(s) orally once a day (09 Jun 2025 22:05)  Vistaril 25 mg oral capsule: 1 cap(s) orally 2 times a day (09 Jun 2025 22:05)      MEDICATIONS  (STANDING):  artificial tears (preservative free) Ophthalmic Solution 1 Drop(s) Right EYE daily  calamine/zinc oxide Lotion 1 Application(s) Topical two times a day  cetirizine 10 milliGRAM(s) Oral daily  cholestyramine Powder (Sugar-Free) 4 Gram(s) Oral two times a day  dextrose 5%. 1000 milliLiter(s) (100 mL/Hr) IV Continuous <Continuous>  dextrose 5%. 1000 milliLiter(s) (50 mL/Hr) IV Continuous <Continuous>  dextrose 50% Injectable 25 Gram(s) IV Push once  dextrose 50% Injectable 12.5 Gram(s) IV Push once  dextrose 50% Injectable 25 Gram(s) IV Push once  famotidine    Tablet 20 milliGRAM(s) Oral daily  ferrous    sulfate 325 milliGRAM(s) Oral daily  fluticasone propionate 50 MICROgram(s)/spray Nasal Spray 1 Spray(s) Both Nostrils two times a day  folic acid 1 milliGRAM(s) Oral daily  glucagon  Injectable 1 milliGRAM(s) IntraMuscular once  heparin   Injectable 5000 Unit(s) SubCutaneous every 8 hours  hydrALAZINE 100 milliGRAM(s) Oral every 8 hours  insulin lispro (ADMELOG) corrective regimen sliding scale   SubCutaneous three times a day before meals  insulin lispro (ADMELOG) corrective regimen sliding scale   SubCutaneous at bedtime  metoprolol succinate  milliGRAM(s) Oral daily  piperacillin/tazobactam IVPB.. 3.375 Gram(s) IV Intermittent every 12 hours  polyethylene glycol 3350 17 Gram(s) Oral once  senna 2 Tablet(s) Oral at bedtime    MEDICATIONS  (PRN):  acetaminophen     Tablet .. 650 milliGRAM(s) Oral every 6 hours PRN Mild Pain (1 - 3), Moderate Pain (4 - 6)  dextrose Oral Gel 15 Gram(s) Oral once PRN Blood Glucose LESS THAN 70 milliGRAM(s)/deciliter  diphenhydrAMINE 25 milliGRAM(s) Oral every 4 hours PRN Rash and/or Itching  melatonin 3 milliGRAM(s) Oral at bedtime PRN Insomnia      Allergies    amlodipine (Flushing)    Intolerances        Social History:  Lives: assisted living  ADLs: dependent  Diet: regular  Alcohol Use: denies  Tobacco Use: remote history  Recreational Drug Use: denies (09 Jun 2025 19:40)      REVIEW OF SYSTEMS: c/o abdominal pain at Surgical site  CONSTITUTIONAL: No fever, No chills, No fatigue, No myalgia, No Body ache, No Weakness  EYES: No eye pain,  No visual disturbances, No discharge, NO Redness  ENMT:  No ear pain, No nose bleed, No vertigo; No sinus pain, NO throat pain, No Congestion  NECK: No pain, No stiffness  RESPIRATORY: No cough, NO wheezing, No  hemoptysis, NO  shortness of breath  CARDIOVASCULAR: No chest pain, palpitations  GASTROINTESTINAL: No abdominal pain, NO epigastric pain. No nausea, No vomiting; No diarrhea, No constipation. [  ] BM  GENITOURINARY: No dysuria, No frequency, No urgency, No hematuria, NO incontinence  NEUROLOGICAL: No headaches, No dizziness, No numbness, No tingling, No tremors, No weakness  EXT: No Swelling, No Pain, No Edema  SKIN:  [ X ] No itching, burning, rashes, or lesions   MUSCULOSKELETAL: No joint pain ,No Jt swelling; No muscle pain, No back pain, No extremity pain  PSYCHIATRIC: No depression,  No anxiety,  No mood swings ,No difficulty sleeping at night  PAIN SCALE: [  ] None  [ X ] Other-3/10 RUQ pain, post-op  ROS Unable to obtain due to - [  ] Dementia  [  ] Lethargy [  ] Drowsy [  ] Sedated [  ] non verbal  REST OF REVIEW Of SYSTEM - [ X ] Normal     Vital Signs Last 24 Hrs  T(C): 36.4 (12 Jun 2025 05:15), Max: 36.7 (11 Jun 2025 12:18)  T(F): 97.5 (12 Jun 2025 05:15), Max: 98.1 (11 Jun 2025 12:18)  HR: 82 (12 Jun 2025 05:15) (65 - 90)  BP: 125/64 (12 Jun 2025 05:15) (83/30 - 175/65)  BP(mean): --  RR: 18 (12 Jun 2025 05:15) (17 - 33)  SpO2: 95% (12 Jun 2025 05:15) (92% - 100%)    Parameters below as of 12 Jun 2025 05:15  Patient On (Oxygen Delivery Method): room air      Finger Stick        06-11 @ 07:01  -  06-12 @ 07:00  --------------------------------------------------------  IN: 350 mL / OUT: 400 mL / NET: -50 mL    06-12 @ 07:01  - 06-12 @ 12:09  --------------------------------------------------------  IN: 240 mL / OUT: 0 mL / NET: 240 mL        PHYSICAL EXAM:  GENERAL:  [ X ] NAD , [ X ] well appearing, [  ] Agitated, [  ] Drowsy,  [  ] Lethargy, [  ] confused   HEAD:  [ X ] Normal, [  ] Other  EYES:  [ X ] EOMI, [ X ] PERRLA, [ X ] conjunctiva and sclera clear normal, [ X ] NO scleral iceterus  [  ] Pallor,[  ] Discharge  ENMT:  [ X ] Normal, [ X ] Moist mucous membranes, [ X ] Good dentition, [ X ] No Thrush  NECK: [ X ] Supple, [ X ] No JVD, [ X ] Normal thyroid, [  ] Lymphadenopathy [  ] Other  CHEST/LUNG:  [ X ] Clear to auscultation bilaterally, [ X ] Breath Sounds equal B/L / Decrease, [  ] poor effort  [ X ] No rales, [ X ] No rhonchi  [ X ]  No wheezing,   HEART:  [ X ] Regular rate and rhythm, [  ] tachycardia, [  ] Bradycardia,  [  ] irregular  [ X ] No murmurs, No rubs, No gallops, [  ] PPM in place (Mfr:  )  ABDOMEN:  [ x ] Soft, [ X ] mild tenderness no R/R/G , [ X ] Nondistended, [ X ] No mass, [ X ] Bowel sounds present, [x  ] obese  NERVOUS SYSTEM:  [ X ] Alert & Oriented X3, [ X ] Nonfocal  [  ] Confusion  [  ] Encephalopathic [  ] Sedated [  ] Unable to assess, [  ] Dementia [  ] Other-  EXTREMITIES: [ X ] 2+ Peripheral Pulses, No clubbing, No cyanosis,  [ X ] NO edema B/L lower EXT. [  ] PVD stasis skin changes B/L Lower EXT, [  ] wound  LYMPH: No lymphadenopathy noted  SKIN:  [ X ] Raise rash on bilateral shins, improved, [  ] ecchymosis, [  ] Skin Tears, [  ] Other    DIET: Diet, Regular:   Consistent Carbohydrate No Snacks  DASH/TLC Sodium & Cholesterol Restricted (06-12-25 @ 09:20)      LABS:                        7.5    18.54 )-----------( 395      ( 12 Jun 2025 08:15 )             24.2     12 Jun 2025 10:15    135    |  110    |  25     ----------------------------<  138    5.3     |  17     |  2.10     Ca    9.0        12 Jun 2025 10:15    TPro  6.6    /  Alb  2.1    /  TBili  1.0    /  DBili  x      /  AST  56     /  ALT  66     /  AlkPhos  742    12 Jun 2025 08:15    PT/INR - ( 11 Jun 2025 06:00 )   PT: 12.6 sec;   INR: 1.07 ratio         PTT - ( 11 Jun 2025 06:00 )  PTT:30.3 sec  Urinalysis Basic - ( 12 Jun 2025 10:15 )    Color: x / Appearance: x / SG: x / pH: x  Gluc: 138 mg/dL / Ketone: x  / Bili: x / Urobili: x   Blood: x / Protein: x / Nitrite: x   Leuk Esterase: x / RBC: x / WBC x   Sq Epi: x / Non Sq Epi: x / Bacteria: x        Culture Results:   Culture positive, 50,000 - 99,000 CFU/mL . Identification to follow. (06-10 @ 07:10)        Urinalysis with Rflx Culture (collected 10 Ghassan 2025 07:10)    Culture - Urine (collected 10 Ghassan 2025 07:10)  Source: Clean Catch  Preliminary Report (11 Jun 2025 11:40):    Culture positive, 50,000 - 99,000 CFU/mL . Identification to follow.        Lipase: 693 U/L (06-09-25 @ 15:51)        HEALTH ISSUES - PROBLEM Dx:  HLD (hyperlipidemia)    Diabetes type 2    Anemia of chronic disease    Need for prophylactic measure    Cholecystitis    HTN (hypertension)    Acute kidney injury superimposed on CKD      Consultant(s) Notes Reviewed:  [ X ] YES     Care Discussed with [X] Consultants  [ X ] Patient  [X  ] Family [  ] HCP [  ]   [ x ] Social Service  [ X ] RN, [ x ] Physical Therapy,[  ] Palliative care team  DVT PPX: [  ] Lovenox, [ X ] S C Heparin, [  ] Coumadin, [  ] Xarelto, [  ] Eliquis, [  ] Pradaxa, [  ] IV Heparin drip, [  ] SCD [  ] Contraindication 2 to GI Bleed,[  ] Ambulation [  ] Contraindicated 2 to  bleed [  ] Contraindicated 2 to Brain Bleed  Advanced directive: [  ] None, [ X ] DNR/DNI

## 2025-06-12 NOTE — PHARMACOTHERAPY INTERVENTION NOTE - COMMENTS
Age friendly >65 years old recommendation to discontinue diphenhydramine, since already on an antihistamine (cetirizine) and hasn't been needing the PRN order.  Accepted via telephone and order discontinued.

## 2025-06-12 NOTE — PROGRESS NOTE ADULT - ASSESSMENT
Pt is a 89 y/o F with PMHx of anemia, DM2, HTN, HLD, CKD stage 3, recent admission to [facility] 5/19-5/23 for severe sepsis 2/2 UTI and pyelonephritis with r/o acute pino however HIDA and MRCP negative at the time s/p IV abx completed at rehab who presents to the ED for fever, generalized itching, nausea and poor PO intake. Pt finished her antibiotics (meropenem and linezolid) on 5/28, then on 6/6 patient spiked a fever to 102F and began with symptoms of R shoulder pain, generalized itching, nausea, and poor PO intake. She was started on Invanz at her rehab center, and her fever and elevated WBCs improved. Pt was evaluated today at her rehab center ([facility]) and had labwork done and then recommended she present to the ED for further workup. Pt still admits to itching, R shoulder pain, and nausea, but denies any abdominal pain. Last fever 3 days ago. UA at rehab with small LE otherwise negative, UA here pending. No other concerns expressed.    R/o acute cholecystitis  Cholelithiasis  Transaminitis  - RUQ US: Cholelithiasis and gallbladder wall thickening, similar to 5/19/2025. Findings are equivocal for acute cholecystitis.  - HIDA negative  - OR 6/11 for lap pino    Recurrent UTI  - no reported urinary sx  - UA negative, only mod leuk est, 25 WBCs  - Urine Culture positive, 50,000 - 99,000 CFU/mL . Identification to follow.    ?Drug Rash/Pruritis  - suspect drug rash in setting of ertapenem; daughter reporting that pt has tolerated meropenem previously w/o issues  - ertapenem since 6/6, switched to zosyn 6/10  - rash improving    Recommendations:   C/w zosyn  F/u pending cx  GI following  Sgy following  Further recs to follow pending above    ***THIS IS AN INCOMPLETE NOTE. CHARTING IN PROGRESS***      Patient evaluated with face-to-face time in addition to reviewing history, labs, microbiology, and imaging.   Antibiotic stewardship, local antibiogram, infection control strategies and potential transmission issues taken into consideration at time of treatment decision making process.   Thank you for allowing us to participate in the care of your patient.  D/w daughter and patient at bedside  Infectious Diseases will follow. Please call with any questions.  Claudette Holguin M.D.  Available on [employer] TEAMS -- *[facility]*  [place] [facility] [phone]  For after 5 P.M. and weekends, please call [phone] Pt is a 89 y/o F with PMHx of anemia, DM2, HTN, HLD, CKD stage 3, [facility] for severe sepsis 2/2 UTI and pyelonephritis with r/o acute pino however HIDA and MRCP negative at the time s/p IV abx completed at rehab who presents to the ED for fever, generalized itching, nausea and poor PO intake. Pt finished her antibiotics (meropenem and linezolid) on 5/28, then on 6/6 patient spiked a fever to 102F and began with symptoms of R shoulder pain, generalized itching, nausea, and poor PO intake. She was started on Invanz at her rehab center, and her fever and elevated WBCs improved. Pt was evaluated today at her rehab center ([facility]) and had labwork done and then recommended she present to the ED for further workup. Pt still admits to itching, R shoulder pain, and nausea, but denies any abdominal pain. Last fever 3 days ago. UA at rehab with small LE otherwise negative, UA here pending. No other concerns expressed.    R/o acute cholecystitis  Cholelithiasis  Transaminitis  - RUQ US: Cholelithiasis and gallbladder wall thickening, similar to 5/19/2025. Findings are equivocal for acute cholecystitis.  - HIDA negative  - S/p Robot-assisted cholecystectomy with cholangiography 11-Jun-2025 14:55:28  Arturo Portillo    Recurrent UTI  - no reported urinary sx  - UA negative, only mod leuk est, 25 WBCs  - Urine Culture positive, 50,000 - 99,000 CFU/mL . Identification to follow.    ?Drug Rash/Pruritis  - suspect drug rash in setting of ertapenem; daughter reporting that pt has tolerated meropenem previously w/o issues  - ertapenem since 6/6, switched to zosyn 6/10  - rash improving    6/12 leukocytosis noted; likely reactive in setting of procedure; will monitor    Recommendations:   C/w zosyn  F/u pending urine cx  GI following  Sgy following  Further recs to follow pending above    Patient evaluated with face-to-face time in addition to reviewing history, labs, microbiology, and imaging.   Antibiotic stewardship, local antibiogram, infection control strategies and potential transmission issues taken into consideration at time of treatment decision making process.   Thank you for allowing us to participate in the care of your patient.  D/w Dr. Holguin  D/w daughter and patient at bedside  Infectious Diseases will follow. Please call with any questions.  Claudette Holguin M.D.  Available on [employer] TEAMS -- *[facility]*  [place] [facility] [phone]  For after 5 P.M. and weekends, please call [phone]

## 2025-06-12 NOTE — PHYSICAL THERAPY INITIAL EVALUATION ADULT - NAME OF CLINICIAN
Health Maintenance Due   Topic Date Due    TETANUS VACCINE  08/26/1967    Abdominal Aortic Aneurysm Screening  08/26/2015    Pneumococcal Vaccine (65+ Low/Medium Risk) (2 of 2 - PCV13) 09/25/2020     Updates were requested from care everywhere.  Chart was reviewed for overdue Proactive [facility] Encounters (LEON) topics (CRS, Breast Cancer Screening, Eye exam)  Health Maintenance has been updated.  LINKS immunization registry triggered.  Immunizations were reconciled.       RN: Yasmin

## 2025-06-12 NOTE — PROGRESS NOTE ADULT - PROBLEM SELECTOR PLAN 7
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

## 2025-06-12 NOTE — CASE MANAGEMENT PROGRESS NOTE - NSCMPROGRESSNOTE_GEN_ALL_CORE
Patient discussed during morning round. POD #1 Cholecystectomy with cholangiography. Hgb 7.5 trending downward, possible transfusion today.

## 2025-06-12 NOTE — CONSULT NOTE ADULT - REASON FOR ADMISSION
r/o cholecystitis/choledocholithiasis

## 2025-06-12 NOTE — PROGRESS NOTE ADULT - PROBLEM SELECTOR PLAN 2
Ashley- CKD3, baseline Cr 1.8  - ASHLEY likely pre-renal  - Cr 2.1 this AM  -  HOLD Losartan   - Encourage PO intake  - monitor daily renal function  - Nephro (Dr. Kaur follow up   Pt has Gamma Migrating paraprotein on previous SPEP study Ashley- CKD3, baseline Cr 1.8  - ASHLEY likely pre-renal  - Cr 2.1 this AM  -  HOLD Losartan   - Encourage PO intake  - Daily BMP  - Nephro (Dr. 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

## 2025-06-12 NOTE — PHYSICAL THERAPY INITIAL EVALUATION ADULT - PERTINENT HX OF CURRENT PROBLEM, REHAB EVAL
Pt is a 89 y/o F with PMHx of anemia, DM2, HTN, HLD, CKD stage 3, recent admission to [facility] 5/19-5/23 for severe sepsis 2/2 UTI and pyelonephritis with r/o acute pino however HIDA and MRCP negative at the time s/p IV abx completed at rehab who presents to the ED for fever, generalized itching, nausea and poor PO intake. History obtained from patient and her two daughters at bedside. Pt finished her antibiotics (meropenem and linezolid) on 5/28, then on 6/6 patient spiked a fever to 102F and began with symptoms of R shoulder pain, generalized itching, nausea, and poor PO intake. She was started on Invanz at her rehab center, and her fever and elevated WBCs improved. Pt was evaluated today at her rehab center ([facility]) and had labwork done and then recommended she present to the ED for further workup. Pt still admits to itching, R shoulder pain, and nausea, but denies any abdominal pain. Last fever 3 days ago. UA at rehab with small LE otherwise negative, UA here pending. No other concerns expressed.

## 2025-06-12 NOTE — PROGRESS NOTE ADULT - SUBJECTIVE AND OBJECTIVE BOX
[facility] [facility]  HERNESTO Ramirez Y. Patel, S. Shah, G. [facility]  [phone]    Name: NELDA VERDIN  Age: 89y  Gender: Female  MRN: [id number]    Interval History:  Patient seen and examined at bedside  No acute overnight events.   Notes reviewed    Antibiotics:  piperacillin/tazobactam IVPB.. 3.375 Gram(s) IV Intermittent every 12 hours      Medications:  acetaminophen     Tablet .. 650 milliGRAM(s) Oral every 6 hours PRN  artificial tears (preservative free) Ophthalmic Solution 1 Drop(s) Right EYE daily  calamine/zinc oxide Lotion 1 Application(s) Topical two times a day  cetirizine 10 milliGRAM(s) Oral daily  cholestyramine Powder (Sugar-Free) 4 Gram(s) Oral two times a day  dextrose 5%. 1000 milliLiter(s) IV Continuous <Continuous>  dextrose 5%. 1000 milliLiter(s) IV Continuous <Continuous>  dextrose 50% Injectable 25 Gram(s) IV Push once  dextrose 50% Injectable 12.5 Gram(s) IV Push once  dextrose 50% Injectable 25 Gram(s) IV Push once  dextrose Oral Gel 15 Gram(s) Oral once PRN  diphenhydrAMINE 25 milliGRAM(s) Oral every 4 hours PRN  famotidine    Tablet 20 milliGRAM(s) Oral daily  ferrous    sulfate 325 milliGRAM(s) Oral daily  fluticasone propionate 50 MICROgram(s)/spray Nasal Spray 1 Spray(s) Both Nostrils two times a day  folic acid 1 milliGRAM(s) Oral daily  glucagon  Injectable 1 milliGRAM(s) IntraMuscular once  heparin   Injectable 5000 Unit(s) SubCutaneous every 8 hours  hydrALAZINE 100 milliGRAM(s) Oral every 8 hours  insulin lispro (ADMELOG) corrective regimen sliding scale   SubCutaneous three times a day before meals  insulin lispro (ADMELOG) corrective regimen sliding scale   SubCutaneous at bedtime  melatonin 3 milliGRAM(s) Oral at bedtime PRN  metoprolol succinate  milliGRAM(s) Oral daily  piperacillin/tazobactam IVPB.. 3.375 Gram(s) IV Intermittent every 12 hours      Review of Systems:  A 10-point review of systems was obtained.     Pertinent positives and negatives--  Constitutional: No fevers. No Chills. No Rigors.   Cardiovascular: No chest pain. No palpitations.  Respiratory: No shortness of breath. No cough.  Gastrointestinal: No nausea or vomiting. No diarrhea or constipation.   Psychiatric: Pleasant. Appropriate affect.    Review of systems otherwise negative except as previously noted.    Allergies: amlodipine (Flushing)    For details regarding the patient's past medical history, social history, family history, and other miscellaneous elements, please refer the initial infectious diseases consultation and/or the admitting history and physical examination for this admission.    Objective:  Vitals:   T(C): 36.4 (06-12-25 @ 05:15), Max: 36.7 (06-11-25 @ 12:18)  HR: 82 (06-12-25 @ 05:15) (65 - 90)  BP: 125/64 (06-12-25 @ 05:15) (83/30 - 175/65)  RR: 18 (06-12-25 @ 05:15) (17 - 33)  SpO2: 95% (06-12-25 @ 05:15) (92% - 100%)    Physical Examination:  General: no acute distress  HEENT: NC/AT, EOMI  Cardio: S1, S2 heard, RRR, no murmurs  Resp: breath sounds heard bilaterally  Abd: soft, NT, ND  Ext: no edema or cyanosis  Skin: warm, dry, no visible rash      Laboratory Studies:  CBC:                       7.5    18.54 )-----------( 395      ( 12 Jun 2025 08:15 )             24.2     CMP: 06-11    139  |  108  |  29[H]  ----------------------------<  101[H]  4.3   |  25  |  2.20[H]    Ca    9.3      11 Jun 2025 06:00    TPro  7.1  /  Alb  2.3[L]  /  TBili  1.0  /  DBili  0.7[H]  /  AST  84[H]  /  ALT  93[H]  /  AlkPhos  922[H]  06-11    LIVER FUNCTIONS - ( 11 Jun 2025 06:00 )  Alb: 2.3 g/dL / Pro: 7.1 g/dL / ALK PHOS: 922 U/L / ALT: 93 U/L / AST: 84 U/L / GGT: x           Urinalysis Basic - ( 11 Jun 2025 06:00 )    Color: x / Appearance: x / SG: x / pH: x  Gluc: 101 mg/dL / Ketone: x  / Bili: x / Urobili: x   Blood: x / Protein: x / Nitrite: x   Leuk Esterase: x / RBC: x / WBC x   Sq Epi: x / Non Sq Epi: x / Bacteria: x        Microbiology: reviewed    Urinalysis with Rflx Culture (collected 06-10-25 @ 07:10)    Culture - Urine (collected 06-10-25 @ 07:10)  Source: Clean Catch  Preliminary Report (06-11-25 @ 11:40):    Culture positive, 50,000 - 99,000 CFU/mL . Identification to follow.          Radiology: reviewed       [place] [facility]  HERNESTO Ramirez Y. Patel, S. Shah, G. [facility]  [phone]    Name: NELDA VERDIN  Age: 89y  Gender: Female  MRN: [id number]    Interval History:  S/p Robot-assisted cholecystectomy with cholangiography 11-Jun-2025 14:55:28  Arturo Portillo  Rash is better  Notes reviewed    Antibiotics:  piperacillin/tazobactam IVPB.. 3.375 Gram(s) IV Intermittent every 12 hours      Medications:  acetaminophen     Tablet .. 650 milliGRAM(s) Oral every 6 hours PRN  artificial tears (preservative free) Ophthalmic Solution 1 Drop(s) Right EYE daily  calamine/zinc oxide Lotion 1 Application(s) Topical two times a day  cetirizine 10 milliGRAM(s) Oral daily  cholestyramine Powder (Sugar-Free) 4 Gram(s) Oral two times a day  dextrose 5%. 1000 milliLiter(s) IV Continuous <Continuous>  dextrose 5%. 1000 milliLiter(s) IV Continuous <Continuous>  dextrose 50% Injectable 25 Gram(s) IV Push once  dextrose 50% Injectable 12.5 Gram(s) IV Push once  dextrose 50% Injectable 25 Gram(s) IV Push once  dextrose Oral Gel 15 Gram(s) Oral once PRN  diphenhydrAMINE 25 milliGRAM(s) Oral every 4 hours PRN  famotidine    Tablet 20 milliGRAM(s) Oral daily  ferrous    sulfate 325 milliGRAM(s) Oral daily  fluticasone propionate 50 MICROgram(s)/spray Nasal Spray 1 Spray(s) Both Nostrils two times a day  folic acid 1 milliGRAM(s) Oral daily  glucagon  Injectable 1 milliGRAM(s) IntraMuscular once  heparin   Injectable 5000 Unit(s) SubCutaneous every 8 hours  hydrALAZINE 100 milliGRAM(s) Oral every 8 hours  insulin lispro (ADMELOG) corrective regimen sliding scale   SubCutaneous three times a day before meals  insulin lispro (ADMELOG) corrective regimen sliding scale   SubCutaneous at bedtime  melatonin 3 milliGRAM(s) Oral at bedtime PRN  metoprolol succinate  milliGRAM(s) Oral daily  piperacillin/tazobactam IVPB.. 3.375 Gram(s) IV Intermittent every 12 hours      Review of Systems:  A 10-point review of systems was obtained.   Review of systems otherwise negative except as previously noted.    Allergies: amlodipine (Flushing)    For details regarding the patient's past medical history, social history, family history, and other miscellaneous elements, please refer the initial infectious diseases consultation and/or the admitting history and physical examination for this admission.    Objective:  Vitals:   T(C): 36.4 (06-12-25 @ 05:15), Max: 36.7 (06-11-25 @ 12:18)  HR: 82 (06-12-25 @ 05:15) (65 - 90)  BP: 125/64 (06-12-25 @ 05:15) (83/30 - 175/65)  RR: 18 (06-12-25 @ 05:15) (17 - 33)  SpO2: 95% (06-12-25 @ 05:15) (92% - 100%)    Physical Examination:  General: no acute distress  HEENT: NC/AT, EOMI  Cardio: RRR  Resp: breath sounds heard bilaterally  Abd: soft, NT, ND  Ext: no edema or cyanosis  Skin: warm, dry, no visible rash      Laboratory Studies:  CBC:                       7.5    18.54 )-----------( 395      ( 12 Jun 2025 08:15 )             24.2     CMP: 06-11    139  |  108  |  29[H]  ----------------------------<  101[H]  4.3   |  25  |  2.20[H]    Ca    9.3      11 Jun 2025 06:00    TPro  7.1  /  Alb  2.3[L]  /  TBili  1.0  /  DBili  0.7[H]  /  AST  84[H]  /  ALT  93[H]  /  AlkPhos  922[H]  06-11    LIVER FUNCTIONS - ( 11 Jun 2025 06:00 )  Alb: 2.3 g/dL / Pro: 7.1 g/dL / ALK PHOS: 922 U/L / ALT: 93 U/L / AST: 84 U/L / GGT: x           Urinalysis Basic - ( 11 Jun 2025 06:00 )    Color: x / Appearance: x / SG: x / pH: x  Gluc: 101 mg/dL / Ketone: x  / Bili: x / Urobili: x   Blood: x / Protein: x / Nitrite: x   Leuk Esterase: x / RBC: x / WBC x   Sq Epi: x / Non Sq Epi: x / Bacteria: x        Microbiology: reviewed    Urinalysis with Rflx Culture (collected 06-10-25 @ 07:10)    Culture - Urine (collected 06-10-25 @ 07:10)  Source: Clean Catch  Preliminary Report (06-11-25 @ 11:40):    Culture positive, 50,000 - 99,000 CFU/mL . Identification to follow.          Radiology: reviewed

## 2025-06-12 NOTE — PROGRESS NOTE ADULT - PROBLEM SELECTOR PLAN 1
Pt presenting with fever, and elevated liver enzymes on outpatient labs-r/o  Ac on chronic Pino  SP IV abx at [facility] VIA Mid line -completed for UTI-Pyelonephritis on last admission  - Direct Bili 0.7, TBili 0.9, , AST//156, Lipase 693  - RUQ US: Cholelithiasis and gallbladder wall thickening, similar to 5/19/2025. Findings are equivocal for acute cholecystitis.  Mild leukocytosis   - HIDA negative for acute pino  - POD1 robotic pino w/ Dr Fischer, tolerated well  - Hold home aspirin   - 1u PRBCs ordered for drop in Hg, likely post-op  - CLD, advance as tolerated  - c/w IV Zosyn q 8 hrs as pt has Rash on chest wall, possible reaction from invanz  - c/w cholestyramine and PRN Benadryl for itching, calamine lotion  - Once pt with diet, will start soft and bite sized diet as some concern expressed for patient choking on food, will get speech & swallow eval in AM  - GI Dr Dumont d/w -HIDA-Neg   - Surgery Dr fischer- d/w HIDA -NEG, CT A/P Gallbladder wall thickening/edema., plan for OR today 6/11  - ID (Dr. JENIFFER Holguin d/w -Change to IV Zosyn q 8 hrs  as pt has Rash with Invaz-Meropenem  - EKG reviewed, METS >4, RCRI:1. 6% risk of major cardiac event. Pt is a low risk candidate for low risk procedure. Medically optimized for proposed lap cholecystectomy today, TTE: LVEF 60-65%. Pending cardio recs Pt presenting with fever, and elevated liver enzymes on outpatient labs-r/o  Ac on chronic Pino  SP IV abx at [facility] VIA Mid line -completed for UTI-Pyelonephritis on last admission  - Direct Bili 0.7, TBili 0.9, , AST//156, Lipase 693  - RUQ US: Cholelithiasis and gallbladder wall thickening, similar to 5/19/2025. Findings are equivocal for acute cholecystitis.  Mild leukocytosis   - HIDA negative for acute pino  - POD1 robotic pino w/ Dr Fischer, tolerated well  - Hold home aspirin   - 1u PRBCs ordered for drop in Hg, likely post-op  - CLD, advance as tolerated  - c/w IV Zosyn q 8 hrs as pt has Rash on chest wall, possible reaction from invanz  - c/w cholestyramine and calamine lotion for itching  - d/w pharmacy,  given itching improved, will monitor off PRN Benadryl for now given risks in elderly  - Once pt with diet, will start soft and bite sized diet as some concern expressed for patient choking on food, will get speech & swallow eval in AM  - GI Dr Dumont d/w -HIDA-Neg   - Surgery Dr fischer- d/w HIDA -NEG, CT A/P Gallbladder wall thickening/edema., plan for OR today 6/11  - ID (Dr. JENIFFER Holguin d/w -Change to IV Zosyn q 8 hrs  as pt has Rash with Invaz-Meropenem  - EKG reviewed, METS >4, RCRI:1. 6% risk of major cardiac event. Pt is a low risk candidate for low risk procedure. Medically optimized for proposed lap cholecystectomy today, TTE: LVEF 60-65%. Pending cardio recs Pt presenting with fever, and elevated liver enzymes on outpatient labs-r/o  Ac on chronic Pino  S/P Robotic SX   - Direct Bili 0.7, TBili 0.9, , AST//156, Lipase 693  - RUQ US: Cholelithiasis and gallbladder wall thickening, similar to 5/19/2025. Findings are equivocal for acute cholecystitis.  Mild leukocytosis   - HIDA negative for acute pino  - POD1 robotic pino w/ Dr Fischer, tolerated well  - Hold home aspirin   - 1u PRBCs ordered for drop in Hg, likely multifactorial   - advance diet  as tolerated  - c/w IV Zosyn q 8 hrs , possible reaction from Invanz  - c/w cholestyramine and calamine lotion for itching  - d/w pharmacy,  given itching improved, will monitor off PRN Benadryl for now given risks in elderly  - Once pt with diet, will start soft and bite sized diet as some concern expressed for patient choking on food, will get speech & swallow eval in AM  - GI Dr Dumont d/w -HIDA-Neg   - Surgery Dr fischer- d/w Leukocytosis -likely reactive post op   - ID (Dr. JENIFFER Holguin d/w -Change to IV Zosyn q 8 hrs  as pt has Rash with INVANZ -Meropenem  , TTE: LVEF 60-65%. Pt presenting with fever, and elevated liver enzymes on outpatient labs-r/o  Ac on chronic Pino  S/P Robotic SX - Elevated WBC post op likely reactive,  - Direct Bili 0.7, TBili 0.9, , AST//156, Lipase 693  - RUQ US: Cholelithiasis and gallbladder wall thickening, similar to 5/19/2025. Findings are equivocal for acute cholecystitis.  Mild leukocytosis   - HIDA negative for acute pino  - POD1 robotic pino w/ Dr Fischer, tolerated well  - Hold home aspirin   - 1u PRBCs ordered for drop in Hg, likely multifactorial   - advance diet  as tolerated  - c/w IV Zosyn q 8 hrs , possible reaction from Invanz  - c/w cholestyramine and calamine lotion for itching  - d/w pharmacy,  given itching improved, will monitor off PRN Benadryl for now given risks in elderly  - Once pt with diet, will start soft and bite sized diet as some concern expressed for patient choking on food, will get speech & swallow eval in AM  - GI Dr Dumont d/w -HIDA-Neg   - Surgery Dr fischer- d/w Leukocytosis -likely reactive post op   - ID (Dr. JENIFFER Holguin d/w -Change to IV Zosyn q 8 hrs  as pt has Rash with INVANZ -Meropenem  , TTE: LVEF 60-65%.

## 2025-06-12 NOTE — PROGRESS NOTE ADULT - PROBLEM SELECTOR PLAN 4
Chronic  - previously on statin however will keep on hold 2/2 transaminitis Chronic  - c/w home hydralazine and metoprolol with hold parameters  - monitor routine hemodynamics

## 2025-06-12 NOTE — PROGRESS NOTE ADULT - SUBJECTIVE AND OBJECTIVE BOX
SUBJECTIVE:  Patient seen and examined at bedside. No overnight events. Patient reports mild back pain but otherwise feeling well. No appetite for clears tray last night. No flatus or BM yet. Has not been ambulating. Denies abdominal pain, nausea/vomiting.     VITALS  Vital Signs Last 24 Hrs  T(C): 36.4 (12 Jun 2025 05:15), Max: 36.7 (11 Jun 2025 12:18)  T(F): 97.5 (12 Jun 2025 05:15), Max: 98.1 (11 Jun 2025 12:18)  HR: 82 (12 Jun 2025 05:15) (65 - 90)  BP: 125/64 (12 Jun 2025 05:15) (83/30 - 175/65)  RR: 18 (12 Jun 2025 05:15) (17 - 33)  SpO2: 95% (12 Jun 2025 05:15) (92% - 100%)    Parameters below as of 12 Jun 2025 05:15  Patient On (Oxygen Delivery Method): room air    PHYSICAL EXAM  GENERAL:  Elderly female lying comfortably in bed in [facility].  HEENT:  NC/AT. Sclera white.  CARDIO:  Regular rate and rhythm.  RESPIRATORY:  Nonlabored breathing, no accessory muscle use.  ABDOMEN:  Soft, nondistended, +incisional tenderness. No rebound tenderness or guarding.  SKIN:  No jaundice, pallor, or cyanosis  NEURO:  Alert; answers questions appropriately.    INTAKE & OUTPUT  I&O's Summary    10 Ghassan 2025 07:01  -  11 Jun 2025 07:00  --------------------------------------------------------  IN: 200 mL / OUT: 0 mL / NET: 200 mL    11 Jun 2025 07:01  -  12 Jun 2025 06:56  --------------------------------------------------------  IN: 350 mL / OUT: 400 mL / NET: -50 mL    I&O's Detail    10 Ghassan 2025 07:01  -  11 Jun 2025 07:00  --------------------------------------------------------  IN:    IV PiggyBack: 200 mL  Total IN: 200 mL    OUT:  Total OUT: 0 mL    Total NET: 200 mL    11 Jun 2025 07:01  -  12 Jun 2025 06:56  --------------------------------------------------------  IN:    Oral Fluid: 50 mL    sodium chloride 0.9%: 300 mL  Total IN: 350 mL    OUT:    Voided (mL): 400 mL  Total OUT: 400 mL    Total NET: -50 mL    MEDICATIONS  MEDICATIONS  (STANDING):  artificial tears (preservative free) Ophthalmic Solution 1 Drop(s) Right EYE daily  calamine/zinc oxide Lotion 1 Application(s) Topical two times a day  cetirizine 10 milliGRAM(s) Oral daily  cholestyramine Powder (Sugar-Free) 4 Gram(s) Oral two times a day  dextrose 5%. 1000 milliLiter(s) (100 mL/Hr) IV Continuous <Continuous>  dextrose 5%. 1000 milliLiter(s) (50 mL/Hr) IV Continuous <Continuous>  dextrose 50% Injectable 25 Gram(s) IV Push once  dextrose 50% Injectable 12.5 Gram(s) IV Push once  dextrose 50% Injectable 25 Gram(s) IV Push once  famotidine    Tablet 20 milliGRAM(s) Oral daily  ferrous    sulfate 325 milliGRAM(s) Oral daily  fluticasone propionate 50 MICROgram(s)/spray Nasal Spray 1 Spray(s) Both Nostrils two times a day  folic acid 1 milliGRAM(s) Oral daily  glucagon  Injectable 1 milliGRAM(s) IntraMuscular once  heparin   Injectable 5000 Unit(s) SubCutaneous every 8 hours  hydrALAZINE 100 milliGRAM(s) Oral every 8 hours  insulin lispro (ADMELOG) corrective regimen sliding scale   SubCutaneous three times a day before meals  insulin lispro (ADMELOG) corrective regimen sliding scale   SubCutaneous at bedtime  metoprolol succinate  milliGRAM(s) Oral daily  pantoprazole    Tablet 40 milliGRAM(s) Oral before breakfast  piperacillin/tazobactam IVPB.. 3.375 Gram(s) IV Intermittent every 12 hours    MEDICATIONS  (PRN):  acetaminophen     Tablet .. 650 milliGRAM(s) Oral every 6 hours PRN Mild Pain (1 - 3), Moderate Pain (4 - 6)  dextrose Oral Gel 15 Gram(s) Oral once PRN Blood Glucose LESS THAN 70 milliGRAM(s)/deciliter  diphenhydrAMINE 25 milliGRAM(s) Oral every 4 hours PRN Rash and/or Itching  melatonin 3 milliGRAM(s) Oral at bedtime PRN Insomnia    LABS:                        8.5    11.66 )-----------( 396      ( 11 Jun 2025 06:00 )             26.7     06-11    139  |  108  |  29[H]  ----------------------------<  101[H]  4.3   |  25  |  2.20[H]    Ca    9.3      11 Jun 2025 06:00    TPro  7.1  /  Alb  2.3[L]  /  TBili  1.0  /  DBili  0.7[H]  /  AST  84[H]  /  ALT  93[H]  /  AlkPhos  922[H]  06-11    PT/INR - ( 11 Jun 2025 06:00 )   PT: 12.6 sec;   INR: 1.07 ratio  PTT - ( 11 Jun 2025 06:00 )  PTT:30.3 sec    Culture - Urine (collected 10 Ghassan 2025 07:10)  Source: Clean Catch  Preliminary Report (11 Jun 2025 11:40):    Culture positive, 50,000 - 99,000 CFU/mL . Identification to follow.    Urinalysis with Rflx Culture (collected 10 Ghassan 2025 07:10)    ASSESSMENT & PLAN  89 year old female POD#1 s/p robotic-assisted laparoscopic cholecystectomy for acute on chronic cholecystitis.    - Continue clears for breakfast; advance as tolerated  - DVT prophylaxis with SQH  - Encourage ambulation  - Pain control PRN  - Incentive spirometry  - Follow up AM labs  - D/c planning  - Case to be discussed with Dr. Portillo

## 2025-06-12 NOTE — PROGRESS NOTE ADULT - PROBLEM SELECTOR PLAN 5
Chronic  - on alogliptin at home  - hold home medications  - ANGELINA with FS QAC/QHS  - hypoglycemia protocol  - A1c 6.1% Chronic  - previously on statin however will keep on hold 2/2 transaminitis

## 2025-06-12 NOTE — PROGRESS NOTE ADULT - PROBLEM SELECTOR PLAN 3
Chronic  - c/w home hydralazine and metoprolol with hold parameters  - monitor routine hemodynamics Pt with recurrent UTIs, UCx hx of pseudomonas  - UA negative, only mod leuk est, 25 WBCs  - Urine Culture positive, 50,000 - 99,000 CFU/mL . Identification to follow.  - On IV abx, c/w Pt with recurrent UTIs, UCx hx of pseudomonas  - UA negative, only mod leuk est, 25 WBCs  - Urine Culture positive, 50,000 - 99,000 CFU/mL . Identification to follow.  - On IV abx, c/w d/w ID

## 2025-06-12 NOTE — CONSULT NOTE ADULT - SUBJECTIVE AND OBJECTIVE BOX
[facility]                             Nephrology and Hypertension                             Marcin  Georges Kaplan                                          [phone]     Patient is a 89y old  Female who presents with a chief complaint of r/o cholecystitis/choledocholithiasis (2025 12:25)       HPI:  Pt is a 89 y/o F with PMHx of anemia, DM2, HTN, HLD, CKD stage 3, recent admission to [facility] - for severe sepsis 2/2 UTI and pyelonephritis with r/o acute pino however HIDA and MRCP negative at the time s/p IV abx completed at rehab who presents to the ED for fever, generalized itching, nausea and poor PO intake. History obtained from patient and her two daughters at bedside. Pt finished her antibiotics (meropenem and linezolid) on , then on  patient spiked a fever to 102F and began with symptoms of R shoulder pain, generalized itching, nausea, and poor PO intake. She was started on Invanz at her rehab center, and her fever and elevated WBCs improved. Pt was evaluated today at her rehab center ([facility]) and had labwork done and then recommended she present to the ED for further workup. Pt still admits to itching, R shoulder pain, and nausea, but denies any abdominal pain. Last fever 3 days ago. UA at rehab with small LE otherwise negative, UA here pending. No other concerns expressed.    ED course:  VS: /76, HR 66, T 97.3F, RR 16, SpO2 98% on RA  Labs: H/H 8.2/25.2, K 3.2, BUN/Cr 37/2.20, GFR 20, Direct Bili 0.7, TBili 0.9, , AST//156, Lipase 693  RUQ US: Cholelithiasis and gallbladder wall thickening, similar to 2025. Findings are equivocal for acute cholecystitis. Consider further evaluation with nuclear medicine HIDA scan.  UA pending  Received in the ED: 1L NS bolus (2025 19:40)     Nephrology c/s for CKD and HTN management. She follows with Dr. Su in the office for CKD. She is c/p some pain on right side, no sob, no dizziness, no N/V, daughter at bedside. Baseline SCr 1.7-2.1. CT with small renal cyst.     PAST MEDICAL & SURGICAL HISTORY:  Hypertension      Diabetes      HLD (hyperlipidemia)      Stage 3 chronic kidney disease      Anemia of chronic disease      Lumbar compression fracture      HTN (hypertension)      History of sepsis      History of UTI      Abnormal LFTs      S/P ORIF (open reduction internal fixation) fracture  right hip           FAMILY HISTORY:  FH: asthma (Father)    [place]    Social History:Non smoker    MEDICATIONS  (STANDING):  artificial tears (preservative free) Ophthalmic Solution 1 Drop(s) Right EYE daily  calamine/zinc oxide Lotion 1 Application(s) Topical two times a day  cetirizine 10 milliGRAM(s) Oral daily  cholestyramine Powder (Sugar-Free) 4 Gram(s) Oral two times a day  dextrose 5%. 1000 milliLiter(s) (50 mL/Hr) IV Continuous <Continuous>  dextrose 5%. 1000 milliLiter(s) (100 mL/Hr) IV Continuous <Continuous>  dextrose 50% Injectable 25 Gram(s) IV Push once  dextrose 50% Injectable 12.5 Gram(s) IV Push once  dextrose 50% Injectable 25 Gram(s) IV Push once  famotidine    Tablet 20 milliGRAM(s) Oral daily  ferrous    sulfate 325 milliGRAM(s) Oral daily  fluticasone propionate 50 MICROgram(s)/spray Nasal Spray 1 Spray(s) Both Nostrils two times a day  folic acid 1 milliGRAM(s) Oral daily  glucagon  Injectable 1 milliGRAM(s) IntraMuscular once  heparin   Injectable 5000 Unit(s) SubCutaneous every 8 hours  hydrALAZINE 100 milliGRAM(s) Oral every 8 hours  insulin lispro (ADMELOG) corrective regimen sliding scale   SubCutaneous three times a day before meals  insulin lispro (ADMELOG) corrective regimen sliding scale   SubCutaneous at bedtime  metoprolol succinate  milliGRAM(s) Oral daily  piperacillin/tazobactam IVPB.. 3.375 Gram(s) IV Intermittent every 12 hours  senna 2 Tablet(s) Oral at bedtime    MEDICATIONS  (PRN):  acetaminophen     Tablet .. 650 milliGRAM(s) Oral every 6 hours PRN Mild Pain (1 - 3), Moderate Pain (4 - 6)  dextrose Oral Gel 15 Gram(s) Oral once PRN Blood Glucose LESS THAN 70 milliGRAM(s)/deciliter  melatonin 3 milliGRAM(s) Oral at bedtime PRN Insomnia   Meds reviewed    Allergies    amlodipine (Flushing)    Intolerances         REVIEW OF SYSTEMS: as per HPI      Vital Signs Last 24 Hrs  T(C): 36.4 (2025 13:30), Max: 36.7 (2025 16:00)  T(F): 97.6 (2025 13:30), Max: 98 (2025 16:00)  HR: 90 (:30) (65 - 90)  BP: 118/60 (2025 13:30) (110/51 - 135/75)  BP(mean): --  RR: 18 (:30) (18 - 28)  SpO2: 94% (:30) (92% - 100%)    Parameters below as of 2025 13:30  Patient On (Oxygen Delivery Method): room air      Daily     Daily Weight in k.2 (2025 05:05)    PHYSICAL EXAM:    GENERAL: NAD  HEAD:  Atraumatic, Normocephalic  EYES: EOMI, conjunctiva and sclera clear  ENMT: No Drainage from nares, No drainage from ears  NERVOUS SYSTEM:  Awake and Alert  CHEST/LUNG: Clear to percussion bilaterally  EXTREMITIES:  No Edema  SKIN: No rashes No obvious ecchymosis      LABS:                        7.5    18.54 )-----------( 395      ( 2025 08:15 )             24.2     06-12    135  |  110[H]  |  25[H]  ----------------------------<  138[H]  5.3   |  17[L]  |  2.10[H]    Ca    9.0      2025 10:15    TPro  6.6  /  Alb  2.1[L]  /  TBili  1.0  /  DBili  x   /  AST  56[H]  /  ALT  66  /  AlkPhos  742[H]  06-12    PT/INR - ( 2025 06:00 )   PT: 12.6 sec;   INR: 1.07 ratio         PTT - ( 2025 06:00 )  PTT:30.3 sec  Urinalysis Basic - ( 2025 10:15 )    Color: x / Appearance: x / SG: x / pH: x  Gluc: 138 mg/dL / Ketone: x  / Bili: x / Urobili: x   Blood: x / Protein: x / Nitrite: x   Leuk Esterase: x / RBC: x / WBC x   Sq Epi: x / Non Sq Epi: x / Bacteria: x

## 2025-06-12 NOTE — CONSULT NOTE ADULT - ASSESSMENT
CKD 3  HTN with CKD3  Anemia  Hyperkalemia  Metabolic acidosis  Hyponatremia  Renal cyst    -CKD 3 2/2 HTN, DM2  -SCr baseline 1.7-2.1, is about baseline  -Has mild hyperkalemia and mild hyponatremia, will adjust diet to be low k diet  -Will add bicarb for acudosis  -Hgb >7, check iron panel with am labs  -BP is controlled, continue current meds  -Imaging with renal cyst, no intervention at this erika    d/w family and primary team at bedside

## 2025-06-13 ENCOUNTER — TRANSCRIPTION ENCOUNTER (OUTPATIENT)
Age: 89
End: 2025-06-13

## 2025-06-13 VITALS
DIASTOLIC BLOOD PRESSURE: 72 MMHG | RESPIRATION RATE: 18 BRPM | TEMPERATURE: 98 F | SYSTOLIC BLOOD PRESSURE: 164 MMHG | OXYGEN SATURATION: 93 % | HEART RATE: 69 BPM

## 2025-06-13 LAB
-  AMPICILLIN: SIGNIFICANT CHANGE UP
-  CIPROFLOXACIN: SIGNIFICANT CHANGE UP
-  DAPTOMYCIN: SIGNIFICANT CHANGE UP
-  LEVOFLOXACIN: SIGNIFICANT CHANGE UP
-  LINEZOLID: SIGNIFICANT CHANGE UP
-  NITROFURANTOIN: SIGNIFICANT CHANGE UP
-  TETRACYCLINE: SIGNIFICANT CHANGE UP
-  VANCOMYCIN: SIGNIFICANT CHANGE UP
ALBUMIN SERPL ELPH-MCNC: 1.9 G/DL — LOW (ref 3.3–5)
ALP SERPL-CCNC: 651 U/L — HIGH (ref 40–120)
ALT FLD-CCNC: 53 U/L — SIGNIFICANT CHANGE UP (ref 12–78)
ANION GAP SERPL CALC-SCNC: 8 MMOL/L — SIGNIFICANT CHANGE UP (ref 5–17)
AST SERPL-CCNC: 45 U/L — HIGH (ref 15–37)
BASOPHILS # BLD AUTO: 0.05 K/UL — SIGNIFICANT CHANGE UP (ref 0–0.2)
BASOPHILS NFR BLD AUTO: 0.4 % — SIGNIFICANT CHANGE UP (ref 0–2)
BILIRUB SERPL-MCNC: 0.9 MG/DL — SIGNIFICANT CHANGE UP (ref 0.2–1.2)
BUN SERPL-MCNC: 24 MG/DL — HIGH (ref 7–23)
CALCIUM SERPL-MCNC: 8.9 MG/DL — SIGNIFICANT CHANGE UP (ref 8.5–10.1)
CHLORIDE SERPL-SCNC: 110 MMOL/L — HIGH (ref 96–108)
CO2 SERPL-SCNC: 21 MMOL/L — LOW (ref 22–31)
CREAT SERPL-MCNC: 2 MG/DL — HIGH (ref 0.5–1.3)
CULTURE RESULTS: ABNORMAL
EGFR: 23 ML/MIN/1.73M2 — LOW
EGFR: 23 ML/MIN/1.73M2 — LOW
EOSINOPHIL # BLD AUTO: 1.24 K/UL — HIGH (ref 0–0.5)
EOSINOPHIL NFR BLD AUTO: 9.2 % — HIGH (ref 0–6)
FERRITIN SERPL-MCNC: 667 NG/ML — HIGH (ref 13–330)
GLUCOSE BLDC GLUCOMTR-MCNC: 103 MG/DL — HIGH (ref 70–99)
GLUCOSE BLDC GLUCOMTR-MCNC: 120 MG/DL — HIGH (ref 70–99)
GLUCOSE BLDC GLUCOMTR-MCNC: 128 MG/DL — HIGH (ref 70–99)
GLUCOSE SERPL-MCNC: 87 MG/DL — SIGNIFICANT CHANGE UP (ref 70–99)
HCT VFR BLD CALC: 28.1 % — LOW (ref 34.5–45)
HGB BLD-MCNC: 8.7 G/DL — LOW (ref 11.5–15.5)
IMM GRANULOCYTES NFR BLD AUTO: 1 % — HIGH (ref 0–0.9)
IRON SATN MFR SERPL: 15 % — SIGNIFICANT CHANGE UP (ref 14–50)
IRON SATN MFR SERPL: 26 UG/DL — LOW (ref 30–160)
LYMPHOCYTES # BLD AUTO: 15.1 % — SIGNIFICANT CHANGE UP (ref 13–44)
LYMPHOCYTES # BLD AUTO: 2.03 K/UL — SIGNIFICANT CHANGE UP (ref 1–3.3)
MAGNESIUM SERPL-MCNC: 1.7 MG/DL — SIGNIFICANT CHANGE UP (ref 1.6–2.6)
MCHC RBC-ENTMCNC: 26.6 PG — LOW (ref 27–34)
MCHC RBC-ENTMCNC: 31 G/DL — LOW (ref 32–36)
MCV RBC AUTO: 85.9 FL — SIGNIFICANT CHANGE UP (ref 80–100)
METHOD TYPE: SIGNIFICANT CHANGE UP
MONOCYTES # BLD AUTO: 1.25 K/UL — HIGH (ref 0–0.9)
MONOCYTES NFR BLD AUTO: 9.3 % — SIGNIFICANT CHANGE UP (ref 2–14)
NEUTROPHILS # BLD AUTO: 8.74 K/UL — HIGH (ref 1.8–7.4)
NEUTROPHILS NFR BLD AUTO: 65 % — SIGNIFICANT CHANGE UP (ref 43–77)
NRBC BLD AUTO-RTO: 0 /100 WBCS — SIGNIFICANT CHANGE UP (ref 0–0)
ORGANISM # SPEC MICROSCOPIC CNT: ABNORMAL
ORGANISM # SPEC MICROSCOPIC CNT: SIGNIFICANT CHANGE UP
PHOSPHATE SERPL-MCNC: 2.8 MG/DL — SIGNIFICANT CHANGE UP (ref 2.5–4.5)
PLATELET # BLD AUTO: 358 K/UL — SIGNIFICANT CHANGE UP (ref 150–400)
POTASSIUM SERPL-MCNC: 4.1 MMOL/L — SIGNIFICANT CHANGE UP (ref 3.5–5.3)
POTASSIUM SERPL-SCNC: 4.1 MMOL/L — SIGNIFICANT CHANGE UP (ref 3.5–5.3)
PROT SERPL-MCNC: 6.5 G/DL — SIGNIFICANT CHANGE UP (ref 6–8.3)
RBC # BLD: 3.27 M/UL — LOW (ref 3.8–5.2)
RBC # FLD: 19.1 % — HIGH (ref 10.3–14.5)
SODIUM SERPL-SCNC: 139 MMOL/L — SIGNIFICANT CHANGE UP (ref 135–145)
SPECIMEN SOURCE: SIGNIFICANT CHANGE UP
TIBC SERPL-MCNC: 177 UG/DL — LOW (ref 220–430)
UIBC SERPL-MCNC: 151 UG/DL — SIGNIFICANT CHANGE UP (ref 110–370)
WBC # BLD: 13.45 K/UL — HIGH (ref 3.8–10.5)
WBC # FLD AUTO: 13.45 K/UL — HIGH (ref 3.8–10.5)

## 2025-06-13 PROCEDURE — 83036 HEMOGLOBIN GLYCOSYLATED A1C: CPT

## 2025-06-13 PROCEDURE — 84100 ASSAY OF PHOSPHORUS: CPT

## 2025-06-13 PROCEDURE — 85730 THROMBOPLASTIN TIME PARTIAL: CPT

## 2025-06-13 PROCEDURE — 36415 COLL VENOUS BLD VENIPUNCTURE: CPT

## 2025-06-13 PROCEDURE — 74176 CT ABD & PELVIS W/O CONTRAST: CPT

## 2025-06-13 PROCEDURE — 83605 ASSAY OF LACTIC ACID: CPT

## 2025-06-13 PROCEDURE — 83550 IRON BINDING TEST: CPT

## 2025-06-13 PROCEDURE — 82247 BILIRUBIN TOTAL: CPT

## 2025-06-13 PROCEDURE — 99232 SBSQ HOSP IP/OBS MODERATE 35: CPT

## 2025-06-13 PROCEDURE — 87086 URINE CULTURE/COLONY COUNT: CPT

## 2025-06-13 PROCEDURE — 80053 COMPREHEN METABOLIC PANEL: CPT

## 2025-06-13 PROCEDURE — 87186 SC STD MICRODIL/AGAR DIL: CPT

## 2025-06-13 PROCEDURE — 80061 LIPID PANEL: CPT

## 2025-06-13 PROCEDURE — 93306 TTE W/DOPPLER COMPLETE: CPT

## 2025-06-13 PROCEDURE — 87077 CULTURE AEROBIC IDENTIFY: CPT

## 2025-06-13 PROCEDURE — C1889: CPT

## 2025-06-13 PROCEDURE — 80048 BASIC METABOLIC PNL TOTAL CA: CPT

## 2025-06-13 PROCEDURE — 97116 GAIT TRAINING THERAPY: CPT

## 2025-06-13 PROCEDURE — 81001 URINALYSIS AUTO W/SCOPE: CPT

## 2025-06-13 PROCEDURE — 83690 ASSAY OF LIPASE: CPT

## 2025-06-13 PROCEDURE — 74018 RADEX ABDOMEN 1 VIEW: CPT | Mod: 26

## 2025-06-13 PROCEDURE — 82248 BILIRUBIN DIRECT: CPT

## 2025-06-13 PROCEDURE — 76705 ECHO EXAM OF ABDOMEN: CPT

## 2025-06-13 PROCEDURE — 99285 EMERGENCY DEPT VISIT HI MDM: CPT

## 2025-06-13 PROCEDURE — 82962 GLUCOSE BLOOD TEST: CPT

## 2025-06-13 PROCEDURE — 83735 ASSAY OF MAGNESIUM: CPT

## 2025-06-13 PROCEDURE — 85025 COMPLETE CBC W/AUTO DIFF WBC: CPT

## 2025-06-13 PROCEDURE — 92610 EVALUATE SWALLOWING FUNCTION: CPT

## 2025-06-13 PROCEDURE — 82728 ASSAY OF FERRITIN: CPT

## 2025-06-13 PROCEDURE — S2900: CPT

## 2025-06-13 PROCEDURE — 78227 HEPATOBIL SYST IMAGE W/DRUG: CPT

## 2025-06-13 PROCEDURE — 74018 RADEX ABDOMEN 1 VIEW: CPT

## 2025-06-13 PROCEDURE — 86850 RBC ANTIBODY SCREEN: CPT

## 2025-06-13 PROCEDURE — 85027 COMPLETE CBC AUTOMATED: CPT

## 2025-06-13 PROCEDURE — 86900 BLOOD TYPING SEROLOGIC ABO: CPT

## 2025-06-13 PROCEDURE — 93005 ELECTROCARDIOGRAM TRACING: CPT

## 2025-06-13 PROCEDURE — 88304 TISSUE EXAM BY PATHOLOGIST: CPT

## 2025-06-13 PROCEDURE — 86901 BLOOD TYPING SEROLOGIC RH(D): CPT

## 2025-06-13 PROCEDURE — 36430 TRANSFUSION BLD/BLD COMPNT: CPT

## 2025-06-13 PROCEDURE — 97162 PT EVAL MOD COMPLEX 30 MIN: CPT

## 2025-06-13 PROCEDURE — P9016: CPT

## 2025-06-13 PROCEDURE — 85610 PROTHROMBIN TIME: CPT

## 2025-06-13 PROCEDURE — 94640 AIRWAY INHALATION TREATMENT: CPT

## 2025-06-13 PROCEDURE — 80076 HEPATIC FUNCTION PANEL: CPT

## 2025-06-13 PROCEDURE — 86923 COMPATIBILITY TEST ELECTRIC: CPT

## 2025-06-13 PROCEDURE — 83540 ASSAY OF IRON: CPT

## 2025-06-13 RX ORDER — METOPROLOL SUCCINATE 50 MG/1
1 TABLET, EXTENDED RELEASE ORAL
Qty: 0 | Refills: 0 | DISCHARGE
Start: 2025-06-13

## 2025-06-13 RX ORDER — AMOXICILLIN AND CLAVULANATE POTASSIUM 500; 125 MG/1; MG/1
1 TABLET, FILM COATED ORAL
Qty: 0 | Refills: 0 | DISCHARGE
Start: 2025-06-13

## 2025-06-13 RX ORDER — ERTAPENEM SODIUM 1 G/1
500 INJECTION, POWDER, LYOPHILIZED, FOR SOLUTION INTRAMUSCULAR; INTRAVENOUS
Refills: 0 | DISCHARGE

## 2025-06-13 RX ORDER — SENNA 187 MG
2 TABLET ORAL
Qty: 0 | Refills: 0 | DISCHARGE
Start: 2025-06-13

## 2025-06-13 RX ORDER — TORSEMIDE 10 MG
1 TABLET ORAL
Refills: 0 | DISCHARGE

## 2025-06-13 RX ORDER — OMEPRAZOLE 20 MG/1
1 CAPSULE, DELAYED RELEASE ORAL
Refills: 0 | DISCHARGE

## 2025-06-13 RX ORDER — LINEZOLID 2 MG/ML
600 INJECTION, SOLUTION INTRAVENOUS EVERY 12 HOURS
Refills: 0 | Status: DISCONTINUED | OUTPATIENT
Start: 2025-06-13 | End: 2025-06-13

## 2025-06-13 RX ORDER — POLYETHYLENE GLYCOL 3350 17 G/17G
17 POWDER, FOR SOLUTION ORAL
Qty: 0 | Refills: 0 | DISCHARGE
Start: 2025-06-13

## 2025-06-13 RX ORDER — CALAMINE 8% AND ZINC OXIDE 8% 160 MG/ML
1 LOTION TOPICAL
Qty: 0 | Refills: 0 | DISCHARGE
Start: 2025-06-13

## 2025-06-13 RX ORDER — AMOXICILLIN AND CLAVULANATE POTASSIUM 500; 125 MG/1; MG/1
1 TABLET, FILM COATED ORAL
Refills: 0 | Status: DISCONTINUED | OUTPATIENT
Start: 2025-06-13 | End: 2025-06-13

## 2025-06-13 RX ORDER — ACETAMINOPHEN 500 MG/5ML
2 LIQUID (ML) ORAL
Qty: 0 | Refills: 0 | DISCHARGE
Start: 2025-06-13

## 2025-06-13 RX ORDER — FERROUS SULFATE 137(45) MG
1 TABLET, EXTENDED RELEASE ORAL
Qty: 0 | Refills: 0 | DISCHARGE
Start: 2025-06-13

## 2025-06-13 RX ORDER — LINEZOLID 2 MG/ML
1 INJECTION, SOLUTION INTRAVENOUS
Qty: 0 | Refills: 0 | DISCHARGE
Start: 2025-06-13

## 2025-06-13 RX ORDER — MAGNESIUM SULFATE 500 MG/ML
1 SYRINGE (ML) INJECTION ONCE
Refills: 0 | Status: COMPLETED | OUTPATIENT
Start: 2025-06-13 | End: 2025-06-13

## 2025-06-13 RX ORDER — POLYETHYLENE GLYCOL 3350 17 G/17G
17 POWDER, FOR SOLUTION ORAL DAILY
Refills: 0 | Status: DISCONTINUED | OUTPATIENT
Start: 2025-06-13 | End: 2025-06-13

## 2025-06-13 RX ORDER — BISACODYL 5 MG
10 TABLET, DELAYED RELEASE (ENTERIC COATED) ORAL ONCE
Refills: 0 | Status: COMPLETED | OUTPATIENT
Start: 2025-06-13 | End: 2025-06-13

## 2025-06-13 RX ADMIN — HEPARIN SODIUM 5000 UNIT(S): 1000 INJECTION INTRAVENOUS; SUBCUTANEOUS at 05:45

## 2025-06-13 RX ADMIN — Medication 325 MILLIGRAM(S): at 05:43

## 2025-06-13 RX ADMIN — LINEZOLID 600 MILLIGRAM(S): 2 INJECTION, SOLUTION INTRAVENOUS at 17:37

## 2025-06-13 RX ADMIN — Medication 650 MILLIGRAM(S): at 00:35

## 2025-06-13 RX ADMIN — Medication 4 GRAM(S): at 08:50

## 2025-06-13 RX ADMIN — Medication 650 MILLIGRAM(S): at 16:50

## 2025-06-13 RX ADMIN — CALAMINE 8% AND ZINC OXIDE 8% 1 APPLICATION(S): 160 LOTION TOPICAL at 05:50

## 2025-06-13 RX ADMIN — Medication 650 MILLIGRAM(S): at 01:05

## 2025-06-13 RX ADMIN — Medication 100 MILLIGRAM(S): at 05:44

## 2025-06-13 RX ADMIN — AMOXICILLIN AND CLAVULANATE POTASSIUM 1 TABLET(S): 500; 125 TABLET, FILM COATED ORAL at 11:50

## 2025-06-13 RX ADMIN — Medication 10 MILLIGRAM(S): at 11:47

## 2025-06-13 RX ADMIN — Medication 1 DROP(S): at 11:47

## 2025-06-13 RX ADMIN — Medication 650 MILLIGRAM(S): at 06:58

## 2025-06-13 RX ADMIN — POLYETHYLENE GLYCOL 3350 17 GRAM(S): 17 POWDER, FOR SOLUTION ORAL at 11:47

## 2025-06-13 RX ADMIN — Medication 20 MILLIGRAM(S): at 11:47

## 2025-06-13 RX ADMIN — Medication 325 MILLIGRAM(S): at 14:16

## 2025-06-13 RX ADMIN — AMOXICILLIN AND CLAVULANATE POTASSIUM 1 TABLET(S): 500; 125 TABLET, FILM COATED ORAL at 17:37

## 2025-06-13 RX ADMIN — FLUTICASONE PROPIONATE 1 SPRAY(S): 50 SPRAY, METERED NASAL at 05:45

## 2025-06-13 RX ADMIN — Medication 10 MILLIGRAM(S): at 16:00

## 2025-06-13 RX ADMIN — HEPARIN SODIUM 5000 UNIT(S): 1000 INJECTION INTRAVENOUS; SUBCUTANEOUS at 14:17

## 2025-06-13 RX ADMIN — METOPROLOL SUCCINATE 100 MILLIGRAM(S): 50 TABLET, EXTENDED RELEASE ORAL at 05:44

## 2025-06-13 RX ADMIN — Medication 100 MILLIGRAM(S): at 14:17

## 2025-06-13 RX ADMIN — Medication 650 MILLIGRAM(S): at 16:06

## 2025-06-13 RX ADMIN — FOLIC ACID 1 MILLIGRAM(S): 1 TABLET ORAL at 11:47

## 2025-06-13 NOTE — PROGRESS NOTE ADULT - PROBLEM SELECTOR PLAN 4
Chronic  - c/w home hydralazine and metoprolol with hold parameters  - monitor routine hemodynamics Chronic  - c/w home hydralazine and metoprolol with hold parameters  - monitor routine hemodynamics  HOLD Torsemide

## 2025-06-13 NOTE — PROGRESS NOTE ADULT - ASSESSMENT
Pt is a 89 y/o F with PMHx of anemia, DM2, HTN, HLD, CKD stage 3, recent admission to [facility] 5/19-5/23 for severe sepsis 2/2 UTI and pyelonephritis with r/o acute pino however HIDA and MRCP negative at the time s/p IV abx completed at rehab who presents to the ED for fever, generalized itching, nausea and poor PO intake, admitted with abnormal LFT's  r/o Ac on chronic  cholecystitis.

## 2025-06-13 NOTE — PROGRESS NOTE ADULT - SUBJECTIVE AND OBJECTIVE BOX
Patient is a 89y old  Female who presents with a chief complaint of r/o cholecystitis/choledocholithiasis (13 Jun 2025 09:03)    HPI:  Pt is a 89 y/o F with PMHx of anemia, DM2, HTN, HLD, CKD stage 3, recent admission to [facility] 5/19-5/23 for severe sepsis 2/2 UTI and pyelonephritis with r/o acute pino however HIDA and MRCP negative at the time s/p IV abx completed at rehab who presents to the ED for fever, generalized itching, nausea and poor PO intake. History obtained from patient and her two daughters at bedside. Pt finished her antibiotics (meropenem and linezolid) on 5/28, then on 6/6 patient spiked a fever to 102F and began with symptoms of R shoulder pain, generalized itching, nausea, and poor PO intake. She was started on Invanz at her rehab center, and her fever and elevated WBCs improved. Pt was evaluated today at her rehab center ([facility]) and had labwork done and then recommended she present to the ED for further workup. Pt still admits to itching, R shoulder pain, and nausea, but denies any abdominal pain. Last fever 3 days ago. UA at rehab with small LE otherwise negative, UA here pending. No other concerns expressed.    ED course:  VS: /76, HR 66, T 97.3F, RR 16, SpO2 98% on RA  Labs: H/H 8.2/25.2, K 3.2, BUN/Cr 37/2.20, GFR 20, Direct Bili 0.7, TBili 0.9, , AST//156, Lipase 693  RUQ US: Cholelithiasis and gallbladder wall thickening, similar to 5/19/2025. Findings are equivocal for acute cholecystitis. Consider further evaluation with nuclear medicine HIDA scan.  UA pending  Received in the ED: 1L NS bolus (09 Jun 2025 19:40)    INTERVAL HPI:  6/10: Pt seen and examined at bedside. Pt states that she has extreme generalized itching. Denies abdominal pain, fever, chills at present. S/P  IV invanz.--> Change to IV Zosyn as pt got Rash on chest wall, +Itching  NPO after midnight for possible procedure, Low K, Cr elevated   6/11: Pt seen and examined at bedside. Pt states itching is present but has improved, c/o bilateral leg itching. Denies abdominal pain or fever. On IV zosyn. NPO for cholecystectomy.  S/P leukocytosis  and elevated Cr  6/12: Pt seen and examined at bedside. POD # 1 robotic pino. Itching has resolved. On IV Zosyn   6/13: Pt seen and examined at bedside. POD#2 robotic lap pino. Pt c/o mild abdominal pain. Has not had BM. STAT dulcolax suppository ordered. UCx w/ VRE. Now on PO augmentin and linezolid.     OVERNIGHT EVENTS: None    Home Medications:  alogliptin 6.25 mg oral tablet: 1 tab(s) orally once a day (09 Jun 2025 21:59)  amoxicillin-clavulanate 875 mg-125 mg oral tablet: 1 tab(s) orally 2 times a day (13 Jun 2025 10:43)  aspirin 81 mg oral delayed release tablet: 1 tab(s) orally every other day (09 Jun 2025 22:06)  calamine topical lotion: 1 Apply topically to affected area 2 times a day (13 Jun 2025 10:43)  cetirizine 10 mg oral tablet: 1 tab(s) orally once a day (13 Jun 2025 10:32)  cholestyramine 4 g/9 g oral powder for reconstitution: 4 gram(s) orally 4 times a day (13 Jun 2025 10:32)  famotidine 20 mg oral tablet: 1 tab(s) orally once a day (09 Jun 2025 22:06)  ferrous sulfate 325 mg (65 mg elemental iron) oral tablet: 1 tab(s) orally once a day (13 Jun 2025 10:43)  folic acid 1 mg oral tablet: 1 tab(s) orally once a day (09 Jun 2025 22:05)  heparin: 5,000 international unit(s) subcutaneous every 12 hours (09 Jun 2025 21:56)  hydrALAZINE 100 mg oral tablet: 1 tab(s) orally 3 times a day (09 Jun 2025 21:57)  linezolid 600 mg oral tablet: 1 tab(s) orally every 12 hours (13 Jun 2025 10:43)  metoprolol succinate 100 mg oral tablet, extended release: 1 tab(s) orally once a day (13 Jun 2025 10:43)  polyethylene glycol 3350 oral powder for reconstitution: 17 gram(s) orally once a day (13 Jun 2025 10:43)  potassium chloride: 10 milliequivalent(s) once a day (09 Jun 2025 22:05)  senna leaf extract oral tablet: 2 tab(s) orally once a day (at bedtime) (13 Jun 2025 10:43)  sodium bicarbonate: 650 milligram(s) orally 3 times a day (09 Jun 2025 22:05)  torsemide 20 mg oral tablet: 1 tab(s) orally once a day (09 Jun 2025 22:05)      MEDICATIONS  (STANDING):  amoxicillin  875 milliGRAM(s)/clavulanate 1 Tablet(s) Oral two times a day  artificial tears (preservative free) Ophthalmic Solution 1 Drop(s) Right EYE daily  bisacodyl Suppository 10 milliGRAM(s) Rectal once  calamine/zinc oxide Lotion 1 Application(s) Topical two times a day  cetirizine 10 milliGRAM(s) Oral daily  cholestyramine Powder (Sugar-Free) 4 Gram(s) Oral two times a day  dextrose 5%. 1000 milliLiter(s) (100 mL/Hr) IV Continuous <Continuous>  dextrose 5%. 1000 milliLiter(s) (50 mL/Hr) IV Continuous <Continuous>  dextrose 50% Injectable 25 Gram(s) IV Push once  dextrose 50% Injectable 12.5 Gram(s) IV Push once  dextrose 50% Injectable 25 Gram(s) IV Push once  famotidine    Tablet 20 milliGRAM(s) Oral daily  ferrous    sulfate 325 milliGRAM(s) Oral daily  fluticasone propionate 50 MICROgram(s)/spray Nasal Spray 1 Spray(s) Both Nostrils two times a day  folic acid 1 milliGRAM(s) Oral daily  glucagon  Injectable 1 milliGRAM(s) IntraMuscular once  heparin   Injectable 5000 Unit(s) SubCutaneous every 8 hours  hydrALAZINE 100 milliGRAM(s) Oral every 8 hours  insulin lispro (ADMELOG) corrective regimen sliding scale   SubCutaneous three times a day before meals  insulin lispro (ADMELOG) corrective regimen sliding scale   SubCutaneous at bedtime  linezolid    Tablet 600 milliGRAM(s) Oral every 12 hours  metoprolol succinate  milliGRAM(s) Oral daily  polyethylene glycol 3350 17 Gram(s) Oral daily  senna 2 Tablet(s) Oral at bedtime  sodium bicarbonate 325 milliGRAM(s) Oral three times a day    MEDICATIONS  (PRN):  acetaminophen     Tablet .. 650 milliGRAM(s) Oral every 6 hours PRN Mild Pain (1 - 3), Moderate Pain (4 - 6)  dextrose Oral Gel 15 Gram(s) Oral once PRN Blood Glucose LESS THAN 70 milliGRAM(s)/deciliter  melatonin 3 milliGRAM(s) Oral at bedtime PRN Insomnia      Allergies    amlodipine (Flushing)    Intolerances        Social History:  Lives: assisted living  ADLs: dependent  Diet: regular  Alcohol Use: denies  Tobacco Use: remote history  Recreational Drug Use: denies (09 Jun 2025 19:40)      REVIEW OF SYSTEMS:  CONSTITUTIONAL: No fever, No chills, No fatigue, No myalgia, No Body ache, No Weakness  EYES: No eye pain,  No visual disturbances, No discharge, NO Redness  ENMT:  No ear pain, No nose bleed, No vertigo; No sinus pain, NO throat pain, No Congestion  NECK: No pain, No stiffness  RESPIRATORY: No cough, NO wheezing, No  hemoptysis, NO  shortness of breath  CARDIOVASCULAR: No chest pain, palpitations  GASTROINTESTINAL: No abdominal pain, NO epigastric pain. No nausea, No vomiting; No diarrhea, No constipation. [  ] BM  GENITOURINARY: No dysuria, No frequency, No urgency, No hematuria, NO incontinence  NEUROLOGICAL: No headaches, No dizziness, No numbness, No tingling, No tremors, No weakness  EXT: No Swelling, No Pain, No Edema  SKIN:  [ X ] No itching, burning, rashes, or lesions   MUSCULOSKELETAL: No joint pain ,No Jt swelling; No muscle pain, No back pain, No extremity pain  PSYCHIATRIC: No depression,  No anxiety,  No mood swings ,No difficulty sleeping at night  PAIN SCALE: [  ] None  [ X ] Other-3/10 RUQ pain, post-op  ROS Unable to obtain due to - [  ] Dementia  [  ] Lethargy [  ] Drowsy [  ] Sedated [  ] non verbal  REST OF REVIEW Of SYSTEM - [ X ] Normal     Vital Signs Last 24 Hrs  T(C): 37 (13 Jun 2025 05:18), Max: 37 (13 Jun 2025 05:18)  T(F): 98.6 (13 Jun 2025 05:18), Max: 98.6 (13 Jun 2025 05:18)  HR: 78 (13 Jun 2025 05:18) (78 - 90)  BP: 135/52 (13 Jun 2025 05:18) (118/60 - 140/65)  BP(mean): --  RR: 19 (13 Jun 2025 05:18) (17 - 19)  SpO2: 92% (13 Jun 2025 05:18) (92% - 94%)    Parameters below as of 13 Jun 2025 05:18  Patient On (Oxygen Delivery Method): room air      Finger Stick        06-12 @ 07:01  -  06-13 @ 07:00  --------------------------------------------------------  IN: 340 mL / OUT: 0 mL / NET: 340 mL        PHYSICAL EXAM:  GENERAL:  [ X ] NAD , [ X ] well appearing, [  ] Agitated, [  ] Drowsy,  [  ] Lethargy, [  ] confused   HEAD:  [ X ] Normal, [  ] Other  EYES:  [ X ] EOMI, [ X ] PERRLA, [ X ] conjunctiva and sclera clear normal, [  ] Other,  [  ] Pallor,[  ] Discharge  ENMT:  [ X ] Normal, [ X ] Moist mucous membranes, [ X ] Good dentition, [ X ] No Thrush  NECK: [ X ] Supple, [ X ] No JVD, [ X ] Normal thyroid, [  ] Lymphadenopathy [  ] Other  CHEST/LUNG:  [ X ] Clear to auscultation bilaterally, [ X ] Breath Sounds equal B/L / Decrease, [  ] poor effort  [ X ] No rales, [ X ] No rhonchi  [ X ]  No wheezing,   HEART:  [ X ] Regular rate and rhythm, [  ] tachycardia, [  ] Bradycardia,  [  ] irregular  [ X ] No murmurs, No rubs, No gallops, [  ] PPM in place (Mfr:  )  ABDOMEN:  [  ] Soft, [ X ] Nontender, [ X ] Nondistended, [ X ] No mass, [ X ] Bowel sounds present, [  ] obese  NERVOUS SYSTEM:  [ X ] Alert & Oriented X3, [ X ] Nonfocal  [  ] Confusion  [  ] Encephalopathic [  ] Sedated [  ] Unable to assess, [  ] Dementia [  ] Other-  EXTREMITIES: [ X ] 2+ Peripheral Pulses, No clubbing, No cyanosis,  [ X ] NO edema B/L lower EXT. [  ] PVD stasis skin changes B/L Lower EXT, [  ] wound  LYMPH: No lymphadenopathy noted  SKIN:  [  ] No rashes or lesions, [  ] Pressure Ulcers, [  ] ecchymosis, [  ] Skin Tears, [  ] Other    DIET: Diet, Regular:   Consistent Carbohydrate No Snacks  DASH/TLC Sodium & Cholesterol Restricted  No Concentrated Potassium (06-12-25 @ 15:39)      LABS:                        8.7    13.45 )-----------( 358      ( 13 Jun 2025 06:18 )             28.1     13 Jun 2025 06:18    139    |  110    |  24     ----------------------------<  87     4.1     |  21     |  2.00     Ca    8.9        13 Jun 2025 06:18  Phos  2.8       13 Jun 2025 06:18  Mg     1.7       13 Jun 2025 06:18    TPro  6.5    /  Alb  1.9    /  TBili  0.9    /  DBili  x      /  AST  45     /  ALT  53     /  AlkPhos  651    13 Jun 2025 06:18      Urinalysis Basic - ( 13 Jun 2025 06:18 )    Color: x / Appearance: x / SG: x / pH: x  Gluc: 87 mg/dL / Ketone: x  / Bili: x / Urobili: x   Blood: x / Protein: x / Nitrite: x   Leuk Esterase: x / RBC: x / WBC x   Sq Epi: x / Non Sq Epi: x / Bacteria: x        Culture Results:   50,000 - 99,000 CFU/mL Enterococcus faecium (06-10 @ 07:10)                  Urinalysis with Rflx Culture (collected 10 Ghassan 2025 07:10)    Culture - Urine (collected 10 Ghassan 2025 07:10)  Source: Clean Catch  Preliminary Report (12 Jun 2025 18:09):    50,000 - 99,000 CFU/mL Enterococcus faecium         Anemia Panel:  Iron Total: 26 ug/dL (06-13-25 @ 06:18)  Iron - Total Binding Capacity.: 177 ug/dL (06-13-25 @ 06:18)      Thyroid Panel:        Lipase: 693 U/L (06-09-25 @ 15:51)        HEALTH ISSUES - PROBLEM Dx:  Cholecystitis    Acute kidney injury superimposed on CKD    Recurrent UTI    HTN (hypertension)    HLD (hyperlipidemia)    Diabetes type 2    Anemia of chronic disease    Need for prophylactic measure              Consultant(s) Notes Reviewed:  [ X ] YES     Care Discussed with [X] Consultants  [ X ] Patient  [X  ] Family [  ] HCP [  ]   [ x ] Social Service  [ X ] RN, [ x ] Physical Therapy,[  ] Palliative care team  DVT PPX: [  ] Lovenox, [ X ] S C Heparin, [  ] Coumadin, [  ] Xarelto, [  ] Eliquis, [  ] Pradaxa, [  ] IV Heparin drip, [  ] SCD [  ] Contraindication 2 to GI Bleed,[  ] Ambulation [  ] Contraindicated 2 to  bleed [  ] Contraindicated 2 to Brain Bleed  Advanced directive: [  ] None, [ X ] DNR/DNI Patient is a 89y old  Female who presents with a chief complaint of r/o cholecystitis/choledocholithiasis (13 Jun 2025 09:03)    HPI:  Pt is a 89 y/o F with PMHx of anemia, DM2, HTN, HLD, CKD stage 3, recent admission to \[facility]\"" 5/19-5/23 for severe sepsis 2/2 UTI and pyelonephritis with r/o acute pino however HIDA and MRCP negative at the time s/p IV abx completed at rehab who presents to the ED for fever, generalized itching, nausea and poor PO intake. History obtained from patient and her two daughters at bedside. Pt finished her antibiotics (meropenem and linezolid) on 5/28, then on 6/6 patient spiked a fever to 102F and began with symptoms of R shoulder pain, generalized itching, nausea, and poor PO intake. She was started on Invanz at her rehab center, and her fever and elevated WBCs improved. Pt was evaluated today at her rehab center ([facility]) and had labwork done and then recommended she present to the ED for further workup. Pt still admits to itching, R shoulder pain, and nausea, but denies any abdominal pain. Last fever 3 days ago. UA at rehab with small LE otherwise negative, UA here pending. No other concerns expressed.    ED course:  VS: /76, HR 66, T 97.3F, RR 16, SpO2 98% on RA  Labs: H/H 8.2/25.2, K 3.2, BUN/Cr 37/2.20, GFR 20, Direct Bili 0.7, TBili 0.9, , AST//156, Lipase 693  RUQ US: Cholelithiasis and gallbladder wall thickening, similar to 5/19/2025. Findings are equivocal for acute cholecystitis. Consider further evaluation with nuclear medicine HIDA scan.  UA pending  Received in the ED: 1L NS bolus (09 Jun 2025 19:40)    INTERVAL HPI:  6/10: Pt seen and examined at bedside. Pt states that she has extreme generalized itching. Denies abdominal pain, fever, chills at present. S/P  IV invanz.--> Change to IV Zosyn as pt got Rash on chest wall, +Itching  NPO after midnight for possible procedure, Low K, Cr elevated   6/11: Pt seen and examined at bedside. Pt states itching is present but has improved, c/o bilateral leg itching. Denies abdominal pain or fever. On IV zosyn. NPO for cholecystectomy.  S/P leukocytosis  and elevated Cr  6/12: Pt seen and examined at bedside. POD # 1 robotic pino. Itching has resolved. On IV Zosyn   6/13: Pt seen and examined at bedside. POD#2 robotic lap pino. Pt c/o mild abdominal pain. Has not had BM. STAT dulcolax suppository ordered. UCx w/ VRE. Now on PO augmentin and linezolid. WBC improved     OVERNIGHT EVENTS: None    Home Medications:  alogliptin 6.25 mg oral tablet: 1 tab(s) orally once a day (09 Jun 2025 21:59)  amoxicillin-clavulanate 875 mg-125 mg oral tablet: 1 tab(s) orally 2 times a day (13 Jun 2025 10:43)  aspirin 81 mg oral delayed release tablet: 1 tab(s) orally every other day (09 Jun 2025 22:06)  calamine topical lotion: 1 Apply topically to affected area 2 times a day (13 Jun 2025 10:43)  cetirizine 10 mg oral tablet: 1 tab(s) orally once a day (13 Jun 2025 10:32)  cholestyramine 4 g/9 g oral powder for reconstitution: 4 gram(s) orally 4 times a day (13 Jun 2025 10:32)  famotidine 20 mg oral tablet: 1 tab(s) orally once a day (09 Jun 2025 22:06)  ferrous sulfate 325 mg (65 mg elemental iron) oral tablet: 1 tab(s) orally once a day (13 Jun 2025 10:43)  folic acid 1 mg oral tablet: 1 tab(s) orally once a day (09 Jun 2025 22:05)  heparin: 5,000 international unit(s) subcutaneous every 12 hours (09 Jun 2025 21:56)  hydrALAZINE 100 mg oral tablet: 1 tab(s) orally 3 times a day (09 Jun 2025 21:57)  linezolid 600 mg oral tablet: 1 tab(s) orally every 12 hours (13 Jun 2025 10:43)  metoprolol succinate 100 mg oral tablet, extended release: 1 tab(s) orally once a day (13 Jun 2025 10:43)  polyethylene glycol 3350 oral powder for reconstitution: 17 gram(s) orally once a day (13 Jun 2025 10:43)  potassium chloride: 10 milliequivalent(s) once a day (09 Jun 2025 22:05)  senna leaf extract oral tablet: 2 tab(s) orally once a day (at bedtime) (13 Jun 2025 10:43)  sodium bicarbonate: 650 milligram(s) orally 3 times a day (09 Jun 2025 22:05)  torsemide 20 mg oral tablet: 1 tab(s) orally once a day (09 Jun 2025 22:05)      MEDICATIONS  (STANDING):  amoxicillin  875 milliGRAM(s)/clavulanate 1 Tablet(s) Oral two times a day  artificial tears (preservative free) Ophthalmic Solution 1 Drop(s) Right EYE daily  bisacodyl Suppository 10 milliGRAM(s) Rectal once  calamine/zinc oxide Lotion 1 Application(s) Topical two times a day  cetirizine 10 milliGRAM(s) Oral daily  cholestyramine Powder (Sugar-Free) 4 Gram(s) Oral two times a day  dextrose 5%. 1000 milliLiter(s) (100 mL/Hr) IV Continuous <Continuous>  dextrose 5%. 1000 milliLiter(s) (50 mL/Hr) IV Continuous <Continuous>  dextrose 50% Injectable 25 Gram(s) IV Push once  dextrose 50% Injectable 12.5 Gram(s) IV Push once  dextrose 50% Injectable 25 Gram(s) IV Push once  famotidine    Tablet 20 milliGRAM(s) Oral daily  ferrous    sulfate 325 milliGRAM(s) Oral daily  fluticasone propionate 50 MICROgram(s)/spray Nasal Spray 1 Spray(s) Both Nostrils two times a day  folic acid 1 milliGRAM(s) Oral daily  glucagon  Injectable 1 milliGRAM(s) IntraMuscular once  heparin   Injectable 5000 Unit(s) SubCutaneous every 8 hours  hydrALAZINE 100 milliGRAM(s) Oral every 8 hours  insulin lispro (ADMELOG) corrective regimen sliding scale   SubCutaneous three times a day before meals  insulin lispro (ADMELOG) corrective regimen sliding scale   SubCutaneous at bedtime  linezolid    Tablet 600 milliGRAM(s) Oral every 12 hours  metoprolol succinate  milliGRAM(s) Oral daily  polyethylene glycol 3350 17 Gram(s) Oral daily  senna 2 Tablet(s) Oral at bedtime  sodium bicarbonate 325 milliGRAM(s) Oral three times a day    MEDICATIONS  (PRN):  acetaminophen     Tablet .. 650 milliGRAM(s) Oral every 6 hours PRN Mild Pain (1 - 3), Moderate Pain (4 - 6)  dextrose Oral Gel 15 Gram(s) Oral once PRN Blood Glucose LESS THAN 70 milliGRAM(s)/deciliter  melatonin 3 milliGRAM(s) Oral at bedtime PRN Insomnia      Allergies    amlodipine (Flushing)    Intolerances        Social History:  Lives: assisted living  ADLs: dependent  Diet: regular  Alcohol Use: denies  Tobacco Use: remote history  Recreational Drug Use: denies (09 Jun 2025 19:40)      REVIEW OF SYSTEMS:i am OK  CONSTITUTIONAL: No fever, No chills, No fatigue, No myalgia, No Body ache, No Weakness  EYES: No eye pain,  No visual disturbances, No discharge, NO Redness  ENMT:  No ear pain, No nose bleed, No vertigo; No sinus pain, NO throat pain, No Congestion  NECK: No pain, No stiffness  RESPIRATORY: No cough, NO wheezing, No  hemoptysis, NO  shortness of breath  CARDIOVASCULAR: No chest pain, palpitations  GASTROINTESTINAL: No abdominal pain, NO epigastric pain. No nausea, No vomiting; No diarrhea, No constipation. [  ] BM  GENITOURINARY: No dysuria, No frequency, No urgency, No hematuria, NO incontinence  NEUROLOGICAL: No headaches, No dizziness, No numbness, No tingling, No tremors, No weakness  EXT: No Swelling, No Pain, No Edema  SKIN:  [ X ] No itching, burning, rashes, or lesions   MUSCULOSKELETAL: No joint pain ,No Jt swelling; No muscle pain, No back pain, No extremity pain  PSYCHIATRIC: No depression,  No anxiety,  No mood swings ,No difficulty sleeping at night  PAIN SCALE: [  ] None  [ X ] Other-3/10 RUQ pain, post-op  ROS Unable to obtain due to - [  ] Dementia  [  ] Lethargy [  ] Drowsy [  ] Sedated [  ] non verbal  REST OF REVIEW Of SYSTEM - [ X ] Normal     Vital Signs Last 24 Hrs  T(C): 37 (13 Jun 2025 05:18), Max: 37 (13 Jun 2025 05:18)  T(F): 98.6 (13 Jun 2025 05:18), Max: 98.6 (13 Jun 2025 05:18)  HR: 78 (13 Jun 2025 05:18) (78 - 90)  BP: 135/52 (13 Jun 2025 05:18) (118/60 - 140/65)  BP(mean): --  RR: 19 (13 Jun 2025 05:18) (17 - 19)  SpO2: 92% (13 Jun 2025 05:18) (92% - 94%)    Parameters below as of 13 Jun 2025 05:18  Patient On (Oxygen Delivery Method): room air      Finger Stick        06-12 @ 07:01  -  06-13 @ 07:00  --------------------------------------------------------  IN: 340 mL / OUT: 0 mL / NET: 340 mL        PHYSICAL EXAM:OOB to chair   GENERAL:  [ X ] NAD , [ X ] well appearing, [  ] Agitated, [  ] Drowsy,  [  ] Lethargy, [  ] confused   HEAD:  [ X ] Normal, [  ] Other  EYES:  [ X ] EOMI, [ X ] PERRLA, [ X ] conjunctiva and sclera clear normal, [  ] Other,  [  ] Pallor,[  ] Discharge  ENMT:  [ X ] Normal, [ X ] Moist mucous membranes, [ X ] Good dentition, [ X ] No Thrush  NECK: [ X ] Supple, [ X ] No JVD, [ X ] Normal thyroid, [  ] Lymphadenopathy [  ] Other  CHEST/LUNG:  [ X ] Clear to auscultation bilaterally, [ X ] Breath Sounds equal B/L / Decrease, [  ] poor effort  [ X ] No rales, [ X ] No rhonchi  [ X ]  No wheezing,   HEART:  [ X ] Regular rate and rhythm, [  ] tachycardia, [  ] Bradycardia,  [  ] irregular  [ X ] No murmurs, No rubs, No gallops, [  ] PPM in place (Mfr:  )  ABDOMEN:  [  ] Soft, [ X ] Nontender, [ X ] Nondistended, [ X ] No mass, [ X ] Bowel sounds present, [ x ] obese,+ Mild soreness at Sx site No R/R/G  NERVOUS SYSTEM:  [ X ] Alert & Oriented X3, [ X ] Nonfocal  [  ] Confusion  [  ] Encephalopathic [  ] Sedated [  ] Unable to assess, [  ] Dementia [  ] Other-  EXTREMITIES: [ X ] 2+ Peripheral Pulses, No clubbing, No cyanosis,  [ X ] NO edema B/L lower EXT. [  ] PVD stasis skin changes B/L Lower EXT, [  ] wound  LYMPH: No lymphadenopathy noted  SKIN:  [  ] No rashes or lesions, [  ] Pressure Ulcers, [  ] ecchymosis, [  ] Skin Tears, [  ] Other    DIET: Diet, Regular:   Consistent Carbohydrate No Snacks  DASH/TLC Sodium & Cholesterol Restricted  No Concentrated Potassium (06-12-25 @ 15:39)      LABS:                        8.7    13.45 )-----------( 358      ( 13 Jun 2025 06:18 )             28.1     13 Jun 2025 06:18    139    |  110    |  24     ----------------------------<  87     4.1     |  21     |  2.00     Ca    8.9        13 Jun 2025 06:18  Phos  2.8       13 Jun 2025 06:18  Mg     1.7       13 Jun 2025 06:18    TPro  6.5    /  Alb  1.9    /  TBili  0.9    /  DBili  x      /  AST  45     /  ALT  53     /  AlkPhos  651    13 Jun 2025 06:18      Urinalysis Basic - ( 13 Jun 2025 06:18 )    Color: x / Appearance: x / SG: x / pH: x  Gluc: 87 mg/dL / Ketone: x  / Bili: x / Urobili: x   Blood: x / Protein: x / Nitrite: x   Leuk Esterase: x / RBC: x / WBC x   Sq Epi: x / Non Sq Epi: x / Bacteria: x        Culture Results:   50,000 - 99,000 CFU/mL Enterococcus faecium (06-10 @ 07:10)                  Urinalysis with Rflx Culture (collected 10 Ghassan 2025 07:10)    Culture - Urine (collected 10 Ghassan 2025 07:10)  Source: Clean Catch  Preliminary Report (12 Jun 2025 18:09):    50,000 - 99,000 CFU/mL Enterococcus faecium         Anemia Panel:  Iron Total: 26 ug/dL (06-13-25 @ 06:18)  Iron - Total Binding Capacity.: 177 ug/dL (06-13-25 @ 06:18)      Thyroid Panel:        Lipase: 693 U/L (06-09-25 @ 15:51)        HEALTH ISSUES - PROBLEM Dx:  Cholecystitis    Acute kidney injury superimposed on CKD    Recurrent UTI    HTN (hypertension)    HLD (hyperlipidemia)    Diabetes type 2    Anemia of chronic disease    Need for prophylactic measure              Consultant(s) Notes Reviewed:  [ X ] YES     Care Discussed with [X] Consultants  [ X ] Patient  [X  ] Family- dtr  [  ] HCP [  ]   [ x ] Social Service  [ X ] RN, [ x ] Physical Therapy,[  ] Palliative care team  DVT PPX: [  ] Lovenox, [ X ] S C Heparin, [  ] Coumadin, [  ] Xarelto, [  ] Eliquis, [  ] Pradaxa, [  ] IV Heparin drip, [  ] SCD [  ] Contraindication 2 to GI Bleed,[  ] Ambulation [  ] Contraindicated 2 to  bleed [  ] Contraindicated 2 to Brain Bleed  Advanced directive: [  ] None, [ X ] DNR/DNI

## 2025-06-13 NOTE — DISCHARGE NOTE NURSING/CASE MANAGEMENT/SOCIAL WORK - FINANCIAL ASSISTANCE
[facility] provides services at a reduced cost to those who are determined to be eligible through [facility]’s financial assistance program. Information regarding [facility]’s financial assistance program can be found by going to [email or web address] or by calling [phone].

## 2025-06-13 NOTE — SWALLOW BEDSIDE ASSESSMENT ADULT - SWALLOW EVAL: DIAGNOSIS
1) Mild oral dysphagia for regular solids, puree, mildly thick liquids, and thin liquid marked by mildly prolonged manipulation, however sufficient bolus collection prior to AP transfer. Patient noted with oral clearance following swallow. 2) Functional  pharyngeal stage of swallow for regular solids, puree, mildly thick, and thin liquids marked by  present pharyngeal swallow trigger and hyolaryngeal excursion noted upon digital palpation. No overt signs/symptoms of penetration/aspiration noted.

## 2025-06-13 NOTE — PROGRESS NOTE ADULT - PROBLEM SELECTOR PLAN 7
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

## 2025-06-13 NOTE — PROGRESS NOTE ADULT - PROBLEM SELECTOR PLAN 1
Pt presenting with fever, and elevated liver enzymes on outpatient labs-r/o  Ac on chronic Pino  S/P Robotic SX - Elevated WBC post op likely reactive,  - Direct Bili 0.7, TBili 0.9, , AST//156, Lipase 693  - RUQ US: Cholelithiasis and gallbladder wall thickening, similar to 5/19/2025. Findings are equivocal for acute cholecystitis.  Mild leukocytosis   - HIDA negative for acute pino  - POD2 robotic pino w/ Dr Fischer, tolerated well  - Hold home aspirin   - s/p 1u PRBCs ordered for drop in Hg, Hg responded appropriately   - IV zosyn transitioned to PO augmentin per ID  - c/w cholestyramine and calamine lotion for itching  - d/w pharmacy,  given itching improved, will monitor off PRN Benadryl for now given risks in elderly  - Once pt with diet, will start soft and bite sized diet as some concern expressed for patient choking on food, will get speech & swallow eval in AM  - GI Dr Dumont d/w -HIDA-Neg   - Surgery Dr fischer- d/w Leukocytosis -likely reactive post op   - ID (Dr. JENIFFER Holguin d/w -PO augmentin   , TTE: LVEF 60-65%. Pt presenting with fever, and elevated liver enzymes on outpatient labs-r/o  Ac on chronic Pino  S/P Robotic SX - Elevated WBC post op likely reactive,  - Direct Bili 0.7, TBili 0.9, , AST//156, Lipase 693  - RUQ US: Cholelithiasis and gallbladder wall thickening, similar to 5/19/2025. Findings are equivocal for acute cholecystitis.  Mild leukocytosis   - HIDA negative for acute pino  - POD2 robotic pino w/ Dr Fischer, tolerated well  - Hold home aspirin -restart on d/c as per Sx   - s/p 1u PRBCs ordered for drop in Hg, Hg responded appropriately LFT improving  - IV zosyn transitioned to PO augmentin per ID  - c/w cholestyramine and calamine lotion for itching  - d/w pharmacy,  given itching improved, will monitor off PRN Benadryl for now given risks in elderly  - Once pt with diet, will start soft and bite sized diet as some concern expressed for patient choking on food, will get speech & swallow eval in AM  - GI Dr Dumont d/w -HIDA-Neg   - Surgery Dr fischer- d/w Leukocytosis -likely reactive post op -improving   - ID (Dr. JENIFFER Holguin d/w -PO augmentin 2x day   , TTE: LVEF 60-65%.

## 2025-06-13 NOTE — PROGRESS NOTE ADULT - SUBJECTIVE AND OBJECTIVE BOX
Patient seen and examined bedside resting comfortably. sates "i am weak"  .Denies nausea and vomiting. Tolerating diet.    No flatus/BM reported  Denies chest pain, dyspnea, cough. no overnight events     T(F): 98.6 (06-13-25 @ 05:18), Max: 98.6 (06-13-25 @ 05:18)  HR: 78 (06-13-25 @ 05:18) (78 - 90)  BP: 135/52 (06-13-25 @ 05:18) (118/60 - 140/65)  RR: 19 (06-13-25 @ 05:18) (17 - 19)  SpO2: 92% (06-13-25 @ 05:18) (92% - 94%)  Wt(kg): --  CAPILLARY BLOOD GLUCOSE      POCT Blood Glucose.: 103 mg/dL (13 Jun 2025 07:37)  POCT Blood Glucose.: 220 mg/dL (12 Jun 2025 21:47)  POCT Blood Glucose.: 165 mg/dL (12 Jun 2025 17:02)  POCT Blood Glucose.: 105 mg/dL (12 Jun 2025 11:55)      PHYSICAL EXAM:  General: NAD, WDWN.   Neuro:  Alert & oriented x 3  CV: +S1+S2 regular rate and rhythm  Lung:  respirations nonlabored, good inspiratory effort  Abdomen: soft, mildly distended, RRQ pain with no guarding , + BS  Extremities: no pedal edema or calf tenderness noted       LABS:                        8.7    13.45 )-----------( 358      ( 13 Jun 2025 06:18 )             28.1     06-13    139  |  110[H]  |  24[H]  ----------------------------<  87  4.1   |  21[L]  |  2.00[H]    Ca    8.9      13 Jun 2025 06:18  Phos  2.8     06-13  Mg     1.7     06-13    TPro  6.5  /  Alb  1.9[L]  /  TBili  0.9  /  DBili  x   /  AST  45[H]  /  ALT  53  /  AlkPhos  651[H]  06-13      I&O's Detail    12 Jun 2025 07:01  -  13 Jun 2025 07:00  --------------------------------------------------------  IN:    IV PiggyBack: 100 mL    Oral Fluid: 240 mL  Total IN: 340 mL    OUT:  Total OUT: 0 mL    Total NET: 340 mL          Impression: 89y Female admitted with Calculus of bile duct without obstruction, cholangitis, or cholecystitis      PMH Hypertension    Diabetes    TIA (transient ischemic attack)    Hip fracture    HLD (hyperlipidemia)    Stage 3 chronic kidney disease    Anemia of chronic disease    Lumbar compression fracture    HTN (hypertension)    History of sepsis    History of UTI    Abnormal LFTs        Plan:  -continue VTE prophylaxis   -Increase activity with PT, OOB, Ambulate  will monitor po intake and await return of bowel function   -

## 2025-06-13 NOTE — PROGRESS NOTE ADULT - NS ATTEND AMEND GEN_ALL_CORE FT
89 y/o F with PMHx of anemia, DM2, HTN, HLD, CKD stage 3, recent admission to \[facility]"" 5/19-5/23 for severe sepsis 2/2 UTI and pyelonephritis with r/o acute pino however HIDA and MRCP negative at the time s/p IV abx completed at rehab who presents to the ED for fever. Cardiology consulted for cardiac optimization. [facility] [place] [facility]     - S/p Robot-assisted cholecystectomy with cholangiography  - Tolerated procedure well, cardiac status optimal post operatively   - Continue antibiotics     - No evidence of any active ischemia   - Statin held for transaminitis  - Technically difficult ECHO showed normal LV & RV size and function EF 60-65%, mil dil LA, mod MR    - BP labile 80-170s   - Continue Toprol 100 and hydralazine 100 q8h as bp tolerates   - Home losartan and Lasix held for ELVIN
89 y/o F with PMHx of anemia, DM2, HTN, HLD, CKD stage 3, recent admission to \[facility]\"" 5/19-5/23 for severe sepsis 2/2 UTI and pyelonephritis with r/o acute pino however HIDA and MRCP negative at the time s/p IV abx completed at rehab who presents to the ED for fever,    htn, dm, chol  no sig cad or structural heart disease  has had echo in the past reportedly normal  now planned for lap pino  repeat echo with normal LV & RV size and function EF 60-65%, mil dil LA, mod MR  no baseline sx of angina, hf or arrhythmia, limited exercise capacity  optimized for planned intermediate risk surgery.  will follow with you
89 y/o F with PMHx of anemia, DM2, HTN, HLD, CKD stage 3, recent admission to [facility] 5/19-5/23 for severe sepsis 2/2 UTI and pyelonephritis with r/o acute pino however HIDA and MRCP negative at the time s/p IV abx completed at rehab who presents to the ED for fever. Cardiology consulted for cardiac optimization. Follows [place] [facility]     - S/p Robot-assisted cholecystectomy with cholangiography  - Tolerated procedure well, cardiac status optimal post operatively   - Holding home statin for transaminitis  - Continue Toprol 100 and hydralazine 100 q8h  - Continue to hold home Losartan and Lasix for now  for ELVIN on CKD

## 2025-06-13 NOTE — PROGRESS NOTE ADULT - SUBJECTIVE AND OBJECTIVE BOX
[facility]  HERNESTO Ramirez Y. Patel, S. Shah, [facility]  [phone]    Name: NELDA VERDIN  Age: 89y  Gender: Female  MRN: [id number]    Interval History:  Patient seen and examined at bedside  No acute overnight events. Afebrile  Reporting less itching and improving rash  Notes reviewed    Antibiotics:  piperacillin/tazobactam IVPB.. 3.375 Gram(s) IV Intermittent every 12 hours      Medications:  acetaminophen     Tablet .. 650 milliGRAM(s) Oral every 6 hours PRN  artificial tears (preservative free) Ophthalmic Solution 1 Drop(s) Right EYE daily  calamine/zinc oxide Lotion 1 Application(s) Topical two times a day  cetirizine 10 milliGRAM(s) Oral daily  cholestyramine Powder (Sugar-Free) 4 Gram(s) Oral two times a day  dextrose 5%. 1000 milliLiter(s) IV Continuous <Continuous>  dextrose 5%. 1000 milliLiter(s) IV Continuous <Continuous>  dextrose 50% Injectable 25 Gram(s) IV Push once  dextrose 50% Injectable 12.5 Gram(s) IV Push once  dextrose 50% Injectable 25 Gram(s) IV Push once  dextrose Oral Gel 15 Gram(s) Oral once PRN  famotidine    Tablet 20 milliGRAM(s) Oral daily  ferrous    sulfate 325 milliGRAM(s) Oral daily  fluticasone propionate 50 MICROgram(s)/spray Nasal Spray 1 Spray(s) Both Nostrils two times a day  folic acid 1 milliGRAM(s) Oral daily  glucagon  Injectable 1 milliGRAM(s) IntraMuscular once  heparin   Injectable 5000 Unit(s) SubCutaneous every 8 hours  hydrALAZINE 100 milliGRAM(s) Oral every 8 hours  insulin lispro (ADMELOG) corrective regimen sliding scale   SubCutaneous three times a day before meals  insulin lispro (ADMELOG) corrective regimen sliding scale   SubCutaneous at bedtime  melatonin 3 milliGRAM(s) Oral at bedtime PRN  metoprolol succinate  milliGRAM(s) Oral daily  piperacillin/tazobactam IVPB.. 3.375 Gram(s) IV Intermittent every 12 hours  senna 2 Tablet(s) Oral at bedtime  sodium bicarbonate 325 milliGRAM(s) Oral three times a day      Review of Systems:  A 10-point review of systems was obtained.  Review of systems otherwise negative except as previously noted.    Allergies: amlodipine (Flushing)    For details regarding the patient's past medical history, social history, family history, and other miscellaneous elements, please refer the initial infectious diseases consultation and/or the admitting history and physical examination for this admission.    Objective:  Vitals:   T(C): 37 (06-13-25 @ 05:18), Max: 37 (06-13-25 @ 05:18)  HR: 78 (06-13-25 @ 05:18) (78 - 90)  BP: 135/52 (06-13-25 @ 05:18) (118/60 - 140/65)  RR: 19 (06-13-25 @ 05:18) (17 - 19)  SpO2: 92% (06-13-25 @ 05:18) (92% - 94%)    Physical Examination:  General: no acute distress  HEENT: NC/AT, EOMI  Cardio: S1, S2 heard, RRR, no murmurs  Resp: breath sounds heard bilaterally  Abd: soft, NT, ND  Ext: no edema or cyanosis  Skin: faint rash across upper chest      Laboratory Studies:  CBC:                       7.5    18.54 )-----------( 395      ( 12 Jun 2025 08:15 )             24.2     CMP: 06-12    135  |  110[H]  |  25[H]  ----------------------------<  138[H]  5.3   |  17[L]  |  2.10[H]    Ca    9.0      12 Jun 2025 10:15    TPro  6.6  /  Alb  2.1[L]  /  TBili  1.0  /  DBili  x   /  AST  56[H]  /  ALT  66  /  AlkPhos  742[H]  06-12    LIVER FUNCTIONS - ( 12 Jun 2025 08:15 )  Alb: 2.1 g/dL / Pro: 6.6 g/dL / ALK PHOS: 742 U/L / ALT: 66 U/L / AST: 56 U/L / GGT: x           Urinalysis Basic - ( 12 Jun 2025 10:15 )    Color: x / Appearance: x / SG: x / pH: x  Gluc: 138 mg/dL / Ketone: x  / Bili: x / Urobili: x   Blood: x / Protein: x / Nitrite: x   Leuk Esterase: x / RBC: x / WBC x   Sq Epi: x / Non Sq Epi: x / Bacteria: x        Microbiology: reviewed    Urinalysis with Rflx Culture (collected 06-10-25 @ 07:10)    Culture - Urine (collected 06-10-25 @ 07:10)  Source: Clean Catch  Preliminary Report (06-12-25 @ 18:09):    50,000 - 99,000 CFU/mL Enterococcus faecium          Radiology: reviewed

## 2025-06-13 NOTE — PROGRESS NOTE ADULT - ASSESSMENT
Pt is a 89 y/o F with PMHx of anemia, DM2, HTN, HLD, CKD stage 3, recent admission to [facility] 5/19-5/23 for severe sepsis 2/2 UTI and pyelonephritis with r/o acute pino however HIDA and MRCP negative at the time s/p IV abx completed at rehab who presents to the ED for fever, generalized itching, nausea and poor PO intake. Pt finished her antibiotics (meropenem and linezolid) on 5/28, then on 6/6 patient spiked a fever to 102F and began with symptoms of R shoulder pain, generalized itching, nausea, and poor PO intake. She was started on Invanz at her rehab center, and her fever and elevated WBCs improved. Pt was evaluated today at her rehab center ([facility]) and had labwork done and then recommended she present to the ED for further workup. Pt still admits to itching, R shoulder pain, and nausea, but denies any abdominal pain. Last fever 3 days ago. UA at rehab with small LE otherwise negative, UA here pending. No other concerns expressed.    R/o acute cholecystitis  Cholelithiasis  Transaminitis  - RUQ US: Cholelithiasis and gallbladder wall thickening, similar to 5/19/2025. Findings are equivocal for acute cholecystitis.  - HIDA negative  - S/p Robot-assisted cholecystectomy with cholangiography 11-Jun-2025 14:55:28  Arturo Portillo    Recurrent UTI  - no reported urinary sx  - UA negative, only mod leuk est, 25 WBCs  - Urine Culture positive, 50,000 - 99,000 CFU/mL Enterococcus faecium; likely VRE again    ?Drug Rash/Pruritis  - suspect drug rash in setting of ertapenem; daughter reporting that pt has tolerated meropenem previously w/o issues  - ertapenem since 6/6, switched to zosyn 6/10  - rash improving    6/12 leukocytosis noted; likely reactive in setting of procedure; will monitor    Recommendations:   C/w zosyn  Will add linezolid 600mg BID x3 day course  F/u pending urine cx  GI following  Surgery following  Further recs to follow pending above    **CHARTING IN PROGRESS**    Patient evaluated with face-to-face time in addition to reviewing history, labs, microbiology, and imaging.   Antibiotic stewardship, local antibiogram, infection control strategies and potential transmission issues taken into consideration at time of treatment decision making process.   Thank you for allowing us to participate in the care of your patient.  D/w Dr. Holguin  Infectious Diseases will follow. Please call with any questions.  Claudette Holguin M.D.  Available on [employer] TEAMS -- *PREFERRED*  [facility] [phone]  For after 5 P.M. and weekends, please call [phone] Pt is a 89 y/o F with PMHx of anemia, DM2, HTN, HLD, CKD stage 3, recent admission to [facility] 5/19-5/23 for severe sepsis 2/2 UTI and pyelonephritis with r/o acute pino however HIDA and MRCP negative at the time s/p IV abx completed at rehab who presents to the ED for fever, generalized itching, nausea and poor PO intake. Pt finished her antibiotics (meropenem and linezolid) on 5/28, then on 6/6 patient spiked a fever to 102F and began with symptoms of R shoulder pain, generalized itching, nausea, and poor PO intake. She was started on Invanz at her rehab center, and her fever and elevated WBCs improved. Pt was evaluated today at her rehab center ([facility]) and had labwork done and then recommended she present to the ED for further workup. Pt still admits to itching, R shoulder pain, and nausea, but denies any abdominal pain. Last fever 3 days ago. UA at rehab with small LE otherwise negative, UA here pending. No other concerns expressed.    R/o acute cholecystitis  Cholelithiasis  Transaminitis  - RUQ US: Cholelithiasis and gallbladder wall thickening, similar to 5/19/2025. Findings are equivocal for acute cholecystitis.  - HIDA negative  - S/p Robot-assisted cholecystectomy with cholangiography 11-Jun-2025 14:55:28  Arturo Portillo    Recurrent UTI  - no reported urinary sx  - UA negative, only mod leuk est, 25 WBCs  - Urine Culture positive, 50,000 - 99,000 CFU/mL Enterococcus faecium; likely VRE again    ?Drug Rash/Pruritis  - suspect drug rash in setting of ertapenem; daughter reporting that pt has tolerated meropenem previously w/o issues  - ertapenem since 6/6, switched to zosyn 6/10  - rash improving    6/12 leukocytosis noted; likely reactive in setting of procedure; will monitor  6/13 leukocytosis improving    Recommendations:   Stop zosyn  Can switch to PO augmentin to complete x5 day course post-procedure until 6/16/25  Will add linezolid 600mg BID x3 day course to complete 6/15/25  F/u final urine culture  GI following  Surgery following  additional care, d/c planning per primary team    Patient evaluated with face-to-face time in addition to reviewing history, labs, microbiology, and imaging.   Antibiotic stewardship, local antibiogram, infection control strategies and potential transmission issues taken into consideration at time of treatment decision making process.   Thank you for allowing us to participate in the care of your patient.  D/w Dr. Holguin and team  D/w daughter Maday on phone  Dr. Gonzalez covering weekend service from 06/14/25-06/15/25.  I will resume care 6/16/25  Infectious Diseases will follow. Please call with any questions.  Claudette Holguin M.D.  Available on [employer] TEAMS -- *[facility]*  [facility] [phone]  For after 5 P.M. and weekends, please call [phone]

## 2025-06-13 NOTE — DISCHARGE NOTE NURSING/CASE MANAGEMENT/SOCIAL WORK - PATIENT PORTAL LINK FT
You can access the FollowMyHealth Patient Portal offered by [facility] by registering at the following website: [email or web address]. By joining [employer]’s FollowMyHealth portal, you will also be able to view your health information using other applications (apps) compatible with our system.

## 2025-06-13 NOTE — PROGRESS NOTE ADULT - ATTENDING COMMENTS
Pt is a 89 y/o F with PMHx of anemia, DM2, HTN, HLD, CKD stage 3, recent admission to [facility] 5/19-5/23 for severe sepsis 2/2 UTI and pyelonephritis with r/o acute pino however HIDA and MRCP negative at the time s/p IV abx completed at rehab who presents to the ED for fever, generalized itching, nausea and poor PO intake, admitted with  abnormal LFT's Fever at [facility], r/o Ac  cholecystitis.  Pt seen ,examined, case & care plan d/w pt, [facility]  D/W Dtr at bed side. d/c today, Labs d/w pt & dtr   Consult-  Sx -Dr fischer -POD # 2 Robotic  Cholecystectomy -ok for d/c   -GI-DR Dumont follow up -stable form GI stand point  ID-DR JENIFFER Holguin d/w - On Zosyn q 8 hrs--< Change to PO Augmentin 2x day , Linezolid 600 mg 2x day   Renal-Dr TORRES group -ELVIN-CKD 3 -hold Torsemide -out pt follow up with Dr Su to resume   Cardiology -Dr Bajwa   follow up -stable for d/c   DVT ppx   D/C  to [facility] today d/w  SW  -Post op Incentive spirometry   -Total  d/c care time is 60 minutes.

## 2025-06-13 NOTE — PROGRESS NOTE ADULT - PROBLEM SELECTOR PLAN 2
Ashley- CKD3, baseline Cr 1.8  - ASHLEY likely pre-renal  - Cr 2.1 this AM  -  HOLD Losartan   - HOLD torsemide  - Encourage PO intake  - Daily BMP  - Nephro (Dr. Kaur follow up   Pt has Gamma Migrating paraprotein on previous SPEP study Ashley- CKD3, baseline Cr 1.8  - ASHLEY likely pre-renal  - Cr 2.1 this AM  -  HOLD Losartan   - HOLD torsemide  - Encourage PO intake  - Daily BMP  - Nephro (Dr. TORRES  follow up   Pt has Gamma Migrating paraprotein on previous SPEP study

## 2025-06-13 NOTE — SWALLOW BEDSIDE ASSESSMENT ADULT - COMMENTS
Per Internal Medicine note 6/13 "Pt is a 89 y/o F with PMHx of anemia, DM2, HTN, HLD, CKD stage 3, recent admission to \[facility]\"" 5/19-5/23 for severe sepsis 2/2 UTI and pyelonephritis with r/o acute pino however HIDA and MRCP negative at the time s/p IV abx completed at rehab who presents to the ED for fever, generalized itching, nausea and poor PO intake, admitted with abnormal LFT's  r/o Ac on chronic  cholecystitis."    No recent chest imaging.     Patient seen at bedside, awake/alert and oriented, during clinical swallow evaluation this PM. Patient able to follow simple directions and answer questions. Patient's daughter present at bedside. Patient was cooperative and accepted PO trials. Patient denied difficulty swallowing and reported she consumes regular solids and thin liquids in the home environment.

## 2025-06-13 NOTE — PROGRESS NOTE ADULT - NSPROGADDITIONALINFOA_GEN_ALL_CORE
I reviewed the overnight course of events on the unit, re-confirming the patient history. I discussed the care with the patient and their family  The plan of care was discussed with the nurse practitioner and modifications were made to the notation where appropriate.   Differential diagnosis and plan of care discussed with patient after the evaluation  35 minutes spent on total encounter of which more than fifty percent of the encounter was spent counseling and/or coordinating care by the attending physician.  Advanced care planning was discussed with patient and family.  Advanced care planning forms were reviewed and discussed.  Risks, benefits and alternatives of gastroenterologic procedures were discussed in detail and all questions were answered.
I reviewed the overnight course of events on the unit, re-confirming the patient history. I discussed the care with the patient and their family  The plan of care was discussed with the physician assistant and modifications were made to the notation where appropriate.   Differential diagnosis and plan of care discussed with patient after the evaluation  35 minutes spent on total encounter of which more than fifty percent of the encounter was spent counseling and/or coordinating care by the attending physician.  Advanced care planning was discussed with patient and family.  Advanced care planning forms were reviewed and discussed.  Risks, benefits and alternatives of gastroenterologic procedures were discussed in detail and all questions were answered.
I reviewed the overnight course of events on the unit, re-confirming the patient history. I discussed the care with the patient and their family  The plan of care was discussed with the nurse practitioner and modifications were made to the notation where appropriate.   Differential diagnosis and plan of care discussed with patient after the evaluation  35 minutes spent on total encounter of which more than fifty percent of the encounter was spent counseling and/or coordinating care by the attending physician.  Advanced care planning was discussed with patient and family.  Advanced care planning forms were reviewed and discussed.  Risks, benefits and alternatives of gastroenterologic procedures were discussed in detail and all questions were answered.
I reviewed the overnight course of events on the unit, re-confirming the patient history. I discussed the care with the patient and their family  The plan of care was discussed with the nurse practitioner and modifications were made to the notation where appropriate.   Differential diagnosis and plan of care discussed with patient after the evaluation  35 minutes spent on total encounter of which more than fifty percent of the encounter was spent counseling and/or coordinating care by the attending physician.  Advanced care planning was discussed with patient and family.  Advanced care planning forms were reviewed and discussed.  Risks, benefits and alternatives of gastroenterologic procedures were discussed in detail and all questions were answered.
agree with above unless contradicts below  doing well  slight pain  f/u blood work  d/c planning
agree with above unless contradicts below  kub  poss bowel regimen  cont current care
agree with above unless contradicts below  minimal pain  multiple ER admissions for likely biliary colic  ill proceed with robo cholecystectomy  procedure d/w pt and daughter, including r/b/a

## 2025-06-13 NOTE — PROGRESS NOTE ADULT - ASSESSMENT
89 y/o F with PMHx of anemia, DM2, HTN, HLD, CKD stage 3, recent admission to [facility] 5/19-5/23 for severe sepsis 2/2 UTI and pyelonephritis with r/o acute pino however HIDA and MRCP negative at the time s/p IV abx completed at rehab who presents to the ED for fever. Cardiology consulted for cardiac optimization. Follows [facility]     Cardiac Optimization, HTN  - S/p Robot-assisted cholecystectomy with cholangiography  - Tolerated procedure well, cardiac status optimal post operatively   - GI and Surgery following   - Abx per ID     - EKG SR with PACS  - No evidence of any active ischemia   - Holding home statin for transaminitis    - Technically difficult ECHO showed normal LV & RV size and function EF 60-65%, mil dil LA, mod MR  - No evidence of any meaningful volume overload   - No O2 requirement    - BP now more stable at 130-140  - Continue Toprol 100 and ydralazine 100 q8h  - Continue to hold home Losartan and Lasix for now  for ELVIN on CKD    - Anemia as per primary, has hx PADMINI   - Monitor and replete lytes, keep K>4, Mg>2.  - Will continue to follow.    Anabel Jones DNP, NP-C, AGACNP-C  Cardiology   Call TEAMS

## 2025-06-13 NOTE — SOCIAL WORK PROGRESS NOTE - NSSWPROGRESSNOTE_GEN_ALL_CORE
pt for dc back to [facility]. pt and dtr at bedside aware and in agreement. nwems to  pt at 5pm. all paperwork to accompany pt, no further sw services indicated at this time. [facility]

## 2025-06-13 NOTE — PROGRESS NOTE ADULT - REASON FOR ADMISSION
r/o cholecystitis/choledocholithiasis

## 2025-06-13 NOTE — PROGRESS NOTE ADULT - SUBJECTIVE AND OBJECTIVE BOX
[facility]  Owen Morrison NP  [address] [place] [address]  [phone]      INTERVAL HPI/OVERNIGHT EVENTS:  Pt s/e with daughter at bedside  Pt s/p cholecystectomy, reports pruritis but otherwise no GI complaints    MEDICATIONS  (STANDING):  amoxicillin  875 milliGRAM(s)/clavulanate 1 Tablet(s) Oral two times a day  artificial tears (preservative free) Ophthalmic Solution 1 Drop(s) Right EYE daily  calamine/zinc oxide Lotion 1 Application(s) Topical two times a day  cetirizine 10 milliGRAM(s) Oral daily  cholestyramine Powder (Sugar-Free) 4 Gram(s) Oral two times a day  dextrose 5%. 1000 milliLiter(s) (100 mL/Hr) IV Continuous <Continuous>  dextrose 5%. 1000 milliLiter(s) (50 mL/Hr) IV Continuous <Continuous>  dextrose 50% Injectable 25 Gram(s) IV Push once  dextrose 50% Injectable 12.5 Gram(s) IV Push once  dextrose 50% Injectable 25 Gram(s) IV Push once  famotidine    Tablet 20 milliGRAM(s) Oral daily  fluticasone propionate 50 MICROgram(s)/spray Nasal Spray 1 Spray(s) Both Nostrils two times a day  folic acid 1 milliGRAM(s) Oral daily  glucagon  Injectable 1 milliGRAM(s) IntraMuscular once  heparin   Injectable 5000 Unit(s) SubCutaneous every 8 hours  hydrALAZINE 100 milliGRAM(s) Oral every 8 hours  insulin lispro (ADMELOG) corrective regimen sliding scale   SubCutaneous three times a day before meals  insulin lispro (ADMELOG) corrective regimen sliding scale   SubCutaneous at bedtime  linezolid    Tablet 600 milliGRAM(s) Oral every 12 hours  metoprolol succinate  milliGRAM(s) Oral daily  polyethylene glycol 3350 17 Gram(s) Oral daily  senna 2 Tablet(s) Oral at bedtime  sodium bicarbonate 325 milliGRAM(s) Oral three times a day    MEDICATIONS  (PRN):  acetaminophen     Tablet .. 650 milliGRAM(s) Oral every 6 hours PRN Mild Pain (1 - 3), Moderate Pain (4 - 6)  dextrose Oral Gel 15 Gram(s) Oral once PRN Blood Glucose LESS THAN 70 milliGRAM(s)/deciliter  melatonin 3 milliGRAM(s) Oral at bedtime PRN Insomnia      Allergies    amlodipine (Flushing)    PHYSICAL EXAM:   Vital Signs:  Vital Signs Last 24 Hrs  T(C): 37 (2025 05:18), Max: 37 (2025 05:18)  T(F): 98.6 (2025 05:18), Max: 98.6 (2025 05:18)  HR: 78 (2025 05:18) (78 - 90)  BP: 135/52 (2025 05:18) (118/60 - 140/65)  BP(mean): --  RR: 19 (2025 05:18) (17 - 19)  SpO2: 92% (2025 05:18) (92% - 94%)    Parameters below as of 2025 05:18  Patient On (Oxygen Delivery Method): room air      Daily     Daily Weight in k.2 (2025 05:18)    GENERAL:  Appears stated age  HEENT:  NC/AT  CHEST:  Full & symmetric excursion  HEART:  Regular rhythm  ABDOMEN:  Soft, non-tender, non-distended  EXTEREMITIES:  no cyanosis  SKIN:  No rash  NEURO:  Alert      LABS:                        8.7    13.45 )-----------( 358      ( 2025 06:18 )             28.1     06-13    139  |  110[H]  |  24[H]  ----------------------------<  87  4.1   |  21[L]  |  2.00[H]    Ca    8.9      2025 06:18  Phos  2.8     06-13  Mg     1.7     06-13    TPro  6.5  /  Alb  1.9[L]  /  TBili  0.9  /  DBili  x   /  AST  45[H]  /  ALT  53  /  AlkPhos  651[H]  06-13      Urinalysis Basic - ( 2025 06:18 )    Color: x / Appearance: x / SG: x / pH: x  Gluc: 87 mg/dL / Ketone: x  / Bili: x / Urobili: x   Blood: x / Protein: x / Nitrite: x   Leuk Esterase: x / RBC: x / WBC x   Sq Epi: x / Non Sq Epi: x / Bacteria: x

## 2025-06-13 NOTE — PROGRESS NOTE ADULT - PROBLEM SELECTOR PLAN 3
Pt with recurrent UTIs, UCx hx of pseudomonas  - UA negative, only mod leuk est, 25 WBCs  - Urine Culture positive, 50,000 - 99,000 CFU/mL Enterococcus faecium.  - PO linezolid 600mg BID x3d per ID

## 2025-06-13 NOTE — PROGRESS NOTE ADULT - PROVIDER SPECIALTY LIST ADULT
Cardiology
Gastroenterology
Infectious Disease
Infectious Disease
Surgery
Surgery
Gastroenterology
Nephrology
Nephrology
Surgery
Cardiology
Cardiology
Gastroenterology
Infectious Disease
Gastroenterology
Internal Medicine

## 2025-06-13 NOTE — PROGRESS NOTE ADULT - SUBJECTIVE AND OBJECTIVE BOX
[facility] -- Tigist Mari, Kedar Bajwa Savella, , Hugo Gama  Office # [phone]    Follow Up:  Cardiac Optimization    Subjective/Observations: c/o RLQ pain but denies N/V.  Comfortable on RA.  Denies any form of respiratory or cardiac discomfort    REVIEW OF SYSTEMS: All other review of systems is negative unless indicated above  PAST MEDICAL & SURGICAL HISTORY:  Hypertension      Diabetes      HLD (hyperlipidemia)      Stage 3 chronic kidney disease      Anemia of chronic disease      Lumbar compression fracture      HTN (hypertension)      History of sepsis      History of UTI      Abnormal LFTs      S/P ORIF (open reduction internal fixation) fracture  right hip    MEDICATIONS  (STANDING):  artificial tears (preservative free) Ophthalmic Solution 1 Drop(s) Right EYE daily  calamine/zinc oxide Lotion 1 Application(s) Topical two times a day  cetirizine 10 milliGRAM(s) Oral daily  cholestyramine Powder (Sugar-Free) 4 Gram(s) Oral two times a day  dextrose 5%. 1000 milliLiter(s) (100 mL/Hr) IV Continuous <Continuous>  dextrose 5%. 1000 milliLiter(s) (50 mL/Hr) IV Continuous <Continuous>  dextrose 50% Injectable 25 Gram(s) IV Push once  dextrose 50% Injectable 12.5 Gram(s) IV Push once  dextrose 50% Injectable 25 Gram(s) IV Push once  famotidine    Tablet 20 milliGRAM(s) Oral daily  ferrous    sulfate 325 milliGRAM(s) Oral daily  fluticasone propionate 50 MICROgram(s)/spray Nasal Spray 1 Spray(s) Both Nostrils two times a day  folic acid 1 milliGRAM(s) Oral daily  glucagon  Injectable 1 milliGRAM(s) IntraMuscular once  heparin   Injectable 5000 Unit(s) SubCutaneous every 8 hours  hydrALAZINE 100 milliGRAM(s) Oral every 8 hours  insulin lispro (ADMELOG) corrective regimen sliding scale   SubCutaneous three times a day before meals  insulin lispro (ADMELOG) corrective regimen sliding scale   SubCutaneous at bedtime  metoprolol succinate  milliGRAM(s) Oral daily  piperacillin/tazobactam IVPB.. 3.375 Gram(s) IV Intermittent every 12 hours  senna 2 Tablet(s) Oral at bedtime  sodium bicarbonate 325 milliGRAM(s) Oral three times a day    MEDICATIONS  (PRN):  acetaminophen     Tablet .. 650 milliGRAM(s) Oral every 6 hours PRN Mild Pain (1 - 3), Moderate Pain (4 - 6)  dextrose Oral Gel 15 Gram(s) Oral once PRN Blood Glucose LESS THAN 70 milliGRAM(s)/deciliter  melatonin 3 milliGRAM(s) Oral at bedtime PRN Insomnia    Allergies    amlodipine (Flushing)    Intolerances    Vital Signs Last 24 Hrs  T(C): 37 (13 Jun 2025 05:18), Max: 37 (13 Jun 2025 05:18)  T(F): 98.6 (13 Jun 2025 05:18), Max: 98.6 (13 Jun 2025 05:18)  HR: 78 (13 Jun 2025 05:18) (78 - 90)  BP: 135/52 (13 Jun 2025 05:18) (118/60 - 140/65)  BP(mean): --  RR: 19 (13 Jun 2025 05:18) (17 - 19)  SpO2: 92% (13 Jun 2025 05:18) (92% - 94%)    Parameters below as of 13 Jun 2025 05:18  Patient On (Oxygen Delivery Method): room air      I&O's Summary    12 Jun 2025 07:01  -  13 Jun 2025 07:00  --------------------------------------------------------  IN: 340 mL / OUT: 0 mL / NET: 340 mL     PHYSICAL EXAM:  TELE: Not on tele  Constitutional: NAD, awake and alert  HEENT: Moist Mucous Membranes, Anicteric  Pulmonary: Non-labored, breath sounds are clear bilaterally, No wheezing, rales or rhonchi  Cardiovascular: Regular, S1 and S2, +murmurs, no rubs, gallops or clicks  Gastrointestinal: Bowel Sounds present, soft, nontender.   Lymph: No peripheral edema. No lymphadenopathy.  Skin: No visible rashes or ulcers.  Psych:  Mood & affect appropriate  LABS: All Labs Reviewed:                        8.7    13.45 )-----------( 358      ( 13 Jun 2025 06:18 )             28.1                         7.5    18.54 )-----------( 395      ( 12 Jun 2025 08:15 )             24.2                         8.5    11.66 )-----------( 396      ( 11 Jun 2025 06:00 )             26.7     13 Jun 2025 06:18    139    |  110    |  24     ----------------------------<  87     4.1     |  21     |  2.00   12 Jun 2025 10:15    135    |  110    |  25     ----------------------------<  138    5.3     |  17     |  2.10   12 Jun 2025 08:15    139    |  110    |  25     ----------------------------<  104    4.8     |  20     |  2.00     Ca    8.9        13 Jun 2025 06:18  Ca    9.0        12 Jun 2025 10:15  Ca    9.0        12 Jun 2025 08:15  Phos  2.8       13 Jun 2025 06:18  Mg     1.7       13 Jun 2025 06:18    TPro  6.5    /  Alb  1.9    /  TBili  0.9    /  DBili  x      /  AST  45     /  ALT  53     /  AlkPhos  651    13 Jun 2025 06:18  TPro  6.6    /  Alb  2.1    /  TBili  1.0    /  DBili  x      /  AST  56     /  ALT  66     /  AlkPhos  742    12 Jun 2025 08:15  TPro  7.1    /  Alb  2.3    /  TBili  1.0    /  DBili  0.7    /  AST  84     /  ALT  93     /  AlkPhos  922    11 Jun 2025 06:00          12 Lead ECG:   Ventricular Rate 67 BPM    Atrial Rate 67 BPM    P-R Interval 208 ms    QRS Duration 80 ms    Q-T Interval 438 ms    QTC Calculation(Bazett) 462 ms    P Axis 42 degrees    R Axis 5 degrees    T Axis 30 degrees    Diagnosis Line Sinus rhythm with premature atrial complexes  Otherwise normal ECG  When compared with ECG of 10-JAMIR-2025 14:18, (Unconfirmed)  Sinus rhythm has replaced Atrial fibrillation  Confirmed by Chuy Escoto MD ([id number]) on 6/10/2025 3:02:29 PM (06-10-25 @ 14:19)      TRANSTHORACIC ECHOCARDIOGRAM REPORT  ________________________________________________________________________________                                      _______       Pt. Name:       NELDA VERDIN Study Date:    6/10/2025  MRN:            [id number]       YOB: 1930  Accession #:    [id number]      Age:           89 years  Account#:       [id number]     Gender:        F  Heart Rate:                    Height:        61.02 in (155.00 cm)  Rhythm:                        Weight:  130.07 lb (59.00 kg)  Blood Pressure: 169/61 mmHg    BSA/BMI:       1.57 m² / 24.56 kg/m²  ________________________________________________________________________________________  Referring Physician:    [id number] Everett Holguin  Interpreting Physician: Maday Gama MD  Primary Sonographer:    Jorden Su    CPT:               ECHO TTE WO CON COMP W DOPP - 56245.m  Indication(s):     Edema, unspecified - R60.9  Procedure:         Transthoracic echocardiogram with 2-D, M-mode and complete                spectral and color flow Doppler.  Ordering Location: [facility]  Admission Status:  Inpatient  Study Information: Image quality for this study is technically difficult.    _______________________________________________________________________________________     CONCLUSIONS:      1. Technically difficult image quality.   2. Left ventricular cavity is normal in size. Left ventricular systolic function is normal with an ejection fraction visually estimated at 60 to 65 %.   3. Normal right ventricular cavity size and normal right ventricular systolic function.   4. Left atrium is mildly dilated.   5. Moderate mitral regurgitation.   6. Estimated pulmonary artery systolic pressure is 32 mmHg, consistent with normal pulmonary artery pressure.    ________________________________________________________________________________________  FINDINGS:     Left Ventricle:  The left ventricular cavity is normal in size. Left ventricular systolic function is normal with an ejection fraction visually estimated at 60 to 65%. There is poor visualization of the endocardial borders to determine the presence of wall motion abnormalities. There is mild (grade 1) left ventricular diastolic dysfunction.     Right Ventricle:  The right ventricle is not well visualized. The right ventricular cavity is normal in size and right ventricular systolic function is normal. Tricuspid annular plane systolic excursion (TAPSE) is 2.0 cm (normal >=1.7 cm).     Left Atrium:  The left atrium is mildly dilated with an indexed volume of 40.45 ml/m².     Right Atrium:  The right atrium is normal in size with an indexed volume of 20.71 ml/m².     Interatrial Septum:  The interatrial septum appears intact.     Aortic Valve:  The aortic valve was not well visualized. The aortic valve anatomy cannot be determined with normal systolic excursion. There is focal calcification of the aortic valve leaflets.     Mitral Valve:  The mitral valve was not well visualized. There is calcification of the mitral valve annulus. There is moderate mitral regurgitation.     Tricuspid Valve:  The tricuspid valve was not well visualized. The tricuspid valve is structurally normal with normal leaflet excursion. There is mild to moderate tricuspid regurgitation. Estimated pulmonary artery systolic pressure is 32 mmHg, consistent with normal pulmonary artery pressure.     Pulmonic Valve:  The pulmonic valve was not well visualized.     Aorta:  The aortic root at the sinuses of Valsalva is normal in size, measuring 3.08 cm (indexed 1.95 cm/m²). The ascending aorta is normal in size, measuring 2.90 cm (indexed 1.84 cm/m²). The aortic arch diameter is normal in size, measuring 2.5 cm (indexed 1.59 cm/m²).     Pericardium:  No pericardial effusion seen.     Systemic Veins:  The inferior vena cava is normal in size measuring 1.40 cm in diameter, (normal <2.1cm) with normal inspiratory collapse (normal >50%) consistent with normal right atrial pressure (~3, range 0-5mmHg).  ____________________________________________________________________  QUANTITATIVE DATA:  Left Ventricle Measurements: (Indexed to BSA)     IVSd (2D):   0.9 cm  LVPWd (2D):  0.9 cm  LVIDd (2D):  3.9 cm  LVIDs (2D):  2.7 cm  LV Mass:     103 g  65.2 g/m²  Visualized LV EF%: 60 to 65%     MV E Vmax:1.15 m/s  MV A Vmax:    1.29 m/s  MV E/A:       0.89  e' lateral:   9.80 cm/s  e' medial:    7.00 cm/s  E/e' lateral: 11.69  E/e' medial:  16.37  E/e' Average: 13.64  MV DT:        233 msec    Aorta Measurements: (Normal range) (Indexed to BSA)     Ao Root d    3.08 cm (2.7 - 3.3 cm) 1.95 cm/m²  Ao Asc prox: 2.90 cm                1.84 cm/m²  Ao Arch:     2.5 cm    Left Atrium Measurements: (Indexed to BSA)  LA Diam 2D: 2.89 cm  Right Ventricle Measurements: Right Atrial Measurements:     RV d, 2D: 2.9 cm              RA Vol:       32.60 ml  TAPSE:    2.0 cm              RA Vol Index: 20.71 ml/m²   LVOT / RVOT/ Qp/Qs Data: (Indexed to BSA)  LVOT Diameter,s: 1.90 cm  LVOT Area:       2.84 cm²  LVOT Vmax:       1.22 m/s  LVOTVmn:        0.610 m/s  LVOT VTI:        28.30 cm  LVOT peak grad:  6 mmHg  LVOT mean grad:  2.5 mmHg  LVOT SV:         80.4 ml   51.09 ml/m²    Aortic Valve Measurements:  AV Peak Gradient:  13.1 mmHg  AoV EOA, Contin i: 1.14 cm²/m²    Mitral Valve Measurements:     MV E Vmax: 1.1 m/s         MR Peak Gradient: 110.4 mmHg  MV A Vmax: 1.3 m/s  MV E/A:    0.9    Tricuspid Valve Measurements:     TR Vmax:          2.7 m/s  TR Peak Gradient: 29.4 mmHg  RA Pressure:      3 mmHg  PASP:             32mmH  ________________________________________________________________________________________  Electronically signed on 6/11/2025 at 8:57:49 AM by Maday Gama MD    *** Final ***

## 2025-06-16 LAB — SURGICAL PATHOLOGY STUDY: SIGNIFICANT CHANGE UP

## 2025-07-13 ENCOUNTER — NON-APPOINTMENT (OUTPATIENT)
Age: 89
End: 2025-07-13

## 2025-07-18 ENCOUNTER — NON-APPOINTMENT (OUTPATIENT)
Age: 89
End: 2025-07-18

## 2025-07-25 ENCOUNTER — APPOINTMENT (OUTPATIENT)
Dept: ORTHOPEDIC SURGERY | Facility: CLINIC | Age: 89
End: 2025-07-25

## (undated) DEVICE — DRSG AQUACEL 3.5 X 6"

## (undated) DEVICE — DRILL BIT MICROAIRE TWIST 2.4MM X 127MM

## (undated) DEVICE — WARMING BLANKET UPPER ADULT

## (undated) DEVICE — SUT MONOCRYL 4-0 27" PS-2 UNDYED

## (undated) DEVICE — SOL IRR BAG NS 0.9% 3000ML

## (undated) DEVICE — VENODYNE/SCD SLEEVE CALF MEDIUM

## (undated) DEVICE — DRAPE TOWEL BLUE 17" X 24"

## (undated) DEVICE — SOL IRR POUR NS 0.9% 1000ML

## (undated) DEVICE — GLV 8.5 PROTEXIS (WHITE)

## (undated) DEVICE — PACK TOTAL HIP

## (undated) DEVICE — DRSG COMBINE 5 X 9"

## (undated) DEVICE — SUT POLYSORB 0 30" GU-45

## (undated) DEVICE — XI CANNULA SEAL 5MM - 8 MM

## (undated) DEVICE — GOWN XL W TOWEL

## (undated) DEVICE — PLV-STRYKER GAMMA 3 TARGETING DEVICE: Type: DURABLE MEDICAL EQUIPMENT

## (undated) DEVICE — BAG SPONGE COUNTER EZ

## (undated) DEVICE — DRAPE 3/4 SHEET 52X76"

## (undated) DEVICE — HOOD FLYTE STRYKER HELMET SHIELD

## (undated) DEVICE — POSITIONER PATIENT SAFETY STRAP 3X60"

## (undated) DEVICE — CANISTER SUCTION LID GUARD 3000CC

## (undated) DEVICE — GLV 7.5 PROTEXIS (WHITE)

## (undated) DEVICE — DRSG DERMABOND 0.7ML

## (undated) DEVICE — XI DRAPE COLUMN

## (undated) DEVICE — SYR LUER LOK 50CC

## (undated) DEVICE — DRAPE 3/4 SHEET W REINFORCEMENT 56X77"

## (undated) DEVICE — PACK HIP FRACTURE

## (undated) DEVICE — ENDOCATCH 10MM

## (undated) DEVICE — POSITIONER STRAP ARMBOARD VELCRO TS-30

## (undated) DEVICE — STAPLER SKIN PROXIMATE

## (undated) DEVICE — DRAPE MAYO STAND 23"

## (undated) DEVICE — DRILL BIT SYNTHES ORTHO 4.2MM

## (undated) DEVICE — NDL SPINAL 18G X 3.5" (PINK)

## (undated) DEVICE — XI ENDOWRIST SUCTION IRRIGATOR 8MM

## (undated) DEVICE — DRAPE U (BLUE) 60 X 72"

## (undated) DEVICE — SOL IRR POUR H2O 1000ML

## (undated) DEVICE — SOL IRR POUR NS 0.9% 500ML

## (undated) DEVICE — POSITIONER POSITIONING ROLL  5X17"

## (undated) DEVICE — DRAPE HIP W POUCHES 87X115X134"

## (undated) DEVICE — SOL IRR POUR H2O 1500ML

## (undated) DEVICE — NDL HYPO REGULAR BEVEL 25G X 1.5" (BLUE)

## (undated) DEVICE — POSITIONER FOAM HEAD CRADLE (PINK)

## (undated) DEVICE — XI CORD BIPOLAR CAUTERY (BLUE)

## (undated) DEVICE — DRSG DERMABOND PRINEO 60CM

## (undated) DEVICE — SUT BIOSYN 2-0 30" C-14 UNDYED

## (undated) DEVICE — SOLIDIFIER CANN EXPRESS 3K

## (undated) DEVICE — SYNTHES REAMING ROD WITH BALL TIP 2.5MM 950MM

## (undated) DEVICE — SMOKE EVACUTATION SYS LAPROSCOPIC AC/PA

## (undated) DEVICE — DURABLE MEDICAL EQUIPMENT: Type: DURABLE MEDICAL EQUIPMENT

## (undated) DEVICE — ELCTR AQUAMANTYS BIPOLAR SEALER 6.0

## (undated) DEVICE — POSITIONER FOAM ABDUCTION PILLOW MED (PINK)

## (undated) DEVICE — ELCTR ROCKER SWITCH PENCIL BLUE 10FT

## (undated) DEVICE — PLV-CONSIGN STRYKER GAMMA OPTIONAL INST: Type: DURABLE MEDICAL EQUIPMENT

## (undated) DEVICE — DRSG SILVERLON ISLAND 4X10" 2X8" PAD

## (undated) DEVICE — SPECIMEN CONTAINER PET

## (undated) DEVICE — DRSG SILVERLON ISLAND 4X6" 2X4" PAD

## (undated) DEVICE — XI OBTURATOR OPTICAL BLADELESS 8MM

## (undated) DEVICE — PLV-STRYKER SYSTEM 8: Type: DURABLE MEDICAL EQUIPMENT

## (undated) DEVICE — PACK GENERAL LAPAROSCOPY

## (undated) DEVICE — XI ENDOWRIST 12 - 8 MM CANNULA REDUCER

## (undated) DEVICE — VENODYNE/SCD SLEEVE CALF LARGE

## (undated) DEVICE — GLV 8 PROTEXIS (WHITE)

## (undated) DEVICE — DRAPE C ARM UNIVERSAL

## (undated) DEVICE — SUT VICRYL PLUS 1 27" CP UNDYED

## (undated) DEVICE — BLADE SCALPEL SAFETYLOCK #15

## (undated) DEVICE — POSITIONER STIRRUP STRAP W SLIP RING 19X3.5"

## (undated) DEVICE — PLV-SCD MACHINE: Type: DURABLE MEDICAL EQUIPMENT

## (undated) DEVICE — TUBING STRYKER PNEUMOCLEAR HIGH FLOW

## (undated) DEVICE — GUIDEWIRE SYNTHES 3.2MM X 400MM

## (undated) DEVICE — XI DRAPE ARM

## (undated) DEVICE — SUT PDS II 0 27" OS-6

## (undated) DEVICE — D HELP - CLEARVIEW CLEARIFY SYSTEM

## (undated) DEVICE — SOL IRR BAG NS 0.9% 1000ML

## (undated) DEVICE — BLADE SCALPEL SAFETY #15 WITH PLASTIC GREEN HANDLE

## (undated) DEVICE — ELCTR BOVIE TIP BLADE INSULATED 2.75" EDGE

## (undated) DEVICE — XI 12MM AND STAPLER CANNULA SEAL

## (undated) DEVICE — XI CORD MONOPOLAR CAUTERY (GREEN)